# Patient Record
Sex: FEMALE | Race: BLACK OR AFRICAN AMERICAN | NOT HISPANIC OR LATINO | URBAN - METROPOLITAN AREA
[De-identification: names, ages, dates, MRNs, and addresses within clinical notes are randomized per-mention and may not be internally consistent; named-entity substitution may affect disease eponyms.]

---

## 2021-12-20 ENCOUNTER — ANESTHESIA EVENT (OUTPATIENT)
Dept: SURGERY | Facility: HOSPITAL | Age: 56
DRG: 907 | End: 2021-12-20
Payer: OTHER GOVERNMENT

## 2021-12-20 ENCOUNTER — HOSPITAL ENCOUNTER (INPATIENT)
Facility: HOSPITAL | Age: 56
LOS: 25 days | Discharge: NURSING, REHAB OR LONG-TERM CARE | DRG: 907 | End: 2022-01-14
Attending: SURGERY | Admitting: SURGERY
Payer: OTHER GOVERNMENT

## 2021-12-20 DIAGNOSIS — R58 HYPOTENSION DUE TO BLOOD LOSS: ICD-10-CM

## 2021-12-20 DIAGNOSIS — A41.9 SEPTIC SHOCK: ICD-10-CM

## 2021-12-20 DIAGNOSIS — D62 ACUTE BLOOD LOSS ANEMIA: ICD-10-CM

## 2021-12-20 DIAGNOSIS — R65.21 SEPTIC SHOCK: ICD-10-CM

## 2021-12-20 DIAGNOSIS — R00.0 INCREASED HEART RATE: ICD-10-CM

## 2021-12-20 DIAGNOSIS — R00.0 TACHYCARDIA: Primary | ICD-10-CM

## 2021-12-20 DIAGNOSIS — I95.89 HYPOTENSION DUE TO BLOOD LOSS: ICD-10-CM

## 2021-12-20 DIAGNOSIS — K76.89 SUBCAPSULAR HEMATOMA OF LIVER: ICD-10-CM

## 2021-12-20 DIAGNOSIS — Z99.11 ENCOUNTER FOR WEANING FROM VENTILATOR: ICD-10-CM

## 2021-12-20 DIAGNOSIS — N17.9 AKI (ACUTE KIDNEY INJURY): ICD-10-CM

## 2021-12-20 DIAGNOSIS — E87.5 HYPERKALEMIA: ICD-10-CM

## 2021-12-20 DIAGNOSIS — K63.1 BOWEL PERFORATION: ICD-10-CM

## 2021-12-20 LAB
ABO + RH BLD: NORMAL
ALBUMIN SERPL BCP-MCNC: 3.1 G/DL (ref 3.5–5.2)
ALBUMIN SERPL BCP-MCNC: 3.6 G/DL (ref 3.5–5.2)
ALBUMIN SERPL BCP-MCNC: 3.6 G/DL (ref 3.5–5.2)
ALLENS TEST: ABNORMAL
ALP SERPL-CCNC: 152 U/L (ref 55–135)
ALP SERPL-CCNC: 158 U/L (ref 55–135)
ALT SERPL W/O P-5'-P-CCNC: 321 U/L (ref 10–44)
ALT SERPL W/O P-5'-P-CCNC: 322 U/L (ref 10–44)
ANION GAP SERPL CALC-SCNC: 8 MMOL/L (ref 8–16)
ANION GAP SERPL CALC-SCNC: 9 MMOL/L (ref 8–16)
ANISOCYTOSIS BLD QL SMEAR: SLIGHT
ANISOCYTOSIS BLD QL SMEAR: SLIGHT
APTT BLDCRRT: 42.4 SEC (ref 21–32)
AST SERPL-CCNC: 846 U/L (ref 10–40)
AST SERPL-CCNC: 848 U/L (ref 10–40)
BASOPHILS # BLD AUTO: ABNORMAL K/UL (ref 0–0.2)
BASOPHILS # BLD AUTO: ABNORMAL K/UL (ref 0–0.2)
BASOPHILS NFR BLD: 0 % (ref 0–1.9)
BASOPHILS NFR BLD: 0 % (ref 0–1.9)
BILIRUB SERPL-MCNC: 4.8 MG/DL (ref 0.1–1)
BILIRUB SERPL-MCNC: 4.8 MG/DL (ref 0.1–1)
BLD GP AB SCN CELLS X3 SERPL QL: NORMAL
BLD PROD TYP BPU: NORMAL
BLD PROD TYP BPU: NORMAL
BLOOD UNIT EXPIRATION DATE: NORMAL
BLOOD UNIT EXPIRATION DATE: NORMAL
BLOOD UNIT TYPE CODE: 6200
BLOOD UNIT TYPE CODE: 6200
BLOOD UNIT TYPE: NORMAL
BLOOD UNIT TYPE: NORMAL
BUN SERPL-MCNC: 54 MG/DL (ref 6–20)
BUN SERPL-MCNC: 55 MG/DL (ref 6–20)
BUN SERPL-MCNC: 55 MG/DL (ref 6–20)
BUN SERPL-MCNC: 59 MG/DL (ref 6–20)
CA-I BLDV-SCNC: 1.05 MMOL/L (ref 1.06–1.42)
CALCIUM SERPL-MCNC: 7.9 MG/DL (ref 8.7–10.5)
CALCIUM SERPL-MCNC: 7.9 MG/DL (ref 8.7–10.5)
CALCIUM SERPL-MCNC: 8 MG/DL (ref 8.7–10.5)
CALCIUM SERPL-MCNC: 8.3 MG/DL (ref 8.7–10.5)
CHLORIDE SERPL-SCNC: 105 MMOL/L (ref 95–110)
CHLORIDE SERPL-SCNC: 106 MMOL/L (ref 95–110)
CO2 SERPL-SCNC: 20 MMOL/L (ref 23–29)
CO2 SERPL-SCNC: 22 MMOL/L (ref 23–29)
CO2 SERPL-SCNC: 23 MMOL/L (ref 23–29)
CO2 SERPL-SCNC: 23 MMOL/L (ref 23–29)
CODING SYSTEM: NORMAL
CODING SYSTEM: NORMAL
CREAT SERPL-MCNC: 2.9 MG/DL (ref 0.5–1.4)
CREAT SERPL-MCNC: 3.2 MG/DL (ref 0.5–1.4)
DELSYS: ABNORMAL
DIFFERENTIAL METHOD: ABNORMAL
DIFFERENTIAL METHOD: ABNORMAL
DISPENSE STATUS: NORMAL
DISPENSE STATUS: NORMAL
EOSINOPHIL # BLD AUTO: ABNORMAL K/UL (ref 0–0.5)
EOSINOPHIL # BLD AUTO: ABNORMAL K/UL (ref 0–0.5)
EOSINOPHIL NFR BLD: 1 % (ref 0–8)
EOSINOPHIL NFR BLD: 1 % (ref 0–8)
ERYTHROCYTE [DISTWIDTH] IN BLOOD BY AUTOMATED COUNT: 15.5 % (ref 11.5–14.5)
ERYTHROCYTE [DISTWIDTH] IN BLOOD BY AUTOMATED COUNT: 16.3 % (ref 11.5–14.5)
ERYTHROCYTE [SEDIMENTATION RATE] IN BLOOD BY WESTERGREN METHOD: 18 MM/H
EST. GFR  (AFRICAN AMERICAN): 18.1 ML/MIN/1.73 M^2
EST. GFR  (AFRICAN AMERICAN): 20.4 ML/MIN/1.73 M^2
EST. GFR  (NON AFRICAN AMERICAN): 15.7 ML/MIN/1.73 M^2
EST. GFR  (NON AFRICAN AMERICAN): 17.7 ML/MIN/1.73 M^2
GLUCOSE SERPL-MCNC: 114 MG/DL (ref 70–110)
GLUCOSE SERPL-MCNC: 118 MG/DL (ref 70–110)
GLUCOSE SERPL-MCNC: 118 MG/DL (ref 70–110)
GLUCOSE SERPL-MCNC: 90 MG/DL (ref 70–110)
HCO3 UR-SCNC: 23.1 MMOL/L (ref 24–28)
HCT VFR BLD AUTO: 20.9 % (ref 37–48.5)
HCT VFR BLD AUTO: 29.9 % (ref 37–48.5)
HGB BLD-MCNC: 10.5 G/DL (ref 12–16)
HGB BLD-MCNC: 7.3 G/DL (ref 12–16)
IMM GRANULOCYTES # BLD AUTO: ABNORMAL K/UL (ref 0–0.04)
IMM GRANULOCYTES # BLD AUTO: ABNORMAL K/UL (ref 0–0.04)
IMM GRANULOCYTES NFR BLD AUTO: ABNORMAL % (ref 0–0.5)
IMM GRANULOCYTES NFR BLD AUTO: ABNORMAL % (ref 0–0.5)
INR PPP: 1.3 (ref 0.8–1.2)
LACTATE SERPL-SCNC: 1.1 MMOL/L (ref 0.5–2.2)
LYMPHOCYTES # BLD AUTO: ABNORMAL K/UL (ref 1–4.8)
LYMPHOCYTES # BLD AUTO: ABNORMAL K/UL (ref 1–4.8)
LYMPHOCYTES NFR BLD: 14 % (ref 18–48)
LYMPHOCYTES NFR BLD: 4 % (ref 18–48)
MAGNESIUM SERPL-MCNC: 2.3 MG/DL (ref 1.6–2.6)
MAGNESIUM SERPL-MCNC: 2.4 MG/DL (ref 1.6–2.6)
MAGNESIUM SERPL-MCNC: 2.4 MG/DL (ref 1.6–2.6)
MCH RBC QN AUTO: 30.4 PG (ref 27–31)
MCH RBC QN AUTO: 30.8 PG (ref 27–31)
MCHC RBC AUTO-ENTMCNC: 34.9 G/DL (ref 32–36)
MCHC RBC AUTO-ENTMCNC: 35.1 G/DL (ref 32–36)
MCV RBC AUTO: 87 FL (ref 82–98)
MCV RBC AUTO: 88 FL (ref 82–98)
METAMYELOCYTES NFR BLD MANUAL: 2 %
METAMYELOCYTES NFR BLD MANUAL: 5 %
MODE: ABNORMAL
MONOCYTES # BLD AUTO: ABNORMAL K/UL (ref 0.3–1)
MONOCYTES # BLD AUTO: ABNORMAL K/UL (ref 0.3–1)
MONOCYTES NFR BLD: 3 % (ref 4–15)
MONOCYTES NFR BLD: 9 % (ref 4–15)
MYELOCYTES NFR BLD MANUAL: 2 %
MYELOCYTES NFR BLD MANUAL: 2 %
NEUTROPHILS NFR BLD: 64 % (ref 38–73)
NEUTROPHILS NFR BLD: 70 % (ref 38–73)
NEUTS BAND NFR BLD MANUAL: 11 %
NEUTS BAND NFR BLD MANUAL: 11 %
NRBC BLD-RTO: 2 /100 WBC
NRBC BLD-RTO: 2 /100 WBC
NUM UNITS TRANS PACKED RBC: NORMAL
NUM UNITS TRANS PACKED RBC: NORMAL
OVALOCYTES BLD QL SMEAR: ABNORMAL
PCO2 BLDA: 42.9 MMHG (ref 35–45)
PEEP: 5
PH SMN: 7.34 [PH] (ref 7.35–7.45)
PHOSPHATE SERPL-MCNC: 2.8 MG/DL (ref 2.7–4.5)
PLATELET # BLD AUTO: 101 K/UL (ref 150–450)
PLATELET # BLD AUTO: 109 K/UL (ref 150–450)
PLATELET BLD QL SMEAR: ABNORMAL
PLATELET BLD QL SMEAR: ABNORMAL
PMV BLD AUTO: 12.2 FL (ref 9.2–12.9)
PMV BLD AUTO: 12.2 FL (ref 9.2–12.9)
PO2 BLDA: 74 MMHG (ref 80–100)
POC BE: -3 MMOL/L
POC SATURATED O2: 94 % (ref 95–100)
POC TCO2: 24 MMOL/L (ref 23–27)
POCT GLUCOSE: 114 MG/DL (ref 70–110)
POCT GLUCOSE: 204 MG/DL (ref 70–110)
POCT GLUCOSE: 52 MG/DL (ref 70–110)
POCT GLUCOSE: 64 MG/DL (ref 70–110)
POCT GLUCOSE: 75 MG/DL (ref 70–110)
POCT GLUCOSE: 89 MG/DL (ref 70–110)
POCT GLUCOSE: 96 MG/DL (ref 70–110)
POIKILOCYTOSIS BLD QL SMEAR: SLIGHT
POLYCHROMASIA BLD QL SMEAR: ABNORMAL
POLYCHROMASIA BLD QL SMEAR: ABNORMAL
POTASSIUM SERPL-SCNC: 4.4 MMOL/L (ref 3.5–5.1)
POTASSIUM SERPL-SCNC: 4.5 MMOL/L (ref 3.5–5.1)
POTASSIUM SERPL-SCNC: 4.5 MMOL/L (ref 3.5–5.1)
POTASSIUM SERPL-SCNC: 4.7 MMOL/L (ref 3.5–5.1)
PROT SERPL-MCNC: 5.1 G/DL (ref 6–8.4)
PROT SERPL-MCNC: 5.4 G/DL (ref 6–8.4)
PROTHROMBIN TIME: 14.5 SEC (ref 9–12.5)
RBC # BLD AUTO: 2.4 M/UL (ref 4–5.4)
RBC # BLD AUTO: 3.41 M/UL (ref 4–5.4)
SAMPLE: ABNORMAL
SITE: ABNORMAL
SODIUM SERPL-SCNC: 135 MMOL/L (ref 136–145)
SODIUM SERPL-SCNC: 135 MMOL/L (ref 136–145)
SODIUM SERPL-SCNC: 136 MMOL/L (ref 136–145)
SODIUM SERPL-SCNC: 136 MMOL/L (ref 136–145)
TRIGL SERPL-MCNC: 53 MG/DL (ref 30–150)
VT: 395
WBC # BLD AUTO: 16.5 K/UL (ref 3.9–12.7)
WBC # BLD AUTO: 17.45 K/UL (ref 3.9–12.7)
WBC OTHER NFR BLD MANUAL: 1 %

## 2021-12-20 PROCEDURE — 99291 CRITICAL CARE FIRST HOUR: CPT | Mod: ,,, | Performed by: SURGERY

## 2021-12-20 PROCEDURE — 83735 ASSAY OF MAGNESIUM: CPT | Mod: 91 | Performed by: SURGERY

## 2021-12-20 PROCEDURE — P9016 RBC LEUKOCYTES REDUCED: HCPCS | Performed by: SURGERY

## 2021-12-20 PROCEDURE — 84100 ASSAY OF PHOSPHORUS: CPT | Mod: 91 | Performed by: SURGERY

## 2021-12-20 PROCEDURE — 86901 BLOOD TYPING SEROLOGIC RH(D): CPT | Performed by: SURGERY

## 2021-12-20 PROCEDURE — 85060 BLOOD SMEAR INTERPRETATION: CPT | Mod: ,,, | Performed by: PATHOLOGY

## 2021-12-20 PROCEDURE — 85007 BL SMEAR W/DIFF WBC COUNT: CPT | Mod: 91

## 2021-12-20 PROCEDURE — 80053 COMPREHEN METABOLIC PANEL: CPT

## 2021-12-20 PROCEDURE — 82330 ASSAY OF CALCIUM: CPT | Performed by: SURGERY

## 2021-12-20 PROCEDURE — 25000003 PHARM REV CODE 250: Performed by: INTERNAL MEDICINE

## 2021-12-20 PROCEDURE — P9016 RBC LEUKOCYTES REDUCED: HCPCS

## 2021-12-20 PROCEDURE — 63600175 PHARM REV CODE 636 W HCPCS

## 2021-12-20 PROCEDURE — 99291 PR CRITICAL CARE, E/M 30-74 MINUTES: ICD-10-PCS | Mod: ,,, | Performed by: SURGERY

## 2021-12-20 PROCEDURE — 84100 ASSAY OF PHOSPHORUS: CPT

## 2021-12-20 PROCEDURE — 86920 COMPATIBILITY TEST SPIN: CPT

## 2021-12-20 PROCEDURE — 27000221 HC OXYGEN, UP TO 24 HOURS

## 2021-12-20 PROCEDURE — 83735 ASSAY OF MAGNESIUM: CPT | Mod: 91 | Performed by: STUDENT IN AN ORGANIZED HEALTH CARE EDUCATION/TRAINING PROGRAM

## 2021-12-20 PROCEDURE — 36430 TRANSFUSION BLD/BLD COMPNT: CPT

## 2021-12-20 PROCEDURE — 86920 COMPATIBILITY TEST SPIN: CPT | Performed by: STUDENT IN AN ORGANIZED HEALTH CARE EDUCATION/TRAINING PROGRAM

## 2021-12-20 PROCEDURE — 25000003 PHARM REV CODE 250

## 2021-12-20 PROCEDURE — 80069 RENAL FUNCTION PANEL: CPT | Performed by: STUDENT IN AN ORGANIZED HEALTH CARE EDUCATION/TRAINING PROGRAM

## 2021-12-20 PROCEDURE — 63600175 PHARM REV CODE 636 W HCPCS: Mod: JG

## 2021-12-20 PROCEDURE — 20000000 HC ICU ROOM

## 2021-12-20 PROCEDURE — 94002 VENT MGMT INPAT INIT DAY: CPT

## 2021-12-20 PROCEDURE — 37799 UNLISTED PX VASCULAR SURGERY: CPT

## 2021-12-20 PROCEDURE — 85007 BL SMEAR W/DIFF WBC COUNT: CPT | Performed by: SURGERY

## 2021-12-20 PROCEDURE — 85610 PROTHROMBIN TIME: CPT | Performed by: SURGERY

## 2021-12-20 PROCEDURE — 85027 COMPLETE CBC AUTOMATED: CPT | Mod: 91

## 2021-12-20 PROCEDURE — 94761 N-INVAS EAR/PLS OXIMETRY MLT: CPT

## 2021-12-20 PROCEDURE — 25000003 PHARM REV CODE 250: Performed by: SURGERY

## 2021-12-20 PROCEDURE — 80053 COMPREHEN METABOLIC PANEL: CPT | Mod: 91 | Performed by: SURGERY

## 2021-12-20 PROCEDURE — 83605 ASSAY OF LACTIC ACID: CPT | Performed by: SURGERY

## 2021-12-20 PROCEDURE — 83735 ASSAY OF MAGNESIUM: CPT

## 2021-12-20 PROCEDURE — 85730 THROMBOPLASTIN TIME PARTIAL: CPT | Performed by: SURGERY

## 2021-12-20 PROCEDURE — 82803 BLOOD GASES ANY COMBINATION: CPT

## 2021-12-20 PROCEDURE — 86920 COMPATIBILITY TEST SPIN: CPT | Performed by: SURGERY

## 2021-12-20 PROCEDURE — 85027 COMPLETE CBC AUTOMATED: CPT | Performed by: SURGERY

## 2021-12-20 PROCEDURE — 84478 ASSAY OF TRIGLYCERIDES: CPT | Performed by: SURGERY

## 2021-12-20 PROCEDURE — 36415 COLL VENOUS BLD VENIPUNCTURE: CPT | Performed by: SURGERY

## 2021-12-20 PROCEDURE — 85060 PATHOLOGIST REVIEW: ICD-10-PCS | Mod: ,,, | Performed by: PATHOLOGY

## 2021-12-20 PROCEDURE — 99900035 HC TECH TIME PER 15 MIN (STAT)

## 2021-12-20 RX ORDER — MUPIROCIN 20 MG/G
OINTMENT TOPICAL 2 TIMES DAILY
Status: COMPLETED | OUTPATIENT
Start: 2021-12-20 | End: 2021-12-25

## 2021-12-20 RX ORDER — MAGNESIUM SULFATE HEPTAHYDRATE 40 MG/ML
4 INJECTION, SOLUTION INTRAVENOUS
Status: DISCONTINUED | OUTPATIENT
Start: 2021-12-20 | End: 2021-12-21

## 2021-12-20 RX ORDER — FENTANYL CITRATE 50 UG/ML
INJECTION, SOLUTION INTRAMUSCULAR; INTRAVENOUS
Status: COMPLETED
Start: 2021-12-20 | End: 2021-12-20

## 2021-12-20 RX ORDER — NOREPINEPHRINE BITARTRATE/D5W 4MG/250ML
0-3 PLASTIC BAG, INJECTION (ML) INTRAVENOUS CONTINUOUS
Status: DISCONTINUED | OUTPATIENT
Start: 2021-12-20 | End: 2021-12-26

## 2021-12-20 RX ORDER — POTASSIUM CHLORIDE 29.8 MG/ML
80 INJECTION INTRAVENOUS
Status: DISCONTINUED | OUTPATIENT
Start: 2021-12-20 | End: 2021-12-21

## 2021-12-20 RX ORDER — FENTANYL CITRATE-0.9 % NACL/PF 10 MCG/ML
0-250 PLASTIC BAG, INJECTION (ML) INTRAVENOUS CONTINUOUS
Status: DISCONTINUED | OUTPATIENT
Start: 2021-12-20 | End: 2021-12-22

## 2021-12-20 RX ORDER — MAGNESIUM SULFATE HEPTAHYDRATE 40 MG/ML
2 INJECTION, SOLUTION INTRAVENOUS
Status: ACTIVE | OUTPATIENT
Start: 2021-12-20 | End: 2021-12-21

## 2021-12-20 RX ORDER — HYDROCODONE BITARTRATE AND ACETAMINOPHEN 500; 5 MG/1; MG/1
TABLET ORAL CONTINUOUS
Status: DISCONTINUED | OUTPATIENT
Start: 2021-12-20 | End: 2022-01-07

## 2021-12-20 RX ORDER — FAMOTIDINE 10 MG/ML
20 INJECTION INTRAVENOUS EVERY 12 HOURS
Status: DISCONTINUED | OUTPATIENT
Start: 2021-12-20 | End: 2021-12-21

## 2021-12-20 RX ORDER — HYDROCODONE BITARTRATE AND ACETAMINOPHEN 500; 5 MG/1; MG/1
TABLET ORAL
Status: DISCONTINUED | OUTPATIENT
Start: 2021-12-20 | End: 2021-12-22

## 2021-12-20 RX ORDER — POTASSIUM CHLORIDE 14.9 MG/ML
60 INJECTION INTRAVENOUS
Status: DISCONTINUED | OUTPATIENT
Start: 2021-12-20 | End: 2021-12-21

## 2021-12-20 RX ORDER — SODIUM CHLORIDE 0.9 % (FLUSH) 0.9 %
10 SYRINGE (ML) INJECTION
Status: DISCONTINUED | OUTPATIENT
Start: 2021-12-20 | End: 2022-01-07

## 2021-12-20 RX ORDER — MAGNESIUM SULFATE HEPTAHYDRATE 40 MG/ML
2 INJECTION, SOLUTION INTRAVENOUS
Status: DISCONTINUED | OUTPATIENT
Start: 2021-12-20 | End: 2021-12-21

## 2021-12-20 RX ORDER — DEXMEDETOMIDINE HYDROCHLORIDE 4 UG/ML
0-1.4 INJECTION, SOLUTION INTRAVENOUS CONTINUOUS
Status: DISCONTINUED | OUTPATIENT
Start: 2021-12-20 | End: 2021-12-27

## 2021-12-20 RX ORDER — POTASSIUM CHLORIDE 29.8 MG/ML
40 INJECTION INTRAVENOUS
Status: DISCONTINUED | OUTPATIENT
Start: 2021-12-20 | End: 2021-12-21

## 2021-12-20 RX ORDER — SODIUM CHLORIDE, SODIUM LACTATE, POTASSIUM CHLORIDE, CALCIUM CHLORIDE 600; 310; 30; 20 MG/100ML; MG/100ML; MG/100ML; MG/100ML
INJECTION, SOLUTION INTRAVENOUS CONTINUOUS
Status: DISCONTINUED | OUTPATIENT
Start: 2021-12-20 | End: 2021-12-21

## 2021-12-20 RX ADMIN — MUPIROCIN: 20 OINTMENT TOPICAL at 08:12

## 2021-12-20 RX ADMIN — FAMOTIDINE 20 MG: 10 INJECTION INTRAVENOUS at 08:12

## 2021-12-20 RX ADMIN — ALTEPLASE 2 MG: 2.2 INJECTION, POWDER, LYOPHILIZED, FOR SOLUTION INTRAVENOUS at 09:12

## 2021-12-20 RX ADMIN — SODIUM CHLORIDE, SODIUM LACTATE, POTASSIUM CHLORIDE, AND CALCIUM CHLORIDE: .6; .31; .03; .02 INJECTION, SOLUTION INTRAVENOUS at 03:12

## 2021-12-20 RX ADMIN — ALTEPLASE 2 MG: 2.2 INJECTION, POWDER, LYOPHILIZED, FOR SOLUTION INTRAVENOUS at 11:12

## 2021-12-20 RX ADMIN — SODIUM CHLORIDE: 0.9 INJECTION, SOLUTION INTRAVENOUS at 08:12

## 2021-12-20 RX ADMIN — CALCIUM GLUCONATE 1 G: 98 INJECTION, SOLUTION INTRAVENOUS at 04:12

## 2021-12-20 RX ADMIN — NITROGLYCERIN 0.5 INCH: 20 OINTMENT TOPICAL at 09:12

## 2021-12-20 RX ADMIN — DEXTROSE MONOHYDRATE 25 G: 25 INJECTION, SOLUTION INTRAVENOUS at 03:12

## 2021-12-20 RX ADMIN — PIPERACILLIN SODIUM AND TAZOBACTAM SODIUM 4.5 G: 4; .5 INJECTION, POWDER, FOR SOLUTION INTRAVENOUS at 04:12

## 2021-12-20 RX ADMIN — NOREPINEPHRINE BITARTRATE 0.02 MCG/KG/MIN: 4 INJECTION, SOLUTION INTRAVENOUS at 08:12

## 2021-12-20 NOTE — H&P
[facility] - Surgical Intensive Care  Critical Care - Surgery  History & Physical    Patient Name: Chidi Castaneda  MRN: [id number]  Admission Date: 12/20/2021  Code Status: Full Code  Attending Physician: Kendall Gorman MD   Primary Care Provider: Primary Doctor No   Principal Problem: <principal problem not specified>    Subjective:     HPI:  Ms. Castaneda is a 57yo F with PMH of sleep apnea, GERD, DVT not on antiboagulation, diverticulitis, vaughn's esophagus, HFrEF (last EF 40%) and a PSH of elective laparoscopic hysterectomy on 12/8.    On 12/10 she was transferred from [facility] to [facility] for acute renal failure, hypotension and tachycardia. A CT scan was done at the OSH that showed 2 small bowel perforations. She went for an expoloratory laparotomy on 12/11 at which time 16 inches of small bowel were removed, abdomen left open with abthera vac in place. Started on SLED on 12/12 for her SUSAN. On 12/15 found to have a subcapsular liver hematoma. On 12/18 she was taken back tot he OR for Wound vac change and washout with removal of liver hematoma.  She continued to have sanguinous to serosanguinous output from her wound vac. She was given TXA on 12/19 without success. 12/20 underwent HD. She has receveived a total of 9 units pRBC, 4 units FFP, and 1 unit of platelets since her admission to the OSH.     She has been transferred from [facility] for embolization vs OR takeback of her suspected hepatic bleed and higher level of care in the intensive care unit.      Hospital/ICU Course:  No notes on file    Follow-up For: Procedure(s) (LRB):  LAPAROTOMY, EXPLORATORY (N/A)    Post-Operative Day:       No past medical history on file.    No past surgical history on file.    Review of patient's allergies indicates:   Allergen Reactions    Oxycodone     Tylox [oxycodone-acetaminophen]        Family History    None       Tobacco Use    Smoking status: Not on file    Smokeless tobacco: Not on file    Substance and Sexual Activity    Alcohol use: Not on file    Drug use: Not on file    Sexual activity: Not on file      Review of Systems Patient is intubated. Negative Except per HPI.  Objective:     Vital Signs (Most Recent):  Temp: 96.62 °F (35.9 °C) (12/20/21 1515)  Pulse: 98 (12/20/21 1515)  Resp: 20 (12/20/21 1515)  BP: (!) 109/59 (12/20/21 1509)  SpO2: 99 % (12/20/21 1515) Vital Signs (24h Range):  Temp:  [96.62 °F (35.9 °C)-98.8 °F (37.1 °C)] 96.62 °F (35.9 °C)  Pulse:  [] 98  Resp:  [18-22] 20  SpO2:  [98 %-100 %] 99 %  BP: (103-142)/(50-66) 109/59  Arterial Line BP: (133-160)/(59-65) 133/59     Weight: 97.5 kg (214 lb 15.2 oz)  There is no height or weight on file to calculate BMI.    No intake or output data in the 24 hours ending 12/20/21 1613    Physical Exam  Vitals and nursing note reviewed.   Constitutional:       Appearance: She is obese. She is ill-appearing.   HENT:      Head: Normocephalic and atraumatic.      Nose:      Comments: NG tube in place  Cardiovascular:      Rate and Rhythm: Normal rate and regular rhythm.      Pulses: Normal pulses.   Pulmonary:      Comments: Intubated  Vent Mode: A/C  Oxygen Concentration (%):  (40-50) 50  Resp Rate Total:  (18 br/min-20 br/min) 20 br/min  Vt Set:  (395 mL) 395 mL  PEEP/CPAP:  (5 cmH20) 5 cmH20  Mean Airway Pressure:  (12 cmH20) 12 cmH20    Abdominal:      Comments: Abdomen is open. There is an abdominal wound vac in place to continuous suction draining serosanguinous to sanguinous output.   Musculoskeletal:      Right lower leg: Edema present.      Left lower leg: Edema present.      Comments: L radial larry, L femoral trialysis 16fr, L subclavian triple lumen   Skin:     General: Skin is warm and dry.   Neurological:      Comments: Does not respond to commands         Vents:  Vent Mode: A/C (12/20/21 1509)  Ventilator Initiated: Yes (12/20/21 1448)  Set Rate: 18 BPM (12/20/21 1509)  Vt Set: 395 mL (12/20/21 1509)  PEEP/CPAP: 5 cmH20  (12/20/21 1509)  Oxygen Concentration (%): 50 (12/20/21 1515)  Peak Airway Pressure: 40 cmH2O (12/20/21 1509)  Plateau Pressure: 0 cmH20 (12/20/21 1509)  Total Ve: 7.22 mL (12/20/21 1509)  Negative Inspiratory Force (cm H2O): 0 (12/20/21 1509)  F/VT Ratio<105 (RSBI): (!) 44.67 (12/20/21 1509)    Lines/Drains/Airways     Central Venous Catheter Line            Percutaneous Central Line Insertion/Assessment - Triple Lumen  12/16/21 1531 left subclavian 4 days    Trialysis (Dialysis) Catheter 12/16/21 0000 left femoral 4 days          Drain                 NG/OG Tube 12/16/21 0000 Left nostril 4 days          Airway                 Airway - Non-Surgical 12/20/21 1451 Endotracheal Tube <1 day          Arterial Line            Arterial Line 12/18/21 0000 Right Radial 2 days                Significant Labs:    CBC/Anemia Profile:  Recent Labs   Lab 12/20/21  1454   WBC 17.45*   HGB 7.3*   HCT 20.9*   *   MCV 87   RDW 15.5*        Chemistries:  Recent Labs   Lab 12/20/21  1454   *   K 4.4      CO2 22*   BUN 54*   CREATININE 2.9*   CALCIUM 8.0*   ALBUMIN 3.6   PROT 5.4*   BILITOT 4.8*   ALKPHOS 152*   *   *   MG 2.4   PHOS 2.8       All pertinent labs within the past 24 hours have been reviewed.    Significant Imaging: I have reviewed all pertinent imaging results/findings within the past 24 hours.    Assessment/Plan:     Subcapsular hematoma of liver    Neuro/Psych:   -- Does not respond to commands, this is unchanged from OSH report   --Sedation/Pain: precedex, fentanyl, currently off sedation             Cards:   -- Pressor requirements on arrival 0.03 vaso, levophed 0.06, currently off pressors  -- Transfused 1u prior to arrival, Hgb at that point 6.9, repeat 7.3  -- LR @ 125cc/hr      Pulm:   -- Goal O2 sat > 90%  -- Intubated, currently on 50/6, satting well      Renal:  -- Keep billingsley for strict I/O  -- Currently has SUSAN - was received SLED at OSH, received HD today for  transfer  -- BUN/Cr elevated, trending  -- Nephrology consult today      FEN / GI:   -- Replace lytes as needed  -- Nutrition: strict NPO  -- LR @ 125cc/hr      ID:   -- Tm: afebrile; WBC 17.45   -- Abx on zosyn      Heme/Onc:   -- Since hosptial admission on 12/10 has received 9u pRBC, 4u FFP, 1u platelets  -- H/H 7.3 from 6.9, transfusing 2 units now  -- Q12 CBC      Endo:   -- Gluc goal 140-180  -- no history of diabetes      PPx:   Feeding: NPO  Analgesia/Sedation: Fentanyl/Versed  Thromboembolic prevention: Holding  HOB >30: yes  Stress Ulcer ppx: Famotidine  Glucose control: Critical care goal 140-180 g/dl, ISS    Lines/Drains/Airway: NG, Ng, L radial larry, L femoral trialysis 16fr, L subclavian triple lumen      Dispo/Code Status/Palliative:   -- SICU / Full Code       Critical secondary to Patient has a condition that poses threat to life and bodily function: Acute Renal Failure, Acute blood loss anemia     Critical care was time spent personally by me on the following activities: development of treatment plan with patient or surrogate and bedside caregivers, discussions with consultants, evaluation of patient's response to treatment, examination of patient, ordering and performing treatments and interventions, ordering and review of laboratory studies, ordering and review of radiographic studies, pulse oximetry, re-evaluation of patient's condition.  This critical care time did not overlap with that of any other provider or involve time for any procedures.     Jaylan Altman MD  Critical Care - Surgery  [facility] - Surgical Intensive Care

## 2021-12-20 NOTE — CARE UPDATE
FLIGHT CARE TRANSPORT NOTE     Date of Transport: 2021  : 1965  Age: 56 y.o.  Medication Dosing Weight: 88kg  Sex: female  Race: Unknown    MRN: [id number]  Time Of Patient Handoff: 1445    ASSESSMENT/INTERVENTIONS     This patient was transported by [facility] from the ICU of [facility] by [facility]. The patient's overall condition remained unchanged throughout transport, with all vital signs remaining stable per the patient's current baseline. All lines, tubes, and devices remained patent and intact.     LDAs:  7.5 ETT - 20cm @ the teeth,  NG tube - L. Nare,  Midline - L. Upper Arm,  Right Radial Arterial Line,  Left Subclavian TLC,  R. Groin HD TLC,  Indwelling Ng Catheter,  LIWS with 'open' abdomen - 300mL sanguinous output en route.     GTTS:  Vaso gtt @ 0.03units/min,  Fentanyl gtt @ 150mcg/hr,  Versed gtt @ 6mg/hr.    Levo gtt DC'd en route s/t MAP >65.    POCT CB.    The patient was transferred from the [facility] stretcher to SICU bed 13242 where care was transitioned to Bedside [facility] without incident. The patient was sent without personal belongings (sent with family) and OSH Chart and Diagnostic Disk(s) were left with receiving clinician.     Vital signs prior to  departure:  HR: 105 ST, no ectopy,  BP: 111/89 (96) - Automatic  BP: 143/53 (71) - Arterial  SpO2: 100%  EtCO2: 40  RR: 20  Temperature: 98.8 Axillary    Ventilator Settings:  AC  VT: 280mL (from receiving s/t 'lung protective measures')  PEEP: 5.0  FiO2: 40%  RR: 20    Personally contacted family:  Columba Castaneda (): [phone] - and updated on safe arrival. No questions noted.  Mahad Castaneda (Son): [phone].    FOLLOW-UP     Call [facility], Yarely Cao RN at [phone] (Adult Team) or [phone] (Pediatric Team) for additional questions or concerns.

## 2021-12-20 NOTE — SUBJECTIVE & OBJECTIVE
Follow-up For: Procedure(s) (LRB):  LAPAROTOMY, EXPLORATORY (N/A)    Post-Operative Day:       No past medical history on file.    No past surgical history on file.    Review of patient's allergies indicates:   Allergen Reactions    Oxycodone     Tylox [oxycodone-acetaminophen]        Family History    None       Tobacco Use    Smoking status: Not on file    Smokeless tobacco: Not on file   Substance and Sexual Activity    Alcohol use: Not on file    Drug use: Not on file    Sexual activity: Not on file      Review of Systems Patient is intubated. Negative Except per HPI.  Objective:     Vital Signs (Most Recent):  Temp: 96.62 °F (35.9 °C) (12/20/21 1515)  Pulse: 98 (12/20/21 1515)  Resp: 20 (12/20/21 1515)  BP: (!) 109/59 (12/20/21 1509)  SpO2: 99 % (12/20/21 1515) Vital Signs (24h Range):  Temp:  [96.62 °F (35.9 °C)-98.8 °F (37.1 °C)] 96.62 °F (35.9 °C)  Pulse:  [] 98  Resp:  [18-22] 20  SpO2:  [98 %-100 %] 99 %  BP: (103-142)/(50-66) 109/59  Arterial Line BP: (133-160)/(59-65) 133/59     Weight: 97.5 kg (214 lb 15.2 oz)  There is no height or weight on file to calculate BMI.    No intake or output data in the 24 hours ending 12/20/21 1613    Physical Exam  Vitals and nursing note reviewed.   Constitutional:       Appearance: She is obese. She is ill-appearing.   HENT:      Head: Normocephalic and atraumatic.      Nose:      Comments: NG tube in place  Cardiovascular:      Rate and Rhythm: Normal rate and regular rhythm.      Pulses: Normal pulses.   Pulmonary:      Comments: Intubated  Vent Mode: A/C  Oxygen Concentration (%):  (40-50) 50  Resp Rate Total:  (18 br/min-20 br/min) 20 br/min  Vt Set:  (395 mL) 395 mL  PEEP/CPAP:  (5 cmH20) 5 cmH20  Mean Airway Pressure:  (12 cmH20) 12 cmH20    Abdominal:      Comments: Abdomen is open. There is an abdominal wound vac in place to continuous suction draining serosanguinous to sanguinous output.   Musculoskeletal:      Right lower leg: Edema present.       Left lower leg: Edema present.      Comments: L radial larry, L femoral trialysis 16fr, L subclavian triple lumen   Skin:     General: Skin is warm and dry.   Neurological:      Comments: Does not respond to commands         Vents:  Vent Mode: A/C (12/20/21 1509)  Ventilator Initiated: Yes (12/20/21 1448)  Set Rate: 18 BPM (12/20/21 1509)  Vt Set: 395 mL (12/20/21 1509)  PEEP/CPAP: 5 cmH20 (12/20/21 1509)  Oxygen Concentration (%): 50 (12/20/21 1515)  Peak Airway Pressure: 40 cmH2O (12/20/21 1509)  Plateau Pressure: 0 cmH20 (12/20/21 1509)  Total Ve: 7.22 mL (12/20/21 1509)  Negative Inspiratory Force (cm H2O): 0 (12/20/21 1509)  F/VT Ratio<105 (RSBI): (!) 44.67 (12/20/21 1509)    Lines/Drains/Airways     Central Venous Catheter Line            Percutaneous Central Line Insertion/Assessment - Triple Lumen  12/16/21 1531 left subclavian 4 days    Trialysis (Dialysis) Catheter 12/16/21 0000 left femoral 4 days          Drain                 NG/OG Tube 12/16/21 0000 Left nostril 4 days          Airway                 Airway - Non-Surgical 12/20/21 1451 Endotracheal Tube <1 day          Arterial Line            Arterial Line 12/18/21 0000 Right Radial 2 days                Significant Labs:    CBC/Anemia Profile:  Recent Labs   Lab 12/20/21  1454   WBC 17.45*   HGB 7.3*   HCT 20.9*   *   MCV 87   RDW 15.5*        Chemistries:  Recent Labs   Lab 12/20/21  1454   *   K 4.4      CO2 22*   BUN 54*   CREATININE 2.9*   CALCIUM 8.0*   ALBUMIN 3.6   PROT 5.4*   BILITOT 4.8*   ALKPHOS 152*   *   *   MG 2.4   PHOS 2.8       All pertinent labs within the past 24 hours have been reviewed.    Significant Imaging: I have reviewed all pertinent imaging results/findings within the past 24 hours.

## 2021-12-20 NOTE — CARE UPDATE
Received patient from outside facility and placed on a  vent on documented settings.    ETT is placed at 20cm at the teeth.  Will continue to monitor

## 2021-12-20 NOTE — PROGRESS NOTES
Patient admitted to SICU [id number]. Report received from CHASIDY Santamaria RN at [facility], report received from flight nurses. Respiratory/charge/SICU team notified of arrival. Pt placed on ventilator by RT, continuous cardiac monitor. Fully assessed, see flowsheet. MD orders placed and carried out. Orders for 2 unit PRBC. WCTM.

## 2021-12-20 NOTE — ASSESSMENT & PLAN NOTE
  Neuro/Psych:   -- Does not respond to commands, this is unchanged from OSH report   --Sedation/Pain: precedex, fentanyl, currently off sedation             Cards:   -- Pressor requirements on arrival 0.03 vaso, levophed 0.06, currently off pressors  -- Transfused 1u prior to arrival, Hgb at that point 6.9, repeat 7.3  -- LR @ 125cc/hr      Pulm:   -- Goal O2 sat > 90%  -- Intubated, currently on 50/6, satting well      Renal:  -- Keep billingsley for strict I/O  -- Currently has SUSAN - was received SLED at OSH, received HD today for transfer  -- BUN/Cr elevated, trending  -- Nephrology consult today      FEN / GI:   -- Replace lytes as needed  -- Nutrition: strict NPO  -- LR @ 125cc/hr      ID:   -- Tm: afebrile; WBC 17.45   -- Abx on zosyn      Heme/Onc:   -- Since hosptial admission on 12/10 has received 9u pRBC, 4u FFP, 1u platelets  -- H/H 7.3 from 6.9, transfusing 2 units now  -- Q12 CBC      Endo:   -- Gluc goal 140-180  -- no history of diabetes      PPx:   Feeding: NPO  Analgesia/Sedation: Fentanyl/Versed  Thromboembolic prevention: Holding  HOB >30: yes  Stress Ulcer ppx: Famotidine  Glucose control: Critical care goal 140-180 g/dl, ISS    Lines/Drains/Airway: NG, Billingsley, L radial larry, L femoral trialysis 16fr, L subclavian triple lumen      Dispo/Code Status/Palliative:   -- SICU / Full Code

## 2021-12-20 NOTE — HPI
Ms. Castaneda is a 57yo F with PMH of sleep apnea, GERD, DVT not on antiboagulation, diverticulitis, vaughn's esophagus, HFrEF (last EF 40%) and a PSH of elective laparoscopic hysterectomy on 12/8.    On 12/10 she was transferred from [facility] to [facility] for acute renal failure, hypotension and tachycardia. A CT scan was done at the OSH that showed 2 small bowel perforations. She went for an expoloratory laparotomy on 12/11 at which time 16 inches of small bowel were removed, abdomen left open with abthera vac in place. Started on SLED on 12/12 for her SUSAN. On 12/15 found to have a subcapsular liver hematoma. On 12/18 she was taken back tot he OR for Wound vac change and washout with removal of liver hematoma.  She continued to have sanguinous to serosanguinous output from her wound vac. She was given TXA on 12/19 without success. 12/20 underwent HD. She has receveived a total of 9 units pRBC, 4 units FFP, and 1 unit of platelets since her admission to the OSH.     She has been transferred from [facility] for embolization vs OR takeback of her suspected hepatic bleed and higher level of care in the intensive care unit.

## 2021-12-21 ENCOUNTER — ANESTHESIA (OUTPATIENT)
Dept: SURGERY | Facility: HOSPITAL | Age: 56
DRG: 907 | End: 2021-12-21
Payer: OTHER GOVERNMENT

## 2021-12-21 PROBLEM — A41.9 SEPTIC SHOCK: Status: ACTIVE | Noted: 2021-12-21

## 2021-12-21 PROBLEM — E87.20 METABOLIC ACIDOSIS: Status: ACTIVE | Noted: 2021-12-21

## 2021-12-21 PROBLEM — N17.9 AKI (ACUTE KIDNEY INJURY): Status: ACTIVE | Noted: 2021-12-21

## 2021-12-21 PROBLEM — R65.21 SEPTIC SHOCK: Status: ACTIVE | Noted: 2021-12-21

## 2021-12-21 PROBLEM — K63.1 BOWEL PERFORATION: Status: ACTIVE | Noted: 2021-12-21

## 2021-12-21 PROBLEM — E87.70 VOLUME OVERLOAD: Status: ACTIVE | Noted: 2021-12-21

## 2021-12-21 LAB
ALBUMIN SERPL BCP-MCNC: 2 G/DL (ref 3.5–5.2)
ALBUMIN SERPL BCP-MCNC: 2.4 G/DL (ref 3.5–5.2)
ALBUMIN SERPL BCP-MCNC: 2.9 G/DL (ref 3.5–5.2)
ALBUMIN SERPL BCP-MCNC: 3 G/DL (ref 3.5–5.2)
ALLENS TEST: ABNORMAL
ALLENS TEST: NORMAL
ALLENS TEST: NORMAL
ALP SERPL-CCNC: 163 U/L (ref 55–135)
ALP SERPL-CCNC: 163 U/L (ref 55–135)
ALP SERPL-CCNC: 194 U/L (ref 55–135)
ALT SERPL W/O P-5'-P-CCNC: 262 U/L (ref 10–44)
ALT SERPL W/O P-5'-P-CCNC: 262 U/L (ref 10–44)
ALT SERPL W/O P-5'-P-CCNC: 287 U/L (ref 10–44)
ANION GAP SERPL CALC-SCNC: 5 MMOL/L (ref 8–16)
ANION GAP SERPL CALC-SCNC: 6 MMOL/L (ref 8–16)
ANION GAP SERPL CALC-SCNC: 8 MMOL/L (ref 8–16)
ANION GAP SERPL CALC-SCNC: 9 MMOL/L (ref 8–16)
ANISOCYTOSIS BLD QL SMEAR: SLIGHT
ASCENDING AORTA: 2.69 CM
AST SERPL-CCNC: 705 U/L (ref 10–40)
AV INDEX (PROSTH): 0.63
AV MEAN GRADIENT: 2 MMHG
AV PEAK GRADIENT: 4 MMHG
AV VALVE AREA: 1.82 CM2
AV VELOCITY RATIO: 0.81
BASOPHILS # BLD AUTO: ABNORMAL K/UL (ref 0–0.2)
BASOPHILS NFR BLD: 0 % (ref 0–1.9)
BASOPHILS NFR BLD: 1 % (ref 0–1.9)
BILIRUB SERPL-MCNC: 5.4 MG/DL (ref 0.1–1)
BILIRUB SERPL-MCNC: 5.4 MG/DL (ref 0.1–1)
BILIRUB SERPL-MCNC: 6.4 MG/DL (ref 0.1–1)
BSA FOR ECHO PROCEDURE: 2.1 M2
BUN SERPL-MCNC: 10 MG/DL (ref 6–20)
BUN SERPL-MCNC: 22 MG/DL (ref 6–20)
BUN SERPL-MCNC: 22 MG/DL (ref 6–20)
BUN SERPL-MCNC: 28 MG/DL (ref 6–20)
BUN SERPL-MCNC: 29 MG/DL (ref 6–20)
BUN SERPL-MCNC: 7 MG/DL (ref 6–20)
CALCIUM SERPL-MCNC: 7.3 MG/DL (ref 8.7–10.5)
CALCIUM SERPL-MCNC: 7.5 MG/DL (ref 8.7–10.5)
CALCIUM SERPL-MCNC: 7.5 MG/DL (ref 8.7–10.5)
CALCIUM SERPL-MCNC: 7.8 MG/DL (ref 8.7–10.5)
CALCIUM SERPL-MCNC: 8.1 MG/DL (ref 8.7–10.5)
CALCIUM SERPL-MCNC: 8.1 MG/DL (ref 8.7–10.5)
CHLORIDE SERPL-SCNC: 107 MMOL/L (ref 95–110)
CHLORIDE SERPL-SCNC: 107 MMOL/L (ref 95–110)
CHLORIDE SERPL-SCNC: 108 MMOL/L (ref 95–110)
CO2 SERPL-SCNC: 20 MMOL/L (ref 23–29)
CO2 SERPL-SCNC: 20 MMOL/L (ref 23–29)
CO2 SERPL-SCNC: 23 MMOL/L (ref 23–29)
CO2 SERPL-SCNC: 23 MMOL/L (ref 23–29)
CO2 SERPL-SCNC: 26 MMOL/L (ref 23–29)
CO2 SERPL-SCNC: 26 MMOL/L (ref 23–29)
CREAT SERPL-MCNC: 0.7 MG/DL (ref 0.5–1.4)
CREAT SERPL-MCNC: 0.8 MG/DL (ref 0.5–1.4)
CREAT SERPL-MCNC: 1.7 MG/DL (ref 0.5–1.4)
CREAT SERPL-MCNC: 1.7 MG/DL (ref 0.5–1.4)
CREAT SERPL-MCNC: 1.8 MG/DL (ref 0.5–1.4)
CREAT SERPL-MCNC: 1.8 MG/DL (ref 0.5–1.4)
CV ECHO LV RWT: 0.55 CM
DELSYS: ABNORMAL
DELSYS: NORMAL
DIFFERENTIAL METHOD: ABNORMAL
DOP CALC AO PEAK VEL: 0.97 M/S
DOP CALC AO VTI: 14.83 CM
DOP CALC LVOT AREA: 2.9 CM2
DOP CALC LVOT DIAMETER: 1.91 CM
DOP CALC LVOT PEAK VEL: 0.79 M/S
DOP CALC LVOT STROKE VOLUME: 26.95 CM3
DOP CALCLVOT PEAK VEL VTI: 9.41 CM
ECHO LV POSTERIOR WALL: 1.01 CM (ref 0.6–1.1)
EJECTION FRACTION: 55 %
EOSINOPHIL # BLD AUTO: ABNORMAL K/UL (ref 0–0.5)
EOSINOPHIL NFR BLD: 0 % (ref 0–8)
EOSINOPHIL NFR BLD: 0 % (ref 0–8)
EOSINOPHIL NFR BLD: 1 % (ref 0–8)
EOSINOPHIL NFR BLD: 1 % (ref 0–8)
EOSINOPHIL NFR BLD: 2 % (ref 0–8)
ERYTHROCYTE [DISTWIDTH] IN BLOOD BY AUTOMATED COUNT: 16 % (ref 11.5–14.5)
ERYTHROCYTE [DISTWIDTH] IN BLOOD BY AUTOMATED COUNT: 16.7 % (ref 11.5–14.5)
ERYTHROCYTE [DISTWIDTH] IN BLOOD BY AUTOMATED COUNT: 17 % (ref 11.5–14.5)
ERYTHROCYTE [DISTWIDTH] IN BLOOD BY AUTOMATED COUNT: 17 % (ref 11.5–14.5)
ERYTHROCYTE [DISTWIDTH] IN BLOOD BY AUTOMATED COUNT: 17.5 % (ref 11.5–14.5)
ERYTHROCYTE [SEDIMENTATION RATE] IN BLOOD BY WESTERGREN METHOD: 18 MM/H
ERYTHROCYTE [SEDIMENTATION RATE] IN BLOOD BY WESTERGREN METHOD: 18 MM/H
EST. GFR  (AFRICAN AMERICAN): 36.3 ML/MIN/1.73 M^2
EST. GFR  (AFRICAN AMERICAN): 36.3 ML/MIN/1.73 M^2
EST. GFR  (AFRICAN AMERICAN): 38.9 ML/MIN/1.73 M^2
EST. GFR  (AFRICAN AMERICAN): 38.9 ML/MIN/1.73 M^2
EST. GFR  (AFRICAN AMERICAN): >60 ML/MIN/1.73 M^2
EST. GFR  (AFRICAN AMERICAN): >60 ML/MIN/1.73 M^2
EST. GFR  (NON AFRICAN AMERICAN): 31.5 ML/MIN/1.73 M^2
EST. GFR  (NON AFRICAN AMERICAN): 31.5 ML/MIN/1.73 M^2
EST. GFR  (NON AFRICAN AMERICAN): 33.7 ML/MIN/1.73 M^2
EST. GFR  (NON AFRICAN AMERICAN): 33.7 ML/MIN/1.73 M^2
EST. GFR  (NON AFRICAN AMERICAN): >60 ML/MIN/1.73 M^2
EST. GFR  (NON AFRICAN AMERICAN): >60 ML/MIN/1.73 M^2
FIO2: 50
FIO2: 50
FRACTIONAL SHORTENING: 31 % (ref 28–44)
GLUCOSE SERPL-MCNC: 112 MG/DL (ref 70–110)
GLUCOSE SERPL-MCNC: 112 MG/DL (ref 70–110)
GLUCOSE SERPL-MCNC: 77 MG/DL (ref 70–110)
GLUCOSE SERPL-MCNC: 77 MG/DL (ref 70–110)
GLUCOSE SERPL-MCNC: 83 MG/DL (ref 70–110)
GLUCOSE SERPL-MCNC: 83 MG/DL (ref 70–110)
HCO3 UR-SCNC: 25.1 MMOL/L (ref 24–28)
HCO3 UR-SCNC: 25.3 MMOL/L (ref 24–28)
HCT VFR BLD AUTO: 24.7 % (ref 37–48.5)
HCT VFR BLD AUTO: 28.6 % (ref 37–48.5)
HCT VFR BLD AUTO: 28.6 % (ref 37–48.5)
HCT VFR BLD AUTO: 29 % (ref 37–48.5)
HCT VFR BLD AUTO: 29.4 % (ref 37–48.5)
HGB BLD-MCNC: 10.1 G/DL (ref 12–16)
HGB BLD-MCNC: 10.1 G/DL (ref 12–16)
HGB BLD-MCNC: 10.3 G/DL (ref 12–16)
HGB BLD-MCNC: 10.3 G/DL (ref 12–16)
HGB BLD-MCNC: 8.3 G/DL (ref 12–16)
HYPOCHROMIA BLD QL SMEAR: ABNORMAL
IMM GRANULOCYTES # BLD AUTO: ABNORMAL K/UL (ref 0–0.04)
IMM GRANULOCYTES NFR BLD AUTO: ABNORMAL % (ref 0–0.5)
INTERVENTRICULAR SEPTUM: 0.87 CM (ref 0.6–1.1)
LA MAJOR: 3.66 CM
LA MINOR: 2.79 CM
LA WIDTH: 2.44 CM
LDH SERPL L TO P-CCNC: 1.04 MMOL/L (ref 0.36–1.25)
LEFT ATRIUM SIZE: 2.47 CM
LEFT ATRIUM VOLUME INDEX: 8 ML/M2
LEFT ATRIUM VOLUME: 16.22 CM3
LEFT INTERNAL DIMENSION IN SYSTOLE: 2.56 CM (ref 2.1–4)
LEFT VENTRICLE DIASTOLIC VOLUME INDEX: 28.58 ML/M2
LEFT VENTRICLE DIASTOLIC VOLUME: 57.73 ML
LEFT VENTRICLE MASS INDEX: 51 G/M2
LEFT VENTRICLE SYSTOLIC VOLUME INDEX: 11.7 ML/M2
LEFT VENTRICLE SYSTOLIC VOLUME: 23.59 ML
LEFT VENTRICULAR INTERNAL DIMENSION IN DIASTOLE: 3.69 CM (ref 3.5–6)
LEFT VENTRICULAR MASS: 102.57 G
LYMPHOCYTES # BLD AUTO: ABNORMAL K/UL (ref 1–4.8)
LYMPHOCYTES NFR BLD: 4 % (ref 18–48)
LYMPHOCYTES NFR BLD: 7 % (ref 18–48)
LYMPHOCYTES NFR BLD: 7 % (ref 18–48)
LYMPHOCYTES NFR BLD: 8 % (ref 18–48)
LYMPHOCYTES NFR BLD: 9 % (ref 18–48)
MAGNESIUM SERPL-MCNC: 1.7 MG/DL (ref 1.6–2.6)
MAGNESIUM SERPL-MCNC: 1.8 MG/DL (ref 1.6–2.6)
MAGNESIUM SERPL-MCNC: 2 MG/DL (ref 1.6–2.6)
MCH RBC QN AUTO: 29.9 PG (ref 27–31)
MCH RBC QN AUTO: 30 PG (ref 27–31)
MCH RBC QN AUTO: 30.1 PG (ref 27–31)
MCH RBC QN AUTO: 30.5 PG (ref 27–31)
MCH RBC QN AUTO: 30.5 PG (ref 27–31)
MCHC RBC AUTO-ENTMCNC: 33.6 G/DL (ref 32–36)
MCHC RBC AUTO-ENTMCNC: 35 G/DL (ref 32–36)
MCHC RBC AUTO-ENTMCNC: 35.3 G/DL (ref 32–36)
MCHC RBC AUTO-ENTMCNC: 35.3 G/DL (ref 32–36)
MCHC RBC AUTO-ENTMCNC: 35.5 G/DL (ref 32–36)
MCV RBC AUTO: 84 FL (ref 82–98)
MCV RBC AUTO: 86 FL (ref 82–98)
MCV RBC AUTO: 89 FL (ref 82–98)
METAMYELOCYTES NFR BLD MANUAL: 1 %
METAMYELOCYTES NFR BLD MANUAL: 2 %
METAMYELOCYTES NFR BLD MANUAL: 4 %
MIN VOL: 8.4
MODE: ABNORMAL
MODE: NORMAL
MONOCYTES # BLD AUTO: ABNORMAL K/UL (ref 0.3–1)
MONOCYTES NFR BLD: 10 % (ref 4–15)
MONOCYTES NFR BLD: 3 % (ref 4–15)
MONOCYTES NFR BLD: 7 % (ref 4–15)
MYELOCYTES NFR BLD MANUAL: 2 %
MYELOCYTES NFR BLD MANUAL: 3 %
MYELOCYTES NFR BLD MANUAL: 3 %
NEUTROPHILS NFR BLD: 72 % (ref 38–73)
NEUTROPHILS NFR BLD: 76 % (ref 38–73)
NEUTROPHILS NFR BLD: 77 % (ref 38–73)
NEUTROPHILS NFR BLD: 77 % (ref 38–73)
NEUTROPHILS NFR BLD: 79 % (ref 38–73)
NEUTS BAND NFR BLD MANUAL: 4 %
NEUTS BAND NFR BLD MANUAL: 5 %
NEUTS BAND NFR BLD MANUAL: 5 %
NRBC BLD-RTO: 1 /100 WBC
NRBC BLD-RTO: 2 /100 WBC
NRBC BLD-RTO: 3 /100 WBC
OVALOCYTES BLD QL SMEAR: ABNORMAL
PATH REV BLD -IMP: NORMAL
PCO2 BLDA: 33.1 MMHG (ref 35–45)
PCO2 BLDA: 37.9 MMHG (ref 35–45)
PEEP: 6
PEEP: 6
PH SMN: 7.43 [PH] (ref 7.35–7.45)
PH SMN: 7.49 [PH] (ref 7.35–7.45)
PHOSPHATE SERPL-MCNC: 1.7 MG/DL (ref 2.7–4.5)
PHOSPHATE SERPL-MCNC: 2.5 MG/DL (ref 2.7–4.5)
PHOSPHATE SERPL-MCNC: 3 MG/DL (ref 2.7–4.5)
PIP: 32
PISA TR MAX VEL: 2.68 M/S
PLATELET # BLD AUTO: 107 K/UL (ref 150–450)
PLATELET # BLD AUTO: 108 K/UL (ref 150–450)
PLATELET # BLD AUTO: 109 K/UL (ref 150–450)
PLATELET # BLD AUTO: 114 K/UL (ref 150–450)
PLATELET # BLD AUTO: 114 K/UL (ref 150–450)
PLATELET BLD QL SMEAR: ABNORMAL
PMV BLD AUTO: 12 FL (ref 9.2–12.9)
PMV BLD AUTO: 12.1 FL (ref 9.2–12.9)
PMV BLD AUTO: 12.2 FL (ref 9.2–12.9)
PO2 BLDA: 107 MMHG (ref 80–100)
PO2 BLDA: 86 MMHG (ref 80–100)
POC BE: 1 MMOL/L
POC BE: 2 MMOL/L
POC SATURATED O2: 97 % (ref 95–100)
POC SATURATED O2: 99 % (ref 95–100)
POC TCO2: 26 MMOL/L (ref 23–27)
POC TCO2: 26 MMOL/L (ref 23–27)
POCT GLUCOSE: 110 MG/DL (ref 70–110)
POCT GLUCOSE: 112 MG/DL (ref 70–110)
POCT GLUCOSE: 117 MG/DL (ref 70–110)
POCT GLUCOSE: 160 MG/DL (ref 70–110)
POCT GLUCOSE: 76 MG/DL (ref 70–110)
POCT GLUCOSE: 84 MG/DL (ref 70–110)
POCT GLUCOSE: 89 MG/DL (ref 70–110)
POCT GLUCOSE: 96 MG/DL (ref 70–110)
POIKILOCYTOSIS BLD QL SMEAR: SLIGHT
POLYCHROMASIA BLD QL SMEAR: ABNORMAL
POTASSIUM SERPL-SCNC: 4.8 MMOL/L (ref 3.5–5.1)
POTASSIUM SERPL-SCNC: 4.9 MMOL/L (ref 3.5–5.1)
POTASSIUM SERPL-SCNC: 4.9 MMOL/L (ref 3.5–5.1)
POTASSIUM SERPL-SCNC: 5.1 MMOL/L (ref 3.5–5.1)
PROT SERPL-MCNC: 4 G/DL (ref 6–8.4)
PROT SERPL-MCNC: 4 G/DL (ref 6–8.4)
PROT SERPL-MCNC: 4.8 G/DL (ref 6–8.4)
RA MAJOR: 3.04 CM
RA WIDTH: 2.44 CM
RBC # BLD AUTO: 2.77 M/UL (ref 4–5.4)
RBC # BLD AUTO: 3.31 M/UL (ref 4–5.4)
RBC # BLD AUTO: 3.31 M/UL (ref 4–5.4)
RBC # BLD AUTO: 3.42 M/UL (ref 4–5.4)
RBC # BLD AUTO: 3.44 M/UL (ref 4–5.4)
RIGHT VENTRICULAR END-DIASTOLIC DIMENSION: 2.68 CM
SAMPLE: ABNORMAL
SAMPLE: NORMAL
SAMPLE: NORMAL
SINUS: 2.44 CM
SITE: ABNORMAL
SITE: NORMAL
SITE: NORMAL
SMUDGE CELLS BLD QL SMEAR: PRESENT
SMUDGE CELLS BLD QL SMEAR: PRESENT
SODIUM SERPL-SCNC: 136 MMOL/L (ref 136–145)
SODIUM SERPL-SCNC: 136 MMOL/L (ref 136–145)
SODIUM SERPL-SCNC: 138 MMOL/L (ref 136–145)
SODIUM SERPL-SCNC: 139 MMOL/L (ref 136–145)
SODIUM SERPL-SCNC: 139 MMOL/L (ref 136–145)
SODIUM SERPL-SCNC: 140 MMOL/L (ref 136–145)
SP02: 96
SP02: 96
STJ: 2.47 CM
TDI LATERAL: 0.07 M/S
TDI SEPTAL: 0.04 M/S
TDI: 0.06 M/S
TR MAX PG: 29 MMHG
TRICUSPID ANNULAR PLANE SYSTOLIC EXCURSION: 1.61 CM
VT: 420
VT: 420
WBC # BLD AUTO: 16.05 K/UL (ref 3.9–12.7)
WBC # BLD AUTO: 16.09 K/UL (ref 3.9–12.7)
WBC # BLD AUTO: 16.12 K/UL (ref 3.9–12.7)
WBC # BLD AUTO: 17.03 K/UL (ref 3.9–12.7)
WBC # BLD AUTO: 17.03 K/UL (ref 3.9–12.7)

## 2021-12-21 PROCEDURE — 37799 UNLISTED PX VASCULAR SURGERY: CPT

## 2021-12-21 PROCEDURE — 84295 ASSAY OF SERUM SODIUM: CPT

## 2021-12-21 PROCEDURE — 99223 1ST HOSP IP/OBS HIGH 75: CPT | Mod: ,,, | Performed by: INTERNAL MEDICINE

## 2021-12-21 PROCEDURE — 25000003 PHARM REV CODE 250

## 2021-12-21 PROCEDURE — 49002 REOPENING OF ABDOMEN: CPT | Mod: ,,, | Performed by: SURGERY

## 2021-12-21 PROCEDURE — 80053 COMPREHEN METABOLIC PANEL: CPT

## 2021-12-21 PROCEDURE — 63600175 PHARM REV CODE 636 W HCPCS: Performed by: STUDENT IN AN ORGANIZED HEALTH CARE EDUCATION/TRAINING PROGRAM

## 2021-12-21 PROCEDURE — 97605 NEG PRS WND THER DME<=50SQCM: CPT | Mod: ,,, | Performed by: SURGERY

## 2021-12-21 PROCEDURE — 85007 BL SMEAR W/DIFF WBC COUNT: CPT | Mod: 91

## 2021-12-21 PROCEDURE — 88300 SURGICAL PATH GROSS: CPT | Performed by: STUDENT IN AN ORGANIZED HEALTH CARE EDUCATION/TRAINING PROGRAM

## 2021-12-21 PROCEDURE — 90945 DIALYSIS ONE EVALUATION: CPT

## 2021-12-21 PROCEDURE — 20000000 HC ICU ROOM

## 2021-12-21 PROCEDURE — 36000707: Performed by: SURGERY

## 2021-12-21 PROCEDURE — D9220A PRA ANESTHESIA: Mod: ,,, | Performed by: STUDENT IN AN ORGANIZED HEALTH CARE EDUCATION/TRAINING PROGRAM

## 2021-12-21 PROCEDURE — 99900035 HC TECH TIME PER 15 MIN (STAT)

## 2021-12-21 PROCEDURE — 82803 BLOOD GASES ANY COMBINATION: CPT

## 2021-12-21 PROCEDURE — 88300 PR  SURG PATH,GROSS,LEVEL I: ICD-10-PCS | Mod: 26,,, | Performed by: STUDENT IN AN ORGANIZED HEALTH CARE EDUCATION/TRAINING PROGRAM

## 2021-12-21 PROCEDURE — 99900026 HC AIRWAY MAINTENANCE (STAT)

## 2021-12-21 PROCEDURE — 80069 RENAL FUNCTION PANEL: CPT | Mod: 91 | Performed by: STUDENT IN AN ORGANIZED HEALTH CARE EDUCATION/TRAINING PROGRAM

## 2021-12-21 PROCEDURE — 97605 PR NEG PRESS WOUND THERAPY (NPWT) W/NON-DISPOSABLE WOUND VAC DEVICE (DME), <=50 CM: ICD-10-PCS | Mod: ,,, | Performed by: SURGERY

## 2021-12-21 PROCEDURE — 93005 ELECTROCARDIOGRAM TRACING: CPT

## 2021-12-21 PROCEDURE — 37000008 HC ANESTHESIA 1ST 15 MINUTES: Performed by: SURGERY

## 2021-12-21 PROCEDURE — A4217 STERILE WATER/SALINE, 500 ML: HCPCS | Performed by: STUDENT IN AN ORGANIZED HEALTH CARE EDUCATION/TRAINING PROGRAM

## 2021-12-21 PROCEDURE — 99223 PR INITIAL HOSPITAL CARE,LEVL III: ICD-10-PCS | Mod: ,,, | Performed by: INTERNAL MEDICINE

## 2021-12-21 PROCEDURE — 88300 SURGICAL PATH GROSS: CPT | Mod: 26,,, | Performed by: STUDENT IN AN ORGANIZED HEALTH CARE EDUCATION/TRAINING PROGRAM

## 2021-12-21 PROCEDURE — 36000706: Performed by: SURGERY

## 2021-12-21 PROCEDURE — 85007 BL SMEAR W/DIFF WBC COUNT: CPT | Mod: 91 | Performed by: STUDENT IN AN ORGANIZED HEALTH CARE EDUCATION/TRAINING PROGRAM

## 2021-12-21 PROCEDURE — 84132 ASSAY OF SERUM POTASSIUM: CPT

## 2021-12-21 PROCEDURE — 25000003 PHARM REV CODE 250: Performed by: STUDENT IN AN ORGANIZED HEALTH CARE EDUCATION/TRAINING PROGRAM

## 2021-12-21 PROCEDURE — 83735 ASSAY OF MAGNESIUM: CPT | Mod: 91 | Performed by: STUDENT IN AN ORGANIZED HEALTH CARE EDUCATION/TRAINING PROGRAM

## 2021-12-21 PROCEDURE — 27201423 OPTIME MED/SURG SUP & DEVICES STERILE SUPPLY: Performed by: SURGERY

## 2021-12-21 PROCEDURE — 93010 EKG 12-LEAD: ICD-10-PCS | Mod: ,,, | Performed by: INTERNAL MEDICINE

## 2021-12-21 PROCEDURE — 85027 COMPLETE CBC AUTOMATED: CPT | Mod: 91

## 2021-12-21 PROCEDURE — 49002 PR REOPEN RECENT ABD EXPLORATORY: ICD-10-PCS | Mod: ,,, | Performed by: SURGERY

## 2021-12-21 PROCEDURE — 82330 ASSAY OF CALCIUM: CPT

## 2021-12-21 PROCEDURE — 85014 HEMATOCRIT: CPT

## 2021-12-21 PROCEDURE — D9220A PRA ANESTHESIA: ICD-10-PCS | Mod: ,,, | Performed by: STUDENT IN AN ORGANIZED HEALTH CARE EDUCATION/TRAINING PROGRAM

## 2021-12-21 PROCEDURE — 94761 N-INVAS EAR/PLS OXIMETRY MLT: CPT

## 2021-12-21 PROCEDURE — 85007 BL SMEAR W/DIFF WBC COUNT: CPT | Mod: 91 | Performed by: SURGERY

## 2021-12-21 PROCEDURE — 37000009 HC ANESTHESIA EA ADD 15 MINS: Performed by: SURGERY

## 2021-12-21 PROCEDURE — 94003 VENT MGMT INPAT SUBQ DAY: CPT

## 2021-12-21 PROCEDURE — 99291 CRITICAL CARE FIRST HOUR: CPT | Mod: ,,, | Performed by: SURGERY

## 2021-12-21 PROCEDURE — 80053 COMPREHEN METABOLIC PANEL: CPT | Mod: 91 | Performed by: SURGERY

## 2021-12-21 PROCEDURE — 27000221 HC OXYGEN, UP TO 24 HOURS

## 2021-12-21 PROCEDURE — 25000003 PHARM REV CODE 250: Performed by: INTERNAL MEDICINE

## 2021-12-21 PROCEDURE — 93010 ELECTROCARDIOGRAM REPORT: CPT | Mod: ,,, | Performed by: INTERNAL MEDICINE

## 2021-12-21 PROCEDURE — 63600175 PHARM REV CODE 636 W HCPCS

## 2021-12-21 PROCEDURE — B4185 PARENTERAL SOL 10 GM LIPIDS: HCPCS | Performed by: STUDENT IN AN ORGANIZED HEALTH CARE EDUCATION/TRAINING PROGRAM

## 2021-12-21 PROCEDURE — 99291 PR CRITICAL CARE, E/M 30-74 MINUTES: ICD-10-PCS | Mod: ,,, | Performed by: SURGERY

## 2021-12-21 PROCEDURE — 85002 BLEEDING TIME TEST: CPT

## 2021-12-21 PROCEDURE — 85027 COMPLETE CBC AUTOMATED: CPT | Mod: 91 | Performed by: SURGERY

## 2021-12-21 PROCEDURE — 83605 ASSAY OF LACTIC ACID: CPT

## 2021-12-21 PROCEDURE — 85027 COMPLETE CBC AUTOMATED: CPT | Mod: 91 | Performed by: STUDENT IN AN ORGANIZED HEALTH CARE EDUCATION/TRAINING PROGRAM

## 2021-12-21 RX ORDER — ROCURONIUM BROMIDE 10 MG/ML
INJECTION, SOLUTION INTRAVENOUS
Status: DISCONTINUED | OUTPATIENT
Start: 2021-12-21 | End: 2021-12-21

## 2021-12-21 RX ORDER — MAGNESIUM SULFATE HEPTAHYDRATE 40 MG/ML
2 INJECTION, SOLUTION INTRAVENOUS ONCE
Status: COMPLETED | OUTPATIENT
Start: 2021-12-21 | End: 2021-12-22

## 2021-12-21 RX ORDER — HEPARIN SODIUM 5000 [USP'U]/ML
5000 INJECTION, SOLUTION INTRAVENOUS; SUBCUTANEOUS EVERY 8 HOURS
Status: DISCONTINUED | OUTPATIENT
Start: 2021-12-21 | End: 2021-12-28

## 2021-12-21 RX ORDER — PHENYLEPHRINE HYDROCHLORIDE 10 MG/ML
INJECTION INTRAVENOUS
Status: DISCONTINUED | OUTPATIENT
Start: 2021-12-21 | End: 2021-12-21

## 2021-12-21 RX ORDER — MIDAZOLAM HYDROCHLORIDE 5 MG/ML
INJECTION INTRAMUSCULAR; INTRAVENOUS
Status: DISCONTINUED | OUTPATIENT
Start: 2021-12-21 | End: 2021-12-21

## 2021-12-21 RX ORDER — FAMOTIDINE 10 MG/ML
20 INJECTION INTRAVENOUS DAILY
Status: DISCONTINUED | OUTPATIENT
Start: 2021-12-21 | End: 2021-12-27

## 2021-12-21 RX ADMIN — MAGNESIUM SULFATE 2 G: 2 INJECTION INTRAVENOUS at 11:12

## 2021-12-21 RX ADMIN — CALCIUM GLUCONATE: 98 INJECTION, SOLUTION INTRAVENOUS at 09:12

## 2021-12-21 RX ADMIN — SODIUM CHLORIDE, SODIUM LACTATE, POTASSIUM CHLORIDE, AND CALCIUM CHLORIDE 1000 ML: .6; .31; .03; .02 INJECTION, SOLUTION INTRAVENOUS at 06:12

## 2021-12-21 RX ADMIN — ROCURONIUM BROMIDE 10 MG: 10 INJECTION, SOLUTION INTRAVENOUS at 09:12

## 2021-12-21 RX ADMIN — PHENYLEPHRINE HYDROCHLORIDE 100 MCG: 10 INJECTION INTRAVENOUS at 09:12

## 2021-12-21 RX ADMIN — PIPERACILLIN SODIUM AND TAZOBACTAM SODIUM 4.5 G: 4; .5 INJECTION, POWDER, FOR SOLUTION INTRAVENOUS at 12:12

## 2021-12-21 RX ADMIN — PIPERACILLIN SODIUM AND TAZOBACTAM SODIUM 4.5 G: 4; .5 INJECTION, POWDER, FOR SOLUTION INTRAVENOUS at 08:12

## 2021-12-21 RX ADMIN — NITROGLYCERIN 0.5 INCH: 20 OINTMENT TOPICAL at 06:12

## 2021-12-21 RX ADMIN — DEXMEDETOMIDINE HYDROCHLORIDE 0.2 MCG/KG/HR: 4 INJECTION INTRAVENOUS at 04:12

## 2021-12-21 RX ADMIN — NITROGLYCERIN 0.5 INCH: 20 OINTMENT TOPICAL at 12:12

## 2021-12-21 RX ADMIN — NOREPINEPHRINE BITARTRATE 0.06 MCG/KG/MIN: 4 INJECTION, SOLUTION INTRAVENOUS at 11:12

## 2021-12-21 RX ADMIN — SODIUM CHLORIDE 0.4 MCG/KG/MIN: 9 INJECTION, SOLUTION INTRAVENOUS at 09:12

## 2021-12-21 RX ADMIN — SODIUM CHLORIDE: 0.9 INJECTION, SOLUTION INTRAVENOUS at 06:12

## 2021-12-21 RX ADMIN — PIPERACILLIN SODIUM AND TAZOBACTAM SODIUM 4.5 G: 4; .5 INJECTION, POWDER, FOR SOLUTION INTRAVENOUS at 04:12

## 2021-12-21 RX ADMIN — FAMOTIDINE 20 MG: 10 INJECTION, SOLUTION INTRAVENOUS at 09:12

## 2021-12-21 RX ADMIN — MUPIROCIN: 20 OINTMENT TOPICAL at 11:12

## 2021-12-21 RX ADMIN — HEPARIN SODIUM 5000 UNITS: 5000 INJECTION INTRAVENOUS; SUBCUTANEOUS at 09:12

## 2021-12-21 RX ADMIN — SODIUM PHOSPHATE, MONOBASIC, MONOHYDRATE 15 MMOL: 276; 142 INJECTION, SOLUTION INTRAVENOUS at 11:12

## 2021-12-21 RX ADMIN — NITROGLYCERIN 0.5 INCH: 20 OINTMENT TOPICAL at 05:12

## 2021-12-21 RX ADMIN — SMOFLIPID 250 ML: 6; 6; 5; 3 INJECTION, EMULSION INTRAVENOUS at 09:12

## 2021-12-21 RX ADMIN — PHENYLEPHRINE HYDROCHLORIDE 100 MCG: 10 INJECTION INTRAVENOUS at 10:12

## 2021-12-21 RX ADMIN — MUPIROCIN: 20 OINTMENT TOPICAL at 08:12

## 2021-12-21 RX ADMIN — MIDAZOLAM 2 MG: 5 INJECTION INTRAMUSCULAR; INTRAVENOUS at 10:12

## 2021-12-21 RX ADMIN — ROCURONIUM BROMIDE 40 MG: 10 INJECTION, SOLUTION INTRAVENOUS at 09:12

## 2021-12-21 RX ADMIN — SODIUM CHLORIDE, SODIUM LACTATE, POTASSIUM CHLORIDE, AND CALCIUM CHLORIDE 1000 ML: .6; .31; .03; .02 INJECTION, SOLUTION INTRAVENOUS at 04:12

## 2021-12-21 RX ADMIN — NITROGLYCERIN 0.5 INCH: 20 OINTMENT TOPICAL at 11:12

## 2021-12-21 NOTE — PLAN OF CARE
[facility] - [facility] Intensive Care  Initial Discharge Assessment       Primary Care Provider: Primary Doctor No    Admission Diagnosis: Septic shock [A41.9, R65.21]    Admission Date: 12/20/2021  Expected Discharge Date:     Discharge Barriers Identified: None    Payor:  / Plan:  [employer] / Product Type: Government /     No emergency contact information on file.    Discharge Plan A: Other (Anticipate transfer back to [facility] after second surg on this Thursday.)  Discharge Plan B: Other (Anticipate transfer back to [facility] after second surg on this Thursday.)    No Pharmacies Listed    Initial Assessment (most recent)     Adult Discharge Assessment - 12/21/21 1140        Discharge Assessment    Assessment Type Discharge Planning Assessment     Confirmed/corrected address, phone number and insurance --   [address], [address]    Source of Information family   ()- Comaria luisa [facility] [phone] and (son) Mahad [facility] [phone]    If unable to respond/provide information was family/caregiver contacted? Yes   ()- Columba [facility] [phone] and (son) Mahad [facility] [phone]    Contact Name/Number ()- Columba [facility] [phone] and (son) Mahad [facility] [phone]     Communicated SHANA with patient/caregiver Date not available/Unable to determine     Reason For Admission No active principal  problem     Lives With spouse   ()- Columba [facility] [phone]    Facility Arrived From: [facility]     Do you expect to return to your current living situation? No   ()- Comaria luisa [facility] [phone]    Do you have help at home or someone to help you manage your care at home? Yes   ()- [facility] [phone]    Who are your caregiver(s) and their phone number(s)? ()- Josefmerissa [facility] [phone] and (son) Mahad [facility] [phone]     Prior to hospitilization cognitive status:  Coma/Sedated/Intubated     Current cognitive status: Coma/Sedated/Intubated     Walking or Climbing Stairs Difficulty none     Dressing/Bathing Difficulty none     Equipment Currently Used at Home none     Readmission within 30 days? No     Patient currently being followed by outpatient case management? No     Do you currently have service(s) that help you manage your care at home? No     Do you take prescription medications? Yes     Do you have prescription coverage? Yes     Coverage [facility]     Do you have any problems affording any of your prescribed medications? No     Is the patient taking medications as prescribed? yes     Who is going to help you get home at discharge? ()- Cotimbomerissa [facility] [phone] and (son) Mahad [facility] [phone]     How do you get to doctors appointments? car, drives self   ()- Comaria luisa [facility] [phone] and (son) Mahad [facility] [phone]    Are you on dialysis? No     Do you take coumadin? No     Discharge Plan A Other   Anticipate transfer back to [facility] after second surg on this Thursday.    Discharge Plan B Other   Anticipate transfer back to [facility] after second surg on this Thursday.    DME Needed Upon Discharge  other (see comments)   TBA    Discharge Plan discussed with: Spouse/sig other     Name(s) and Number(s) ()- Columba [facility] [phone]     Discharge Barriers Identified None        Relationship/Environment    Name(s) of Who Lives With Patient ()- [facility] [phone]                 LUISA spoke with patient's  via phone [facility] ([phone]  for Discharge Planning Assessment. Per ,  Amita lives  in a single family home on a [employer] [address] with  zero steps to porch and point of entry.  Patient was independent with ADLS and DID NOT use DME or in-home assistive equipment. Patient is not on dialysis or coumadin,  takes medications as prescribed / keeps  refilled / has resources for all daily and prescriptive needs. [facility]  -  Wants any necessary medication sent to preferred pharmacy.   Will have help from  ()- Columba Miller [phone] and other immediate family upon discharge - Anticipates facility transfer by ambulance back to [facility]note [facility] after second surgery on this Thursday.  All questions addressed. Unit and SW direct numbers provided. Will continue to follow for course of hospitalization.    12/20/2021 11:34 AM  Septic shock [A41.9, R65.21]    PCP: Primary Doctor No Dr. Lee OB- G[facility]    PHARMACY: No Pharmacies Listed [facility]    Payor:  / Plan: [facility] / Product Type: [place] /       Stephanie Cronin LMSW  PRN - [facility]  [id number]

## 2021-12-21 NOTE — SUBJECTIVE & OBJECTIVE
Interval History: went back to OR this morning and restarted on SLED for clearance and UF    Review of patient's allergies indicates:   Allergen Reactions    Oxycodone     Tylox [oxycodone-acetaminophen]      Current Facility-Administered Medications   Medication Frequency    0.9%  NaCl infusion (CRRT USE ONLY) Continuous    0.9%  NaCl infusion (for blood administration) Q24H PRN    dexmedetomidine (PRECEDEX) 400mcg/100mL 0.9% NaCL infusion Continuous    dextrose 50% injection 25 g PRN    famotidine (PF) injection 20 mg Daily    fentaNYL 2500 mcg in 0.9% sodium chloride 250 mL infusion premix (titrating) Continuous    heparin (porcine) injection 5,000 Units Q8H    lactated ringers bolus 1,000 mL Once    lipid (SMOFLIPID) (SMOFLIPID) 20 % infusion 250 mL Daily    magnesium sulfate 2g in water 50mL IVPB (premix) PRN    mupirocin 2 % ointment BID    nitroGLYCERIN 2% TD oint ointment 0.5 inch Q6H    NORepinephrine 4 mg in dextrose 5% 250 mL infusion (premix) (titrating) Continuous    piperacillin-tazobactam 4.5 g in sodium chloride 0.9% 100 mL IVPB (ready to mix system) Q8H    sodium chloride 0.9% flush 10 mL PRN    TPN ADULT CENTRAL LINE CUSTOM Continuous       Objective:     Vital Signs (Most Recent):  Temp: 99.32 °F (37.4 °C) (12/21/21 1600)  Pulse: 97 (12/21/21 1600)  Resp: 19 (12/21/21 1600)  BP: 93/74 (12/21/21 1600)  SpO2: 100 % (12/21/21 1600)  O2 Device (Oxygen Therapy): ventilator (12/21/21 1530) Vital Signs (24h Range):  Temp:  [96.26 °F (35.7 °C)-99.86 °F (37.7 °C)] 99.32 °F (37.4 °C)  Pulse:  [] 97  Resp:  [7-25] 19  SpO2:  [92 %-100 %] 100 %  BP: ()/(50-80) 93/74  Arterial Line BP: ()/(34-74) 91/53     Weight: 97.5 kg (215 lb) (12/21/21 1500)  Body mass index is 36.9 kg/m².  Body surface area is 2.1 meters squared.    I/O last 3 completed shifts:  In: 2624.6 [I.V.:1658.5; Blood:819; IV Piggyback:147.1]  Out: 2964 [Urine:50; Other:2914]    Physical Exam  Constitutional:        Appearance: She is obese. She is ill-appearing.   HENT:      Mouth/Throat:      Mouth: Mucous membranes are moist.   Eyes:      Pupils: Pupils are equal, round, and reactive to light.   Cardiovascular:      Rate and Rhythm: Normal rate and regular rhythm.   Pulmonary:      Comments: intubated  Abdominal:      Comments: open   Musculoskeletal:         General: No deformity.      Right lower leg: Edema present.      Left lower leg: Edema present.   Skin:     Coloration: Skin is not jaundiced.   Neurological:      General: No focal deficit present.         Significant Labs:  All labs within the past 24 hours have been reviewed.     Significant Imaging:  Labs: Reviewed

## 2021-12-21 NOTE — PROGRESS NOTES
[facility]  Critical Care - Surgery  Progress Note    Patient Name: Chidi Castaneda  MRN: [id number]  Admission Date: 12/20/2021  Hospital Length of Stay: 1 days  Code Status: Full Code  Attending Provider: Kendall Gorman MD  Primary Care Provider: Primary Doctor No   Principal Problem: <principal problem not specified>    Subjective:     Hospital/ICU Course:  No notes on file    Interval History/Significant Events:   Patient admitted yesterday as transfer from [facility].  Overnight, was called by nurse about trialysis line - unable to draw back from venous or arterial ports. Cathflow x2 was tried to no avail. Placed new right IJ trialysis line around 0000 this morning. SLED was started after that point. She tolerated this well and did not require pressors.   She continues to not follow commands and she is not on any sedation.    Follow-up For: Procedure(s) (LRB):  LAPAROTOMY, EXPLORATORY (N/A)    Post-Operative Day: Day of Surgery    Objective:     Vital Signs (Most Recent):  Temp: 99.32 °F (37.4 °C) (12/21/21 0545)  Pulse: 88 (12/21/21 0545)  Resp: 18 (12/21/21 0545)  BP: (!) 94/57 (12/21/21 0500)  SpO2: 97 % (12/21/21 0545) Vital Signs (24h Range):  Temp:  [96.26 °F (35.7 °C)-99.86 °F (37.7 °C)] 99.32 °F (37.4 °C)  Pulse:  [] 88  Resp:  [7-25] 18  SpO2:  [93 %-100 %] 97 %  BP: ()/(50-72) 94/57  Arterial Line BP: ()/(50-74) 109/53     Weight: 97.9 kg (215 lb 13.3 oz)  Body mass index is 37.3 kg/m².      Intake/Output Summary (Last 24 hours) at 12/21/2021 0558  Last data filed at 12/21/2021 0500  Gross per 24 hour   Intake 2397.22 ml   Output 1678 ml   Net 719.22 ml       Physical Exam  Vitals and nursing note reviewed.   Constitutional:       Appearance: She is obese. She is ill-appearing.   HENT:      Head: Normocephalic and atraumatic.      Nose:      Comments: NG tube in place  Neck:      Comments: Right IJ trialysis line  Cardiovascular:      Rate and  Rhythm: Normal rate and regular rhythm.      Pulses: Normal pulses.   Pulmonary:      Comments: Intubated  Vent Mode: A/C  Oxygen Concentration (%):  (40-50) 50  Resp Rate Total:  (18 br/min-25 br/min) 18 br/min  Vt Set:  (395 mL-420 mL) 420 mL  PEEP/CPAP:  (5 cmH20-6 cmH20) 6 cmH20  Mean Airway Pressure:  (12 lqO49-86 cmH20) 14 cmH20      Abdominal:      Comments: Abdomen is open. There is an abdominal wound vac in place to continuous suction draining serosanguinous to sanguinous output.   Musculoskeletal:      Right lower leg: Edema present.      Left lower leg: Edema present.      Comments: L radial larry, L subclavian triple lumen   Skin:     General: Skin is warm and dry.   Neurological:      Comments: Does not respond to commands  Will open eyes to stimulation  Withdraws from pain         Vents:  Vent Mode: A/C (12/21/21 0426)  Ventilator Initiated: Yes (12/20/21 1448)  Set Rate: 18 BPM (12/21/21 0426)  Vt Set: 420 mL (12/21/21 0426)  PEEP/CPAP: 6 cmH20 (12/21/21 0426)  Oxygen Concentration (%): 50 (12/21/21 0545)  Peak Airway Pressure: 32 cmH2O (12/21/21 0426)  Plateau Pressure: 0 cmH20 (12/21/21 0426)  Total Ve: 9.67 mL (12/21/21 0426)  Negative Inspiratory Force (cm H2O): 0 (12/21/21 0426)  F/VT Ratio<105 (RSBI): (!) 48.03 (12/21/21 0426)    Lines/Drains/Airways     Central Venous Catheter Line            Trialysis (Dialysis) Catheter 12/16/21 0000 left femoral 5 days    Percutaneous Central Line Insertion/Assessment - Triple Lumen  12/16/21 1531 left subclavian 4 days    Trialysis (Dialysis) Catheter 12/21/21 0030 right internal jugular <1 day          Drain                 NG/OG Tube 12/16/21 0000 Left nostril 5 days         Urethral Catheter 12/20/21 1743 <1 day          Airway                 Airway - Non-Surgical 12/20/21 1451 Endotracheal Tube <1 day          Arterial Line            Arterial Line 12/18/21 0000 Right Radial 3 days                Significant Labs:    CBC/Anemia Profile:  Recent Labs    Lab 12/20/21  1454 12/20/21 2022 12/21/21  0425   WBC 17.45* 16.50* 16.05*   HGB 7.3* 10.5* 10.3*   HCT 20.9* 29.9* 29.4*   * 101* 107*   MCV 87 88 86   RDW 15.5* 16.3* 16.0*        Chemistries:  Recent Labs   Lab 12/20/21  1454 12/20/21  1454 12/20/21 2216 12/21/21 0424 12/21/21  0425     136  135*   < > 135* 136 136   K 4.5  4.5  4.4   < > 4.7 4.9 4.9     105  105   < > 106 108 107   CO2 23  23  22*   < > 20* 20* 20*   BUN 55*  55*  54*   < > 59* 29* 28*   CREATININE 2.9*  2.9*  2.9*   < > 3.2* 1.8* 1.8*   CALCIUM 7.9*  7.9*  8.0*   < > 8.3* 8.1* 8.1*   ALBUMIN 3.6  3.6   < > 3.1* 2.9* 3.0*   PROT 5.1*  5.4*  --   --   --  4.8*   BILITOT 4.8*  4.8*  --   --   --  6.4*   ALKPHOS 158*  152*  --   --   --  194*   *  322*  --   --   --  287*   *  848*  --   --   --  705*   MG 2.4  2.4  --  2.3 2.0  --    PHOS 2.8  2.8  --  2.8 1.7*  --     < > = values in this interval not displayed.       All pertinent labs within the past 24 hours have been reviewed.    Significant Imaging:  I have reviewed all pertinent imaging results/findings within the past 24 hours.    Assessment/Plan:     Subcapsular hematoma of liver    Neuro/Psych:   -- Does not respond to commands but opens eyes to stimulation and withdraws from pain  --Sedation/Pain: precedex as needed, currently off             Cards:   -- HDS with MAP goal > 65   --Pressor requirements on arrival 0.03 vaso, levophed 0.06, currently off pressors  -- Transfused 1u prior to arrival, Hgb at that point 6.9, repeat 7.3      Pulm:   -- Goal O2 sat > 90%  -- Intubated, currently on 50/6, satting well      Renal:  -- Keep billingsley for strict I/O  -- Currently has SUSAN - was received SLED at OSH, received only 2 hours of HD yesterday prior to transfer  -- BUN/Cr 28/1.8  -- Nephrology consult - started on SLED overnight      FEN / GI:   -- Replace lytes as needed  -- Nutrition: strict NPO, reorder TPN tonight, will get G  tube today in OR  -- LR stopped for SLED      ID:   -- Tm: afebrile; WBC 16.05 from 17.45   -- Abx on zosyn      Heme/Onc:   -- Since hosptial admission on 12/10 has received 11u pRBC, 4u FFP, 1u platelets  -- H/H 10.3 from 7.3, s/p 2 units on arrival  -- Daily CBC      Endo:   -- Gluc goal 140-180  -- no history of diabetes  -- Accu checks as off TPN currently      PPx:   Feeding: NPO  Analgesia/Sedation: Precedex  Thromboembolic prevention: Holding  HOB >30: yes  Stress Ulcer ppx: Famotidine  Glucose control: Critical care goal 140-180 g/dl, ISS    Lines/Drains/Airway: NG, Ng, L radial larry, R IJ trialysis, L subclavian triple lumen      Dispo/Code Status/Palliative:   -- SICU / Full Code          Critical secondary to Patient has a condition that poses threat to life and bodily function: Acute Renal Failure and hemorrhagic vs septic shock     Critical care was time spent personally by me on the following activities: development of treatment plan with patient or surrogate and bedside caregivers, discussions with consultants, evaluation of patient's response to treatment, examination of patient, ordering and performing treatments and interventions, ordering and review of laboratory studies, ordering and review of radiographic studies, pulse oximetry, re-evaluation of patient's condition.  This critical care time did not overlap with that of any other provider or involve time for any procedures.     Jaylan Altman MD  Critical Care - Surgery  [facility]

## 2021-12-21 NOTE — PLAN OF CARE
SICU PLAN OF CARE NOTE      Shift Events: Admitted to SICU. Pressors weened to off. Nephrology consulted. Plans for OR with Dr. Gorman in morning.    Goals of Care: MAPs >65    Neuro: With sedation fully paused, does not follow commands. Withdraws from pain in all extremities. Pupils 2mm, brisk/equal/reactive bilaterally.    Vital Signs: /67   Pulse 83   Temp 96.44 °F (35.8 °C)   Resp 18   Wt 97.5 kg (214 lb 15.2 oz)   SpO2 95%     Respiratory: ACVC FiO2 50%, PEEP 6, peak pressures 38-42.     Diet: NPO    Gtts: Precedex    Urine Output: 50cc/shift    Drains: Open abd with surgical packing, transparent film, to continuous wall suction, 120cc sanguineous output since 1500. NG to low intermittent suction, no measurable output this shift.     Labs/Accuchecks: CBC/CMP/phos/mag, lactic sent. 2 units PRBC admin per MD order. Hypoglycemic, dextrose administered, BG rechecked, last .    Skin: Dusky nail beds. No new skin breakdown noted. Heel and sacral foams in place. Heels elevated. SCDs on. Bed plugged in and working properly.

## 2021-12-21 NOTE — OP NOTE
[facility] ([facility])  Surgery Department  Operative Note    SUMMARY     Date of Procedure: 12/21/2021     Procedure:    Re-exploration recent laparotomy  Evacuation of hematoma  Packing of liver  Application of negative pressure abdominal dressing (ABThera)    Surgeon(s) and Role:     * Kendall Gorman MD - Primary     * Víctor Valderrama MD - Resident - Assisting     * Angelica Ivy MD - Resident - Assisting    Pre-Operative Diagnosis:   1. Hepatic hematoma and torn capsule    Post-Operative Diagnosis:   1. Same    Anesthesia: General    Operative Findings:   >2L of old hematoma evacuated   7 retained lap pads removed from RUQ  Large capsular defect involving right hepatic lobe with diffuse oozing. Re-packed with Knunit and 7 lap pads and ABThera VAC replaced    Indications: Chidi Castaneda is a 56 y.o. female transferred to our facility with and open abdomen in hemorrhagic shock. She had recently undergone laparotomy and liver packing. We recommended return to the operating room for exploratory laparotomy and washout. The patient's family was in agreement with the plan and did sign informed consent.     Procedure:   The patient was identified in the preoperative area and informed consent verified prior to transport to the operating room. Anesthesia was induced without complication. The abdomen was then prepped and draped in the usual standard fashion. A timeout was performed verifying correct patient, procedure, and the administration of prophylactic antibiotics.  Temporary abdominal wound vac was removed and old hematoma was encountered. >2L of old blood was evacuated. A total of 7 retained lap pads were removed from the right upper quadrant. There was a large capsular defect involving most of the dome of the right hepatic lobe with ongoing venous oozing. The falciform ligament was taken down with cautery for better exposure. The RUQ was then temporarily packed and the rest of the peritoneal cavity examined.  There was hematoma in the pelvis. Vaginal cuff appeared intact. The small bowel was then examined from the ileocecal valve to the ligament of Tretiz. A patent small bowel to small bowel anastomosis was identified in the proximal ileum. The entire colon and rectum appeared healthy. Temporary lap pads were then removed from the RUQ and peritoneal cavity copiously irrigated. Given the continued venous oozing from the right liver, we elected to repack the patient. A large piece of Nu-Knit was applied to the raw liver surface and RUQ tightly packed with 7 lap pads. Hemostasis was satisfactory. An ABThera VAC was then replaced in the usual fashion.  All needle, instrument counts correct at the end of the case.  The sponge count remained off by 1, however, patient will return to OR 12/23 for re-exploration at which time laps will be recounted and XR obtained.    The patient was then transferred back to the SICU in critical but stable condition.     Dr. Gorman was scrubbed for the case.     Estimated Blood Loss (EBL): 200mL           Implants: * No implants in log *    Specimens:   Specimen (24h ago, onward)                None                    Condition: Critical    Disposition: ICU - intubated and critically ill.

## 2021-12-21 NOTE — PROCEDURES
Chidi Castaneda is a 56 y.o. female patient.    Temp: 99.32 °F (37.4 °C) (12/21/21 0000)  Pulse: 93 (12/21/21 0000)  Resp: (!) 22 (12/21/21 0000)  BP: 104/61 (12/21/21 0000)  SpO2: (!) 94 % (12/21/21 0000)  Weight: 97.5 kg (214 lb 15.2 oz) (12/20/21 1335)       Central Line    Date/Time: 12/21/2021 12:31 AM  Performed by: Jaylan Altman MD  Authorized by: Jaylan Altman MD     Supervisor Name:  Noel Sommer MD  Location procedure was performed:  [facility] CRITICAL CARE SURGERY  Time out complete?: Verified correct patient, procedure, equipment, staff, and site/side    Indications:  Hemodialysis, vascular access and hemodynamic monitoring  Anesthesia:  Local infiltration  Local anesthetic:  Lidocaine 1% without epinephrine  Anesthetic total (ml):  3  Preparation:  Skin prepped with ChloraPrep  Skin prep agent dried: Skin prep agent completely dried prior to procedure    Sterile barriers: All five maximal sterile barriers used - gloves, gown, cap, mask and large sterile sheet    Hand hygiene: Hand hygiene performed immediately prior to central venous catheter insertion    Location:  Right internal jugular  Catheter type:  Trialysis  Catheter size:  13 Fr  Inserted Catheter Length (cm):  15  Ultrasound guidance: Yes    Needle advanced into vessel with real time ultrasound guidance.    Guidewire confirmed in vessel.    Steril sheath on probe.    Sterile gel used.  Manometry: No    Number of attempts:  1  Securement:  Line sutured, chlorhexidine patch, blood return through all ports and sterile dressing applied  Complications: No    Estimated blood loss (mL):  5  Specimens: No    Implants: No    XRay:  Placement verified by x-ray and no pneumothorax on x-ray        12/21/2021

## 2021-12-21 NOTE — ASSESSMENT & PLAN NOTE
55yo female transfer from OSH after hysterectomy on 12/8 complicated by delayed recognition of small bowel injury requiring resection (in discontinuity) and at some point new bleed from the liver - transferred with open abdomen for higher level of care    - Plan for exploration in the operating room today, consent obtained yesterday from the family  - Additional recs pending operative findings  - NPO  - Resuscitate with fluids  - Transfuse as needed - would consider TEG if have concern for ongoing bleeding post-operatively  - Strict I/Os  - Appreciate nephrology assistance for CRRT  - Remainder of care per SICU

## 2021-12-21 NOTE — PLAN OF CARE
7 lap (from outside facility) removed and sent to Pathology for gross.      MD states  7 laps left in patient at packing laps.     Count is off by one lap.  Garbage and room searched.    ST believes that 8 laps were left in patient.     MD states we will be bringing patient back on Thursday and will be getting Xray at that time.

## 2021-12-21 NOTE — PROGRESS NOTES
Unable to aspirate from ports on L Fem Trialysis cath after 1h and 45 mins of Cathflo, discussed with Dr. Altman, will attempt a second dose of CathFlo at this time and reassess.

## 2021-12-21 NOTE — SUBJECTIVE & OBJECTIVE
Interval History: Minimal pressor requirements (off this AM) and no blood transfusions required overnight. Vent settings minimal. Wound vac with 400cc sanguinous output in last 24hrs. Minimal urine, started on CRRT.     Medications:  Continuous Infusions:   sodium chloride 0.9% 200 mL/hr at 12/21/21 0600    dexmedetomidine (PRECEDEX) infusion 0.2 mcg/kg/hr (12/21/21 0600)    fentanyl      lactated ringers Stopped (12/20/21 1914)    NORepinephrine bitartrate-D5W Stopped (12/21/21 0330)    vasopressin       Scheduled Meds:   famotidine (PF)  20 mg Intravenous Q12H    mupirocin   Nasal BID    nitroGLYCERIN 2% TD oint  0.5 inch Topical (Top) Q6H    piperacillin-tazobactam (ZOSYN) IVPB  4.5 g Intravenous Q12H     PRN Meds:sodium chloride, calcium gluconate IVPB, calcium gluconate IVPB, calcium gluconate IVPB, dextrose 50%, magnesium sulfate IVPB, magnesium sulfate IVPB, magnesium sulfate IVPB, potassium chloride in water **AND** potassium chloride in water **AND** potassium chloride in water, sodium chloride 0.9%     Review of patient's allergies indicates:   Allergen Reactions    Oxycodone     Tylox [oxycodone-acetaminophen]      Objective:     Vital Signs (Most Recent):  Temp: 99.14 °F (37.3 °C) (12/21/21 0630)  Pulse: 83 (12/21/21 0630)  Resp: 19 (12/21/21 0630)  BP: (!) 86/51 (12/21/21 0600)  SpO2: 98 % (12/21/21 0630) Vital Signs (24h Range):  Temp:  [96.26 °F (35.7 °C)-99.86 °F (37.7 °C)] 99.14 °F (37.3 °C)  Pulse:  [] 83  Resp:  [7-25] 19  SpO2:  [93 %-100 %] 98 %  BP: ()/(50-72) 86/51  Arterial Line BP: ()/(50-74) 140/64     Weight: 97.9 kg (215 lb 13.3 oz)  Body mass index is 37.3 kg/m².    Intake/Output - Last 3 Shifts       12/19 0700 12/20 0659 12/20 0700 12/21 0659 12/21 0700 12/22 0659    I.V. (mL/kg)  1658.5 (16.9)     Blood  819     IV Piggyback  147.1     Total Intake(mL/kg)  2624.6 (26.8)     Urine (mL/kg/hr)  50     Drains  0     Other  2459     Total Output  2509      Net  +115.6                  Physical Exam  Vitals and nursing note reviewed.   Constitutional:       Appearance: She is obese. She is ill-appearing.   HENT:      Head: Normocephalic and atraumatic.      Nose:      Comments: NG tube in place  Neck:      Comments: Right IJ trialysis line  Cardiovascular:      Rate and Rhythm: Normal rate and regular rhythm.      Pulses: Normal pulses.   Pulmonary:      Comments: Intubated  Vent Mode: A/C  Oxygen Concentration (%):  (40-50) 50  Resp Rate Total:  (18 br/min-25 br/min) 18 br/min  Vt Set:  (395 mL-420 mL) 420 mL  PEEP/CPAP:  (5 cmH20-6 cmH20) 6 cmH20  Mean Airway Pressure:  (12 wkW89-37 cmH20) 14 cmH20  Abdominal:      Comments: Abdomen is open. There is an abdominal wound vac in place to continuous suction draining serosanguinous to sanguinous output.   Musculoskeletal:      Right lower leg: Edema present.      Left lower leg: Edema present.      Comments: L radial larry, L subclavian triple lumen   Skin:     General: Skin is warm and dry.   Neurological:      Comments: Does not respond to commands  Will open eyes to stimulation  Withdraws from pain       Significant Labs:  I have reviewed all pertinent lab results within the past 24 hours.  CBC:   Recent Labs   Lab 12/21/21  0425   WBC 16.05*   RBC 3.42*   HGB 10.3*   HCT 29.4*   *   MCV 86   MCH 30.1   MCHC 35.0     CMP:   Recent Labs   Lab 12/21/21  0425   GLU 77   CALCIUM 8.1*   ALBUMIN 3.0*   PROT 4.8*      K 4.9   CO2 20*      BUN 28*   CREATININE 1.8*   ALKPHOS 194*   *   *   BILITOT 6.4*       Significant Diagnostics:  I have reviewed all pertinent imaging results/findings within the past 24 hours.

## 2021-12-21 NOTE — PROGRESS NOTES
[facility]  General Surgery  Progress Note    Subjective:     History of Present Illness:  No notes on file    Post-Op Info:  Procedure(s) (LRB):  LAPAROTOMY, EXPLORATORY (N/A)   Day of Surgery     Interval History: Minimal pressor requirements (off this AM) and no blood transfusions required overnight. Vent settings minimal. Wound vac with 400cc sanguinous output in last 24hrs. Minimal urine, started on CRRT.     Medications:  Continuous Infusions:   sodium chloride 0.9% 200 mL/hr at 12/21/21 0600    dexmedetomidine (PRECEDEX) infusion 0.2 mcg/kg/hr (12/21/21 0600)    fentanyl      lactated ringers Stopped (12/20/21 1914)    NORepinephrine bitartrate-D5W Stopped (12/21/21 0330)    vasopressin       Scheduled Meds:   famotidine (PF)  20 mg Intravenous Q12H    mupirocin   Nasal BID    nitroGLYCERIN 2% TD oint  0.5 inch Topical (Top) Q6H    piperacillin-tazobactam (ZOSYN) IVPB  4.5 g Intravenous Q12H     PRN Meds:sodium chloride, calcium gluconate IVPB, calcium gluconate IVPB, calcium gluconate IVPB, dextrose 50%, magnesium sulfate IVPB, magnesium sulfate IVPB, magnesium sulfate IVPB, potassium chloride in water **AND** potassium chloride in water **AND** potassium chloride in water, sodium chloride 0.9%     Review of patient's allergies indicates:   Allergen Reactions    Oxycodone     Tylox [oxycodone-acetaminophen]      Objective:     Vital Signs (Most Recent):  Temp: 99.14 °F (37.3 °C) (12/21/21 0630)  Pulse: 83 (12/21/21 0630)  Resp: 19 (12/21/21 0630)  BP: (!) 86/51 (12/21/21 0600)  SpO2: 98 % (12/21/21 0630) Vital Signs (24h Range):  Temp:  [96.26 °F (35.7 °C)-99.86 °F (37.7 °C)] 99.14 °F (37.3 °C)  Pulse:  [] 83  Resp:  [7-25] 19  SpO2:  [93 %-100 %] 98 %  BP: ()/(50-72) 86/51  Arterial Line BP: ()/(50-74) 140/64     Weight: 97.9 kg (215 lb 13.3 oz)  Body mass index is 37.3 kg/m².    Intake/Output - Last 3 Shifts       12/19 0700  12/20 0659 12/20  0700  12/21 0659 12/21 0700  12/22 0659    I.V. (mL/kg)  1658.5 (16.9)     Blood  819     IV Piggyback  147.1     Total Intake(mL/kg)  2624.6 (26.8)     Urine (mL/kg/hr)  50     Drains  0     Other  2459     Total Output  2509     Net  +115.6                  Physical Exam  Vitals and nursing note reviewed.   Constitutional:       Appearance: She is obese. She is ill-appearing.   HENT:      Head: Normocephalic and atraumatic.      Nose:      Comments: NG tube in place  Neck:      Comments: Right IJ trialysis line  Cardiovascular:      Rate and Rhythm: Normal rate and regular rhythm.      Pulses: Normal pulses.   Pulmonary:      Comments: Intubated  Vent Mode: A/C  Oxygen Concentration (%):  (40-50) 50  Resp Rate Total:  (18 br/min-25 br/min) 18 br/min  Vt Set:  (395 mL-420 mL) 420 mL  PEEP/CPAP:  (5 cmH20-6 cmH20) 6 cmH20  Mean Airway Pressure:  (12 hqC91-67 cmH20) 14 cmH20  Abdominal:      Comments: Abdomen is open. There is an abdominal wound vac in place to continuous suction draining serosanguinous to sanguinous output.   Musculoskeletal:      Right lower leg: Edema present.      Left lower leg: Edema present.      Comments: L radial larry, L subclavian triple lumen   Skin:     General: Skin is warm and dry.   Neurological:      Comments: Does not respond to commands  Will open eyes to stimulation  Withdraws from pain       Significant Labs:  I have reviewed all pertinent lab results within the past 24 hours.  CBC:   Recent Labs   Lab 12/21/21  0425   WBC 16.05*   RBC 3.42*   HGB 10.3*   HCT 29.4*   *   MCV 86   MCH 30.1   MCHC 35.0     CMP:   Recent Labs   Lab 12/21/21  0425   GLU 77   CALCIUM 8.1*   ALBUMIN 3.0*   PROT 4.8*      K 4.9   CO2 20*      BUN 28*   CREATININE 1.8*   ALKPHOS 194*   *   *   BILITOT 6.4*       Significant Diagnostics:  I have reviewed all pertinent imaging results/findings within the past 24 hours.    Assessment/Plan:     Subcapsular hematoma of  liver  57yo female transfer from OSH after hysterectomy on 12/8 complicated by delayed recognition of small bowel injury requiring resection (in discontinuity) and at some point new bleed from the liver - transferred with open abdomen for higher level of care    - Plan for exploration in the operating room today, consent obtained yesterday from the family  - Additional recs pending operative findings  - NPO  - Resuscitate with fluids  - Transfuse as needed - would consider TEG if have concern for ongoing bleeding post-operatively  - Strict I/Os  - Appreciate nephrology assistance for CRRT  - Remainder of care per SICU        Leanne Harding MD  General Surgery  [facility]

## 2021-12-21 NOTE — PROGRESS NOTES
Pt returned to [id number] per anesthesia. Connected to bedside tele monitor and ventilator. VSS. All new orders received and carried out. See flowsheet for full assessment.

## 2021-12-21 NOTE — HPI
"Chidi Castaneda is a 56 y.o. female w/ PMHx HTN, GERD s/p EGD 6/22/2021, migraines, ovarian cyst s/p cystectomy, and obesity who underwent an elective lap hysterectomy on 12/18/2021 at [facility] and was then transferred to [place], [place] for higher level of care when pt was found to have post-op somnolence, decrease PO intake, decrease urine output that progressed to anuria with a sharp rise in Cr from 0.9 to 2.8 (12/10). Pt was treated for sepsis with volume resuscitation and broad spectrum antibiotics, however pt continue to deteriorate and became tachycardic, hypotensive, and imaging showed an ileus. Pt was intubated since she was found to be acidotic with a ph of 7.28 despite a nonrebreather with 100%  FiO2  12/11 pt was taken back to the OR for washout and a bowel resection was done as she was found to have a small bowel perforation  12/15 pt was found to have a subscapular liver hematoma  12/18 pt was taken back to the OR for wound vacc exchange and removal of hematoma   12/19 general surgery team recommended no further surgical intervention since they did not find any active bleeding intraoperatively on 12/18 and felt that the ongoing bleeding was 2/2 consumptive coagulopathy and septic shock. They recommended to continue wound vacc and to consider higher level of care for pt  Pt was also being treated for a bacteremia as well as for intraabdominal infection, her antibiotics prior to transfer were: meropenem, flagyl, and vancomycin   Pt received tranexamic acid x1 and multiple units of blood products.    Pt was transferred to [facility] on 12/20/2021 for higher level of care: embolization for a subscapular hematoma       Nephrology was consulted for: "dialysis, SUSAN"    Pt does not have any h/o renal disease prior to hospitalization (per chart review and per pt's )   EDW: 88 kg   Post-op renal ultrasound was normal (results of ultrasound and other medical records from the outside " hospital are in the chart at the bedside, needs to be scanned into chart)   Pt was started on CRRT on 12/12 via a trialysis catheter,   On 12/20/21 morning nephrology at outside hospital had recommended CRRT-SLED however primary team requested iHD prior to transfer to [facility], it appears that pt was ordered for 4 hours however, pt was prepped for transfer and was unable to complete full session 2/2 transfer, per pt's , pt had 2L of fluid removed instead of the planned 4 liters. Of note, while pt was at [facility] pt's CRRT required the use of citrate to prevent clotting (briefly), however that apparently resolved and was able to get a session w/o citrate and obviously was able to tolerate iHD prior to transfer.   Upon arrival to [facility] pt was started on zosyn, she was started on NS 125ml/hr, was transfused 2 units of PRBCs, and remained off vasopressors.

## 2021-12-21 NOTE — SUBJECTIVE & OBJECTIVE
No past medical history on file.    No past surgical history on file.    Review of patient's allergies indicates:   Allergen Reactions    Oxycodone     Tylox [oxycodone-acetaminophen]      Current Facility-Administered Medications   Medication Frequency    0.9%  NaCl infusion (CRRT USE ONLY) Continuous    0.9%  NaCl infusion (for blood administration) Q24H PRN    alteplase injection 2 mg Once    alteplase injection 2 mg Once    calcium gluconate 1 g in dextrose 5 % 100 mL IVPB PRN    calcium gluconate 2 g in dextrose 5 % 100 mL IVPB PRN    calcium gluconate 3 g in dextrose 5 % 100 mL IVPB PRN    dexmedetomidine (PRECEDEX) 400mcg/100mL 0.9% NaCL infusion Continuous    dextrose 50% injection 25 g PRN    famotidine (PF) injection 20 mg Q12H    fentaNYL 2500 mcg in 0.9% sodium chloride 250 mL infusion premix (titrating) Continuous    lactated ringers infusion Continuous    magnesium sulfate 2g in water 50mL IVPB (premix) PRN    magnesium sulfate 2g in water 50mL IVPB (premix) PRN    magnesium sulfate 2g in water 50mL IVPB (premix) PRN    mupirocin 2 % ointment BID    nitroGLYCERIN 2% TD oint ointment 0.5 inch Q6H    NORepinephrine 4 mg in dextrose 5% 250 mL infusion (premix) (titrating) Continuous    piperacillin-tazobactam 4.5 g in sodium chloride 0.9% 100 mL IVPB (ready to mix system) Q12H    potassium chloride 40 mEq in 100 mL IVPB (FOR CENTRAL LINE ADMINISTRATION ONLY) PRN    And    potassium chloride 20 mEq in 100 mL IVPB (FOR CENTRAL LINE ADMINISTRATION ONLY) PRN    And    potassium chloride 40 mEq in 100 mL IVPB (FOR CENTRAL LINE ADMINISTRATION ONLY) PRN    sodium chloride 0.9% flush 10 mL PRN    vasopressin (PITRESSIN) 0.2 Units/mL in dextrose 5 % 100 mL infusion Continuous     Family History    None       Tobacco Use    Smoking status: Not on file    Smokeless tobacco: Not on file   Substance and Sexual Activity    Alcohol use: Not on file    Drug use: Not on file    Sexual  activity: Not on file     Review of Systems   Unable to perform ROS: Intubated     Objective:     Vital Signs (Most Recent):  Temp: 99.32 °F (37.4 °C) (12/21/21 0000)  Pulse: 93 (12/21/21 0000)  Resp: (!) 22 (12/21/21 0000)  BP: 104/61 (12/21/21 0000)  SpO2: (!) 94 % (12/21/21 0000)  O2 Device (Oxygen Therapy): ventilator (12/21/21 0000) Vital Signs (24h Range):  Temp:  [96.26 °F (35.7 °C)-99.32 °F (37.4 °C)] 99.32 °F (37.4 °C)  Pulse:  [] 93  Resp:  [18-23] 22  SpO2:  [93 %-100 %] 94 %  BP: ()/(50-72) 104/61  Arterial Line BP: ()/(50-74) 127/58     Weight: 97.5 kg (214 lb 15.2 oz) (12/20/21 1335)  There is no height or weight on file to calculate BMI.  There is no height or weight on file to calculate BSA.    I/O last 3 completed shifts:  In: 1265.3 [I.V.:397.9; Blood:819; IV Piggyback:48.4]  Out: 170 [Urine:50; Other:120]    Physical Exam  Vitals and nursing note reviewed.   Constitutional:       General: She is not in acute distress.     Appearance: She is ill-appearing and toxic-appearing. She is not diaphoretic.   HENT:      Nose:      Comments: NG TUBE in place with dark green/brown fluid draining into canister  Eyes:      General: No scleral icterus.  Cardiovascular:      Rate and Rhythm: Normal rate and regular rhythm.      Pulses: Normal pulses.      Heart sounds: No murmur heard.       Comments: L groin trialysis cath  Pulmonary:      Effort: Pulmonary effort is normal. No respiratory distress.      Breath sounds: No wheezing or rales.      Comments: Intubated   Abdominal:      General: Abdomen is flat. There is distension.      Palpations: Abdomen is soft.      Comments: See image  Severe abd wall edema  Open wound with wound vacc bloody drainage   JPs present with bloody drainage    Genitourinary:     Comments: Ng catheter, no urine in collection bag   Musculoskeletal:         General: Swelling present. No tenderness or deformity.      Right lower leg: Edema present.      Left lower  leg: Edema present.      Comments: 3+ to 4+ pitting edema from T10 down to to midfoot  Blistering noted      Skin:     General: Skin is warm.      Capillary Refill: Capillary refill takes 2 to 3 seconds.      Coloration: Skin is not jaundiced or pale.      Findings: Bruising present. No erythema, lesion or rash.      Comments: Dusky fingers R hand>L hand   Dusky toes R & L foot about equal   See image    Neurological:      Comments: Intubated and sedated    Psychiatric:      Comments: Intubated and sedated          Severe edema of thigh, R hand with dusky fingers    Severe abdominal wall edema and distention, some blisters noted around the hips     Dusky toes     Significant Labs:  ABGs:   Recent Labs   Lab 12/20/21  1509   PH 7.339*   PCO2 42.9   HCO3 23.1*   POCSATURATED 94*   BE -3     Cardiac Markers: No results for input(s): CKMB, TROPONINT, MYOGLOBIN in the last 168 hours.  CBC:   Recent Labs   Lab 12/20/21 2022   WBC 16.50*   RBC 3.41*   HGB 10.5*   HCT 29.9*   *   MCV 88   MCH 30.8   MCHC 35.1     CMP:   Recent Labs   Lab 12/20/21  1454 12/20/21  1454 12/20/21  2216   *  118*  114*   < > 90   CALCIUM 7.9*  7.9*  8.0*   < > 8.3*   ALBUMIN 3.6  3.6   < > 3.1*   PROT 5.1*  5.4*  --   --      136  135*   < > 135*   K 4.5  4.5  4.4   < > 4.7   CO2 23  23  22*   < > 20*     105  105   < > 106   BUN 55*  55*  54*   < > 59*   CREATININE 2.9*  2.9*  2.9*   < > 3.2*   ALKPHOS 158*  152*  --   --    *  322*  --   --    *  848*  --   --    BILITOT 4.8*  4.8*  --   --     < > = values in this interval not displayed.     Coagulation:   Recent Labs   Lab 12/20/21  1454   INR 1.3*   APTT 42.4*     LFTs:   Recent Labs   Lab 12/20/21  1454 12/20/21  1454 12/20/21  2216   *  322*  --   --    *  848*  --   --    ALKPHOS 158*  152*  --   --    BILITOT 4.8*  4.8*  --   --    PROT 5.1*  5.4*  --   --    ALBUMIN 3.6  3.6   < > 3.1*    < > = values in  this interval not displayed.     Microbiology Results (last 7 days)     ** No results found for the last 168 hours. **        Specimen (24h ago, onward)            None        PTH: No results for input(s): PTH in the last 168 hours.  TSH: No results for input(s): TSH in the last 168 hours.  All labs within the past 24 hours have been reviewed.    Significant Imaging:  Labs: Reviewed  X-Ray: Reviewed

## 2021-12-21 NOTE — PROGRESS NOTES
Per spouse, pt received Covid19 vaccine J&J in August. Tested negative on 12.20 prior to transfer.

## 2021-12-21 NOTE — PROGRESS NOTES
SKIN NOTE:    R heel DTI, mult skin tears to upper back, blisters to LLE and abd, DTI to nose noted upon arrival to ICU from OSH. Foams applied, heel boots in place, wound care consl placed. Pt turned q2h

## 2021-12-21 NOTE — BRIEF OP NOTE
[facility] - [facility] (2nd Fl)  Brief Operative Note    SUMMARY     Surgery Date: 12/21/2021     Surgeon(s) and Role:     * Kendall Gorman MD - Primary     * Víctor Valderrama MD - Resident - Assisting     * Angelica Ivy MD - Resident - Assisting    Pre-op Diagnosis:  Septic shock [A41.9, R65.21]    Post-op Diagnosis:  Post-Op Diagnosis Codes:     * Septic shock [A41.9, R65.21]    Procedure(s) (LRB):  1. Re-opening of recent laparotomy  2. Evacuation of hematoma  3. Abdominal washout   4. ABThera VAC replacement     Anesthesia: General    Operative Findings:   >2L of old hematoma evacuated   7 retained lap pads removed from RUQ  Large capsular defect involving right hepatic lobe with diffuse oozing. Re-packed with Knunit and 7 lap pads and ABThera VAC replaced    Estimated Blood Loss: 200mL    Estimated Blood Loss has been documented.         Specimens:   Specimen (24h ago, onward)            None          [id number]

## 2021-12-21 NOTE — ASSESSMENT & PLAN NOTE
-oliguric SUSAN, ischemic ATN with multifactorial etiology, however most likely d/t severe hypotension as a result of septic shock 2/2 small bowel perforation and bacteremia   -BL sCr 1  -KRT started at OSH 12/12/2021 for acidosis and volume overload  -still anuric on return from OR this morning on pressers  -restart KRT for clearance of acidosis and volume management  -SLED continuous goal UF 1-2L over next 12 hours

## 2021-12-21 NOTE — ASSESSMENT & PLAN NOTE
Oliguric SUSAN-Ischemic ATN with multifactorial etiology, however most likely d/t severe hypotension as a result of septic shock 2/2 small bowel perforation and bacteremia   Baseline: unk, however presented with sCr 0.9 at outside hospital on 12/10  Pt was started on RRT on 12/12/2021      Recommendations:   -anuric, start CRRT-SLED, -450ml/hr as BP allows, pt may need to be on vasopressors in order to improve volume removal. Consent obtained from  who was present in the hospital waiting area.  Pt is critically ill and mortality is high d/t her underlying septic shock, acute hypoxic respiratory failure, bacteremia, ongoing acute blood loss anemia, and severe ATN.   -Electrolytes: replace as necessary, nutrition consult recommended    Phos level 2.8, No phos binders needed yet   -Acid/base: AGMA, CRRT-SLED as per above   -Fluid balance: severely fluid overloaded, DC IVF (pt is anuric & CXR shows pulmonary findings consistent with pulm edema)    -Anemia: Hgb 7.3, s/p 3 units of PRBCs today, transfuse for Hgb <7.0  -Strict I/O's and daily weights   -Renal function panel Q8H   -Maintain MAP >65 for renal perfusion    There is no immediate indication for RRT at this time

## 2021-12-21 NOTE — PROGRESS NOTES
Attempted to connect pt to crrt. Arterial port of hd cath does not aspirate venous port not aspirating icu staff made aware

## 2021-12-21 NOTE — PROGRESS NOTES
[facility]  Nephrology  Progress Note    Patient Name: Chidi Castaneda  MRN: [id number]  Admission Date: 12/20/2021  Hospital Length of Stay: 1 days  Attending Provider: Kendall Gorman MD   Primary Care Physician: Primary Doctor No  Principal Problem:<principal problem not specified>    Subjective:     HPI: Chidi Castaneda is a 56 y.o. female w/ PMHx HTN, GERD s/p EGD 6/22/2021, migraines, ovarian cyst s/p cystectomy, and obesity who underwent an elective lap hysterectomy on 12/18/2021 at [facility] and was then transferred to [place], [place] for higher level of care when pt was found to have post-op somnolence, decrease PO intake, decrease urine output that progressed to anuria with a sharp rise in Cr from 0.9 to 2.8 (12/10). Pt was treated for sepsis with volume resuscitation and broad spectrum antibiotics, however pt continue to deteriorate and became tachycardic, hypotensive, and imaging showed an ileus. Pt was intubated since she was found to be acidotic with a ph of 7.28 despite a nonrebreather with 100%  FiO2  12/11 pt was taken back to the OR for washout and a bowel resection was done as she was found to have a small bowel perforation  12/15 pt was found to have a subscapular liver hematoma  12/18 pt was taken back to the OR for wound vacc exchange and removal of hematoma   12/19 general surgery team recommended no further surgical intervention since they did not find any active bleeding intraoperatively on 12/18 and felt that the ongoing bleeding was 2/2 consumptive coagulopathy and septic shock. They recommended to continue wound vacc and to consider higher level of care for pt  Pt was also being treated for a bacteremia as well as for intraabdominal infection, her antibiotics prior to transfer were: meropenem, flagyl, and vancomycin   Pt received tranexamic acid x1 and multiple units of blood products.    Pt was transferred to [facility] on 12/20/2021 for higher  "level of care: embolization for a subscapular hematoma       Nephrology was consulted for: "dialysis, SUSAN"    Pt does not have any h/o renal disease prior to hospitalization (per chart review and per pt's )   EDW: 88 kg   Post-op renal ultrasound was normal (results of ultrasound and other medical records from the outside hospital are in the chart at the bedside, needs to be scanned into chart)   Pt was started on CRRT on 12/12 via a trialysis catheter,   On 12/20/21 morning nephrology at outside hospital had recommended CRRT-SLED however primary team requested iHD prior to transfer to [facility], it appears that pt was ordered for 4 hours however, pt was prepped for transfer and was unable to complete full session 2/2 transfer, per pt's , pt had 2L of fluid removed instead of the planned 4 liters. Of note, while pt was at [facility] pt's CRRT required the use of citrate to prevent clotting (briefly), however that apparently resolved and was able to get a session w/o citrate and obviously was able to tolerate iHD prior to transfer.   Upon arrival to [facility] pt was started on zosyn, she was started on NS 125ml/hr, was transfused 2 units of PRBCs, and remained off vasopressors.       Interval History: went back to OR this morning and restarted on SLED for clearance and UF    Review of patient's allergies indicates:   Allergen Reactions    Oxycodone     Tylox [oxycodone-acetaminophen]      Current Facility-Administered Medications   Medication Frequency    0.9%  NaCl infusion (CRRT USE ONLY) Continuous    0.9%  NaCl infusion (for blood administration) Q24H PRN    dexmedetomidine (PRECEDEX) 400mcg/100mL 0.9% NaCL infusion Continuous    dextrose 50% injection 25 g PRN    famotidine (PF) injection 20 mg Daily    fentaNYL 2500 mcg in 0.9% sodium chloride 250 mL infusion premix (titrating) Continuous    heparin (porcine) injection 5,000 Units Q8H    lactated ringers bolus 1,000 mL Once    lipid " (SMOFLIPID) (SMOFLIPID) 20 % infusion 250 mL Daily    magnesium sulfate 2g in water 50mL IVPB (premix) PRN    mupirocin 2 % ointment BID    nitroGLYCERIN 2% TD oint ointment 0.5 inch Q6H    NORepinephrine 4 mg in dextrose 5% 250 mL infusion (premix) (titrating) Continuous    piperacillin-tazobactam 4.5 g in sodium chloride 0.9% 100 mL IVPB (ready to mix system) Q8H    sodium chloride 0.9% flush 10 mL PRN    TPN ADULT CENTRAL LINE CUSTOM Continuous       Objective:     Vital Signs (Most Recent):  Temp: 99.32 °F (37.4 °C) (12/21/21 1600)  Pulse: 97 (12/21/21 1600)  Resp: 19 (12/21/21 1600)  BP: 93/74 (12/21/21 1600)  SpO2: 100 % (12/21/21 1600)  O2 Device (Oxygen Therapy): ventilator (12/21/21 1530) Vital Signs (24h Range):  Temp:  [96.26 °F (35.7 °C)-99.86 °F (37.7 °C)] 99.32 °F (37.4 °C)  Pulse:  [] 97  Resp:  [7-25] 19  SpO2:  [92 %-100 %] 100 %  BP: ()/(50-80) 93/74  Arterial Line BP: ()/(34-74) 91/53     Weight: 97.5 kg (215 lb) (12/21/21 1500)  Body mass index is 36.9 kg/m².  Body surface area is 2.1 meters squared.    I/O last 3 completed shifts:  In: 2624.6 [I.V.:1658.5; Blood:819; IV Piggyback:147.1]  Out: 2964 [Urine:50; Other:2914]    Physical Exam  Constitutional:       Appearance: She is obese. She is ill-appearing.   HENT:      Mouth/Throat:      Mouth: Mucous membranes are moist.   Eyes:      Pupils: Pupils are equal, round, and reactive to light.   Cardiovascular:      Rate and Rhythm: Normal rate and regular rhythm.   Pulmonary:      Comments: intubated  Abdominal:      Comments: open   Musculoskeletal:         General: No deformity.      Right lower leg: Edema present.      Left lower leg: Edema present.   Skin:     Coloration: Skin is not jaundiced.   Neurological:      General: No focal deficit present.         Significant Labs:  All labs within the past 24 hours have been reviewed.     Significant Imaging:  Labs: Reviewed    Assessment/Plan:     Volume  overload  -correcting with UF    Metabolic acidosis  -controlled with KRT    Bowel perforation  -per primary    Septic shock  -secondary to bowel perforation  -per primary      SUSAN (acute kidney injury)  -oliguric SUSAN, ischemic ATN with multifactorial etiology, however most likely d/t severe hypotension as a result of septic shock 2/2 small bowel perforation and bacteremia   -BL sCr 1  -KRT started at OSH 12/12/2021 for acidosis and volume overload  -still anuric on return from OR this morning on pressers  -restart KRT for clearance of acidosis and volume management  -SLED continuous goal UF 1-2L over next 12 hours    Subcapsular hematoma of liver  -per primary          Anthony Steven MD  Nephrology  [facility]

## 2021-12-21 NOTE — ASSESSMENT & PLAN NOTE
  Neuro/Psych:   -- Does not respond to commands but opens eyes to stimulation and withdraws from pain  --Sedation/Pain: precedex as needed, currently off             Cards:   -- HDS with MAP goal > 65   --Pressor requirements on arrival 0.03 vaso, levophed 0.06, currently off pressors  -- Transfused 1u prior to arrival, Hgb at that point 6.9, repeat 7.3      Pulm:   -- Goal O2 sat > 90%  -- Intubated, currently on 50/6, satting well      Renal:  -- Keep billingsley for strict I/O  -- Currently has SUSAN - was received SLED at OSH, received only 2 hours of HD yesterday prior to transfer  -- BUN/Cr 28/1.8  -- Nephrology consult - started on SLED overnight      FEN / GI:   -- Replace lytes as needed  -- Nutrition: strict NPO, reorder TPN tonight, will get G tube today in OR  -- LR stopped for SLED      ID:   -- Tm: afebrile; WBC 16.05 from 17.45   -- Abx on zosyn      Heme/Onc:   -- Since hosptial admission on 12/10 has received 11u pRBC, 4u FFP, 1u platelets  -- H/H 10.3 from 7.3, s/p 2 units on arrival  -- Daily CBC      Endo:   -- Gluc goal 140-180  -- no history of diabetes  -- Accu checks as off TPN currently      PPx:   Feeding: NPO  Analgesia/Sedation: Precedex  Thromboembolic prevention: Holding  HOB >30: yes  Stress Ulcer ppx: Famotidine  Glucose control: Critical care goal 140-180 g/dl, ISS    Lines/Drains/Airway: NG, Billingsley, L radial larry, R IJ trialysis, L subclavian triple lumen      Dispo/Code Status/Palliative:   -- SICU / Full Code

## 2021-12-21 NOTE — CONSULTS
"[facility]  Nephrology  Consult Note    Patient Name: Chidi Castaneda  MRN: [id number]  Admission Date: 12/20/2021  Hospital Length of Stay: 1 days  Attending Provider: Kendall Gorman MD   Primary Care Physician: Primary Doctor No  Principal Problem:<principal problem not specified>    Inpatient consult to Nephrology  Consult performed by: Belinda Mckenna MD  Consult ordered by: Jaylan Altman MD  Reason for consult: "dialysis, SUSAN"  Assessment/Recommendations: Please see consult note & staff attestation below for full recommendations. Thank you!         Subjective:     HPI: Chidi Castaneda is a 56 y.o. female w/ PMHx HTN, GERD s/p EGD 6/22/2021, migraines, ovarian cyst s/p cystectomy, and obesity who underwent an elective lap hysterectomy on 12/18/2021 at [facility] and was then transferred to [place], [place] for higher level of care when pt was found to have post-op somnolence, decrease PO intake, decrease urine output that progressed to anuria with a sharp rise in Cr from 0.9 to 2.8 (12/10). Pt was treated for sepsis with volume resuscitation and broad spectrum antibiotics, however pt continue to deteriorate and became tachycardic, hypotensive, and imaging showed an ileus. Pt was intubated since she was found to be acidotic with a ph of 7.28 despite a nonrebreather with 100%  FiO2  12/11 pt was taken back to the OR for washout and a bowel resection was done as she was found to have a small bowel perforation  12/15 pt was found to have a subscapular liver hematoma  12/18 pt was taken back to the OR for wound vacc exchange and removal of hematoma   12/19 general surgery team recommended no further surgical intervention since they did not find any active bleeding intraoperatively on 12/18 and felt that the ongoing bleeding was 2/2 consumptive coagulopathy and septic shock. They recommended to continue wound vacc and to consider higher level of care for pt  Pt was also " "being treated for a bacteremia as well as for intraabdominal infection, her antibiotics prior to transfer were: meropenem, flagyl, and vancomycin   Pt received tranexamic acid x1 and multiple units of blood products.    Pt was transferred to [facility] on 12/20/2021 for higher level of care: embolization for a subscapular hematoma       Nephrology was consulted for: "dialysis, SUSAN"    Pt does not have any h/o renal disease prior to hospitalization (per chart review and per pt's )   EDW: 88 kg   Post-op renal ultrasound was normal (results of ultrasound and other medical records from the outside hospital are in the chart at the bedside, needs to be scanned into chart)   Pt was started on CRRT on 12/12 via a trialysis catheter,   On 12/20/21 morning nephrology at outside hospital had recommended CRRT-SLED however primary team requested iHD prior to transfer to [facility], it appears that pt was ordered for 4 hours however, pt was prepped for transfer and was unable to complete full session 2/2 transfer, per pt's , pt had 2L of fluid removed instead of the planned 4 liters. Of note, while pt was at [facility] pt's CRRT required the use of citrate to prevent clotting (briefly), however that apparently resolved and was able to get a session w/o citrate and obviously was able to tolerate iHD prior to transfer.   Upon arrival to [facility] pt was started on zosyn, she was started on NS 125ml/hr, was transfused 2 units of PRBCs, and remained off vasopressors.       No past medical history on file.    No past surgical history on file.    Review of patient's allergies indicates:   Allergen Reactions    Oxycodone     Tylox [oxycodone-acetaminophen]      Current Facility-Administered Medications   Medication Frequency    0.9%  NaCl infusion (CRRT USE ONLY) Continuous    0.9%  NaCl infusion (for blood administration) Q24H PRN    alteplase injection 2 mg Once    alteplase injection 2 mg Once    calcium gluconate 1 g in " dextrose 5 % 100 mL IVPB PRN    calcium gluconate 2 g in dextrose 5 % 100 mL IVPB PRN    calcium gluconate 3 g in dextrose 5 % 100 mL IVPB PRN    dexmedetomidine (PRECEDEX) 400mcg/100mL 0.9% NaCL infusion Continuous    dextrose 50% injection 25 g PRN    famotidine (PF) injection 20 mg Q12H    fentaNYL 2500 mcg in 0.9% sodium chloride 250 mL infusion premix (titrating) Continuous    lactated ringers infusion Continuous    magnesium sulfate 2g in water 50mL IVPB (premix) PRN    magnesium sulfate 2g in water 50mL IVPB (premix) PRN    magnesium sulfate 2g in water 50mL IVPB (premix) PRN    mupirocin 2 % ointment BID    nitroGLYCERIN 2% TD oint ointment 0.5 inch Q6H    NORepinephrine 4 mg in dextrose 5% 250 mL infusion (premix) (titrating) Continuous    piperacillin-tazobactam 4.5 g in sodium chloride 0.9% 100 mL IVPB (ready to mix system) Q12H    potassium chloride 40 mEq in 100 mL IVPB (FOR CENTRAL LINE ADMINISTRATION ONLY) PRN    And    potassium chloride 20 mEq in 100 mL IVPB (FOR CENTRAL LINE ADMINISTRATION ONLY) PRN    And    potassium chloride 40 mEq in 100 mL IVPB (FOR CENTRAL LINE ADMINISTRATION ONLY) PRN    sodium chloride 0.9% flush 10 mL PRN    vasopressin (PITRESSIN) 0.2 Units/mL in dextrose 5 % 100 mL infusion Continuous     Family History    None       Tobacco Use    Smoking status: Not on file    Smokeless tobacco: Not on file   Substance and Sexual Activity    Alcohol use: Not on file    Drug use: Not on file    Sexual activity: Not on file     Review of Systems   Unable to perform ROS: Intubated     Objective:     Vital Signs (Most Recent):  Temp: 99.32 °F (37.4 °C) (12/21/21 0000)  Pulse: 93 (12/21/21 0000)  Resp: (!) 22 (12/21/21 0000)  BP: 104/61 (12/21/21 0000)  SpO2: (!) 94 % (12/21/21 0000)  O2 Device (Oxygen Therapy): ventilator (12/21/21 0000) Vital Signs (24h Range):  Temp:  [96.26 °F (35.7 °C)-99.32 °F (37.4 °C)] 99.32 °F (37.4 °C)  Pulse:  [] 93  Resp:  [18-23]  22  SpO2:  [93 %-100 %] 94 %  BP: ()/(50-72) 104/61  Arterial Line BP: ()/(50-74) 127/58     Weight: 97.5 kg (214 lb 15.2 oz) (12/20/21 1335)  There is no height or weight on file to calculate BMI.  There is no height or weight on file to calculate BSA.    I/O last 3 completed shifts:  In: 1265.3 [I.V.:397.9; Blood:819; IV Piggyback:48.4]  Out: 170 [Urine:50; Other:120]    Physical Exam  Vitals and nursing note reviewed.   Constitutional:       General: She is not in acute distress.     Appearance: She is ill-appearing and toxic-appearing. She is not diaphoretic.   HENT:      Nose:      Comments: NG TUBE in place with dark green/brown fluid draining into canister  Eyes:      General: No scleral icterus.  Cardiovascular:      Rate and Rhythm: Normal rate and regular rhythm.      Pulses: Normal pulses.      Heart sounds: No murmur heard.       Comments: L groin trialysis cath  Pulmonary:      Effort: Pulmonary effort is normal. No respiratory distress.      Breath sounds: No wheezing or rales.      Comments: Intubated   Abdominal:      General: Abdomen is flat. There is distension.      Palpations: Abdomen is soft.      Comments: See image  Severe abd wall edema  Open wound with wound vacc bloody drainage   JPs present with bloody drainage    Genitourinary:     Comments: Ng catheter, no urine in collection bag   Musculoskeletal:         General: Swelling present. No tenderness or deformity.      Right lower leg: Edema present.      Left lower leg: Edema present.      Comments: 3+ to 4+ pitting edema from T10 down to to midfoot  Blistering noted      Skin:     General: Skin is warm.      Capillary Refill: Capillary refill takes 2 to 3 seconds.      Coloration: Skin is not jaundiced or pale.      Findings: Bruising present. No erythema, lesion or rash.      Comments: Dusky fingers R hand>L hand   Dusky toes R & L foot about equal   See image    Neurological:      Comments: Intubated and sedated     Psychiatric:      Comments: Intubated and sedated          Severe edema of thigh, R hand with dusky fingers    Severe abdominal wall edema and distention, some blisters noted around the hips     Dusky toes     Significant Labs:  ABGs:   Recent Labs   Lab 12/20/21  1509   PH 7.339*   PCO2 42.9   HCO3 23.1*   POCSATURATED 94*   BE -3     Cardiac Markers: No results for input(s): CKMB, TROPONINT, MYOGLOBIN in the last 168 hours.  CBC:   Recent Labs   Lab 12/20/21 2022   WBC 16.50*   RBC 3.41*   HGB 10.5*   HCT 29.9*   *   MCV 88   MCH 30.8   MCHC 35.1     CMP:   Recent Labs   Lab 12/20/21  1454 12/20/21  1454 12/20/21 2216   *  118*  114*   < > 90   CALCIUM 7.9*  7.9*  8.0*   < > 8.3*   ALBUMIN 3.6  3.6   < > 3.1*   PROT 5.1*  5.4*  --   --      136  135*   < > 135*   K 4.5  4.5  4.4   < > 4.7   CO2 23  23  22*   < > 20*     105  105   < > 106   BUN 55*  55*  54*   < > 59*   CREATININE 2.9*  2.9*  2.9*   < > 3.2*   ALKPHOS 158*  152*  --   --    *  322*  --   --    *  848*  --   --    BILITOT 4.8*  4.8*  --   --     < > = values in this interval not displayed.     Coagulation:   Recent Labs   Lab 12/20/21  1454   INR 1.3*   APTT 42.4*     LFTs:   Recent Labs   Lab 12/20/21  1454 12/20/21  1454 12/20/21  2216   *  322*  --   --    *  848*  --   --    ALKPHOS 158*  152*  --   --    BILITOT 4.8*  4.8*  --   --    PROT 5.1*  5.4*  --   --    ALBUMIN 3.6  3.6   < > 3.1*    < > = values in this interval not displayed.     Microbiology Results (last 7 days)     ** No results found for the last 168 hours. **        Specimen (24h ago, onward)            None        PTH: No results for input(s): PTH in the last 168 hours.  TSH: No results for input(s): TSH in the last 168 hours.  All labs within the past 24 hours have been reviewed.    Significant Imaging:  Labs: Reviewed  X-Ray: Reviewed    Assessment/Plan:     Septic shock  -Plan per primary  team       SUSAN (acute kidney injury)  Oliguric SUSAN-Ischemic ATN with multifactorial etiology, however most likely d/t severe hypotension as a result of septic shock 2/2 small bowel perforation and bacteremia   Baseline: unk, however presented with sCr 0.9 at outside hospital on 12/10  Pt was started on RRT on 12/12/2021      Recommendations:   -anuric, start CRRT-SLED, -450ml/hr as BP allows, pt may need to be on vasopressors in order to improve volume removal. Consent obtained from  who was present in the hospital waiting area.  Pt is critically ill and mortality is high d/t her underlying septic shock, acute hypoxic respiratory failure, bacteremia, ongoing acute blood loss anemia, and severe ATN.   -Electrolytes: replace as necessary, nutrition consult recommended    Phos level 2.8, No phos binders needed yet   -Acid/base: AGMA, CRRT-SLED as per above   -Fluid balance: severely fluid overloaded, DC IVF (pt is anuric & CXR shows pulmonary findings consistent with pulm edema)    -Anemia: Hgb 7.3, s/p 3 units of PRBCs today, transfuse for Hgb <7.0  -Strict I/O's and daily weights   -Renal function panel Q8H   -Maintain MAP >65 for renal perfusion              Subcapsular hematoma of liver  -Plan per primary team           Thank you for your consult. I will follow-up with patient. Please contact us if you have any additional questions.    Belinda Mckenna MD  Nephrology  [facility]

## 2021-12-21 NOTE — PROGRESS NOTES
POC reviewed with pt and spouse at bedside, no acute events overnight, VSS, on and off Norepinephrine gtt @ 0.01 mcg/kg/min to maintain MAP>65. L Fem trialysis exchanged for R IJ, no complications, CRRT started @ 0100,  @ goal, no UOP. Vent settings AC VC+ 50% and PEEP 6, stats 96%. AM lab results reviewed and addressed, all questions answered.

## 2021-12-21 NOTE — SUBJECTIVE & OBJECTIVE
Interval History/Significant Events:   Patient admitted yesterday as transfer from [facility].  Overnight, was called by nurse about trialysis line - unable to draw back from venous or arterial ports. Cathflow x2 was tried to no avail. Placed new right IJ trialysis line around 0000 this morning. SLED was started after that point. She tolerated this well and did not require pressors.   She continues to not follow commands and she is not on any sedation.    Follow-up For: Procedure(s) (LRB):  LAPAROTOMY, EXPLORATORY (N/A)    Post-Operative Day: Day of Surgery    Objective:     Vital Signs (Most Recent):  Temp: 99.32 °F (37.4 °C) (12/21/21 0545)  Pulse: 88 (12/21/21 0545)  Resp: 18 (12/21/21 0545)  BP: (!) 94/57 (12/21/21 0500)  SpO2: 97 % (12/21/21 0545) Vital Signs (24h Range):  Temp:  [96.26 °F (35.7 °C)-99.86 °F (37.7 °C)] 99.32 °F (37.4 °C)  Pulse:  [] 88  Resp:  [7-25] 18  SpO2:  [93 %-100 %] 97 %  BP: ()/(50-72) 94/57  Arterial Line BP: ()/(50-74) 109/53     Weight: 97.9 kg (215 lb 13.3 oz)  Body mass index is 37.3 kg/m².      Intake/Output Summary (Last 24 hours) at 12/21/2021 0558  Last data filed at 12/21/2021 0500  Gross per 24 hour   Intake 2397.22 ml   Output 1678 ml   Net 719.22 ml       Physical Exam  Vitals and nursing note reviewed.   Constitutional:       Appearance: She is obese. She is ill-appearing.   HENT:      Head: Normocephalic and atraumatic.      Nose:      Comments: NG tube in place  Neck:      Comments: Right IJ trialysis line  Cardiovascular:      Rate and Rhythm: Normal rate and regular rhythm.      Pulses: Normal pulses.   Pulmonary:      Comments: Intubated  Vent Mode: A/C  Oxygen Concentration (%):  (40-50) 50  Resp Rate Total:  (18 br/min-25 br/min) 18 br/min  Vt Set:  (395 mL-420 mL) 420 mL  PEEP/CPAP:  (5 cmH20-6 cmH20) 6 cmH20  Mean Airway Pressure:  (12 bwR67-50 cmH20) 14 cmH20      Abdominal:      Comments: Abdomen is open. There is an abdominal  wound vac in place to continuous suction draining serosanguinous to sanguinous output.   Musculoskeletal:      Right lower leg: Edema present.      Left lower leg: Edema present.      Comments: L radial larry, L subclavian triple lumen   Skin:     General: Skin is warm and dry.   Neurological:      Comments: Does not respond to commands  Will open eyes to stimulation  Withdraws from pain         Vents:  Vent Mode: A/C (12/21/21 0426)  Ventilator Initiated: Yes (12/20/21 1448)  Set Rate: 18 BPM (12/21/21 0426)  Vt Set: 420 mL (12/21/21 0426)  PEEP/CPAP: 6 cmH20 (12/21/21 0426)  Oxygen Concentration (%): 50 (12/21/21 0545)  Peak Airway Pressure: 32 cmH2O (12/21/21 0426)  Plateau Pressure: 0 cmH20 (12/21/21 0426)  Total Ve: 9.67 mL (12/21/21 0426)  Negative Inspiratory Force (cm H2O): 0 (12/21/21 0426)  F/VT Ratio<105 (RSBI): (!) 48.03 (12/21/21 0426)    Lines/Drains/Airways     Central Venous Catheter Line            Trialysis (Dialysis) Catheter 12/16/21 0000 left femoral 5 days    Percutaneous Central Line Insertion/Assessment - Triple Lumen  12/16/21 1531 left subclavian 4 days    Trialysis (Dialysis) Catheter 12/21/21 0030 right internal jugular <1 day          Drain                 NG/OG Tube 12/16/21 0000 Left nostril 5 days         Urethral Catheter 12/20/21 1743 <1 day          Airway                 Airway - Non-Surgical 12/20/21 1451 Endotracheal Tube <1 day          Arterial Line            Arterial Line 12/18/21 0000 Right Radial 3 days                Significant Labs:    CBC/Anemia Profile:  Recent Labs   Lab 12/20/21  1454 12/20/21 2022 12/21/21 0425   WBC 17.45* 16.50* 16.05*   HGB 7.3* 10.5* 10.3*   HCT 20.9* 29.9* 29.4*   * 101* 107*   MCV 87 88 86   RDW 15.5* 16.3* 16.0*        Chemistries:  Recent Labs   Lab 12/20/21  1454 12/20/21  1454 12/20/21  2216 12/21/21  0424 12/21/21  0425     136  135*   < > 135* 136 136   K 4.5  4.5  4.4   < > 4.7 4.9 4.9     105  105   < > 106  108 107   CO2 23  23  22*   < > 20* 20* 20*   BUN 55*  55*  54*   < > 59* 29* 28*   CREATININE 2.9*  2.9*  2.9*   < > 3.2* 1.8* 1.8*   CALCIUM 7.9*  7.9*  8.0*   < > 8.3* 8.1* 8.1*   ALBUMIN 3.6  3.6   < > 3.1* 2.9* 3.0*   PROT 5.1*  5.4*  --   --   --  4.8*   BILITOT 4.8*  4.8*  --   --   --  6.4*   ALKPHOS 158*  152*  --   --   --  194*   *  322*  --   --   --  287*   *  848*  --   --   --  705*   MG 2.4  2.4  --  2.3 2.0  --    PHOS 2.8  2.8  --  2.8 1.7*  --     < > = values in this interval not displayed.       All pertinent labs within the past 24 hours have been reviewed.    Significant Imaging:  I have reviewed all pertinent imaging results/findings within the past 24 hours.

## 2021-12-22 ENCOUNTER — ANESTHESIA EVENT (OUTPATIENT)
Dept: SURGERY | Facility: HOSPITAL | Age: 56
DRG: 907 | End: 2021-12-22
Payer: OTHER GOVERNMENT

## 2021-12-22 LAB
ALBUMIN SERPL BCP-MCNC: 1.6 G/DL (ref 3.5–5.2)
ALBUMIN SERPL BCP-MCNC: 1.6 G/DL (ref 3.5–5.2)
ALBUMIN SERPL BCP-MCNC: 1.7 G/DL (ref 3.5–5.2)
ALBUMIN SERPL BCP-MCNC: 1.8 G/DL (ref 3.5–5.2)
ALLENS TEST: ABNORMAL
ALP SERPL-CCNC: 118 U/L (ref 55–135)
ALT SERPL W/O P-5'-P-CCNC: 168 U/L (ref 10–44)
ANION GAP SERPL CALC-SCNC: 4 MMOL/L (ref 8–16)
ANION GAP SERPL CALC-SCNC: 4 MMOL/L (ref 8–16)
ANION GAP SERPL CALC-SCNC: 6 MMOL/L (ref 8–16)
ANION GAP SERPL CALC-SCNC: 7 MMOL/L (ref 8–16)
ANISOCYTOSIS BLD QL SMEAR: SLIGHT
AST SERPL-CCNC: 315 U/L (ref 10–40)
BASOPHILS # BLD AUTO: 0.05 K/UL (ref 0–0.2)
BASOPHILS NFR BLD: 0 % (ref 0–1.9)
BASOPHILS NFR BLD: 0.4 % (ref 0–1.9)
BASOPHILS NFR BLD: 1 % (ref 0–1.9)
BASOPHILS NFR BLD: 1 % (ref 0–1.9)
BILIRUB SERPL-MCNC: 4.2 MG/DL (ref 0.1–1)
BLD PROD TYP BPU: NORMAL
BLOOD UNIT EXPIRATION DATE: NORMAL
BLOOD UNIT TYPE CODE: 600
BLOOD UNIT TYPE CODE: 6200
BLOOD UNIT TYPE CODE: 6200
BLOOD UNIT TYPE: NORMAL
BUN SERPL-MCNC: 13 MG/DL (ref 6–20)
BUN SERPL-MCNC: 6 MG/DL (ref 6–20)
CALCIUM SERPL-MCNC: 6.9 MG/DL (ref 8.7–10.5)
CALCIUM SERPL-MCNC: 7.2 MG/DL (ref 8.7–10.5)
CHLORIDE SERPL-SCNC: 107 MMOL/L (ref 95–110)
CHLORIDE SERPL-SCNC: 107 MMOL/L (ref 95–110)
CHLORIDE SERPL-SCNC: 108 MMOL/L (ref 95–110)
CHLORIDE SERPL-SCNC: 108 MMOL/L (ref 95–110)
CO2 SERPL-SCNC: 25 MMOL/L (ref 23–29)
CO2 SERPL-SCNC: 26 MMOL/L (ref 23–29)
CO2 SERPL-SCNC: 26 MMOL/L (ref 23–29)
CO2 SERPL-SCNC: 28 MMOL/L (ref 23–29)
CODING SYSTEM: NORMAL
CREAT SERPL-MCNC: 0.5 MG/DL (ref 0.5–1.4)
CREAT SERPL-MCNC: 0.6 MG/DL (ref 0.5–1.4)
CREAT SERPL-MCNC: 0.6 MG/DL (ref 0.5–1.4)
CREAT SERPL-MCNC: 0.8 MG/DL (ref 0.5–1.4)
DELSYS: ABNORMAL
DIFFERENTIAL METHOD: ABNORMAL
DISPENSE STATUS: NORMAL
DOHLE BOD BLD QL SMEAR: PRESENT
EOSINOPHIL # BLD AUTO: 0.1 K/UL (ref 0–0.5)
EOSINOPHIL NFR BLD: 0 % (ref 0–8)
EOSINOPHIL NFR BLD: 0.5 % (ref 0–8)
ERYTHROCYTE [DISTWIDTH] IN BLOOD BY AUTOMATED COUNT: 16.5 % (ref 11.5–14.5)
ERYTHROCYTE [DISTWIDTH] IN BLOOD BY AUTOMATED COUNT: 17 % (ref 11.5–14.5)
ERYTHROCYTE [DISTWIDTH] IN BLOOD BY AUTOMATED COUNT: 17.6 % (ref 11.5–14.5)
ERYTHROCYTE [DISTWIDTH] IN BLOOD BY AUTOMATED COUNT: 18.2 % (ref 11.5–14.5)
ERYTHROCYTE [SEDIMENTATION RATE] IN BLOOD BY WESTERGREN METHOD: 18 MM/H
EST. GFR  (AFRICAN AMERICAN): >60 ML/MIN/1.73 M^2
EST. GFR  (NON AFRICAN AMERICAN): >60 ML/MIN/1.73 M^2
FIO2: 50
GLUCOSE SERPL-MCNC: 140 MG/DL (ref 70–110)
GLUCOSE SERPL-MCNC: 142 MG/DL (ref 70–110)
GLUCOSE SERPL-MCNC: 155 MG/DL (ref 70–110)
GLUCOSE SERPL-MCNC: 157 MG/DL (ref 70–110)
GLUCOSE SERPL-MCNC: 89 MG/DL (ref 70–110)
HCO3 UR-SCNC: 22.2 MMOL/L (ref 24–28)
HCO3 UR-SCNC: 29.6 MMOL/L (ref 24–28)
HCT VFR BLD AUTO: 19.1 % (ref 37–48.5)
HCT VFR BLD AUTO: 20.5 % (ref 37–48.5)
HCT VFR BLD AUTO: 21.9 % (ref 37–48.5)
HCT VFR BLD AUTO: 25.4 % (ref 37–48.5)
HCT VFR BLD CALC: 26 %PCV (ref 36–54)
HGB BLD-MCNC: 6.5 G/DL (ref 12–16)
HGB BLD-MCNC: 6.9 G/DL (ref 12–16)
HGB BLD-MCNC: 7.6 G/DL (ref 12–16)
HGB BLD-MCNC: 8.4 G/DL (ref 12–16)
HYPOCHROMIA BLD QL SMEAR: ABNORMAL
IMM GRANULOCYTES # BLD AUTO: 0.57 K/UL (ref 0–0.04)
IMM GRANULOCYTES # BLD AUTO: ABNORMAL K/UL (ref 0–0.04)
IMM GRANULOCYTES NFR BLD AUTO: 4.3 % (ref 0–0.5)
IMM GRANULOCYTES NFR BLD AUTO: ABNORMAL % (ref 0–0.5)
LYMPHOCYTES # BLD AUTO: 1.5 K/UL (ref 1–4.8)
LYMPHOCYTES NFR BLD: 11 % (ref 18–48)
LYMPHOCYTES NFR BLD: 2 % (ref 18–48)
LYMPHOCYTES NFR BLD: 4 % (ref 18–48)
LYMPHOCYTES NFR BLD: 8 % (ref 18–48)
MAGNESIUM SERPL-MCNC: 1.6 MG/DL (ref 1.6–2.6)
MAGNESIUM SERPL-MCNC: 1.9 MG/DL (ref 1.6–2.6)
MAGNESIUM SERPL-MCNC: 2.1 MG/DL (ref 1.6–2.6)
MCH RBC QN AUTO: 30.7 PG (ref 27–31)
MCH RBC QN AUTO: 30.7 PG (ref 27–31)
MCH RBC QN AUTO: 31 PG (ref 27–31)
MCH RBC QN AUTO: 31.7 PG (ref 27–31)
MCHC RBC AUTO-ENTMCNC: 33.1 G/DL (ref 32–36)
MCHC RBC AUTO-ENTMCNC: 33.7 G/DL (ref 32–36)
MCHC RBC AUTO-ENTMCNC: 34 G/DL (ref 32–36)
MCHC RBC AUTO-ENTMCNC: 34.7 G/DL (ref 32–36)
MCV RBC AUTO: 90 FL (ref 82–98)
MCV RBC AUTO: 91 FL (ref 82–98)
MCV RBC AUTO: 91 FL (ref 82–98)
MCV RBC AUTO: 94 FL (ref 82–98)
METAMYELOCYTES NFR BLD MANUAL: 1 %
METAMYELOCYTES NFR BLD MANUAL: 1 %
METAMYELOCYTES NFR BLD MANUAL: 2 %
MODE: ABNORMAL
MONOCYTES # BLD AUTO: 1.2 K/UL (ref 0.3–1)
MONOCYTES NFR BLD: 3 % (ref 4–15)
MONOCYTES NFR BLD: 3 % (ref 4–15)
MONOCYTES NFR BLD: 4 % (ref 4–15)
MONOCYTES NFR BLD: 8.7 % (ref 4–15)
MYELOCYTES NFR BLD MANUAL: 1 %
MYELOCYTES NFR BLD MANUAL: 1 %
MYELOCYTES NFR BLD MANUAL: 2 %
NEUTROPHILS # BLD AUTO: 10 K/UL (ref 1.8–7.7)
NEUTROPHILS NFR BLD: 75.1 % (ref 38–73)
NEUTROPHILS NFR BLD: 81 % (ref 38–73)
NEUTROPHILS NFR BLD: 85 % (ref 38–73)
NEUTROPHILS NFR BLD: 89 % (ref 38–73)
NEUTS BAND NFR BLD MANUAL: 2 %
NEUTS BAND NFR BLD MANUAL: 2 %
NEUTS BAND NFR BLD MANUAL: 6 %
NRBC BLD-RTO: 1 /100 WBC
NRBC BLD-RTO: 2 /100 WBC
OVALOCYTES BLD QL SMEAR: ABNORMAL
OVALOCYTES BLD QL SMEAR: ABNORMAL
PCO2 BLDA: 35.6 MMHG (ref 35–45)
PCO2 BLDA: 37 MMHG (ref 35–45)
PEEP: 6
PH SMN: 7.4 [PH] (ref 7.35–7.45)
PH SMN: 7.51 [PH] (ref 7.35–7.45)
PHOSPHATE SERPL-MCNC: 1.4 MG/DL (ref 2.7–4.5)
PHOSPHATE SERPL-MCNC: 1.8 MG/DL (ref 2.7–4.5)
PHOSPHATE SERPL-MCNC: 3.1 MG/DL (ref 2.7–4.5)
PLATELET # BLD AUTO: 110 K/UL (ref 150–450)
PLATELET # BLD AUTO: 114 K/UL (ref 150–450)
PLATELET # BLD AUTO: 115 K/UL (ref 150–450)
PLATELET # BLD AUTO: 99 K/UL (ref 150–450)
PLATELET BLD QL SMEAR: ABNORMAL
PLATELET BLD QL SMEAR: ABNORMAL
PMV BLD AUTO: 12.2 FL (ref 9.2–12.9)
PMV BLD AUTO: 12.6 FL (ref 9.2–12.9)
PMV BLD AUTO: 12.8 FL (ref 9.2–12.9)
PMV BLD AUTO: 13 FL (ref 9.2–12.9)
PO2 BLDA: 164 MMHG (ref 80–100)
PO2 BLDA: 78 MMHG (ref 80–100)
POC BE: -3 MMOL/L
POC BE: 7 MMOL/L
POC IONIZED CALCIUM: 1.09 MMOL/L (ref 1.06–1.42)
POC SATURATED O2: 100 % (ref 95–100)
POC SATURATED O2: 96 % (ref 95–100)
POC TCO2: 23 MMOL/L (ref 23–27)
POC TCO2: 31 MMOL/L (ref 23–27)
POCT GLUCOSE: 160 MG/DL (ref 70–110)
POCT GLUCOSE: 178 MG/DL (ref 70–110)
POCT GLUCOSE: 179 MG/DL (ref 70–110)
POIKILOCYTOSIS BLD QL SMEAR: SLIGHT
POLYCHROMASIA BLD QL SMEAR: ABNORMAL
POTASSIUM BLD-SCNC: 4.7 MMOL/L (ref 3.5–5.1)
POTASSIUM SERPL-SCNC: 4.2 MMOL/L (ref 3.5–5.1)
POTASSIUM SERPL-SCNC: 4.3 MMOL/L (ref 3.5–5.1)
POTASSIUM SERPL-SCNC: 4.8 MMOL/L (ref 3.5–5.1)
POTASSIUM SERPL-SCNC: 4.9 MMOL/L (ref 3.5–5.1)
PROMYELOCYTES NFR BLD MANUAL: 1 %
PROT SERPL-MCNC: 3.6 G/DL (ref 6–8.4)
RBC # BLD AUTO: 2.12 M/UL (ref 4–5.4)
RBC # BLD AUTO: 2.25 M/UL (ref 4–5.4)
RBC # BLD AUTO: 2.4 M/UL (ref 4–5.4)
RBC # BLD AUTO: 2.71 M/UL (ref 4–5.4)
SAMPLE: ABNORMAL
SAMPLE: ABNORMAL
SCHISTOCYTES BLD QL SMEAR: ABNORMAL
SCHISTOCYTES BLD QL SMEAR: PRESENT
SITE: ABNORMAL
SODIUM BLD-SCNC: 141 MMOL/L (ref 136–145)
SODIUM SERPL-SCNC: 137 MMOL/L (ref 136–145)
SODIUM SERPL-SCNC: 139 MMOL/L (ref 136–145)
SODIUM SERPL-SCNC: 140 MMOL/L (ref 136–145)
SODIUM SERPL-SCNC: 140 MMOL/L (ref 136–145)
TRANS ERYTHROCYTES VOL PATIENT: NORMAL ML
VT: 420
WBC # BLD AUTO: 13.32 K/UL (ref 3.9–12.7)
WBC # BLD AUTO: 15.14 K/UL (ref 3.9–12.7)
WBC # BLD AUTO: 17.45 K/UL (ref 3.9–12.7)
WBC # BLD AUTO: 19.08 K/UL (ref 3.9–12.7)

## 2021-12-22 PROCEDURE — 94003 VENT MGMT INPAT SUBQ DAY: CPT

## 2021-12-22 PROCEDURE — P9021 RED BLOOD CELLS UNIT: HCPCS | Performed by: STUDENT IN AN ORGANIZED HEALTH CARE EDUCATION/TRAINING PROGRAM

## 2021-12-22 PROCEDURE — 99900035 HC TECH TIME PER 15 MIN (STAT)

## 2021-12-22 PROCEDURE — 25000003 PHARM REV CODE 250

## 2021-12-22 PROCEDURE — 99233 PR SUBSEQUENT HOSPITAL CARE,LEVL III: ICD-10-PCS | Mod: 24,,, | Performed by: SURGERY

## 2021-12-22 PROCEDURE — 25000003 PHARM REV CODE 250: Performed by: STUDENT IN AN ORGANIZED HEALTH CARE EDUCATION/TRAINING PROGRAM

## 2021-12-22 PROCEDURE — 80069 RENAL FUNCTION PANEL: CPT | Mod: 91 | Performed by: STUDENT IN AN ORGANIZED HEALTH CARE EDUCATION/TRAINING PROGRAM

## 2021-12-22 PROCEDURE — 90945 DIALYSIS ONE EVALUATION: CPT | Mod: ,,, | Performed by: INTERNAL MEDICINE

## 2021-12-22 PROCEDURE — 63600175 PHARM REV CODE 636 W HCPCS: Performed by: STUDENT IN AN ORGANIZED HEALTH CARE EDUCATION/TRAINING PROGRAM

## 2021-12-22 PROCEDURE — 85025 COMPLETE CBC W/AUTO DIFF WBC: CPT | Performed by: STUDENT IN AN ORGANIZED HEALTH CARE EDUCATION/TRAINING PROGRAM

## 2021-12-22 PROCEDURE — 63600175 PHARM REV CODE 636 W HCPCS: Performed by: SURGERY

## 2021-12-22 PROCEDURE — 82803 BLOOD GASES ANY COMBINATION: CPT

## 2021-12-22 PROCEDURE — 37799 UNLISTED PX VASCULAR SURGERY: CPT

## 2021-12-22 PROCEDURE — 90945 PR DIALYSIS, NOT HEMO, 1 EVAL: ICD-10-PCS | Mod: ,,, | Performed by: INTERNAL MEDICINE

## 2021-12-22 PROCEDURE — 85027 COMPLETE CBC AUTOMATED: CPT | Mod: 91 | Performed by: STUDENT IN AN ORGANIZED HEALTH CARE EDUCATION/TRAINING PROGRAM

## 2021-12-22 PROCEDURE — 85007 BL SMEAR W/DIFF WBC COUNT: CPT | Performed by: SURGERY

## 2021-12-22 PROCEDURE — P9021 RED BLOOD CELLS UNIT: HCPCS

## 2021-12-22 PROCEDURE — 90945 DIALYSIS ONE EVALUATION: CPT

## 2021-12-22 PROCEDURE — 85027 COMPLETE CBC AUTOMATED: CPT | Performed by: SURGERY

## 2021-12-22 PROCEDURE — 63600175 PHARM REV CODE 636 W HCPCS

## 2021-12-22 PROCEDURE — A4217 STERILE WATER/SALINE, 500 ML: HCPCS | Performed by: STUDENT IN AN ORGANIZED HEALTH CARE EDUCATION/TRAINING PROGRAM

## 2021-12-22 PROCEDURE — 27000221 HC OXYGEN, UP TO 24 HOURS

## 2021-12-22 PROCEDURE — 85007 BL SMEAR W/DIFF WBC COUNT: CPT | Mod: 91 | Performed by: STUDENT IN AN ORGANIZED HEALTH CARE EDUCATION/TRAINING PROGRAM

## 2021-12-22 PROCEDURE — 80100008 HC CRRT DAILY MAINTENANCE

## 2021-12-22 PROCEDURE — 27200966 HC CLOSED SUCTION SYSTEM

## 2021-12-22 PROCEDURE — 83735 ASSAY OF MAGNESIUM: CPT | Mod: 91 | Performed by: STUDENT IN AN ORGANIZED HEALTH CARE EDUCATION/TRAINING PROGRAM

## 2021-12-22 PROCEDURE — 99233 SBSQ HOSP IP/OBS HIGH 50: CPT | Mod: 24,,, | Performed by: SURGERY

## 2021-12-22 PROCEDURE — 80053 COMPREHEN METABOLIC PANEL: CPT | Performed by: STUDENT IN AN ORGANIZED HEALTH CARE EDUCATION/TRAINING PROGRAM

## 2021-12-22 PROCEDURE — 99900026 HC AIRWAY MAINTENANCE (STAT)

## 2021-12-22 PROCEDURE — 20000000 HC ICU ROOM

## 2021-12-22 PROCEDURE — B4185 PARENTERAL SOL 10 GM LIPIDS: HCPCS | Performed by: STUDENT IN AN ORGANIZED HEALTH CARE EDUCATION/TRAINING PROGRAM

## 2021-12-22 PROCEDURE — 94761 N-INVAS EAR/PLS OXIMETRY MLT: CPT

## 2021-12-22 RX ORDER — POTASSIUM CHLORIDE 14.9 MG/ML
20 INJECTION INTRAVENOUS
Status: DISCONTINUED | OUTPATIENT
Start: 2021-12-22 | End: 2021-12-22

## 2021-12-22 RX ORDER — GLUCAGON 1 MG
1 KIT INJECTION
Status: DISCONTINUED | OUTPATIENT
Start: 2021-12-22 | End: 2021-12-22

## 2021-12-22 RX ORDER — MAGNESIUM SULFATE HEPTAHYDRATE 40 MG/ML
2 INJECTION, SOLUTION INTRAVENOUS ONCE
Status: COMPLETED | OUTPATIENT
Start: 2021-12-22 | End: 2021-12-22

## 2021-12-22 RX ORDER — INSULIN ASPART 100 [IU]/ML
0-5 INJECTION, SOLUTION INTRAVENOUS; SUBCUTANEOUS EVERY 6 HOURS PRN
Status: DISCONTINUED | OUTPATIENT
Start: 2021-12-22 | End: 2021-12-22

## 2021-12-22 RX ORDER — POTASSIUM CHLORIDE 29.8 MG/ML
40 INJECTION INTRAVENOUS
Status: DISCONTINUED | OUTPATIENT
Start: 2021-12-22 | End: 2021-12-22

## 2021-12-22 RX ORDER — HYDROCODONE BITARTRATE AND ACETAMINOPHEN 500; 5 MG/1; MG/1
TABLET ORAL
Status: DISCONTINUED | OUTPATIENT
Start: 2021-12-22 | End: 2021-12-27

## 2021-12-22 RX ORDER — BALSAM PERU/CASTOR OIL
OINTMENT (GRAM) TOPICAL 2 TIMES DAILY
Status: DISCONTINUED | OUTPATIENT
Start: 2021-12-22 | End: 2022-01-14 | Stop reason: HOSPADM

## 2021-12-22 RX ORDER — GLUCAGON 1 MG
1 KIT INJECTION
Status: DISCONTINUED | OUTPATIENT
Start: 2021-12-22 | End: 2022-01-03

## 2021-12-22 RX ORDER — MAGNESIUM SULFATE HEPTAHYDRATE 40 MG/ML
4 INJECTION, SOLUTION INTRAVENOUS
Status: DISCONTINUED | OUTPATIENT
Start: 2021-12-22 | End: 2021-12-22

## 2021-12-22 RX ORDER — SODIUM CHLORIDE 0.9 % (FLUSH) 0.9 %
10 SYRINGE (ML) INJECTION
Status: DISCONTINUED | OUTPATIENT
Start: 2021-12-22 | End: 2022-01-07

## 2021-12-22 RX ORDER — INSULIN ASPART 100 [IU]/ML
0-5 INJECTION, SOLUTION INTRAVENOUS; SUBCUTANEOUS EVERY 6 HOURS PRN
Status: DISCONTINUED | OUTPATIENT
Start: 2021-12-22 | End: 2022-01-03

## 2021-12-22 RX ORDER — FENTANYL CITRATE 50 UG/ML
25 INJECTION, SOLUTION INTRAMUSCULAR; INTRAVENOUS
Status: DISCONTINUED | OUTPATIENT
Start: 2021-12-22 | End: 2021-12-24

## 2021-12-22 RX ADMIN — MUPIROCIN: 20 OINTMENT TOPICAL at 09:12

## 2021-12-22 RX ADMIN — FAMOTIDINE 20 MG: 10 INJECTION, SOLUTION INTRAVENOUS at 08:12

## 2021-12-22 RX ADMIN — DEXMEDETOMIDINE HYDROCHLORIDE 0.3 MCG/KG/HR: 4 INJECTION INTRAVENOUS at 09:12

## 2021-12-22 RX ADMIN — CALCIUM GLUCONATE: 98 INJECTION, SOLUTION INTRAVENOUS at 10:12

## 2021-12-22 RX ADMIN — MAGNESIUM SULFATE 2 G: 2 INJECTION INTRAVENOUS at 03:12

## 2021-12-22 RX ADMIN — CALCIUM GLUCONATE 1 G: 98 INJECTION, SOLUTION INTRAVENOUS at 05:12

## 2021-12-22 RX ADMIN — Medication: at 01:12

## 2021-12-22 RX ADMIN — HEPARIN SODIUM 5000 UNITS: 5000 INJECTION INTRAVENOUS; SUBCUTANEOUS at 01:12

## 2021-12-22 RX ADMIN — Medication: at 08:12

## 2021-12-22 RX ADMIN — SODIUM CHLORIDE, SODIUM LACTATE, POTASSIUM CHLORIDE, AND CALCIUM CHLORIDE 1000 ML: .6; .31; .03; .02 INJECTION, SOLUTION INTRAVENOUS at 12:12

## 2021-12-22 RX ADMIN — HEPARIN SODIUM 5000 UNITS: 5000 INJECTION INTRAVENOUS; SUBCUTANEOUS at 05:12

## 2021-12-22 RX ADMIN — SMOFLIPID 250 ML: 6; 6; 5; 3 INJECTION, EMULSION INTRAVENOUS at 10:12

## 2021-12-22 RX ADMIN — NITROGLYCERIN 0.5 INCH: 20 OINTMENT TOPICAL at 06:12

## 2021-12-22 RX ADMIN — NITROGLYCERIN 0.5 INCH: 20 OINTMENT TOPICAL at 11:12

## 2021-12-22 RX ADMIN — PIPERACILLIN SODIUM AND TAZOBACTAM SODIUM 4.5 G: 4; .5 INJECTION, POWDER, FOR SOLUTION INTRAVENOUS at 12:12

## 2021-12-22 RX ADMIN — SODIUM PHOSPHATE, MONOBASIC, MONOHYDRATE 30 MMOL: 276; 142 INJECTION, SOLUTION INTRAVENOUS at 07:12

## 2021-12-22 RX ADMIN — SODIUM CHLORIDE: 0.9 INJECTION, SOLUTION INTRAVENOUS at 05:12

## 2021-12-22 RX ADMIN — FENTANYL CITRATE 25 MCG: 50 INJECTION INTRAMUSCULAR; INTRAVENOUS at 11:12

## 2021-12-22 RX ADMIN — HEPARIN SODIUM 5000 UNITS: 5000 INJECTION INTRAVENOUS; SUBCUTANEOUS at 09:12

## 2021-12-22 RX ADMIN — MUPIROCIN: 20 OINTMENT TOPICAL at 08:12

## 2021-12-22 RX ADMIN — NITROGLYCERIN 0.5 INCH: 20 OINTMENT TOPICAL at 12:12

## 2021-12-22 RX ADMIN — CALCIUM GLUCONATE 1 G: 98 INJECTION, SOLUTION INTRAVENOUS at 06:12

## 2021-12-22 RX ADMIN — NOREPINEPHRINE BITARTRATE 0.08 MCG/KG/MIN: 4 INJECTION, SOLUTION INTRAVENOUS at 09:12

## 2021-12-22 RX ADMIN — SODIUM PHOSPHATE, MONOBASIC, MONOHYDRATE 20.01 MMOL: 276; 142 INJECTION, SOLUTION INTRAVENOUS at 07:12

## 2021-12-22 RX ADMIN — PIPERACILLIN SODIUM AND TAZOBACTAM SODIUM 4.5 G: 4; .5 INJECTION, POWDER, FOR SOLUTION INTRAVENOUS at 04:12

## 2021-12-22 RX ADMIN — SODIUM CHLORIDE: 0.9 INJECTION, SOLUTION INTRAVENOUS at 12:12

## 2021-12-22 RX ADMIN — NITROGLYCERIN 0.5 INCH: 20 OINTMENT TOPICAL at 05:12

## 2021-12-22 RX ADMIN — NOREPINEPHRINE BITARTRATE 0.04 MCG/KG/MIN: 4 INJECTION, SOLUTION INTRAVENOUS at 10:12

## 2021-12-22 RX ADMIN — SODIUM CHLORIDE, SODIUM LACTATE, POTASSIUM CHLORIDE, AND CALCIUM CHLORIDE 1000 ML: .6; .31; .03; .02 INJECTION, SOLUTION INTRAVENOUS at 01:12

## 2021-12-22 NOTE — PROGRESS NOTES
[facility]  Adult Nutrition  Progress Note    SUMMARY       Recommendations    1. Rec Impact Peptide 1.5 advancing as tolerated until goal of 40 mL/hr providing pt with 1440 kcal, 90 g protein, and 739 mL free water    2. If not tolerating TFs, resume current TPN regimen 150g D, 100g AA + SMOF lipids - meeting needs    3. If able to extubate, ADAT to Cardiac with texture per SLP    Goals: Pt to tolerate >/= 75% EEN/EPN by f/u date  Nutrition Goal Status: new  Communication of RD Recs: reviewed with RN (POC)    Assessment and Plan    Nutrition Problem  Inadequate oral intake    Related to (etiology):   Inability to consume sufficient energy    Signs and Symptoms (as evidenced by):   NPO, intubated     Interventions (treatment strategy):  Collaboration with other providers  Parenteral/enteral nutrition     Nutrition Diagnosis Status:   New    Malnutrition Assessment                 Orbital Region (Subcutaneous Fat Loss): well nourished   Skipwith Region (Muscle Loss): well nourished  Clavicle Bone Region (Muscle Loss): well nourished                 Reason for Assessment    Reason For Assessment: new TPN,new tube feeding  Diagnosis:  (Subcapsular hematoma of liver)  Relevant Medical History: GERD, DVT not on antiboagulation, diverticulitis, vaughn's esophagus, HFrEF  Interdisciplinary Rounds: attended    General Information Comments: S/p ex lap/wound washout/wound vac replacement/ hemetoma evacuation POD1. Pt remains intubated, propofol off. TPN infusing, plans for trickle feeds today. CRRT/SLED daily. No wt hx. Partial NFPE completed 12/22, nourished w/ no physical indicators of malnutrition. Will monitor tolerance.    Nutrition Discharge Planning: Unable to determine    Nutrition Risk Screen    Nutrition Risk Screen: tube feeding or parenteral nutrition    Nutrition/Diet History    Spiritual, Cultural Beliefs, Moravian Practices, Values that Affect Care: no  Food Allergies: NKFA  Factors  "Affecting Nutritional Intake: NPO,on mechanical ventilation    Anthropometrics    Temp: 96.98 °F (36.1 °C)  Height Method:  (PER )  Height: 5' 4" (162.6 cm)  Height (inches): 64 in  Weight Method: Bed Scale  Weight: 97.5 kg (215 lb)  Weight (lb): 215 lb  Ideal Body Weight (IBW), Female: 120 lb  % Ideal Body Weight, Female (lb): 179.17 %  BMI (Calculated): 36.9  BMI Grade: 35 - 39.9 - obesity - grade II       Lab/Procedures/Meds    Pertinent Labs Reviewed: reviewed  Pertinent Labs Comments: Glu 155, Ca 6.9, Phos 1.8, Bili 4.2, ,   Pertinent Medications Reviewed: reviewed  Pertinent Medications Comments: famotidine, heparin, NaCl, precedex, TPN/lipids    Estimated/Assessed Needs    Weight Used For Calorie Calculations: 97.5 kg (214 lb 15.2 oz)  Energy Calorie Requirements (kcal): 8624-5629 kcal/day  Energy Need Method: Kcal/kg (11-14 kcal/kg)  Protein Requirements:  g/day (1.5-2.0 g/kg; intubated, dialysis, wound present)  Weight Used For Protein Calculations: 54.4 kg (120 lb) (IBW)  Fluid Requirements (mL): 1 mL/kcal or per MD  Estimated Fluid Requirement Method: RDA Method  RDA Method (mL): 1073         Nutrition Prescription Ordered    Current Diet Order: NPO  Nutrition Order Comments: TF not started; Custom TPN 150g D, 100g AA + SMOF lipids  Current Nutrition Support Formula Ordered: Novasource Renal    Evaluation of Received Nutrient/Fluid Intake    Parenteral Calories (kcal): 910  Parenteral Protein (gm): 100  Parenteral Fluid (mL): 960  Lipid Calories (kcals): 500 kcals  GIR (Glucose Infusion Rate) (mg/kg/min): 1.06 mg/kg/min  Total Calories (kcal): 1410  % Kcal Needs: 103  % Protein Needs: 100  I/O: +766.5 mL since admit  Energy Calories Required: meeting needs  Protein Required: meeting needs  Fluid Required: meeting needs  Comments: LBM: unknown  Tolerance:  (Will monitor)  % Intake of Estimated Energy Needs: 75 - 100 % via TPN/lipids  % Meal Intake: NPO    Nutrition Risk    Level " of Risk/Frequency of Follow-up: low - moderate     Monitor and Evaluation    Food and Nutrient Intake: energy intake  Food and Nutrient Adminstration: diet order,enteral and parenteral nutrition administration  Anthropometric Measurements: weight,weight change  Biochemical Data, Medical Tests and Procedures: electrolyte and renal panel,gastrointestinal profile,glucose/endocrine profile,inflammatory profile,lipid profile  Nutrition-Focused Physical Findings: overall appearance     Nutrition Follow-Up    RD Follow-up?: Yes

## 2021-12-22 NOTE — ASSESSMENT & PLAN NOTE
55yo female transfer from [facility] after hysterectomy on 12/8 complicated by delayed recognition of small bowel injury requiring resection (in discontinuity) and at some point new bleed from the liver - transferred with open abdomen for higher level of care.  S/p ex-lap, liver packing 12/21    - Plan for return to OR tomorrow but possibly today if continues to have bleeding requiring transfusion  - Transfuse. Correct coagulopathy  - Wean vasopressors  - Strict I/Os  - Appreciate nephrology assistance for CRRT  - Remainder of care per SICU

## 2021-12-22 NOTE — SUBJECTIVE & OBJECTIVE
Interval History: Bloody WV output with drop in HH and increased pressor requirement overnight  Anuric   Resp status stable    Medications:  Continuous Infusions:   sodium chloride 0.9% 200 mL/hr at 12/22/21 0600    dexmedetomidine (PRECEDEX) infusion 0.2 mcg/kg/hr (12/22/21 0600)    fentanyl      NORepinephrine bitartrate-D5W 0.07 mcg/kg/min (12/22/21 0600)    TPN ADULT CENTRAL LINE CUSTOM Stopped (12/21/21 2147)     Scheduled Meds:   calcium gluconate IVPB  1 g Intravenous Once    famotidine (PF)  20 mg Intravenous Daily    heparin (porcine)  5,000 Units Subcutaneous Q8H    lipid (SMOFLIPID)  250 mL Intravenous Daily    mupirocin   Nasal BID    nitroGLYCERIN 2% TD oint  0.5 inch Topical (Top) Q6H    piperacillin-tazobactam (ZOSYN) IVPB  4.5 g Intravenous Q8H     PRN Meds:sodium chloride, sodium chloride, sodium chloride, dextrose 50%, dextrose 50%, glucagon (human recombinant), insulin aspart U-100, sodium chloride 0.9%, sodium chloride 0.9%     Review of patient's allergies indicates:   Allergen Reactions    Oxycodone     Tylox [oxycodone-acetaminophen]      Objective:     Vital Signs (Most Recent):  Temp: 98.78 °F (37.1 °C) (12/22/21 0600)  Pulse: (!) 111 (12/22/21 0600)  Resp: 20 (12/22/21 0600)  BP: (!) 100/57 (12/22/21 0600)  SpO2: 98 % (12/22/21 0600) Vital Signs (24h Range):  Temp:  [97.52 °F (36.4 °C)-99.68 °F (37.6 °C)] 98.78 °F (37.1 °C)  Pulse:  [] 111  Resp:  [18-28] 20  SpO2:  [64 %-100 %] 98 %  BP: ()/(48-80) 100/57  Arterial Line BP: ()/(34-61) 111/53     Weight: 97.5 kg (215 lb)  Body mass index is 36.9 kg/m².    Intake/Output - Last 3 Shifts       12/20 0700 12/21 0659 12/21 0700 12/22 0659 12/22 0700 12/23 0659    I.V. (mL/kg) 1658.5 (16.9) 4367 (44.8)     Blood 819 227     IV Piggyback 147.1 3521.5     TPN  509.1     Total Intake(mL/kg) 2624.6 (26.8) 8624.7 (88.5)     Urine (mL/kg/hr) 50 25 (0)     Drains 0      Other 1826 4574     Total Output 0012 0164      Net +115.6 +623.7                  Physical Exam  Vitals and nursing note reviewed.   Constitutional:       Appearance: She is obese. She is ill-appearing.   HENT:      Head: Normocephalic and atraumatic.      Nose:      Comments: NG tube in place  Neck:      Comments: Right IJ trialysis line  Cardiovascular:      Rate and Rhythm: Normal rate and regular rhythm.      Pulses: Normal pulses.   Pulmonary:      Comments: Intubated  Vent Mode: A/C  Oxygen Concentration (%):  (40-50) 50  Resp Rate Total:  (18 br/min-25 br/min) 18 br/min  Vt Set:  (395 mL-420 mL) 420 mL  PEEP/CPAP:  (5 cmH20-6 cmH20) 6 cmH20  Mean Airway Pressure:  (12 uvV34-63 cmH20) 14 cmH20      Abdominal:      Comments: Abdomen is open. There is an abdominal wound vac in place to continuous suction draining serosanguinous to sanguinous output.   Musculoskeletal:      Right lower leg: Edema present.      Left lower leg: Edema present.      Comments: L radial larry, L subclavian triple lumen   Skin:     General: Skin is warm and dry.   Neurological:      Comments: Sedated         Significant Labs:  I have reviewed all pertinent lab results within the past 24 hours.    Significant Diagnostics:  I have reviewed all pertinent imaging results/findings within the past 24 hours.

## 2021-12-22 NOTE — PROGRESS NOTES
[facility]  Critical Care - Surgery  Progress Note    Patient Name: Chidi Castaneda  MRN: [id number]  Admission Date: 12/20/2021  Hospital Length of Stay: 2 days  Code Status: Full Code  Attending Provider: Kendall Gorman MD  Primary Care Provider: Primary Doctor No   Principal Problem: <principal problem not specified>    Subjective:       Interval History/Significant Events: NAEON.   Return to OR tomorrow for abd closure and g tube placement.   Bloody WV output, transfuse 2 units this AM  Minimal sedation; not following commands. Moves all 4 extremities.  Levo 0.07  AC 40/6, mildly alkalotic on morning gas, vent settings adjusted  TPN  Start TFs at trickle today, will d/c TPN tomorrow if tolerating  Continue Zosyn  Remains anuric  Heparin/pepcid    Follow-up For: Procedure(s) (LRB):  LAPAROTOMY, EXPLORATORY (N/A)  EVACUATION, HEMATOMA  WASHOUT  REPLACEMENT, WOUND VAC    Post-Operative Day: 1 Day Post-Op    Objective:     Vital Signs (Most Recent):  Temp: 98.78 °F (37.1 °C) (12/22/21 0600)  Pulse: (!) 111 (12/22/21 0600)  Resp: 20 (12/22/21 0600)  BP: (!) 100/57 (12/22/21 0600)  SpO2: 98 % (12/22/21 0600) Vital Signs (24h Range):  Temp:  [97.52 °F (36.4 °C)-99.68 °F (37.6 °C)] 98.78 °F (37.1 °C)  Pulse:  [] 111  Resp:  [18-28] 20  SpO2:  [64 %-100 %] 98 %  BP: ()/(48-80) 100/57  Arterial Line BP: ()/(34-64) 111/53     Weight: 97.5 kg (215 lb)  Body mass index is 36.9 kg/m².      Intake/Output Summary (Last 24 hours) at 12/22/2021 0626  Last data filed at 12/22/2021 0600  Gross per 24 hour   Intake 8624.65 ml   Output 8001 ml   Net 623.65 ml       Physical Exam  Vitals and nursing note reviewed.   Constitutional:       Appearance: She is obese. She is ill-appearing.   HENT:      Head: Normocephalic and atraumatic.      Nose:      Comments: NG tube in place  Neck:      Comments: Right IJ trialysis line  Cardiovascular:      Rate and Rhythm: Normal rate and regular rhythm.       Pulses: Normal pulses.   Pulmonary:      Comments: Intubated  Vent Mode: A/C  Oxygen Concentration (%):  (40-50) 50  Resp Rate Total:  (18 br/min-25 br/min) 18 br/min  Vt Set:  (395 mL-420 mL) 420 mL  PEEP/CPAP:  (5 cmH20-6 cmH20) 6 cmH20  Mean Airway Pressure:  (12 ajY63-71 cmH20) 14 cmH20      Abdominal:      Comments: Abdomen is open. There is an abdominal wound vac in place to continuous suction draining serosanguinous to sanguinous output.   Musculoskeletal:      Right lower leg: Edema present.      Left lower leg: Edema present.      Comments: L radial larry, L subclavian triple lumen   Skin:     General: Skin is warm and dry.   Neurological:      Comments: Does not respond to commands  Will open eyes to stimulation  Withdraws from pain         Vents:  Vent Mode: A/C (12/22/21 0355)  Ventilator Initiated: Yes (12/20/21 1448)  Set Rate: 18 BPM (12/22/21 0355)  Vt Set: 420 mL (12/22/21 0355)  PEEP/CPAP: 6 cmH20 (12/22/21 0355)  Oxygen Concentration (%): 40 (12/22/21 0600)  Peak Airway Pressure: 40 cmH2O (12/22/21 0355)  Plateau Pressure: 23 cmH20 (12/22/21 0355)  Total Ve: 11.7 mL (12/22/21 0355)  Negative Inspiratory Force (cm H2O): 0 (12/22/21 0355)  F/VT Ratio<105 (RSBI): (!) 61.32 (12/22/21 0355)    Lines/Drains/Airways     Central Venous Catheter Line            Percutaneous Central Line Insertion/Assessment - Triple Lumen  12/16/21 1531 left subclavian 5 days    Trialysis (Dialysis) Catheter 12/21/21 0030 right internal jugular 1 day          Drain                 NG/OG Tube 12/16/21 0000 Left nostril 6 days         Urethral Catheter 12/20/21 1743 1 day          Airway                 Airway - Non-Surgical 12/20/21 1451 Endotracheal Tube 1 day          Arterial Line            Arterial Line 12/18/21 0000 Right Radial 4 days                Significant Labs:    CBC/Anemia Profile:  Recent Labs   Lab 12/21/21  1045 12/21/21 2000 12/22/21  0400   WBC 17.03*  17.03* 16.09* 15.14*   HGB 10.1*  10.1* 8.3*  6.5*   HCT 28.6*  28.6* 24.7* 19.1*   *  114* 108* 115*   MCV 86  86 89 90   RDW 17.0*  17.0* 17.5* 17.6*        Chemistries:  Recent Labs   Lab 12/21/21  0425 12/21/21  0425 12/21/21  1045 12/21/21  1045 12/21/21  1456 12/21/21  2200 12/22/21  0400      < > 139  139   < > 138 140 137  140   K 4.9   < > 5.1  5.1   < > 4.8 5.1 4.8  4.9      < > 108  108   < > 107 108 107  108   CO2 20*   < > 23  23   < > 26 26 26  26   BUN 28*   < > 22*  22*   < > 10 7 6  6   CREATININE 1.8*   < > 1.7*  1.7*   < > 0.8 0.7 0.6  0.6   CALCIUM 8.1*   < > 7.5*  7.5*   < > 7.8* 7.3* 6.9*  6.9*   ALBUMIN 3.0*   < > 2.4*  2.4*   < > 2.4* 2.0* 1.7*  1.8*   PROT 4.8*  --  4.0*  4.0*  --   --   --  3.6*   BILITOT 6.4*  --  5.4*  5.4*  --   --   --  4.2*   ALKPHOS 194*  --  163*  163*  --   --   --  118   *  --  262*  262*  --   --   --  168*   *  --  705*  705*  --   --   --  315*   MG  --    < >  --   --  1.8 1.7 1.9   PHOS  --    < >  --   --  3.0 2.5* 1.8*    < > = values in this interval not displayed.       All pertinent labs within the past 24 hours have been reviewed.    Significant Imaging:  I have reviewed all pertinent imaging results/findings within the past 24 hours.    Assessment/Plan:     Subcapsular hematoma of liver    Neuro/Psych:   -- Does not respond to commands but opens eyes to stimulation and withdraws from pain  --Sedation/Pain: precedex PRN, remains on minimal dose 2/2 to vent agitation             Cards:   -- HDS with MAP goal > 65   --Pressor requirements on arrival 0.03 vaso, levophed 0.06  -- current: Levo 0.07  -- ECHO with EF 55%  -- transfuse PRN      Pulm:   -- Goal O2 sat > 90%  -- Intubated, currently on 40/6, satting well  -- SBT as indicated, after abd closure      Renal:  -- Keep billingsley for strict I/O  -- continue CRRT per nephrology  -- BUN/Cr 28/1.8  -- replete lytes PRN  -- keep euvolemic      FEN / GI:   -- Replace lytes as needed  --  Nutrition: strict NPO, TPN  -- G tube likely soon      ID:   -- Tm: afebrile; WBC 15   -- Abx - on zosyn      Heme/Onc:   -- Since hosptial admission on 12/10 has received 11u pRBC, 4u FFP, 1u platelets  -- H/H 6.5 this morning, transfuse  -- Daily CBC      Endo:   -- Gluc goal 140-180  -- no history of diabetes  -- SSI/accuchecks      PPx:   Feeding: NPO, TPN  Analgesia/Sedation: Precedex  Thromboembolic prevention: Heparin  HOB >30: yes  Stress Ulcer ppx: Famotidine  Glucose control: Critical care goal 140-180 g/dl, ISS    Lines/Drains/Airway: NG, Ng, L radial larry, R IJ trialysis, L subclavian triple lumen      Dispo/Code Status/Palliative:   -- SICU / Full Code  -- discussed with staff    Critical care was time spent personally by me on the following activities: development of treatment plan with patient or surrogate and bedside caregivers, discussions with consultants, evaluation of patient's response to treatment, examination of patient, ordering and performing treatments and interventions, ordering and review of laboratory studies, ordering and review of radiographic studies, pulse oximetry, re-evaluation of patient's condition.  This critical care time did not overlap with that of any other provider or involve time for any procedures.     Ricardo Zacarias MD  Critical Care - Surgery  [facility]

## 2021-12-22 NOTE — PLAN OF CARE
Recommendations    1. Rec Impact Peptide 1.5 advancing as tolerated until goal of 40 mL/hr providing pt with 1440 kcal, 90 g protein, and 739 mL free water    2. If not tolerating TFs, resume current TPN regimen 150g D, 100g AA + SMOF lipids - meeting needs    3. If able to extubate, ADAT to Cardiac with texture per SLP    Goals: Pt to tolerate >/= 75% EEN/EPN by f/u date  Nutrition Goal Status: new  Communication of RD Recs: reviewed with RN (POC)

## 2021-12-22 NOTE — ASSESSMENT & PLAN NOTE
  Neuro/Psych:   -- Does not respond to commands but opens eyes to stimulation and withdraws from pain  --Sedation/Pain: precedex PRN, remains on minimal dose 2/2 to vent agitation             Cards:   -- HDS with MAP goal > 65   --Pressor requirements on arrival 0.03 vaso, levophed 0.06  -- current: Levo 0.07  -- ECHO with EF 55%  -- transfuse PRN      Pulm:   -- Goal O2 sat > 90%  -- Intubated, currently on 40/6, satting well  -- SBT as indicated, after abd closure      Renal:  -- Keep billingsley for strict I/O  -- continue CRRT per nephrology  -- BUN/Cr 28/1.8  -- replete lytes PRN  -- keep euvolemic      FEN / GI:   -- Replace lytes as needed  -- Nutrition: strict NPO, TPN  -- G tube likely soon      ID:   -- Tm: afebrile; WBC 15   -- Abx - on zosyn      Heme/Onc:   -- Since hosptial admission on 12/10 has received 11u pRBC, 4u FFP, 1u platelets  -- H/H 6.5 this morning, transfuse  -- Daily CBC      Endo:   -- Gluc goal 140-180  -- no history of diabetes  -- SSI/accuchecks      PPx:   Feeding: NPO, TPN  Analgesia/Sedation: Precedex  Thromboembolic prevention: Heparin  HOB >30: yes  Stress Ulcer ppx: Famotidine  Glucose control: Critical care goal 140-180 g/dl, ISS    Lines/Drains/Airway: NG, Hernandez, L radial larry, R IJ trialysis, L subclavian triple lumen      Dispo/Code Status/Palliative:   -- SICU / Full Code  -- discussed with staff

## 2021-12-22 NOTE — PROGRESS NOTES
[facility]  Nephrology  Progress Note    Patient Name: Chidi Castaneda  MRN: [id number]  Admission Date: 12/20/2021  Hospital Length of Stay: 2 days  Attending Provider: Kendall Gorman MD   Primary Care Physician: Primary Doctor No  Principal Problem:<principal problem not specified>    Subjective:     HPI: Chidi Castaneda is a 56 y.o. female w/ PMHx HTN, GERD s/p EGD 6/22/2021, migraines, ovarian cyst s/p cystectomy, and obesity who underwent an elective lap hysterectomy on 12/18/2021 at [facility] and was then transferred to [place], [place] for higher level of care when pt was found to have post-op somnolence, decrease PO intake, decrease urine output that progressed to anuria with a sharp rise in Cr from 0.9 to 2.8 (12/10). Pt was treated for sepsis with volume resuscitation and broad spectrum antibiotics, however pt continue to deteriorate and became tachycardic, hypotensive, and imaging showed an ileus. Pt was intubated since she was found to be acidotic with a ph of 7.28 despite a nonrebreather with 100%  FiO2  12/11 pt was taken back to the OR for washout and a bowel resection was done as she was found to have a small bowel perforation  12/15 pt was found to have a subscapular liver hematoma  12/18 pt was taken back to the OR for wound vacc exchange and removal of hematoma   12/19 general surgery team recommended no further surgical intervention since they did not find any active bleeding intraoperatively on 12/18 and felt that the ongoing bleeding was 2/2 consumptive coagulopathy and septic shock. They recommended to continue wound vacc and to consider higher level of care for pt  Pt was also being treated for a bacteremia as well as for intraabdominal infection, her antibiotics prior to transfer were: meropenem, flagyl, and vancomycin   Pt received tranexamic acid x1 and multiple units of blood products.    Pt was transferred to [facility] on 12/20/2021 for higher  "level of care: embolization for a subscapular hematoma       Nephrology was consulted for: "dialysis, SUSAN"    Pt does not have any h/o renal disease prior to hospitalization (per chart review and per pt's )   EDW: 88 kg   Post-op renal ultrasound was normal (results of ultrasound and other medical records from the outside hospital are in the chart at the bedside, needs to be scanned into chart)   Pt was started on CRRT on 12/12 via a trialysis catheter,   On 12/20/21 morning nephrology at outside hospital had recommended CRRT-SLED however primary team requested iHD prior to transfer to [facility], it appears that pt was ordered for 4 hours however, pt was prepped for transfer and was unable to complete full session 2/2 transfer, per pt's , pt had 2L of fluid removed instead of the planned 4 liters. Of note, while pt was at [facility] pt's CRRT required the use of citrate to prevent clotting (briefly), however that apparently resolved and was able to get a session w/o citrate and obviously was able to tolerate iHD prior to transfer.   Upon arrival to [facility] pt was started on zosyn, she was started on NS 125ml/hr, was transfused 2 units of PRBCs, and remained off vasopressors.       Interval History: 500cc net positive fluid balance now with resp alkalosis    Review of patient's allergies indicates:   Allergen Reactions    Oxycodone     Tylox [oxycodone-acetaminophen]      Current Facility-Administered Medications   Medication Frequency    0.9%  NaCl infusion (CRRT USE ONLY) Continuous    0.9%  NaCl infusion (for blood administration) Q24H PRN    0.9%  NaCl infusion (for blood administration) Q24H PRN    0.9%  NaCl infusion (for blood administration) Q24H PRN    dexmedetomidine (PRECEDEX) 400mcg/100mL 0.9% NaCL infusion Continuous    dextrose 50% injection 12.5 g PRN    dextrose 50% injection 25 g PRN    famotidine (PF) injection 20 mg Daily    fentaNYL 50 mcg/mL injection 25 mcg Q1H PRN    " glucagon (human recombinant) injection 1 mg PRN    heparin (porcine) injection 5,000 Units Q8H    insulin aspart U-100 pen 0-5 Units Q6H PRN    mupirocin 2 % ointment BID    nitroGLYCERIN 2% TD oint ointment 0.5 inch Q6H    NORepinephrine 4 mg in dextrose 5% 250 mL infusion (premix) (titrating) Continuous    piperacillin-tazobactam 4.5 g in sodium chloride 0.9% 100 mL IVPB (ready to mix system) Q8H    sodium chloride 0.9% flush 10 mL PRN    sodium chloride 0.9% flush 10 mL PRN    sodium phosphate 20.01 mmol in dextrose 5 % 250 mL IVPB Once    TPN ADULT CENTRAL LINE CUSTOM Continuous       Objective:     Vital Signs (Most Recent):  Temp: 97.52 °F (36.4 °C) (12/22/21 0815)  Pulse: 85 (12/22/21 0815)  Resp: 18 (12/22/21 0815)  BP: (!) 88/53 (12/22/21 0800)  SpO2: 96 % (12/22/21 0815)  O2 Device (Oxygen Therapy): ventilator (12/22/21 0730) Vital Signs (24h Range):  Temp:  [97.52 °F (36.4 °C)-99.68 °F (37.6 °C)] 97.52 °F (36.4 °C)  Pulse:  [] 85  Resp:  [17-28] 18  SpO2:  [64 %-100 %] 96 %  BP: ()/(48-80) 88/53  Arterial Line BP: ()/(34-61) 121/61     Weight: 97.5 kg (215 lb) (12/21/21 1500)  Body mass index is 36.9 kg/m².  Body surface area is 2.1 meters squared.    I/O last 3 completed shifts:  In: 9984 [I.V.:5627.6; Blood:227; IV Piggyback:3620.2]  Out: 99258 [Urine:35; Other:42896]    Physical Exam  Constitutional:       Appearance: She is obese. She is ill-appearing.   HENT:      Mouth/Throat:      Mouth: Mucous membranes are moist.   Eyes:      Pupils: Pupils are equal, round, and reactive to light.   Cardiovascular:      Rate and Rhythm: Normal rate and regular rhythm.   Pulmonary:      Comments: intubated  Abdominal:      Comments: open   Musculoskeletal:         General: No deformity.      Right lower leg: Edema present.      Left lower leg: Edema present.   Skin:     Coloration: Skin is not jaundiced.   Neurological:      General: No focal deficit present.         Significant  Labs:  All labs within the past 24 hours have been reviewed.     Significant Imaging:  Labs: Reviewed    Assessment/Plan:     SUSAN (acute kidney injury)  -oliguric SUSAN, ischemic ATN with multifactorial etiology, however most likely d/t severe hypotension as a result of septic shock 2/2 small bowel perforation and bacteremia   -BL sCr 1  -KRT started at OSH 12/12/2021 for acidosis and volume overload  -still anuric on norepi and will continue KRT for volume optimization prior to surgery  -SLED today to stop at 6pm; goal euvolemia    Volume overload  -corrected  -CXR mild pulmonary edema    Metabolic acidosis  -controlled with KRT  -now with resp alkalosis    Bowel perforation  -per primary    Septic shock  -secondary to bowel perforation  -per primary      Subcapsular hematoma of liver  -per primary          Anthony Steven MD  Nephrology  [facility]

## 2021-12-22 NOTE — CONSULTS
[facility]  Wound Care    Patient Name:  Chidi Castaneda   MRN:  [id number]  Date: 12/22/2021  Diagnosis: <principal problem not specified>    History:     No past medical history on file.    Social History     Socioeconomic History    Marital status: Unknown       Precautions:     Allergies as of 12/20/2021 - Reviewed 12/20/2021   Allergen Reaction Noted    Oxycodone  12/20/2021    Tylox [oxycodone-acetaminophen]  12/20/2021       [facility] Assessment Details/Treatment     Wound care consult received from RN for assessment of nose, heels and upper back. Per chart review, patient transferred from outside hospital to [facility] for higher level of care. She is now s/p lap hysterectomy complicated by multiple bowel injuries. Has undergone ex-lap with small bowel resection and anastomosis and now with hemorrhage from liver. Patient arrived intubated, sedated, on multiple pressors with ongoing blood loss from abdominal vac dressing. Assessment and pictures listed below.     Upper back- nursing reports that patient has resolving skin tears to upper back. Nursing managing with silicone foam dressing.     Left heel- present on admit- intact skin with slow blanching redness      Left toes- present on admit- dusky discoloration likely from vasopressor induced ischemia       Right heel- present on admit-  1.5 x 1 cm- intact skin with purple discoloration- etiology: vasopressors vs pressure- will continue to monitor      Right toes-  present on admit- dusky discoloration likely from vasopressor induced ischemia      Left nare- present on admit- purple discoloration with partial thickness exposed at this time- small amount of serosanguinous drainage - nurse reports injury was found after NG tube removal from OSH          Recommendations made to primary team for   - Balsam peru castor oil ointment to bilateral heels to stimulate capillary blood flow to promote healing and prevent further skin breakdown  - Nursing to  continue to manage resolving skin tears to upper back with foam dressings   - Sween moisturizer to R nare BID.   - Orders in place currently for Nitropaste to right and left toes.    Nursing and MD team notified with recommendations. Orders placed. Nursing to maintain pressure injury prevention interventions to include a low air loss surface. Heel boots in use. Wound care to assist with pt prn.     12/22/2021

## 2021-12-22 NOTE — SUBJECTIVE & OBJECTIVE
Interval History/Significant Events: NAEON.   Return to OR tomorrow for abd closure and g tube placement.   Minimal sedation; not following commands. Moves all 4 extremities.  Levo 0.07  AC 40/6, mildly alkalotic on morning gas, vent settings adjusted  TPN  Continue Zosyn  1uPRBC infusing  Heparin/pepcid    Follow-up For: Procedure(s) (LRB):  LAPAROTOMY, EXPLORATORY (N/A)  EVACUATION, HEMATOMA  WASHOUT  REPLACEMENT, WOUND VAC    Post-Operative Day: 1 Day Post-Op    Objective:     Vital Signs (Most Recent):  Temp: 98.78 °F (37.1 °C) (12/22/21 0600)  Pulse: (!) 111 (12/22/21 0600)  Resp: 20 (12/22/21 0600)  BP: (!) 100/57 (12/22/21 0600)  SpO2: 98 % (12/22/21 0600) Vital Signs (24h Range):  Temp:  [97.52 °F (36.4 °C)-99.68 °F (37.6 °C)] 98.78 °F (37.1 °C)  Pulse:  [] 111  Resp:  [18-28] 20  SpO2:  [64 %-100 %] 98 %  BP: ()/(48-80) 100/57  Arterial Line BP: ()/(34-64) 111/53     Weight: 97.5 kg (215 lb)  Body mass index is 36.9 kg/m².      Intake/Output Summary (Last 24 hours) at 12/22/2021 0626  Last data filed at 12/22/2021 0600  Gross per 24 hour   Intake 8624.65 ml   Output 8001 ml   Net 623.65 ml       Physical Exam  Vitals and nursing note reviewed.   Constitutional:       Appearance: She is obese. She is ill-appearing.   HENT:      Head: Normocephalic and atraumatic.      Nose:      Comments: NG tube in place  Neck:      Comments: Right IJ trialysis line  Cardiovascular:      Rate and Rhythm: Normal rate and regular rhythm.      Pulses: Normal pulses.   Pulmonary:      Comments: Intubated  Vent Mode: A/C  Oxygen Concentration (%):  (40-50) 50  Resp Rate Total:  (18 br/min-25 br/min) 18 br/min  Vt Set:  (395 mL-420 mL) 420 mL  PEEP/CPAP:  (5 cmH20-6 cmH20) 6 cmH20  Mean Airway Pressure:  (12 foV09-28 cmH20) 14 cmH20      Abdominal:      Comments: Abdomen is open. There is an abdominal wound vac in place to continuous suction draining serosanguinous to sanguinous output.   Musculoskeletal:       Right lower leg: Edema present.      Left lower leg: Edema present.      Comments: L radial larry, L subclavian triple lumen   Skin:     General: Skin is warm and dry.   Neurological:      Comments: Does not respond to commands  Will open eyes to stimulation  Withdraws from pain         Vents:  Vent Mode: A/C (12/22/21 0355)  Ventilator Initiated: Yes (12/20/21 1448)  Set Rate: 18 BPM (12/22/21 0355)  Vt Set: 420 mL (12/22/21 0355)  PEEP/CPAP: 6 cmH20 (12/22/21 0355)  Oxygen Concentration (%): 40 (12/22/21 0600)  Peak Airway Pressure: 40 cmH2O (12/22/21 0355)  Plateau Pressure: 23 cmH20 (12/22/21 0355)  Total Ve: 11.7 mL (12/22/21 0355)  Negative Inspiratory Force (cm H2O): 0 (12/22/21 0355)  F/VT Ratio<105 (RSBI): (!) 61.32 (12/22/21 0355)    Lines/Drains/Airways     Central Venous Catheter Line            Percutaneous Central Line Insertion/Assessment - Triple Lumen  12/16/21 1531 left subclavian 5 days    Trialysis (Dialysis) Catheter 12/21/21 0030 right internal jugular 1 day          Drain                 NG/OG Tube 12/16/21 0000 Left nostril 6 days         Urethral Catheter 12/20/21 1743 1 day          Airway                 Airway - Non-Surgical 12/20/21 1451 Endotracheal Tube 1 day          Arterial Line            Arterial Line 12/18/21 0000 Right Radial 4 days                Significant Labs:    CBC/Anemia Profile:  Recent Labs   Lab 12/21/21  1045 12/21/21  2000 12/22/21  0400   WBC 17.03*  17.03* 16.09* 15.14*   HGB 10.1*  10.1* 8.3* 6.5*   HCT 28.6*  28.6* 24.7* 19.1*   *  114* 108* 115*   MCV 86  86 89 90   RDW 17.0*  17.0* 17.5* 17.6*        Chemistries:  Recent Labs   Lab 12/21/21  0425 12/21/21  0425 12/21/21  1045 12/21/21  1045 12/21/21  1456 12/21/21  2200 12/22/21  0400      < > 139  139   < > 138 140 137  140   K 4.9   < > 5.1  5.1   < > 4.8 5.1 4.8  4.9      < > 108  108   < > 107 108 107  108   CO2 20*   < > 23  23   < > 26 26 26  26   BUN 28*   < > 22*  22*    < > 10 7 6  6   CREATININE 1.8*   < > 1.7*  1.7*   < > 0.8 0.7 0.6  0.6   CALCIUM 8.1*   < > 7.5*  7.5*   < > 7.8* 7.3* 6.9*  6.9*   ALBUMIN 3.0*   < > 2.4*  2.4*   < > 2.4* 2.0* 1.7*  1.8*   PROT 4.8*  --  4.0*  4.0*  --   --   --  3.6*   BILITOT 6.4*  --  5.4*  5.4*  --   --   --  4.2*   ALKPHOS 194*  --  163*  163*  --   --   --  118   *  --  262*  262*  --   --   --  168*   *  --  705*  705*  --   --   --  315*   MG  --    < >  --   --  1.8 1.7 1.9   PHOS  --    < >  --   --  3.0 2.5* 1.8*    < > = values in this interval not displayed.       All pertinent labs within the past 24 hours have been reviewed.    Significant Imaging:  I have reviewed all pertinent imaging results/findings within the past 24 hours.

## 2021-12-22 NOTE — ASSESSMENT & PLAN NOTE
-oliguric SUSAN, ischemic ATN with multifactorial etiology, however most likely d/t severe hypotension as a result of septic shock 2/2 small bowel perforation and bacteremia   -BL sCr 1  -KRT started at OSH 12/12/2021 for acidosis and volume overload  -still anuric on norepi  -continue KRT for clearance of acidosis and volume management  -SLED continuous goal UF 1-2L over next 24 hours

## 2021-12-22 NOTE — PROGRESS NOTES
[facility]  General Surgery  Progress Note    Subjective:     History of Present Illness:  No notes on file    Post-Op Info:  Procedure(s) (LRB):  LAPAROTOMY, EXPLORATORY (N/A)  EVACUATION, HEMATOMA  WASHOUT  REPLACEMENT, WOUND VAC   1 Day Post-Op     Interval History: Bloody WV output with drop in HH and increased pressor requirement overnight  Anuric   Resp status stable    Medications:  Continuous Infusions:   sodium chloride 0.9% 200 mL/hr at 12/22/21 0600    dexmedetomidine (PRECEDEX) infusion 0.2 mcg/kg/hr (12/22/21 0600)    fentanyl      NORepinephrine bitartrate-D5W 0.07 mcg/kg/min (12/22/21 0600)    TPN ADULT CENTRAL LINE CUSTOM Stopped (12/21/21 2147)     Scheduled Meds:   calcium gluconate IVPB  1 g Intravenous Once    famotidine (PF)  20 mg Intravenous Daily    heparin (porcine)  5,000 Units Subcutaneous Q8H    lipid (SMOFLIPID)  250 mL Intravenous Daily    mupirocin   Nasal BID    nitroGLYCERIN 2% TD oint  0.5 inch Topical (Top) Q6H    piperacillin-tazobactam (ZOSYN) IVPB  4.5 g Intravenous Q8H     PRN Meds:sodium chloride, sodium chloride, sodium chloride, dextrose 50%, dextrose 50%, glucagon (human recombinant), insulin aspart U-100, sodium chloride 0.9%, sodium chloride 0.9%     Review of patient's allergies indicates:   Allergen Reactions    Oxycodone     Tylox [oxycodone-acetaminophen]      Objective:     Vital Signs (Most Recent):  Temp: 98.78 °F (37.1 °C) (12/22/21 0600)  Pulse: (!) 111 (12/22/21 0600)  Resp: 20 (12/22/21 0600)  BP: (!) 100/57 (12/22/21 0600)  SpO2: 98 % (12/22/21 0600) Vital Signs (24h Range):  Temp:  [97.52 °F (36.4 °C)-99.68 °F (37.6 °C)] 98.78 °F (37.1 °C)  Pulse:  [] 111  Resp:  [18-28] 20  SpO2:  [64 %-100 %] 98 %  BP: ()/(48-80) 100/57  Arterial Line BP: ()/(34-61) 111/53     Weight: 97.5 kg (215 lb)  Body mass index is 36.9 kg/m².    Intake/Output - Last 3 Shifts       12/20 0700  12/21 0659 12/21 0700 12/22 0659  12/22 0700  12/23 0659    I.V. (mL/kg) 1658.5 (16.9) 4367 (44.8)     Blood 819 227     IV Piggyback 147.1 3521.5     TPN  509.1     Total Intake(mL/kg) 2624.6 (26.8) 8624.7 (88.5)     Urine (mL/kg/hr) 50 25 (0)     Drains 0      Other 2459 7976     Total Output 2509 8001     Net +115.6 +623.7                  Physical Exam  Vitals and nursing note reviewed.   Constitutional:       Appearance: She is obese. She is ill-appearing.   HENT:      Head: Normocephalic and atraumatic.      Nose:      Comments: NG tube in place  Neck:      Comments: Right IJ trialysis line  Cardiovascular:      Rate and Rhythm: Normal rate and regular rhythm.      Pulses: Normal pulses.   Pulmonary:      Comments: Intubated  Vent Mode: A/C  Oxygen Concentration (%):  (40-50) 50  Resp Rate Total:  (18 br/min-25 br/min) 18 br/min  Vt Set:  (395 mL-420 mL) 420 mL  PEEP/CPAP:  (5 cmH20-6 cmH20) 6 cmH20  Mean Airway Pressure:  (12 xoQ98-70 cmH20) 14 cmH20      Abdominal:      Comments: Abdomen is open. There is an abdominal wound vac in place to continuous suction draining serosanguinous to sanguinous output.   Musculoskeletal:      Right lower leg: Edema present.      Left lower leg: Edema present.      Comments: L radial larry, L subclavian triple lumen   Skin:     General: Skin is warm and dry.   Neurological:      Comments: Sedated         Significant Labs:  I have reviewed all pertinent lab results within the past 24 hours.    Significant Diagnostics:  I have reviewed all pertinent imaging results/findings within the past 24 hours.    Assessment/Plan:     Subcapsular hematoma of liver  55yo female transfer from OSH after hysterectomy on 12/8 complicated by delayed recognition of small bowel injury requiring resection (in discontinuity) and at some point new bleed from the liver - transferred with open abdomen for higher level of care.  S/p ex-lap, liver packing 12/21    - Plan for return to OR tomorrow but possibly today if continues to have  bleeding requiring transfusion  - Transfuse. Correct coagulopathy  - Wean vasopressors  - Strict I/Os  - Appreciate nephrology assistance for CRRT  - Remainder of care per SICU        Víctor Valderrama MD  General Surgery  [facility]

## 2021-12-23 ENCOUNTER — ANESTHESIA (OUTPATIENT)
Dept: SURGERY | Facility: HOSPITAL | Age: 56
DRG: 907 | End: 2021-12-23
Payer: OTHER GOVERNMENT

## 2021-12-23 LAB
ABO + RH BLD: NORMAL
ALBUMIN SERPL BCP-MCNC: 1.7 G/DL (ref 3.5–5.2)
ALBUMIN SERPL BCP-MCNC: 1.8 G/DL (ref 3.5–5.2)
ALLENS TEST: ABNORMAL
ALP SERPL-CCNC: 55 U/L (ref 55–135)
ALP SERPL-CCNC: 91 U/L (ref 55–135)
ALT SERPL W/O P-5'-P-CCNC: 73 U/L (ref 10–44)
ALT SERPL W/O P-5'-P-CCNC: 96 U/L (ref 10–44)
ANION GAP SERPL CALC-SCNC: 10 MMOL/L (ref 8–16)
ANION GAP SERPL CALC-SCNC: 10 MMOL/L (ref 8–16)
ANISOCYTOSIS BLD QL SMEAR: SLIGHT
APTT BLDCRRT: 34 SEC (ref 21–32)
APTT BLDCRRT: 34.9 SEC (ref 21–32)
AST SERPL-CCNC: 110 U/L (ref 10–40)
AST SERPL-CCNC: 164 U/L (ref 10–40)
BASO STIPL BLD QL SMEAR: ABNORMAL
BASOPHILS # BLD AUTO: 0.05 K/UL (ref 0–0.2)
BASOPHILS # BLD AUTO: ABNORMAL K/UL (ref 0–0.2)
BASOPHILS NFR BLD: 0 % (ref 0–1.9)
BASOPHILS NFR BLD: 0.4 % (ref 0–1.9)
BASOPHILS NFR BLD: 1 % (ref 0–1.9)
BASOPHILS NFR BLD: 2.7 % (ref 0–1.9)
BILIRUB SERPL-MCNC: 5.3 MG/DL (ref 0.1–1)
BILIRUB SERPL-MCNC: 6.7 MG/DL (ref 0.1–1)
BLD GP AB SCN CELLS X3 SERPL QL: NORMAL
BLD PROD TYP BPU: NORMAL
BLOOD UNIT EXPIRATION DATE: NORMAL
BLOOD UNIT TYPE CODE: 5100
BLOOD UNIT TYPE CODE: 600
BLOOD UNIT TYPE CODE: 600
BLOOD UNIT TYPE CODE: 6200
BLOOD UNIT TYPE: NORMAL
BUN SERPL-MCNC: 21 MG/DL (ref 6–20)
BUN SERPL-MCNC: 31 MG/DL (ref 6–20)
CA-I BLDV-SCNC: 1.03 MMOL/L (ref 1.06–1.42)
CALCIUM SERPL-MCNC: 7 MG/DL (ref 8.7–10.5)
CALCIUM SERPL-MCNC: 8 MG/DL (ref 8.7–10.5)
CHLORIDE SERPL-SCNC: 105 MMOL/L (ref 95–110)
CHLORIDE SERPL-SCNC: 106 MMOL/L (ref 95–110)
CO2 SERPL-SCNC: 22 MMOL/L (ref 23–29)
CO2 SERPL-SCNC: 24 MMOL/L (ref 23–29)
CODING SYSTEM: NORMAL
CREAT SERPL-MCNC: 1.2 MG/DL (ref 0.5–1.4)
CREAT SERPL-MCNC: 1.6 MG/DL (ref 0.5–1.4)
DELSYS: ABNORMAL
DIFFERENTIAL METHOD: ABNORMAL
DISPENSE STATUS: NORMAL
EOSINOPHIL # BLD AUTO: 0.2 K/UL (ref 0–0.5)
EOSINOPHIL # BLD AUTO: ABNORMAL K/UL (ref 0–0.5)
EOSINOPHIL NFR BLD: 0 % (ref 0–8)
EOSINOPHIL NFR BLD: 0.7 % (ref 0–8)
EOSINOPHIL NFR BLD: 1 % (ref 0–8)
EOSINOPHIL NFR BLD: 1.5 % (ref 0–8)
ERYTHROCYTE [DISTWIDTH] IN BLOOD BY AUTOMATED COUNT: 14 % (ref 11.5–14.5)
ERYTHROCYTE [DISTWIDTH] IN BLOOD BY AUTOMATED COUNT: 14.2 % (ref 11.5–14.5)
ERYTHROCYTE [DISTWIDTH] IN BLOOD BY AUTOMATED COUNT: 15.9 % (ref 11.5–14.5)
ERYTHROCYTE [DISTWIDTH] IN BLOOD BY AUTOMATED COUNT: 15.9 % (ref 11.5–14.5)
ERYTHROCYTE [SEDIMENTATION RATE] IN BLOOD BY WESTERGREN METHOD: 16 MM/H
EST. GFR  (AFRICAN AMERICAN): 41.8 ML/MIN/1.73 M^2
EST. GFR  (AFRICAN AMERICAN): 59.2 ML/MIN/1.73 M^2
EST. GFR  (NON AFRICAN AMERICAN): 36.3 ML/MIN/1.73 M^2
EST. GFR  (NON AFRICAN AMERICAN): 51.4 ML/MIN/1.73 M^2
FIBRINOGEN PPP-MCNC: 261 MG/DL (ref 182–400)
FIO2: 35
FIO2: 35
FIO2: 40
GIANT PLATELETS BLD QL SMEAR: PRESENT
GLUCOSE SERPL-MCNC: 134 MG/DL (ref 70–110)
GLUCOSE SERPL-MCNC: 136 MG/DL (ref 70–110)
GLUCOSE SERPL-MCNC: 144 MG/DL (ref 70–110)
GLUCOSE SERPL-MCNC: 153 MG/DL (ref 70–110)
GLUCOSE SERPL-MCNC: 175 MG/DL (ref 70–110)
HCO3 UR-SCNC: 11.2 MMOL/L (ref 24–28)
HCO3 UR-SCNC: 23.1 MMOL/L (ref 24–28)
HCO3 UR-SCNC: 25.5 MMOL/L (ref 24–28)
HCO3 UR-SCNC: 25.6 MMOL/L (ref 24–28)
HCO3 UR-SCNC: 27.8 MMOL/L (ref 24–28)
HCO3 UR-SCNC: 28.3 MMOL/L (ref 24–28)
HCO3 UR-SCNC: 29.2 MMOL/L (ref 24–28)
HCT VFR BLD AUTO: 23.1 % (ref 37–48.5)
HCT VFR BLD AUTO: 25.1 % (ref 37–48.5)
HCT VFR BLD AUTO: 30.6 % (ref 37–48.5)
HCT VFR BLD AUTO: 33.5 % (ref 37–48.5)
HCT VFR BLD CALC: 16 %PCV (ref 36–54)
HCT VFR BLD CALC: 17 %PCV (ref 36–54)
HCT VFR BLD CALC: 20 %PCV (ref 36–54)
HCT VFR BLD CALC: 22 %PCV (ref 36–54)
HCT VFR BLD CALC: 30 %PCV (ref 36–54)
HGB BLD-MCNC: 10.3 G/DL (ref 12–16)
HGB BLD-MCNC: 10.9 G/DL (ref 12–16)
HGB BLD-MCNC: 8 G/DL (ref 12–16)
HGB BLD-MCNC: 8.5 G/DL (ref 12–16)
HYPOCHROMIA BLD QL SMEAR: ABNORMAL
IMM GRANULOCYTES # BLD AUTO: 0.85 K/UL (ref 0–0.04)
IMM GRANULOCYTES # BLD AUTO: ABNORMAL K/UL (ref 0–0.04)
IMM GRANULOCYTES NFR BLD AUTO: 6 % (ref 0–0.5)
IMM GRANULOCYTES NFR BLD AUTO: ABNORMAL % (ref 0–0.5)
INR PPP: 1.2 (ref 0.8–1.2)
INR PPP: 1.2 (ref 0.8–1.2)
LACTATE SERPL-SCNC: 2.6 MMOL/L (ref 0.5–2.2)
LDH SERPL L TO P-CCNC: 2.67 MMOL/L (ref 0.36–1.25)
LYMPHOCYTES # BLD AUTO: 1.2 K/UL (ref 1–4.8)
LYMPHOCYTES # BLD AUTO: ABNORMAL K/UL (ref 1–4.8)
LYMPHOCYTES NFR BLD: 10 % (ref 18–48)
LYMPHOCYTES NFR BLD: 11 % (ref 18–48)
LYMPHOCYTES NFR BLD: 17 % (ref 18–48)
LYMPHOCYTES NFR BLD: 8.6 % (ref 18–48)
MAGNESIUM SERPL-MCNC: 2.1 MG/DL (ref 1.6–2.6)
MAGNESIUM SERPL-MCNC: 2.1 MG/DL (ref 1.6–2.6)
MCH RBC QN AUTO: 29.7 PG (ref 27–31)
MCH RBC QN AUTO: 30 PG (ref 27–31)
MCH RBC QN AUTO: 31.1 PG (ref 27–31)
MCH RBC QN AUTO: 31.4 PG (ref 27–31)
MCHC RBC AUTO-ENTMCNC: 32.5 G/DL (ref 32–36)
MCHC RBC AUTO-ENTMCNC: 33.7 G/DL (ref 32–36)
MCHC RBC AUTO-ENTMCNC: 33.9 G/DL (ref 32–36)
MCHC RBC AUTO-ENTMCNC: 34.6 G/DL (ref 32–36)
MCV RBC AUTO: 88 FL (ref 82–98)
MCV RBC AUTO: 90 FL (ref 82–98)
MCV RBC AUTO: 92 FL (ref 82–98)
MCV RBC AUTO: 93 FL (ref 82–98)
METAMYELOCYTES NFR BLD MANUAL: 1 %
METAMYELOCYTES NFR BLD MANUAL: 1.3 %
MIN VOL: 7
MIN VOL: 7
MODE: ABNORMAL
MONOCYTES # BLD AUTO: 0.7 K/UL (ref 0.3–1)
MONOCYTES # BLD AUTO: ABNORMAL K/UL (ref 0.3–1)
MONOCYTES NFR BLD: 4.8 % (ref 4–15)
MONOCYTES NFR BLD: 6 % (ref 4–15)
MONOCYTES NFR BLD: 6.7 % (ref 4–15)
MONOCYTES NFR BLD: 8 % (ref 4–15)
MYELOCYTES NFR BLD MANUAL: 1 %
NEUTROPHILS # BLD AUTO: 11.2 K/UL (ref 1.8–7.7)
NEUTROPHILS NFR BLD: 70 % (ref 38–73)
NEUTROPHILS NFR BLD: 76.6 % (ref 38–73)
NEUTROPHILS NFR BLD: 77 % (ref 38–73)
NEUTROPHILS NFR BLD: 78.7 % (ref 38–73)
NEUTS BAND NFR BLD MANUAL: 2 %
NEUTS BAND NFR BLD MANUAL: 3 %
NEUTS BAND NFR BLD MANUAL: 4 %
NRBC BLD-RTO: 1 /100 WBC
NUM UNITS TRANS FFP: NORMAL
NUM UNITS TRANS FFP: NORMAL
NUM UNITS TRANS PACKED RBC: NORMAL
NUM UNITS TRANS PACKED RBC: NORMAL
OVALOCYTES BLD QL SMEAR: ABNORMAL
PCO2 BLDA: 17.3 MMHG (ref 35–45)
PCO2 BLDA: 30.1 MMHG (ref 35–45)
PCO2 BLDA: 36.6 MMHG (ref 35–45)
PCO2 BLDA: 36.8 MMHG (ref 35–45)
PCO2 BLDA: 40.3 MMHG (ref 35–45)
PCO2 BLDA: 43.4 MMHG (ref 35–45)
PCO2 BLDA: 44.2 MMHG (ref 35–45)
PEEP: 5
PEEP: 5
PEEP: 6
PH SMN: 7.37 [PH] (ref 7.35–7.45)
PH SMN: 7.41 [PH] (ref 7.35–7.45)
PH SMN: 7.42 [PH] (ref 7.35–7.45)
PH SMN: 7.42 [PH] (ref 7.35–7.45)
PH SMN: 7.45 [PH] (ref 7.35–7.45)
PH SMN: 7.51 [PH] (ref 7.35–7.45)
PH SMN: 7.54 [PH] (ref 7.35–7.45)
PHOSPHATE SERPL-MCNC: 2.5 MG/DL (ref 2.7–4.5)
PIP: 32
PIP: 32
PLATELET # BLD AUTO: 128 K/UL (ref 150–450)
PLATELET # BLD AUTO: 130 K/UL (ref 150–450)
PLATELET # BLD AUTO: 55 K/UL (ref 150–450)
PLATELET # BLD AUTO: 63 K/UL (ref 150–450)
PLATELET BLD QL SMEAR: ABNORMAL
PMV BLD AUTO: 12.2 FL (ref 9.2–12.9)
PMV BLD AUTO: 12.7 FL (ref 9.2–12.9)
PMV BLD AUTO: 13 FL (ref 9.2–12.9)
PMV BLD AUTO: 13.4 FL (ref 9.2–12.9)
PO2 BLDA: 138 MMHG (ref 80–100)
PO2 BLDA: 158 MMHG (ref 80–100)
PO2 BLDA: 190 MMHG (ref 80–100)
PO2 BLDA: 277 MMHG (ref 80–100)
PO2 BLDA: 66 MMHG (ref 80–100)
PO2 BLDA: 90 MMHG (ref 80–100)
PO2 BLDA: 96 MMHG (ref 80–100)
POC BE: -13 MMOL/L
POC BE: -2 MMOL/L
POC BE: 0 MMOL/L
POC BE: 3 MMOL/L
POC BE: 4 MMOL/L
POC BE: 4 MMOL/L
POC BE: 6 MMOL/L
POC IONIZED CALCIUM: 0.97 MMOL/L (ref 1.06–1.42)
POC IONIZED CALCIUM: 1.06 MMOL/L (ref 1.06–1.42)
POC IONIZED CALCIUM: 1.09 MMOL/L (ref 1.06–1.42)
POC IONIZED CALCIUM: 1.11 MMOL/L (ref 1.06–1.42)
POC IONIZED CALCIUM: 1.18 MMOL/L (ref 1.06–1.42)
POC SATURATED O2: 100 % (ref 95–100)
POC SATURATED O2: 92 % (ref 95–100)
POC SATURATED O2: 97 % (ref 95–100)
POC SATURATED O2: 98 % (ref 95–100)
POC SATURATED O2: 99 % (ref 95–100)
POC TCO2: 12 MMOL/L (ref 23–27)
POC TCO2: 24 MMOL/L (ref 23–27)
POC TCO2: 26 MMOL/L (ref 23–27)
POC TCO2: 27 MMOL/L (ref 23–27)
POC TCO2: 29 MMOL/L (ref 23–27)
POC TCO2: 30 MMOL/L (ref 23–27)
POC TCO2: 30 MMOL/L (ref 23–27)
POCT GLUCOSE: 132 MG/DL (ref 70–110)
POCT GLUCOSE: 148 MG/DL (ref 70–110)
POCT GLUCOSE: 166 MG/DL (ref 70–110)
POIKILOCYTOSIS BLD QL SMEAR: SLIGHT
POLYCHROMASIA BLD QL SMEAR: ABNORMAL
POTASSIUM BLD-SCNC: 3.4 MMOL/L (ref 3.5–5.1)
POTASSIUM BLD-SCNC: 3.7 MMOL/L (ref 3.5–5.1)
POTASSIUM BLD-SCNC: 3.7 MMOL/L (ref 3.5–5.1)
POTASSIUM BLD-SCNC: 3.8 MMOL/L (ref 3.5–5.1)
POTASSIUM BLD-SCNC: 4.7 MMOL/L (ref 3.5–5.1)
POTASSIUM SERPL-SCNC: 4.2 MMOL/L (ref 3.5–5.1)
POTASSIUM SERPL-SCNC: 4.9 MMOL/L (ref 3.5–5.1)
PROT SERPL-MCNC: 3.4 G/DL (ref 6–8.4)
PROT SERPL-MCNC: 4 G/DL (ref 6–8.4)
PROTHROMBIN TIME: 13 SEC (ref 9–12.5)
PROTHROMBIN TIME: 13.5 SEC (ref 9–12.5)
RBC # BLD AUTO: 2.57 M/UL (ref 4–5.4)
RBC # BLD AUTO: 2.71 M/UL (ref 4–5.4)
RBC # BLD AUTO: 3.47 M/UL (ref 4–5.4)
RBC # BLD AUTO: 3.63 M/UL (ref 4–5.4)
SAMPLE: ABNORMAL
SARS-COV-2 RDRP RESP QL NAA+PROBE: NEGATIVE
SITE: ABNORMAL
SMUDGE CELLS BLD QL SMEAR: PRESENT
SODIUM BLD-SCNC: 140 MMOL/L (ref 136–145)
SODIUM BLD-SCNC: 141 MMOL/L (ref 136–145)
SODIUM BLD-SCNC: 143 MMOL/L (ref 136–145)
SODIUM SERPL-SCNC: 138 MMOL/L (ref 136–145)
SODIUM SERPL-SCNC: 139 MMOL/L (ref 136–145)
SP02: 98
SP02: 98
TRANS ERYTHROCYTES VOL PATIENT: NORMAL ML
TRANS ERYTHROCYTES VOL PATIENT: NORMAL ML
UNIT NUMBER: NORMAL
VT: 420
WBC # BLD AUTO: 12.31 K/UL (ref 3.9–12.7)
WBC # BLD AUTO: 13.55 K/UL (ref 3.9–12.7)
WBC # BLD AUTO: 14.25 K/UL (ref 3.9–12.7)
WBC # BLD AUTO: 14.58 K/UL (ref 3.9–12.7)

## 2021-12-23 PROCEDURE — 80053 COMPREHEN METABOLIC PANEL: CPT | Mod: 91 | Performed by: STUDENT IN AN ORGANIZED HEALTH CARE EDUCATION/TRAINING PROGRAM

## 2021-12-23 PROCEDURE — 82803 BLOOD GASES ANY COMBINATION: CPT

## 2021-12-23 PROCEDURE — 85610 PROTHROMBIN TIME: CPT | Mod: 91 | Performed by: STUDENT IN AN ORGANIZED HEALTH CARE EDUCATION/TRAINING PROGRAM

## 2021-12-23 PROCEDURE — 99233 SBSQ HOSP IP/OBS HIGH 50: CPT | Mod: ,,, | Performed by: INTERNAL MEDICINE

## 2021-12-23 PROCEDURE — 63600175 PHARM REV CODE 636 W HCPCS: Mod: JG | Performed by: NURSE ANESTHETIST, CERTIFIED REGISTERED

## 2021-12-23 PROCEDURE — 25000003 PHARM REV CODE 250: Performed by: NURSE ANESTHETIST, CERTIFIED REGISTERED

## 2021-12-23 PROCEDURE — U0002 COVID-19 LAB TEST NON-CDC: HCPCS | Performed by: STUDENT IN AN ORGANIZED HEALTH CARE EDUCATION/TRAINING PROGRAM

## 2021-12-23 PROCEDURE — 99233 PR SUBSEQUENT HOSPITAL CARE,LEVL III: ICD-10-PCS | Mod: 24,,, | Performed by: SURGERY

## 2021-12-23 PROCEDURE — 85730 THROMBOPLASTIN TIME PARTIAL: CPT | Performed by: STUDENT IN AN ORGANIZED HEALTH CARE EDUCATION/TRAINING PROGRAM

## 2021-12-23 PROCEDURE — 85007 BL SMEAR W/DIFF WBC COUNT: CPT | Mod: 91 | Performed by: STUDENT IN AN ORGANIZED HEALTH CARE EDUCATION/TRAINING PROGRAM

## 2021-12-23 PROCEDURE — 47600 PR REMOVAL GALLBLADDER: ICD-10-PCS | Mod: 79,51,, | Performed by: SURGERY

## 2021-12-23 PROCEDURE — P9021 RED BLOOD CELLS UNIT: HCPCS | Performed by: SURGERY

## 2021-12-23 PROCEDURE — 27800903 OPTIME MED/SURG SUP & DEVICES OTHER IMPLANTS: Performed by: SURGERY

## 2021-12-23 PROCEDURE — 85384 FIBRINOGEN ACTIVITY: CPT | Performed by: STUDENT IN AN ORGANIZED HEALTH CARE EDUCATION/TRAINING PROGRAM

## 2021-12-23 PROCEDURE — 63600175 PHARM REV CODE 636 W HCPCS: Performed by: STUDENT IN AN ORGANIZED HEALTH CARE EDUCATION/TRAINING PROGRAM

## 2021-12-23 PROCEDURE — 20000000 HC ICU ROOM

## 2021-12-23 PROCEDURE — 99900035 HC TECH TIME PER 15 MIN (STAT)

## 2021-12-23 PROCEDURE — 86920 COMPATIBILITY TEST SPIN: CPT | Performed by: SURGERY

## 2021-12-23 PROCEDURE — 85002 BLEEDING TIME TEST: CPT

## 2021-12-23 PROCEDURE — 25000003 PHARM REV CODE 250: Performed by: STUDENT IN AN ORGANIZED HEALTH CARE EDUCATION/TRAINING PROGRAM

## 2021-12-23 PROCEDURE — 84100 ASSAY OF PHOSPHORUS: CPT | Performed by: STUDENT IN AN ORGANIZED HEALTH CARE EDUCATION/TRAINING PROGRAM

## 2021-12-23 PROCEDURE — 47010 PR DRAINAGE OF LIVER LESION, OPEN: ICD-10-PCS | Mod: 58,,, | Performed by: SURGERY

## 2021-12-23 PROCEDURE — 85027 COMPLETE CBC AUTOMATED: CPT | Mod: 91 | Performed by: STUDENT IN AN ORGANIZED HEALTH CARE EDUCATION/TRAINING PROGRAM

## 2021-12-23 PROCEDURE — 94003 VENT MGMT INPAT SUBQ DAY: CPT

## 2021-12-23 PROCEDURE — 36000707: Performed by: SURGERY

## 2021-12-23 PROCEDURE — P9012 CRYOPRECIPITATE EACH UNIT: HCPCS | Performed by: STUDENT IN AN ORGANIZED HEALTH CARE EDUCATION/TRAINING PROGRAM

## 2021-12-23 PROCEDURE — 36000706: Performed by: SURGERY

## 2021-12-23 PROCEDURE — 63600175 PHARM REV CODE 636 W HCPCS: Performed by: SURGERY

## 2021-12-23 PROCEDURE — 88300 SURGICAL PATH GROSS: CPT | Mod: 26,59,, | Performed by: PATHOLOGY

## 2021-12-23 PROCEDURE — 97607 PR NEG PRESS WOUND THERAPY (NPWT) W/DISPOSABLE WOUND VAC DEVICE, <=50 CM: ICD-10-PCS | Mod: ,,, | Performed by: SURGERY

## 2021-12-23 PROCEDURE — 63600175 PHARM REV CODE 636 W HCPCS: Performed by: NURSE ANESTHETIST, CERTIFIED REGISTERED

## 2021-12-23 PROCEDURE — 82330 ASSAY OF CALCIUM: CPT | Performed by: SURGERY

## 2021-12-23 PROCEDURE — 37000009 HC ANESTHESIA EA ADD 15 MINS: Performed by: SURGERY

## 2021-12-23 PROCEDURE — 86901 BLOOD TYPING SEROLOGIC RH(D): CPT | Performed by: SURGERY

## 2021-12-23 PROCEDURE — 83605 ASSAY OF LACTIC ACID: CPT | Performed by: STUDENT IN AN ORGANIZED HEALTH CARE EDUCATION/TRAINING PROGRAM

## 2021-12-23 PROCEDURE — 25000003 PHARM REV CODE 250: Performed by: SURGERY

## 2021-12-23 PROCEDURE — 83735 ASSAY OF MAGNESIUM: CPT | Performed by: STUDENT IN AN ORGANIZED HEALTH CARE EDUCATION/TRAINING PROGRAM

## 2021-12-23 PROCEDURE — 47010 HEPATOT OPN DRG ABSC/CST 1/2: CPT | Mod: 58,,, | Performed by: SURGERY

## 2021-12-23 PROCEDURE — P9016 RBC LEUKOCYTES REDUCED: HCPCS | Performed by: SURGERY

## 2021-12-23 PROCEDURE — D9220A PRA ANESTHESIA: Mod: ANES,,, | Performed by: ANESTHESIOLOGY

## 2021-12-23 PROCEDURE — 27200966 HC CLOSED SUCTION SYSTEM

## 2021-12-23 PROCEDURE — 94761 N-INVAS EAR/PLS OXIMETRY MLT: CPT

## 2021-12-23 PROCEDURE — 37799 UNLISTED PX VASCULAR SURGERY: CPT

## 2021-12-23 PROCEDURE — 85027 COMPLETE CBC AUTOMATED: CPT | Performed by: SURGERY

## 2021-12-23 PROCEDURE — B4185 PARENTERAL SOL 10 GM LIPIDS: HCPCS | Performed by: STUDENT IN AN ORGANIZED HEALTH CARE EDUCATION/TRAINING PROGRAM

## 2021-12-23 PROCEDURE — 27000221 HC OXYGEN, UP TO 24 HOURS

## 2021-12-23 PROCEDURE — D9220A PRA ANESTHESIA: ICD-10-PCS | Mod: CRNA,,, | Performed by: NURSE ANESTHETIST, CERTIFIED REGISTERED

## 2021-12-23 PROCEDURE — 86965 POOLING BLOOD PLATELETS: CPT | Performed by: STUDENT IN AN ORGANIZED HEALTH CARE EDUCATION/TRAINING PROGRAM

## 2021-12-23 PROCEDURE — P9045 ALBUMIN (HUMAN), 5%, 250 ML: HCPCS | Mod: JG | Performed by: NURSE ANESTHETIST, CERTIFIED REGISTERED

## 2021-12-23 PROCEDURE — 88304 TISSUE EXAM BY PATHOLOGIST: CPT | Mod: 26,,, | Performed by: PATHOLOGY

## 2021-12-23 PROCEDURE — 99233 SBSQ HOSP IP/OBS HIGH 50: CPT | Mod: 24,,, | Performed by: SURGERY

## 2021-12-23 PROCEDURE — 99233 PR SUBSEQUENT HOSPITAL CARE,LEVL III: ICD-10-PCS | Mod: ,,, | Performed by: INTERNAL MEDICINE

## 2021-12-23 PROCEDURE — 97607 NEG PRS WND THR NDME<=50SQCM: CPT | Mod: ,,, | Performed by: SURGERY

## 2021-12-23 PROCEDURE — 88300 SURGICAL PATH GROSS: CPT | Performed by: PATHOLOGY

## 2021-12-23 PROCEDURE — 88300 PR  SURG PATH,GROSS,LEVEL I: ICD-10-PCS | Mod: 26,59,, | Performed by: PATHOLOGY

## 2021-12-23 PROCEDURE — A4217 STERILE WATER/SALINE, 500 ML: HCPCS | Performed by: STUDENT IN AN ORGANIZED HEALTH CARE EDUCATION/TRAINING PROGRAM

## 2021-12-23 PROCEDURE — C1729 CATH, DRAINAGE: HCPCS | Performed by: SURGERY

## 2021-12-23 PROCEDURE — D9220A PRA ANESTHESIA: Mod: CRNA,,, | Performed by: NURSE ANESTHETIST, CERTIFIED REGISTERED

## 2021-12-23 PROCEDURE — 99900026 HC AIRWAY MAINTENANCE (STAT)

## 2021-12-23 PROCEDURE — 47600 CHOLECYSTECTOMY: CPT | Mod: 79,51,, | Performed by: SURGERY

## 2021-12-23 PROCEDURE — 83735 ASSAY OF MAGNESIUM: CPT | Mod: 91 | Performed by: STUDENT IN AN ORGANIZED HEALTH CARE EDUCATION/TRAINING PROGRAM

## 2021-12-23 PROCEDURE — D9220A PRA ANESTHESIA: ICD-10-PCS | Mod: ANES,,, | Performed by: ANESTHESIOLOGY

## 2021-12-23 PROCEDURE — 88304 PR  SURG PATH,LEVEL III: ICD-10-PCS | Mod: 26,,, | Performed by: PATHOLOGY

## 2021-12-23 PROCEDURE — 88304 TISSUE EXAM BY PATHOLOGIST: CPT | Performed by: PATHOLOGY

## 2021-12-23 PROCEDURE — 37000008 HC ANESTHESIA 1ST 15 MINUTES: Performed by: SURGERY

## 2021-12-23 PROCEDURE — 80053 COMPREHEN METABOLIC PANEL: CPT | Performed by: STUDENT IN AN ORGANIZED HEALTH CARE EDUCATION/TRAINING PROGRAM

## 2021-12-23 PROCEDURE — 27201423 OPTIME MED/SURG SUP & DEVICES STERILE SUPPLY: Performed by: SURGERY

## 2021-12-23 PROCEDURE — 85007 BL SMEAR W/DIFF WBC COUNT: CPT | Performed by: SURGERY

## 2021-12-23 PROCEDURE — 36430 TRANSFUSION BLD/BLD COMPNT: CPT

## 2021-12-23 PROCEDURE — P9017 PLASMA 1 DONOR FRZ W/IN 8 HR: HCPCS | Performed by: SURGERY

## 2021-12-23 RX ORDER — PHENYLEPHRINE HYDROCHLORIDE 10 MG/ML
INJECTION INTRAVENOUS
Status: DISCONTINUED | OUTPATIENT
Start: 2021-12-23 | End: 2021-12-23

## 2021-12-23 RX ORDER — ROCURONIUM BROMIDE 10 MG/ML
INJECTION, SOLUTION INTRAVENOUS
Status: DISCONTINUED | OUTPATIENT
Start: 2021-12-23 | End: 2021-12-23

## 2021-12-23 RX ORDER — SODIUM CHLORIDE 9 MG/ML
INJECTION, SOLUTION INTRAVENOUS CONTINUOUS
Status: DISCONTINUED | OUTPATIENT
Start: 2021-12-23 | End: 2021-12-27

## 2021-12-23 RX ORDER — HYDROCODONE BITARTRATE AND ACETAMINOPHEN 500; 5 MG/1; MG/1
TABLET ORAL
Status: DISCONTINUED | OUTPATIENT
Start: 2021-12-23 | End: 2021-12-29

## 2021-12-23 RX ORDER — ALBUMIN HUMAN 50 G/1000ML
SOLUTION INTRAVENOUS CONTINUOUS PRN
Status: DISCONTINUED | OUTPATIENT
Start: 2021-12-23 | End: 2021-12-23

## 2021-12-23 RX ORDER — MIDAZOLAM HYDROCHLORIDE 1 MG/ML
INJECTION, SOLUTION INTRAMUSCULAR; INTRAVENOUS
Status: DISCONTINUED | OUTPATIENT
Start: 2021-12-23 | End: 2021-12-23

## 2021-12-23 RX ORDER — ONDANSETRON 2 MG/ML
INJECTION INTRAMUSCULAR; INTRAVENOUS
Status: DISCONTINUED | OUTPATIENT
Start: 2021-12-23 | End: 2021-12-23

## 2021-12-23 RX ORDER — PROPOFOL 10 MG/ML
VIAL (ML) INTRAVENOUS
Status: DISCONTINUED | OUTPATIENT
Start: 2021-12-23 | End: 2021-12-23

## 2021-12-23 RX ORDER — CALCIUM CHLORIDE INJECTION 100 MG/ML
INJECTION, SOLUTION INTRAVENOUS
Status: DISCONTINUED | OUTPATIENT
Start: 2021-12-23 | End: 2021-12-23

## 2021-12-23 RX ORDER — MAGNESIUM SULFATE HEPTAHYDRATE 40 MG/ML
2 INJECTION, SOLUTION INTRAVENOUS
Status: CANCELLED | OUTPATIENT
Start: 2021-12-23 | End: 2021-12-24

## 2021-12-23 RX ADMIN — PHENYLEPHRINE HYDROCHLORIDE 200 MCG: 10 INJECTION INTRAVENOUS at 01:12

## 2021-12-23 RX ADMIN — PHENYLEPHRINE HYDROCHLORIDE 150 MCG: 10 INJECTION INTRAVENOUS at 11:12

## 2021-12-23 RX ADMIN — PHENYLEPHRINE HYDROCHLORIDE 100 MCG: 10 INJECTION INTRAVENOUS at 12:12

## 2021-12-23 RX ADMIN — PIPERACILLIN SODIUM AND TAZOBACTAM SODIUM 4.5 G: 4; .5 INJECTION, POWDER, FOR SOLUTION INTRAVENOUS at 08:12

## 2021-12-23 RX ADMIN — ROCURONIUM BROMIDE 50 MG: 10 INJECTION, SOLUTION INTRAVENOUS at 12:12

## 2021-12-23 RX ADMIN — DEXMEDETOMIDINE HYDROCHLORIDE 0.8 MCG/KG/HR: 4 INJECTION INTRAVENOUS at 08:12

## 2021-12-23 RX ADMIN — PHENYLEPHRINE HYDROCHLORIDE 100 MCG: 10 INJECTION INTRAVENOUS at 01:12

## 2021-12-23 RX ADMIN — HEPARIN SODIUM 5000 UNITS: 5000 INJECTION INTRAVENOUS; SUBCUTANEOUS at 05:12

## 2021-12-23 RX ADMIN — PHENYLEPHRINE HYDROCHLORIDE 200 MCG: 10 INJECTION INTRAVENOUS at 02:12

## 2021-12-23 RX ADMIN — NOREPINEPHRINE BITARTRATE 0.03 MCG/KG/MIN: 1 INJECTION, SOLUTION, CONCENTRATE INTRAVENOUS at 11:12

## 2021-12-23 RX ADMIN — FENTANYL CITRATE 25 MCG: 50 INJECTION INTRAMUSCULAR; INTRAVENOUS at 01:12

## 2021-12-23 RX ADMIN — ONDANSETRON 0.5 G: 2 INJECTION INTRAMUSCULAR; INTRAVENOUS at 12:12

## 2021-12-23 RX ADMIN — ONDANSETRON 0.5 G: 2 INJECTION INTRAMUSCULAR; INTRAVENOUS at 02:12

## 2021-12-23 RX ADMIN — SMOFLIPID 250 ML: 6; 6; 5; 3 INJECTION, EMULSION INTRAVENOUS at 09:12

## 2021-12-23 RX ADMIN — ALBUMIN (HUMAN): 12.5 SOLUTION INTRAVENOUS at 12:12

## 2021-12-23 RX ADMIN — NITROGLYCERIN 0.5 INCH: 20 OINTMENT TOPICAL at 05:12

## 2021-12-23 RX ADMIN — MUPIROCIN: 20 OINTMENT TOPICAL at 08:12

## 2021-12-23 RX ADMIN — NITROGLYCERIN 0.5 INCH: 20 OINTMENT TOPICAL at 11:12

## 2021-12-23 RX ADMIN — FENTANYL CITRATE 25 MCG: 50 INJECTION INTRAMUSCULAR; INTRAVENOUS at 10:12

## 2021-12-23 RX ADMIN — FENTANYL CITRATE 25 MCG: 50 INJECTION INTRAMUSCULAR; INTRAVENOUS at 11:12

## 2021-12-23 RX ADMIN — MUPIROCIN: 20 OINTMENT TOPICAL at 09:12

## 2021-12-23 RX ADMIN — ONDANSETRON 4 MG: 2 INJECTION INTRAMUSCULAR; INTRAVENOUS at 01:12

## 2021-12-23 RX ADMIN — FENTANYL CITRATE 25 MCG: 50 INJECTION INTRAMUSCULAR; INTRAVENOUS at 03:12

## 2021-12-23 RX ADMIN — ROCURONIUM BROMIDE 20 MG: 10 INJECTION, SOLUTION INTRAVENOUS at 11:12

## 2021-12-23 RX ADMIN — HEPARIN SODIUM 5000 UNITS: 5000 INJECTION INTRAVENOUS; SUBCUTANEOUS at 09:12

## 2021-12-23 RX ADMIN — CALCIUM GLUCONATE: 98 INJECTION, SOLUTION INTRAVENOUS at 09:12

## 2021-12-23 RX ADMIN — PHENYLEPHRINE HYDROCHLORIDE 150 MCG: 10 INJECTION INTRAVENOUS at 12:12

## 2021-12-23 RX ADMIN — MIDAZOLAM HYDROCHLORIDE 2 MG: 1 INJECTION, SOLUTION INTRAMUSCULAR; INTRAVENOUS at 11:12

## 2021-12-23 RX ADMIN — DEXMEDETOMIDINE HYDROCHLORIDE 0.8 MCG/KG/HR: 4 INJECTION INTRAVENOUS at 04:12

## 2021-12-23 RX ADMIN — ROCURONIUM BROMIDE 30 MG: 10 INJECTION, SOLUTION INTRAVENOUS at 11:12

## 2021-12-23 RX ADMIN — SODIUM CHLORIDE: 0.9 INJECTION, SOLUTION INTRAVENOUS at 04:12

## 2021-12-23 RX ADMIN — DEXMEDETOMIDINE HYDROCHLORIDE 1.4 MCG/KG/HR: 4 INJECTION INTRAVENOUS at 04:12

## 2021-12-23 RX ADMIN — VASOPRESSIN 0.04 UNITS/MIN: 20 INJECTION INTRAVENOUS at 08:12

## 2021-12-23 RX ADMIN — FAMOTIDINE 20 MG: 10 INJECTION, SOLUTION INTRAVENOUS at 09:12

## 2021-12-23 RX ADMIN — VASOPRESSIN 0.04 UNITS/MIN: 20 INJECTION INTRAVENOUS at 12:12

## 2021-12-23 RX ADMIN — PROPOFOL 50 MG: 10 INJECTION, EMULSION INTRAVENOUS at 11:12

## 2021-12-23 RX ADMIN — DEXMEDETOMIDINE HYDROCHLORIDE 1.2 MCG/KG/HR: 4 INJECTION INTRAVENOUS at 07:12

## 2021-12-23 RX ADMIN — Medication: at 08:12

## 2021-12-23 RX ADMIN — PHENYLEPHRINE HYDROCHLORIDE 200 MCG: 10 INJECTION INTRAVENOUS at 11:12

## 2021-12-23 RX ADMIN — SODIUM CHLORIDE, SODIUM LACTATE, POTASSIUM CHLORIDE, AND CALCIUM CHLORIDE 1000 ML: .6; .31; .03; .02 INJECTION, SOLUTION INTRAVENOUS at 05:12

## 2021-12-23 RX ADMIN — Medication: at 09:12

## 2021-12-23 RX ADMIN — SODIUM CHLORIDE, SODIUM LACTATE, POTASSIUM CHLORIDE, AND CALCIUM CHLORIDE 1000 ML: .6; .31; .03; .02 INJECTION, SOLUTION INTRAVENOUS at 08:12

## 2021-12-23 NOTE — PLAN OF CARE
[facility]  Discharge Reassessment    Primary Care Provider: Primary Doctor No    Expected Discharge Date: 12/27/2021    Reassessment (most recent)     Discharge Reassessment - 12/23/21 0903        Discharge Reassessment    Assessment Type Discharge Planning Reassessment     Communicated SHANA with patient/caregiver Date not available/Unable to determine     Discharge Plan A Home with family     Discharge Plan B Home Health     DME Needed Upon Discharge  other (see comments)   TBD    Discharge Barriers Identified None     Why the patient remains in the hospital Requires continued medical care        Post-Acute Status    Post-Acute Authorization Placement     Post-Acute Placement Status Pending medical clearance/testing               Per MD's note , NAEON. Levo 0.02 this morning. Temp to 100.4 overnight. Hgb to 6.9, transfused 1 unit  pRBC. To OR today        The patient is not medically stable to discharge and remains in SICU. The SW will continue to follow up to assist the patient with discharge needs.       Philip Borges LMSW  Case Management [facility]  Ext: [phone]

## 2021-12-23 NOTE — SUBJECTIVE & OBJECTIVE
Interval History: NAEON. Levo 0.02 this morning. Temp to 100.4 overnight. Hgb to 6.9, transfused 1 unit  pRBC. To OR today     Medications:  Continuous Infusions:   sodium chloride 0.9% Stopped (12/22/21 1745)    dexmedetomidine (PRECEDEX) infusion 1.2 mcg/kg/hr (12/23/21 0722)    NORepinephrine bitartrate-D5W 0.02 mcg/kg/min (12/23/21 0700)    TPN ADULT CENTRAL LINE CUSTOM 40 mL/hr at 12/23/21 0700     Scheduled Meds:   balsam peru-castor oiL   Topical (Top) BID    famotidine (PF)  20 mg Intravenous Daily    heparin (porcine)  5,000 Units Subcutaneous Q8H    lipid (SMOFLIPID)  250 mL Intravenous Daily    mupirocin   Nasal BID    nitroGLYCERIN 2% TD oint  0.5 inch Topical (Top) Q6H    piperacillin-tazobactam (ZOSYN) IVPB  4.5 g Intravenous Q12H     PRN Meds:sodium chloride, sodium chloride, sodium chloride, sodium chloride, dextrose 50%, dextrose 50%, fentaNYL, glucagon (human recombinant), insulin aspart U-100, sodium chloride 0.9%, sodium chloride 0.9%     Review of patient's allergies indicates:   Allergen Reactions    Oxycodone     Tylox [oxycodone-acetaminophen]      Objective:     Vital Signs (Most Recent):  Temp: 97.16 °F (36.2 °C) (12/23/21 0700)  Pulse: 69 (12/23/21 0700)  Resp: 16 (12/23/21 0700)  BP: 120/70 (12/23/21 0700)  SpO2: 98 % (12/23/21 0700) Vital Signs (24h Range):  Temp:  [96.26 °F (35.7 °C)-100.4 °F (38 °C)] 97.16 °F (36.2 °C)  Pulse:  [] 69  Resp:  [0-32] 16  SpO2:  [95 %-99 %] 98 %  BP: ()/(50-70) 120/70  Arterial Line BP: ()/(45-70) 132/70     Weight: 113 kg (249 lb 1.9 oz)  Body mass index is 42.76 kg/m².    Intake/Output - Last 3 Shifts       12/21 0700 12/22 0659 12/22 0700 12/23 0659 12/23 0700 12/24 0659    I.V. (mL/kg) 4367 (44.8) 2901.1 (25.7) 83 (0.7)    Blood 227 1307.3     IV Piggyback 3521.5 1857     .1 1101.8 121.8    Total Intake(mL/kg) 8624.7 (88.5) 7167.1 (63.4) 204.7 (1.8)    Urine (mL/kg/hr) 25 (0) 20 (0) 2 (0)    Drains  1300      Other 8236 4581 150    Stool  0     Total Output 8261 5901 152    Net +363.7 +1266.1 +52.7           Stool Occurrence  1 x           Vitals and nursing note reviewed.   Constitutional:       Appearance: She is obese. She is ill-appearing.   HENT:      Head: Normocephalic and atraumatic.      Nose:      Comments: NG tube in place  Neck:      Comments: Right IJ trialysis line  Cardiovascular:      Rate and Rhythm: Normal rate and regular rhythm.      Pulses: Normal pulses.   Pulmonary:      Comments: Intubated  Vent Mode: A/C  Oxygen Concentration (%):  [35-40] 35  Resp Rate Total:  [16 br/min-24 br/min] 16 br/min  Vt Set:  [420 mL] 420 mL  PEEP/CPAP:  [6 cmH20] 6 cmH20  Mean Airway Pressure:  [10 zrG75-68 cmH20] 10 cmH20  Abdominal:      Comments: Abdomen is open. There is an abdominal wound vac in place to continuous suction draining serosanguinous to sanguinous output.   Musculoskeletal:      Right lower leg: Edema present.      Left lower leg: Edema present.      Comments: L radial larry, L subclavian triple lumen. Palpaable pulses distally on BLE  Skin:     General: Skin is warm and dry.   Neurological:      Comments: Sedated     Significant Labs:  I have reviewed all pertinent lab results within the past 24 hours.  CBC:   Recent Labs   Lab 12/21/21  1017 12/23/21  0024 12/23/21  0512   WBC  --  14.58*  --    RBC  --  2.71*  --    HGB  --  8.5*  --    HCT   < > 25.1* 22*   PLT  --  128*  --    MCV  --  93  --    MCH  --  31.4*  --    MCHC  --  33.9  --     < > = values in this interval not displayed.     CMP:   Recent Labs   Lab 12/23/21  0358   *   CALCIUM 7.0*   ALBUMIN 1.7*   PROT 4.0*      K 4.2   CO2 24      BUN 21*   CREATININE 1.2   ALKPHOS 91   ALT 96*   *   BILITOT 6.7*       Significant Diagnostics:  I have reviewed all pertinent imaging results/findings within the past 24 hours.

## 2021-12-23 NOTE — PLAN OF CARE
Significant events: No significant events overnight.    Vitals:  Mechanically ventilated, AC VC +, FiO2 40%, 6-peep, rate 16.   O2: >97%.   HR: 70's-110's, sinus/sinus tachycardic.  MAP: >65.  Temperature max: 100.4 F.    Gtts:  Norepinephrine at 0.02 mcg/kg/min, Precedex, TPN, and Lipids.   UOP: 10 cc/shift.        Last Bowel Movement: 12/23/21.  Wound vac: 700 cc sanguineous output.   Neuro: Pupils equal and reactive. Withdraws to pain, does not follow commands.    Diet:  NPO.   Labs/Accuchecks:  CBC Q12 hrs. Daily labs.  Accuchecks Q6 hrs.       Plan:  Plan to go to OR today for washout and possible closure.     Skin:  Skin tear to upper back covered with foam. Wound vac intact to abdomen. Breakdown to left nare covered with sween cream. Waffle mattress, heel boots, and sacral dressing in use. Turned Q2 hrs. No new skin breakdown noted.

## 2021-12-23 NOTE — PROGRESS NOTES
[facility]  General Surgery  Progress Note    Subjective:     History of Present Illness:  No notes on file    Post-Op Info:  Procedure(s) (LRB):  LAPAROTOMY, EXPLORATORY (N/A)  EVACUATION, HEMATOMA  WASHOUT  REPLACEMENT, WOUND VAC   2 Days Post-Op     Interval History: NAEON. Levo 0.02 this morning. Temp to 100.4 overnight. Hgb to 6.9, transfused 1 unit  pRBC. To OR today     Medications:  Continuous Infusions:   sodium chloride 0.9% Stopped (12/22/21 1745)    dexmedetomidine (PRECEDEX) infusion 1.2 mcg/kg/hr (12/23/21 0722)    NORepinephrine bitartrate-D5W 0.02 mcg/kg/min (12/23/21 0700)    TPN ADULT CENTRAL LINE CUSTOM 40 mL/hr at 12/23/21 0700     Scheduled Meds:   balsam peru-castor oiL   Topical (Top) BID    famotidine (PF)  20 mg Intravenous Daily    heparin (porcine)  5,000 Units Subcutaneous Q8H    lipid (SMOFLIPID)  250 mL Intravenous Daily    mupirocin   Nasal BID    nitroGLYCERIN 2% TD oint  0.5 inch Topical (Top) Q6H    piperacillin-tazobactam (ZOSYN) IVPB  4.5 g Intravenous Q12H     PRN Meds:sodium chloride, sodium chloride, sodium chloride, sodium chloride, dextrose 50%, dextrose 50%, fentaNYL, glucagon (human recombinant), insulin aspart U-100, sodium chloride 0.9%, sodium chloride 0.9%     Review of patient's allergies indicates:   Allergen Reactions    Oxycodone     Tylox [oxycodone-acetaminophen]      Objective:     Vital Signs (Most Recent):  Temp: 97.16 °F (36.2 °C) (12/23/21 0700)  Pulse: 69 (12/23/21 0700)  Resp: 16 (12/23/21 0700)  BP: 120/70 (12/23/21 0700)  SpO2: 98 % (12/23/21 0700) Vital Signs (24h Range):  Temp:  [96.26 °F (35.7 °C)-100.4 °F (38 °C)] 97.16 °F (36.2 °C)  Pulse:  [] 69  Resp:  [0-32] 16  SpO2:  [95 %-99 %] 98 %  BP: ()/(50-70) 120/70  Arterial Line BP: ()/(45-70) 132/70     Weight: 113 kg (249 lb 1.9 oz)  Body mass index is 42.76 kg/m².    Intake/Output - Last 3 Shifts       12/21 0700  12/22 0659 12/22 0700 12/23  0659 12/23 0700  12/24 0659    I.V. (mL/kg) 4367 (44.8) 2901.1 (25.7) 83 (0.7)    Blood 227 1307.3     IV Piggyback 3521.5 1857     .1 1101.8 121.8    Total Intake(mL/kg) 8624.7 (88.5) 7167.1 (63.4) 204.7 (1.8)    Urine (mL/kg/hr) 25 (0) 20 (0) 2 (0)    Drains  1300     Other 8236 4581 150    Stool  0     Total Output 8261 5901 152    Net +363.7 +1266.1 +52.7           Stool Occurrence  1 x           Vitals and nursing note reviewed.   Constitutional:       Appearance: She is obese. She is ill-appearing.   HENT:      Head: Normocephalic and atraumatic.      Nose:      Comments: NG tube in place  Neck:      Comments: Right IJ trialysis line  Cardiovascular:      Rate and Rhythm: Normal rate and regular rhythm.      Pulses: Normal pulses.   Pulmonary:      Comments: Intubated  Vent Mode: A/C  Oxygen Concentration (%):  [35-40] 35  Resp Rate Total:  [16 br/min-24 br/min] 16 br/min  Vt Set:  [420 mL] 420 mL  PEEP/CPAP:  [6 cmH20] 6 cmH20  Mean Airway Pressure:  [10 szW17-98 cmH20] 10 cmH20  Abdominal:      Comments: Abdomen is open. There is an abdominal wound vac in place to continuous suction draining serosanguinous to sanguinous output.   Musculoskeletal:      Right lower leg: Edema present.      Left lower leg: Edema present.      Comments: L radial larry, L subclavian triple lumen. Palpaable pulses distally on BLE  Skin:     General: Skin is warm and dry.   Neurological:      Comments: Sedated     Significant Labs:  I have reviewed all pertinent lab results within the past 24 hours.  CBC:   Recent Labs   Lab 12/21/21  1017 12/23/21  0024 12/23/21  0512   WBC  --  14.58*  --    RBC  --  2.71*  --    HGB  --  8.5*  --    HCT   < > 25.1* 22*   PLT  --  128*  --    MCV  --  93  --    MCH  --  31.4*  --    MCHC  --  33.9  --     < > = values in this interval not displayed.     CMP:   Recent Labs   Lab 12/23/21  0358   *   CALCIUM 7.0*   ALBUMIN 1.7*   PROT 4.0*      K 4.2   CO2 24      BUN 21*    CREATININE 1.2   ALKPHOS 91   ALT 96*   *   BILITOT 6.7*       Significant Diagnostics:  I have reviewed all pertinent imaging results/findings within the past 24 hours.    Assessment/Plan:     Subcapsular hematoma of liver  55yo female transfer from OSH after hysterectomy on 12/8 complicated by delayed recognition of small bowel injury requiring resection (in discontinuity) and at some point new bleed from the liver - transferred with open abdomen for higher level of care.  S/p ex-lap, liver packing 12/21    - Plan for return to OR today  - Transfuse for Hgb <7. Correct coagulopathy  - Wean vasopressors  - Strict I/Os  - Appreciate nephrology assistance for CRRT  - Remainder of care per SICU    Case discussed with Dr. Gorman.      Renato Anderson PA-C  General Surgery  [facility]

## 2021-12-23 NOTE — SUBJECTIVE & OBJECTIVE
Interval History: net positive 1L fluid balance after SLED stopped last night around 6pm. fio2 stable and planned to return to OR though CXR with worse pulmonary edema, may need KRT on return from OR    Review of patient's allergies indicates:   Allergen Reactions    Oxycodone     Tylox [oxycodone-acetaminophen]      Current Facility-Administered Medications   Medication Frequency    0.9%  NaCl infusion (CRRT USE ONLY) Continuous    0.9%  NaCl infusion (for blood administration) Q24H PRN    0.9%  NaCl infusion (for blood administration) Q24H PRN    0.9%  NaCl infusion (for blood administration) Q24H PRN    0.9%  NaCl infusion (for blood administration) Q24H PRN    balsam peru-castor oiL Oint BID    dexmedetomidine (PRECEDEX) 400mcg/100mL 0.9% NaCL infusion Continuous    dextrose 50% injection 12.5 g PRN    dextrose 50% injection 25 g PRN    famotidine (PF) injection 20 mg Daily    fentaNYL 50 mcg/mL injection 25 mcg Q1H PRN    glucagon (human recombinant) injection 1 mg PRN    heparin (porcine) injection 5,000 Units Q8H    insulin aspart U-100 pen 0-5 Units Q6H PRN    lipid (SMOFLIPID) (SMOFLIPID) 20 % infusion 250 mL Daily    lipid (SMOFLIPID) (SMOFLIPID) 20 % infusion 250 mL Daily    mupirocin 2 % ointment BID    nitroGLYCERIN 2% TD oint ointment 0.5 inch Q6H    NORepinephrine 4 mg in dextrose 5% 250 mL infusion (premix) (titrating) Continuous    piperacillin-tazobactam 4.5 g in sodium chloride 0.9% 100 mL IVPB (ready to mix system) Q12H    sodium chloride 0.9% flush 10 mL PRN    sodium chloride 0.9% flush 10 mL PRN    TPN ADULT CENTRAL LINE CUSTOM Continuous    TPN ADULT CENTRAL LINE CUSTOM Continuous       Objective:     Vital Signs (Most Recent):  Temp: 96.62 °F (35.9 °C) (12/23/21 0800)  Pulse: 61 (12/23/21 0800)  Resp: 16 (12/23/21 0800)  BP: 129/70 (12/23/21 0800)  SpO2: 98 % (12/23/21 0800)  O2 Device (Oxygen Therapy): ventilator (12/23/21 6830) Vital Signs (24h Range):  Temp:   [96.26 °F (35.7 °C)-100.4 °F (38 °C)] 96.62 °F (35.9 °C)  Pulse:  [] 61  Resp:  [0-32] 16  SpO2:  [95 %-99 %] 98 %  BP: ()/(50-70) 129/70  Arterial Line BP: ()/(46-70) 138/68     Weight: 113 kg (249 lb 1.9 oz) (12/23/21 0400)  Body mass index is 42.76 kg/m².  Body surface area is 2.26 meters squared.    I/O last 3 completed shifts:  In: 74984.4 [I.V.:5323.2; Blood:1534.3; IV Piggyback:4094.3]  Out: 9823 [Urine:22; Drains:1300; Other:8501]    Physical Exam  Constitutional:       Appearance: She is obese. She is ill-appearing.   HENT:      Mouth/Throat:      Mouth: Mucous membranes are moist.   Eyes:      Pupils: Pupils are equal, round, and reactive to light.   Cardiovascular:      Rate and Rhythm: Normal rate and regular rhythm.   Pulmonary:      Comments: intubated  Abdominal:      Comments: open   Musculoskeletal:         General: No deformity.      Right lower leg: Edema present.      Left lower leg: Edema present.   Skin:     Coloration: Skin is not jaundiced.   Neurological:      General: No focal deficit present.         Significant Labs:  All labs within the past 24 hours have been reviewed.     Significant Imaging:  Labs: Reviewed  X-Ray: Reviewed

## 2021-12-23 NOTE — PROGRESS NOTES
Patient received from OR. Connected to bedside monitor. All vitals noted to be stable. MD and RT called to bedside. Patient placed on ventilator; current settings 35% 5 PEEP. Labs sent off. Plan of care reviewed with MD. Plan of care reviewed with family at bedside. All questions and concerns addressed and answered.

## 2021-12-23 NOTE — PROGRESS NOTES
[facility]  Nephrology  Progress Note    Patient Name: Chidi Castaneda  MRN: [id number]  Admission Date: 12/20/2021  Hospital Length of Stay: 3 days  Attending Provider: Kendall Gorman MD   Primary Care Physician: Primary Doctor No  Principal Problem:<principal problem not specified>    Subjective:     HPI: Chidi Castaneda is a 56 y.o. female w/ PMHx HTN, GERD s/p EGD 6/22/2021, migraines, ovarian cyst s/p cystectomy, and obesity who underwent an elective lap hysterectomy on 12/18/2021 at [facility] and was then transferred to [place], [place] for higher level of care when pt was found to have post-op somnolence, decrease PO intake, decrease urine output that progressed to anuria with a sharp rise in Cr from 0.9 to 2.8 (12/10). Pt was treated for sepsis with volume resuscitation and broad spectrum antibiotics, however pt continue to deteriorate and became tachycardic, hypotensive, and imaging showed an ileus. Pt was intubated since she was found to be acidotic with a ph of 7.28 despite a nonrebreather with 100%  FiO2  12/11 pt was taken back to the OR for washout and a bowel resection was done as she was found to have a small bowel perforation  12/15 pt was found to have a subscapular liver hematoma  12/18 pt was taken back to the OR for wound vacc exchange and removal of hematoma   12/19 general surgery team recommended no further surgical intervention since they did not find any active bleeding intraoperatively on 12/18 and felt that the ongoing bleeding was 2/2 consumptive coagulopathy and septic shock. They recommended to continue wound vacc and to consider higher level of care for pt  Pt was also being treated for a bacteremia as well as for intraabdominal infection, her antibiotics prior to transfer were: meropenem, flagyl, and vancomycin   Pt received tranexamic acid x1 and multiple units of blood products.    [place] [facility] [place] higher  "level of care: embolization for a subscapular hematoma       Nephrology was consulted for: "dialysis, SUSAN"    Pt does not have any h/o renal disease prior to hospitalization (per chart review and per pt's )   EDW: 88 kg   Post-op renal ultrasound was normal (results of ultrasound and other medical records from the outside hospital are in the chart at the bedside, needs to be scanned into chart)   Pt was started on CRRT on 12/12 via a trialysis catheter,   On 12/20/21 morning nephrology at outside hospital had recommended CRRT-SLED however primary team requested iHD prior to transfer to [facility], it appears that pt was ordered for 4 hours however, pt was prepped for transfer and was unable to complete full session 2/2 transfer, per pt's , pt had 2L of fluid removed instead of the planned 4 liters. Of note, while pt was at [facility] pt's CRRT required the use of citrate to prevent clotting (briefly), however that apparently resolved and was able to get a session w/o citrate and obviously was able to tolerate iHD prior to transfer.   Upon arrival to [facility] pt was started on zosyn, she was started on NS 125ml/hr, was transfused 2 units of PRBCs, and remained off vasopressors.       Interval History: net positive 1L fluid balance after SLED stopped last night around 6pm. fio2 stable and planned to return to OR though CXR with worse pulmonary edema, may need KRT on return from OR    Review of patient's allergies indicates:   Allergen Reactions    Oxycodone     Tylox [oxycodone-acetaminophen]      Current Facility-Administered Medications   Medication Frequency    0.9%  NaCl infusion (CRRT USE ONLY) Continuous    0.9%  NaCl infusion (for blood administration) Q24H PRN    0.9%  NaCl infusion (for blood administration) Q24H PRN    0.9%  NaCl infusion (for blood administration) Q24H PRN    0.9%  NaCl infusion (for blood administration) Q24H PRN    balsam peru-castor oiL Oint BID    dexmedetomidine " (PRECEDEX) 400mcg/100mL 0.9% NaCL infusion Continuous    dextrose 50% injection 12.5 g PRN    dextrose 50% injection 25 g PRN    famotidine (PF) injection 20 mg Daily    fentaNYL 50 mcg/mL injection 25 mcg Q1H PRN    glucagon (human recombinant) injection 1 mg PRN    heparin (porcine) injection 5,000 Units Q8H    insulin aspart U-100 pen 0-5 Units Q6H PRN    lipid (SMOFLIPID) (SMOFLIPID) 20 % infusion 250 mL Daily    lipid (SMOFLIPID) (SMOFLIPID) 20 % infusion 250 mL Daily    mupirocin 2 % ointment BID    nitroGLYCERIN 2% TD oint ointment 0.5 inch Q6H    NORepinephrine 4 mg in dextrose 5% 250 mL infusion (premix) (titrating) Continuous    piperacillin-tazobactam 4.5 g in sodium chloride 0.9% 100 mL IVPB (ready to mix system) Q12H    sodium chloride 0.9% flush 10 mL PRN    sodium chloride 0.9% flush 10 mL PRN    TPN ADULT CENTRAL LINE CUSTOM Continuous    TPN ADULT CENTRAL LINE CUSTOM Continuous       Objective:     Vital Signs (Most Recent):  Temp: 96.62 °F (35.9 °C) (12/23/21 0800)  Pulse: 61 (12/23/21 0800)  Resp: 16 (12/23/21 0800)  BP: 129/70 (12/23/21 0800)  SpO2: 98 % (12/23/21 0800)  O2 Device (Oxygen Therapy): ventilator (12/23/21 0811) Vital Signs (24h Range):  Temp:  [96.26 °F (35.7 °C)-100.4 °F (38 °C)] 96.62 °F (35.9 °C)  Pulse:  [] 61  Resp:  [0-32] 16  SpO2:  [95 %-99 %] 98 %  BP: ()/(50-70) 129/70  Arterial Line BP: ()/(46-70) 138/68     Weight: 113 kg (249 lb 1.9 oz) (12/23/21 0400)  Body mass index is 42.76 kg/m².  Body surface area is 2.26 meters squared.    I/O last 3 completed shifts:  In: 64217.4 [I.V.:5323.2; Blood:1534.3; IV Piggyback:4094.3]  Out: 9823 [Urine:22; Drains:1300; Other:8501]    Physical Exam  Constitutional:       Appearance: She is obese. She is ill-appearing.   HENT:      Mouth/Throat:      Mouth: Mucous membranes are moist.   Eyes:      Pupils: Pupils are equal, round, and reactive to light.   Cardiovascular:      Rate and Rhythm: Normal rate  and regular rhythm.   Pulmonary:      Comments: intubated  Abdominal:      Comments: open   Musculoskeletal:         General: No deformity.      Right lower leg: Edema present.      Left lower leg: Edema present.   Skin:     Coloration: Skin is not jaundiced.   Neurological:      General: No focal deficit present.         Significant Labs:  All labs within the past 24 hours have been reviewed.     Significant Imaging:  Labs: Reviewed  X-Ray: Reviewed    Assessment/Plan:     SUSAN (acute kidney injury)  -oliguric SUSAN, ischemic ATN with multifactorial etiology, however most likely d/t severe hypotension as a result of septic shock 2/2 small bowel perforation and bacteremia   -BL sCr 1  -KRT started at OSH 12/12/2021 for acidosis and volume overload  -still anuric on norepi  -net positive 1L last 24 hours with stable fio2 though worse CXR pulmonary edema  -may need resumption of KRT on return from OR    Metabolic acidosis  -controlled with KRT  -now with resp alkalosis    Bowel perforation  -per primary    Septic shock  -secondary to bowel perforation  -per primary      Subcapsular hematoma of liver  -per primary          Anthony Steven MD  Nephrology  [facility]

## 2021-12-23 NOTE — PROGRESS NOTES
Volume lost in OR. Ok to hold KRT this afternoon which was going to mainly be for UF. Discussed with Dr. Gorman surgical team. If thing change, please let us know.    Anthony Steven

## 2021-12-23 NOTE — ASSESSMENT & PLAN NOTE
55yo female transfer from OSH after hysterectomy on 12/8 complicated by delayed recognition of small bowel injury requiring resection (in discontinuity) and at some point new bleed from the liver - transferred with open abdomen for higher level of care.  S/p ex-lap, liver packing 12/21    - Plan for return to OR today  - Transfuse for Hgb <7. Correct coagulopathy  - Wean vasopressors  - Strict I/Os  - Appreciate nephrology assistance for CRRT  - Remainder of care per SICU

## 2021-12-23 NOTE — PROGRESS NOTES
[facility]  Critical Care - Surgery  Progress Note    Patient Name: Chidi Castaneda  MRN: [id number]  Admission Date: 12/20/2021  Hospital Length of Stay: 3 days  Code Status: Full Code  Attending Provider: Kendall Gorman MD  Primary Care Provider: Primary Doctor No   Principal Problem: <principal problem not specified>    Subjective:     Hospital/ICU Course:  No notes on file    Interval History/Significant Events: Hgb decreased to 6.9. Given 1U pRBC. Plan for return to OR today.    Follow-up For: Procedure(s) (LRB):  LAPAROTOMY, EXPLORATORY (N/A)  EVACUATION, HEMATOMA  WASHOUT  REPLACEMENT, WOUND VAC    Post-Operative Day: 2 Days Post-Op    Objective:     Vital Signs (Most Recent):  Temp: 98.06 °F (36.7 °C) (12/23/21 0545)  Pulse: 73 (12/23/21 0545)  Resp: 16 (12/23/21 0545)  BP: (!) 94/58 (12/23/21 0500)  SpO2: 98 % (12/23/21 0545) Vital Signs (24h Range):  Temp:  [96.26 °F (35.7 °C)-100.4 °F (38 °C)] 98.06 °F (36.7 °C)  Pulse:  [] 73  Resp:  [0-32] 16  SpO2:  [95 %-99 %] 98 %  BP: ()/(50-63) 94/58  Arterial Line BP: ()/(45-66) 112/65     Weight: 113 kg (249 lb 1.9 oz)  Body mass index is 42.76 kg/m².      Intake/Output Summary (Last 24 hours) at 12/23/2021 0701  Last data filed at 12/23/2021 0500  Gross per 24 hour   Intake 6465.1 ml   Output 5531 ml   Net 934.1 ml       Physical Exam  Vitals and nursing note reviewed.   Constitutional:       Appearance: She is obese. She is ill-appearing.   HENT:      Head: Normocephalic and atraumatic.      Nose: Nose normal.      Comments: NG tube in place  Eyes:      Extraocular Movements: Extraocular movements intact.   Neck:      Comments: Right IJ trialysis line  Cardiovascular:      Rate and Rhythm: Normal rate and regular rhythm.      Pulses: Normal pulses.   Pulmonary:      Comments: Intubated  Vent Mode: A/C  Oxygen Concentration (%):  (40-50) 50  Resp Rate Total:  (18 br/min-25 br/min) 18 br/min  Vt Set:  (395 mL-420 mL) 420  mL  PEEP/CPAP:  (5 cmH20-6 cmH20) 6 cmH20  Mean Airway Pressure:  (12 uzQ80-66 cmH20) 14 cmH20      Abdominal:      Comments: Abdomen is open. There is an abdominal wound vac in place to continuous suction draining serosanguinous to sanguinous output.   Musculoskeletal:      Right lower leg: Edema present.      Left lower leg: Edema present.      Comments: L radial larry, L subclavian triple lumen   Feet:      Comments: Dark skin discoloration in all toes. Likely from pressors. DP intact +2  Skin:     General: Skin is warm and dry.   Neurological:      Comments: Does not respond to commands  Will open eyes to stimulation  Withdraws from pain         Vents:  Vent Mode: A/C (12/23/21 0512)  Ventilator Initiated: Yes (12/20/21 1448)  Set Rate: 16 BPM (12/23/21 0512)  Vt Set: 420 mL (12/23/21 0512)  PEEP/CPAP: 6 cmH20 (12/23/21 0512)  Oxygen Concentration (%): 40 (12/23/21 0545)  Peak Airway Pressure: 25 cmH2O (12/23/21 0512)  Plateau Pressure: 23 cmH20 (12/23/21 0512)  Total Ve: 6.78 mL (12/23/21 0512)  Negative Inspiratory Force (cm H2O): 0 (12/23/21 0512)  F/VT Ratio<105 (RSBI): (!) 37.74 (12/23/21 0512)    Lines/Drains/Airways     Central Venous Catheter Line            Percutaneous Central Line Insertion/Assessment - Triple Lumen  12/16/21 1531 left subclavian 6 days    Trialysis (Dialysis) Catheter 12/21/21 0030 right internal jugular 2 days          Drain                 NG/OG Tube 12/16/21 0000 Left nostril 7 days         Urethral Catheter 12/20/21 1743 2 days          Airway                 Airway - Non-Surgical 12/20/21 1451 Endotracheal Tube 2 days          Arterial Line            Arterial Line 12/18/21 0000 Right Radial 5 days                Significant Labs:    CBC/Anemia Profile:  Recent Labs   Lab 12/22/21  1247 12/22/21  1247 12/22/21  2038 12/23/21  0024 12/23/21  0512   WBC 19.08*  --  13.32* 14.58*  --    HGB 8.4*  --  6.9* 8.5*  --    HCT 25.4*   < > 20.5* 25.1* 22*   *  --  114* 128*  --     MCV 94  --  91 93  --    RDW 17.0*  --  16.5* 15.9*  --     < > = values in this interval not displayed.        Chemistries:  Recent Labs   Lab 12/21/21  1045 12/21/21  1456 12/22/21  0400 12/22/21  0400 12/22/21  1400 12/22/21  2205 12/23/21  0358     139   < > 137  140   < > 139 140 139   K 5.1  5.1   < > 4.8  4.9   < > 4.3 4.2 4.2     108   < > 107  108   < > 107 108 105   CO2 23  23   < > 26  26   < > 28 25 24   BUN 22*  22*   < > 6  6   < > 6 13 21*   CREATININE 1.7*  1.7*   < > 0.6  0.6   < > 0.5 0.8 1.2   CALCIUM 7.5*  7.5*   < > 6.9*  6.9*   < > 6.9* 7.2* 7.0*   ALBUMIN 2.4*  2.4*   < > 1.7*  1.8*   < > 1.6* 1.6* 1.7*   PROT 4.0*  4.0*  --  3.6*  --   --   --  4.0*   BILITOT 5.4*  5.4*  --  4.2*  --   --   --  6.7*   ALKPHOS 163*  163*  --  118  --   --   --  91   *  262*  --  168*  --   --   --  96*   *  705*  --  315*  --   --   --  110*   MG  --    < > 1.9   < > 1.6 2.1 2.1   PHOS  --    < > 1.8*   < > 1.4* 3.1 2.5*    < > = values in this interval not displayed.       All pertinent labs within the past 24 hours have been reviewed.    Significant Imaging:  I have reviewed all pertinent imaging results/findings within the past 24 hours.    Assessment/Plan:     Subcapsular hematoma of liver    Neuro/Psych:   -- Does not respond to commands but opens eyes to stimulation and withdraws from pain  --Sedation/Pain: precedex PRN, remains on minimal dose 2/2 to vent agitation             Cards:   -- HDS with MAP goal > 65   -- Pressor requirements on arrival 0.03 vaso, levophed 0.06  -- current: Levo 0.02  -- ECHO with EF 55%  -- transfuse PRN      Pulm:   -- Goal O2 sat > 90%  -- Intubated, currently on 35/5, satting well.  -- SBT as indicated, after abd closure      Renal:  -- Keep billingsley for strict I/O  -- continue CRRT per nephrology  -- BUN/Cr 21/1.2  -- replete lytes PRN  -- keep euvolemic      FEN / GI:   -- Replace lytes as needed  -- Nutrition: strict  NPO, TPN  -- G tube likely soon  -- Elevated Tbili to 6.7  -- Plan to return to OR today.      ID:   -- Tm: afebrile; WBC 14.58  -- Abx - on zosyn      Heme/Onc:   -- Since hosptial admission on 12/10 has received 11u pRBC, 4u FFP, 1u platelets  -- H/H 6.9 this morning, transfuse  -- Daily CBC      Endo:   -- Gluc goal 140-180  -- no history of diabetes  -- SSI/accuchecks      PPx:   Feeding: NPO, TPN  Analgesia/Sedation: Precedex  Thromboembolic prevention: Heparin  HOB >30: yes  Stress Ulcer ppx: Famotidine  Glucose control: Critical care goal 140-180 g/dl, ISS    Lines/Drains/Airway: NG, Ng, L radial larry, R IJ trialysis, L subclavian triple lumen      Dispo/Code Status/Palliative:   -- SICU / Full Code          Critical secondary to Patient has a condition that poses threat to life and bodily function: Severe Respiratory Distress and Acute Renal Failure     Critical care was time spent personally by me on the following activities: development of treatment plan with patient or surrogate and bedside caregivers, discussions with consultants, evaluation of patient's response to treatment, examination of patient, ordering and performing treatments and interventions, ordering and review of laboratory studies, ordering and review of radiographic studies, pulse oximetry, re-evaluation of patient's condition.  This critical care time did not overlap with that of any other provider or involve time for any procedures.     Evan Cutler MD  Critical Care - Surgery  [facility]

## 2021-12-23 NOTE — SUBJECTIVE & OBJECTIVE
Interval History/Significant Events: Hgb decreased to 6.9. Given 1U pRBC. Plan for return to OR today.    Follow-up For: Procedure(s) (LRB):  LAPAROTOMY, EXPLORATORY (N/A)  EVACUATION, HEMATOMA  WASHOUT  REPLACEMENT, WOUND VAC    Post-Operative Day: 2 Days Post-Op    Objective:     Vital Signs (Most Recent):  Temp: 98.06 °F (36.7 °C) (12/23/21 0545)  Pulse: 73 (12/23/21 0545)  Resp: 16 (12/23/21 0545)  BP: (!) 94/58 (12/23/21 0500)  SpO2: 98 % (12/23/21 0545) Vital Signs (24h Range):  Temp:  [96.26 °F (35.7 °C)-100.4 °F (38 °C)] 98.06 °F (36.7 °C)  Pulse:  [] 73  Resp:  [0-32] 16  SpO2:  [95 %-99 %] 98 %  BP: ()/(50-63) 94/58  Arterial Line BP: ()/(45-66) 112/65     Weight: 113 kg (249 lb 1.9 oz)  Body mass index is 42.76 kg/m².      Intake/Output Summary (Last 24 hours) at 12/23/2021 0701  Last data filed at 12/23/2021 0500  Gross per 24 hour   Intake 6465.1 ml   Output 5531 ml   Net 934.1 ml       Physical Exam  Vitals and nursing note reviewed.   Constitutional:       Appearance: She is obese. She is ill-appearing.   HENT:      Head: Normocephalic and atraumatic.      Nose: Nose normal.      Comments: NG tube in place  Eyes:      Extraocular Movements: Extraocular movements intact.   Neck:      Comments: Right IJ trialysis line  Cardiovascular:      Rate and Rhythm: Normal rate and regular rhythm.      Pulses: Normal pulses.   Pulmonary:      Comments: Intubated  Vent Mode: A/C  Oxygen Concentration (%):  (40-50) 50  Resp Rate Total:  (18 br/min-25 br/min) 18 br/min  Vt Set:  (395 mL-420 mL) 420 mL  PEEP/CPAP:  (5 cmH20-6 cmH20) 6 cmH20  Mean Airway Pressure:  (12 spI45-19 cmH20) 14 cmH20      Abdominal:      Comments: Abdomen is open. There is an abdominal wound vac in place to continuous suction draining serosanguinous to sanguinous output.   Musculoskeletal:      Right lower leg: Edema present.      Left lower leg: Edema present.      Comments: L radial larry, L subclavian triple lumen    Feet:      Comments: Dark skin discoloration in all toes. Likely from pressors. DP intact +2  Skin:     General: Skin is warm and dry.   Neurological:      Comments: Does not respond to commands  Will open eyes to stimulation  Withdraws from pain         Vents:  Vent Mode: A/C (12/23/21 0512)  Ventilator Initiated: Yes (12/20/21 1448)  Set Rate: 16 BPM (12/23/21 0512)  Vt Set: 420 mL (12/23/21 0512)  PEEP/CPAP: 6 cmH20 (12/23/21 0512)  Oxygen Concentration (%): 40 (12/23/21 0545)  Peak Airway Pressure: 25 cmH2O (12/23/21 0512)  Plateau Pressure: 23 cmH20 (12/23/21 0512)  Total Ve: 6.78 mL (12/23/21 0512)  Negative Inspiratory Force (cm H2O): 0 (12/23/21 0512)  F/VT Ratio<105 (RSBI): (!) 37.74 (12/23/21 0512)    Lines/Drains/Airways     Central Venous Catheter Line            Percutaneous Central Line Insertion/Assessment - Triple Lumen  12/16/21 1531 left subclavian 6 days    Trialysis (Dialysis) Catheter 12/21/21 0030 right internal jugular 2 days          Drain                 NG/OG Tube 12/16/21 0000 Left nostril 7 days         Urethral Catheter 12/20/21 1743 2 days          Airway                 Airway - Non-Surgical 12/20/21 1451 Endotracheal Tube 2 days          Arterial Line            Arterial Line 12/18/21 0000 Right Radial 5 days                Significant Labs:    CBC/Anemia Profile:  Recent Labs   Lab 12/22/21  1247 12/22/21  1247 12/22/21  2038 12/23/21  0024 12/23/21  0512   WBC 19.08*  --  13.32* 14.58*  --    HGB 8.4*  --  6.9* 8.5*  --    HCT 25.4*   < > 20.5* 25.1* 22*   *  --  114* 128*  --    MCV 94  --  91 93  --    RDW 17.0*  --  16.5* 15.9*  --     < > = values in this interval not displayed.        Chemistries:  Recent Labs   Lab 12/21/21  1045 12/21/21  1456 12/22/21  0400 12/22/21  0400 12/22/21  1400 12/22/21  2205 12/23/21  0358     139   < > 137  140   < > 139 140 139   K 5.1  5.1   < > 4.8  4.9   < > 4.3 4.2 4.2     108   < > 107  108   < > 107 108 105   CO2  23  23   < > 26  26   < > 28 25 24   BUN 22*  22*   < > 6  6   < > 6 13 21*   CREATININE 1.7*  1.7*   < > 0.6  0.6   < > 0.5 0.8 1.2   CALCIUM 7.5*  7.5*   < > 6.9*  6.9*   < > 6.9* 7.2* 7.0*   ALBUMIN 2.4*  2.4*   < > 1.7*  1.8*   < > 1.6* 1.6* 1.7*   PROT 4.0*  4.0*  --  3.6*  --   --   --  4.0*   BILITOT 5.4*  5.4*  --  4.2*  --   --   --  6.7*   ALKPHOS 163*  163*  --  118  --   --   --  91   *  262*  --  168*  --   --   --  96*   *  705*  --  315*  --   --   --  110*   MG  --    < > 1.9   < > 1.6 2.1 2.1   PHOS  --    < > 1.8*   < > 1.4* 3.1 2.5*    < > = values in this interval not displayed.       All pertinent labs within the past 24 hours have been reviewed.    Significant Imaging:  I have reviewed all pertinent imaging results/findings within the past 24 hours.

## 2021-12-23 NOTE — BRIEF OP NOTE
[facility]  Brief Operative Note    SUMMARY     Surgery Date: 12/23/2021     Surgeon(s) and Role:     * Kendall Gorman MD - Primary     * Víctor Valderrama MD - Resident - Assisting    Pre-op Diagnosis:  Subcapsular hematoma of liver [K76.89]    Post-op Diagnosis:  Post-Op Diagnosis Codes:     * Subcapsular hematoma of liver [K76.89]    Procedure(s) (LRB):  1. Re-opening of recent laparotomy  2. Evacuation of hematoma  3. Open cholecystectomy  4. Gastrostomy tube insertion using 20Fr billingsley   5. Application wound vac    Anesthesia: General    Operative Findings: Moderate amount of hematoma around liver. Liver parenchyma congested and friable  Coagulopathic bleeding   2 ollie drains in RUQ, 1 in subcutaneous space  Abdominal fascia closed with internal retention sutures  Wound vac applied to skin    Estimated Blood Loss: 500 mL    Estimated Blood Loss has been documented.         Specimens:   Specimen (24h ago, onward)             Start     Ordered    12/23/21 1345  Specimen to Pathology, Surgery General Surgery  Once        Comments: Pre-op Diagnosis: Subcapsular hematoma of liver [K76.89]    Procedure(s):  LAPAROTOMY, EXPLORATORY  INSERTION, GASTROSTOMY TUBE, PERCUTANEOUS     Number of specimens: 2    Name of specimens:  1. Gallbladder (Perm)  2. Lap Sponge Packing (Gross) 7 total   References:    Click here for ordering Quick Tip   Question Answer Comment   Procedure Type: General Surgery    Specimen Class: Routine/Screening        12/23/21 1346                [id number]

## 2021-12-23 NOTE — ASSESSMENT & PLAN NOTE
  Neuro/Psych:   -- Does not respond to commands but opens eyes to stimulation and withdraws from pain  --Sedation/Pain: precedex PRN, remains on minimal dose 2/2 to vent agitation             Cards:   -- HDS with MAP goal > 65   -- Pressor requirements on arrival 0.03 vaso, levophed 0.06  -- current: Levo 0.02  -- ECHO with EF 55%  -- transfuse PRN      Pulm:   -- Goal O2 sat > 90%  -- Intubated, currently on 35/5, satting well.  -- SBT as indicated, after abd closure      Renal:  -- Keep billingsley for strict I/O  -- continue CRRT per nephrology  -- BUN/Cr 21/1.2  -- replete lytes PRN  -- keep euvolemic      FEN / GI:   -- Replace lytes as needed  -- Nutrition: strict NPO, TPN  -- G tube likely soon  -- Elevated Tbili to 6.7  -- Plan to return to OR today.      ID:   -- Tm: afebrile; WBC 14.58  -- Abx - on zosyn      Heme/Onc:   -- Since hosptial admission on 12/10 has received 11u pRBC, 4u FFP, 1u platelets  -- H/H 6.9 this morning, transfuse  -- Daily CBC      Endo:   -- Gluc goal 140-180  -- no history of diabetes  -- SSI/accuchecks      PPx:   Feeding: NPO, TPN  Analgesia/Sedation: Precedex  Thromboembolic prevention: Heparin  HOB >30: yes  Stress Ulcer ppx: Famotidine  Glucose control: Critical care goal 140-180 g/dl, ISS    Lines/Drains/Airway: NG, Hernandez, L radial larry, R IJ trialysis, L subclavian triple lumen      Dispo/Code Status/Palliative:   -- SICU / Full Code

## 2021-12-23 NOTE — ASSESSMENT & PLAN NOTE
-oliguric SUSAN, ischemic ATN with multifactorial etiology, however most likely d/t severe hypotension as a result of septic shock 2/2 small bowel perforation and bacteremia   -BL sCr 1  -KRT started at OSH 12/12/2021 for acidosis and volume overload  -still anuric on norepi  -net positive 1L last 24 hours with stable fio2 though worse CXR pulmonary edema  -may need resumption of KRT on return from OR

## 2021-12-24 LAB
ALBUMIN SERPL BCP-MCNC: 1.5 G/DL (ref 3.5–5.2)
ALBUMIN SERPL BCP-MCNC: 1.6 G/DL (ref 3.5–5.2)
ALBUMIN SERPL BCP-MCNC: 1.6 G/DL (ref 3.5–5.2)
ALLENS TEST: ABNORMAL
ALP SERPL-CCNC: 57 U/L (ref 55–135)
ALT SERPL W/O P-5'-P-CCNC: 73 U/L (ref 10–44)
ANION GAP SERPL CALC-SCNC: 6 MMOL/L (ref 8–16)
ANION GAP SERPL CALC-SCNC: 7 MMOL/L (ref 8–16)
ANION GAP SERPL CALC-SCNC: 8 MMOL/L (ref 8–16)
ANISOCYTOSIS BLD QL SMEAR: SLIGHT
AST SERPL-CCNC: 142 U/L (ref 10–40)
BASO STIPL BLD QL SMEAR: ABNORMAL
BASO STIPL BLD QL SMEAR: ABNORMAL
BASOPHILS # BLD AUTO: 0.06 K/UL (ref 0–0.2)
BASOPHILS # BLD AUTO: 0.06 K/UL (ref 0–0.2)
BASOPHILS # BLD AUTO: ABNORMAL K/UL (ref 0–0.2)
BASOPHILS NFR BLD: 0 % (ref 0–1.9)
BASOPHILS NFR BLD: 0.4 % (ref 0–1.9)
BASOPHILS NFR BLD: 0.5 % (ref 0–1.9)
BASOPHILS NFR BLD: 1 % (ref 0–1.9)
BILIRUB SERPL-MCNC: 4.8 MG/DL (ref 0.1–1)
BUN SERPL-MCNC: 13 MG/DL (ref 6–20)
BUN SERPL-MCNC: 42 MG/DL (ref 6–20)
BUN SERPL-MCNC: 42 MG/DL (ref 6–20)
CALCIUM SERPL-MCNC: 7.2 MG/DL (ref 8.7–10.5)
CALCIUM SERPL-MCNC: 7.3 MG/DL (ref 8.7–10.5)
CALCIUM SERPL-MCNC: 7.5 MG/DL (ref 8.7–10.5)
CHLORIDE SERPL-SCNC: 105 MMOL/L (ref 95–110)
CHLORIDE SERPL-SCNC: 106 MMOL/L (ref 95–110)
CHLORIDE SERPL-SCNC: 107 MMOL/L (ref 95–110)
CO2 SERPL-SCNC: 24 MMOL/L (ref 23–29)
CO2 SERPL-SCNC: 24 MMOL/L (ref 23–29)
CO2 SERPL-SCNC: 26 MMOL/L (ref 23–29)
CREAT SERPL-MCNC: 0.7 MG/DL (ref 0.5–1.4)
CREAT SERPL-MCNC: 2 MG/DL (ref 0.5–1.4)
CREAT SERPL-MCNC: 2.1 MG/DL (ref 0.5–1.4)
DACRYOCYTES BLD QL SMEAR: ABNORMAL
DELSYS: ABNORMAL
DIFFERENTIAL METHOD: ABNORMAL
EOSINOPHIL # BLD AUTO: 0.2 K/UL (ref 0–0.5)
EOSINOPHIL # BLD AUTO: 0.2 K/UL (ref 0–0.5)
EOSINOPHIL # BLD AUTO: ABNORMAL K/UL (ref 0–0.5)
EOSINOPHIL NFR BLD: 0 % (ref 0–8)
EOSINOPHIL NFR BLD: 0.5 % (ref 0–8)
EOSINOPHIL NFR BLD: 0.7 % (ref 0–8)
EOSINOPHIL NFR BLD: 1 % (ref 0–8)
EOSINOPHIL NFR BLD: 1.1 % (ref 0–8)
EOSINOPHIL NFR BLD: 1.5 % (ref 0–8)
ERYTHROCYTE [DISTWIDTH] IN BLOOD BY AUTOMATED COUNT: 14.6 % (ref 11.5–14.5)
ERYTHROCYTE [DISTWIDTH] IN BLOOD BY AUTOMATED COUNT: 14.6 % (ref 11.5–14.5)
ERYTHROCYTE [DISTWIDTH] IN BLOOD BY AUTOMATED COUNT: 15 % (ref 11.5–14.5)
ERYTHROCYTE [DISTWIDTH] IN BLOOD BY AUTOMATED COUNT: 15.3 % (ref 11.5–14.5)
ERYTHROCYTE [DISTWIDTH] IN BLOOD BY AUTOMATED COUNT: 15.4 % (ref 11.5–14.5)
ERYTHROCYTE [DISTWIDTH] IN BLOOD BY AUTOMATED COUNT: 15.4 % (ref 11.5–14.5)
ERYTHROCYTE [SEDIMENTATION RATE] IN BLOOD BY WESTERGREN METHOD: 16 MM/H
EST. GFR  (AFRICAN AMERICAN): 30.1 ML/MIN/1.73 M^2
EST. GFR  (AFRICAN AMERICAN): 31.9 ML/MIN/1.73 M^2
EST. GFR  (AFRICAN AMERICAN): >60 ML/MIN/1.73 M^2
EST. GFR  (NON AFRICAN AMERICAN): 26.1 ML/MIN/1.73 M^2
EST. GFR  (NON AFRICAN AMERICAN): 27.7 ML/MIN/1.73 M^2
EST. GFR  (NON AFRICAN AMERICAN): >60 ML/MIN/1.73 M^2
FIO2: 35
GIANT PLATELETS BLD QL SMEAR: PRESENT
GLUCOSE SERPL-MCNC: 132 MG/DL (ref 70–110)
GLUCOSE SERPL-MCNC: 133 MG/DL (ref 70–110)
GLUCOSE SERPL-MCNC: 135 MG/DL (ref 70–110)
HCO3 UR-SCNC: 23.9 MMOL/L (ref 24–28)
HCT VFR BLD AUTO: 21.9 % (ref 37–48.5)
HCT VFR BLD AUTO: 21.9 % (ref 37–48.5)
HCT VFR BLD AUTO: 22.2 % (ref 37–48.5)
HCT VFR BLD AUTO: 23.4 % (ref 37–48.5)
HCT VFR BLD AUTO: 25 % (ref 37–48.5)
HCT VFR BLD AUTO: 26.4 % (ref 37–48.5)
HGB BLD-MCNC: 7.5 G/DL (ref 12–16)
HGB BLD-MCNC: 7.6 G/DL (ref 12–16)
HGB BLD-MCNC: 7.7 G/DL (ref 12–16)
HGB BLD-MCNC: 8.2 G/DL (ref 12–16)
HGB BLD-MCNC: 8.7 G/DL (ref 12–16)
HGB BLD-MCNC: 9.3 G/DL (ref 12–16)
HYPOCHROMIA BLD QL SMEAR: ABNORMAL
IMM GRANULOCYTES # BLD AUTO: 0.54 K/UL (ref 0–0.04)
IMM GRANULOCYTES # BLD AUTO: 0.6 K/UL (ref 0–0.04)
IMM GRANULOCYTES # BLD AUTO: ABNORMAL K/UL (ref 0–0.04)
IMM GRANULOCYTES NFR BLD AUTO: 4.2 % (ref 0–0.5)
IMM GRANULOCYTES NFR BLD AUTO: 4.8 % (ref 0–0.5)
IMM GRANULOCYTES NFR BLD AUTO: ABNORMAL % (ref 0–0.5)
LACTATE SERPL-SCNC: 1.7 MMOL/L (ref 0.5–2.2)
LYMPHOCYTES # BLD AUTO: 1.1 K/UL (ref 1–4.8)
LYMPHOCYTES # BLD AUTO: 1.2 K/UL (ref 1–4.8)
LYMPHOCYTES # BLD AUTO: ABNORMAL K/UL (ref 1–4.8)
LYMPHOCYTES NFR BLD: 14.5 % (ref 18–48)
LYMPHOCYTES NFR BLD: 4.7 % (ref 18–48)
LYMPHOCYTES NFR BLD: 5 % (ref 18–48)
LYMPHOCYTES NFR BLD: 8 % (ref 18–48)
LYMPHOCYTES NFR BLD: 8.1 % (ref 18–48)
LYMPHOCYTES NFR BLD: 9.6 % (ref 18–48)
MAGNESIUM SERPL-MCNC: 1.7 MG/DL (ref 1.6–2.6)
MAGNESIUM SERPL-MCNC: 1.8 MG/DL (ref 1.6–2.6)
MAGNESIUM SERPL-MCNC: 1.9 MG/DL (ref 1.6–2.6)
MCH RBC QN AUTO: 30 PG (ref 27–31)
MCH RBC QN AUTO: 30.2 PG (ref 27–31)
MCH RBC QN AUTO: 30.4 PG (ref 27–31)
MCH RBC QN AUTO: 30.5 PG (ref 27–31)
MCH RBC QN AUTO: 30.7 PG (ref 27–31)
MCH RBC QN AUTO: 30.8 PG (ref 27–31)
MCHC RBC AUTO-ENTMCNC: 34.2 G/DL (ref 32–36)
MCHC RBC AUTO-ENTMCNC: 34.7 G/DL (ref 32–36)
MCHC RBC AUTO-ENTMCNC: 34.7 G/DL (ref 32–36)
MCHC RBC AUTO-ENTMCNC: 34.8 G/DL (ref 32–36)
MCHC RBC AUTO-ENTMCNC: 35 G/DL (ref 32–36)
MCHC RBC AUTO-ENTMCNC: 35.2 G/DL (ref 32–36)
MCV RBC AUTO: 87 FL (ref 82–98)
MCV RBC AUTO: 87 FL (ref 82–98)
MCV RBC AUTO: 88 FL (ref 82–98)
METAMYELOCYTES NFR BLD MANUAL: 0.7 %
METAMYELOCYTES NFR BLD MANUAL: 1 %
METAMYELOCYTES NFR BLD MANUAL: 3 %
MODE: ABNORMAL
MONOCYTES # BLD AUTO: 0.8 K/UL (ref 0.3–1)
MONOCYTES # BLD AUTO: 0.9 K/UL (ref 0.3–1)
MONOCYTES # BLD AUTO: ABNORMAL K/UL (ref 0.3–1)
MONOCYTES NFR BLD: 2 % (ref 4–15)
MONOCYTES NFR BLD: 4.6 % (ref 4–15)
MONOCYTES NFR BLD: 6.5 % (ref 4–15)
MONOCYTES NFR BLD: 7.3 % (ref 4–15)
MONOCYTES NFR BLD: 8 % (ref 4–15)
MONOCYTES NFR BLD: 8 % (ref 4–15)
MYELOCYTES NFR BLD MANUAL: 0.7 %
MYELOCYTES NFR BLD MANUAL: 1 %
MYELOCYTES NFR BLD MANUAL: 2 %
NEUTROPHILS # BLD AUTO: 11.4 K/UL (ref 1.8–7.7)
NEUTROPHILS # BLD AUTO: 8.6 K/UL (ref 1.8–7.7)
NEUTROPHILS # BLD AUTO: ABNORMAL K/UL (ref 1.8–7.7)
NEUTROPHILS NFR BLD: 73.5 % (ref 38–73)
NEUTROPHILS NFR BLD: 76.3 % (ref 38–73)
NEUTROPHILS NFR BLD: 78 % (ref 38–73)
NEUTROPHILS NFR BLD: 79.7 % (ref 38–73)
NEUTROPHILS NFR BLD: 82.6 % (ref 38–73)
NEUTROPHILS NFR BLD: 89 % (ref 38–73)
NEUTS BAND NFR BLD MANUAL: 1 %
NEUTS BAND NFR BLD MANUAL: 1 %
NEUTS BAND NFR BLD MANUAL: 2.5 %
NEUTS BAND NFR BLD MANUAL: 6 %
NRBC BLD-RTO: 0 /100 WBC
NRBC BLD-RTO: 1 /100 WBC
NRBC BLD-RTO: 1 /100 WBC
OVALOCYTES BLD QL SMEAR: ABNORMAL
PCO2 BLDA: 30.9 MMHG (ref 35–45)
PEEP: 5
PH SMN: 7.5 [PH] (ref 7.35–7.45)
PHOSPHATE SERPL-MCNC: 1.5 MG/DL (ref 2.7–4.5)
PHOSPHATE SERPL-MCNC: 2.3 MG/DL (ref 2.7–4.5)
PHOSPHATE SERPL-MCNC: 3.3 MG/DL (ref 2.7–4.5)
PLATELET # BLD AUTO: 106 K/UL (ref 150–450)
PLATELET # BLD AUTO: 63 K/UL (ref 150–450)
PLATELET # BLD AUTO: 76 K/UL (ref 150–450)
PLATELET # BLD AUTO: 82 K/UL (ref 150–450)
PLATELET # BLD AUTO: 86 K/UL (ref 150–450)
PLATELET # BLD AUTO: 93 K/UL (ref 150–450)
PLATELET BLD QL SMEAR: ABNORMAL
PMV BLD AUTO: 12.7 FL (ref 9.2–12.9)
PMV BLD AUTO: 13 FL (ref 9.2–12.9)
PMV BLD AUTO: 13.4 FL (ref 9.2–12.9)
PMV BLD AUTO: 13.5 FL (ref 9.2–12.9)
PMV BLD AUTO: 13.8 FL (ref 9.2–12.9)
PMV BLD AUTO: 13.9 FL (ref 9.2–12.9)
PO2 BLDA: 55 MMHG (ref 80–100)
POC BE: 1 MMOL/L
POC SATURATED O2: 91 % (ref 95–100)
POC TCO2: 25 MMOL/L (ref 23–27)
POCT GLUCOSE: 149 MG/DL (ref 70–110)
POCT GLUCOSE: 154 MG/DL (ref 70–110)
POCT GLUCOSE: 164 MG/DL (ref 70–110)
POIKILOCYTOSIS BLD QL SMEAR: SLIGHT
POLYCHROMASIA BLD QL SMEAR: ABNORMAL
POTASSIUM SERPL-SCNC: 4.2 MMOL/L (ref 3.5–5.1)
POTASSIUM SERPL-SCNC: 4.4 MMOL/L (ref 3.5–5.1)
POTASSIUM SERPL-SCNC: 4.8 MMOL/L (ref 3.5–5.1)
PROT SERPL-MCNC: 3.6 G/DL (ref 6–8.4)
RBC # BLD AUTO: 2.48 M/UL (ref 4–5.4)
RBC # BLD AUTO: 2.53 M/UL (ref 4–5.4)
RBC # BLD AUTO: 2.53 M/UL (ref 4–5.4)
RBC # BLD AUTO: 2.67 M/UL (ref 4–5.4)
RBC # BLD AUTO: 2.85 M/UL (ref 4–5.4)
RBC # BLD AUTO: 3.02 M/UL (ref 4–5.4)
SAMPLE: ABNORMAL
SITE: ABNORMAL
SODIUM SERPL-SCNC: 137 MMOL/L (ref 136–145)
SODIUM SERPL-SCNC: 138 MMOL/L (ref 136–145)
SODIUM SERPL-SCNC: 138 MMOL/L (ref 136–145)
VT: 420
WBC # BLD AUTO: 10.82 K/UL (ref 3.9–12.7)
WBC # BLD AUTO: 11.23 K/UL (ref 3.9–12.7)
WBC # BLD AUTO: 11.42 K/UL (ref 3.9–12.7)
WBC # BLD AUTO: 11.96 K/UL (ref 3.9–12.7)
WBC # BLD AUTO: 13.82 K/UL (ref 3.9–12.7)
WBC # BLD AUTO: 14.34 K/UL (ref 3.9–12.7)

## 2021-12-24 PROCEDURE — 25000003 PHARM REV CODE 250: Performed by: STUDENT IN AN ORGANIZED HEALTH CARE EDUCATION/TRAINING PROGRAM

## 2021-12-24 PROCEDURE — 63600175 PHARM REV CODE 636 W HCPCS: Performed by: STUDENT IN AN ORGANIZED HEALTH CARE EDUCATION/TRAINING PROGRAM

## 2021-12-24 PROCEDURE — 99233 PR SUBSEQUENT HOSPITAL CARE,LEVL III: ICD-10-PCS | Mod: 24,,, | Performed by: SURGERY

## 2021-12-24 PROCEDURE — 83605 ASSAY OF LACTIC ACID: CPT | Performed by: STUDENT IN AN ORGANIZED HEALTH CARE EDUCATION/TRAINING PROGRAM

## 2021-12-24 PROCEDURE — 85007 BL SMEAR W/DIFF WBC COUNT: CPT | Mod: 91 | Performed by: STUDENT IN AN ORGANIZED HEALTH CARE EDUCATION/TRAINING PROGRAM

## 2021-12-24 PROCEDURE — 25000003 PHARM REV CODE 250: Performed by: SURGERY

## 2021-12-24 PROCEDURE — 85027 COMPLETE CBC AUTOMATED: CPT | Performed by: SURGERY

## 2021-12-24 PROCEDURE — 90945 PR DIALYSIS, NOT HEMO, 1 EVAL: ICD-10-PCS | Mod: ,,, | Performed by: INTERNAL MEDICINE

## 2021-12-24 PROCEDURE — 63600175 PHARM REV CODE 636 W HCPCS: Performed by: INTERNAL MEDICINE

## 2021-12-24 PROCEDURE — 99900035 HC TECH TIME PER 15 MIN (STAT)

## 2021-12-24 PROCEDURE — 27000221 HC OXYGEN, UP TO 24 HOURS

## 2021-12-24 PROCEDURE — 27200966 HC CLOSED SUCTION SYSTEM

## 2021-12-24 PROCEDURE — 85025 COMPLETE CBC W/AUTO DIFF WBC: CPT | Performed by: SURGERY

## 2021-12-24 PROCEDURE — B4185 PARENTERAL SOL 10 GM LIPIDS: HCPCS | Performed by: SURGERY

## 2021-12-24 PROCEDURE — 83735 ASSAY OF MAGNESIUM: CPT | Performed by: STUDENT IN AN ORGANIZED HEALTH CARE EDUCATION/TRAINING PROGRAM

## 2021-12-24 PROCEDURE — 85027 COMPLETE CBC AUTOMATED: CPT | Mod: 91 | Performed by: STUDENT IN AN ORGANIZED HEALTH CARE EDUCATION/TRAINING PROGRAM

## 2021-12-24 PROCEDURE — 82803 BLOOD GASES ANY COMBINATION: CPT

## 2021-12-24 PROCEDURE — 99900026 HC AIRWAY MAINTENANCE (STAT)

## 2021-12-24 PROCEDURE — 20000000 HC ICU ROOM

## 2021-12-24 PROCEDURE — 94003 VENT MGMT INPAT SUBQ DAY: CPT

## 2021-12-24 PROCEDURE — 85007 BL SMEAR W/DIFF WBC COUNT: CPT | Mod: 91 | Performed by: SURGERY

## 2021-12-24 PROCEDURE — 80069 RENAL FUNCTION PANEL: CPT | Mod: 91 | Performed by: INTERNAL MEDICINE

## 2021-12-24 PROCEDURE — A4217 STERILE WATER/SALINE, 500 ML: HCPCS | Performed by: SURGERY

## 2021-12-24 PROCEDURE — 37799 UNLISTED PX VASCULAR SURGERY: CPT

## 2021-12-24 PROCEDURE — 83735 ASSAY OF MAGNESIUM: CPT | Mod: 91 | Performed by: INTERNAL MEDICINE

## 2021-12-24 PROCEDURE — 63600175 PHARM REV CODE 636 W HCPCS: Performed by: SURGERY

## 2021-12-24 PROCEDURE — 90945 DIALYSIS ONE EVALUATION: CPT | Mod: ,,, | Performed by: INTERNAL MEDICINE

## 2021-12-24 PROCEDURE — 90945 DIALYSIS ONE EVALUATION: CPT

## 2021-12-24 PROCEDURE — 80053 COMPREHEN METABOLIC PANEL: CPT | Performed by: STUDENT IN AN ORGANIZED HEALTH CARE EDUCATION/TRAINING PROGRAM

## 2021-12-24 PROCEDURE — 94761 N-INVAS EAR/PLS OXIMETRY MLT: CPT

## 2021-12-24 PROCEDURE — 84100 ASSAY OF PHOSPHORUS: CPT | Performed by: STUDENT IN AN ORGANIZED HEALTH CARE EDUCATION/TRAINING PROGRAM

## 2021-12-24 PROCEDURE — 99233 SBSQ HOSP IP/OBS HIGH 50: CPT | Mod: 24,,, | Performed by: SURGERY

## 2021-12-24 RX ORDER — FENTANYL CITRATE-0.9 % NACL/PF 10 MCG/ML
0-200 PLASTIC BAG, INJECTION (ML) INTRAVENOUS CONTINUOUS
Status: DISCONTINUED | OUTPATIENT
Start: 2021-12-24 | End: 2021-12-27

## 2021-12-24 RX ORDER — MAGNESIUM SULFATE HEPTAHYDRATE 40 MG/ML
2 INJECTION, SOLUTION INTRAVENOUS
Status: DISPENSED | OUTPATIENT
Start: 2021-12-24 | End: 2021-12-25

## 2021-12-24 RX ORDER — SODIUM,POTASSIUM PHOSPHATES 280-250MG
2 POWDER IN PACKET (EA) ORAL
Status: DISCONTINUED | OUTPATIENT
Start: 2021-12-25 | End: 2021-12-27

## 2021-12-24 RX ORDER — HYDROMORPHONE HYDROCHLORIDE 1 MG/ML
0.5 INJECTION, SOLUTION INTRAMUSCULAR; INTRAVENOUS; SUBCUTANEOUS ONCE
Status: COMPLETED | OUTPATIENT
Start: 2021-12-24 | End: 2021-12-24

## 2021-12-24 RX ORDER — FENTANYL CITRATE 50 UG/ML
50 INJECTION, SOLUTION INTRAMUSCULAR; INTRAVENOUS
Status: DISCONTINUED | OUTPATIENT
Start: 2021-12-24 | End: 2021-12-24

## 2021-12-24 RX ADMIN — FENTANYL CITRATE 50 MCG: 50 INJECTION INTRAMUSCULAR; INTRAVENOUS at 05:12

## 2021-12-24 RX ADMIN — VASOPRESSIN 0.04 UNITS/MIN: 20 INJECTION INTRAVENOUS at 02:12

## 2021-12-24 RX ADMIN — Medication: at 08:12

## 2021-12-24 RX ADMIN — SODIUM CHLORIDE: 0.9 INJECTION, SOLUTION INTRAVENOUS at 05:12

## 2021-12-24 RX ADMIN — SODIUM CHLORIDE: 0.9 INJECTION, SOLUTION INTRAVENOUS at 12:12

## 2021-12-24 RX ADMIN — NITROGLYCERIN 0.5 INCH: 20 OINTMENT TOPICAL at 11:12

## 2021-12-24 RX ADMIN — MAGNESIUM SULFATE 2 G: 2 INJECTION INTRAVENOUS at 11:12

## 2021-12-24 RX ADMIN — FENTANYL CITRATE 50 MCG: 50 INJECTION INTRAMUSCULAR; INTRAVENOUS at 08:12

## 2021-12-24 RX ADMIN — VASOPRESSIN 0.04 UNITS/MIN: 20 INJECTION INTRAVENOUS at 05:12

## 2021-12-24 RX ADMIN — Medication 25 MCG/HR: at 02:12

## 2021-12-24 RX ADMIN — HYDROMORPHONE HYDROCHLORIDE 0.5 MG: 1 INJECTION, SOLUTION INTRAMUSCULAR; INTRAVENOUS; SUBCUTANEOUS at 11:12

## 2021-12-24 RX ADMIN — FENTANYL CITRATE 50 MCG: 50 INJECTION INTRAMUSCULAR; INTRAVENOUS at 10:12

## 2021-12-24 RX ADMIN — HEPARIN SODIUM 5000 UNITS: 5000 INJECTION INTRAVENOUS; SUBCUTANEOUS at 05:12

## 2021-12-24 RX ADMIN — DEXMEDETOMIDINE HYDROCHLORIDE 1.4 MCG/KG/HR: 4 INJECTION INTRAVENOUS at 01:12

## 2021-12-24 RX ADMIN — SMOFLIPID 250 ML: 6; 6; 5; 3 INJECTION, EMULSION INTRAVENOUS at 09:12

## 2021-12-24 RX ADMIN — DEXMEDETOMIDINE HYDROCHLORIDE 1.4 MCG/KG/HR: 4 INJECTION INTRAVENOUS at 07:12

## 2021-12-24 RX ADMIN — FAMOTIDINE 20 MG: 10 INJECTION, SOLUTION INTRAVENOUS at 08:12

## 2021-12-24 RX ADMIN — PIPERACILLIN SODIUM AND TAZOBACTAM SODIUM 4.5 G: 4; .5 INJECTION, POWDER, FOR SOLUTION INTRAVENOUS at 07:12

## 2021-12-24 RX ADMIN — NITROGLYCERIN 0.5 INCH: 20 OINTMENT TOPICAL at 05:12

## 2021-12-24 RX ADMIN — NOREPINEPHRINE BITARTRATE 0.01 MCG/KG/MIN: 4 INJECTION, SOLUTION INTRAVENOUS at 02:12

## 2021-12-24 RX ADMIN — DEXMEDETOMIDINE HYDROCHLORIDE 1 MCG/KG/HR: 4 INJECTION INTRAVENOUS at 01:12

## 2021-12-24 RX ADMIN — PIPERACILLIN SODIUM AND TAZOBACTAM SODIUM 4.5 G: 4; .5 INJECTION, POWDER, FOR SOLUTION INTRAVENOUS at 08:12

## 2021-12-24 RX ADMIN — MUPIROCIN: 20 OINTMENT TOPICAL at 08:12

## 2021-12-24 RX ADMIN — FENTANYL CITRATE 25 MCG: 50 INJECTION INTRAMUSCULAR; INTRAVENOUS at 02:12

## 2021-12-24 RX ADMIN — HEPARIN SODIUM 5000 UNITS: 5000 INJECTION INTRAVENOUS; SUBCUTANEOUS at 01:12

## 2021-12-24 RX ADMIN — FENTANYL CITRATE 50 MCG: 50 INJECTION INTRAMUSCULAR; INTRAVENOUS at 04:12

## 2021-12-24 RX ADMIN — DEXMEDETOMIDINE HYDROCHLORIDE 1.4 MCG/KG/HR: 4 INJECTION INTRAVENOUS at 04:12

## 2021-12-24 RX ADMIN — HEPARIN SODIUM 5000 UNITS: 5000 INJECTION INTRAVENOUS; SUBCUTANEOUS at 09:12

## 2021-12-24 RX ADMIN — CALCIUM GLUCONATE: 98 INJECTION, SOLUTION INTRAVENOUS at 09:12

## 2021-12-24 RX ADMIN — FENTANYL CITRATE 50 MCG: 50 INJECTION INTRAMUSCULAR; INTRAVENOUS at 01:12

## 2021-12-24 RX ADMIN — DEXMEDETOMIDINE HYDROCHLORIDE 1.4 MCG/KG/HR: 4 INJECTION INTRAVENOUS at 10:12

## 2021-12-24 NOTE — PLAN OF CARE
Pt VSS w/ need of Vaso gtt. Sporadic SBTs completed- pt unable to maintain so stayed on ACVC throughout shift w/ FiO2 of 50% & 5P. CRRT (SLED) started ~ 1245 w/ 300 UF & tolerating. NSR. Afebrile. Vaso, Precedex, Fentanyl gtts. Sedated/intubated but able to follow commands. UOP 10cc/shift. REAGAN 1- 95, REAGAN 2- 170, REAGAN 3- 0, Wound Vac- 375, & Gtube- 5. Wound care as ordered. Accuchecks and CBCs q6. Plan to wean pressors and get anxiety/pain controlled in order to extubate. POC reviewed w/ pt and pts spouse/family members @ bedside. Questions encouraged and answered. Emotional support provided

## 2021-12-24 NOTE — PROGRESS NOTES
[facility]  General Surgery  Progress Note    Subjective:     History of Present Illness:  No notes on file    Post-Op Info:  Procedure(s) (LRB):  LAPAROTOMY, EXPLORATORY (N/A)  INSERTION, GASTROSTOMY TUBE, PERCUTANEOUS (N/A)  CHOLECYSTECTOMY  EGD (ESOPHAGOGASTRODUODENOSCOPY) (N/A)   1 Day Post-Op     Interval History: NAEON. Abdomen closed yesterday in OR.  On 0.04 vaso.  Low vent settings this morning.  Still making little to no urine.   H/H downt o 8/7/25 this morning.      Medications:  Continuous Infusions:   sodium chloride 0.9% Stopped (12/22/21 1745)    sodium chloride 0.9% Stopped (12/24/21 0447)    dexmedetomidine (PRECEDEX) infusion 1.4 mcg/kg/hr (12/24/21 0724)    NORepinephrine bitartrate-D5W Stopped (12/23/21 2226)    TPN ADULT CENTRAL LINE CUSTOM 40 mL/hr at 12/24/21 0700    vasopressin 0.04 Units/min (12/24/21 0700)     Scheduled Meds:   balsam peru-castor oiL   Topical (Top) BID    famotidine (PF)  20 mg Intravenous Daily    heparin (porcine)  5,000 Units Subcutaneous Q8H    lipid (SMOFLIPID)  250 mL Intravenous Daily    mupirocin   Nasal BID    nitroGLYCERIN 2% TD oint  0.5 inch Topical (Top) Q6H    piperacillin-tazobactam (ZOSYN) IVPB  4.5 g Intravenous Q12H     PRN Meds:sodium chloride, sodium chloride, sodium chloride, sodium chloride, sodium chloride, dextrose 50%, dextrose 50%, fentaNYL, glucagon (human recombinant), insulin aspart U-100, sodium chloride 0.9%, sodium chloride 0.9%     Review of patient's allergies indicates:   Allergen Reactions    Oxycodone     Tylox [oxycodone-acetaminophen]      Objective:     Vital Signs (Most Recent):  Temp: 99.32 °F (37.4 °C) (12/24/21 0732)  Pulse: 86 (12/24/21 0732)  Resp: (!) 39 (12/24/21 0732)  BP: (!) 89/53 (12/24/21 0600)  SpO2: (!) 94 % (12/24/21 0732) Vital Signs (24h Range):  Temp:  [93.92 °F (34.4 °C)-99.68 °F (37.6 °C)] 99.32 °F (37.4 °C)  Pulse:  [58-92] 86  Resp:  [16-39] 39  SpO2:  [90 %-100 %] 94  %  BP: ()/(46-79) 89/53  Arterial Line BP: ()/(51-84) 117/67     Weight: 120.1 kg (264 lb 12.4 oz)  Body mass index is 45.45 kg/m².    Intake/Output - Last 3 Shifts       12/22 0700  12/23 0659 12/23 0700 12/24 0659 12/24 0700 12/25 0659    I.V. (mL/kg) 2901.1 (25.7) 2113.8 (17.6) 46.1 (0.4)    Blood 1307.3 1710     NG/GT  25     IV Piggyback 1857 3501.3     TPN 1101.8 943.8 60.9    Total Intake(mL/kg) 7167.1 (63.4) 8293.9 (69.1) 107 (0.9)    Urine (mL/kg/hr) 20 (0) 12 (0) 0 (0)    Drains 1300 538 90    Other 4581 600 150    Stool 0 0     Blood  200     Total Output 5901 1350 240    Net +1266.1 +6943.9 -133           Stool Occurrence 1 x 1 x           Vitals and nursing note reviewed.   Constitutional:       Appearance: She is obese. She is ill-appearing.   HENT:      Head: Normocephalic and atraumatic.      Nose:      Comments: NG tube in place  Neck:      Comments: Right IJ trialysis line  Cardiovascular:      Rate and Rhythm: Normal rate and regular rhythm.      Pulses: Normal pulses.   Pulmonary:      Comments: Intubated  Vent Mode: A/C  Oxygen Concentration (%):  [35-50] 50  Resp Rate Total:  [16 br/min-25 br/min] 25 br/min  Vt Set:  [420 mL] 420 mL  PEEP/CPAP:  [5 cmH20] 5 cmH20  Mean Airway Pressure:  [11 lwN51-85 cmH20] 14 cmH20  Abdominal:      Comments: Abdomen is closed with skin open with wound vac in place. x2 ollie drains within abdomen around liver with serosanguinous output.  SubQ ollie drain in LLQ with serosanguinous output.   Musculoskeletal:      Right lower leg: Edema present.      Left lower leg: Edema present.      Comments: L radial larry, L subclavian triple lumen. Palpaable pulses distally on BLE  Skin:     General: Skin is warm and dry.   Neurological:      Comments: Sedated     Significant Labs:  I have reviewed all pertinent lab results within the past 24 hours.  CBC:   Recent Labs   Lab 12/24/21  0255   WBC 11.96   RBC 2.85*   HGB 8.7*   HCT 25.0*   PLT 76*   MCV 88   MCH  30.5   MCHC 34.8     CMP:   Recent Labs   Lab 12/24/21  0255   *   CALCIUM 7.5*   ALBUMIN 1.6*   PROT 3.6*      K 4.8   CO2 24      BUN 42*   CREATININE 2.1*   ALKPHOS 57   ALT 73*   *   BILITOT 4.8*       Significant Diagnostics:  I have reviewed all pertinent imaging results/findings within the past 24 hours.    Assessment/Plan:     Subcapsular hematoma of liver  55yo female transfer from OSH after hysterectomy on 12/8 complicated by delayed recognition of small bowel injury requiring resection (in discontinuity) and at some point new bleed from the liver - transferred with open abdomen for higher level of care.  S/p ex-lap, liver packing 12/21    - Sedation vacation and SBT, if meets parameters would plan to extubate  - Transfuse for Hgb <7. Correct coagulopathy  - Wean vasopressors as tolerated  - Strict I/Os  - Appreciate nephrology assistance for CRRT  - Remainder of care per SICU        Robb Ceron MD  General Surgery  [facility]

## 2021-12-24 NOTE — CARE UPDATE
Placed patient on spontaneous mode.  She became tachypnic with rates in the upper 30's low 40's.    Will try again today, will continue to monitor

## 2021-12-24 NOTE — PROGRESS NOTES
VSS throughout shift. Bear hugger applied to temp after returning from OR. Patient withdraws on all extremities with sedation paused. Patient on AC/VC 35% 5 PEEP; SATS >95%. NSR with HR 50s-80s. Patient with extremely labile blood pressure. MAPS >65. Patient currently on MIVF at 75ml/hr, levo at 0.01 mcg/kg/min, and vaso at 0.04 units/min. Precedex titrated for RASS and comfort. See flowsheet for UOP, REAGAN drain output, and wound vac output. Accuchecks q6 hours. Labs drawn and reviewed with MD. Plan of care reviewed with patient and family at bedside. All questions and concerns addressed and answered.     Skin: no additional breakdown noted throughout shift. Foams applied to pressure points. SCDs and heel boots in use. Patient turned as tolerated. Bed plugged in and waffle inflated.

## 2021-12-24 NOTE — ASSESSMENT & PLAN NOTE
55yo female transfer from OSH after hysterectomy on 12/8 complicated by delayed recognition of small bowel injury requiring resection (in discontinuity) and at some point new bleed from the liver - transferred with open abdomen for higher level of care.  S/p ex-lap, liver packing 12/21    - Sedation vacation and SBT, if meets parameters would plan to extubate  - Transfuse for Hgb <7. Correct coagulopathy  - Wean vasopressors as tolerated  - Strict I/Os  - Appreciate nephrology assistance for CRRT  - Remainder of care per SICU

## 2021-12-24 NOTE — OP NOTE
[facility]  Surgery Department  Operative Note    SUMMARY     Date of Procedure: 12/23/2021     Procedure:   1. Re-opening of recent laparotomy  2. Evacuation of hematoma  3. Open cholecystectomy  4. Gastrostomy tube insertion using 20Fr billingsley   5. Application wound vac  6. Esophagogastroduodenoscopy     Surgeon(s) and Role:     * Kendall Gorman MD - Primary     * Víctor Valderrama MD - Resident - Assisting     * Robb Ceron MD - Resident - Assisting    Pre-Operative Diagnosis: Subcapsular hematoma of liver [K76.89]    Post-Operative Diagnosis: Post-Op Diagnosis Codes:     * Subcapsular hematoma of liver [K76.89]    Anesthesia: General    Operative Findings: Moderate amount of hematoma around liver. Liver parenchyma congested and friable  Coagulopathic bleeding   2 ollie drains in RUQ, 1 in subcutaneous space  Abdominal fascia closed with internal retention sutures  Wound vac applied to skin  Stomach mucosa normal     Indications: Chidi Castaneda is a 56 y.o. female initially transferred to our facility with and open abdomen in hemorrhagic shock. She had recently undergone laparotomy and liver re-packing. We recommended return to the operating room for exploratory laparotomy and washout. The patient's family was in agreement with the plan and did sign informed consent.    Procedure:   The patient was identified in the preoperative area and informed consent verified prior to transport to the operating room. Anesthesia was induced without complication. The ABThera was removed and abdomen was then prepped and draped in the usual standard fashion. A timeout was performed verifying correct patient, procedure, and the administration of prophylactic antibiotics.  A total of 7 lap pads were removed from around the liver. There was a mild amount of old hematoma but no active bleeding. There was a moderate amount of necrotic liver parenchyma involving the right lobe at the area of previous capsular  injury. A small amount of old hematoma was evacuated from the pelvis. The butler retractor was deployed. We then turned attention to the gallbladder which appeared chronically inflamed, with areas of punctate ischemia. There was cholelithiasis and a thick inflammatory rind. We proceeded with cholecystectomy. The gallbladder was  from the liver bed using cautery starting at the dome of the gallbladder down towards the cystic triangle. The cystic artery was divided with cautery. The cystic duct was identified, clamped, and divided sharply. The cystic duct stump was secured using a 2-0 silk stick tie and then oversewn with silk. The liver was congested and extremely friable resulting in diffuse oozing from the gallbladder fossa. This was controlled with a combination of Nu-Knit, Argon coag, and packing. Surgiflo was also utilized with good effect. We then elected to place a gastrostomy tube for gastric decompression and long term enteral access. A double pursestring suture was placed on the anterior stomach wall using 3-0 Vicryl. A 20Fr billingsley was introduced through a small stab incision in the left upper quadrant. A gastrotomy was made at the center of the pure string sutures and billingsley catheter inserted. The billingsley balloon was inflated and pursestring sutures tied. Catheter flushed easily. The stomach was then stammed to the anterior abdominal wall using 2-0 Silk. Gastrostomy tube was secured at the skin. The abdomen was then copiously irrigated and thoroughly examined. Small bowel and colon appeared healthy. Previously done small anastomosis appeared patent and viable. Hemostasis was excellent in the gallbladder fossa. Two 19Fr ollie drains were introduced in the RUQ and positioned into the gallbladder fossa and over the dome of the liver. An x-ray was then obtained prior to abdominal closure which did not identify any retained foreign objects. The abdominal fascia was then closed using 2 #1 looped PDS  combined with #1 Vicryl internal retention sutures spaced several centimeters apart. Bilateral subcutaneous skin flaps were raised to allow primary closure of the fascia. A 19Fr ollie drain was introduced through a LLQ stab incision and placed above the fascia. The skin was left open and wound vac applied in standard fashion using black sponge. An EGD was performed as there was some resistance flushing the gastrostomy tube and minimal drainage from it. EGD revealed the gastrostomy tube to be in good position inside the stomach lumen. Gastric mucosa appeared normal.   The patient was kept intubated and transported back to the SICU in stable but critical condition.   Dr. Gorman was scrubbed for the case.     Estimated Blood Loss (EBL): 500 mL           Implants: * No implants in log *    Specimens:   Specimen (24h ago, onward)                 Start     Ordered    12/23/21 1345  Specimen to Pathology, Surgery General Surgery  Once        Comments: Pre-op Diagnosis: Subcapsular hematoma of liver [K76.89]    Procedure(s):  LAPAROTOMY, EXPLORATORY  INSERTION, GASTROSTOMY TUBE, PERCUTANEOUS     Number of specimens: 2    Name of specimens:  1. Gallbladder (Perm)  2. Lap Sponge Packing (Gross) 7 total   References:    Click here for ordering Quick Tip   Question Answer Comment   Procedure Type: General Surgery    Specimen Class: Routine/Screening        12/23/21 1344                            Condition: Critical    Disposition: ICU - intubated and critically ill.

## 2021-12-24 NOTE — PROGRESS NOTES
[facility]  Nephrology  Progress Note     Patient Name: Chidi Castaneda  MRN: [id number]  Admission Date: 12/20/2021  Hospital Length of Stay: 4 days  Attending Provider: Kendall Gorman MD   Primary Care Physician: Primary Doctor No  Principal Problem: <principal problem not specified>    Subjective:     HPI: Chidi Castaneda is a 56 y.o. female w/ PMHx HTN, GERD s/p EGD 6/22/2021, migraines, ovarian cyst s/p cystectomy, and obesity who underwent an elective lap hysterectomy on 12/18/2021 at [facility] and was then transferred to [place], [place] for higher level of care when pt was found to have post-op somnolence, decrease PO intake, decrease urine output that progressed to anuria with a sharp rise in Cr from 0.9 to 2.8 (12/10). Pt was treated for sepsis with volume resuscitation and broad spectrum antibiotics, however pt continue to deteriorate and became tachycardic, hypotensive, and imaging showed an ileus. Pt was intubated since she was found to be acidotic with a ph of 7.28 despite a nonrebreather with 100%  FiO2  12/11 pt was taken back to the OR for washout and a bowel resection was done as she was found to have a small bowel perforation  12/15 pt was found to have a subscapular liver hematoma  12/18 pt was taken back to the OR for wound vacc exchange and removal of hematoma   12/19 general surgery team recommended no further surgical intervention since they did not find any active bleeding intraoperatively on 12/18 and felt that the ongoing bleeding was 2/2 consumptive coagulopathy and septic shock. They recommended to continue wound vacc and to consider higher level of care for pt  Pt was also being treated for a bacteremia as well as for intraabdominal infection, her antibiotics prior to transfer were: meropenem, flagyl, and vancomycin   Pt received tranexamic acid x1 and multiple units of blood products.    Pt was transferred to [facility] on 12/20/2021 for higher  "level of care: embolization for a subscapular hematoma       Nephrology was consulted for: "dialysis, SUSAN"    Pt does not have any h/o renal disease prior to hospitalization (per chart review and per pt's )   EDW: 88 kg   Post-op renal ultrasound was normal (results of ultrasound and other medical records from the outside hospital are in the chart at the bedside, needs to be scanned into chart)   Pt was started on CRRT on 12/12 via a trialysis catheter,   On 12/20/21 morning nephrology at outside hospital had recommended CRRT-SLED however primary team requested iHD prior to transfer to [facility], it appears that pt was ordered for 4 hours however, pt was prepped for transfer and was unable to complete full session 2/2 transfer, per pt's , pt had 2L of fluid removed instead of the planned 4 liters. Of note, while pt was at [facility] pt's CRRT required the use of citrate to prevent clotting (briefly), however that apparently resolved and was able to get a session w/o citrate and obviously was able to tolerate iHD prior to transfer.   Upon arrival to [facility] pt was started on zosyn, she was started on NS 125ml/hr, was transfused 2 units of PRBCs, and remained off vasopressors.       Interval History: Seen at the bedside no event overnight       Review of patient's allergies indicates:   Allergen Reactions    Oxycodone     Tylox [oxycodone-acetaminophen]       Current Facility-Administered Medications   Medication Frequency    0.9%  NaCl infusion (CRRT USE ONLY) Continuous    0.9%  NaCl infusion (for blood administration) Q24H PRN    0.9%  NaCl infusion (for blood administration) Q24H PRN    0.9%  NaCl infusion (for blood administration) Q24H PRN    0.9%  NaCl infusion (for blood administration) Q24H PRN    0.9%  NaCl infusion (for blood administration) Q24H PRN    0.9%  NaCl infusion Continuous    balsam peru-castor oiL Oint BID    dexmedetomidine (PRECEDEX) 400mcg/100mL 0.9% NaCL infusion " Continuous    dextrose 50% injection 12.5 g PRN    dextrose 50% injection 25 g PRN    famotidine (PF) injection 20 mg Daily    fentaNYL 50 mcg/mL injection 50 mcg Q1H PRN    glucagon (human recombinant) injection 1 mg PRN    heparin (porcine) injection 5,000 Units Q8H    insulin aspart U-100 pen 0-5 Units Q6H PRN    lipid (SMOFLIPID) (SMOFLIPID) 20 % infusion 250 mL Daily    mupirocin 2 % ointment BID    nitroGLYCERIN 2% TD oint ointment 0.5 inch Q6H    NORepinephrine 4 mg in dextrose 5% 250 mL infusion (premix) (titrating) Continuous    piperacillin-tazobactam 4.5 g in sodium chloride 0.9% 100 mL IVPB (ready to mix system) Q12H    sodium chloride 0.9% flush 10 mL PRN    sodium chloride 0.9% flush 10 mL PRN    TPN ADULT CENTRAL LINE CUSTOM Continuous    TPN ADULT CENTRAL LINE CUSTOM Continuous    vasopressin (PITRESSIN) 0.2 Units/mL in dextrose 5 % 100 mL infusion Continuous       Objective:     Vital Signs (Most Recent):  Temp: 98.96 °F (37.2 °C) (12/24/21 1045)  Pulse: 84 (12/24/21 1045)  Resp: (!) 23 (12/24/21 1045)  BP: (!) 99/56 (12/24/21 1000)  SpO2: (!) 94 % (12/24/21 1045)  O2 Device (Oxygen Therapy): ventilator (12/24/21 0732) Vital Signs (24h Range):  Temp:  [93.92 °F (34.4 °C)-99.68 °F (37.6 °C)] 98.96 °F (37.2 °C)  Pulse:  [58-93] 84  Resp:  [16-40] 23  SpO2:  [90 %-100 %] 94 %  BP: ()/(46-79) 99/56  Arterial Line BP: ()/() 110/64   Weight: 120.1 kg (264 lb 12.4 oz) (12/24/21 0600)  Body mass index is 45.45 kg/m².  Body surface area is 2.33 meters squared.      Intake/Output Summary (Last 24 hours) at 12/24/2021 1052  Last data filed at 12/24/2021 1010  Gross per 24 hour   Intake 8307.34 ml   Output 1293 ml   Net 7014.34 ml     I/O last 3 completed shifts:  In: 9711.8 [I.V.:2325.4; Blood:1981; NG/GT:25; IV Piggyback:3801.4]  Out: 2600 [Urine:22; Drains:928; Other:1450; Blood:200]  Net IO Since Admission: 8,932.46 mL [12/24/21 1052]     Physical Exam     Constitutional:        Appearance: She is obese. She is ill-appearing.   HENT:      Mouth/Throat:      Mouth: Mucous membranes are moist.   Eyes:      Pupils: Pupils are equal, round, and reactive to light.   Cardiovascular:      Rate and Rhythm: Normal rate and regular rhythm.   Pulmonary:      Comments: intubated  Abdominal:      Comments: open   Musculoskeletal:         General: No deformity.      Right lower leg: Edema present.      Left lower leg: Edema present.   Skin:     Coloration: Skin is not jaundiced.   Neurological:      General: No focal deficit present.     Recent Labs   Lab 12/23/21  2345 12/23/21  2345 12/24/21  0255 12/24/21  0826   WBC 10.82  --  11.96 14.34*   HGB 9.3*  --  8.7* 8.2*   HCT 26.4*  --  25.0* 23.4*   PLT 63*  --  76* 93*   MONO 8.0  Test Not Performed   < > 4.6  CANCELED 6.5  0.9    < > = values in this interval not displayed.      Recent Labs   Lab 12/23/21  0358 12/23/21  1524 12/24/21  0255    138 138   K 4.2 4.9 4.8    106 107   CO2 24 22* 24   BUN 21* 31* 42*   CREATININE 1.2 1.6* 2.1*   CALCIUM 7.0* 8.0* 7.5*   PROT 4.0* 3.4* 3.6*   BILITOT 6.7* 5.3* 4.8*   ALKPHOS 91 55 57   ALT 96* 73* 73*   * 164* 142*      Recent Labs     12/23/21  1439 12/23/21  1531 12/24/21  0414   PH 7.405 7.371 7.496*   PCO2 36.8 44.2 30.9*   PO2 277* 66* 55*   HCO3 23.1* 25.6 23.9*   POCSATURATED 100 92* 91*   BE -2 0 1       Assessment/Plan:       Subcapsular hematoma of liver    SUSAN (acute kidney injury)    Septic shock    Bowel perforation    Metabolic acidosis    Volume overload       SUSAN (acute kidney injury)  -oliguric SUSAN, ischemic ATN with multifactorial etiology, however most likely d/t severe hypotension as a result of septic shock 2/2 small bowel perforation and bacteremia   -BL sCr 1  -KRT started at OSH 12/12/2021 for acidosis and volume overload  -still anuric on norepi  -net positive 6L last 24 hours with CXR pulmonary edema as read by me   - we will start SLED for volume and  metabolic clearance        Metabolic acidosis  -controlled with KRT  - stable      Bowel perforation  -per primary     Septic shock  -secondary to bowel perforation  -per primary        Subcapsular hematoma of liver  -per primary      Thank you for your consult. I will follow-up with patient. Please contact us if you have any additional questions.    Uma Ernandez MD  Nephrology  [facility]

## 2021-12-24 NOTE — PROGRESS NOTES
[facility]  Critical Care - Surgery  Progress Note    Patient Name: Chidi Castaneda  MRN: [id number]  Admission Date: 12/20/2021  Hospital Length of Stay: 4 days  Code Status: Full Code  Attending Provider: eKndall Gorman MD  Primary Care Provider: Primary Doctor No   Principal Problem: <principal problem not specified>    Subjective:     Hospital/ICU Course:  No notes on file    Interval History/Significant Events: TEG after transfusions wnl. AM Hgb 8.7. Given 2Ls LR overnight. Failed SBT this morning.           Follow-up For: Procedure(s) (LRB):  LAPAROTOMY, EXPLORATORY (N/A)  INSERTION, GASTROSTOMY TUBE, PERCUTANEOUS (N/A)  CHOLECYSTECTOMY  EGD (ESOPHAGOGASTRODUODENOSCOPY) (N/A)    Post-Operative Day: 1 Day Post-Op    Objective:     Vital Signs (Most Recent):  Temp: 99.14 °F (37.3 °C) (12/24/21 0830)  Pulse: 84 (12/24/21 0830)  Resp: 17 (12/24/21 0830)  BP: (!) 90/58 (12/24/21 0800)  SpO2: (!) 94 % (12/24/21 0830) Vital Signs (24h Range):  Temp:  [93.92 °F (34.4 °C)-99.68 °F (37.6 °C)] 99.14 °F (37.3 °C)  Pulse:  [58-93] 84  Resp:  [16-40] 17  SpO2:  [90 %-100 %] 94 %  BP: ()/(46-79) 90/58  Arterial Line BP: ()/() 108/62     Weight: 120.1 kg (264 lb 12.4 oz)  Body mass index is 45.45 kg/m².      Intake/Output Summary (Last 24 hours) at 12/24/2021 0848  Last data filed at 12/24/2021 0800  Gross per 24 hour   Intake 8203.66 ml   Output 1438 ml   Net 6765.66 ml       Physical Exam  Constitutional:       Comments: RAAS of 0   HENT:      Head: Normocephalic and atraumatic.      Nose: Nose normal.      Mouth/Throat:      Mouth: Mucous membranes are moist.      Pharynx: Oropharynx is clear.   Eyes:      Conjunctiva/sclera: Conjunctivae normal.      Pupils: Pupils are equal, round, and reactive to light.   Cardiovascular:      Rate and Rhythm: Normal rate and regular rhythm.      Pulses: Normal pulses.      Heart sounds: Normal heart sounds.   Pulmonary:      Effort: Pulmonary  effort is normal.      Breath sounds: Normal breath sounds.      Comments: Crackles best appreciated at the bases.   Abdominal:      General: Abdomen is flat.      Comments: REAGAN drains and abdominal wound vac in place   Musculoskeletal:         General: Normal range of motion.      Cervical back: Normal range of motion and neck supple.   Skin:     General: Skin is warm and dry.      Capillary Refill: Capillary refill takes less than 2 seconds.   Psychiatric:         Mood and Affect: Mood normal.         Behavior: Behavior normal.         Vents:  Vent Mode: A/C (12/24/21 0732)  Ventilator Initiated: Yes (12/20/21 1448)  Set Rate: 18 BPM (12/24/21 0732)  Vt Set: 420 mL (12/24/21 0732)  PEEP/CPAP: 5 cmH20 (12/24/21 0732)  Oxygen Concentration (%): 50 (12/24/21 0830)  Peak Airway Pressure: 33 cmH2O (12/24/21 0732)  Plateau Pressure: 38 cmH20 (12/24/21 0732)  Total Ve: 11.1 mL (12/24/21 0732)  Negative Inspiratory Force (cm H2O): 0 (12/24/21 0732)  F/VT Ratio<105 (RSBI): 108.33 (12/24/21 0732)    Lines/Drains/Airways     Central Venous Catheter Line            Percutaneous Central Line Insertion/Assessment - Triple Lumen  12/16/21 1531 left subclavian 7 days    Trialysis (Dialysis) Catheter 12/21/21 0030 right internal jugular 3 days          Drain                 Urethral Catheter 12/20/21 1743 3 days         Closed/Suction Drain 12/23/21 1428 Right;Inferior RUQ Bulb 19 Fr. <1 day         Closed/Suction Drain 12/23/21 1429 Right;Superior RUQ Bulb 19 Fr. <1 day         Closed/Suction Drain 12/23/21 1430 LLQ Bulb 19 Fr. <1 day         Gastrostomy/Enterostomy 12/23/21 1338 Gastrostomy tube w/ balloon LUQ feeding <1 day          Airway                 Airway - Non-Surgical 12/20/21 1451 Endotracheal Tube 3 days          Arterial Line            Arterial Line 12/18/21 0000 Right Radial 6 days                Significant Labs:    CBC/Anemia Profile:  Recent Labs   Lab 12/23/21  1910 12/23/21  2345 12/24/21  0255   WBC 13.55*  10.82 11.96   HGB 10.3* 9.3* 8.7*   HCT 30.6* 26.4* 25.0*   PLT 55* 63* 76*   MCV 88 87 88   RDW 14.0 14.6* 14.6*        Chemistries:  Recent Labs   Lab 12/22/21 2205 12/22/21 2205 12/23/21  0358 12/23/21  1524 12/24/21  0255      < > 139 138 138   K 4.2   < > 4.2 4.9 4.8      < > 105 106 107   CO2 25   < > 24 22* 24   BUN 13   < > 21* 31* 42*   CREATININE 0.8   < > 1.2 1.6* 2.1*   CALCIUM 7.2*   < > 7.0* 8.0* 7.5*   ALBUMIN 1.6*   < > 1.7* 1.8* 1.6*   PROT  --   --  4.0* 3.4* 3.6*   BILITOT  --   --  6.7* 5.3* 4.8*   ALKPHOS  --   --  91 55 57   ALT  --   --  96* 73* 73*   AST  --   --  110* 164* 142*   MG 2.1   < > 2.1 2.1 1.9   PHOS 3.1  --  2.5*  --  3.3    < > = values in this interval not displayed.           Assessment/Plan:     Subcapsular hematoma of liver    Neuro/Psy ch:   -- Does not respond to commands but opens eyes to stimulation and withdraws from pain  --Sedation/Pain: precedex PRN, remains on minimal dose 2/2 to vent agitation             Cards:   -- HDS with MAP goal > 65   -- current: vaso 0.04  -- ECHO with EF 55%  -- transfuse PRN      Pulm:   -- Goal O2 sat > 90%  -- Intubated, currently on 35/5, satting well.  -- SBT as indicated, after abd closure      Renal:  -- Keep billingsley for strict I/O  -- continue CRRT per nephrology  -- BUN/Cr 21/1.2  -- replete lytes PRN  -- keep euvolemic      FEN / GI:   -- Replace lytes as needed  -- Nutrition: TPN, start trickle feeds today through G-tube   -- G tube likely soon        ID:   -- Tm: afebrile; WBC 11.9  -- Abx - on zosyn      Heme/Onc:   -- Since hosptial admission on 12/10 has received 11u pRBC, 4u FFP, 1u platelets  -- H/H 6.9 this morning, transfuse  -- Daily CBC      Endo:   -- Gluc goal 140-180  -- no history of diabetes  -- SSI/accuchecks      PPx:   Feeding: NPO, TPN  Analgesia/Sedation: Precedex  Thromboembolic prevention: Heparin  HOB >30: yes  Stress Ulcer ppx: Famotidine  Glucose control: Critical care goal 140-180  g/dl, ISS    Lines/Drains/Airway: NG, Ng, L radial larry, R IJ trialysis, L subclavian triple lumen      Dispo/Code Status/Palliative:   -- SICU / Full Code        Critical care was time spent personally by me on the following activities: development of treatment plan with patient or surrogate and bedside caregivers, discussions with consultants, evaluation of patient's response to treatment, examination of patient, ordering and performing treatments and interventions, ordering and review of laboratory studies, ordering and review of radiographic studies, pulse oximetry, re-evaluation of patient's condition.  This critical care time did not overlap with that of any other provider or involve time for any procedures.     Joao Verdugo MD  Critical Care - Surgery  [facility]

## 2021-12-24 NOTE — NURSING
Dr. Gorman & team to bedside for rounds. Plan to start trickle feeds, RT periodically working pt on spont., and plan to d/c zosyn brady (12/25)

## 2021-12-24 NOTE — SUBJECTIVE & OBJECTIVE
Interval History/Significant Events: TEG after transfusions wnl. AM Hgb 8.7. Given 2Ls LR overnight. Failed SBT this morning.           Follow-up For: Procedure(s) (LRB):  LAPAROTOMY, EXPLORATORY (N/A)  INSERTION, GASTROSTOMY TUBE, PERCUTANEOUS (N/A)  CHOLECYSTECTOMY  EGD (ESOPHAGOGASTRODUODENOSCOPY) (N/A)    Post-Operative Day: 1 Day Post-Op    Objective:     Vital Signs (Most Recent):  Temp: 99.14 °F (37.3 °C) (12/24/21 0830)  Pulse: 84 (12/24/21 0830)  Resp: 17 (12/24/21 0830)  BP: (!) 90/58 (12/24/21 0800)  SpO2: (!) 94 % (12/24/21 0830) Vital Signs (24h Range):  Temp:  [93.92 °F (34.4 °C)-99.68 °F (37.6 °C)] 99.14 °F (37.3 °C)  Pulse:  [58-93] 84  Resp:  [16-40] 17  SpO2:  [90 %-100 %] 94 %  BP: ()/(46-79) 90/58  Arterial Line BP: ()/() 108/62     Weight: 120.1 kg (264 lb 12.4 oz)  Body mass index is 45.45 kg/m².      Intake/Output Summary (Last 24 hours) at 12/24/2021 0848  Last data filed at 12/24/2021 0800  Gross per 24 hour   Intake 8203.66 ml   Output 1438 ml   Net 6765.66 ml       Physical Exam  Constitutional:       Comments: RAAS of 0   HENT:      Head: Normocephalic and atraumatic.      Nose: Nose normal.      Mouth/Throat:      Mouth: Mucous membranes are moist.      Pharynx: Oropharynx is clear.   Eyes:      Conjunctiva/sclera: Conjunctivae normal.      Pupils: Pupils are equal, round, and reactive to light.   Cardiovascular:      Rate and Rhythm: Normal rate and regular rhythm.      Pulses: Normal pulses.      Heart sounds: Normal heart sounds.   Pulmonary:      Effort: Pulmonary effort is normal.      Breath sounds: Normal breath sounds.      Comments: Crackles best appreciated at the bases.   Abdominal:      General: Abdomen is flat.      Comments: REAGAN drains and abdominal wound vac in place   Musculoskeletal:         General: Normal range of motion.      Cervical back: Normal range of motion and neck supple.   Skin:     General: Skin is warm and dry.      Capillary Refill:  Capillary refill takes less than 2 seconds.   Psychiatric:         Mood and Affect: Mood normal.         Behavior: Behavior normal.         Vents:  Vent Mode: A/C (12/24/21 0732)  Ventilator Initiated: Yes (12/20/21 1448)  Set Rate: 18 BPM (12/24/21 0732)  Vt Set: 420 mL (12/24/21 0732)  PEEP/CPAP: 5 cmH20 (12/24/21 0732)  Oxygen Concentration (%): 50 (12/24/21 0830)  Peak Airway Pressure: 33 cmH2O (12/24/21 0732)  Plateau Pressure: 38 cmH20 (12/24/21 0732)  Total Ve: 11.1 mL (12/24/21 0732)  Negative Inspiratory Force (cm H2O): 0 (12/24/21 0732)  F/VT Ratio<105 (RSBI): 108.33 (12/24/21 0732)    Lines/Drains/Airways     Central Venous Catheter Line            Percutaneous Central Line Insertion/Assessment - Triple Lumen  12/16/21 1531 left subclavian 7 days    Trialysis (Dialysis) Catheter 12/21/21 0030 right internal jugular 3 days          Drain                 Urethral Catheter 12/20/21 1743 3 days         Closed/Suction Drain 12/23/21 1428 Right;Inferior RUQ Bulb 19 Fr. <1 day         Closed/Suction Drain 12/23/21 1429 Right;Superior RUQ Bulb 19 Fr. <1 day         Closed/Suction Drain 12/23/21 1430 LLQ Bulb 19 Fr. <1 day         Gastrostomy/Enterostomy 12/23/21 1338 Gastrostomy tube w/ balloon LUQ feeding <1 day          Airway                 Airway - Non-Surgical 12/20/21 1451 Endotracheal Tube 3 days          Arterial Line            Arterial Line 12/18/21 0000 Right Radial 6 days                Significant Labs:    CBC/Anemia Profile:  Recent Labs   Lab 12/23/21  1910 12/23/21  2345 12/24/21  0255   WBC 13.55* 10.82 11.96   HGB 10.3* 9.3* 8.7*   HCT 30.6* 26.4* 25.0*   PLT 55* 63* 76*   MCV 88 87 88   RDW 14.0 14.6* 14.6*        Chemistries:  Recent Labs   Lab 12/22/21  2205 12/22/21  2205 12/23/21  0358 12/23/21  1524 12/24/21  0255      < > 139 138 138   K 4.2   < > 4.2 4.9 4.8      < > 105 106 107   CO2 25   < > 24 22* 24   BUN 13   < > 21* 31* 42*   CREATININE 0.8   < > 1.2 1.6* 2.1*    CALCIUM 7.2*   < > 7.0* 8.0* 7.5*   ALBUMIN 1.6*   < > 1.7* 1.8* 1.6*   PROT  --   --  4.0* 3.4* 3.6*   BILITOT  --   --  6.7* 5.3* 4.8*   ALKPHOS  --   --  91 55 57   ALT  --   --  96* 73* 73*   AST  --   --  110* 164* 142*   MG 2.1   < > 2.1 2.1 1.9   PHOS 3.1  --  2.5*  --  3.3    < > = values in this interval not displayed.

## 2021-12-24 NOTE — ASSESSMENT & PLAN NOTE
  Neuro/Psy ch:   -- Does not respond to commands but opens eyes to stimulation and withdraws from pain  --Sedation/Pain: precedex PRN, remains on minimal dose 2/2 to vent agitation             Cards:   -- HDS with MAP goal > 65   -- current: vaso 0.04  -- ECHO with EF 55%  -- transfuse PRN      Pulm:   -- Goal O2 sat > 90%  -- Intubated, currently on 35/5, satting well.  -- SBT as indicated, after abd closure      Renal:  -- Keep billingsley for strict I/O  -- continue CRRT per nephrology  -- BUN/Cr 21/1.2  -- replete lytes PRN  -- keep euvolemic      FEN / GI:   -- Replace lytes as needed  -- Nutrition: TPN, start trickle feeds today through G-tube   -- G tube likely soon        ID:   -- Tm: afebrile; WBC 11.9  -- Abx - on zosyn      Heme/Onc:   -- Since hosptial admission on 12/10 has received 11u pRBC, 4u FFP, 1u platelets  -- H/H 6.9 this morning, transfuse  -- Daily CBC      Endo:   -- Gluc goal 140-180  -- no history of diabetes  -- SSI/accuchecks      PPx:   Feeding: NPO, TPN  Analgesia/Sedation: Precedex  Thromboembolic prevention: Heparin  HOB >30: yes  Stress Ulcer ppx: Famotidine  Glucose control: Critical care goal 140-180 g/dl, ISS    Lines/Drains/Airway: NG, Billingsley, L radial larry, R IJ trialysis, L subclavian triple lumen      Dispo/Code Status/Palliative:   -- SICU / Full Code

## 2021-12-24 NOTE — SUBJECTIVE & OBJECTIVE
Interval History: NAEON. Abdomen closed yesterday in OR.  On 0.04 vaso.  Low vent settings this morning.  Still making little to no urine.   H/H downt o 8/7/25 this morning.      Medications:  Continuous Infusions:   sodium chloride 0.9% Stopped (12/22/21 1745)    sodium chloride 0.9% Stopped (12/24/21 0447)    dexmedetomidine (PRECEDEX) infusion 1.4 mcg/kg/hr (12/24/21 0724)    NORepinephrine bitartrate-D5W Stopped (12/23/21 2226)    TPN ADULT CENTRAL LINE CUSTOM 40 mL/hr at 12/24/21 0700    vasopressin 0.04 Units/min (12/24/21 0700)     Scheduled Meds:   balsam peru-castor oiL   Topical (Top) BID    famotidine (PF)  20 mg Intravenous Daily    heparin (porcine)  5,000 Units Subcutaneous Q8H    lipid (SMOFLIPID)  250 mL Intravenous Daily    mupirocin   Nasal BID    nitroGLYCERIN 2% TD oint  0.5 inch Topical (Top) Q6H    piperacillin-tazobactam (ZOSYN) IVPB  4.5 g Intravenous Q12H     PRN Meds:sodium chloride, sodium chloride, sodium chloride, sodium chloride, sodium chloride, dextrose 50%, dextrose 50%, fentaNYL, glucagon (human recombinant), insulin aspart U-100, sodium chloride 0.9%, sodium chloride 0.9%     Review of patient's allergies indicates:   Allergen Reactions    Oxycodone     Tylox [oxycodone-acetaminophen]      Objective:     Vital Signs (Most Recent):  Temp: 99.32 °F (37.4 °C) (12/24/21 0732)  Pulse: 86 (12/24/21 0732)  Resp: (!) 39 (12/24/21 0732)  BP: (!) 89/53 (12/24/21 0600)  SpO2: (!) 94 % (12/24/21 0732) Vital Signs (24h Range):  Temp:  [93.92 °F (34.4 °C)-99.68 °F (37.6 °C)] 99.32 °F (37.4 °C)  Pulse:  [58-92] 86  Resp:  [16-39] 39  SpO2:  [90 %-100 %] 94 %  BP: ()/(46-79) 89/53  Arterial Line BP: ()/(51-84) 117/67     Weight: 120.1 kg (264 lb 12.4 oz)  Body mass index is 45.45 kg/m².    Intake/Output - Last 3 Shifts       12/22 0700 12/23 0659 12/23 0700 12/24 0659 12/24 0700 12/25 0659    I.V. (mL/kg) 2901.1 (25.7) 2113.8 (17.6) 46.1 (0.4)    Blood 1307.3 1710      NG/GT  25     IV Piggyback 1857 3501.3     TPN 1101.8 943.8 60.9    Total Intake(mL/kg) 7167.1 (63.4) 8293.9 (69.1) 107 (0.9)    Urine (mL/kg/hr) 20 (0) 12 (0) 0 (0)    Drains 1300 538 90    Other 4581 600 150    Stool 0 0     Blood  200     Total Output 5901 1350 240    Net +1266.1 +6943.9 -133           Stool Occurrence 1 x 1 x           Vitals and nursing note reviewed.   Constitutional:       Appearance: She is obese. She is ill-appearing.   HENT:      Head: Normocephalic and atraumatic.      Nose:      Comments: NG tube in place  Neck:      Comments: Right IJ trialysis line  Cardiovascular:      Rate and Rhythm: Normal rate and regular rhythm.      Pulses: Normal pulses.   Pulmonary:      Comments: Intubated  Vent Mode: A/C  Oxygen Concentration (%):  [35-50] 50  Resp Rate Total:  [16 br/min-25 br/min] 25 br/min  Vt Set:  [420 mL] 420 mL  PEEP/CPAP:  [5 cmH20] 5 cmH20  Mean Airway Pressure:  [11 jrU82-18 cmH20] 14 cmH20  Abdominal:      Comments: Abdomen is closed with skin open with wound vac in place. x2 ollie drains within abdomen around liver with serosanguinous output.  SubQ ollie drain in LLQ with serosanguinous output.   Musculoskeletal:      Right lower leg: Edema present.      Left lower leg: Edema present.      Comments: L radial larry, L subclavian triple lumen. Palpaable pulses distally on BLE  Skin:     General: Skin is warm and dry.   Neurological:      Comments: Sedated     Significant Labs:  I have reviewed all pertinent lab results within the past 24 hours.  CBC:   Recent Labs   Lab 12/24/21  0255   WBC 11.96   RBC 2.85*   HGB 8.7*   HCT 25.0*   PLT 76*   MCV 88   MCH 30.5   MCHC 34.8     CMP:   Recent Labs   Lab 12/24/21  0255   *   CALCIUM 7.5*   ALBUMIN 1.6*   PROT 3.6*      K 4.8   CO2 24      BUN 42*   CREATININE 2.1*   ALKPHOS 57   ALT 73*   *   BILITOT 4.8*       Significant Diagnostics:  I have reviewed all pertinent imaging results/findings within the past 24  hours.

## 2021-12-24 NOTE — PLAN OF CARE
"      SICU PLAN OF CARE NOTE    Dx: Septic shock, Bowel perforation    Shift Events: Pt's V/S at this time. Pt received an additional 1L bolus of LR and 1 of Cryo over night. MD notified of pt's decreased PAO2 and O2Sat, FiO2 increased to 50% @ 0430, MIVF stopped per MD, pt responding well to increase in O2. Levophed gtt off since 2230, pressor requirements stable over shift. MD aware of pt's ability to follow commands multiple times throughout shift, sedation increased as needed, MD aware of pt being awake despite max sedation / pain control measures, MD approved of increased Fentanyl IVP dose at this time. Plan to re-evaluate fluid volume status / need for CRRT, weaned O2 / pressors as tolerated, monitor neuro status closely, maintain pt comfort. POC reviewed with pt and pt's spouse, all questions answered and encouraged. Will continue to monitor.    Goals of Care: MAP >65    Neuro: Sedated, Arouses to Voice, Follows Commands and Moves All Extremities     Vital Signs: BP (!) 93/52 (BP Location: Right arm, Patient Position: Lying)   Pulse 78   Temp 99.5 °F (37.5 °C) (Core Esophageal)   Resp (!) 22   Ht 5' 4" (1.626 m)   Wt 113 kg (249 lb 1.9 oz)   SpO2 (!) 94%   BMI 42.76 kg/m²     Cardiac: NSR, HR 60-80s, CVP 12/15/15    Respiratory: Ventilator, AC/VC+, FiO2 50%, PEEP 5, RR 16    Diet: NPO and TPN @ 40 ml/hr    Gtts: Precedex, Norepinephrine, Vasopressin and MIVF    Urine Output: Urinary Catheter 10 cc/shift    Drains:  REAGAN #1, total output of 85 ml/shift  REAGAN #2, total output of 225 ml/shift  REAGAN #3, total output of 0 ml/shift  LUQ G-tube, total output of 35 ml/shift     Restriants: maintained while pt intubated/sedated, assessed Q2H for injury, no injury noted     Labs: daily, Q6H CBC / Accuchecks: Q6H    Skin: No new skin breakdown noted. Pt's midabdominal incision maintained with Wound Vac over night CDI, 125 mmHg suction. Skin tears redressed with skin foams, CDI at this time. Venelex applied to bilateral " heels.

## 2021-12-25 LAB
ALBUMIN SERPL BCP-MCNC: 1.5 G/DL (ref 3.5–5.2)
ALBUMIN SERPL BCP-MCNC: 1.6 G/DL (ref 3.5–5.2)
ALLENS TEST: ABNORMAL
ALP SERPL-CCNC: 87 U/L (ref 55–135)
ALT SERPL W/O P-5'-P-CCNC: 69 U/L (ref 10–44)
ANION GAP SERPL CALC-SCNC: 5 MMOL/L (ref 8–16)
ANION GAP SERPL CALC-SCNC: 7 MMOL/L (ref 8–16)
ANION GAP SERPL CALC-SCNC: 7 MMOL/L (ref 8–16)
ANION GAP SERPL CALC-SCNC: 9 MMOL/L (ref 8–16)
ANISOCYTOSIS BLD QL SMEAR: SLIGHT
ANISOCYTOSIS BLD QL SMEAR: SLIGHT
AST SERPL-CCNC: 103 U/L (ref 10–40)
BASO STIPL BLD QL SMEAR: ABNORMAL
BASOPHILS # BLD AUTO: 0.04 K/UL (ref 0–0.2)
BASOPHILS # BLD AUTO: 0.05 K/UL (ref 0–0.2)
BASOPHILS # BLD AUTO: ABNORMAL K/UL (ref 0–0.2)
BASOPHILS NFR BLD: 0 % (ref 0–1.9)
BASOPHILS NFR BLD: 0.3 % (ref 0–1.9)
BASOPHILS NFR BLD: 0.4 % (ref 0–1.9)
BASOPHILS NFR BLD: 0.7 % (ref 0–1.9)
BILIRUB SERPL-MCNC: 3.5 MG/DL (ref 0.1–1)
BUN SERPL-MCNC: 10 MG/DL (ref 6–20)
BUN SERPL-MCNC: 20 MG/DL (ref 6–20)
BUN SERPL-MCNC: 9 MG/DL (ref 6–20)
BUN SERPL-MCNC: 9 MG/DL (ref 6–20)
BURR CELLS BLD QL SMEAR: ABNORMAL
CALCIUM SERPL-MCNC: 7.1 MG/DL (ref 8.7–10.5)
CALCIUM SERPL-MCNC: 7.2 MG/DL (ref 8.7–10.5)
CALCIUM SERPL-MCNC: 7.3 MG/DL (ref 8.7–10.5)
CALCIUM SERPL-MCNC: 7.3 MG/DL (ref 8.7–10.5)
CHLORIDE SERPL-SCNC: 104 MMOL/L (ref 95–110)
CHLORIDE SERPL-SCNC: 104 MMOL/L (ref 95–110)
CHLORIDE SERPL-SCNC: 107 MMOL/L (ref 95–110)
CHLORIDE SERPL-SCNC: 108 MMOL/L (ref 95–110)
CO2 SERPL-SCNC: 24 MMOL/L (ref 23–29)
CO2 SERPL-SCNC: 24 MMOL/L (ref 23–29)
CO2 SERPL-SCNC: 26 MMOL/L (ref 23–29)
CO2 SERPL-SCNC: 27 MMOL/L (ref 23–29)
CREAT SERPL-MCNC: 0.6 MG/DL (ref 0.5–1.4)
CREAT SERPL-MCNC: 0.6 MG/DL (ref 0.5–1.4)
CREAT SERPL-MCNC: 0.7 MG/DL (ref 0.5–1.4)
CREAT SERPL-MCNC: 1.2 MG/DL (ref 0.5–1.4)
DACRYOCYTES BLD QL SMEAR: ABNORMAL
DIFFERENTIAL METHOD: ABNORMAL
DOHLE BOD BLD QL SMEAR: PRESENT
EOSINOPHIL # BLD AUTO: 0.2 K/UL (ref 0–0.5)
EOSINOPHIL # BLD AUTO: 0.2 K/UL (ref 0–0.5)
EOSINOPHIL # BLD AUTO: ABNORMAL K/UL (ref 0–0.5)
EOSINOPHIL NFR BLD: 1.7 % (ref 0–8)
EOSINOPHIL NFR BLD: 1.9 % (ref 0–8)
EOSINOPHIL NFR BLD: 2 % (ref 0–8)
EOSINOPHIL NFR BLD: 3.3 % (ref 0–8)
ERYTHROCYTE [DISTWIDTH] IN BLOOD BY AUTOMATED COUNT: 15.5 % (ref 11.5–14.5)
ERYTHROCYTE [DISTWIDTH] IN BLOOD BY AUTOMATED COUNT: 15.8 % (ref 11.5–14.5)
ERYTHROCYTE [DISTWIDTH] IN BLOOD BY AUTOMATED COUNT: 15.8 % (ref 11.5–14.5)
ERYTHROCYTE [DISTWIDTH] IN BLOOD BY AUTOMATED COUNT: 15.9 % (ref 11.5–14.5)
EST. GFR  (AFRICAN AMERICAN): 59.2 ML/MIN/1.73 M^2
EST. GFR  (AFRICAN AMERICAN): >60 ML/MIN/1.73 M^2
EST. GFR  (NON AFRICAN AMERICAN): 51.4 ML/MIN/1.73 M^2
EST. GFR  (NON AFRICAN AMERICAN): >60 ML/MIN/1.73 M^2
GLUCOSE SERPL-MCNC: 123 MG/DL (ref 70–110)
GLUCOSE SERPL-MCNC: 124 MG/DL (ref 70–110)
GLUCOSE SERPL-MCNC: 129 MG/DL (ref 70–110)
GLUCOSE SERPL-MCNC: 129 MG/DL (ref 70–110)
HCO3 UR-SCNC: 26 MMOL/L (ref 24–28)
HCT VFR BLD AUTO: 22 % (ref 37–48.5)
HCT VFR BLD AUTO: 22.2 % (ref 37–48.5)
HCT VFR BLD AUTO: 22.3 % (ref 37–48.5)
HCT VFR BLD AUTO: 22.8 % (ref 37–48.5)
HGB BLD-MCNC: 7.1 G/DL (ref 12–16)
HGB BLD-MCNC: 7.3 G/DL (ref 12–16)
HGB BLD-MCNC: 7.4 G/DL (ref 12–16)
HGB BLD-MCNC: 7.5 G/DL (ref 12–16)
HYPOCHROMIA BLD QL SMEAR: ABNORMAL
IMM GRANULOCYTES # BLD AUTO: 0.52 K/UL (ref 0–0.04)
IMM GRANULOCYTES # BLD AUTO: 0.63 K/UL (ref 0–0.04)
IMM GRANULOCYTES # BLD AUTO: ABNORMAL K/UL (ref 0–0.04)
IMM GRANULOCYTES # BLD AUTO: ABNORMAL K/UL (ref 0–0.04)
IMM GRANULOCYTES NFR BLD AUTO: 4.2 % (ref 0–0.5)
IMM GRANULOCYTES NFR BLD AUTO: 4.7 % (ref 0–0.5)
IMM GRANULOCYTES NFR BLD AUTO: ABNORMAL % (ref 0–0.5)
IMM GRANULOCYTES NFR BLD AUTO: ABNORMAL % (ref 0–0.5)
LYMPHOCYTES # BLD AUTO: 1 K/UL (ref 1–4.8)
LYMPHOCYTES # BLD AUTO: 1.1 K/UL (ref 1–4.8)
LYMPHOCYTES # BLD AUTO: ABNORMAL K/UL (ref 1–4.8)
LYMPHOCYTES NFR BLD: 3 % (ref 18–48)
LYMPHOCYTES NFR BLD: 8.2 % (ref 18–48)
LYMPHOCYTES NFR BLD: 8.4 % (ref 18–48)
LYMPHOCYTES NFR BLD: 8.6 % (ref 18–48)
MAGNESIUM SERPL-MCNC: 1.8 MG/DL (ref 1.6–2.6)
MCH RBC QN AUTO: 29.9 PG (ref 27–31)
MCH RBC QN AUTO: 30.1 PG (ref 27–31)
MCH RBC QN AUTO: 30.1 PG (ref 27–31)
MCH RBC QN AUTO: 30.2 PG (ref 27–31)
MCHC RBC AUTO-ENTMCNC: 32 G/DL (ref 32–36)
MCHC RBC AUTO-ENTMCNC: 32 G/DL (ref 32–36)
MCHC RBC AUTO-ENTMCNC: 33.6 G/DL (ref 32–36)
MCHC RBC AUTO-ENTMCNC: 33.6 G/DL (ref 32–36)
MCV RBC AUTO: 90 FL (ref 82–98)
MCV RBC AUTO: 90 FL (ref 82–98)
MCV RBC AUTO: 93 FL (ref 82–98)
MCV RBC AUTO: 94 FL (ref 82–98)
METAMYELOCYTES NFR BLD MANUAL: 0.7 %
MONOCYTES # BLD AUTO: 1 K/UL (ref 0.3–1)
MONOCYTES # BLD AUTO: 1.1 K/UL (ref 0.3–1)
MONOCYTES # BLD AUTO: ABNORMAL K/UL (ref 0.3–1)
MONOCYTES NFR BLD: 2 % (ref 4–15)
MONOCYTES NFR BLD: 6 % (ref 4–15)
MONOCYTES NFR BLD: 8.2 % (ref 4–15)
MONOCYTES NFR BLD: 8.4 % (ref 4–15)
MYELOCYTES NFR BLD MANUAL: 0.7 %
NEUTROPHILS # BLD AUTO: 10.2 K/UL (ref 1.8–7.7)
NEUTROPHILS # BLD AUTO: 9.4 K/UL (ref 1.8–7.7)
NEUTROPHILS NFR BLD: 76.7 % (ref 38–73)
NEUTROPHILS NFR BLD: 76.9 % (ref 38–73)
NEUTROPHILS NFR BLD: 80.6 % (ref 38–73)
NEUTROPHILS NFR BLD: 85 % (ref 38–73)
NEUTS BAND NFR BLD MANUAL: 2.7 %
NEUTS BAND NFR BLD MANUAL: 4 %
NRBC BLD-RTO: 0 /100 WBC
OVALOCYTES BLD QL SMEAR: ABNORMAL
PCO2 BLDA: 38.7 MMHG (ref 35–45)
PH SMN: 7.43 [PH] (ref 7.35–7.45)
PHOSPHATE SERPL-MCNC: 1.6 MG/DL (ref 2.7–4.5)
PHOSPHATE SERPL-MCNC: 1.7 MG/DL (ref 2.7–4.5)
PHOSPHATE SERPL-MCNC: 2.4 MG/DL (ref 2.7–4.5)
PHOSPHATE SERPL-MCNC: 3.6 MG/DL (ref 2.7–4.5)
PLATELET # BLD AUTO: 126 K/UL (ref 150–450)
PLATELET # BLD AUTO: 139 K/UL (ref 150–450)
PLATELET # BLD AUTO: 155 K/UL (ref 150–450)
PLATELET # BLD AUTO: 165 K/UL (ref 150–450)
PLATELET BLD QL SMEAR: ABNORMAL
PLATELET BLD QL SMEAR: ABNORMAL
PMV BLD AUTO: 13.1 FL (ref 9.2–12.9)
PMV BLD AUTO: 13.4 FL (ref 9.2–12.9)
PMV BLD AUTO: 13.5 FL (ref 9.2–12.9)
PMV BLD AUTO: 13.8 FL (ref 9.2–12.9)
PO2 BLDA: 118 MMHG (ref 80–100)
POC BE: 2 MMOL/L
POC SATURATED O2: 99 % (ref 95–100)
POC TCO2: 27 MMOL/L (ref 23–27)
POCT GLUCOSE: 129 MG/DL (ref 70–110)
POCT GLUCOSE: 131 MG/DL (ref 70–110)
POCT GLUCOSE: 136 MG/DL (ref 70–110)
POCT GLUCOSE: 140 MG/DL (ref 70–110)
POIKILOCYTOSIS BLD QL SMEAR: SLIGHT
POLYCHROMASIA BLD QL SMEAR: ABNORMAL
POLYCHROMASIA BLD QL SMEAR: ABNORMAL
POTASSIUM SERPL-SCNC: 4.5 MMOL/L (ref 3.5–5.1)
POTASSIUM SERPL-SCNC: 4.6 MMOL/L (ref 3.5–5.1)
PROMYELOCYTES NFR BLD MANUAL: 0.7 %
PROT SERPL-MCNC: 4.2 G/DL (ref 6–8.4)
RBC # BLD AUTO: 2.36 M/UL (ref 4–5.4)
RBC # BLD AUTO: 2.44 M/UL (ref 4–5.4)
RBC # BLD AUTO: 2.45 M/UL (ref 4–5.4)
RBC # BLD AUTO: 2.49 M/UL (ref 4–5.4)
SAMPLE: ABNORMAL
SCHISTOCYTES BLD QL SMEAR: ABNORMAL
SCHISTOCYTES BLD QL SMEAR: PRESENT
SITE: ABNORMAL
SODIUM SERPL-SCNC: 136 MMOL/L (ref 136–145)
SODIUM SERPL-SCNC: 137 MMOL/L (ref 136–145)
SODIUM SERPL-SCNC: 139 MMOL/L (ref 136–145)
SODIUM SERPL-SCNC: 140 MMOL/L (ref 136–145)
WBC # BLD AUTO: 12.3 K/UL (ref 3.9–12.7)
WBC # BLD AUTO: 13.22 K/UL (ref 3.9–12.7)
WBC # BLD AUTO: 13.34 K/UL (ref 3.9–12.7)
WBC # BLD AUTO: 15.04 K/UL (ref 3.9–12.7)
WBC TOXIC VACUOLES BLD QL SMEAR: PRESENT

## 2021-12-25 PROCEDURE — 94150 VITAL CAPACITY TEST: CPT

## 2021-12-25 PROCEDURE — 99233 PR SUBSEQUENT HOSPITAL CARE,LEVL III: ICD-10-PCS | Mod: 24,,, | Performed by: SURGERY

## 2021-12-25 PROCEDURE — 20000000 HC ICU ROOM

## 2021-12-25 PROCEDURE — 99233 SBSQ HOSP IP/OBS HIGH 50: CPT | Mod: 24,,, | Performed by: SURGERY

## 2021-12-25 PROCEDURE — 84100 ASSAY OF PHOSPHORUS: CPT | Performed by: STUDENT IN AN ORGANIZED HEALTH CARE EDUCATION/TRAINING PROGRAM

## 2021-12-25 PROCEDURE — 90945 DIALYSIS ONE EVALUATION: CPT

## 2021-12-25 PROCEDURE — 82803 BLOOD GASES ANY COMBINATION: CPT

## 2021-12-25 PROCEDURE — 99900026 HC AIRWAY MAINTENANCE (STAT)

## 2021-12-25 PROCEDURE — B4185 PARENTERAL SOL 10 GM LIPIDS: HCPCS | Performed by: SURGERY

## 2021-12-25 PROCEDURE — 99900035 HC TECH TIME PER 15 MIN (STAT)

## 2021-12-25 PROCEDURE — 63600175 PHARM REV CODE 636 W HCPCS

## 2021-12-25 PROCEDURE — A4217 STERILE WATER/SALINE, 500 ML: HCPCS | Performed by: SURGERY

## 2021-12-25 PROCEDURE — 90945 PR DIALYSIS, NOT HEMO, 1 EVAL: ICD-10-PCS | Mod: ,,, | Performed by: INTERNAL MEDICINE

## 2021-12-25 PROCEDURE — 85025 COMPLETE CBC W/AUTO DIFF WBC: CPT | Mod: 91 | Performed by: SURGERY

## 2021-12-25 PROCEDURE — 63600175 PHARM REV CODE 636 W HCPCS: Performed by: SURGERY

## 2021-12-25 PROCEDURE — 80069 RENAL FUNCTION PANEL: CPT | Performed by: INTERNAL MEDICINE

## 2021-12-25 PROCEDURE — 25000003 PHARM REV CODE 250: Performed by: SURGERY

## 2021-12-25 PROCEDURE — 27000221 HC OXYGEN, UP TO 24 HOURS

## 2021-12-25 PROCEDURE — 63600175 PHARM REV CODE 636 W HCPCS: Performed by: STUDENT IN AN ORGANIZED HEALTH CARE EDUCATION/TRAINING PROGRAM

## 2021-12-25 PROCEDURE — 94761 N-INVAS EAR/PLS OXIMETRY MLT: CPT

## 2021-12-25 PROCEDURE — 85027 COMPLETE CBC AUTOMATED: CPT | Performed by: SURGERY

## 2021-12-25 PROCEDURE — 25000003 PHARM REV CODE 250: Performed by: STUDENT IN AN ORGANIZED HEALTH CARE EDUCATION/TRAINING PROGRAM

## 2021-12-25 PROCEDURE — 85007 BL SMEAR W/DIFF WBC COUNT: CPT | Mod: 91 | Performed by: SURGERY

## 2021-12-25 PROCEDURE — 94010 BREATHING CAPACITY TEST: CPT

## 2021-12-25 PROCEDURE — 90945 DIALYSIS ONE EVALUATION: CPT | Mod: ,,, | Performed by: INTERNAL MEDICINE

## 2021-12-25 PROCEDURE — 83735 ASSAY OF MAGNESIUM: CPT | Mod: 91 | Performed by: INTERNAL MEDICINE

## 2021-12-25 PROCEDURE — 83735 ASSAY OF MAGNESIUM: CPT | Performed by: STUDENT IN AN ORGANIZED HEALTH CARE EDUCATION/TRAINING PROGRAM

## 2021-12-25 PROCEDURE — 80100008 HC CRRT DAILY MAINTENANCE

## 2021-12-25 PROCEDURE — 63600175 PHARM REV CODE 636 W HCPCS: Performed by: INTERNAL MEDICINE

## 2021-12-25 PROCEDURE — 94003 VENT MGMT INPAT SUBQ DAY: CPT

## 2021-12-25 PROCEDURE — 80053 COMPREHEN METABOLIC PANEL: CPT | Performed by: STUDENT IN AN ORGANIZED HEALTH CARE EDUCATION/TRAINING PROGRAM

## 2021-12-25 PROCEDURE — 37799 UNLISTED PX VASCULAR SURGERY: CPT

## 2021-12-25 RX ORDER — FENTANYL CITRATE 50 UG/ML
100 INJECTION, SOLUTION INTRAMUSCULAR; INTRAVENOUS
Status: DISCONTINUED | OUTPATIENT
Start: 2021-12-25 | End: 2021-12-25

## 2021-12-25 RX ORDER — HYDROMORPHONE HYDROCHLORIDE 1 MG/ML
0.5 INJECTION, SOLUTION INTRAMUSCULAR; INTRAVENOUS; SUBCUTANEOUS ONCE
Status: COMPLETED | OUTPATIENT
Start: 2021-12-25 | End: 2021-12-25

## 2021-12-25 RX ORDER — HYDROMORPHONE HYDROCHLORIDE 1 MG/ML
0.5 INJECTION, SOLUTION INTRAMUSCULAR; INTRAVENOUS; SUBCUTANEOUS
Status: DISCONTINUED | OUTPATIENT
Start: 2021-12-25 | End: 2021-12-26

## 2021-12-25 RX ORDER — FENTANYL CITRATE 50 UG/ML
50 INJECTION, SOLUTION INTRAMUSCULAR; INTRAVENOUS
Status: DISCONTINUED | OUTPATIENT
Start: 2021-12-25 | End: 2021-12-27

## 2021-12-25 RX ADMIN — Medication: at 08:12

## 2021-12-25 RX ADMIN — NITROGLYCERIN 0.5 INCH: 20 OINTMENT TOPICAL at 11:12

## 2021-12-25 RX ADMIN — MUPIROCIN: 20 OINTMENT TOPICAL at 08:12

## 2021-12-25 RX ADMIN — FAMOTIDINE 20 MG: 10 INJECTION, SOLUTION INTRAVENOUS at 08:12

## 2021-12-25 RX ADMIN — DEXMEDETOMIDINE HYDROCHLORIDE 1.4 MCG/KG/HR: 4 INJECTION INTRAVENOUS at 06:12

## 2021-12-25 RX ADMIN — NITROGLYCERIN 0.5 INCH: 20 OINTMENT TOPICAL at 06:12

## 2021-12-25 RX ADMIN — POTASSIUM & SODIUM PHOSPHATES POWDER PACK 280-160-250 MG 2 PACKET: 280-160-250 PACK at 12:12

## 2021-12-25 RX ADMIN — VASOPRESSIN 0.02 UNITS/MIN: 20 INJECTION INTRAVENOUS at 02:12

## 2021-12-25 RX ADMIN — CALCIUM GLUCONATE: 98 INJECTION, SOLUTION INTRAVENOUS at 09:12

## 2021-12-25 RX ADMIN — HYDROMORPHONE HYDROCHLORIDE 0.5 MG: 1 INJECTION, SOLUTION INTRAMUSCULAR; INTRAVENOUS; SUBCUTANEOUS at 06:12

## 2021-12-25 RX ADMIN — HEPARIN SODIUM 5000 UNITS: 5000 INJECTION INTRAVENOUS; SUBCUTANEOUS at 06:12

## 2021-12-25 RX ADMIN — DEXMEDETOMIDINE HYDROCHLORIDE 1.4 MCG/KG/HR: 4 INJECTION INTRAVENOUS at 01:12

## 2021-12-25 RX ADMIN — NITROGLYCERIN 0.5 INCH: 20 OINTMENT TOPICAL at 12:12

## 2021-12-25 RX ADMIN — VASOPRESSIN 0.04 UNITS/MIN: 20 INJECTION INTRAVENOUS at 01:12

## 2021-12-25 RX ADMIN — HEPARIN SODIUM 5000 UNITS: 5000 INJECTION INTRAVENOUS; SUBCUTANEOUS at 09:12

## 2021-12-25 RX ADMIN — SMOFLIPID 250 ML: 6; 6; 5; 3 INJECTION, EMULSION INTRAVENOUS at 09:12

## 2021-12-25 RX ADMIN — DEXMEDETOMIDINE HYDROCHLORIDE 1.4 MCG/KG/HR: 4 INJECTION INTRAVENOUS at 03:12

## 2021-12-25 RX ADMIN — MAGNESIUM SULFATE 2 G: 2 INJECTION INTRAVENOUS at 04:12

## 2021-12-25 RX ADMIN — HYDROMORPHONE HYDROCHLORIDE 0.5 MG: 1 INJECTION, SOLUTION INTRAMUSCULAR; INTRAVENOUS; SUBCUTANEOUS at 09:12

## 2021-12-25 RX ADMIN — HYDROMORPHONE HYDROCHLORIDE 0.5 MG: 1 INJECTION, SOLUTION INTRAMUSCULAR; INTRAVENOUS; SUBCUTANEOUS at 02:12

## 2021-12-25 RX ADMIN — HYDROMORPHONE HYDROCHLORIDE 0.5 MG: 1 INJECTION, SOLUTION INTRAMUSCULAR; INTRAVENOUS; SUBCUTANEOUS at 10:12

## 2021-12-25 RX ADMIN — POTASSIUM & SODIUM PHOSPHATES POWDER PACK 280-160-250 MG 4 PACKET: 280-160-250 PACK at 04:12

## 2021-12-25 RX ADMIN — DEXMEDETOMIDINE HYDROCHLORIDE 1.4 MCG/KG/HR: 4 INJECTION INTRAVENOUS at 02:12

## 2021-12-25 RX ADMIN — HYDROMORPHONE HYDROCHLORIDE 0.5 MG: 1 INJECTION, SOLUTION INTRAMUSCULAR; INTRAVENOUS; SUBCUTANEOUS at 07:12

## 2021-12-25 RX ADMIN — DEXMEDETOMIDINE HYDROCHLORIDE 1.4 MCG/KG/HR: 4 INJECTION INTRAVENOUS at 09:12

## 2021-12-25 RX ADMIN — Medication: at 09:12

## 2021-12-25 RX ADMIN — HEPARIN SODIUM 5000 UNITS: 5000 INJECTION INTRAVENOUS; SUBCUTANEOUS at 01:12

## 2021-12-25 NOTE — PLAN OF CARE
"                                                                        SICU PLAN OF CARE NOTE     Dx: Septic shock, Bowel perforation     Shift Events: Pt's V/S at this time. Pt intermittently on Levophed gtt over night, minimal pressor requirements while awake. Neuro status remains unchanged over shift. MD notified of pt's increasing discomfort and anxiety despite max sedation and pain control measures, one time dose of dilaudid given and PRN fentanyl bolus ordered and given, pt responding well temporarily but no sustain comfort obtained. Pt's had trial on CPAP this AM, low tidal volumes obtained, planning to wean sedation further and try later this AM. Plan to continue CRRT and maintain adequate fluid volume status, wean O2 and pressors as tolerated, possibly extubation, maintain pt comfort. POC reviewed with pt and pt's spouse all questions answered and encouraged. Will continue to monitor.      Goals of Care: MAP >65     Neuro: Sedated, Arouses to Voice, Follows Commands and Moves All Extremities     Vital Signs: BP (!) 74/44 (BP Location: Right arm, Patient Position: Lying)   Pulse 96   Temp 98.6 °F (37 °C) (Core Esophageal)   Resp 18   Ht 5' 4" (1.626 m)   Wt 120.1 kg (264 lb 12.4 oz)   SpO2 (!) 92%   BMI 45.45 kg/m²     Cardiac: NSR, HR 60-80s, CVP 12/15/15     Respiratory: Ventilator, AC/VC+, FiO2 50%, PEEP 5, RR 12     Diet: NPO and TPN @ 40 ml/hr     Gtts: Precedex, Vasopressin and MIVF - intermittent Levophed, Fentanyl off this AM per MD for SBT     Urine Output: Urinary Catheter 0 cc/shift, Anuric while on CRRT     Drains:  REAGAN #1, total output of 60 ml/shift  REAGAN #2, total output of 165 ml/shift  REAGAN #3, total output of 0 ml/shift  LUQ G-tube, minimal residuals      Restriants: maintained while pt intubated/sedated, assessed Q2H for injury, no injury noted    CRRT: SLED continuous, UF goal 200-400 (@ 350 currently), tolerating well at this time, UF titrated per pt's MAPS     Labs: daily, Q6H CBC / " Accuchecks: Q6H     Skin: No new skin breakdown noted. Pt's midabdominal incision maintained with Wound Vac over night CDI, 125 mmHg suction. Skin tears redressed with skin foams, CDI at this time. Venelex applied to bilateral heels. [place] bed plugged in and working properly, waffle mattress inflated.

## 2021-12-25 NOTE — SUBJECTIVE & OBJECTIVE
Interval History/Significant Events: NAEON. Pt failed SBT yesterday, will try again this AM. Pt was agitated and overbreathing overnight and responded to prn dose of dilaudid and fentanyl gtt. Will CTM in SICU.     Follow-up For: Procedure(s) (LRB):  LAPAROTOMY, EXPLORATORY (N/A)  INSERTION, GASTROSTOMY TUBE, PERCUTANEOUS (N/A)  CHOLECYSTECTOMY  EGD (ESOPHAGOGASTRODUODENOSCOPY) (N/A)    Post-Operative Day: 2 Days Post-Op    Objective:     Vital Signs (Most Recent):  Temp: 98.6 °F (37 °C) (12/25/21 0500)  Pulse: 96 (12/25/21 0530)  Resp: 18 (12/25/21 0530)  BP: (!) 74/44 (12/25/21 0300)  SpO2: (!) 92 % (12/25/21 0530) Vital Signs (24h Range):  Temp:  [97.88 °F (36.6 °C)-99.32 °F (37.4 °C)] 98.6 °F (37 °C)  Pulse:  [] 96  Resp:  [14-40] 18  SpO2:  [88 %-100 %] 92 %  BP: ()/(44-80) 74/44  Arterial Line BP: ()/() 131/62     Weight: 120.1 kg (264 lb 12.4 oz)  Body mass index is 45.45 kg/m².      Intake/Output Summary (Last 24 hours) at 12/25/2021 0551  Last data filed at 12/25/2021 0500  Gross per 24 hour   Intake 6089.49 ml   Output 6045 ml   Net 44.49 ml       Physical Exam  Constitutional:       Comments: RAAS of 0   HENT:      Head: Normocephalic and atraumatic.      Nose: Nose normal.      Mouth/Throat:      Mouth: Mucous membranes are moist.      Pharynx: Oropharynx is clear.   Eyes:      Conjunctiva/sclera: Conjunctivae normal.      Pupils: Pupils are equal, round, and reactive to light.   Cardiovascular:      Rate and Rhythm: Normal rate and regular rhythm.      Pulses: Normal pulses.      Heart sounds: Normal heart sounds.   Pulmonary:      Effort: Pulmonary effort is normal.      Breath sounds: Normal breath sounds.      Comments: Crackles best appreciated at the bases.   Abdominal:      General: Abdomen is flat.      Comments: REAGAN drains and abdominal wound vac in place   Musculoskeletal:         General: Normal range of motion.      Cervical back: Normal range of motion and neck supple.    Skin:     General: Skin is warm and dry.      Capillary Refill: Capillary refill takes less than 2 seconds.   Psychiatric:         Mood and Affect: Mood normal.         Behavior: Behavior normal.         Vents:  Vent Mode: A/C (12/25/21 0515)  Ventilator Initiated: Yes (12/20/21 1448)  Set Rate: 12 BPM (12/25/21 0515)  Vt Set: 420 mL (12/25/21 0515)  Pressure Support: 20 cmH20 (12/25/21 0100)  PEEP/CPAP: 5 cmH20 (12/25/21 0515)  Oxygen Concentration (%): 40 (12/25/21 0530)  Peak Airway Pressure: 32 cmH2O (12/25/21 0515)  Plateau Pressure: 36 cmH20 (12/25/21 0515)  Total Ve: 6.98 mL (12/25/21 0515)  Negative Inspiratory Force (cm H2O): 0 (12/25/21 0100)  F/VT Ratio<105 (RSBI): (!) 42.86 (12/25/21 0515)    Lines/Drains/Airways     Central Venous Catheter Line            Percutaneous Central Line Insertion/Assessment - Triple Lumen  12/16/21 1531 left subclavian 8 days    Trialysis (Dialysis) Catheter 12/21/21 0030 right internal jugular 4 days          Drain                 Urethral Catheter 12/20/21 1743 4 days         Closed/Suction Drain 12/23/21 1428 Right;Inferior RUQ Bulb 19 Fr. 1 day         Closed/Suction Drain 12/23/21 1429 Right;Superior RUQ Bulb 19 Fr. 1 day         Closed/Suction Drain 12/23/21 1430 LLQ Bulb 19 Fr. 1 day         Gastrostomy/Enterostomy 12/23/21 1338 Gastrostomy tube w/ balloon LUQ feeding 1 day          Airway                 Airway - Non-Surgical 12/20/21 1451 Endotracheal Tube 4 days          Arterial Line            Arterial Line 12/18/21 0000 Right Radial 7 days                Significant Labs:    CBC/Anemia Profile:  Recent Labs   Lab 12/24/21  1308 12/24/21  2051 12/25/21  0300   WBC 11.42  11.23 13.82* 13.22*   HGB 7.7*  7.6* 7.5* 7.4*   HCT 22.2*  21.9* 21.9* 22.0*   PLT 86*  82* 106* 126*   MCV 88  87 88 90   RDW 15.3*  15.0* 15.4* 15.5*        Chemistries:  Recent Labs   Lab 12/23/21  1524 12/23/21  1524 12/24/21  0255 12/24/21  0255 12/24/21  1308 12/24/21  2217  12/25/21  0300      < > 138   < > 138 137 136  137   K 4.9   < > 4.8   < > 4.2 4.4 4.5  4.5      < > 107   < > 106 105 104  104   CO2 22*   < > 24   < > 24 26 27  26   BUN 31*   < > 42*   < > 42* 13 9  9   CREATININE 1.6*   < > 2.1*   < > 2.0* 0.7 0.6  0.6   CALCIUM 8.0*   < > 7.5*   < > 7.3* 7.2* 7.1*  7.2*   ALBUMIN 1.8*   < > 1.6*   < > 1.5* 1.6* 1.6*  1.6*   PROT 3.4*  --  3.6*  --   --   --  4.2*   BILITOT 5.3*  --  4.8*  --   --   --  3.5*   ALKPHOS 55  --  57  --   --   --  87   ALT 73*  --  73*  --   --   --  69*   *  --  142*  --   --   --  103*   MG 2.1   < > 1.9   < > 1.8 1.7 1.8  1.8   PHOS  --   --  3.3   < > 2.3* 1.5* 1.7*  1.6*    < > = values in this interval not displayed.       All pertinent labs within the past 24 hours have been reviewed.    Significant Imaging:  I have reviewed all pertinent imaging results/findings within the past 24 hours.  I have reviewed and interpreted all pertinent imaging results/findings within the past 24 hours.

## 2021-12-25 NOTE — PROGRESS NOTES
[facility]  Nephrology  Progress Note     Patient Name: Chidi Castaneda  MRN: [id number]  Admission Date: 12/20/2021  Hospital Length of Stay: 5 days  Attending Provider: Kendall Gorman MD   Primary Care Physician: Primary Doctor No  Principal Problem: <principal problem not specified>    Subjective:     HPI: Chidi Castaneda is a 56 y.o. female w/ PMHx HTN, GERD s/p EGD 6/22/2021, migraines, ovarian cyst s/p cystectomy, and obesity who underwent an elective lap hysterectomy on 12/18/2021 at [facility] and was then transferred to [place], [place] for higher level of care when pt was found to have post-op somnolence, decrease PO intake, decrease urine output that progressed to anuria with a sharp rise in Cr from 0.9 to 2.8 (12/10). Pt was treated for sepsis with volume resuscitation and broad spectrum antibiotics, however pt continue to deteriorate and became tachycardic, hypotensive, and imaging showed an ileus. Pt was intubated since she was found to be acidotic with a ph of 7.28 despite a nonrebreather with 100%  FiO2  12/11 pt was taken back to the OR for washout and a bowel resection was done as she was found to have a small bowel perforation  12/15 pt was found to have a subscapular liver hematoma  12/18 pt was taken back to the OR for wound vacc exchange and removal of hematoma   12/19 general surgery team recommended no further surgical intervention since they did not find any active bleeding intraoperatively on 12/18 and felt that the ongoing bleeding was 2/2 consumptive coagulopathy and septic shock. They recommended to continue wound vacc and to consider higher level of care for pt  Pt was also being treated for a bacteremia as well as for intraabdominal infection, her antibiotics prior to transfer were: meropenem, flagyl, and vancomycin   Pt received tranexamic acid x1 and multiple units of blood products.    Pt was transferred to [facility] [place] 12/20/[place]21 for higher  "level of care: embolization for a subscapular hematoma       Nephrology was consulted for: "dialysis, SUSAN"    Pt does not have any h/o renal disease prior to hospitalization (per chart review and per pt's )   EDW: 88 kg   Post-op renal ultrasound was normal (results of ultrasound and other medical records from the outside hospital are in the chart at the bedside, needs to be scanned into chart)   Pt was started on CRRT on 12/12 via a trialysis catheter,   On 12/20/21 morning nephrology at outside hospital had recommended CRRT-SLED however primary team requested iHD prior to [place] [facility], it appears that pt was ordered for 4 hours however, pt was prepped for transfer and was unable to complete full session 2/2 transfer, per pt's , pt had 2L of fluid removed instead of the planned 4 liters. Of note, while pt was at [facility] pt's CRRT required the use of citrate to prevent clotting (briefly), however that apparently resolved and was able to get a session w/o citrate and obviously was able to tolerate iHD prior to transfer.   Upon arrival to [facility] [place] was started on zosyn, she was started on NS 125ml/hr, was transfused 2 units of PRBCs, and remained off vasopressors.       Interval History: Seen at the bedside no event overnight       Review of patient's allergies indicates:   Allergen Reactions    Oxycodone     Tylox [oxycodone-acetaminophen]       Current Facility-Administered Medications   Medication Frequency    0.9%  NaCl infusion (CRRT USE ONLY) Continuous    0.9%  NaCl infusion (for blood administration) Q24H PRN    0.9%  NaCl infusion (for blood administration) Q24H PRN    0.9%  NaCl infusion (for blood administration) Q24H PRN    0.9%  NaCl infusion (for blood administration) Q24H PRN    0.9%  NaCl infusion (for blood administration) Q24H PRN    0.9%  NaCl infusion Continuous    balsam peru-castor oiL Oint BID    dexmedetomidine (PRECEDEX) 400mcg/100mL 0.9% NaCL infusion " Continuous    dextrose 50% injection 12.5 g PRN    dextrose 50% injection 25 g PRN    famotidine (PF) injection 20 mg Daily    fentaNYL 2500 mcg in 0.9% sodium chloride 250 mL infusion premix (titrating) Continuous    fentanyl IV bolus from bag/infusion 50 mcg Q1H PRN    glucagon (human recombinant) injection 1 mg PRN    heparin (porcine) injection 5,000 Units Q8H    HYDROmorphone injection 0.5 mg Q2H PRN    insulin aspart U-100 pen 0-5 Units Q6H PRN    lipid (SMOFLIPID) (SMOFLIPID) 20 % infusion 250 mL Daily    magnesium sulfate 2g in water 50mL IVPB (premix) PRN    nitroGLYCERIN 2% TD oint ointment 0.5 inch Q6H    NORepinephrine 4 mg in dextrose 5% 250 mL infusion (premix) (titrating) Continuous    potassium, sodium phosphates 280-160-250 mg packet 2 packet PRN    potassium, sodium phosphates 280-160-250 mg packet 2 packet PRN    potassium, sodium phosphates 280-160-250 mg packet 2 packet PRN    sodium chloride 0.9% flush 10 mL PRN    sodium chloride 0.9% flush 10 mL PRN    TPN ADULT CENTRAL LINE CUSTOM Continuous    vasopressin (PITRESSIN) 0.2 Units/mL in dextrose 5 % 100 mL infusion Continuous       Objective:     Vital Signs (Most Recent):  Temp: 98.6 °F (37 °C) (12/25/21 0821)  Pulse: 92 (12/25/21 0821)  Resp: (!) 40 (12/25/21 0821)  BP: (!) 74/44 (12/25/21 0300)  SpO2: 95 % (12/25/21 0821)  O2 Device (Oxygen Therapy): ventilator (12/25/21 0821) Vital Signs (24h Range):  Temp:  [97.88 °F (36.6 °C)-99.14 °F (37.3 °C)] 98.6 °F (37 °C)  Pulse:  [] 92  Resp:  [14-40] 40  SpO2:  [88 %-100 %] 95 %  BP: ()/(44-80) 74/44  Arterial Line BP: ()/(47-75) 148/66   Weight: 120.1 kg (264 lb 12.4 oz) (12/24/21 0600)  Body mass index is 45.45 kg/m².  Body surface area is 2.33 meters squared.      Intake/Output Summary (Last 24 hours) at 12/25/2021 0910  Last data filed at 12/25/2021 0800  Gross per 24 hour   Intake 6281.74 ml   Output 7084 ml   Net -802.26 ml     I/O last 3 completed  shifts:  In: 9904.2 [I.V.:5949.4; Blood:79; NG/GT:240; IV Piggyback:1873.5]  Out: 6876 [Urine:20; Drains:840; Other:6016]  Net IO Since Admission: 8,002.54 mL [12/25/21 0910]     Physical Exam     Constitutional:       Appearance: She is obese. She is ill-appearing.   HENT:      Mouth/Throat:      Mouth: Mucous membranes are moist.   Eyes:      Pupils: Pupils are equal, round, and reactive to light.   Cardiovascular:      Rate and Rhythm: Normal rate and regular rhythm.   Pulmonary:      Comments: intubated  Abdominal:      Comments: open   Musculoskeletal:         General: No deformity.      Right lower leg: Edema present.      Left lower leg: Edema present.   Skin:     Coloration: Skin is not jaundiced.   Neurological:      General: No focal deficit present.     Recent Labs   Lab 12/24/21  1308 12/24/21  1308 12/24/21  2051 12/25/21  0300   WBC 11.42  11.23  --  13.82* 13.22*   HGB 7.7*  7.6*  --  7.5* 7.4*   HCT 22.2*  21.9*  --  21.9* 22.0*   PLT 86*  82*  --  106* 126*   MONO 2.0*  7.3  CANCELED  0.8   < > 8.0  CANCELED 2.0*  CANCELED    < > = values in this interval not displayed.      Recent Labs   Lab 12/23/21  1524 12/23/21  1524 12/24/21  0255 12/24/21  0255 12/24/21  1308 12/24/21  2215 12/25/21  0300      < > 138   < > 138 137 136  137   K 4.9   < > 4.8   < > 4.2 4.4 4.5  4.5      < > 107   < > 106 105 104  104   CO2 22*   < > 24   < > 24 26 27  26   BUN 31*   < > 42*   < > 42* 13 9  9   CREATININE 1.6*   < > 2.1*   < > 2.0* 0.7 0.6  0.6   CALCIUM 8.0*   < > 7.5*   < > 7.3* 7.2* 7.1*  7.2*   PROT 3.4*  --  3.6*  --   --   --  4.2*   BILITOT 5.3*  --  4.8*  --   --   --  3.5*   ALKPHOS 55  --  57  --   --   --  87   ALT 73*  --  73*  --   --   --  69*   *  --  142*  --   --   --  103*    < > = values in this interval not displayed.      Recent Labs     12/23/21  1531 12/24/21  0414 12/25/21  0443   PH 7.371 7.496* 7.435   PCO2 44.2 30.9* 38.7   PO2 66* 55* 118*   HCO3  25.6 23.9* 26.0   POCSATURATED 92* 91* 99   BE 0 1 2       Assessment/Plan:       Subcapsular hematoma of liver    SUSAN (acute kidney injury)    Septic shock    Bowel perforation    Metabolic acidosis    Volume overload       SUSAN (acute kidney injury)  -oliguric SUSAN, ischemic ATN with multifactorial etiology, however most likely d/t severe hypotension as a result of septic shock 2/2 small bowel perforation and bacteremia   -BL sCr 1  -KRT started at OSH 12/12/2021 for acidosis and volume overload  -still anuric on norepi  -net positive 6L last 24 hours with CXR pulmonary edema as read by me   - started SLED 12/24/21 we will continue till 8pm today and will hold and will reassess in am        Metabolic acidosis  -controlled with KRT  - stable      Bowel perforation  -per primary     Septic shock  -secondary to bowel perforation  -per primary        Subcapsular hematoma of liver  -per primary      Thank you for your consult. I will follow-up with patient. Please contact us if you have any additional questions.    Uma Ernandez MD  Nephrology  [facility]

## 2021-12-25 NOTE — ASSESSMENT & PLAN NOTE
55yo female transfer from OSH after hysterectomy on 12/8 complicated by delayed recognition of small bowel injury requiring resection (in discontinuity) and at some point new bleed from the liver - transferred with open abdomen for higher level of care.  S/p ex-lap, liver packing 12/21    - Sedation vacation and SBT, if meets parameters would plan to extubate  - Transfuse for Hgb <7. Correct coagulopathy  - Wean vasopressors as tolerated to off  - Would start Tube feeds after extubation or if not extubated would begin tube feeds  - Strict I/Os  - Appreciate nephrology assistance for CRRT  - Remainder of care per SICU

## 2021-12-25 NOTE — PROGRESS NOTES
[facility]  General Surgery  Progress Note    Subjective:     History of Present Illness:  No notes on file    Post-Op Info:  Procedure(s) (LRB):  LAPAROTOMY, EXPLORATORY (N/A)  INSERTION, GASTROSTOMY TUBE, PERCUTANEOUS (N/A)  CHOLECYSTECTOMY  EGD (ESOPHAGOGASTRODUODENOSCOPY) (N/A)   2 Days Post-Op     Interval History: NAEON.  H/H stable overnight.  On 0.02 of vaso.  Vent settings minimal.  Failed SBT yesterday secondary to agitation.  Tolerating CRRT.    Medications:  Continuous Infusions:   sodium chloride 0.9% 200 mL/hr at 12/25/21 0600    sodium chloride 0.9% Stopped (12/24/21 0447)    dexmedetomidine (PRECEDEX) infusion 1.4 mcg/kg/hr (12/25/21 0600)    fentanyl 100 mcg/hr (12/25/21 0600)    NORepinephrine bitartrate-D5W Stopped (12/25/21 0221)    TPN ADULT CENTRAL LINE CUSTOM 40 mL/hr at 12/25/21 0600    vasopressin 0.02 Units/min (12/25/21 0600)     Scheduled Meds:   balsam peru-castor oiL   Topical (Top) BID    famotidine (PF)  20 mg Intravenous Daily    heparin (porcine)  5,000 Units Subcutaneous Q8H    lipid (SMOFLIPID)  250 mL Intravenous Daily    mupirocin   Nasal BID    nitroGLYCERIN 2% TD oint  0.5 inch Topical (Top) Q6H     PRN Meds:sodium chloride, sodium chloride, sodium chloride, sodium chloride, sodium chloride, dextrose 50%, dextrose 50%, fentanyl, glucagon (human recombinant), insulin aspart U-100, magnesium sulfate IVPB, potassium, sodium phosphates, potassium, sodium phosphates, potassium, sodium phosphates, sodium chloride 0.9%, sodium chloride 0.9%     Review of patient's allergies indicates:   Allergen Reactions    Oxycodone     Tylox [oxycodone-acetaminophen]      Objective:     Vital Signs (Most Recent):  Temp: 98.6 °F (37 °C) (12/25/21 0500)  Pulse: 87 (12/25/21 0645)  Resp: (!) 23 (12/25/21 0645)  BP: (!) 74/44 (12/25/21 0300)  SpO2: 98 % (12/25/21 0645) Vital Signs (24h Range):  Temp:  [97.88 °F (36.6 °C)-99.32 °F (37.4 °C)] 98.6 °F (37  °C)  Pulse:  [] 87  Resp:  [14-40] 23  SpO2:  [88 %-100 %] 98 %  BP: ()/(44-80) 74/44  Arterial Line BP: ()/() 120/57     Weight: 120.1 kg (264 lb 12.4 oz)  Body mass index is 45.45 kg/m².    Intake/Output - Last 3 Shifts       12/23 0700 12/24 0659 12/24 0700 12/25 0659 12/25 0700 12/26 0659    I.V. (mL/kg) 2113.8 (17.6) 4699.9 (39.1)     Blood 1710      NG/GT 25 215     IV Piggyback 3501.3 199.6     .8 1209.6     Total Intake(mL/kg) 8293.9 (69.1) 6324.1 (52.7)     Urine (mL/kg/hr) 12 (0) 10 (0)     Drains 538 495     Other 600 5916     Stool 0 0     Blood 200      Total Output 1350 6421     Net +6943.9 -96.9            Stool Occurrence 1 x 2 x           Vitals and nursing note reviewed.   Constitutional:       Appearance: She is obese. She is ill-appearing.   HENT:      Head: Normocephalic and atraumatic.      Nose:      Comments: NG tube in place  Neck:      Comments: Right IJ trialysis line  Cardiovascular:      Rate and Rhythm: Normal rate and regular rhythm.      Pulses: Normal pulses.   Pulmonary:      Comments: Intubated  Vent Mode: A/C  Oxygen Concentration (%):  [40-50] 40  Resp Rate Total:  [14 br/min-41 br/min] 23 br/min  Vt Set:  [420 mL] 420 mL  PEEP/CPAP:  [5 cmH20] 5 cmH20  Pressure Support:  [20 cmH20] 20 cmH20  Mean Airway Pressure:  [10 pgG42-65 cmH20] 10 cmH20  Abdominal:      Comments: Abdomen is closed with skin open with wound vac in place. x2 ollie drains within abdomen around liver with serosanguinous output.  SubQ ollie drain in LLQ with serosanguinous output.   Musculoskeletal:      Right lower leg: Edema present.      Left lower leg: Edema present.      Comments: L radial larry, L subclavian triple lumen. Palpaable pulses distally on BLE  Skin:     General: Skin is warm and dry.   Neurological:      Comments: Arrousable following commands.     Significant Labs:  I have reviewed all pertinent lab results within the past 24 hours.  CBC:   Recent Labs   Lab  12/25/21  0300   WBC 13.22*   RBC 2.45*   HGB 7.4*   HCT 22.0*   *   MCV 90   MCH 30.2   MCHC 33.6     CMP:   Recent Labs   Lab 12/25/21  0300   *  129*   CALCIUM 7.1*  7.2*   ALBUMIN 1.6*  1.6*   PROT 4.2*     137   K 4.5  4.5   CO2 27  26     104   BUN 9  9   CREATININE 0.6  0.6   ALKPHOS 87   ALT 69*   *   BILITOT 3.5*       Significant Diagnostics:  I have reviewed all pertinent imaging results/findings within the past 24 hours.    Assessment/Plan:     Subcapsular hematoma of liver  55yo female transfer from OSH after hysterectomy on 12/8 complicated by delayed recognition of small bowel injury requiring resection (in discontinuity) and at some point new bleed from the liver - transferred with open abdomen for higher level of care.  S/p ex-lap, liver packing 12/21    - Sedation vacation and SBT, if meets parameters would plan to extubate  - Transfuse for Hgb <7. Correct coagulopathy  - Wean vasopressors as tolerated to off  - Would start Tube feeds after extubation or if not extubated would begin tube feeds  - Strict I/Os  - Appreciate nephrology assistance for CRRT  - Remainder of care per SICU        Robb Ceron MD  General Surgery  [facility]

## 2021-12-25 NOTE — ASSESSMENT & PLAN NOTE
  Neuro/Psy ch:   -- Does not respond to commands but opens eyes to stimulation and withdraws from pain  --Sedation/Pain: fentanyl/precedex gtt, remains on minimal dose 2/2 to vent agitation  --Fentanyl pushes PRN             Cards:   -- HDS with MAP goal > 65   -- Levo/Vaso  -- ECHO with EF 55%  -- transfuse PRN      Pulm:   -- Goal O2 sat > 90%  -- Intubated, wean as tolerated  -- SBT as indicated      Renal:  -- Keep billingsley for strict I/O  -- continue CRRT per nephrology  -- BUN/Cr stable  -- replete lytes PRN  -- keep euvolemic      FEN / GI:   -- Replace lytes as needed  -- Nutrition: TPN, trickle feeds today through G-tube after possible extubation  -- G tube        ID:   -- Tm: afebrile; WBC stable  -- Abx none      Heme/Onc:   -- Since hosptial admission on 12/10 has received 11u pRBC, 4u FFP, 1u platelets  -- H/H stable  -- Daily CBC      Endo:   -- Gluc goal 140-180  -- no history of diabetes  -- SSI/accuchecks      PPx:   Feeding: NPO, TPN  Analgesia/Sedation: Precedex, Fentanyl  Thromboembolic prevention: Heparin  HOB >30: yes  Stress Ulcer ppx: Famotidine  Glucose control: Critical care goal 140-180 g/dl, ISS    Lines/Drains/Airway: PEG, Billingsley, L radial larry, R IJ trialysis, L subclavian triple lumen, ETT      Dispo/Code Status/Palliative:   -- SICU / Full Code

## 2021-12-25 NOTE — PROGRESS NOTES
12/25/21 0351   Treatment   Treatment Type SLED   Treatment Status Daily equipment check   Dialysis Machine Number [id number]   Dialyzer Time (hours) 15.15   BVP (Liters) 178.5 L   Solutions Labeled and Current  Yes   Access Right;IJ;Temporary Cath   Catheter Dressing Intact  Yes   Alarms Engaged Yes   CRRT Comments SLED daily check   Prescription   Time (Hours) Continuous   Dialysate K + (mEq/L) 4   Dialysate CA + (mEq/L) 2.25   Dialysate HCO3 - (Bicarb) (mEq/L) 30   Dialysate Na + (mEq/L) 138   Cartridge Type Other  (f16)   Dialysate Flow Rate (mL/min) 200   UF Goal Rate 400 mL/hr   CRRT Hourly Documentation   Blood Flow (mL/min) 200   UF Rate 300 cc/hr   Arterial Pressure (mmHg) -40 mmHg   Venous Pressure (mmHg) 60 mmHg   Effluent Pressure (EP) (mmHg) 50 mmHg     SLED daily check performed.

## 2021-12-25 NOTE — SUBJECTIVE & OBJECTIVE
Interval History: NAEON.  H/H stable overnight.  On 0.02 of vaso.  Vent settings minimal.  Failed SBT yesterday secondary to agitation.  Tolerating CRRT.    Medications:  Continuous Infusions:   sodium chloride 0.9% 200 mL/hr at 12/25/21 0600    sodium chloride 0.9% Stopped (12/24/21 0447)    dexmedetomidine (PRECEDEX) infusion 1.4 mcg/kg/hr (12/25/21 0600)    fentanyl 100 mcg/hr (12/25/21 0600)    NORepinephrine bitartrate-D5W Stopped (12/25/21 0221)    TPN ADULT CENTRAL LINE CUSTOM 40 mL/hr at 12/25/21 0600    vasopressin 0.02 Units/min (12/25/21 0600)     Scheduled Meds:   balsam peru-castor oiL   Topical (Top) BID    famotidine (PF)  20 mg Intravenous Daily    heparin (porcine)  5,000 Units Subcutaneous Q8H    lipid (SMOFLIPID)  250 mL Intravenous Daily    mupirocin   Nasal BID    nitroGLYCERIN 2% TD oint  0.5 inch Topical (Top) Q6H     PRN Meds:sodium chloride, sodium chloride, sodium chloride, sodium chloride, sodium chloride, dextrose 50%, dextrose 50%, fentanyl, glucagon (human recombinant), insulin aspart U-100, magnesium sulfate IVPB, potassium, sodium phosphates, potassium, sodium phosphates, potassium, sodium phosphates, sodium chloride 0.9%, sodium chloride 0.9%     Review of patient's allergies indicates:   Allergen Reactions    Oxycodone     Tylox [oxycodone-acetaminophen]      Objective:     Vital Signs (Most Recent):  Temp: 98.6 °F (37 °C) (12/25/21 0500)  Pulse: 87 (12/25/21 0645)  Resp: (!) 23 (12/25/21 0645)  BP: (!) 74/44 (12/25/21 0300)  SpO2: 98 % (12/25/21 0645) Vital Signs (24h Range):  Temp:  [97.88 °F (36.6 °C)-99.32 °F (37.4 °C)] 98.6 °F (37 °C)  Pulse:  [] 87  Resp:  [14-40] 23  SpO2:  [88 %-100 %] 98 %  BP: ()/(44-80) 74/44  Arterial Line BP: ()/() 120/57     Weight: 120.1 kg (264 lb 12.4 oz)  Body mass index is 45.45 kg/m².    Intake/Output - Last 3 Shifts       12/23 0700 12/24 0659 12/24 0700 12/25 0659 12/25 0700 12/26 0659    I.V. (mL/kg)  2113.8 (17.6) 4699.9 (39.1)     Blood 1710      NG/GT 25 215     IV Piggyback 3501.3 199.6     .8 1209.6     Total Intake(mL/kg) 8293.9 (69.1) 6324.1 (52.7)     Urine (mL/kg/hr) 12 (0) 10 (0)     Drains 538 495     Other 600 5916     Stool 0 0     Blood 200      Total Output 1350 6421     Net +6943.9 -96.9            Stool Occurrence 1 x 2 x           Vitals and nursing note reviewed.   Constitutional:       Appearance: She is obese. She is ill-appearing.   HENT:      Head: Normocephalic and atraumatic.      Nose:      Comments: NG tube in place  Neck:      Comments: Right IJ trialysis line  Cardiovascular:      Rate and Rhythm: Normal rate and regular rhythm.      Pulses: Normal pulses.   Pulmonary:      Comments: Intubated  Vent Mode: A/C  Oxygen Concentration (%):  [40-50] 40  Resp Rate Total:  [14 br/min-41 br/min] 23 br/min  Vt Set:  [420 mL] 420 mL  PEEP/CPAP:  [5 cmH20] 5 cmH20  Pressure Support:  [20 cmH20] 20 cmH20  Mean Airway Pressure:  [10 bpG63-64 cmH20] 10 cmH20  Abdominal:      Comments: Abdomen is closed with skin open with wound vac in place. x2 ollie drains within abdomen around liver with serosanguinous output.  SubQ ollie drain in LLQ with serosanguinous output.   Musculoskeletal:      Right lower leg: Edema present.      Left lower leg: Edema present.      Comments: L radial larry, L subclavian triple lumen. Palpaable pulses distally on BLE  Skin:     General: Skin is warm and dry.   Neurological:      Comments: Arrousable following commands.     Significant Labs:  I have reviewed all pertinent lab results within the past 24 hours.  CBC:   Recent Labs   Lab 12/25/21  0300   WBC 13.22*   RBC 2.45*   HGB 7.4*   HCT 22.0*   *   MCV 90   MCH 30.2   MCHC 33.6     CMP:   Recent Labs   Lab 12/25/21  0300   *  129*   CALCIUM 7.1*  7.2*   ALBUMIN 1.6*  1.6*   PROT 4.2*     137   K 4.5  4.5   CO2 27  26     104   BUN 9  9   CREATININE 0.6  0.6   ALKPHOS 87   ALT  69*   *   BILITOT 3.5*       Significant Diagnostics:  I have reviewed all pertinent imaging results/findings within the past 24 hours.

## 2021-12-25 NOTE — PROGRESS NOTES
[facility]  Critical Care - Surgery  Progress Note    Patient Name: Chidi Castaneda  MRN: [id number]  Admission Date: 12/20/2021  Hospital Length of Stay: 5 days  Code Status: Full Code  Attending Provider: Kendall Gorman MD  Primary Care Provider: Primary Doctor No   Principal Problem: <principal problem not specified>    Subjective:     Hospital/ICU Course:  No notes on file    Interval History/Significant Events: NAEON. Pt failed SBT yesterday, will try again this AM. Pt was agitated and overbreathing overnight and responded to prn dose of dilaudid and fentanyl gtt. Will CTM in SICU.     Follow-up For: Procedure(s) (LRB):  LAPAROTOMY, EXPLORATORY (N/A)  INSERTION, GASTROSTOMY TUBE, PERCUTANEOUS (N/A)  CHOLECYSTECTOMY  EGD (ESOPHAGOGASTRODUODENOSCOPY) (N/A)    Post-Operative Day: 2 Days Post-Op    Objective:     Vital Signs (Most Recent):  Temp: 98.6 °F (37 °C) (12/25/21 0500)  Pulse: 96 (12/25/21 0530)  Resp: 18 (12/25/21 0530)  BP: (!) 74/44 (12/25/21 0300)  SpO2: (!) 92 % (12/25/21 0530) Vital Signs (24h Range):  Temp:  [97.88 °F (36.6 °C)-99.32 °F (37.4 °C)] 98.6 °F (37 °C)  Pulse:  [] 96  Resp:  [14-40] 18  SpO2:  [88 %-100 %] 92 %  BP: ()/(44-80) 74/44  Arterial Line BP: ()/() 131/62     Weight: 120.1 kg (264 lb 12.4 oz)  Body mass index is 45.45 kg/m².      Intake/Output Summary (Last 24 hours) at 12/25/2021 0551  Last data filed at 12/25/2021 0500  Gross per 24 hour   Intake 6089.49 ml   Output 6045 ml   Net 44.49 ml       Physical Exam  Constitutional:       Comments: RAAS of 0   HENT:      Head: Normocephalic and atraumatic.      Nose: Nose normal.      Mouth/Throat:      Mouth: Mucous membranes are moist.      Pharynx: Oropharynx is clear.   Eyes:      Conjunctiva/sclera: Conjunctivae normal.      Pupils: Pupils are equal, round, and reactive to light.   Cardiovascular:      Rate and Rhythm: Normal rate and regular rhythm.      Pulses: Normal pulses.       Heart sounds: Normal heart sounds.   Pulmonary:      Effort: Pulmonary effort is normal.      Breath sounds: Normal breath sounds.      Comments: Crackles best appreciated at the bases.   Abdominal:      General: Abdomen is flat.      Comments: REAGAN drains and abdominal wound vac in place   Musculoskeletal:         General: Normal range of motion.      Cervical back: Normal range of motion and neck supple.   Skin:     General: Skin is warm and dry.      Capillary Refill: Capillary refill takes less than 2 seconds.   Psychiatric:         Mood and Affect: Mood normal.         Behavior: Behavior normal.         Vents:  Vent Mode: A/C (12/25/21 0515)  Ventilator Initiated: Yes (12/20/21 1448)  Set Rate: 12 BPM (12/25/21 0515)  Vt Set: 420 mL (12/25/21 0515)  Pressure Support: 20 cmH20 (12/25/21 0100)  PEEP/CPAP: 5 cmH20 (12/25/21 0515)  Oxygen Concentration (%): 40 (12/25/21 0530)  Peak Airway Pressure: 32 cmH2O (12/25/21 0515)  Plateau Pressure: 36 cmH20 (12/25/21 0515)  Total Ve: 6.98 mL (12/25/21 0515)  Negative Inspiratory Force (cm H2O): 0 (12/25/21 0100)  F/VT Ratio<105 (RSBI): (!) 42.86 (12/25/21 0515)    Lines/Drains/Airways     Central Venous Catheter Line            Percutaneous Central Line Insertion/Assessment - Triple Lumen  12/16/21 1531 left subclavian 8 days    Trialysis (Dialysis) Catheter 12/21/21 0030 right internal jugular 4 days          Drain                 Urethral Catheter 12/20/21 1743 4 days         Closed/Suction Drain 12/23/21 1428 Right;Inferior RUQ Bulb 19 Fr. 1 day         Closed/Suction Drain 12/23/21 1429 Right;Superior RUQ Bulb 19 Fr. 1 day         Closed/Suction Drain 12/23/21 1430 LLQ Bulb 19 Fr. 1 day         Gastrostomy/Enterostomy 12/23/21 1338 Gastrostomy tube w/ balloon LUQ feeding 1 day          Airway                 Airway - Non-Surgical 12/20/21 1451 Endotracheal Tube 4 days          Arterial Line            Arterial Line 12/18/21 0000 Right Radial 7 days                 Significant Labs:    CBC/Anemia Profile:  Recent Labs   Lab 12/24/21  1308 12/24/21  2051 12/25/21  0300   WBC 11.42  11.23 13.82* 13.22*   HGB 7.7*  7.6* 7.5* 7.4*   HCT 22.2*  21.9* 21.9* 22.0*   PLT 86*  82* 106* 126*   MCV 88  87 88 90   RDW 15.3*  15.0* 15.4* 15.5*        Chemistries:  Recent Labs   Lab 12/23/21  1524 12/23/21  1524 12/24/21  0255 12/24/21  0255 12/24/21  1308 12/24/21 2215 12/25/21  0300      < > 138   < > 138 137 136  137   K 4.9   < > 4.8   < > 4.2 4.4 4.5  4.5      < > 107   < > 106 105 104  104   CO2 22*   < > 24   < > 24 26 27  26   BUN 31*   < > 42*   < > 42* 13 9  9   CREATININE 1.6*   < > 2.1*   < > 2.0* 0.7 0.6  0.6   CALCIUM 8.0*   < > 7.5*   < > 7.3* 7.2* 7.1*  7.2*   ALBUMIN 1.8*   < > 1.6*   < > 1.5* 1.6* 1.6*  1.6*   PROT 3.4*  --  3.6*  --   --   --  4.2*   BILITOT 5.3*  --  4.8*  --   --   --  3.5*   ALKPHOS 55  --  57  --   --   --  87   ALT 73*  --  73*  --   --   --  69*   *  --  142*  --   --   --  103*   MG 2.1   < > 1.9   < > 1.8 1.7 1.8  1.8   PHOS  --   --  3.3   < > 2.3* 1.5* 1.7*  1.6*    < > = values in this interval not displayed.       All pertinent labs within the past 24 hours have been reviewed.    Significant Imaging:  I have reviewed all pertinent imaging results/findings within the past 24 hours.  I have reviewed and interpreted all pertinent imaging results/findings within the past 24 hours.    Assessment/Plan:     Subcapsular hematoma of liver    Neuro/Psy ch:   -- Does not respond to commands but opens eyes to stimulation and withdraws from pain  --Sedation/Pain: fentanyl/precedex gtt, remains on minimal dose 2/2 to vent agitation  --Fentanyl pushes PRN             Cards:   -- HDS with MAP goal > 65   -- Levo/Vaso  -- ECHO with EF 55%  -- transfuse PRN      Pulm:   -- Goal O2 sat > 90%  -- Intubated, wean as tolerated  -- SBT as indicated      Renal:  -- Keep billingsley for strict I/O  -- continue CRRT per  nephrology  -- BUN/Cr stable  -- replete lytes PRN  -- keep euvolemic      FEN / GI:   -- Replace lytes as needed  -- Nutrition: TPN, trickle feeds today through G-tube after possible extubation  -- G tube        ID:   -- Tm: afebrile; WBC stable  -- Abx none      Heme/Onc:   -- Since hosptial admission on 12/10 has received 11u pRBC, 4u FFP, 1u platelets  -- H/H stable  -- Daily CBC      Endo:   -- Gluc goal 140-180  -- no history of diabetes  -- SSI/accuchecks      PPx:   Feeding: NPO, TPN  Analgesia/Sedation: Precedex, Fentanyl  Thromboembolic prevention: Heparin  HOB >30: yes  Stress Ulcer ppx: Famotidine  Glucose control: Critical care goal 140-180 g/dl, ISS    Lines/Drains/Airway: PEG, Ng, L radial larry, R IJ trialysis, L subclavian triple lumen, ETT      Dispo/Code Status/Palliative:   -- SICU / Full Code         Critical care was time spent personally by me on the following activities: development of treatment plan with patient or surrogate and bedside caregivers, discussions with consultants, evaluation of patient's response to treatment, examination of patient, ordering and performing treatments and interventions, ordering and review of laboratory studies, ordering and review of radiographic studies, pulse oximetry, re-evaluation of patient's condition.  This critical care time did not overlap with that of any other provider or involve time for any procedures.     Peter Durham MD  Critical Care - Surgery  [facility] - Surgical Intensive Care

## 2021-12-26 LAB
ALBUMIN SERPL BCP-MCNC: 1.6 G/DL (ref 3.5–5.2)
ALBUMIN SERPL BCP-MCNC: 1.7 G/DL (ref 3.5–5.2)
ALLENS TEST: ABNORMAL
ALP SERPL-CCNC: 119 U/L (ref 55–135)
ALT SERPL W/O P-5'-P-CCNC: 67 U/L (ref 10–44)
ANION GAP SERPL CALC-SCNC: 10 MMOL/L (ref 8–16)
ANION GAP SERPL CALC-SCNC: 10 MMOL/L (ref 8–16)
ANISOCYTOSIS BLD QL SMEAR: SLIGHT
AST SERPL-CCNC: 74 U/L (ref 10–40)
BASO STIPL BLD QL SMEAR: ABNORMAL
BASOPHILS # BLD AUTO: 0.03 K/UL (ref 0–0.2)
BASOPHILS # BLD AUTO: ABNORMAL K/UL (ref 0–0.2)
BASOPHILS # BLD AUTO: ABNORMAL K/UL (ref 0–0.2)
BASOPHILS NFR BLD: 0 % (ref 0–1.9)
BASOPHILS NFR BLD: 0.2 % (ref 0–1.9)
BASOPHILS NFR BLD: 1 % (ref 0–1.9)
BILIRUB SERPL-MCNC: 3.3 MG/DL (ref 0.1–1)
BUN SERPL-MCNC: 24 MG/DL (ref 6–20)
BUN SERPL-MCNC: 34 MG/DL (ref 6–20)
BURR CELLS BLD QL SMEAR: ABNORMAL
CALCIUM SERPL-MCNC: 7.6 MG/DL (ref 8.7–10.5)
CALCIUM SERPL-MCNC: 7.7 MG/DL (ref 8.7–10.5)
CHLORIDE SERPL-SCNC: 107 MMOL/L (ref 95–110)
CHLORIDE SERPL-SCNC: 108 MMOL/L (ref 95–110)
CO2 SERPL-SCNC: 21 MMOL/L (ref 23–29)
CO2 SERPL-SCNC: 22 MMOL/L (ref 23–29)
CREAT SERPL-MCNC: 1.3 MG/DL (ref 0.5–1.4)
CREAT SERPL-MCNC: 1.9 MG/DL (ref 0.5–1.4)
DELSYS: ABNORMAL
DIFFERENTIAL METHOD: ABNORMAL
EOSINOPHIL # BLD AUTO: 0.3 K/UL (ref 0–0.5)
EOSINOPHIL # BLD AUTO: ABNORMAL K/UL (ref 0–0.5)
EOSINOPHIL # BLD AUTO: ABNORMAL K/UL (ref 0–0.5)
EOSINOPHIL NFR BLD: 1 % (ref 0–8)
EOSINOPHIL NFR BLD: 2 % (ref 0–8)
EOSINOPHIL NFR BLD: 2.7 % (ref 0–8)
ERYTHROCYTE [DISTWIDTH] IN BLOOD BY AUTOMATED COUNT: 15.7 % (ref 11.5–14.5)
ERYTHROCYTE [DISTWIDTH] IN BLOOD BY AUTOMATED COUNT: 15.9 % (ref 11.5–14.5)
ERYTHROCYTE [DISTWIDTH] IN BLOOD BY AUTOMATED COUNT: 16.1 % (ref 11.5–14.5)
EST. GFR  (AFRICAN AMERICAN): 34 ML/MIN/1.73 M^2
EST. GFR  (AFRICAN AMERICAN): 53.7 ML/MIN/1.73 M^2
EST. GFR  (NON AFRICAN AMERICAN): 29.5 ML/MIN/1.73 M^2
EST. GFR  (NON AFRICAN AMERICAN): 46.6 ML/MIN/1.73 M^2
FIO2: 40
GLUCOSE SERPL-MCNC: 121 MG/DL (ref 70–110)
GLUCOSE SERPL-MCNC: 130 MG/DL (ref 70–110)
HCO3 UR-SCNC: 22.8 MMOL/L (ref 24–28)
HCT VFR BLD AUTO: 21.3 % (ref 37–48.5)
HCT VFR BLD AUTO: 21.7 % (ref 37–48.5)
HCT VFR BLD AUTO: 22.3 % (ref 37–48.5)
HGB BLD-MCNC: 7 G/DL (ref 12–16)
HGB BLD-MCNC: 7 G/DL (ref 12–16)
HGB BLD-MCNC: 7.2 G/DL (ref 12–16)
IMM GRANULOCYTES # BLD AUTO: 0.56 K/UL (ref 0–0.04)
IMM GRANULOCYTES # BLD AUTO: ABNORMAL K/UL (ref 0–0.04)
IMM GRANULOCYTES # BLD AUTO: ABNORMAL K/UL (ref 0–0.04)
IMM GRANULOCYTES NFR BLD AUTO: 4.3 % (ref 0–0.5)
IMM GRANULOCYTES NFR BLD AUTO: ABNORMAL % (ref 0–0.5)
IMM GRANULOCYTES NFR BLD AUTO: ABNORMAL % (ref 0–0.5)
LYMPHOCYTES # BLD AUTO: 1.2 K/UL (ref 1–4.8)
LYMPHOCYTES # BLD AUTO: ABNORMAL K/UL (ref 1–4.8)
LYMPHOCYTES # BLD AUTO: ABNORMAL K/UL (ref 1–4.8)
LYMPHOCYTES NFR BLD: 4.6 % (ref 18–48)
LYMPHOCYTES NFR BLD: 6 % (ref 18–48)
LYMPHOCYTES NFR BLD: 9.3 % (ref 18–48)
MAGNESIUM SERPL-MCNC: 1.7 MG/DL (ref 1.6–2.6)
MAGNESIUM SERPL-MCNC: 1.9 MG/DL (ref 1.6–2.6)
MCH RBC QN AUTO: 29.5 PG (ref 27–31)
MCH RBC QN AUTO: 29.9 PG (ref 27–31)
MCH RBC QN AUTO: 30 PG (ref 27–31)
MCHC RBC AUTO-ENTMCNC: 31.4 G/DL (ref 32–36)
MCHC RBC AUTO-ENTMCNC: 32.9 G/DL (ref 32–36)
MCHC RBC AUTO-ENTMCNC: 33.2 G/DL (ref 32–36)
MCV RBC AUTO: 90 FL (ref 82–98)
MCV RBC AUTO: 91 FL (ref 82–98)
MCV RBC AUTO: 94 FL (ref 82–98)
METAMYELOCYTES NFR BLD MANUAL: 0.7 %
MODE: ABNORMAL
MONOCYTES # BLD AUTO: 1.2 K/UL (ref 0.3–1)
MONOCYTES # BLD AUTO: ABNORMAL K/UL (ref 0.3–1)
MONOCYTES # BLD AUTO: ABNORMAL K/UL (ref 0.3–1)
MONOCYTES NFR BLD: 2 % (ref 4–15)
MONOCYTES NFR BLD: 3.3 % (ref 4–15)
MONOCYTES NFR BLD: 9 % (ref 4–15)
MYELOCYTES NFR BLD MANUAL: 0.7 %
MYELOCYTES NFR BLD MANUAL: 1 %
NEUTROPHILS # BLD AUTO: 9.7 K/UL (ref 1.8–7.7)
NEUTROPHILS NFR BLD: 75.2 % (ref 38–73)
NEUTROPHILS NFR BLD: 81 % (ref 38–73)
NEUTROPHILS NFR BLD: 84 % (ref 38–73)
NEUTS BAND NFR BLD MANUAL: 4 %
NEUTS BAND NFR BLD MANUAL: 8 %
NRBC BLD-RTO: 0 /100 WBC
PCO2 BLDA: 32.2 MMHG (ref 35–45)
PEEP: 5
PH SMN: 7.46 [PH] (ref 7.35–7.45)
PHOSPHATE SERPL-MCNC: 3.5 MG/DL (ref 2.7–4.5)
PHOSPHATE SERPL-MCNC: 3.6 MG/DL (ref 2.7–4.5)
PLATELET # BLD AUTO: 187 K/UL (ref 150–450)
PLATELET # BLD AUTO: 194 K/UL (ref 150–450)
PLATELET # BLD AUTO: 198 K/UL (ref 150–450)
PLATELET BLD QL SMEAR: ABNORMAL
PMV BLD AUTO: 12.8 FL (ref 9.2–12.9)
PMV BLD AUTO: 13 FL (ref 9.2–12.9)
PMV BLD AUTO: 13 FL (ref 9.2–12.9)
PO2 BLDA: 75 MMHG (ref 80–100)
POC BE: -1 MMOL/L
POC SATURATED O2: 96 % (ref 95–100)
POC TCO2: 24 MMOL/L (ref 23–27)
POCT GLUCOSE: 138 MG/DL (ref 70–110)
POCT GLUCOSE: 149 MG/DL (ref 70–110)
POCT GLUCOSE: 151 MG/DL (ref 70–110)
POLYCHROMASIA BLD QL SMEAR: ABNORMAL
POLYCHROMASIA BLD QL SMEAR: ABNORMAL
POTASSIUM SERPL-SCNC: 4.5 MMOL/L (ref 3.5–5.1)
POTASSIUM SERPL-SCNC: 4.5 MMOL/L (ref 3.5–5.1)
PROT SERPL-MCNC: 4.7 G/DL (ref 6–8.4)
PS: 5
RBC # BLD AUTO: 2.33 M/UL (ref 4–5.4)
RBC # BLD AUTO: 2.37 M/UL (ref 4–5.4)
RBC # BLD AUTO: 2.41 M/UL (ref 4–5.4)
SAMPLE: ABNORMAL
SITE: ABNORMAL
SMUDGE CELLS BLD QL SMEAR: PRESENT
SODIUM SERPL-SCNC: 139 MMOL/L (ref 136–145)
SODIUM SERPL-SCNC: 139 MMOL/L (ref 136–145)
SP02: 100
SPONT RATE: 35
WBC # BLD AUTO: 12.97 K/UL (ref 3.9–12.7)
WBC # BLD AUTO: 13.85 K/UL (ref 3.9–12.7)
WBC # BLD AUTO: 14.26 K/UL (ref 3.9–12.7)
WBC TOXIC VACUOLES BLD QL SMEAR: PRESENT

## 2021-12-26 PROCEDURE — 63600175 PHARM REV CODE 636 W HCPCS: Performed by: STUDENT IN AN ORGANIZED HEALTH CARE EDUCATION/TRAINING PROGRAM

## 2021-12-26 PROCEDURE — 84100 ASSAY OF PHOSPHORUS: CPT | Performed by: STUDENT IN AN ORGANIZED HEALTH CARE EDUCATION/TRAINING PROGRAM

## 2021-12-26 PROCEDURE — 99233 SBSQ HOSP IP/OBS HIGH 50: CPT | Mod: 24,,, | Performed by: SURGERY

## 2021-12-26 PROCEDURE — 25000003 PHARM REV CODE 250: Performed by: STUDENT IN AN ORGANIZED HEALTH CARE EDUCATION/TRAINING PROGRAM

## 2021-12-26 PROCEDURE — 27000221 HC OXYGEN, UP TO 24 HOURS

## 2021-12-26 PROCEDURE — 99233 PR SUBSEQUENT HOSPITAL CARE,LEVL III: ICD-10-PCS | Mod: 24,,, | Performed by: SURGERY

## 2021-12-26 PROCEDURE — 90945 DIALYSIS ONE EVALUATION: CPT

## 2021-12-26 PROCEDURE — 85027 COMPLETE CBC AUTOMATED: CPT | Mod: 91

## 2021-12-26 PROCEDURE — 99900026 HC AIRWAY MAINTENANCE (STAT)

## 2021-12-26 PROCEDURE — 80053 COMPREHEN METABOLIC PANEL: CPT | Performed by: STUDENT IN AN ORGANIZED HEALTH CARE EDUCATION/TRAINING PROGRAM

## 2021-12-26 PROCEDURE — 85027 COMPLETE CBC AUTOMATED: CPT | Performed by: SURGERY

## 2021-12-26 PROCEDURE — 99900035 HC TECH TIME PER 15 MIN (STAT)

## 2021-12-26 PROCEDURE — 90945 DIALYSIS ONE EVALUATION: CPT | Mod: ,,, | Performed by: INTERNAL MEDICINE

## 2021-12-26 PROCEDURE — 94761 N-INVAS EAR/PLS OXIMETRY MLT: CPT

## 2021-12-26 PROCEDURE — 94003 VENT MGMT INPAT SUBQ DAY: CPT

## 2021-12-26 PROCEDURE — 20000000 HC ICU ROOM

## 2021-12-26 PROCEDURE — 83735 ASSAY OF MAGNESIUM: CPT | Mod: 91 | Performed by: INTERNAL MEDICINE

## 2021-12-26 PROCEDURE — 85025 COMPLETE CBC W/AUTO DIFF WBC: CPT

## 2021-12-26 PROCEDURE — 83735 ASSAY OF MAGNESIUM: CPT | Performed by: STUDENT IN AN ORGANIZED HEALTH CARE EDUCATION/TRAINING PROGRAM

## 2021-12-26 PROCEDURE — 80100008 HC CRRT DAILY MAINTENANCE

## 2021-12-26 PROCEDURE — 90945 PR DIALYSIS, NOT HEMO, 1 EVAL: ICD-10-PCS | Mod: ,,, | Performed by: INTERNAL MEDICINE

## 2021-12-26 PROCEDURE — 85007 BL SMEAR W/DIFF WBC COUNT: CPT | Performed by: SURGERY

## 2021-12-26 PROCEDURE — 82803 BLOOD GASES ANY COMBINATION: CPT

## 2021-12-26 PROCEDURE — 94150 VITAL CAPACITY TEST: CPT

## 2021-12-26 PROCEDURE — 80069 RENAL FUNCTION PANEL: CPT | Mod: 91 | Performed by: INTERNAL MEDICINE

## 2021-12-26 PROCEDURE — 94010 BREATHING CAPACITY TEST: CPT

## 2021-12-26 PROCEDURE — 37799 UNLISTED PX VASCULAR SURGERY: CPT

## 2021-12-26 PROCEDURE — 85007 BL SMEAR W/DIFF WBC COUNT: CPT | Mod: 91

## 2021-12-26 RX ORDER — LORAZEPAM 2 MG/ML
1 INJECTION INTRAMUSCULAR ONCE
Status: COMPLETED | OUTPATIENT
Start: 2021-12-26 | End: 2021-12-26

## 2021-12-26 RX ORDER — MAGNESIUM SULFATE 1 G/100ML
1 INJECTION INTRAVENOUS ONCE
Status: COMPLETED | OUTPATIENT
Start: 2021-12-26 | End: 2021-12-26

## 2021-12-26 RX ORDER — LORAZEPAM 0.5 MG/1
0.5 TABLET ORAL ONCE
Status: COMPLETED | OUTPATIENT
Start: 2021-12-26 | End: 2021-12-26

## 2021-12-26 RX ADMIN — DEXMEDETOMIDINE HYDROCHLORIDE 1.4 MCG/KG/HR: 4 INJECTION INTRAVENOUS at 11:12

## 2021-12-26 RX ADMIN — NITROGLYCERIN 0.5 INCH: 20 OINTMENT TOPICAL at 11:12

## 2021-12-26 RX ADMIN — LORAZEPAM 0.5 MG: 0.5 TABLET ORAL at 08:12

## 2021-12-26 RX ADMIN — Medication: at 08:12

## 2021-12-26 RX ADMIN — LORAZEPAM 1 MG: 2 INJECTION INTRAMUSCULAR; INTRAVENOUS at 09:12

## 2021-12-26 RX ADMIN — DEXMEDETOMIDINE HYDROCHLORIDE 1.4 MCG/KG/HR: 4 INJECTION INTRAVENOUS at 08:12

## 2021-12-26 RX ADMIN — HYDROMORPHONE HYDROCHLORIDE 0.5 MG: 1 INJECTION, SOLUTION INTRAMUSCULAR; INTRAVENOUS; SUBCUTANEOUS at 03:12

## 2021-12-26 RX ADMIN — NITROGLYCERIN 0.5 INCH: 20 OINTMENT TOPICAL at 05:12

## 2021-12-26 RX ADMIN — HEPARIN SODIUM 5000 UNITS: 5000 INJECTION INTRAVENOUS; SUBCUTANEOUS at 05:12

## 2021-12-26 RX ADMIN — HYDROMORPHONE HYDROCHLORIDE 0.5 MG: 1 INJECTION, SOLUTION INTRAMUSCULAR; INTRAVENOUS; SUBCUTANEOUS at 01:12

## 2021-12-26 RX ADMIN — HEPARIN SODIUM 5000 UNITS: 5000 INJECTION INTRAVENOUS; SUBCUTANEOUS at 09:12

## 2021-12-26 RX ADMIN — DEXMEDETOMIDINE HYDROCHLORIDE 1.4 MCG/KG/HR: 4 INJECTION INTRAVENOUS at 05:12

## 2021-12-26 RX ADMIN — Medication: at 09:12

## 2021-12-26 RX ADMIN — DEXMEDETOMIDINE HYDROCHLORIDE 1.4 MCG/KG/HR: 4 INJECTION INTRAVENOUS at 12:12

## 2021-12-26 RX ADMIN — DEXMEDETOMIDINE HYDROCHLORIDE 1.4 MCG/KG/HR: 4 INJECTION INTRAVENOUS at 02:12

## 2021-12-26 RX ADMIN — VASOPRESSIN 0.04 UNITS/MIN: 20 INJECTION INTRAVENOUS at 09:12

## 2021-12-26 RX ADMIN — MAGNESIUM SULFATE HEPTAHYDRATE 1 G: 500 INJECTION, SOLUTION INTRAMUSCULAR; INTRAVENOUS at 05:12

## 2021-12-26 RX ADMIN — SODIUM CHLORIDE: 0.9 INJECTION, SOLUTION INTRAVENOUS at 02:12

## 2021-12-26 RX ADMIN — FENTANYL CITRATE 50 MCG: 50 INJECTION INTRAMUSCULAR; INTRAVENOUS at 06:12

## 2021-12-26 RX ADMIN — Medication 50 MCG/HR: at 11:12

## 2021-12-26 RX ADMIN — FAMOTIDINE 20 MG: 10 INJECTION, SOLUTION INTRAVENOUS at 08:12

## 2021-12-26 RX ADMIN — HEPARIN SODIUM 5000 UNITS: 5000 INJECTION INTRAVENOUS; SUBCUTANEOUS at 01:12

## 2021-12-26 RX ADMIN — VASOPRESSIN 0.04 UNITS/MIN: 20 INJECTION INTRAVENOUS at 10:12

## 2021-12-26 RX ADMIN — FENTANYL CITRATE 50 MCG: 50 INJECTION INTRAMUSCULAR; INTRAVENOUS at 04:12

## 2021-12-26 NOTE — ASSESSMENT & PLAN NOTE
  Neuro/Psy ch:   -- Follows commands  -- Sedation/Pain: maxed on precedex gtt for vent agitation  -- Fentanyl pushes PRN  -- trail benzo today, to eliminate anxiety as barrier to discharge             Cards:   -- HDS with MAP goal > 65   -- minimal vaso  -- ECHO with EF 55%  -- transfuse PRN      Pulm:   -- Goal O2 sat > 90%  -- Intubated, wean as tolerated  -- SBT as indicated      Renal:  -- Keep billingsley for strict I/O  -- continue CRRT per nephrology  -- BUN/Cr stable  -- replete lytes PRN  -- keep euvolemic      FEN / GI:   -- Replace lytes as needed  -- Nutrition: TPN, TFs when tolerating  -- s/p G tube      ID:   -- Tm: afebrile; WBC stable  -- Abx none      Heme/Onc:   -- Since hosptial admission on 12/10 has received 11u pRBC, 4u FFP, 1u platelets  -- H/H stable  -- CBC q12      Endo:   -- Gluc goal 140-180  -- no history of diabetes  -- SSI/accuchecks      PPx:   Feeding: NPO, TPN/TFs trickle  Analgesia/Sedation: Precedex, Fentanyl, Ativan  Thromboembolic prevention: Heparin  HOB >30: yes  Stress Ulcer ppx: Famotidine  Glucose control: Critical care goal 140-180 g/dl, ISS    Lines/Drains/Airway: PEG, Billingsley, L radial larry, R IJ trialysis, L subclavian triple lumen, ETT      Dispo/Code Status/Palliative:   -- SICU / Full Code  -- discussed with SICU staff

## 2021-12-26 NOTE — PLAN OF CARE
"      SICU PLAN OF CARE NOTE    Dx: Septic shock, Bowel perforation     Shift Events: Pt's V/S at this time, no acute events over night. Pt has minimal pressor requirements over shift. Neuro status remains unchanged over shift. PRN dilaudid / fentanyl IVP utilized for pt comfort over night, pt tolerating interventions well with increased comfort intermittently. Plan to continue CRRT, wean O2 and pressors as tolerated, possibly extubation, maintain pt comfort. POC reviewed with pt and pt's spouse all questions answered and encouraged. Will continue to monitor.      Goals of Care: MAP >65     Neuro: Sedated, Arouses to Voice, Follows Commands and Moves All Extremities     Vital Signs: BP (!) 74/44 (BP Location: Right arm, Patient Position: Lying)   Pulse 77   Temp 97.7 °F (36.5 °C) (Core Esophageal)   Resp (!) 23   Ht 5' 4" (1.626 m)   Wt 121.2 kg (267 lb 3.2 oz)   SpO2 99%   BMI 45.86 kg/m²     Cardiac: NSR, HR 60-100s,     Respiratory: Ventilator, Spontaneous PAV+, FiO2 40%, PEEP 5, tolerating well over night     Diet: NPO and TPN/Lipids @ 40 ml/hr     Gtts: Precedex, Vasopressin     Urine Output: Urinary Catheter 5 cc/shift, Oliguric while on CRRT     Drains:  REAGAN #1, total output of 50 ml/shift  REAGAN #2, total output of 60 ml/shift  REAGAN #3, total output of 0 ml/shift  LUQ G-tube, no residuals      Restriants: maintained while pt intubated/sedated, assessed Q2H for injury, no injury noted     CRRT: SCUF continuous, UF goal 200-400 (@ 400 ml/hr currently), tolerating well at this time     Labs: daily, Q6H CBC, 1g of Mg given IVPB / Accuchecks: Q6H     Skin: No new skin breakdown noted. Pt's midabdominal incision maintained with Wound Vac over night CDI, 125 mmHg suction. Skin tears dressed with skin foams, CDI at this time. Venelex applied to bilateral heels. Billings bed plugged in and working properly, waffle mattress inflated.       "

## 2021-12-26 NOTE — PROGRESS NOTES
12/26/21 0300   Treatment   Treatment Type SCUF   Treatment Status Daily equipment check   Dialysis Machine Number [id number]   Dialyzer Time (hours) 38.13   BVP (Liters) 273.8 L   Solutions Labeled and Current  Yes   Access Right;IJ;Temporary Cath   Catheter Dressing Intact  Yes   Alarms Engaged Yes   CRRT Comments SLED daily check   Prescription   Time (Hours) Continuous   Dialysate K + (mEq/L) Other (Comment)   Dialysate CA + (mEq/L) Other (Comment)   Dialysate HCO3 - (Bicarb) (mEq/L) Other (Comment)   Dialysate Na + (mEq/L) 150   Cartridge Type Other  (f16)   Dialysate Flow Rate (mL/min) 0(seq)   UF Goal Rate 400 mL/hr   CRRT Hourly Documentation   Blood Flow (mL/min) 200   UF Rate 400 cc/hr   Arterial Pressure (mmHg) -40 mmHg   Venous Pressure (mmHg) 60 mmHg   Effluent Pressure (EP) (mmHg) 70 mmHg     SCUF daily check performed.

## 2021-12-26 NOTE — ASSESSMENT & PLAN NOTE
55yo female transfer from OSH after hysterectomy on 12/8 complicated by delayed recognition of small bowel injury requiring resection (in discontinuity) and at some point new bleed from the liver - transferred with open abdomen for higher level of care.  S/p ex-lap, liver packing 12/21    - Sedation vacation and SBT, if meets parameters would plan to extubate  - Transfuse for Hgb <7  - Wean vasopressors as tolerated to off  - Would start Tube feeds after extubation or if not extubated would continue  - Strict I/Os  - Appreciate nephrology assistance for CRRT  - Wound vac change 12/27  - Remainder of care per SICU

## 2021-12-26 NOTE — PROGRESS NOTES
[facility]  Critical Care - Surgery  Progress Note    Patient Name: Chidi Castaneda  MRN: [id number]  Admission Date: 12/20/2021  Hospital Length of Stay: 6 days  Code Status: Full Code  Attending Provider: Kendall Gorman MD  Primary Care Provider: Primary Doctor No   Principal Problem: <principal problem not specified>    Subjective:     Hospital/ICU Course:  No notes on file    Interval History/Significant Events:   NAEON.   PAV overnight  SBT again today.   Precedex for agitation, remains anxious/tachpynea maxed out.  Small benzo trial today to eliminate anxiety as barrier to extubation.  Remains on minimal vaso for SCUF. Remains anuric.   TFs at goal.  Hgb stable, spaced out to q12      Follow-up For: Procedure(s) (LRB):  LAPAROTOMY, EXPLORATORY (N/A)  INSERTION, GASTROSTOMY TUBE, PERCUTANEOUS (N/A)  CHOLECYSTECTOMY  EGD (ESOPHAGOGASTRODUODENOSCOPY) (N/A)    Post-Operative Day: 3 Days Post-Op    Objective:     Vital Signs (Most Recent):  Temp: 97.7 °F (36.5 °C) (12/26/21 0715)  Pulse: 79 (12/26/21 0715)  Resp: (!) 25 (12/26/21 0715)  BP: (!) 74/44 (12/25/21 0300)  SpO2: 99 % (12/26/21 0715) Vital Signs (24h Range):  Temp:  [94.82 °F (34.9 °C)-98.6 °F (37 °C)] 97.7 °F (36.5 °C)  Pulse:  [] 79  Resp:  [19-43] 25  SpO2:  [84 %-100 %] 99 %  Arterial Line BP: ()/(47-84) 110/57     Weight: 121.2 kg (267 lb 3.2 oz)  Body mass index is 45.86 kg/m².      Intake/Output Summary (Last 24 hours) at 12/26/2021 0722  Last data filed at 12/26/2021 0700  Gross per 24 hour   Intake 7039.21 ml   Output 8855 ml   Net -1815.79 ml       Physical Exam  Constitutional:       Comments: RAAS of 1   HENT:      Head: Normocephalic and atraumatic.      Nose: Nose normal.      Mouth/Throat:      Mouth: Mucous membranes are moist.      Pharynx: Oropharynx is clear.   Eyes:      Conjunctiva/sclera: Conjunctivae normal.      Pupils: Pupils are equal, round, and reactive to light.   Cardiovascular:      Rate  and Rhythm: Normal rate and regular rhythm.      Pulses: Normal pulses.      Heart sounds: Normal heart sounds.   Pulmonary:      Effort: Pulmonary effort is normal. No respiratory distress.      Breath sounds: Normal breath sounds.      Comments: Crackles best appreciated at the bases.   Mechanical breath sounds  Abdominal:      General: Abdomen is flat.      Comments: REAGAN drains and abdominal wound vac in place   Musculoskeletal:         General: Normal range of motion.      Cervical back: Normal range of motion and neck supple.      Right lower leg: Edema present.      Left lower leg: Edema present.   Skin:     General: Skin is warm and dry.      Capillary Refill: Capillary refill takes less than 2 seconds.   Psychiatric:         Mood and Affect: Mood normal.         Behavior: Behavior normal.         Vents:  Vent Mode: PAV+ (12/26/21 0322)  Ventilator Initiated: Yes (12/20/21 1448)  Set Rate: 18 BPM (12/25/21 1552)  Vt Set: 330 mL (12/25/21 1552)  Pressure Support: 15 cmH20 (12/25/21 2000)  PEEP/CPAP: 5 cmH20 (12/26/21 0322)  Oxygen Concentration (%): 40 (12/26/21 0715)  Peak Airway Pressure: 20 cmH2O (12/26/21 0322)  Plateau Pressure: 25 cmH20 (12/26/21 0322)  Total Ve: 9.13 mL (12/26/21 0322)  Negative Inspiratory Force (cm H2O): -13 (12/26/21 0322)  F/VT Ratio<105 (RSBI): 359.55 (12/26/21 0322)    Lines/Drains/Airways     Central Venous Catheter Line            Percutaneous Central Line Insertion/Assessment - Triple Lumen  12/16/21 1531 left subclavian 9 days    Trialysis (Dialysis) Catheter 12/21/21 0030 right internal jugular 5 days          Drain                 Urethral Catheter 12/20/21 1743 5 days         Closed/Suction Drain 12/23/21 1428 Right;Inferior RUQ Bulb 19 Fr. 2 days         Closed/Suction Drain 12/23/21 1429 Right;Superior RUQ Bulb 19 Fr. 2 days         Closed/Suction Drain 12/23/21 1430 LLQ Bulb 19 Fr. 2 days         Gastrostomy/Enterostomy 12/23/21 1338 Gastrostomy tube w/ balloon LUQ  feeding 2 days          Airway                 Airway - Non-Surgical 12/20/21 1451 Endotracheal Tube 5 days          Arterial Line            Arterial Line 12/18/21 0000 Right Radial 8 days                Significant Labs:    CBC/Anemia Profile:  Recent Labs   Lab 12/25/21  1531 12/25/21 2059 12/26/21  0318   WBC 13.34* 12.30 14.26*   HGB 7.3* 7.1* 7.2*   HCT 22.8* 22.2* 21.7*    155 187   MCV 93 94 90   RDW 15.9* 15.8* 15.7*        Chemistries:  Recent Labs   Lab 12/25/21  0300 12/25/21  0300 12/25/21  1357 12/25/21 2059 12/26/21 0318     137   < > 140 139 139   K 4.5  4.5   < > 4.6 4.5 4.5     104   < > 107 108 107   CO2 27  26   < > 24 24 22*   BUN 9  9   < > 10 20 24*   CREATININE 0.6  0.6   < > 0.7 1.2 1.3   CALCIUM 7.1*  7.2*   < > 7.3* 7.3* 7.6*   ALBUMIN 1.6*  1.6*   < > 1.6* 1.5* 1.7*   PROT 4.2*  --   --   --  4.7*   BILITOT 3.5*  --   --   --  3.3*   ALKPHOS 87  --   --   --  119   ALT 69*  --   --   --  67*   *  --   --   --  74*   MG 1.8  1.8   < > 1.8 1.8 1.7   PHOS 1.7*  1.6*   < > 2.4* 3.6 3.5    < > = values in this interval not displayed.       All pertinent labs within the past 24 hours have been reviewed.    Significant Imaging:  I have reviewed all pertinent imaging results/findings within the past 24 hours.    Assessment/Plan:     Subcapsular hematoma of liver    Neuro/Psy ch:   -- Follows commands  -- Sedation/Pain: maxed on precedex gtt for vent agitation  -- Fentanyl pushes PRN  -- trail benzo today, to eliminate anxiety as barrier to discharge             Cards:   -- HDS with MAP goal > 65   -- minimal vaso  -- ECHO with EF 55%  -- transfuse PRN      Pulm:   -- Goal O2 sat > 90%  -- Intubated, wean as tolerated  -- SBT as indicated      Renal:  -- Keep billingsley for strict I/O  -- continue CRRT per nephrology  -- BUN/Cr stable  -- replete lytes PRN  -- keep euvolemic      FEN / GI:   -- Replace lytes as needed  -- Nutrition: TPN, TFs when  tolerating  -- s/p G tube      ID:   -- Tm: afebrile; WBC stable  -- Abx none      Heme/Onc:   -- Since hosptial admission on 12/10 has received 11u pRBC, 4u FFP, 1u platelets  -- H/H stable  -- CBC q12      Endo:   -- Gluc goal 140-180  -- no history of diabetes  -- SSI/accuchecks      PPx:   Feeding: NPO, TPN/TFs trickle  Analgesia/Sedation: Precedex, Fentanyl, Ativan  Thromboembolic prevention: Heparin  HOB >30: yes  Stress Ulcer ppx: Famotidine  Glucose control: Critical care goal 140-180 g/dl, ISS    Lines/Drains/Airway: PEG, Ng, L radial larry, R IJ trialysis, L subclavian triple lumen, ETT      Dispo/Code Status/Palliative:   -- SICU / Full Code  -- discussed with SICU staff      Critical care was time spent personally by me on the following activities: development of treatment plan with patient or surrogate and bedside caregivers, discussions with consultants, evaluation of patient's response to treatment, examination of patient, ordering and performing treatments and interventions, ordering and review of laboratory studies, ordering and review of radiographic studies, pulse oximetry, re-evaluation of patient's condition.  This critical care time did not overlap with that of any other provider or involve time for any procedures.     Ricardo Zacarias MD  Critical Care - Surgery  [facility]

## 2021-12-26 NOTE — PROGRESS NOTES
[facility]  General Surgery  Progress Note    Subjective:     History of Present Illness:  No notes on file    Post-Op Info:  Procedure(s) (LRB):  LAPAROTOMY, EXPLORATORY (N/A)  INSERTION, GASTROSTOMY TUBE, PERCUTANEOUS (N/A)  CHOLECYSTECTOMY  EGD (ESOPHAGOGASTRODUODENOSCOPY) (N/A)   3 Days Post-Op     Interval History: NAEON.  H/H stable.  Remains on 0.02 vaso.  On PAV vent settings tolerating well. Failed SBT x 2 yesterday.    Medications:  Continuous Infusions:   sodium chloride 0.9% 200 mL/hr at 12/26/21 0700    sodium chloride 0.9% Stopped (12/24/21 0447)    dexmedetomidine (PRECEDEX) infusion 1.4 mcg/kg/hr (12/26/21 0700)    fentanyl 100 mcg/hr (12/25/21 0600)    TPN ADULT CENTRAL LINE CUSTOM 40 mL/hr at 12/26/21 0700    vasopressin 0.02 Units/min (12/26/21 0700)     Scheduled Meds:   balsam peru-castor oiL   Topical (Top) BID    famotidine (PF)  20 mg Intravenous Daily    heparin (porcine)  5,000 Units Subcutaneous Q8H    lipid (SMOFLIPID)  250 mL Intravenous Daily    nitroGLYCERIN 2% TD oint  0.5 inch Topical (Top) Q6H     PRN Meds:sodium chloride, sodium chloride, sodium chloride, sodium chloride, sodium chloride, dextrose 50%, dextrose 50%, fentaNYL, fentanyl, glucagon (human recombinant), insulin aspart U-100, potassium, sodium phosphates, potassium, sodium phosphates, potassium, sodium phosphates, sodium chloride 0.9%, sodium chloride 0.9%     Review of patient's allergies indicates:   Allergen Reactions    Oxycodone     Tylox [oxycodone-acetaminophen]      Objective:     Vital Signs (Most Recent):  Temp: 97.7 °F (36.5 °C) (12/26/21 0730)  Pulse: 88 (12/26/21 0730)  Resp: (!) 31 (12/26/21 0730)  BP: 120/74 (12/26/21 0730)  SpO2: 100 % (12/26/21 0730) Vital Signs (24h Range):  Temp:  [94.82 °F (34.9 °C)-98.6 °F (37 °C)] 97.7 °F (36.5 °C)  Pulse:  [] 88  Resp:  [19-43] 31  SpO2:  [84 %-100 %] 100 %  BP: (120)/(74) 120/74  Arterial Line BP: ()/(47-84) 136/65      Weight: 121.2 kg (267 lb 3.2 oz)  Body mass index is 45.86 kg/m².    Intake/Output - Last 3 Shifts       12/24 0700 12/25 0659 12/25 0700 12/26 0659 12/26 0700 12/27 0659    I.V. (mL/kg) 4699.9 (39.1) 5742.9 (47.4) 311.2 (2.6)    Blood       NG/ 40     IV Piggyback 199.6      TPN 1209.6 1197.5 60.9    Total Intake(mL/kg) 6324.1 (52.7) 6980.4 (57.6) 372.1 (3.1)    Urine (mL/kg/hr) 10 (0) 18 (0)     Drains 495 265     Other 5916 9380 637    Stool 0 0     Blood       Total Output 6421 9663 637    Net -96.9 -2682.6 -264.9           Stool Occurrence 3 x 0 x           Vitals and nursing note reviewed.   Constitutional:       Appearance: She is obese. She is ill-appearing.   HENT:      Head: Normocephalic and atraumatic.      Nose:      Comments: NG tube in place  Neck:      Comments: Right IJ trialysis line  Cardiovascular:      Rate and Rhythm: Normal rate and regular rhythm.      Pulses: Normal pulses.   Pulmonary:      Comments: Intubated  Vent Mode: PAV+  Oxygen Concentration (%):  [40] 40  Resp Rate Total:  [18 br/min-40 br/min] 29 br/min  Vt Set:  [330 mL] 330 mL  PEEP/CPAP:  [5 cmH20] 5 cmH20  Pressure Support:  [5 cmH20-15 cmH20] 15 cmH20  Mean Airway Pressure:  [7.5 ldB86-14 cmH20] 8.8 cmH20  Abdominal:      Comments: Abdomen is closed with skin open with wound vac in place. x2 ollie drains within abdomen around liver with serosanguinous output.  SubQ ollie drain in LLQ with serosanguinous output.   Musculoskeletal:      Right lower leg: Edema present.      Left lower leg: Edema present.      Comments: L radial larry, L subclavian triple lumen. Palpaable pulses distally on BLE  Skin:     General: Skin is warm and dry.   Neurological:      Comments: Arrousable following commands.     Significant Labs:  I have reviewed all pertinent lab results within the past 24 hours.  CBC:   Recent Labs   Lab 12/26/21  0318   WBC 14.26*   RBC 2.41*   HGB 7.2*   HCT 21.7*      MCV 90   MCH 29.9   MCHC 33.2      CMP:   Recent Labs   Lab 12/26/21  0318   *   CALCIUM 7.6*   ALBUMIN 1.7*   PROT 4.7*      K 4.5   CO2 22*      BUN 24*   CREATININE 1.3   ALKPHOS 119   ALT 67*   AST 74*   BILITOT 3.3*       Significant Diagnostics:  I have reviewed all pertinent imaging results/findings within the past 24 hours.    Assessment/Plan:     Subcapsular hematoma of liver  55yo female transfer from OSH after hysterectomy on 12/8 complicated by delayed recognition of small bowel injury requiring resection (in discontinuity) and at some point new bleed from the liver - transferred with open abdomen for higher level of care.  S/p ex-lap, liver packing 12/21    - Sedation vacation and SBT, if meets parameters would plan to extubate  - Transfuse for Hgb <7  - Wean vasopressors as tolerated to off  - Would start Tube feeds after extubation or if not extubated would continue  - Strict I/Os  - Appreciate nephrology assistance for CRRT  - Wound vac change 12/27  - Remainder of care per SICU      Robb Ceron MD  General Surgery  [facility]

## 2021-12-26 NOTE — PROGRESS NOTES
I personally saw and examined the patient on SLED which he is currently on for the removal of uremic toxins and volume control.  The patient is tolerating the treatment, see SLED flow sheet for vitals and assessemts. I also reviewed the chart, flow sheets and current medication.   The UFR, BFR, DFR and dialysis bath was adjusted accordingly.    Patient is doing well, on minimal vent settings (spontaneous), still wtih tachypnea and pulmonary markings on CXR. Respiratory alkalosis.    -2.8 liter yesterday, currently on SCUF.     - will continue SCUF for volume removal with current settings

## 2021-12-26 NOTE — NURSING
SICU PLAN OF CARE NOTE    Dx: <principal problem not specified>    Shift Events: VSS, pt failed SBT x 2, UF rate increased to 400. Plan is for possible extubation in the AM.    Goals of Care: MAP >65    Neuro: Arouses to Voice, Follows Commands and Moves All Extremities    Cardiac: NSR 70s-80s    Respiratory: ventilator, spontaneous 40%, 5 PEEP    Diet: NPO    Gtts: Precedex and Vasopressin    Urine Output: Anuric 13 cc/shift    Drains: REAGAN Drain, total output 155 cc / shift    CRRT     Restraints R &L wrist      Labs/Accuchecks: accuchecks q6, CBC q6.    Skin: no change in pressure injuries, new dressing and ointments applied. Pt turned throughout shift, SCDs on, foams in place, green booties on.  at bedside.

## 2021-12-26 NOTE — SUBJECTIVE & OBJECTIVE
Interval History/Significant Events:   NAEON.   PAV overnight  SBT again today.   Precedex for agitation, remains anxious/tachpynea maxed out.  Small benzo trial today to eliminate anxiety as barrier to extubation.  Remains on minimal vaso for SCUF. Remains anuric.   TFs at goal.  Hgb stable, spaced out to q12      Follow-up For: Procedure(s) (LRB):  LAPAROTOMY, EXPLORATORY (N/A)  INSERTION, GASTROSTOMY TUBE, PERCUTANEOUS (N/A)  CHOLECYSTECTOMY  EGD (ESOPHAGOGASTRODUODENOSCOPY) (N/A)    Post-Operative Day: 3 Days Post-Op    Objective:     Vital Signs (Most Recent):  Temp: 97.7 °F (36.5 °C) (12/26/21 0715)  Pulse: 79 (12/26/21 0715)  Resp: (!) 25 (12/26/21 0715)  BP: (!) 74/44 (12/25/21 0300)  SpO2: 99 % (12/26/21 0715) Vital Signs (24h Range):  Temp:  [94.82 °F (34.9 °C)-98.6 °F (37 °C)] 97.7 °F (36.5 °C)  Pulse:  [] 79  Resp:  [19-43] 25  SpO2:  [84 %-100 %] 99 %  Arterial Line BP: ()/(47-84) 110/57     Weight: 121.2 kg (267 lb 3.2 oz)  Body mass index is 45.86 kg/m².      Intake/Output Summary (Last 24 hours) at 12/26/2021 0722  Last data filed at 12/26/2021 0700  Gross per 24 hour   Intake 7039.21 ml   Output 8855 ml   Net -1815.79 ml       Physical Exam  Constitutional:       Comments: RAAS of 0   HENT:      Head: Normocephalic and atraumatic.      Nose: Nose normal.      Mouth/Throat:      Mouth: Mucous membranes are moist.      Pharynx: Oropharynx is clear.   Eyes:      Conjunctiva/sclera: Conjunctivae normal.      Pupils: Pupils are equal, round, and reactive to light.   Cardiovascular:      Rate and Rhythm: Normal rate and regular rhythm.      Pulses: Normal pulses.      Heart sounds: Normal heart sounds.   Pulmonary:      Effort: Pulmonary effort is normal. No respiratory distress.      Breath sounds: Normal breath sounds.      Comments: Crackles best appreciated at the bases.   Mechanical breath sounds  Abdominal:      General: Abdomen is flat.      Comments: REAGAN drains and abdominal wound vac  in place   Musculoskeletal:         General: Normal range of motion.      Cervical back: Normal range of motion and neck supple.      Right lower leg: Edema present.      Left lower leg: Edema present.   Skin:     General: Skin is warm and dry.      Capillary Refill: Capillary refill takes less than 2 seconds.   Psychiatric:         Mood and Affect: Mood normal.         Behavior: Behavior normal.         Vents:  Vent Mode: PAV+ (12/26/21 0322)  Ventilator Initiated: Yes (12/20/21 1448)  Set Rate: 18 BPM (12/25/21 1552)  Vt Set: 330 mL (12/25/21 1552)  Pressure Support: 15 cmH20 (12/25/21 2000)  PEEP/CPAP: 5 cmH20 (12/26/21 0322)  Oxygen Concentration (%): 40 (12/26/21 0715)  Peak Airway Pressure: 20 cmH2O (12/26/21 0322)  Plateau Pressure: 25 cmH20 (12/26/21 0322)  Total Ve: 9.13 mL (12/26/21 0322)  Negative Inspiratory Force (cm H2O): -13 (12/26/21 0322)  F/VT Ratio<105 (RSBI): 359.55 (12/26/21 0322)    Lines/Drains/Airways     Central Venous Catheter Line            Percutaneous Central Line Insertion/Assessment - Triple Lumen  12/16/21 1531 left subclavian 9 days    Trialysis (Dialysis) Catheter 12/21/21 0030 right internal jugular 5 days          Drain                 Urethral Catheter 12/20/21 1743 5 days         Closed/Suction Drain 12/23/21 1428 Right;Inferior RUQ Bulb 19 Fr. 2 days         Closed/Suction Drain 12/23/21 1429 Right;Superior RUQ Bulb 19 Fr. 2 days         Closed/Suction Drain 12/23/21 1430 LLQ Bulb 19 Fr. 2 days         Gastrostomy/Enterostomy 12/23/21 1338 Gastrostomy tube w/ balloon LUQ feeding 2 days          Airway                 Airway - Non-Surgical 12/20/21 1451 Endotracheal Tube 5 days          Arterial Line            Arterial Line 12/18/21 0000 Right Radial 8 days                Significant Labs:    CBC/Anemia Profile:  Recent Labs   Lab 12/25/21  1531 12/25/21  2059 12/26/21  0318   WBC 13.34* 12.30 14.26*   HGB 7.3* 7.1* 7.2*   HCT 22.8* 22.2* 21.7*    155 187   MCV 93 94 90    RDW 15.9* 15.8* 15.7*        Chemistries:  Recent Labs   Lab 12/25/21  0300 12/25/21  0300 12/25/21  1357 12/25/21 2059 12/26/21  0318     137   < > 140 139 139   K 4.5  4.5   < > 4.6 4.5 4.5     104   < > 107 108 107   CO2 27  26   < > 24 24 22*   BUN 9  9   < > 10 20 24*   CREATININE 0.6  0.6   < > 0.7 1.2 1.3   CALCIUM 7.1*  7.2*   < > 7.3* 7.3* 7.6*   ALBUMIN 1.6*  1.6*   < > 1.6* 1.5* 1.7*   PROT 4.2*  --   --   --  4.7*   BILITOT 3.5*  --   --   --  3.3*   ALKPHOS 87  --   --   --  119   ALT 69*  --   --   --  67*   *  --   --   --  74*   MG 1.8  1.8   < > 1.8 1.8 1.7   PHOS 1.7*  1.6*   < > 2.4* 3.6 3.5    < > = values in this interval not displayed.       All pertinent labs within the past 24 hours have been reviewed.    Significant Imaging:  I have reviewed all pertinent imaging results/findings within the past 24 hours.

## 2021-12-27 LAB
ABO + RH BLD: NORMAL
ALBUMIN SERPL BCP-MCNC: 1.6 G/DL (ref 3.5–5.2)
ALBUMIN SERPL BCP-MCNC: 1.7 G/DL (ref 3.5–5.2)
ALBUMIN SERPL BCP-MCNC: 1.7 G/DL (ref 3.5–5.2)
ALBUMIN SERPL BCP-MCNC: 1.8 G/DL (ref 3.5–5.2)
ALBUMIN SERPL BCP-MCNC: 2.3 G/DL (ref 3.5–5.2)
ALLENS TEST: ABNORMAL
ALLENS TEST: ABNORMAL
ALP SERPL-CCNC: 158 U/L (ref 55–135)
ALT SERPL W/O P-5'-P-CCNC: 65 U/L (ref 10–44)
ANION GAP SERPL CALC-SCNC: 10 MMOL/L (ref 8–16)
ANION GAP SERPL CALC-SCNC: 10 MMOL/L (ref 8–16)
ANION GAP SERPL CALC-SCNC: 12 MMOL/L (ref 8–16)
ANION GAP SERPL CALC-SCNC: 12 MMOL/L (ref 8–16)
ANION GAP SERPL CALC-SCNC: 8 MMOL/L (ref 8–16)
ANION GAP SERPL CALC-SCNC: 9 MMOL/L (ref 8–16)
ANISOCYTOSIS BLD QL SMEAR: SLIGHT
AST SERPL-CCNC: 62 U/L (ref 10–40)
BASOPHILS # BLD AUTO: 0.03 K/UL (ref 0–0.2)
BASOPHILS # BLD AUTO: 0.04 K/UL (ref 0–0.2)
BASOPHILS # BLD AUTO: 0.05 K/UL (ref 0–0.2)
BASOPHILS # BLD AUTO: 0.05 K/UL (ref 0–0.2)
BASOPHILS NFR BLD: 0.2 % (ref 0–1.9)
BASOPHILS NFR BLD: 0.2 % (ref 0–1.9)
BASOPHILS NFR BLD: 0.3 % (ref 0–1.9)
BASOPHILS NFR BLD: 0.3 % (ref 0–1.9)
BILIRUB SERPL-MCNC: 3.1 MG/DL (ref 0.1–1)
BLD GP AB SCN CELLS X3 SERPL QL: NORMAL
BUN SERPL-MCNC: 10 MG/DL (ref 6–20)
BUN SERPL-MCNC: 21 MG/DL (ref 6–20)
BUN SERPL-MCNC: 29 MG/DL (ref 6–20)
BUN SERPL-MCNC: 47 MG/DL (ref 6–20)
BUN SERPL-MCNC: 52 MG/DL (ref 6–20)
BUN SERPL-MCNC: 52 MG/DL (ref 6–20)
BURR CELLS BLD QL SMEAR: ABNORMAL
BURR CELLS BLD QL SMEAR: ABNORMAL
CALCIUM SERPL-MCNC: 7.8 MG/DL (ref 8.7–10.5)
CALCIUM SERPL-MCNC: 7.8 MG/DL (ref 8.7–10.5)
CALCIUM SERPL-MCNC: 7.9 MG/DL (ref 8.7–10.5)
CALCIUM SERPL-MCNC: 8 MG/DL (ref 8.7–10.5)
CALCIUM SERPL-MCNC: 8 MG/DL (ref 8.7–10.5)
CALCIUM SERPL-MCNC: 8.2 MG/DL (ref 8.7–10.5)
CHLORIDE SERPL-SCNC: 104 MMOL/L (ref 95–110)
CHLORIDE SERPL-SCNC: 105 MMOL/L (ref 95–110)
CHLORIDE SERPL-SCNC: 106 MMOL/L (ref 95–110)
CHLORIDE SERPL-SCNC: 107 MMOL/L (ref 95–110)
CHLORIDE SERPL-SCNC: 108 MMOL/L (ref 95–110)
CHLORIDE SERPL-SCNC: 110 MMOL/L (ref 95–110)
CO2 SERPL-SCNC: 17 MMOL/L (ref 23–29)
CO2 SERPL-SCNC: 19 MMOL/L (ref 23–29)
CO2 SERPL-SCNC: 19 MMOL/L (ref 23–29)
CO2 SERPL-SCNC: 23 MMOL/L (ref 23–29)
CO2 SERPL-SCNC: 24 MMOL/L (ref 23–29)
CO2 SERPL-SCNC: 24 MMOL/L (ref 23–29)
CREAT SERPL-MCNC: 0.8 MG/DL (ref 0.5–1.4)
CREAT SERPL-MCNC: 1.3 MG/DL (ref 0.5–1.4)
CREAT SERPL-MCNC: 1.6 MG/DL (ref 0.5–1.4)
CREAT SERPL-MCNC: 2.3 MG/DL (ref 0.5–1.4)
CREAT SERPL-MCNC: 2.6 MG/DL (ref 0.5–1.4)
CREAT SERPL-MCNC: 2.7 MG/DL (ref 0.5–1.4)
DELSYS: ABNORMAL
DELSYS: ABNORMAL
DIFFERENTIAL METHOD: ABNORMAL
EOSINOPHIL # BLD AUTO: 0 K/UL (ref 0–0.5)
EOSINOPHIL # BLD AUTO: 0 K/UL (ref 0–0.5)
EOSINOPHIL # BLD AUTO: 0.1 K/UL (ref 0–0.5)
EOSINOPHIL # BLD AUTO: 0.3 K/UL (ref 0–0.5)
EOSINOPHIL NFR BLD: 0.2 % (ref 0–8)
EOSINOPHIL NFR BLD: 0.2 % (ref 0–8)
EOSINOPHIL NFR BLD: 0.4 % (ref 0–8)
EOSINOPHIL NFR BLD: 1.9 % (ref 0–8)
ERYTHROCYTE [DISTWIDTH] IN BLOOD BY AUTOMATED COUNT: 15.6 % (ref 11.5–14.5)
ERYTHROCYTE [DISTWIDTH] IN BLOOD BY AUTOMATED COUNT: 15.8 % (ref 11.5–14.5)
ERYTHROCYTE [DISTWIDTH] IN BLOOD BY AUTOMATED COUNT: 15.8 % (ref 11.5–14.5)
ERYTHROCYTE [DISTWIDTH] IN BLOOD BY AUTOMATED COUNT: 15.9 % (ref 11.5–14.5)
ERYTHROCYTE [SEDIMENTATION RATE] IN BLOOD BY WESTERGREN METHOD: 23 MM/H
EST. GFR  (AFRICAN AMERICAN): 22.2 ML/MIN/1.73 M^2
EST. GFR  (AFRICAN AMERICAN): 23.2 ML/MIN/1.73 M^2
EST. GFR  (AFRICAN AMERICAN): 27 ML/MIN/1.73 M^2
EST. GFR  (AFRICAN AMERICAN): 41.8 ML/MIN/1.73 M^2
EST. GFR  (AFRICAN AMERICAN): 53.7 ML/MIN/1.73 M^2
EST. GFR  (AFRICAN AMERICAN): >60 ML/MIN/1.73 M^2
EST. GFR  (NON AFRICAN AMERICAN): 19.3 ML/MIN/1.73 M^2
EST. GFR  (NON AFRICAN AMERICAN): 20.2 ML/MIN/1.73 M^2
EST. GFR  (NON AFRICAN AMERICAN): 23.4 ML/MIN/1.73 M^2
EST. GFR  (NON AFRICAN AMERICAN): 36.3 ML/MIN/1.73 M^2
EST. GFR  (NON AFRICAN AMERICAN): 46.6 ML/MIN/1.73 M^2
EST. GFR  (NON AFRICAN AMERICAN): >60 ML/MIN/1.73 M^2
FIO2: 40
FIO2: 40
FLOW: 50
GLUCOSE SERPL-MCNC: 110 MG/DL (ref 70–110)
GLUCOSE SERPL-MCNC: 131 MG/DL (ref 70–110)
GLUCOSE SERPL-MCNC: 77 MG/DL (ref 70–110)
GLUCOSE SERPL-MCNC: 85 MG/DL (ref 70–110)
GLUCOSE SERPL-MCNC: 97 MG/DL (ref 70–110)
GLUCOSE SERPL-MCNC: 98 MG/DL (ref 70–110)
HCO3 UR-SCNC: 19.3 MMOL/L (ref 24–28)
HCO3 UR-SCNC: 25 MMOL/L (ref 24–28)
HCT VFR BLD AUTO: 22.2 % (ref 37–48.5)
HCT VFR BLD AUTO: 23.2 % (ref 37–48.5)
HCT VFR BLD AUTO: 23.8 % (ref 37–48.5)
HCT VFR BLD AUTO: 23.8 % (ref 37–48.5)
HGB BLD-MCNC: 7.1 G/DL (ref 12–16)
HGB BLD-MCNC: 7.5 G/DL (ref 12–16)
HGB BLD-MCNC: 7.7 G/DL (ref 12–16)
HGB BLD-MCNC: 7.8 G/DL (ref 12–16)
HYPOCHROMIA BLD QL SMEAR: ABNORMAL
HYPOCHROMIA BLD QL SMEAR: ABNORMAL
IMM GRANULOCYTES # BLD AUTO: 0.53 K/UL (ref 0–0.04)
IMM GRANULOCYTES # BLD AUTO: 0.54 K/UL (ref 0–0.04)
IMM GRANULOCYTES # BLD AUTO: 0.62 K/UL (ref 0–0.04)
IMM GRANULOCYTES # BLD AUTO: 0.62 K/UL (ref 0–0.04)
IMM GRANULOCYTES NFR BLD AUTO: 3.2 % (ref 0–0.5)
IMM GRANULOCYTES NFR BLD AUTO: 3.3 % (ref 0–0.5)
IMM GRANULOCYTES NFR BLD AUTO: 4.1 % (ref 0–0.5)
IMM GRANULOCYTES NFR BLD AUTO: 4.6 % (ref 0–0.5)
LYMPHOCYTES # BLD AUTO: 1 K/UL (ref 1–4.8)
LYMPHOCYTES # BLD AUTO: 1.2 K/UL (ref 1–4.8)
LYMPHOCYTES # BLD AUTO: 1.2 K/UL (ref 1–4.8)
LYMPHOCYTES # BLD AUTO: 1.3 K/UL (ref 1–4.8)
LYMPHOCYTES NFR BLD: 6.9 % (ref 18–48)
LYMPHOCYTES NFR BLD: 7.4 % (ref 18–48)
LYMPHOCYTES NFR BLD: 7.6 % (ref 18–48)
LYMPHOCYTES NFR BLD: 7.9 % (ref 18–48)
MAGNESIUM SERPL-MCNC: 1.8 MG/DL (ref 1.6–2.6)
MAGNESIUM SERPL-MCNC: 1.9 MG/DL (ref 1.6–2.6)
MAGNESIUM SERPL-MCNC: 2.2 MG/DL (ref 1.6–2.6)
MCH RBC QN AUTO: 29.3 PG (ref 27–31)
MCH RBC QN AUTO: 29.5 PG (ref 27–31)
MCH RBC QN AUTO: 29.6 PG (ref 27–31)
MCH RBC QN AUTO: 29.7 PG (ref 27–31)
MCHC RBC AUTO-ENTMCNC: 32 G/DL (ref 32–36)
MCHC RBC AUTO-ENTMCNC: 32.3 G/DL (ref 32–36)
MCHC RBC AUTO-ENTMCNC: 32.4 G/DL (ref 32–36)
MCHC RBC AUTO-ENTMCNC: 32.8 G/DL (ref 32–36)
MCV RBC AUTO: 90 FL (ref 82–98)
MCV RBC AUTO: 91 FL (ref 82–98)
MCV RBC AUTO: 92 FL (ref 82–98)
MCV RBC AUTO: 93 FL (ref 82–98)
MODE: ABNORMAL
MODE: ABNORMAL
MONOCYTES # BLD AUTO: 1.2 K/UL (ref 0.3–1)
MONOCYTES # BLD AUTO: 1.4 K/UL (ref 0.3–1)
MONOCYTES NFR BLD: 10.1 % (ref 4–15)
MONOCYTES NFR BLD: 7.6 % (ref 4–15)
MONOCYTES NFR BLD: 8.7 % (ref 4–15)
MONOCYTES NFR BLD: 8.7 % (ref 4–15)
NEUTROPHILS # BLD AUTO: 10.3 K/UL (ref 1.8–7.7)
NEUTROPHILS # BLD AUTO: 12.2 K/UL (ref 1.8–7.7)
NEUTROPHILS # BLD AUTO: 13.2 K/UL (ref 1.8–7.7)
NEUTROPHILS # BLD AUTO: 13.4 K/UL (ref 1.8–7.7)
NEUTROPHILS NFR BLD: 75.8 % (ref 38–73)
NEUTROPHILS NFR BLD: 79.8 % (ref 38–73)
NEUTROPHILS NFR BLD: 79.9 % (ref 38–73)
NEUTROPHILS NFR BLD: 80.7 % (ref 38–73)
NRBC BLD-RTO: 0 /100 WBC
OVALOCYTES BLD QL SMEAR: ABNORMAL
OVALOCYTES BLD QL SMEAR: ABNORMAL
PCO2 BLDA: 35.3 MMHG (ref 35–45)
PCO2 BLDA: 35.4 MMHG (ref 35–45)
PH SMN: 7.35 [PH] (ref 7.35–7.45)
PH SMN: 7.46 [PH] (ref 7.35–7.45)
PHOSPHATE SERPL-MCNC: 1.9 MG/DL (ref 2.7–4.5)
PHOSPHATE SERPL-MCNC: 2.8 MG/DL (ref 2.7–4.5)
PHOSPHATE SERPL-MCNC: 3.7 MG/DL (ref 2.7–4.5)
PHOSPHATE SERPL-MCNC: 4.3 MG/DL (ref 2.7–4.5)
PHOSPHATE SERPL-MCNC: 4.4 MG/DL (ref 2.7–4.5)
PLATELET # BLD AUTO: 238 K/UL (ref 150–450)
PLATELET # BLD AUTO: 313 K/UL (ref 150–450)
PLATELET # BLD AUTO: 314 K/UL (ref 150–450)
PLATELET # BLD AUTO: 332 K/UL (ref 150–450)
PLATELET BLD QL SMEAR: ABNORMAL
PMV BLD AUTO: 12.1 FL (ref 9.2–12.9)
PMV BLD AUTO: 12.2 FL (ref 9.2–12.9)
PMV BLD AUTO: 12.4 FL (ref 9.2–12.9)
PMV BLD AUTO: 12.6 FL (ref 9.2–12.9)
PO2 BLDA: 125 MMHG (ref 80–100)
PO2 BLDA: 78 MMHG (ref 80–100)
POC BE: -6 MMOL/L
POC BE: 1 MMOL/L
POC SATURATED O2: 96 % (ref 95–100)
POC SATURATED O2: 99 % (ref 95–100)
POC TCO2: 20 MMOL/L (ref 23–27)
POC TCO2: 26 MMOL/L (ref 23–27)
POCT GLUCOSE: 101 MG/DL (ref 70–110)
POCT GLUCOSE: 110 MG/DL (ref 70–110)
POCT GLUCOSE: 118 MG/DL (ref 70–110)
POCT GLUCOSE: 83 MG/DL (ref 70–110)
POIKILOCYTOSIS BLD QL SMEAR: SLIGHT
POTASSIUM SERPL-SCNC: 4.6 MMOL/L (ref 3.5–5.1)
POTASSIUM SERPL-SCNC: 4.7 MMOL/L (ref 3.5–5.1)
POTASSIUM SERPL-SCNC: 4.8 MMOL/L (ref 3.5–5.1)
POTASSIUM SERPL-SCNC: 4.8 MMOL/L (ref 3.5–5.1)
POTASSIUM SERPL-SCNC: 4.9 MMOL/L (ref 3.5–5.1)
POTASSIUM SERPL-SCNC: 5 MMOL/L (ref 3.5–5.1)
PROT SERPL-MCNC: 5.1 G/DL (ref 6–8.4)
RBC # BLD AUTO: 2.39 M/UL (ref 4–5.4)
RBC # BLD AUTO: 2.54 M/UL (ref 4–5.4)
RBC # BLD AUTO: 2.6 M/UL (ref 4–5.4)
RBC # BLD AUTO: 2.66 M/UL (ref 4–5.4)
SAMPLE: ABNORMAL
SAMPLE: ABNORMAL
SITE: ABNORMAL
SITE: ABNORMAL
SMUDGE CELLS BLD QL SMEAR: PRESENT
SODIUM SERPL-SCNC: 136 MMOL/L (ref 136–145)
SODIUM SERPL-SCNC: 136 MMOL/L (ref 136–145)
SODIUM SERPL-SCNC: 137 MMOL/L (ref 136–145)
SODIUM SERPL-SCNC: 138 MMOL/L (ref 136–145)
SODIUM SERPL-SCNC: 139 MMOL/L (ref 136–145)
SODIUM SERPL-SCNC: 141 MMOL/L (ref 136–145)
SP02: 100
WBC # BLD AUTO: 13.61 K/UL (ref 3.9–12.7)
WBC # BLD AUTO: 15.21 K/UL (ref 3.9–12.7)
WBC # BLD AUTO: 16.47 K/UL (ref 3.9–12.7)
WBC # BLD AUTO: 16.56 K/UL (ref 3.9–12.7)

## 2021-12-27 PROCEDURE — 83735 ASSAY OF MAGNESIUM: CPT | Mod: 91

## 2021-12-27 PROCEDURE — 94660 CPAP INITIATION&MGMT: CPT

## 2021-12-27 PROCEDURE — 63600175 PHARM REV CODE 636 W HCPCS: Mod: JG

## 2021-12-27 PROCEDURE — 27000190 HC CPAP FULL FACE MASK W/VALVE

## 2021-12-27 PROCEDURE — P9045 ALBUMIN (HUMAN), 5%, 250 ML: HCPCS | Mod: JG

## 2021-12-27 PROCEDURE — 94799 UNLISTED PULMONARY SVC/PX: CPT

## 2021-12-27 PROCEDURE — 63600175 PHARM REV CODE 636 W HCPCS: Performed by: STUDENT IN AN ORGANIZED HEALTH CARE EDUCATION/TRAINING PROGRAM

## 2021-12-27 PROCEDURE — 27200966 HC CLOSED SUCTION SYSTEM

## 2021-12-27 PROCEDURE — 93010 ELECTROCARDIOGRAM REPORT: CPT | Mod: ,,, | Performed by: INTERNAL MEDICINE

## 2021-12-27 PROCEDURE — 25000003 PHARM REV CODE 250: Performed by: STUDENT IN AN ORGANIZED HEALTH CARE EDUCATION/TRAINING PROGRAM

## 2021-12-27 PROCEDURE — 80048 BASIC METABOLIC PNL TOTAL CA: CPT

## 2021-12-27 PROCEDURE — 99900017 HC EXTUBATION W/PARAMETERS (STAT)

## 2021-12-27 PROCEDURE — 86920 COMPATIBILITY TEST SPIN: CPT

## 2021-12-27 PROCEDURE — 25000003 PHARM REV CODE 250

## 2021-12-27 PROCEDURE — 99291 PR CRITICAL CARE, E/M 30-74 MINUTES: ICD-10-PCS | Mod: 25,24,, | Performed by: SURGERY

## 2021-12-27 PROCEDURE — 94761 N-INVAS EAR/PLS OXIMETRY MLT: CPT

## 2021-12-27 PROCEDURE — 99291 CRITICAL CARE FIRST HOUR: CPT | Mod: 25,24,, | Performed by: SURGERY

## 2021-12-27 PROCEDURE — 37799 UNLISTED PX VASCULAR SURGERY: CPT

## 2021-12-27 PROCEDURE — 85025 COMPLETE CBC W/AUTO DIFF WBC: CPT | Mod: 91 | Performed by: SURGERY

## 2021-12-27 PROCEDURE — 20000000 HC ICU ROOM

## 2021-12-27 PROCEDURE — 82803 BLOOD GASES ANY COMBINATION: CPT

## 2021-12-27 PROCEDURE — 84100 ASSAY OF PHOSPHORUS: CPT | Performed by: STUDENT IN AN ORGANIZED HEALTH CARE EDUCATION/TRAINING PROGRAM

## 2021-12-27 PROCEDURE — 80069 RENAL FUNCTION PANEL: CPT | Mod: 91 | Performed by: STUDENT IN AN ORGANIZED HEALTH CARE EDUCATION/TRAINING PROGRAM

## 2021-12-27 PROCEDURE — 85025 COMPLETE CBC W/AUTO DIFF WBC: CPT | Mod: 91

## 2021-12-27 PROCEDURE — P9047 ALBUMIN (HUMAN), 25%, 50ML: HCPCS | Mod: JG

## 2021-12-27 PROCEDURE — 86901 BLOOD TYPING SEROLOGIC RH(D): CPT

## 2021-12-27 PROCEDURE — 94667 MNPJ CHEST WALL 1ST: CPT

## 2021-12-27 PROCEDURE — 27100171 HC OXYGEN HIGH FLOW UP TO 24 HOURS

## 2021-12-27 PROCEDURE — 83735 ASSAY OF MAGNESIUM: CPT | Mod: 91 | Performed by: STUDENT IN AN ORGANIZED HEALTH CARE EDUCATION/TRAINING PROGRAM

## 2021-12-27 PROCEDURE — 90945 DIALYSIS ONE EVALUATION: CPT | Mod: ,,, | Performed by: INTERNAL MEDICINE

## 2021-12-27 PROCEDURE — 93005 ELECTROCARDIOGRAM TRACING: CPT

## 2021-12-27 PROCEDURE — 80053 COMPREHEN METABOLIC PANEL: CPT | Performed by: STUDENT IN AN ORGANIZED HEALTH CARE EDUCATION/TRAINING PROGRAM

## 2021-12-27 PROCEDURE — 94640 AIRWAY INHALATION TREATMENT: CPT

## 2021-12-27 PROCEDURE — 80069 RENAL FUNCTION PANEL: CPT | Performed by: INTERNAL MEDICINE

## 2021-12-27 PROCEDURE — 93010 EKG 12-LEAD: ICD-10-PCS | Mod: ,,, | Performed by: INTERNAL MEDICINE

## 2021-12-27 PROCEDURE — 27000221 HC OXYGEN, UP TO 24 HOURS

## 2021-12-27 PROCEDURE — 90945 PR DIALYSIS, NOT HEMO, 1 EVAL: ICD-10-PCS | Mod: ,,, | Performed by: INTERNAL MEDICINE

## 2021-12-27 PROCEDURE — P9021 RED BLOOD CELLS UNIT: HCPCS

## 2021-12-27 PROCEDURE — 99900026 HC AIRWAY MAINTENANCE (STAT)

## 2021-12-27 PROCEDURE — 83735 ASSAY OF MAGNESIUM: CPT | Performed by: INTERNAL MEDICINE

## 2021-12-27 PROCEDURE — 25000242 PHARM REV CODE 250 ALT 637 W/ HCPCS

## 2021-12-27 PROCEDURE — 94010 BREATHING CAPACITY TEST: CPT

## 2021-12-27 PROCEDURE — 25000242 PHARM REV CODE 250 ALT 637 W/ HCPCS: Performed by: STUDENT IN AN ORGANIZED HEALTH CARE EDUCATION/TRAINING PROGRAM

## 2021-12-27 PROCEDURE — 99900035 HC TECH TIME PER 15 MIN (STAT)

## 2021-12-27 PROCEDURE — 94644 CONT INHLJ TX 1ST HOUR: CPT

## 2021-12-27 PROCEDURE — 94150 VITAL CAPACITY TEST: CPT

## 2021-12-27 RX ORDER — ALBUMIN HUMAN 250 G/1000ML
12.5 SOLUTION INTRAVENOUS ONCE
Status: COMPLETED | OUTPATIENT
Start: 2021-12-27 | End: 2021-12-27

## 2021-12-27 RX ORDER — OLANZAPINE 5 MG/1
10 TABLET, ORALLY DISINTEGRATING ORAL NIGHTLY PRN
Status: DISCONTINUED | OUTPATIENT
Start: 2021-12-27 | End: 2022-01-04

## 2021-12-27 RX ORDER — MAGNESIUM SULFATE HEPTAHYDRATE 40 MG/ML
2 INJECTION, SOLUTION INTRAVENOUS
Status: DISPENSED | OUTPATIENT
Start: 2021-12-27 | End: 2021-12-28

## 2021-12-27 RX ORDER — METHOCARBAMOL 500 MG/1
500 TABLET, FILM COATED ORAL 4 TIMES DAILY
Status: DISCONTINUED | OUTPATIENT
Start: 2021-12-27 | End: 2022-01-14 | Stop reason: HOSPADM

## 2021-12-27 RX ORDER — OXYCODONE HCL 5 MG/5 ML
5 SOLUTION, ORAL ORAL EVERY 4 HOURS PRN
Status: DISCONTINUED | OUTPATIENT
Start: 2021-12-27 | End: 2022-01-14 | Stop reason: HOSPADM

## 2021-12-27 RX ORDER — HYDROCODONE BITARTRATE AND ACETAMINOPHEN 500; 5 MG/1; MG/1
TABLET ORAL
Status: DISCONTINUED | OUTPATIENT
Start: 2021-12-27 | End: 2021-12-29

## 2021-12-27 RX ORDER — IPRATROPIUM BROMIDE AND ALBUTEROL SULFATE 2.5; .5 MG/3ML; MG/3ML
3 SOLUTION RESPIRATORY (INHALATION)
Status: DISCONTINUED | OUTPATIENT
Start: 2021-12-27 | End: 2021-12-28

## 2021-12-27 RX ORDER — ALBUMIN HUMAN 50 G/1000ML
25 SOLUTION INTRAVENOUS ONCE
Status: COMPLETED | OUTPATIENT
Start: 2021-12-27 | End: 2021-12-28

## 2021-12-27 RX ORDER — HYDROMORPHONE HYDROCHLORIDE 1 MG/ML
0.5 INJECTION, SOLUTION INTRAMUSCULAR; INTRAVENOUS; SUBCUTANEOUS ONCE
Status: COMPLETED | OUTPATIENT
Start: 2021-12-27 | End: 2021-12-27

## 2021-12-27 RX ORDER — GABAPENTIN 250 MG/5ML
125 SOLUTION ORAL EVERY 8 HOURS
Status: DISCONTINUED | OUTPATIENT
Start: 2021-12-27 | End: 2022-01-14 | Stop reason: HOSPADM

## 2021-12-27 RX ORDER — MAGNESIUM SULFATE HEPTAHYDRATE 40 MG/ML
2 INJECTION, SOLUTION INTRAVENOUS
Status: DISCONTINUED | OUTPATIENT
Start: 2021-12-27 | End: 2021-12-27

## 2021-12-27 RX ORDER — METOPROLOL TARTRATE 1 MG/ML
INJECTION, SOLUTION INTRAVENOUS
Status: DISPENSED
Start: 2021-12-27 | End: 2021-12-28

## 2021-12-27 RX ORDER — OXYCODONE HCL 5 MG/5 ML
10 SOLUTION, ORAL ORAL EVERY 4 HOURS PRN
Status: DISCONTINUED | OUTPATIENT
Start: 2021-12-27 | End: 2022-01-14 | Stop reason: HOSPADM

## 2021-12-27 RX ORDER — HYDROMORPHONE HYDROCHLORIDE 1 MG/ML
0.5 INJECTION, SOLUTION INTRAMUSCULAR; INTRAVENOUS; SUBCUTANEOUS EVERY 6 HOURS PRN
Status: DISCONTINUED | OUTPATIENT
Start: 2021-12-27 | End: 2022-01-05

## 2021-12-27 RX ADMIN — MAGNESIUM SULFATE IN WATER 2 G: 40 INJECTION, SOLUTION INTRAVENOUS at 05:12

## 2021-12-27 RX ADMIN — IPRATROPIUM BROMIDE AND ALBUTEROL SULFATE 3 ML: .5; 2.5 SOLUTION RESPIRATORY (INHALATION) at 10:12

## 2021-12-27 RX ADMIN — METHOCARBAMOL 500 MG: 500 TABLET ORAL at 09:12

## 2021-12-27 RX ADMIN — NITROGLYCERIN 0.5 INCH: 20 OINTMENT TOPICAL at 05:12

## 2021-12-27 RX ADMIN — GABAPENTIN 125 MG: 250 SOLUTION ORAL at 01:12

## 2021-12-27 RX ADMIN — Medication 150 MCG/HR: at 07:12

## 2021-12-27 RX ADMIN — DEXMEDETOMIDINE HYDROCHLORIDE 1.4 MCG/KG/HR: 4 INJECTION INTRAVENOUS at 02:12

## 2021-12-27 RX ADMIN — DEXMEDETOMIDINE HYDROCHLORIDE 1.4 MCG/KG/HR: 4 INJECTION INTRAVENOUS at 05:12

## 2021-12-27 RX ADMIN — ALBUMIN (HUMAN) 12.5 G: 0.25 INJECTION, SOLUTION INTRAVENOUS at 04:12

## 2021-12-27 RX ADMIN — VASOPRESSIN 0.04 UNITS/MIN: 20 INJECTION INTRAVENOUS at 07:12

## 2021-12-27 RX ADMIN — NITROGLYCERIN 0.5 INCH: 20 OINTMENT TOPICAL at 11:12

## 2021-12-27 RX ADMIN — IPRATROPIUM BROMIDE AND ALBUTEROL SULFATE 3 ML: .5; 2.5 SOLUTION RESPIRATORY (INHALATION) at 08:12

## 2021-12-27 RX ADMIN — HEPARIN SODIUM 5000 UNITS: 5000 INJECTION INTRAVENOUS; SUBCUTANEOUS at 05:12

## 2021-12-27 RX ADMIN — IPRATROPIUM BROMIDE AND ALBUTEROL SULFATE 3 ML: .5; 2.5 SOLUTION RESPIRATORY (INHALATION) at 03:12

## 2021-12-27 RX ADMIN — ALBUMIN (HUMAN) 25 G: 12.5 SOLUTION INTRAVENOUS at 11:12

## 2021-12-27 RX ADMIN — Medication: at 08:12

## 2021-12-27 RX ADMIN — DEXMEDETOMIDINE HYDROCHLORIDE 1.4 MCG/KG/HR: 4 INJECTION INTRAVENOUS at 07:12

## 2021-12-27 RX ADMIN — SODIUM CHLORIDE: 0.9 INJECTION, SOLUTION INTRAVENOUS at 08:12

## 2021-12-27 RX ADMIN — OXYCODONE HYDROCHLORIDE 10 MG: 5 SOLUTION ORAL at 04:12

## 2021-12-27 RX ADMIN — IPRATROPIUM BROMIDE AND ALBUTEROL SULFATE 3 ML: .5; 2.5 SOLUTION RESPIRATORY (INHALATION) at 11:12

## 2021-12-27 RX ADMIN — Medication: at 09:12

## 2021-12-27 RX ADMIN — SODIUM CHLORIDE: 0.9 INJECTION, SOLUTION INTRAVENOUS at 06:12

## 2021-12-27 RX ADMIN — HEPARIN SODIUM 5000 UNITS: 5000 INJECTION INTRAVENOUS; SUBCUTANEOUS at 09:12

## 2021-12-27 RX ADMIN — NITROGLYCERIN 0.5 INCH: 20 OINTMENT TOPICAL at 01:12

## 2021-12-27 RX ADMIN — METHOCARBAMOL 500 MG: 500 TABLET ORAL at 01:12

## 2021-12-27 RX ADMIN — GABAPENTIN 125 MG: 250 SOLUTION ORAL at 09:12

## 2021-12-27 RX ADMIN — HEPARIN SODIUM 5000 UNITS: 5000 INJECTION INTRAVENOUS; SUBCUTANEOUS at 01:12

## 2021-12-27 RX ADMIN — HYDROMORPHONE HYDROCHLORIDE 0.5 MG: 1 INJECTION, SOLUTION INTRAMUSCULAR; INTRAVENOUS; SUBCUTANEOUS at 10:12

## 2021-12-27 RX ADMIN — METHOCARBAMOL 500 MG: 500 TABLET ORAL at 04:12

## 2021-12-27 RX ADMIN — OLANZAPINE 10 MG: 5 TABLET, ORALLY DISINTEGRATING ORAL at 09:12

## 2021-12-27 RX ADMIN — FAMOTIDINE 20 MG: 10 INJECTION, SOLUTION INTRAVENOUS at 08:12

## 2021-12-27 NOTE — NURSING
Extubated to 2L per NC with brett >98%. IS and neb treatment equipment at bedside. Resp. Tx to give scheduled neb and CPT as ordered. NAD noted with VSS. Precedex and Fentanyl gtts d/c'd. Will wean vaso to off as tolerated. WCTM

## 2021-12-27 NOTE — PLAN OF CARE
[facility]  Discharge Reassessment    Primary Care Provider: Primary Doctor No    Expected Discharge Date: 12/30/2021    Reassessment (most recent)     Discharge Reassessment - 12/27/21 1153        Discharge Reassessment    Assessment Type Discharge Planning Assessment     Communicated SHANA with patient/caregiver Date not available/Unable to determine     Discharge Plan A Home     Discharge Plan B Home with family     Discharge Barriers Identified None     Why the patient remains in the hospital Requires continued medical care        Post-Acute Status    Post-Acute Authorization Placement     Post-Acute Placement Status Pending medical clearance/testing               Per MD's Note, No acute events overnight, afebrile, vitals stable. Did not pass SBT yesterday but very alert and responsive this morning. Remains on 0.02 vaso, on CRRT. Net negative 2.5L in last 24hrs. Tolerated TFs yesterday without issue but held at midnight given hopes for extubation this morning.     The patient is not medically stable to discharge and remains in SICU. The SW will continue to follow up with the patient to assist with discharge needs.       Philip Borges LMSW  Case Management [facility]  Ext: [phone]

## 2021-12-27 NOTE — PROGRESS NOTES
[facility]  General Surgery  Progress Note    Subjective:     History of Present Illness:  No notes on file    Post-Op Info:  Procedure(s) (LRB):  LAPAROTOMY, EXPLORATORY (N/A)  INSERTION, GASTROSTOMY TUBE, PERCUTANEOUS (N/A)  CHOLECYSTECTOMY  EGD (ESOPHAGOGASTRODUODENOSCOPY) (N/A)   4 Days Post-Op     Interval History: No acute events overnight, afebrile, vitals stable. Did not pass SBT yesterday but very alert and responsive this morning. Remains on 0.02 vaso, on CRRT. Net negative 2.5L in last 24hrs. Tolerated TFs yesterday without issue but held at midnight given hopes for extubation this morning.     Medications:  Continuous Infusions:   sodium chloride 0.9% 200 mL/hr at 12/27/21 0652    sodium chloride 0.9% Stopped (12/24/21 0447)    dexmedetomidine (PRECEDEX) infusion 1.4 mcg/kg/hr (12/27/21 0652)    fentanyl 150 mcg/hr (12/27/21 0652)    vasopressin 0.02 Units/min (12/27/21 0652)     Scheduled Meds:   balsam peru-castor oiL   Topical (Top) BID    famotidine (PF)  20 mg Intravenous Daily    heparin (porcine)  5,000 Units Subcutaneous Q8H    nitroGLYCERIN 2% TD oint  0.5 inch Topical (Top) Q6H     PRN Meds:sodium chloride, sodium chloride, sodium chloride, sodium chloride, sodium chloride, dextrose 50%, dextrose 50%, fentaNYL, fentanyl, glucagon (human recombinant), insulin aspart U-100, potassium, sodium phosphates, potassium, sodium phosphates, potassium, sodium phosphates, sodium chloride 0.9%, sodium chloride 0.9%     Review of patient's allergies indicates:   Allergen Reactions    Oxycodone     Tylox [oxycodone-acetaminophen]      Objective:     Vital Signs (Most Recent):  Temp: 97.7 °F (36.5 °C) (12/27/21 0700)  Pulse: 82 (12/27/21 0700)  Resp: 18 (12/27/21 0700)  BP: (!) 86/51 (12/27/21 0700)  SpO2: 100 % (12/27/21 0700) Vital Signs (24h Range):  Temp:  [97.7 °F (36.5 °C)-99.14 °F (37.3 °C)] 97.7 °F (36.5 °C)  Pulse:  [] 82  Resp:  [18-39] 18  SpO2:  [97 %-100 %]  100 %  BP: ()/(51-74) 86/51  Arterial Line BP: ()/() 108/53     Weight: 121.2 kg (267 lb 3.2 oz)  Body mass index is 45.86 kg/m².    Intake/Output - Last 3 Shifts       12/25 0700  12/26 0659 12/26 0700 12/27 0659 12/27 0700 12/28 0659    I.V. (mL/kg) 5742.9 (47.4) 6261.5 (51.7)     NG/GT 40 347     IV Piggyback       TPN 1197.5 745.3     Total Intake(mL/kg) 6980.4 (57.6) 7353.8 (60.7)     Urine (mL/kg/hr) 18 (0) 15 (0)     Drains 265 170     Other 9380 9415 411    Stool 0      Total Output 9663 9600 411    Net -2682.6 -2246.2 -411           Stool Occurrence 0 x            Physical Exam  Vitals and nursing note reviewed.   Constitutional:       General: She is awake.      Appearance: Normal appearance.      Interventions: She is intubated.      Comments: RAAS of 0   HENT:      Head: Normocephalic and atraumatic.      Nose: Nose normal.      Mouth/Throat:      Mouth: Mucous membranes are moist.      Pharynx: Oropharynx is clear.   Eyes:      Conjunctiva/sclera: Conjunctivae normal.      Pupils: Pupils are equal, round, and reactive to light.   Cardiovascular:      Rate and Rhythm: Normal rate and regular rhythm.      Pulses: Normal pulses.      Heart sounds: Normal heart sounds.   Pulmonary:      Effort: Tachypnea present. No respiratory distress. She is intubated.      Comments: Crackles best appreciated at the bases.   Mechanical breath sounds  Vent Mode: Spont  Oxygen Concentration (%):  (40) 40  Resp Rate Total:  (18 br/min-38 br/min) 18 br/min  PEEP/CPAP:  (5 cmH20) 5 cmH20  Pressure Support:  (5 cmH20) 5 cmH20  Mean Airway Pressure:  (7.7 cmH20-10 cmH20) 9.4 cmH20  Abdominal:      General: Abdomen is flat.      Comments: REAGAN drains and abdominal wound vac in place   Musculoskeletal:         General: Normal range of motion.      Cervical back: Normal range of motion and neck supple.      Right lower leg: Edema present.      Left lower leg: Edema present.   Skin:     General: Skin is warm and  dry.      Capillary Refill: Capillary refill takes less than 2 seconds.   Psychiatric:         Mood and Affect: Mood normal.         Behavior: Behavior normal. Behavior is cooperative.       Significant Labs:  I have reviewed all pertinent lab results within the past 24 hours.  CBC:   Recent Labs   Lab 12/27/21  0309   WBC 13.61*   RBC 2.39*   HGB 7.1*   HCT 22.2*      MCV 93   MCH 29.7   MCHC 32.0     CMP:   Recent Labs   Lab 12/27/21  0309   GLU 97  98   CALCIUM 8.0*  7.9*   ALBUMIN 1.7*  1.7*   PROT 5.1*     136   K 4.9  5.0   CO2 17*  19*     107   BUN 52*  52*   CREATININE 2.7*  2.6*   ALKPHOS 158*   ALT 65*   AST 62*   BILITOT 3.1*       Significant Diagnostics:  I have reviewed all pertinent imaging results/findings within the past 24 hours.    Assessment/Plan:     Subcapsular hematoma of liver  57yo female transfer from OSH after hysterectomy on 12/8 complicated by delayed recognition of small bowel injury requiring resection (in discontinuity) and at some point new bleed from the liver - transferred with open abdomen for higher level of care.  S/p ex-lap, liver packing 12/21    - Sedation vacation and SBT, if meets parameters would plan to extubate  - Transfuse for Hgb <7  - Wean vasopressors as tolerated to off  - Would start Tube feeds after extubation or if not extubated would continue  - Strict I/Os  - Appreciate nephrology assistance for CRRT  - Wound vac change today 12/27  - Remainder of care per SICU        Leanne Harding MD  General Surgery  [facility]

## 2021-12-27 NOTE — CARE UPDATE
Patient extubated to 3 lpm nasal cannula.Following extubation parameters reported to MD:  NIF:-30,VC: 400 ml, RSBI:60, positive leak test. Patient is awake, alert and following commands, I will continue to monitor patient.

## 2021-12-27 NOTE — PROGRESS NOTES
I personally saw and examined the patient on SLED which he is currently on for the removal of uremic toxins and volume control.  The patient is tolerating the treatment, see SLED flow sheet for vitals and assessemts. I also reviewed the chart, flow sheets and current medication.   The UFR, BFR, DFR and dialysis bath was adjusted accordingly.    Patient extubated, doing well. 100% on NC, off pressors now.  Net negative 2.5 liter last 24 hrs. Remains volume overloaded but improved.    Will change SCUF to SLED for 8 hrs for clearance and continue volume removal.

## 2021-12-27 NOTE — SUBJECTIVE & OBJECTIVE
Interval History/Significant Events: Failed SBT again yesterday. All parameters except RSBI were acceptable. Remained on PAV all night. On vaso gtt for BP.     Follow-up For: Procedure(s) (LRB):  LAPAROTOMY, EXPLORATORY (N/A)  INSERTION, GASTROSTOMY TUBE, PERCUTANEOUS (N/A)  CHOLECYSTECTOMY  EGD (ESOPHAGOGASTRODUODENOSCOPY) (N/A)    Post-Operative Day: 4 Days Post-Op    Objective:     Vital Signs (Most Recent):  Temp: 98.42 °F (36.9 °C) (12/27/21 0500)  Pulse: 82 (12/27/21 0515)  Resp: 20 (12/27/21 0515)  BP: 117/74 (12/27/21 0500)  SpO2: 100 % (12/27/21 0515) Vital Signs (24h Range):  Temp:  [97.7 °F (36.5 °C)-99.14 °F (37.3 °C)] 98.42 °F (36.9 °C)  Pulse:  [] 82  Resp:  [18-39] 20  SpO2:  [97 %-100 %] 100 %  BP: ()/(51-74) 117/74  Arterial Line BP: ()/() 133/65     Weight: 121.2 kg (267 lb 3.2 oz)  Body mass index is 45.86 kg/m².      Intake/Output Summary (Last 24 hours) at 12/27/2021 0602  Last data filed at 12/27/2021 0500  Gross per 24 hour   Intake 6874.68 ml   Output 9204 ml   Net -2329.32 ml       Physical Exam  Vitals and nursing note reviewed.   Constitutional:       General: She is awake.      Appearance: Normal appearance.      Interventions: She is intubated.      Comments: RAAS of 0   HENT:      Head: Normocephalic and atraumatic.      Nose: Nose normal.      Mouth/Throat:      Mouth: Mucous membranes are moist.      Pharynx: Oropharynx is clear.   Eyes:      Conjunctiva/sclera: Conjunctivae normal.      Pupils: Pupils are equal, round, and reactive to light.   Cardiovascular:      Rate and Rhythm: Normal rate and regular rhythm.      Pulses: Normal pulses.      Heart sounds: Normal heart sounds.   Pulmonary:      Effort: Tachypnea present. No respiratory distress. She is intubated.      Comments: Crackles best appreciated at the bases.   Mechanical breath sounds  Abdominal:      General: Abdomen is flat.      Comments: REAGAN drains and abdominal wound vac in place    Musculoskeletal:         General: Normal range of motion.      Cervical back: Normal range of motion and neck supple.      Right lower leg: Edema present.      Left lower leg: Edema present.   Skin:     General: Skin is warm and dry.      Capillary Refill: Capillary refill takes less than 2 seconds.   Psychiatric:         Mood and Affect: Mood normal.         Behavior: Behavior normal. Behavior is cooperative.         Vents:  Vent Mode: Spont (12/27/21 0344)  Ventilator Initiated: Yes (12/20/21 1448)  Set Rate: 18 BPM (12/25/21 1552)  Vt Set: 330 mL (12/25/21 1552)  Pressure Support: 5 cmH20 (12/26/21 0759)  PEEP/CPAP: 5 cmH20 (12/27/21 0344)  Oxygen Concentration (%): 40 (12/27/21 0515)  Peak Airway Pressure: 19 cmH2O (12/27/21 0344)  Plateau Pressure: 25 cmH20 (12/27/21 0344)  Total Ve: 7.14 mL (12/27/21 0344)  Negative Inspiratory Force (cm H2O): -35 (12/27/21 0344)  F/VT Ratio<105 (RSBI): (!) 58.46 (12/27/21 0344)    Lines/Drains/Airways     Central Venous Catheter Line            Percutaneous Central Line Insertion/Assessment - Triple Lumen  12/16/21 1531 left subclavian 10 days    Trialysis (Dialysis) Catheter 12/21/21 0030 right internal jugular 6 days          Drain                 Urethral Catheter 12/20/21 1743 6 days         Closed/Suction Drain 12/23/21 1428 Right;Inferior RUQ Bulb 19 Fr. 3 days         Closed/Suction Drain 12/23/21 1429 Right;Superior RUQ Bulb 19 Fr. 3 days         Closed/Suction Drain 12/23/21 1430 LLQ Bulb 19 Fr. 3 days         Gastrostomy/Enterostomy 12/23/21 1338 Gastrostomy tube w/ balloon LUQ feeding 3 days          Airway                 Airway - Non-Surgical 12/20/21 1451 Endotracheal Tube 6 days          Arterial Line            Arterial Line 12/18/21 0000 Right Radial 9 days                Significant Labs:    CBC/Anemia Profile:  Recent Labs   Lab 12/26/21  0927 12/26/21 2001 12/27/21  0309   WBC 13.85* 12.97* 13.61*   HGB 7.0* 7.0* 7.1*   HCT 21.3* 22.3* 22.2*     198 238   MCV 91 94 93   RDW 16.1* 15.9* 15.8*        Chemistries:  Recent Labs   Lab 12/26/21  0318 12/26/21  0318 12/26/21  1309 12/26/21  2222 12/27/21  0309      < > 139 139 137  136   K 4.5   < > 4.5 4.8 4.9  5.0      < > 108 110 108  107   CO2 22*   < > 21* 19* 17*  19*   BUN 24*   < > 34* 47* 52*  52*   CREATININE 1.3   < > 1.9* 2.3* 2.7*  2.6*   CALCIUM 7.6*   < > 7.7* 7.8* 8.0*  7.9*   ALBUMIN 1.7*   < > 1.6* 1.6* 1.7*  1.7*   PROT 4.7*  --   --   --  5.1*   BILITOT 3.3*  --   --   --  3.1*   ALKPHOS 119  --   --   --  158*   ALT 67*  --   --   --  65*   AST 74*  --   --   --  62*   MG 1.7   < > 1.9 1.8 1.8  1.9   PHOS 3.5   < > 3.6 3.7 4.3  4.4    < > = values in this interval not displayed.       All pertinent labs within the past 24 hours have been reviewed.    Significant Imaging:  I have reviewed all pertinent imaging results/findings within the past 24 hours.

## 2021-12-27 NOTE — PROGRESS NOTES
[facility]  Critical Care - Surgery  Progress Note    Patient Name: Chidi Castaneda  MRN: [id number]  Admission Date: 12/20/2021  Hospital Length of Stay: 7 days  Code Status: Full Code  Attending Provider: Kendall Gorman MD  Primary Care Provider: Primary Doctor No   Principal Problem: <principal problem not specified>    Subjective:     Hospital/ICU Course:  No notes on file    Interval History/Significant Events: Failed SBT again yesterday. All parameters except RSBI were acceptable. Remained on PAV all night. On vaso gtt for BP.     Follow-up For: Procedure(s) (LRB):  LAPAROTOMY, EXPLORATORY (N/A)  INSERTION, GASTROSTOMY TUBE, PERCUTANEOUS (N/A)  CHOLECYSTECTOMY  EGD (ESOPHAGOGASTRODUODENOSCOPY) (N/A)    Post-Operative Day: 4 Days Post-Op    Objective:     Vital Signs (Most Recent):  Temp: 98.42 °F (36.9 °C) (12/27/21 0500)  Pulse: 82 (12/27/21 0515)  Resp: 20 (12/27/21 0515)  BP: 117/74 (12/27/21 0500)  SpO2: 100 % (12/27/21 0515) Vital Signs (24h Range):  Temp:  [97.7 °F (36.5 °C)-99.14 °F (37.3 °C)] 98.42 °F (36.9 °C)  Pulse:  [] 82  Resp:  [18-39] 20  SpO2:  [97 %-100 %] 100 %  BP: ()/(51-74) 117/74  Arterial Line BP: ()/() 133/65     Weight: 121.2 kg (267 lb 3.2 oz)  Body mass index is 45.86 kg/m².      Intake/Output Summary (Last 24 hours) at 12/27/2021 0602  Last data filed at 12/27/2021 0500  Gross per 24 hour   Intake 6874.68 ml   Output 9204 ml   Net -2329.32 ml       Physical Exam  Vitals and nursing note reviewed.   Constitutional:       General: She is awake.      Appearance: Normal appearance.      Interventions: She is intubated.      Comments: RAAS of 0   HENT:      Head: Normocephalic and atraumatic.      Nose: Nose normal.      Mouth/Throat:      Mouth: Mucous membranes are moist.      Pharynx: Oropharynx is clear.   Eyes:      Conjunctiva/sclera: Conjunctivae normal.      Pupils: Pupils are equal, round, and reactive to light.   Cardiovascular:       Rate and Rhythm: Normal rate and regular rhythm.      Pulses: Normal pulses.      Heart sounds: Normal heart sounds.   Pulmonary:      Effort: Tachypnea present. No respiratory distress. She is intubated.      Comments: Crackles best appreciated at the bases.   Mechanical breath sounds  Abdominal:      General: Abdomen is flat.      Comments: REAGAN drains and abdominal wound vac in place   Musculoskeletal:         General: Normal range of motion.      Cervical back: Normal range of motion and neck supple.      Right lower leg: Edema present.      Left lower leg: Edema present.   Skin:     General: Skin is warm and dry.      Capillary Refill: Capillary refill takes less than 2 seconds.   Psychiatric:         Mood and Affect: Mood normal.         Behavior: Behavior normal. Behavior is cooperative.         Vents:  Vent Mode: Spont (12/27/21 0344)  Ventilator Initiated: Yes (12/20/21 1448)  Set Rate: 18 BPM (12/25/21 1552)  Vt Set: 330 mL (12/25/21 1552)  Pressure Support: 5 cmH20 (12/26/21 0759)  PEEP/CPAP: 5 cmH20 (12/27/21 0344)  Oxygen Concentration (%): 40 (12/27/21 0515)  Peak Airway Pressure: 19 cmH2O (12/27/21 0344)  Plateau Pressure: 25 cmH20 (12/27/21 0344)  Total Ve: 7.14 mL (12/27/21 0344)  Negative Inspiratory Force (cm H2O): -35 (12/27/21 0344)  F/VT Ratio<105 (RSBI): (!) 58.46 (12/27/21 0344)    Lines/Drains/Airways     Central Venous Catheter Line            Percutaneous Central Line Insertion/Assessment - Triple Lumen  12/16/21 1531 left subclavian 10 days    Trialysis (Dialysis) Catheter 12/21/21 0030 right internal jugular 6 days          Drain                 Urethral Catheter 12/20/21 1743 6 days         Closed/Suction Drain 12/23/21 1428 Right;Inferior RUQ Bulb 19 Fr. 3 days         Closed/Suction Drain 12/23/21 1429 Right;Superior RUQ Bulb 19 Fr. 3 days         Closed/Suction Drain 12/23/21 1430 LLQ Bulb 19 Fr. 3 days         Gastrostomy/Enterostomy 12/23/21 1338 Gastrostomy tube w/ balloon LUQ  feeding 3 days          Airway                 Airway - Non-Surgical 12/20/21 1451 Endotracheal Tube 6 days          Arterial Line            Arterial Line 12/18/21 0000 Right Radial 9 days                Significant Labs:    CBC/Anemia Profile:  Recent Labs   Lab 12/26/21  0927 12/26/21 2001 12/27/21  0309   WBC 13.85* 12.97* 13.61*   HGB 7.0* 7.0* 7.1*   HCT 21.3* 22.3* 22.2*    198 238   MCV 91 94 93   RDW 16.1* 15.9* 15.8*        Chemistries:  Recent Labs   Lab 12/26/21  0318 12/26/21 0318 12/26/21  1309 12/26/21  2222 12/27/21  0309      < > 139 139 137  136   K 4.5   < > 4.5 4.8 4.9  5.0      < > 108 110 108  107   CO2 22*   < > 21* 19* 17*  19*   BUN 24*   < > 34* 47* 52*  52*   CREATININE 1.3   < > 1.9* 2.3* 2.7*  2.6*   CALCIUM 7.6*   < > 7.7* 7.8* 8.0*  7.9*   ALBUMIN 1.7*   < > 1.6* 1.6* 1.7*  1.7*   PROT 4.7*  --   --   --  5.1*   BILITOT 3.3*  --   --   --  3.1*   ALKPHOS 119  --   --   --  158*   ALT 67*  --   --   --  65*   AST 74*  --   --   --  62*   MG 1.7   < > 1.9 1.8 1.8  1.9   PHOS 3.5   < > 3.6 3.7 4.3  4.4    < > = values in this interval not displayed.       All pertinent labs within the past 24 hours have been reviewed.    Significant Imaging:  I have reviewed all pertinent imaging results/findings within the past 24 hours.    Assessment/Plan:     Subcapsular hematoma of liver    Neuro/Psy ch:   -- Follows commands  -- Sedation/Pain: Stop precedex and fentanyl gtt. Can start oxy PRN if needing assistance w pain control             Cards:   -- HDS with MAP goal > 65   -- minimal vaso. Wean off if able  -- ECHO with EF 55%  -- transfuse PRN      Pulm:   -- Goal O2 sat > 90%  -- Passed SBT today. Extubated to nasal cannula.  -- Chest PT, IS, inhalational treatment, duo nebs      Renal:  -- Keep billingsley for strict I/O  -- continue CRRT per nephrology  -- BUN/Cr stable  -- replete lytes PRN  -- Try to approach Net 0 for hospitalization      FEN / GI:   --  Replace lytes as needed  -- Nutrition: Restart TF  -- s/p G tube  -- Consult SLP      ID:   -- Tm: afebrile; WBC stable  -- Abx none      Heme/Onc:   -- Since hosptial admission on 12/10 has received 11u pRBC, 4u FFP, 1u platelets  -- H/H stable  -- CBC q12      Endo:   -- Gluc goal 140-180  -- no history of diabetes  -- SSI/accuchecks      PPx:   Feeding: NPO, TF  Analgesia/Sedation: None. Can start oxy PRN if need pain control  Thromboembolic prevention: Heparin  HOB >30: yes  Stress Ulcer ppx: none  Glucose control: Critical care goal 140-180 g/dl, ISS    Lines/Drains/Airway: PEG, Ng, L radial larry, R IJ trialysis, L subclavian triple lumen      Dispo/Code Status/Palliative:   -- SICU / Full Code  -- discussed with SICU staff      Critical secondary to Patient has a condition that poses threat to life and bodily function: Severe Respiratory Distress and Acute Renal Failure     Critical care was time spent personally by me on the following activities: development of treatment plan with patient or surrogate and bedside caregivers, discussions with consultants, evaluation of patient's response to treatment, examination of patient, ordering and performing treatments and interventions, ordering and review of laboratory studies, ordering and review of radiographic studies, pulse oximetry, re-evaluation of patient's condition.  This critical care time did not overlap with that of any other provider or involve time for any procedures.     Evan Cutler MD  Critical Care - Surgery  [facility]

## 2021-12-27 NOTE — PLAN OF CARE
"      SICU PLAN OF CARE NOTE    Dx: Septic shock, Bowel perforation     Shift Events: Pt's V/S at this time, no acute events over night. Pt's pressor requirements stable over shift. Neuro status remains unchanged over shift. Pt able to rest in between care over night, fentanyl gtt / bolus utilized to maintain pt comfort, pt tolerating interventions well with increased comfort majority of night. Plan to continue CRRT SCUF, wound vac exchange at bedside this AM, wean O2 and pressors as tolerated, possible extubation, maintain pt comfort. POC reviewed with pt and pt's spouse all questions answered and encouraged. Will continue to monitor.      Goals of Care: MAP >65     Neuro: Sedated, Arouses to Voice, Follows Commands and Moves All Extremities     Vital Signs: /74 (BP Location: Left arm, Patient Position: Lying)   Pulse 91   Temp 98.24 °F (36.8 °C) (Core Esophageal)   Resp 20   Ht 5' 4" (1.626 m)   Wt 121.2 kg (267 lb 3.2 oz)   SpO2 100%   BMI 45.86 kg/m²     Cardiac: NSR, HR 60-100s,     Respiratory: Ventilator, Spontaneous PAV+, FiO2 40%, PEEP 5, tolerating well over night     Diet: TF @ 35 ml/hr, NPO @ 0000     Gtts: Precedex, Vasopressin, Fentanyl      Urine Output: Urinary Catheter 5 cc/shift, Oliguric while on CRRT     Drains:  REAGAN #1, total output of 25 ml/shift  REAGAN #2, total output of 30 ml/shift  REAGAN #3, total output of 0 ml/shift  LUQ G-tube, no residuals      Restriants: maintained while pt intubated/sedated, assessed Q2H for injury, no injury noted     CRRT: SCUF continuous, UF goal 200-400 (@ 400 ml/hr currently), tolerating well at this time     Labs: daily, Q6H CBC / Accuchecks: Q6H     Skin: No new skin breakdown noted. Pt's midabdominal incision maintained with Wound Vac over night CDI, 125 mmHg suction. Skin tears dressed with skin foams, CDI at this time. Venelex applied to bilateral heels. Aurora bed plugged in and working properly, waffle mattress inflated.    "

## 2021-12-27 NOTE — ASSESSMENT & PLAN NOTE
  Neuro/Psy ch:   -- Follows commands  -- Sedation/Pain: Stop precedex and fentanyl gtt. Can start oxy PRN if needing assistance w pain control             Cards:   -- HDS with MAP goal > 65   -- minimal vaso. Wean off if able  -- ECHO with EF 55%  -- transfuse PRN      Pulm:   -- Goal O2 sat > 90%  -- Passed SBT today. Extubated to nasal cannula.  -- Chest PT, IS, inhalational treatment, duo nebs      Renal:  -- Keep billingsley for strict I/O  -- continue CRRT per nephrology  -- BUN/Cr stable  -- replete lytes PRN  -- Try to approach Net 0 for hospitalization      FEN / GI:   -- Replace lytes as needed  -- Nutrition: Restart TF  -- s/p G tube  -- Consult SLP      ID:   -- Tm: afebrile; WBC stable  -- Abx none      Heme/Onc:   -- Since hosptial admission on 12/10 has received 11u pRBC, 4u FFP, 1u platelets  -- H/H stable  -- CBC q12      Endo:   -- Gluc goal 140-180  -- no history of diabetes  -- SSI/accuchecks      PPx:   Feeding: NPO, TF  Analgesia/Sedation: None. Can start oxy PRN if need pain control  Thromboembolic prevention: Heparin  HOB >30: yes  Stress Ulcer ppx: none  Glucose control: Critical care goal 140-180 g/dl, ISS    Lines/Drains/Airway: PEG, Billingsley, L radial larry, R IJ trialysis, L subclavian triple lumen      Dispo/Code Status/Palliative:   -- SICU / Full Code  -- discussed with SICU staff

## 2021-12-27 NOTE — PHYSICIAN QUERY
PT Name: Chidi Castaneda  MR #: [id number]     DOCUMENTATION CLARIFICATION     CDS: Brandy Capley, RN  Email: [email or web address]    This form is a permanent document in the medical record.     Query Date: December 27, 2021    By submitting this query, we are merely seeking further clarification of documentation.  Please utilize your independent clinical judgment when addressing the question(s) below.    The Medical Record contains the following   Indicators Supporting Clinical Findings Location in Medical Record   X Heart Failure documented   PMH   HFrEF (last EF 40%)     H&P 12/20, filed 12/21    BNP     X EF/Echo Technically challenging study.  The left ventricle is normal in size with concentric remodeling and normal systolic function. The estimated ejection fraction is 55%.  The right ventricle is not well visualized.  Indeterminate left ventricular diastolic function.  Mechanically ventilated; cannot use inferior caval vein diameter to estimate central venous pressure.     Echo 12/21   X Radiology findings severely fluid overloaded, DC IVF (pt is anuric & CXR shows pulmonary findings consistent with pulm edema)     net positive 1L last 24 hours with stable fio2 though worse CXR pulmonary edema    Bibasilar pleural effusions.  Bilateral patchy alveolar infiltrates.  Possible mild right basilar consolidation.  Possible pulmonary edema.  Cardiac margins are obscured.    Chest one view: Tubes and lines a good position.  There is borderline cardiomegaly.  There is moderate-severe edema pleural fluid and no change.  Impression:  Pulmonary edema pneumonia aspiration or sepsis.  Multiple lap sponges on the right.    There is cardiomegaly, moderate-severe edema, pleural fluid, and no change.  There are right upper quadrant and right lateral lap sponges.  There is postoperative change.  Impression:  Pulmonary edema pneumonia aspiration or sepsis.   Nephrology consult 12/20, filed 12/24      Nephrology progress notes  12/23    CXR 12/20        CXR 12/22            CXR 12/23   X Subjective/Objective Respiratory Conditions Pulmonary:      Effort: Pulmonary effort is normal. No respiratory distress.      Breath sounds: No wheezing or rales.      Comments: Intubated    Nephrology consult 12/20, filed 12/24    Recent/Current MI      Heart Transplant, LVAD     X Edema, JVD Musculoskeletal:         General: Swelling present. No tenderness or deformity.      Right lower leg: Edema present.      Left lower leg: Edema present.      Comments: 3+ to 4+ pitting edema from T10 down to to midfoot  Blistering noted    Nephrology consult 12/20, filed 12/24    Ascites      Diuretics/Meds     X Other Treatment Recommendations:   -anuric, start CRRT-SLED, -450ml/hr as BP allows, pt may need to be on vasopressors in order to improve volume removal.    Nephrology consult 12/20, filed 12/24   X Other Patient transferred from outside facility s/p lap hysterectomy complicated by multiple bowel injuries.  Has undergone ex-lap with SBR and anastomosis, now with hemorrhage from liver.  Packed and left open[place] [facility] [place]  Patient arrived intubated, sedated, on multiple pressors, likely secondary to hypovolemic shock.  Ongoing blood loss from abdominal vac dressing.  Will keep her intubated, sedated.  Wean pressors as tolerated with fluid resuscitation.  Transfuse 2U  PRBC for acute blood loss anemia.  RRT per nephrology for SUSAN.  Continue broad spectrum ABX.     SUSAN (acute kidney injury)  Oliguric SUSAN-Ischemic ATN with multifactorial etiology, however most likely d/t severe hypotension as a result of septic shock 2/2 small bowel perforation and bacteremia   Baseline: unk, however presented with sCr 0.9 at outside hospital on 12/10  Pt was started on RRT on 12/12/2021    H&P 12/20, filed 12/21              Nephrology consult 12/20, filed 12/24     Heart failure is a clinical diagnosis which includes symptomatic fluid  retention, elevated intracardiac pressures, and/or the inability of the heart to deliver adequate blood flow.    Heart Failure with reduced Ejection Fraction (HFrEF) or Systolic Heart Failure (loses ability to contract normally, EF is <40%)    Heart Failure with preserved Ejection Fraction (HFpEF) or Diastolic Heart Failure (stiff ventricles, does not relax properly, EF is >50%)     Heart Failure with Combined Systolic and Diastolic Failure (stiff ventricles, does not relax properly and EF is <50%)    Mid-range or mildly reduced ejection fraction (HFmrEF) is classified as systolic heart failure.   Common clues to acute exacerbation:  Rapidly progressive symptoms (w/in 2 weeks of presentation), using IV diuretics, using supplemental O2, pulmonary edema on Xray, new or worsening pleural effusion, +JVD or other signs of volume overload, MI w/in 4 weeks, and/or BNP >500  The clinical guidelines noted are only system guidelines, and do not replace the providers clinical judgment.    Provider, please clarify HFrEF:     [ x  ]  Acute on Chronic Systolic Heart Failure (HFrEF or HFmrEF) - worsening of CHF signs/symptoms in preexisting CHF     [   ]  Chronic Systolic Heart Failure (HFrEF or HFmrEF) - preexisting and stable     [   ]  Acute on Chronic Diastolic Heart Failure (HFpEF) - worsening of CHF signs/symptoms in preexisting CHF     [   ]  Chronic Diastolic Heart Failure (HFpEF) - preexisting and stable     [   ]  Acute on Chronic Combined Systolic and Diastolic Heart Failure - worsening of CHF signs/symptoms in preexisting CHF     [   ]  Chronic Combined Systolic and Diastolic Heart Failure - pre-existing and stable     [   ]  Other (please specify): ___________________________________     [  ]  Clinically Undetermined       Please document in your progress notes daily for the duration of treatment until resolved and include in your discharge summary.    References:  American Heart Association editorial staff. (2017,  May). Ejection Fraction Heart Failure Measurement. American Heart Association. https://www.heart.org/en/health-topics/heart-failure/diagnosing-heart-failure/ejection-fraction-heart-failure-measurement#:~:text=Ejection%20fraction%20(EF)%20is%20a,pushed%20out%20with%20each%20heartbeat  CHASIDY Foster (2020, December 15). Heart failure with preserved ejection fraction: Clinical manifestations and diagnosis. [employer]. https://www.[facility].[employer]/contents/heart-failure-with-preserved-ejection-fraction-clinical-manifestations-and-diagnosis.  ICD-10-CM/PCS Coding Clinic Third Quarter ICD-10, Effective with discharges: September 8, 2020 [facility] § Heart failure with mid-range or mildly reduced ejection fraction (2020).  Form No. 43280

## 2021-12-27 NOTE — PHYSICIAN QUERY
PT Name: Chidi Castaneda  MR #: [id number]  DOCUMENTATION CLARIFICATION      CDS: Brandy Capley, RN  Email: [email or web address]    This form is a permanent document in the medical record.      Query Date: December 27, 2021    By submitting this query, we are merely seeking further clarification of documentation. Please utilize your independent clinical judgment when addressing the question(s) below.    The Medical Record contains the following:   Indicators  Supporting Clinical Findings Location in Medical Record   X PT        INR        PTT    12/20/2021 14:54   Protime 14.5 (H)   INR 1.3 (H)   aPTT 42.4 (H)        Labs 12/20   X Platelets  12/20/2021 14:54 12/21/2021 04:25 12/22/2021 04:00 12/23/2021 00:24 12/24/2021 02:55 12/25/2021 03:00 12/26/2021 03:18 12/27/2021 03:09   Platelets 109 (L) 107 (L) 115 (L) 128 (L) 76 (L) 126 (L) 187 238      Labs 12/20-12/27   X Coagulopathy or Coagulation Defect documented 12/19 general surgery team recommended no further surgical intervention since they did not find any active bleeding intraoperatively on 12/18 and felt that the ongoing bleeding was 2/2 consumptive coagulopathy and septic shock.     Transfuse. Correct coagulopathy    Coagulopathic bleeding    Nephrology consult 12/20, filed 12/24        General surgery progress notes filed 12/22    Op note 12/24   X Acute/Chronic Illness Patient transferred from outside facility s/p lap hysterectomy complicated by multiple bowel injuries.  Has undergone ex-lap with SBR and anastomosis, now with hemorrhage from liver.  Packed and left open, transferred to [facility] [place] higher level of care.  Patient arrived intubated, sedated, on multiple pressors, likely secondary to hypovolemic shock.  Ongoing blood loss from abdominal vac dressing.  Will keep her intubated, sedated.  Wean pressors as tolerated with fluid resuscitation.  Transfuse 2U  PRBC for acute blood loss anemia.     She has receveived a total of 9 units pRBC, 4 units FFP, and 1  "unit of platelets since her admission to the OSH.    H&P 12/20, filed 12/21   X Treatment Transfuse platelets 1 dose     Transfuse FFP     Transfuse Cryoprecipitate 1 dose   Transfusion Indication: Other (Specify)  Specify "Other" Indication: suspected coagulopathy       Orders 12/22    Orders 12/23    orders 12/23   X Other    12/23/2021 15:24   Fibrinogen 261      Labs 12/23     Provider, please clarify coagulopathy:    [  x ] Abnormal INR/Coagulation Profile     [   ] Coagulation Defect due to (please specify):_________     [   ] DIC (Disseminated Intravascular Coagulation)     [   ] Other hematological diagnosis (please specify):_______     [  ] Clinically Undetermined         Please document in your progress notes daily for the duration of treatment until resolved, and include in your discharge summary.  "

## 2021-12-27 NOTE — PLAN OF CARE
"      SICU PLAN OF CARE NOTE    Dx: <principal problem not specified>    Shift Events: SBT attempted but noted tachypnea and labored respirations. Returned to PAV+ and started on Fentanyl gtt for pain and comfort. Will reattempt SBT and extubate if appropriate tomorrow. Remains on Vaso to maintain MAP >65. TPN to runoff tonight. TF restarted and increased to 35 ml/hr to PEG. Hold TF tonight at midnight for planned extubation in am. WV in midline abd intact with no leaks or issues, 200 ml serous drainage all shift. REAGAN x3 with minimal output, see I&Os.    Goals of Care: MAP >65    Neuro: Sedated, Follows Commands, and Moves All Extremities    Vital Signs: BP (!) 94/53 (BP Location: Left arm, Patient Position: Lying)   Pulse 77   Temp 98.24 °F (36.8 °C)   Resp (!) 23   Ht 5' 4" (1.626 m)   Wt 121.2 kg (267 lb 3.2 oz)   SpO2 100%   BMI 45.86 kg/m²     Respiratory: Ventilator PAV+ 40% 5 PEEP    Diet: Tube Feeds and TPN    Gtts: Fentanyl, Precedex, and Vasopressin    Urine Output: Urinary Catheter 10 cc/shift    Drains: REAGAN Drain, total output 115 cc / shift    CRRT continuous SLED UF @ 400 ml/hr    Wound Vac Midline abd: 200ml/hr    Restraints bilateral soft wrist- pulling at lines     Labs/Accuchecks: Q8hr Renal/Mg. Accuchecks Q6h    Skin: No new skin breakdown noted. See assessment for details and wound care orders. Bilateral heel and sacral foams in place for prevention. Bilateral heel boots in use. TQ2 with x 2 assist required. Exudry applied to abdominal folds to prevent moisture. Full bath and linen change complete.       "

## 2021-12-27 NOTE — PROGRESS NOTES
12/26/21 2208   Treatment   Treatment Type SCUF   Treatment Status Restart   Dialysis Machine Number [id number]   Dialyzer Time (hours) 0   BVP (Liters) 0 L   Solutions Labeled and Current  Yes   Access Right;IJ;Temporary Cath   Catheter Dressing Intact  Yes   Alarms Engaged Yes   CRRT Comments SLED restart   Prescription   Time (Hours) Continuous   Dialysate K + (mEq/L) Other (Comment)   Dialysate CA + (mEq/L) 3   Dialysate HCO3 - (Bicarb) (mEq/L) 35   Dialysate Na + (mEq/L) Other (comment)   Cartridge Type Other  (f16)   Dialysate Flow Rate (mL/min) 0(seq)   UF Goal Rate 400 mL/hr   CRRT Hourly Documentation   Blood Flow (mL/min) 200   UF Rate 400 cc/hr   Arterial Pressure (mmHg) -40 mmHg   Venous Pressure (mmHg) 60 mmHg   Effluent Pressure (EP) (mmHg) 50 mmHg     SCUF restarted. UF rate set at 400. Report given to primary nurse.

## 2021-12-27 NOTE — SUBJECTIVE & OBJECTIVE
Interval History: No acute events overnight, afebrile, vitals stable. Did not pass SBT yesterday but very alert and responsive this morning. Remains on 0.02 vaso, on CRRT. Net negative 2.5L in last 24hrs. Tolerated TFs yesterday without issue but held at midnight given hopes for extubation this morning.     Medications:  Continuous Infusions:   sodium chloride 0.9% 200 mL/hr at 12/27/21 0652    sodium chloride 0.9% Stopped (12/24/21 0447)    dexmedetomidine (PRECEDEX) infusion 1.4 mcg/kg/hr (12/27/21 0652)    fentanyl 150 mcg/hr (12/27/21 0652)    vasopressin 0.02 Units/min (12/27/21 0652)     Scheduled Meds:   balsam peru-castor oiL   Topical (Top) BID    famotidine (PF)  20 mg Intravenous Daily    heparin (porcine)  5,000 Units Subcutaneous Q8H    nitroGLYCERIN 2% TD oint  0.5 inch Topical (Top) Q6H     PRN Meds:sodium chloride, sodium chloride, sodium chloride, sodium chloride, sodium chloride, dextrose 50%, dextrose 50%, fentaNYL, fentanyl, glucagon (human recombinant), insulin aspart U-100, potassium, sodium phosphates, potassium, sodium phosphates, potassium, sodium phosphates, sodium chloride 0.9%, sodium chloride 0.9%     Review of patient's allergies indicates:   Allergen Reactions    Oxycodone     Tylox [oxycodone-acetaminophen]      Objective:     Vital Signs (Most Recent):  Temp: 97.7 °F (36.5 °C) (12/27/21 0700)  Pulse: 82 (12/27/21 0700)  Resp: 18 (12/27/21 0700)  BP: (!) 86/51 (12/27/21 0700)  SpO2: 100 % (12/27/21 0700) Vital Signs (24h Range):  Temp:  [97.7 °F (36.5 °C)-99.14 °F (37.3 °C)] 97.7 °F (36.5 °C)  Pulse:  [] 82  Resp:  [18-39] 18  SpO2:  [97 %-100 %] 100 %  BP: ()/(51-74) 86/51  Arterial Line BP: ()/() 108/53     Weight: 121.2 kg (267 lb 3.2 oz)  Body mass index is 45.86 kg/m².    Intake/Output - Last 3 Shifts       12/25 0700 12/26 0659 12/26 0700 12/27 0659 12/27 0700 12/28 0659    I.V. (mL/kg) 5742.9 (47.4) 6261.5 (51.7)     NG/GT 40 347     IV  Piggyback       TPN 1197.5 745.3     Total Intake(mL/kg) 6980.4 (57.6) 7353.8 (60.7)     Urine (mL/kg/hr) 18 (0) 15 (0)     Drains 265 170     Other 9380 9415 411    Stool 0      Total Output 9663 9600 411    Net -2682.6 -2246.2 -411           Stool Occurrence 0 x            Physical Exam  Vitals and nursing note reviewed.   Constitutional:       General: She is awake.      Appearance: Normal appearance.      Interventions: She is intubated.      Comments: RAAS of 0   HENT:      Head: Normocephalic and atraumatic.      Nose: Nose normal.      Mouth/Throat:      Mouth: Mucous membranes are moist.      Pharynx: Oropharynx is clear.   Eyes:      Conjunctiva/sclera: Conjunctivae normal.      Pupils: Pupils are equal, round, and reactive to light.   Cardiovascular:      Rate and Rhythm: Normal rate and regular rhythm.      Pulses: Normal pulses.      Heart sounds: Normal heart sounds.   Pulmonary:      Effort: Tachypnea present. No respiratory distress. She is intubated.      Comments: Crackles best appreciated at the bases.   Mechanical breath sounds  Vent Mode: Spont  Oxygen Concentration (%):  (40) 40  Resp Rate Total:  (18 br/min-38 br/min) 18 br/min  PEEP/CPAP:  (5 cmH20) 5 cmH20  Pressure Support:  (5 cmH20) 5 cmH20  Mean Airway Pressure:  (7.7 cmH20-10 cmH20) 9.4 cmH20  Abdominal:      General: Abdomen is flat.      Comments: REAGAN drains and abdominal wound vac in place   Musculoskeletal:         General: Normal range of motion.      Cervical back: Normal range of motion and neck supple.      Right lower leg: Edema present.      Left lower leg: Edema present.   Skin:     General: Skin is warm and dry.      Capillary Refill: Capillary refill takes less than 2 seconds.   Psychiatric:         Mood and Affect: Mood normal.         Behavior: Behavior normal. Behavior is cooperative.       Significant Labs:  I have reviewed all pertinent lab results within the past 24 hours.  CBC:   Recent Labs   Lab 12/27/21  0309   WBC  13.61*   RBC 2.39*   HGB 7.1*   HCT 22.2*      MCV 93   MCH 29.7   MCHC 32.0     CMP:   Recent Labs   Lab 12/27/21  0309   GLU 97  98   CALCIUM 8.0*  7.9*   ALBUMIN 1.7*  1.7*   PROT 5.1*     136   K 4.9  5.0   CO2 17*  19*     107   BUN 52*  52*   CREATININE 2.7*  2.6*   ALKPHOS 158*   ALT 65*   AST 62*   BILITOT 3.1*       Significant Diagnostics:  I have reviewed all pertinent imaging results/findings within the past 24 hours.

## 2021-12-27 NOTE — ASSESSMENT & PLAN NOTE
55yo female transfer from OSH after hysterectomy on 12/8 complicated by delayed recognition of small bowel injury requiring resection (in discontinuity) and at some point new bleed from the liver - transferred with open abdomen for higher level of care.  S/p ex-lap, liver packing 12/21    - Sedation vacation and SBT, if meets parameters would plan to extubate  - Transfuse for Hgb <7  - Wean vasopressors as tolerated to off  - Would start Tube feeds after extubation or if not extubated would continue  - Strict I/Os  - Appreciate nephrology assistance for CRRT  - Wound vac change today 12/27  - Remainder of care per SICU

## 2021-12-28 LAB
ALBUMIN SERPL BCP-MCNC: 2.2 G/DL (ref 3.5–5.2)
ALBUMIN SERPL BCP-MCNC: 2.3 G/DL (ref 3.5–5.2)
ALBUMIN SERPL BCP-MCNC: 2.7 G/DL (ref 3.5–5.2)
ALLENS TEST: ABNORMAL
ALP SERPL-CCNC: 177 U/L (ref 55–135)
ALT SERPL W/O P-5'-P-CCNC: 67 U/L (ref 10–44)
ANION GAP SERPL CALC-SCNC: 11 MMOL/L (ref 8–16)
ANION GAP SERPL CALC-SCNC: 12 MMOL/L (ref 8–16)
ANION GAP SERPL CALC-SCNC: 12 MMOL/L (ref 8–16)
ANION GAP SERPL CALC-SCNC: 9 MMOL/L (ref 8–16)
ANION GAP SERPL CALC-SCNC: 9 MMOL/L (ref 8–16)
ASCENDING AORTA: 2.66 CM
AST SERPL-CCNC: 73 U/L (ref 10–40)
AV INDEX (PROSTH): 0.74
AV MEAN GRADIENT: 5 MMHG
AV PEAK GRADIENT: 10 MMHG
AV VALVE AREA: 2.23 CM2
AV VELOCITY RATIO: 0.75
BASOPHILS # BLD AUTO: 0.04 K/UL (ref 0–0.2)
BASOPHILS # BLD AUTO: 0.07 K/UL (ref 0–0.2)
BASOPHILS NFR BLD: 0.3 % (ref 0–1.9)
BASOPHILS NFR BLD: 0.4 % (ref 0–1.9)
BILIRUB SERPL-MCNC: 4.8 MG/DL (ref 0.1–1)
BSA FOR ECHO PROCEDURE: 2.34 M2
BUN SERPL-MCNC: 18 MG/DL (ref 6–20)
BUN SERPL-MCNC: 30 MG/DL (ref 6–20)
BUN SERPL-MCNC: 33 MG/DL (ref 6–20)
BUN SERPL-MCNC: 34 MG/DL (ref 6–20)
BUN SERPL-MCNC: 35 MG/DL (ref 6–20)
CALCIUM SERPL-MCNC: 8 MG/DL (ref 8.7–10.5)
CALCIUM SERPL-MCNC: 8.2 MG/DL (ref 8.7–10.5)
CALCIUM SERPL-MCNC: 8.3 MG/DL (ref 8.7–10.5)
CHLORIDE SERPL-SCNC: 103 MMOL/L (ref 95–110)
CHLORIDE SERPL-SCNC: 104 MMOL/L (ref 95–110)
CHLORIDE SERPL-SCNC: 105 MMOL/L (ref 95–110)
CHLORIDE SERPL-SCNC: 105 MMOL/L (ref 95–110)
CHLORIDE SERPL-SCNC: 106 MMOL/L (ref 95–110)
CO2 SERPL-SCNC: 20 MMOL/L (ref 23–29)
CO2 SERPL-SCNC: 20 MMOL/L (ref 23–29)
CO2 SERPL-SCNC: 22 MMOL/L (ref 23–29)
CO2 SERPL-SCNC: 22 MMOL/L (ref 23–29)
CO2 SERPL-SCNC: 23 MMOL/L (ref 23–29)
CREAT SERPL-MCNC: 1.7 MG/DL (ref 0.5–1.4)
CREAT SERPL-MCNC: 2.4 MG/DL (ref 0.5–1.4)
CREAT SERPL-MCNC: 2.9 MG/DL (ref 0.5–1.4)
CREAT SERPL-MCNC: 2.9 MG/DL (ref 0.5–1.4)
CREAT SERPL-MCNC: 3 MG/DL (ref 0.5–1.4)
CV ECHO LV RWT: 0.35 CM
DELSYS: ABNORMAL
DIFFERENTIAL METHOD: ABNORMAL
DIFFERENTIAL METHOD: ABNORMAL
DOP CALC AO PEAK VEL: 1.6 M/S
DOP CALC AO VTI: 24.46 CM
DOP CALC LVOT AREA: 3 CM2
DOP CALC LVOT DIAMETER: 1.96 CM
DOP CALC LVOT PEAK VEL: 1.2 M/S
DOP CALC LVOT STROKE VOLUME: 54.52 CM3
DOP CALCLVOT PEAK VEL VTI: 18.08 CM
E WAVE DECELERATION TIME: 100.58 MSEC
E/A RATIO: 0.76
E/E' RATIO: 5.57 M/S
ECHO LV POSTERIOR WALL: 0.7 CM (ref 0.6–1.1)
EJECTION FRACTION: 65 %
EOSINOPHIL # BLD AUTO: 0 K/UL (ref 0–0.5)
EOSINOPHIL # BLD AUTO: 0.1 K/UL (ref 0–0.5)
EOSINOPHIL NFR BLD: 0.1 % (ref 0–8)
EOSINOPHIL NFR BLD: 0.8 % (ref 0–8)
EP: 7
ERYTHROCYTE [DISTWIDTH] IN BLOOD BY AUTOMATED COUNT: 16.3 % (ref 11.5–14.5)
ERYTHROCYTE [DISTWIDTH] IN BLOOD BY AUTOMATED COUNT: 16.5 % (ref 11.5–14.5)
ERYTHROCYTE [SEDIMENTATION RATE] IN BLOOD BY WESTERGREN METHOD: 18 MM/H
ERYTHROCYTE [SEDIMENTATION RATE] IN BLOOD BY WESTERGREN METHOD: 65 MM/H
EST. GFR  (AFRICAN AMERICAN): 19.6 ML/MIN/1.73 M^2
EST. GFR  (AFRICAN AMERICAN): 20.4 ML/MIN/1.73 M^2
EST. GFR  (AFRICAN AMERICAN): 20.4 ML/MIN/1.73 M^2
EST. GFR  (AFRICAN AMERICAN): 25.6 ML/MIN/1.73 M^2
EST. GFR  (AFRICAN AMERICAN): 38.9 ML/MIN/1.73 M^2
EST. GFR  (NON AFRICAN AMERICAN): 17 ML/MIN/1.73 M^2
EST. GFR  (NON AFRICAN AMERICAN): 17.7 ML/MIN/1.73 M^2
EST. GFR  (NON AFRICAN AMERICAN): 17.7 ML/MIN/1.73 M^2
EST. GFR  (NON AFRICAN AMERICAN): 22.2 ML/MIN/1.73 M^2
EST. GFR  (NON AFRICAN AMERICAN): 33.7 ML/MIN/1.73 M^2
FIO2: 36
FIO2: 90
FLOW: 4
FRACTIONAL SHORTENING: 35 % (ref 28–44)
GLUCOSE SERPL-MCNC: 109 MG/DL (ref 70–110)
GLUCOSE SERPL-MCNC: 115 MG/DL (ref 70–110)
GLUCOSE SERPL-MCNC: 116 MG/DL (ref 70–110)
GLUCOSE SERPL-MCNC: 127 MG/DL (ref 70–110)
GLUCOSE SERPL-MCNC: 128 MG/DL (ref 70–110)
HCO3 UR-SCNC: 24.9 MMOL/L (ref 24–28)
HCO3 UR-SCNC: 26 MMOL/L (ref 24–28)
HCO3 UR-SCNC: 26.1 MMOL/L (ref 24–28)
HCT VFR BLD AUTO: 23.7 % (ref 37–48.5)
HCT VFR BLD AUTO: 25.3 % (ref 37–48.5)
HCT VFR BLD CALC: 25 %PCV (ref 36–54)
HGB BLD-MCNC: 7.6 G/DL (ref 12–16)
HGB BLD-MCNC: 7.9 G/DL (ref 12–16)
IMM GRANULOCYTES # BLD AUTO: 0.5 K/UL (ref 0–0.04)
IMM GRANULOCYTES # BLD AUTO: 0.8 K/UL (ref 0–0.04)
IMM GRANULOCYTES NFR BLD AUTO: 3.5 % (ref 0–0.5)
IMM GRANULOCYTES NFR BLD AUTO: 4.6 % (ref 0–0.5)
INTERVENTRICULAR SEPTUM: 0.74 CM (ref 0.6–1.1)
IP: 14
IVRT: 64.7 MSEC
LA MAJOR: 5.49 CM
LA MINOR: 5.26 CM
LA WIDTH: 4.02 CM
LACTATE SERPL-SCNC: 0.6 MMOL/L (ref 0.5–2.2)
LEFT ATRIUM SIZE: 3.44 CM
LEFT ATRIUM VOLUME INDEX MOD: 21.6 ML/M2
LEFT ATRIUM VOLUME INDEX: 28.6 ML/M2
LEFT ATRIUM VOLUME MOD: 47.75 CM3
LEFT ATRIUM VOLUME: 63.15 CM3
LEFT INTERNAL DIMENSION IN SYSTOLE: 2.65 CM (ref 2.1–4)
LEFT VENTRICLE DIASTOLIC VOLUME INDEX: 32.63 ML/M2
LEFT VENTRICLE DIASTOLIC VOLUME: 72.12 ML
LEFT VENTRICLE MASS INDEX: 38 G/M2
LEFT VENTRICLE SYSTOLIC VOLUME INDEX: 11.7 ML/M2
LEFT VENTRICLE SYSTOLIC VOLUME: 25.83 ML
LEFT VENTRICULAR INTERNAL DIMENSION IN DIASTOLE: 4.05 CM (ref 3.5–6)
LEFT VENTRICULAR MASS: 83 G
LV LATERAL E/E' RATIO: 4.59 M/S
LV SEPTAL E/E' RATIO: 7.09 M/S
LYMPHOCYTES # BLD AUTO: 1.3 K/UL (ref 1–4.8)
LYMPHOCYTES # BLD AUTO: 1.3 K/UL (ref 1–4.8)
LYMPHOCYTES NFR BLD: 7.6 % (ref 18–48)
LYMPHOCYTES NFR BLD: 8.7 % (ref 18–48)
MAGNESIUM SERPL-MCNC: 2.2 MG/DL (ref 1.6–2.6)
MAGNESIUM SERPL-MCNC: 2.3 MG/DL (ref 1.6–2.6)
MCH RBC QN AUTO: 28.8 PG (ref 27–31)
MCH RBC QN AUTO: 28.9 PG (ref 27–31)
MCHC RBC AUTO-ENTMCNC: 31.2 G/DL (ref 32–36)
MCHC RBC AUTO-ENTMCNC: 32.1 G/DL (ref 32–36)
MCV RBC AUTO: 90 FL (ref 82–98)
MCV RBC AUTO: 93 FL (ref 82–98)
MODE: ABNORMAL
MONOCYTES # BLD AUTO: 1.4 K/UL (ref 0.3–1)
MONOCYTES # BLD AUTO: 1.6 K/UL (ref 0.3–1)
MONOCYTES NFR BLD: 9.3 % (ref 4–15)
MONOCYTES NFR BLD: 9.5 % (ref 4–15)
MV A" WAVE DURATION": 10.56 MSEC
MV PEAK A VEL: 1.02 M/S
MV PEAK E VEL: 0.78 M/S
MV STENOSIS PRESSURE HALF TIME: 29.17 MS
MV VALVE AREA P 1/2 METHOD: 7.54 CM2
NEUTROPHILS # BLD AUTO: 11.2 K/UL (ref 1.8–7.7)
NEUTROPHILS # BLD AUTO: 13.5 K/UL (ref 1.8–7.7)
NEUTROPHILS NFR BLD: 77.2 % (ref 38–73)
NEUTROPHILS NFR BLD: 78 % (ref 38–73)
NRBC BLD-RTO: 0 /100 WBC
NRBC BLD-RTO: 0 /100 WBC
PCO2 BLDA: 41.3 MMHG (ref 35–45)
PCO2 BLDA: 42.7 MMHG (ref 35–45)
PCO2 BLDA: 51.1 MMHG (ref 35–45)
PEEP: 8
PH SMN: 7.32 [PH] (ref 7.35–7.45)
PH SMN: 7.37 [PH] (ref 7.35–7.45)
PH SMN: 7.41 [PH] (ref 7.35–7.45)
PHOSPHATE SERPL-MCNC: 6 MG/DL (ref 2.7–4.5)
PHOSPHATE SERPL-MCNC: 7 MG/DL (ref 2.7–4.5)
PHOSPHATE SERPL-MCNC: 7.4 MG/DL (ref 2.7–4.5)
PISA TR MAX VEL: 3.47 M/S
PLATELET # BLD AUTO: 325 K/UL (ref 150–450)
PLATELET # BLD AUTO: 331 K/UL (ref 150–450)
PMV BLD AUTO: 11.3 FL (ref 9.2–12.9)
PMV BLD AUTO: 12 FL (ref 9.2–12.9)
PO2 BLDA: 101 MMHG (ref 80–100)
PO2 BLDA: 214 MMHG (ref 80–100)
PO2 BLDA: 65 MMHG (ref 80–100)
POC BE: 0 MMOL/L
POC BE: 0 MMOL/L
POC BE: 1 MMOL/L
POC SATURATED O2: 100 % (ref 95–100)
POC SATURATED O2: 90 % (ref 95–100)
POC SATURATED O2: 98 % (ref 95–100)
POC TCO2: 26 MMOL/L (ref 23–27)
POC TCO2: 27 MMOL/L (ref 23–27)
POC TCO2: 28 MMOL/L (ref 23–27)
POCT GLUCOSE: 113 MG/DL (ref 70–110)
POCT GLUCOSE: 124 MG/DL (ref 70–110)
POCT GLUCOSE: 130 MG/DL (ref 70–110)
POCT GLUCOSE: 131 MG/DL (ref 70–110)
POCT GLUCOSE: 145 MG/DL (ref 70–110)
POCT GLUCOSE: 248 MG/DL (ref 70–110)
POCT GLUCOSE: 278 MG/DL (ref 70–110)
POTASSIUM SERPL-SCNC: 4.9 MMOL/L (ref 3.5–5.1)
POTASSIUM SERPL-SCNC: 5.2 MMOL/L (ref 3.5–5.1)
POTASSIUM SERPL-SCNC: 5.8 MMOL/L (ref 3.5–5.1)
POTASSIUM SERPL-SCNC: 5.9 MMOL/L (ref 3.5–5.1)
POTASSIUM SERPL-SCNC: 6.1 MMOL/L (ref 3.5–5.1)
PROT SERPL-MCNC: 5.8 G/DL (ref 6–8.4)
PULM VEIN S/D RATIO: 1.23
PV PEAK D VEL: 0.56 M/S
PV PEAK S VEL: 0.69 M/S
RA MAJOR: 4.73 CM
RA PRESSURE: 8 MMHG
RA WIDTH: 3.44 CM
RBC # BLD AUTO: 2.64 M/UL (ref 4–5.4)
RBC # BLD AUTO: 2.73 M/UL (ref 4–5.4)
RIGHT VENTRICULAR END-DIASTOLIC DIMENSION: 3.71 CM
RV TISSUE DOPPLER FREE WALL SYSTOLIC VELOCITY 1 (APICAL 4 CHAMBER VIEW): 15.24 CM/S
SAMPLE: ABNORMAL
SINUS: 2.97 CM
SITE: ABNORMAL
SODIUM SERPL-SCNC: 136 MMOL/L (ref 136–145)
SODIUM SERPL-SCNC: 136 MMOL/L (ref 136–145)
SODIUM SERPL-SCNC: 137 MMOL/L (ref 136–145)
SP02: 90
STJ: 2.78 CM
TDI LATERAL: 0.17 M/S
TDI SEPTAL: 0.11 M/S
TDI: 0.14 M/S
TR MAX PG: 48 MMHG
TRICUSPID ANNULAR PLANE SYSTOLIC EXCURSION: 2.2 CM
TV REST PULMONARY ARTERY PRESSURE: 56 MMHG
VT: 320
WBC # BLD AUTO: 14.46 K/UL (ref 3.9–12.7)
WBC # BLD AUTO: 17.32 K/UL (ref 3.9–12.7)

## 2021-12-28 PROCEDURE — 25000003 PHARM REV CODE 250: Performed by: STUDENT IN AN ORGANIZED HEALTH CARE EDUCATION/TRAINING PROGRAM

## 2021-12-28 PROCEDURE — 99900025 HC BRONCHOSCOPY-ASST (STAT)

## 2021-12-28 PROCEDURE — 25000003 PHARM REV CODE 250

## 2021-12-28 PROCEDURE — 63600175 PHARM REV CODE 636 W HCPCS

## 2021-12-28 PROCEDURE — 31622 DX BRONCHOSCOPE/WASH: CPT

## 2021-12-28 PROCEDURE — 87070 CULTURE OTHR SPECIMN AEROBIC: CPT | Performed by: SURGERY

## 2021-12-28 PROCEDURE — 62270 INTUBATION: ICD-10-PCS | Mod: GC,,, | Performed by: ANESTHESIOLOGY

## 2021-12-28 PROCEDURE — 99291 CRITICAL CARE FIRST HOUR: CPT | Mod: 24,25,, | Performed by: SURGERY

## 2021-12-28 PROCEDURE — 87205 SMEAR GRAM STAIN: CPT | Performed by: SURGERY

## 2021-12-28 PROCEDURE — 84100 ASSAY OF PHOSPHORUS: CPT | Performed by: STUDENT IN AN ORGANIZED HEALTH CARE EDUCATION/TRAINING PROGRAM

## 2021-12-28 PROCEDURE — 20000000 HC ICU ROOM

## 2021-12-28 PROCEDURE — 83605 ASSAY OF LACTIC ACID: CPT | Performed by: STUDENT IN AN ORGANIZED HEALTH CARE EDUCATION/TRAINING PROGRAM

## 2021-12-28 PROCEDURE — 80069 RENAL FUNCTION PANEL: CPT | Performed by: STUDENT IN AN ORGANIZED HEALTH CARE EDUCATION/TRAINING PROGRAM

## 2021-12-28 PROCEDURE — 85025 COMPLETE CBC W/AUTO DIFF WBC: CPT | Mod: 91 | Performed by: SURGERY

## 2021-12-28 PROCEDURE — 80069 RENAL FUNCTION PANEL: CPT | Mod: 91 | Performed by: STUDENT IN AN ORGANIZED HEALTH CARE EDUCATION/TRAINING PROGRAM

## 2021-12-28 PROCEDURE — 80053 COMPREHEN METABOLIC PANEL: CPT | Performed by: STUDENT IN AN ORGANIZED HEALTH CARE EDUCATION/TRAINING PROGRAM

## 2021-12-28 PROCEDURE — 99291 PR CRITICAL CARE, E/M 30-74 MINUTES: ICD-10-PCS | Mod: 24,25,, | Performed by: SURGERY

## 2021-12-28 PROCEDURE — 87077 CULTURE AEROBIC IDENTIFY: CPT | Performed by: SURGERY

## 2021-12-28 PROCEDURE — 99900035 HC TECH TIME PER 15 MIN (STAT)

## 2021-12-28 PROCEDURE — 31624 DX BRONCHOSCOPE/LAVAGE: CPT | Mod: 58,,, | Performed by: SURGERY

## 2021-12-28 PROCEDURE — 90945 PR DIALYSIS, NOT HEMO, 1 EVAL: ICD-10-PCS | Mod: ,,, | Performed by: INTERNAL MEDICINE

## 2021-12-28 PROCEDURE — 25000242 PHARM REV CODE 250 ALT 637 W/ HCPCS: Performed by: STUDENT IN AN ORGANIZED HEALTH CARE EDUCATION/TRAINING PROGRAM

## 2021-12-28 PROCEDURE — 90945 DIALYSIS ONE EVALUATION: CPT | Mod: ,,, | Performed by: INTERNAL MEDICINE

## 2021-12-28 PROCEDURE — 63600175 PHARM REV CODE 636 W HCPCS: Performed by: STUDENT IN AN ORGANIZED HEALTH CARE EDUCATION/TRAINING PROGRAM

## 2021-12-28 PROCEDURE — 31624 PR BRONCHOSCOPY,DIAG2STIC W LAVAGE: ICD-10-PCS | Mod: 58,,, | Performed by: SURGERY

## 2021-12-28 PROCEDURE — 25500020 PHARM REV CODE 255: Performed by: SURGERY

## 2021-12-28 PROCEDURE — 94761 N-INVAS EAR/PLS OXIMETRY MLT: CPT

## 2021-12-28 PROCEDURE — 94640 AIRWAY INHALATION TREATMENT: CPT

## 2021-12-28 PROCEDURE — 27000221 HC OXYGEN, UP TO 24 HOURS

## 2021-12-28 PROCEDURE — 83735 ASSAY OF MAGNESIUM: CPT | Performed by: STUDENT IN AN ORGANIZED HEALTH CARE EDUCATION/TRAINING PROGRAM

## 2021-12-28 PROCEDURE — 80048 BASIC METABOLIC PNL TOTAL CA: CPT | Performed by: SURGERY

## 2021-12-28 PROCEDURE — 94645 CONT INHLJ TX EACH ADDL HOUR: CPT

## 2021-12-28 PROCEDURE — 99900026 HC AIRWAY MAINTENANCE (STAT)

## 2021-12-28 PROCEDURE — 94668 MNPJ CHEST WALL SBSQ: CPT

## 2021-12-28 PROCEDURE — 37799 UNLISTED PX VASCULAR SURGERY: CPT

## 2021-12-28 PROCEDURE — 82800 BLOOD PH: CPT

## 2021-12-28 PROCEDURE — 85025 COMPLETE CBC W/AUTO DIFF WBC: CPT

## 2021-12-28 PROCEDURE — 87186 SC STD MICRODIL/AGAR DIL: CPT | Performed by: SURGERY

## 2021-12-28 PROCEDURE — 94003 VENT MGMT INPAT SUBQ DAY: CPT

## 2021-12-28 PROCEDURE — 82803 BLOOD GASES ANY COMBINATION: CPT

## 2021-12-28 PROCEDURE — 83735 ASSAY OF MAGNESIUM: CPT | Mod: 91 | Performed by: STUDENT IN AN ORGANIZED HEALTH CARE EDUCATION/TRAINING PROGRAM

## 2021-12-28 PROCEDURE — 62270 DX LMBR SPI PNXR: CPT | Mod: GC,,, | Performed by: ANESTHESIOLOGY

## 2021-12-28 RX ORDER — ROCURONIUM BROMIDE 10 MG/ML
100 INJECTION, SOLUTION INTRAVENOUS ONCE
Status: COMPLETED | OUTPATIENT
Start: 2021-12-28 | End: 2021-12-28

## 2021-12-28 RX ORDER — SODIUM CHLORIDE, SODIUM LACTATE, POTASSIUM CHLORIDE, CALCIUM CHLORIDE 600; 310; 30; 20 MG/100ML; MG/100ML; MG/100ML; MG/100ML
INJECTION, SOLUTION INTRAVENOUS CONTINUOUS
Status: DISCONTINUED | OUTPATIENT
Start: 2021-12-28 | End: 2021-12-29

## 2021-12-28 RX ORDER — ETOMIDATE 2 MG/ML
INJECTION INTRAVENOUS
Status: DISPENSED
Start: 2021-12-28 | End: 2021-12-28

## 2021-12-28 RX ORDER — PHENYLEPHRINE HCL IN 0.9% NACL 1 MG/10 ML
SYRINGE (ML) INTRAVENOUS
Status: COMPLETED
Start: 2021-12-28 | End: 2021-12-28

## 2021-12-28 RX ORDER — DEXMEDETOMIDINE HYDROCHLORIDE 4 UG/ML
INJECTION, SOLUTION INTRAVENOUS
Status: DISPENSED
Start: 2021-12-28 | End: 2021-12-28

## 2021-12-28 RX ORDER — MIDAZOLAM HYDROCHLORIDE 1 MG/ML
INJECTION INTRAMUSCULAR; INTRAVENOUS
Status: COMPLETED
Start: 2021-12-28 | End: 2021-12-28

## 2021-12-28 RX ORDER — ROCURONIUM BROMIDE 10 MG/ML
INJECTION, SOLUTION INTRAVENOUS
Status: COMPLETED
Start: 2021-12-28 | End: 2021-12-28

## 2021-12-28 RX ORDER — MIDAZOLAM HYDROCHLORIDE 1 MG/ML
2 INJECTION INTRAMUSCULAR; INTRAVENOUS ONCE
Status: COMPLETED | OUTPATIENT
Start: 2021-12-28 | End: 2021-12-28

## 2021-12-28 RX ORDER — ALBUTEROL SULFATE 2.5 MG/.5ML
10 SOLUTION RESPIRATORY (INHALATION) ONCE
Status: COMPLETED | OUTPATIENT
Start: 2021-12-29 | End: 2021-12-28

## 2021-12-28 RX ORDER — MAGNESIUM SULFATE HEPTAHYDRATE 40 MG/ML
2 INJECTION, SOLUTION INTRAVENOUS
Status: ACTIVE | OUTPATIENT
Start: 2021-12-28 | End: 2021-12-29

## 2021-12-28 RX ORDER — HEPARIN SODIUM 5000 [USP'U]/ML
7500 INJECTION, SOLUTION INTRAVENOUS; SUBCUTANEOUS EVERY 8 HOURS
Status: DISCONTINUED | OUTPATIENT
Start: 2021-12-28 | End: 2022-01-14 | Stop reason: HOSPADM

## 2021-12-28 RX ORDER — DIPHENHYDRAMINE HYDROCHLORIDE 50 MG/ML
12.5 INJECTION INTRAMUSCULAR; INTRAVENOUS ONCE
Status: COMPLETED | OUTPATIENT
Start: 2021-12-28 | End: 2021-12-28

## 2021-12-28 RX ORDER — ALBUTEROL SULFATE 2.5 MG/.5ML
10 SOLUTION RESPIRATORY (INHALATION) ONCE
Status: COMPLETED | OUTPATIENT
Start: 2021-12-28 | End: 2021-12-28

## 2021-12-28 RX ORDER — ALBUTEROL SULFATE 2.5 MG/.5ML
SOLUTION RESPIRATORY (INHALATION)
Status: DISPENSED
Start: 2021-12-28 | End: 2021-12-29

## 2021-12-28 RX ORDER — KETAMINE HCL IN 0.9 % NACL 50 MG/5 ML
50 SYRINGE (ML) INTRAVENOUS ONCE
Status: COMPLETED | OUTPATIENT
Start: 2021-12-28 | End: 2021-12-28

## 2021-12-28 RX ORDER — SODIUM CHLORIDE 0.9 % (FLUSH) 0.9 %
10 SYRINGE (ML) INJECTION
Status: CANCELLED | OUTPATIENT
Start: 2021-12-28

## 2021-12-28 RX ORDER — DEXMEDETOMIDINE HYDROCHLORIDE 4 UG/ML
0-1.4 INJECTION, SOLUTION INTRAVENOUS CONTINUOUS
Status: DISCONTINUED | OUTPATIENT
Start: 2021-12-28 | End: 2022-01-01

## 2021-12-28 RX ORDER — FENTANYL CITRATE-0.9 % NACL/PF 10 MCG/ML
0-200 PLASTIC BAG, INJECTION (ML) INTRAVENOUS CONTINUOUS
Status: DISCONTINUED | OUTPATIENT
Start: 2021-12-28 | End: 2022-01-01

## 2021-12-28 RX ADMIN — INSULIN HUMAN 10 UNITS: 100 INJECTION, SOLUTION PARENTERAL at 05:12

## 2021-12-28 RX ADMIN — HEPARIN SODIUM 7500 UNITS: 5000 INJECTION INTRAVENOUS; SUBCUTANEOUS at 09:12

## 2021-12-28 RX ADMIN — VASOPRESSIN 0.04 UNITS/MIN: 20 INJECTION INTRAVENOUS at 08:12

## 2021-12-28 RX ADMIN — ROCURONIUM BROMIDE 100 MG: 10 INJECTION, SOLUTION INTRAVENOUS at 10:12

## 2021-12-28 RX ADMIN — METHOCARBAMOL 500 MG: 500 TABLET ORAL at 09:12

## 2021-12-28 RX ADMIN — SODIUM PHOSPHATE, MONOBASIC, MONOHYDRATE 39.99 MMOL: 276; 142 INJECTION, SOLUTION INTRAVENOUS at 12:12

## 2021-12-28 RX ADMIN — HYDROMORPHONE HYDROCHLORIDE 0.5 MG: 1 INJECTION, SOLUTION INTRAMUSCULAR; INTRAVENOUS; SUBCUTANEOUS at 09:12

## 2021-12-28 RX ADMIN — METHOCARBAMOL 500 MG: 500 TABLET ORAL at 05:12

## 2021-12-28 RX ADMIN — METHOCARBAMOL 500 MG: 500 TABLET ORAL at 08:12

## 2021-12-28 RX ADMIN — GABAPENTIN 125 MG: 250 SOLUTION ORAL at 05:12

## 2021-12-28 RX ADMIN — DEXMEDETOMIDINE HYDROCHLORIDE 0.5 MCG/KG/HR: 4 INJECTION INTRAVENOUS at 03:12

## 2021-12-28 RX ADMIN — Medication 50 MG: at 10:12

## 2021-12-28 RX ADMIN — DEXTROSE MONOHYDRATE 25 G: 25 INJECTION, SOLUTION INTRAVENOUS at 11:12

## 2021-12-28 RX ADMIN — DEXMEDETOMIDINE HYDROCHLORIDE 0.4 MCG/KG/HR: 4 INJECTION INTRAVENOUS at 07:12

## 2021-12-28 RX ADMIN — GABAPENTIN 125 MG: 250 SOLUTION ORAL at 01:12

## 2021-12-28 RX ADMIN — IPRATROPIUM BROMIDE AND ALBUTEROL SULFATE 3 ML: .5; 2.5 SOLUTION RESPIRATORY (INHALATION) at 11:12

## 2021-12-28 RX ADMIN — METHOCARBAMOL 500 MG: 500 TABLET ORAL at 01:12

## 2021-12-28 RX ADMIN — CALCIUM GLUCONATE 1 G: 98 INJECTION, SOLUTION INTRAVENOUS at 05:12

## 2021-12-28 RX ADMIN — HEPARIN SODIUM 7500 UNITS: 5000 INJECTION INTRAVENOUS; SUBCUTANEOUS at 01:12

## 2021-12-28 RX ADMIN — Medication: at 08:12

## 2021-12-28 RX ADMIN — GABAPENTIN 125 MG: 250 SOLUTION ORAL at 09:12

## 2021-12-28 RX ADMIN — Medication 100 MCG/HR: at 10:12

## 2021-12-28 RX ADMIN — NITROGLYCERIN 0.5 INCH: 20 OINTMENT TOPICAL at 05:12

## 2021-12-28 RX ADMIN — DEXTROSE MONOHYDRATE 25 G: 25 INJECTION, SOLUTION INTRAVENOUS at 05:12

## 2021-12-28 RX ADMIN — HEPARIN SODIUM 5000 UNITS: 5000 INJECTION INTRAVENOUS; SUBCUTANEOUS at 05:12

## 2021-12-28 RX ADMIN — SODIUM CHLORIDE, SODIUM LACTATE, POTASSIUM CHLORIDE, AND CALCIUM CHLORIDE: .6; .31; .03; .02 INJECTION, SOLUTION INTRAVENOUS at 08:12

## 2021-12-28 RX ADMIN — ALBUTEROL SULFATE 10 MG: 2.5 SOLUTION RESPIRATORY (INHALATION) at 11:12

## 2021-12-28 RX ADMIN — IPRATROPIUM BROMIDE AND ALBUTEROL SULFATE 3 ML: .5; 2.5 SOLUTION RESPIRATORY (INHALATION) at 04:12

## 2021-12-28 RX ADMIN — IPRATROPIUM BROMIDE AND ALBUTEROL SULFATE 3 ML: .5; 2.5 SOLUTION RESPIRATORY (INHALATION) at 07:12

## 2021-12-28 RX ADMIN — NITROGLYCERIN 0.5 INCH: 20 OINTMENT TOPICAL at 12:12

## 2021-12-28 RX ADMIN — DEXMEDETOMIDINE HYDROCHLORIDE 0.1 MCG/KG/HR: 4 INJECTION INTRAVENOUS at 12:12

## 2021-12-28 RX ADMIN — DIPHENHYDRAMINE HYDROCHLORIDE 12.5 MG: 50 INJECTION INTRAMUSCULAR; INTRAVENOUS at 12:12

## 2021-12-28 RX ADMIN — INSULIN HUMAN 10 UNITS: 100 INJECTION, SOLUTION PARENTERAL at 11:12

## 2021-12-28 RX ADMIN — MIDAZOLAM HYDROCHLORIDE 2 MG: 1 INJECTION INTRAMUSCULAR; INTRAVENOUS at 10:12

## 2021-12-28 RX ADMIN — VASOPRESSIN 0.04 UNITS/MIN: 20 INJECTION INTRAVENOUS at 01:12

## 2021-12-28 RX ADMIN — IOHEXOL 100 ML: 350 INJECTION, SOLUTION INTRAVENOUS at 06:12

## 2021-12-28 RX ADMIN — DEXMEDETOMIDINE HYDROCHLORIDE 0.8 MCG/KG/HR: 4 INJECTION INTRAVENOUS at 09:12

## 2021-12-28 RX ADMIN — MIDAZOLAM 2 MG: 1 INJECTION INTRAMUSCULAR; INTRAVENOUS at 10:12

## 2021-12-28 RX ADMIN — ALBUTEROL SULFATE 7.5 MG: 2.5 SOLUTION RESPIRATORY (INHALATION) at 04:12

## 2021-12-28 NOTE — SUBJECTIVE & OBJECTIVE
"Interval History: from nasal cannula to bipap and CTA suggestive of pulmonary edema with CVP 10 despite almost 2L net negative fluid balance last 24 hours    Review of patient's allergies indicates:   Allergen Reactions    Tylox [oxycodone-acetaminophen]      "Jittery"     Current Facility-Administered Medications   Medication Frequency    0.9%  NaCl infusion (CRRT USE ONLY) Continuous    0.9%  NaCl infusion (for blood administration) Q24H PRN    0.9%  NaCl infusion (for blood administration) Q24H PRN    albuterol-ipratropium 2.5 mg-0.5 mg/3 mL nebulizer solution 3 mL Q4H WAKE    balsam peru-castor oiL Oint BID    dexmedetomidine (PRECEDEX) 400mcg/100mL 0.9% NaCL infusion Continuous    dextrose 50% injection 12.5 g PRN    dextrose 50% injection 25 g PRN    gabapentin 250 mg/5 mL (5 mL) solution 125 mg Q8H    glucagon (human recombinant) injection 1 mg PRN    heparin (porcine) injection 7,500 Units Q8H    HYDROmorphone injection 0.5 mg Q6H PRN    insulin aspart U-100 pen 0-5 Units Q6H PRN    lactated ringers infusion Continuous    magnesium sulfate 2g in water 50mL IVPB (premix) PRN    methocarbamoL tablet 500 mg QID    nitroGLYCERIN 2% TD oint ointment 0.5 inch Q6H    olanzapine zydis disintegrating tablet 10 mg Nightly PRN    oxyCODONE 5 mg/5 mL solution 10 mg Q4H PRN    oxyCODONE 5 mg/5 mL solution 5 mg Q4H PRN    sodium chloride 0.9% flush 10 mL PRN    sodium chloride 0.9% flush 10 mL PRN       Objective:     Vital Signs (Most Recent):  Temp: 98.1 °F (36.7 °C) (12/28/21 0715)  Pulse: (!) 131 (12/28/21 0845)  Resp: (!) 41 (12/28/21 0907)  BP: 128/74 (12/28/21 0800)  SpO2: 96 % (12/28/21 0845)  O2 Device (Oxygen Therapy): BiPAP (12/28/21 0730) Vital Signs (24h Range):  Temp:  [98.1 °F (36.7 °C)-100.6 °F (38.1 °C)] 98.1 °F (36.7 °C)  Pulse:  [107-162] 131  Resp:  [18-45] 41  SpO2:  [71 %-100 %] 96 %  BP: ()/(49-75) 128/74  Arterial Line BP: ()/(40-71) 135/71     Weight: 121.2 kg " (267 lb 3.2 oz) (12/26/21 0611)  Body mass index is 45.86 kg/m².  Body surface area is 2.34 meters squared.    I/O last 3 completed shifts:  In: 8298.1 [I.V.:6929; Blood:258; NG/GT:670; IV Piggyback:248.9]  Out: 04808 [Urine:33; Drains:132; Other:65350]    Physical Exam  Constitutional:       Appearance: She is obese. She is ill-appearing.   HENT:      Mouth/Throat:      Mouth: Mucous membranes are moist.   Eyes:      Pupils: Pupils are equal, round, and reactive to light.   Cardiovascular:      Rate and Rhythm: Normal rate and regular rhythm.   Pulmonary:      Effort: Pulmonary effort is normal.   Abdominal:      General: There is no distension.      Comments: closed   Musculoskeletal:         General: No deformity.      Right lower leg: Edema present.      Left lower leg: Edema present.   Skin:     Coloration: Skin is not jaundiced.   Neurological:      General: No focal deficit present.         Significant Labs:  All labs within the past 24 hours have been reviewed.     Significant Imaging:  Labs: Reviewed  X-Ray: Reviewed  CT: Reviewed

## 2021-12-28 NOTE — PLAN OF CARE
"      SICU PLAN OF CARE NOTE    Dx: <principal problem not specified>    Shift Events: Extubated to 2L NC. Vaso on and off to maintain MAP >65. CRRT to SLED for 8 hrs with . Tachycardia 130-140s, EKG = Sinus tachy. 25 gm Albumin (100ml) and 1 u PRBC transfused with small improvement. Pt anxiety noted with MD aware.    Goals of Care: MAP >65, O2sats >90%    Neuro: AAO x4, Follows Commands, and Moves All Extremities    Vital Signs: BP (!) 104/57 (BP Location: Left arm, Patient Position: Lying)   Pulse (!) 148   Temp (!) 100.4 °F (38 °C) (Oral)   Resp (!) 42   Ht 5' 4" (1.626 m)   Wt 121.2 kg (267 lb 3.2 oz)   SpO2 95%   BMI 45.86 kg/m²     Respiratory: Nasal Cannula 1L per NC    Diet: NPO and Tube Feeds Impact peptide @ 35 ml/hr    Gtts: Vasopressin    Urine Output: Urinary Catheter 28 cc/shift    Drains: REAGAN Drain, total output 27 cc / shift    Wound Vac ML abdomen: 50 ml/shift serous    CRRT SLED for 8hr,  ml/hr (goal 500)    Cardiac:Sinus Tachycardia 135 bpm     Labs/Accuchecks: Q8h Renal/Mg. Accuchecks Q6    Skin: No new skin breakdown noted. See assessment for details and wound care orders. Bilateral heel and sacral foams in place for prevention. Bilateral heel boots in use. TQ2 with x 2 assist required. Exudry applied to abdominal folds to prevent moisture. Full bath and linen change complete.       "

## 2021-12-28 NOTE — PT/OT/SLP PROGRESS
Occupational Therapy      Patient Name:  Chidi Castaneda   MRN:  [id number]    Patient not seen today secondary to on hold in am 2* tenuous respiratory status. In PM, pt intubated  . Will follow-up 12/30/21.    12/28/2021

## 2021-12-28 NOTE — EICU
Rounding (Video Assessment):  Yes    Intervention Initiated From:  Bedside    Deven Communicated with Bedside Nurse regarding:  Time-Out    eLerted for Intubation and bronchoscopy. RSI medications ordered by bedside team. 7.0ETT inserted by Micah Mcnair assisting at bedside. Pt tolerated procedures without incident. CXR ordered. Time-out/LDA entered.

## 2021-12-28 NOTE — ASSESSMENT & PLAN NOTE
-oliguric SUSAN, ischemic ATN with multifactorial etiology, however most likely d/t severe hypotension as a result of septic shock 2/2 small bowel perforation and bacteremia   -BL sCr 1  -KRT started at OSH 12/12/2021 for acidosis and volume overload  -still anuric  -despite almost 2L net negative fluid balance she was reintubated and a CTA performed to rule out PE which showed some pulmonary edema  -abg and electrolytes benign, but may need some fluid removal later today  -likely will restart KRT this afternoon

## 2021-12-28 NOTE — PT/OT/SLP PROGRESS
Physical Therapy      Patient Name:  Chidi Castaneda   MRN:  [id number]    Patient not seen today secondary to  (AM -pt on hold due to tentative respiratory status. PM -pt on hold due to being intubated and sedated.). Will follow-up at a later date.    12/28/2021  .

## 2021-12-28 NOTE — PROGRESS NOTES
"[facility]  General Surgery  Progress Note    Subjective:     History of Present Illness:  No notes on file    Post-Op Info:  Procedure(s) (LRB):  LAPAROTOMY, EXPLORATORY (N/A)  INSERTION, GASTROSTOMY TUBE, PERCUTANEOUS (N/A)  CHOLECYSTECTOMY  EGD (ESOPHAGOGASTRODUODENOSCOPY) (N/A)   5 Days Post-Op     Interval History: Extubated yesterday to NC, CPAP overnight. Tachycardic to 160s overnight with temp to 100.4.  Transfused 1 unit pRBC. Tolerating TF @ 35cc/hr.    Medications:  Continuous Infusions:   sodium chloride 0.9% Stopped (12/27/21 2151)    dexmedetomidine (PRECEDEX) infusion 0.4 mcg/kg/hr (12/28/21 0500)    vasopressin Stopped (12/27/21 1803)     Scheduled Meds:   albuterol-ipratropium  3 mL Nebulization Q4H WAKE    balsam peru-castor oiL   Topical (Top) BID    gabapentin  125 mg Per G Tube Q8H    heparin (porcine)  7,500 Units Subcutaneous Q8H    methocarbamoL  500 mg Per G Tube QID    nitroGLYCERIN 2% TD oint  0.5 inch Topical (Top) Q6H     PRN Meds:sodium chloride, sodium chloride, dextrose 50%, dextrose 50%, glucagon (human recombinant), HYDROmorphone, insulin aspart U-100, magnesium sulfate IVPB, olanzapine zydis, oxyCODONE, oxyCODONE, sodium chloride 0.9%, sodium chloride 0.9%     Review of patient's allergies indicates:   Allergen Reactions    Tylox [oxycodone-acetaminophen]      "Jittery"     Objective:     Vital Signs (Most Recent):  Temp: (!) 100.4 °F (38 °C) (12/28/21 0300)  Pulse: (!) 135 (12/28/21 0715)  Resp: (!) 39 (12/28/21 0715)  BP: 127/70 (12/28/21 0715)  SpO2: (!) 87 % (12/28/21 0715) Vital Signs (24h Range):  Temp:  [97.16 °F (36.2 °C)-100.6 °F (38.1 °C)] 100.4 °F (38 °C)  Pulse:  [] 135  Resp:  [16-45] 39  SpO2:  [71 %-100 %] 87 %  BP: ()/(49-75) 127/70  Arterial Line BP: ()/(40-69) 118/59     Weight: 121.2 kg (267 lb 3.2 oz)  Body mass index is 45.86 kg/m².    Intake/Output - Last 3 Shifts       12/26 0700  12/27 0659 12/27 " 0700  12/28 0659 12/28 0700  12/29 0659    I.V. (mL/kg) 6261.5 (51.7) 3695.5 (30.5)     Blood  258     NG/ 420     IV Piggyback  248.9     .3      Total Intake(mL/kg) 7353.8 (60.7) 4622.4 (38.1)     Urine (mL/kg/hr) 15 (0) 28 (0)     Drains 170 77     Other 9415 6518     Stool  0     Total Output 9600 6623     Net -2246.2 -2000.6            Stool Occurrence  2 x           Physical Exam  Vitals and nursing note reviewed.   Constitutional:       General: She is not in acute distress.     Appearance: She is obese. She is not diaphoretic.      Comments: Nasal cannula   HENT:      Head: Normocephalic and atraumatic.      Mouth/Throat:      Mouth: Mucous membranes are moist.      Pharynx: Oropharynx is clear.   Eyes:      Extraocular Movements: Extraocular movements intact.      Conjunctiva/sclera: Conjunctivae normal.   Cardiovascular:      Rate and Rhythm: Regular rhythm. Tachycardia present.   Pulmonary:      Effort: No respiratory distress.      Comments: Increased WOB  Abdominal:      General: There is no distension.      Palpations: Abdomen is soft.      Tenderness: There is no guarding or rebound.      Comments: Wound vac in place with good seal, no leak noted.   REAGAN drains with benign fluid, not bloody or bilious   Musculoskeletal:         General: No deformity.   Skin:     General: Skin is warm and dry.   Neurological:      Mental Status: She is alert.         Significant Labs:  I have reviewed all pertinent lab results within the past 24 hours.  CBC:   Recent Labs   Lab 12/28/21  0303   WBC 17.32*   RBC 2.73*   HGB 7.9*   HCT 25.3*      MCV 93   MCH 28.9   MCHC 31.2*     CMP:   Recent Labs   Lab 12/28/21  0303   *   CALCIUM 8.0*   ALBUMIN 2.7*   PROT 5.8*      K 4.9   CO2 22*      BUN 18   CREATININE 1.7*   ALKPHOS 177*   ALT 67*   AST 73*   BILITOT 4.8*       Significant Diagnostics:  I have reviewed all pertinent imaging results/findings within the past 24  hours.    Assessment/Plan:     Subcapsular hematoma of liver  55yo female transfer from OSH after hysterectomy on 12/8 complicated by delayed recognition of small bowel injury requiring resection (in discontinuity) and at some point new bleed from the liver - transferred with open abdomen for higher level of care.  S/p ex-lap, liver packing 12/21    - CTA c/a/p with PE protocol today  - Transfuse for Hgb <7  - Continue TF as tolerated for now   - Strict I/Os  - Appreciate nephrology assistance for CRRT  - Wound vac last changed 12/27, next change 12/30  - Remainder of care per SICU, appreciate assistance     Case discussed with general surgery staff    Renato Anderson PAJaidaC  General Surgery  [facility]

## 2021-12-28 NOTE — PT/OT/SLP PROGRESS
Speech Language Pathology      Chidi Castaneda  MRN: [id number]    8:10AM  SLP to bedside this AM in attempts to initiate clinical swallow assessment. Pt currently on bipap and not medically appropriate to wean for any PO trials at this time. Per further discussion with RN, SLP will re-attempt this PM as schedule allows and pt medically ready. Otherwise, SLP will re-attempt assessment 12/29/21 as pt medically appropriate. RN demonstrated understanding and agreement with plan.     11:17AM   Pt currently intubated and not appropriate for skilled speech services at this time. Current orders will be discontinued. Team to re-consult upon extubation once medically appropriate.         Mayelin Santillan MS, CCC-SLP  Speech Language Pathologist  Pager: [phone]  Date 12/28/2021

## 2021-12-28 NOTE — PROGRESS NOTES
[facility]  Critical Care - Surgery  Progress Note    Patient Name: Chidi Castaneda  MRN: [id number]  Admission Date: 12/20/2021  Hospital Length of Stay: 8 days  Code Status: Full Code  Attending Provider: Kendall Gorman MD  Primary Care Provider: Primary Doctor No   Principal Problem: <principal problem not specified>    Subjective:     Hospital/ICU Course:  No notes on file    Interval History/Significant Events: Extubated yesterday. Was doing well except for sinus tachycardia in the 140s. EKG showed sinus tach, bedside echo wo R heart strain, CXR w edema, and labs unchanged from prior. Received 1U pRBC, 100cc 25% albumin, and 500cc 5% albumin o/n d/t small CVP on echo. Started on CPAP o/n, but back on 4L nc after awake. Restarted on low-dose precedex infusion for possible precedex discontinuation syndrome as pt endorsed anxiousness. Also given benadryl. This AM, however, was still tachycardic but now tachypneic. ABG w resp acidosis. Concern for PE, so taken for CTA Chest/abd/pel w PE protocol. No PE noted on CTA. Placed on BiPAP this AM.    Follow-up For: Procedure(s) (LRB):  LAPAROTOMY, EXPLORATORY (N/A)  INSERTION, GASTROSTOMY TUBE, PERCUTANEOUS (N/A)  CHOLECYSTECTOMY  EGD (ESOPHAGOGASTRODUODENOSCOPY) (N/A)    Post-Operative Day: 5 Days Post-Op    Objective:     Vital Signs (Most Recent):  Temp: (!) 100.4 °F (38 °C) (12/28/21 0300)  Pulse: (!) 125 (12/28/21 0730)  Resp: (!) 39 (12/28/21 0730)  BP: 127/70 (12/28/21 0715)  SpO2: (!) 93 % (12/28/21 0730) Vital Signs (24h Range):  Temp:  [97.16 °F (36.2 °C)-100.6 °F (38.1 °C)] 100.4 °F (38 °C)  Pulse:  [] 125  Resp:  [17-45] 39  SpO2:  [71 %-100 %] 93 %  BP: ()/(49-75) 127/70  Arterial Line BP: ()/(40-69) 111/55     Weight: 121.2 kg (267 lb 3.2 oz)  Body mass index is 45.86 kg/m².      Intake/Output Summary (Last 24 hours) at 12/28/2021 0743  Last data filed at 12/28/2021 0500  Gross per 24 hour   Intake 4622.36 ml    Output 6202 ml   Net -1579.64 ml       Physical Exam  Vitals and nursing note reviewed.   Constitutional:       General: She is in acute distress.      Appearance: She is obese. She is not diaphoretic.      Comments: Nasal cannula   HENT:      Head: Normocephalic and atraumatic.      Mouth/Throat:      Mouth: Mucous membranes are moist.      Pharynx: Oropharynx is clear.   Eyes:      Extraocular Movements: Extraocular movements intact.      Conjunctiva/sclera: Conjunctivae normal.   Cardiovascular:      Rate and Rhythm: Regular rhythm. Tachycardia present.      Pulses:           Radial pulses are 2+ on the right side and 2+ on the left side.      Heart sounds: Normal heart sounds.   Pulmonary:      Effort: Tachypnea, accessory muscle usage and respiratory distress present.      Breath sounds: Examination of the right-lower field reveals decreased breath sounds. Examination of the left-lower field reveals decreased breath sounds. Decreased breath sounds present.   Abdominal:      General: There is no distension.      Palpations: Abdomen is soft.      Tenderness: There is no abdominal tenderness. There is no guarding or rebound.      Comments: Wound vac in place with good seal, no leak noted.   REAGAN drains with benign fluid, not bloody or bilious   Musculoskeletal:         General: No deformity.   Skin:     General: Skin is warm and dry.   Neurological:      Mental Status: She is alert.   Psychiatric:         Behavior: Behavior is cooperative.         Vents:  Vent Mode: Spont (12/27/21 0820)  Ventilator Initiated: Yes (12/20/21 1448)  Set Rate: 18 BPM (12/25/21 1552)  Vt Set: 330 mL (12/25/21 1552)  Pressure Support: 5 cmH20 (12/26/21 0759)  PEEP/CPAP: 5 cmH20 (12/27/21 0820)  Oxygen Concentration (%): 65 (12/28/21 0730)  Peak Airway Pressure: 28 cmH2O (12/27/21 0820)  Plateau Pressure: 25 cmH20 (12/27/21 0820)  Total Ve: 0 mL (12/27/21 0820)  Negative Inspiratory Force (cm H2O): -30 (12/27/21 0820)  F/VT Ratio<105  (RSBI): (!) 58.46 (12/27/21 0344)    Lines/Drains/Airways     Central Venous Catheter Line            Percutaneous Central Line Insertion/Assessment - Triple Lumen  12/16/21 1531 left subclavian 11 days    Trialysis (Dialysis) Catheter 12/21/21 0030 right internal jugular 7 days          Drain                 Urethral Catheter 12/20/21 1743 7 days         Closed/Suction Drain 12/23/21 1428 Right;Inferior RUQ Bulb 19 Fr. 4 days         Closed/Suction Drain 12/23/21 1429 Right;Superior RUQ Bulb 19 Fr. 4 days         Gastrostomy/Enterostomy 12/23/21 1338 Gastrostomy tube w/ balloon LUQ feeding 4 days          Arterial Line            Arterial Line 12/18/21 0000 Right Radial 10 days                Significant Labs:    CBC/Anemia Profile:  Recent Labs   Lab 12/27/21  1508 12/27/21  1600 12/28/21  0303   WBC 16.56* 15.21* 17.32*   HGB 7.7* 7.5* 7.9*   HCT 23.8* 23.2* 25.3*    313 325   MCV 92 91 93   RDW 15.9* 15.8* 16.5*        Chemistries:  Recent Labs   Lab 12/27/21  0309 12/27/21  0309 12/27/21  1332 12/27/21  1332 12/27/21  1508 12/27/21  2143 12/28/21  0303     136   < > 141   < > 138 136 136   K 4.9  5.0   < > 4.8   < > 4.6 4.7 4.9     107   < > 106   < > 105 104 103   CO2 17*  19*   < > 23   < > 24 24 22*   BUN 52*  52*   < > 29*   < > 21* 10 18   CREATININE 2.7*  2.6*   < > 1.6*   < > 1.3 0.8 1.7*   CALCIUM 8.0*  7.9*   < > 8.2*   < > 7.8* 8.0* 8.0*   ALBUMIN 1.7*  1.7*   < > 1.8*  --   --  2.3* 2.7*   PROT 5.1*  --   --   --   --   --  5.8*   BILITOT 3.1*  --   --   --   --   --  4.8*   ALKPHOS 158*  --   --   --   --   --  177*   ALT 65*  --   --   --   --   --  67*   AST 62*  --   --   --   --   --  73*   MG 1.8  1.9   < >  --   --  1.8 2.2 2.2   PHOS 4.3  4.4   < > 2.8  --   --  1.9* 6.0*    < > = values in this interval not displayed.       All pertinent labs within the past 24 hours have been reviewed.    Significant Imaging:  I have reviewed all pertinent imaging  results/findings within the past 24 hours.    Assessment/Plan:     Subcapsular hematoma of liver    Neuro/Psych:   -- Follows commands  -- Sedation/Pain: Restarted precedex and given benadryl o/n for anxiety. Oxy/dilaudid PRN. Once intubated, will go back to precedex and fentanyl gtts             Cards:   -- HDS with MAP goal > 65   -- Off pressors. Restart vaso if hypotensive  -- ECHO with EF 55%. No R heart strain on bedside echo  -- Cards consulted for echo. Appreciate their assistance      Pulm:   -- Goal O2 sat > 90%  -- Extubated yesterday. Was on nc and CPAP. Placed on BiPAP this AM for tachypnea, increased WOB and O2 at 90%.  -- Will re-intubate today and bronch. F/u BAL  -- Chest PT, IS, inhalational treatment, duo nebs - restart once extubated again  -- CTA Chest w PE protocol: No evidence of PE. Nonspecific bilateral airspace consolidation which could reflect pneumonia, aspiration, and/or other inflammatory process      Renal:  -- Keep billingsley for strict I/O  -- continue CRRT per nephrology  -- BUN/Cr increased  -- replete lytes PRN  -- Try to approach Net 0 for hospitalization      FEN / GI:   -- Replace lytes as needed  -- Nutrition: Hold TF until intubated, then restart  -- Will start mIVF while TF off  -- s/p G tube  -- SLP following  -- CTA Abd/pel: Enchancing lesion in peripheral right hepatic lobe (1.4cm). Moderate volume free fluid in the abdomen or pelvis, possibility of peritonitis or developing abscess. Dependently within the collection there are lobular areas of hyperattenuation. Subcapsular fluid collection about the liver, with drain in-situ.  Small undrained subcapsular collection involving the posterior aspect of the spleen.      ID:   -- Tm: Tmax 100.6. WBC increased to 17.32  -- Abx none for now  -- Lactic acid 0.6      Heme/Onc:   -- Since hosptial admission on 12/10 has received 12u pRBC, 4u FFP, 1u platelets  -- H/H stable  -- CBC q12      Endo:   -- Gluc goal 140-180  -- no  history of diabetes  -- SSI/accuchecks      PPx:   Feeding: NPO, TF  Analgesia/Sedation: oxy and dilaudid/Precedex and fentanyl gtt once intubated  Thromboembolic prevention: SQH  HOB >30: yes  Stress Ulcer ppx: none  Glucose control: Critical care goal 140-180 g/dl, ISS    Lines/Drains/Airway: PEG, Ng, L radial larry, R IJ trialysis, L subclavian triple lumen      Dispo/Code Status/Palliative:   -- SICU / Full Code  -- discussed with SICU staff      Critical secondary to Patient has a condition that poses threat to life and bodily function: Severe Respiratory Distress and Acute Renal Failure     Critical care was time spent personally by me on the following activities: development of treatment plan with patient or surrogate and bedside caregivers, discussions with consultants, evaluation of patient's response to treatment, examination of patient, ordering and performing treatments and interventions, ordering and review of laboratory studies, ordering and review of radiographic studies, pulse oximetry, re-evaluation of patient's condition.  This critical care time did not overlap with that of any other provider or involve time for any procedures.     Evan Cutler MD  Critical Care - Surgery  [facility]

## 2021-12-28 NOTE — NURSING
Patient continues with increased work of breathing with BIPAP at 65% in place. Complain of generalized discomfort. MD to intubate patient as well as bronch. Patient intubated with BIPAP at 100% preoxygenation. Pt. Tolerated procedure with no immediate complications noted. Safety and comfort maintained. Patient  at bedside. Updated by MD       0231 Notified SICU team of increase in pt. Creatine and potassium noting previous plan to hold dialysis this shift per nephro. New orders received.

## 2021-12-28 NOTE — SUBJECTIVE & OBJECTIVE
"Interval History: Extubated yesterday to NC, CPAP overnight. Tachycardic to 160s overnight with temp to 100.4.  Transfused 1 unit pRBC. Tolerating TF @ 35cc/hr.    Medications:  Continuous Infusions:   sodium chloride 0.9% Stopped (12/27/21 2151)    dexmedetomidine (PRECEDEX) infusion 0.4 mcg/kg/hr (12/28/21 0500)    vasopressin Stopped (12/27/21 1803)     Scheduled Meds:   albuterol-ipratropium  3 mL Nebulization Q4H WAKE    balsam peru-castor oiL   Topical (Top) BID    gabapentin  125 mg Per G Tube Q8H    heparin (porcine)  7,500 Units Subcutaneous Q8H    methocarbamoL  500 mg Per G Tube QID    nitroGLYCERIN 2% TD oint  0.5 inch Topical (Top) Q6H     PRN Meds:sodium chloride, sodium chloride, dextrose 50%, dextrose 50%, glucagon (human recombinant), HYDROmorphone, insulin aspart U-100, magnesium sulfate IVPB, olanzapine zydis, oxyCODONE, oxyCODONE, sodium chloride 0.9%, sodium chloride 0.9%     Review of patient's allergies indicates:   Allergen Reactions    Tylox [oxycodone-acetaminophen]      "Jittery"     Objective:     Vital Signs (Most Recent):  Temp: (!) 100.4 °F (38 °C) (12/28/21 0300)  Pulse: (!) 135 (12/28/21 0715)  Resp: (!) 39 (12/28/21 0715)  BP: 127/70 (12/28/21 0715)  SpO2: (!) 87 % (12/28/21 0715) Vital Signs (24h Range):  Temp:  [97.16 °F (36.2 °C)-100.6 °F (38.1 °C)] 100.4 °F (38 °C)  Pulse:  [] 135  Resp:  [16-45] 39  SpO2:  [71 %-100 %] 87 %  BP: ()/(49-75) 127/70  Arterial Line BP: ()/(40-69) 118/59     Weight: 121.2 kg (267 lb 3.2 oz)  Body mass index is 45.86 kg/m².    Intake/Output - Last 3 Shifts       12/26 0700 12/27 0659 12/27 0700 12/28 0659 12/28 0700 12/29 0659    I.V. (mL/kg) 6261.5 (51.7) 3695.5 (30.5)     Blood  258     NG/ 420     IV Piggyback  248.9     .3      Total Intake(mL/kg) 7353.8 (60.7) 4622.4 (38.1)     Urine (mL/kg/hr) 15 (0) 28 (0)     Drains 170 77     Other 5115 6561     Stool  0     Total Output 9600 6623     Net -2246.2 " -2000.6            Stool Occurrence  2 x           Physical Exam  Vitals and nursing note reviewed.   Constitutional:       General: She is not in acute distress.     Appearance: She is obese. She is not diaphoretic.      Comments: Nasal cannula   HENT:      Head: Normocephalic and atraumatic.      Mouth/Throat:      Mouth: Mucous membranes are moist.      Pharynx: Oropharynx is clear.   Eyes:      Extraocular Movements: Extraocular movements intact.      Conjunctiva/sclera: Conjunctivae normal.   Cardiovascular:      Rate and Rhythm: Regular rhythm. Tachycardia present.   Pulmonary:      Effort: No respiratory distress.      Comments: Increased WOB  Abdominal:      General: There is no distension.      Palpations: Abdomen is soft.      Tenderness: There is no guarding or rebound.      Comments: Wound vac in place with good seal, no leak noted.   REAGAN drains with benign fluid, not bloody or bilious   Musculoskeletal:         General: No deformity.   Skin:     General: Skin is warm and dry.   Neurological:      Mental Status: She is alert.         Significant Labs:  I have reviewed all pertinent lab results within the past 24 hours.  CBC:   Recent Labs   Lab 12/28/21  0303   WBC 17.32*   RBC 2.73*   HGB 7.9*   HCT 25.3*      MCV 93   MCH 28.9   MCHC 31.2*     CMP:   Recent Labs   Lab 12/28/21  0303   *   CALCIUM 8.0*   ALBUMIN 2.7*   PROT 5.8*      K 4.9   CO2 22*      BUN 18   CREATININE 1.7*   ALKPHOS 177*   ALT 67*   AST 73*   BILITOT 4.8*       Significant Diagnostics:  I have reviewed all pertinent imaging results/findings within the past 24 hours.

## 2021-12-28 NOTE — ASSESSMENT & PLAN NOTE
  Neuro/Psych:   -- Follows commands  -- Sedation/Pain: Restarted precedex and given benadryl o/n for anxiety. Oxy/dilaudid PRN. Once intubated, will go back to precedex and fentanyl gtts             Cards:   -- HDS with MAP goal > 65   -- Off pressors. Restart vaso if hypotensive  -- ECHO with EF 55%. No R heart strain on bedside echo  -- Cards consulted for echo. Appreciate their assistance      Pulm:   -- Goal O2 sat > 90%  -- Extubated yesterday. Was on nc and CPAP. Placed on BiPAP this AM for tachypnea, increased WOB and O2 at 90%.  -- Will re-intubate today and bronch. F/u BAL  -- Chest PT, IS, inhalational treatment, duo nebs - restart once extubated again  -- CTA Chest w PE protocol: No evidence of PE. Nonspecific bilateral airspace consolidation which could reflect pneumonia, aspiration, and/or other inflammatory process      Renal:  -- Keep billingsley for strict I/O  -- continue CRRT per nephrology  -- BUN/Cr increased  -- replete lytes PRN  -- Try to approach Net 0 for hospitalization      FEN / GI:   -- Replace lytes as needed  -- Nutrition: Hold TF until intubated, then restart  -- Will start mIVF while TF off  -- s/p G tube  -- SLP following  -- CTA Abd/pel: Enchancing lesion in peripheral right hepatic lobe (1.4cm). Moderate volume free fluid in the abdomen or pelvis, possibility of peritonitis or developing abscess. Dependently within the collection there are lobular areas of hyperattenuation. Subcapsular fluid collection about the liver, with drain in-situ.  Small undrained subcapsular collection involving the posterior aspect of the spleen.      ID:   -- Tm: Tmax 100.6. WBC increased to 17.32  -- Abx none for now  -- Lactic acid 0.6      Heme/Onc:   -- Since hosptial admission on 12/10 has received 12u pRBC, 4u FFP, 1u platelets  -- H/H stable  -- CBC q12      Endo:   -- Gluc goal 140-180  -- no history of diabetes  -- SSI/accuchecks      PPx:   Feeding: NPO, TF  Analgesia/Sedation: oxy and  dilaudid/Precedex and fentanyl gtt once intubated  Thromboembolic prevention: SQH  HOB >30: yes  Stress Ulcer ppx: none  Glucose control: Critical care goal 140-180 g/dl, ISS    Lines/Drains/Airway: PEG, Ng, L radial larry, R IJ trialysis, L subclavian triple lumen      Dispo/Code Status/Palliative:   -- SICU / Full Code  -- discussed with SICU staff

## 2021-12-28 NOTE — ASSESSMENT & PLAN NOTE
55yo female transfer from OSH after hysterectomy on 12/8 complicated by delayed recognition of small bowel injury requiring resection (in discontinuity) and at some point new bleed from the liver - transferred with open abdomen for higher level of care.  S/p ex-lap, liver packing 12/21    - CTA c/a/p with PE protocol today  - Transfuse for Hgb <7  - Continue TF as tolerated for now   - Strict I/Os  - Appreciate nephrology assistance for CRRT  - Wound vac last changed 12/27, next change 12/30  - Remainder of care per SICU, appreciate assistance

## 2021-12-28 NOTE — PLAN OF CARE
500cc Albumin given overnight. Precedex gtt started for increasing anxiety. Completed SLED for 8hrs.     Tmax 100.4. Kathleen hugger removed. -160s. EKG done showing Sinus Tachycardia. MAP> 65. SpO2 >90% on 4L NC. CPAP overnight. Pt follows commands and moves all extremities.     Gtts:   Precedex 0.4 mcg/kg/hr     REAGAN drains in place with serosanguineous drainage.   Wound Vac with SS output. 50cc/shift  Ng in place with minimal output  PEG w/ tube feeds at goal of 35cc/hr.     POC reviewed with patient and spouse. All questions/concerns addressed.     Skin: Heel boots and SCDs in place. Nitro paste applied to BL toes. Venelex applied to heels.

## 2021-12-28 NOTE — PROCEDURES
[facility]  Bronchoscopy   Procedure Note    SUMMARY     Date of Procedure: 12/28/2021    Procedure: Bronchoalveolar lavage, BAL    Provider: Ricardo Zacarias MD    Assisting Provider: Evan Cutler MD    Pre-Procedure Diagnosis:  Respiratory Failure    Post-Procedure Diagnosis: Respiratory failure    Indication: Atelectasis/consolidation on CXR/CT    Anesthesia: Moderate Sedation with fentanyl/precedex gtt    Technical Procedures Used: Bronchoscopy    Description of the Findings of the Procedure:     Left mainstem and its subsegmental bronchi patent and without focal findings.    R mainstem patent, middle and lower segmental bronchi with serosanguinous/purulent collection obtained via bronchoalveolar lavage.     Patient's  was consented for the procedure with all risk and benefit of the procedure explained in detail.  Patient's  was given the opportunity to ask questions and all concerns were answered.  The bronchocope was inserted into the main airway via the endotracheal tube. An anatomical survey was done of the main airways and the subsegmental bronchus.  The findings are reported above.  A bronchialalveolar lavage was performed using aliquots of normal saline instilled into the airways then aspirated back.    Significant Surgical Tasks Conducted by the Assistant(s), if Applicable: RLL bronchioloalveolar lavage    Complications: None; patient tolerated the procedure well.    Estimated Blood Loss (EBL): none           Implants: none    Specimens: Sent purulent fluid       Condition: Stable        Disposition: ICU - intubated and hemodynamically stable.    Ricardo Zacarias MD  Department of Anesthesiology  [facility]  12/28/2021 11:05 AM

## 2021-12-28 NOTE — SUBJECTIVE & OBJECTIVE
Interval History/Significant Events: Extubated yesterday. Was doing well except for sinus tachycardia in the 140s. EKG showed sinus tach, bedside echo wo R heart strain, CXR w edema, and labs unchanged from prior. Received 1U pRBC, 100cc 25% albumin, and 500cc 5% albumin o/n d/t small CVP on echo. Started on CPAP o/n, but back on 4L nc after awake. Restarted on low-dose precedex infusion for possible precedex discontinuation syndrome as pt endorsed anxiousness. Also given benadryl. This AM, however, was still tachycardic but now tachypneic. ABG w resp acidosis. Concern for PE, so taken for CTA Chest/abd/pel w PE protocol. No PE noted on CTA. Placed on BiPAP this AM.    Follow-up For: Procedure(s) (LRB):  LAPAROTOMY, EXPLORATORY (N/A)  INSERTION, GASTROSTOMY TUBE, PERCUTANEOUS (N/A)  CHOLECYSTECTOMY  EGD (ESOPHAGOGASTRODUODENOSCOPY) (N/A)    Post-Operative Day: 5 Days Post-Op    Objective:     Vital Signs (Most Recent):  Temp: (!) 100.4 °F (38 °C) (12/28/21 0300)  Pulse: (!) 125 (12/28/21 0730)  Resp: (!) 39 (12/28/21 0730)  BP: 127/70 (12/28/21 0715)  SpO2: (!) 93 % (12/28/21 0730) Vital Signs (24h Range):  Temp:  [97.16 °F (36.2 °C)-100.6 °F (38.1 °C)] 100.4 °F (38 °C)  Pulse:  [] 125  Resp:  [17-45] 39  SpO2:  [71 %-100 %] 93 %  BP: ()/(49-75) 127/70  Arterial Line BP: ()/(40-69) 111/55     Weight: 121.2 kg (267 lb 3.2 oz)  Body mass index is 45.86 kg/m².      Intake/Output Summary (Last 24 hours) at 12/28/2021 0743  Last data filed at 12/28/2021 0500  Gross per 24 hour   Intake 4622.36 ml   Output 6202 ml   Net -1579.64 ml       Physical Exam  Vitals and nursing note reviewed.   Constitutional:       General: She is in acute distress.      Appearance: She is obese. She is not diaphoretic.      Comments: Nasal cannula   HENT:      Head: Normocephalic and atraumatic.      Mouth/Throat:      Mouth: Mucous membranes are moist.      Pharynx: Oropharynx is clear.   Eyes:      Extraocular Movements:  Extraocular movements intact.      Conjunctiva/sclera: Conjunctivae normal.   Cardiovascular:      Rate and Rhythm: Regular rhythm. Tachycardia present.      Pulses:           Radial pulses are 2+ on the right side and 2+ on the left side.      Heart sounds: Normal heart sounds.   Pulmonary:      Effort: Tachypnea, accessory muscle usage and respiratory distress present.      Breath sounds: Examination of the right-lower field reveals decreased breath sounds. Examination of the left-lower field reveals decreased breath sounds. Decreased breath sounds present.   Abdominal:      General: There is no distension.      Palpations: Abdomen is soft.      Tenderness: There is no abdominal tenderness. There is no guarding or rebound.      Comments: Wound vac in place with good seal, no leak noted.   REAGAN drains with benign fluid, not bloody or bilious   Musculoskeletal:         General: No deformity.   Skin:     General: Skin is warm and dry.   Neurological:      Mental Status: She is alert.   Psychiatric:         Behavior: Behavior is cooperative.         Vents:  Vent Mode: Spont (12/27/21 0820)  Ventilator Initiated: Yes (12/20/21 1448)  Set Rate: 18 BPM (12/25/21 1552)  Vt Set: 330 mL (12/25/21 1552)  Pressure Support: 5 cmH20 (12/26/21 0759)  PEEP/CPAP: 5 cmH20 (12/27/21 0820)  Oxygen Concentration (%): 65 (12/28/21 0730)  Peak Airway Pressure: 28 cmH2O (12/27/21 0820)  Plateau Pressure: 25 cmH20 (12/27/21 0820)  Total Ve: 0 mL (12/27/21 0820)  Negative Inspiratory Force (cm H2O): -30 (12/27/21 0820)  F/VT Ratio<105 (RSBI): (!) 58.46 (12/27/21 0344)    Lines/Drains/Airways     Central Venous Catheter Line            Percutaneous Central Line Insertion/Assessment - Triple Lumen  12/16/21 1531 left subclavian 11 days    Trialysis (Dialysis) Catheter 12/21/21 0030 right internal jugular 7 days          Drain                 Urethral Catheter 12/20/21 1743 7 days         Closed/Suction Drain 12/23/21 1428 Right;Inferior RUQ  Bulb 19 Fr. 4 days         Closed/Suction Drain 12/23/21 1429 Right;Superior RUQ Bulb 19 Fr. 4 days         Gastrostomy/Enterostomy 12/23/21 1338 Gastrostomy tube w/ balloon LUQ feeding 4 days          Arterial Line            Arterial Line 12/18/21 0000 Right Radial 10 days                Significant Labs:    CBC/Anemia Profile:  Recent Labs   Lab 12/27/21  1508 12/27/21  1600 12/28/21  0303   WBC 16.56* 15.21* 17.32*   HGB 7.7* 7.5* 7.9*   HCT 23.8* 23.2* 25.3*    313 325   MCV 92 91 93   RDW 15.9* 15.8* 16.5*        Chemistries:  Recent Labs   Lab 12/27/21  0309 12/27/21  0309 12/27/21  1332 12/27/21  1332 12/27/21  1508 12/27/21  2143 12/28/21  0303     136   < > 141   < > 138 136 136   K 4.9  5.0   < > 4.8   < > 4.6 4.7 4.9     107   < > 106   < > 105 104 103   CO2 17*  19*   < > 23   < > 24 24 22*   BUN 52*  52*   < > 29*   < > 21* 10 18   CREATININE 2.7*  2.6*   < > 1.6*   < > 1.3 0.8 1.7*   CALCIUM 8.0*  7.9*   < > 8.2*   < > 7.8* 8.0* 8.0*   ALBUMIN 1.7*  1.7*   < > 1.8*  --   --  2.3* 2.7*   PROT 5.1*  --   --   --   --   --  5.8*   BILITOT 3.1*  --   --   --   --   --  4.8*   ALKPHOS 158*  --   --   --   --   --  177*   ALT 65*  --   --   --   --   --  67*   AST 62*  --   --   --   --   --  73*   MG 1.8  1.9   < >  --   --  1.8 2.2 2.2   PHOS 4.3  4.4   < > 2.8  --   --  1.9* 6.0*    < > = values in this interval not displayed.       All pertinent labs within the past 24 hours have been reviewed.    Significant Imaging:  I have reviewed all pertinent imaging results/findings within the past 24 hours.

## 2021-12-28 NOTE — PROGRESS NOTES
[facility]  Nephrology  Progress Note    Patient Name: Chidi Castaneda  MRN: [id number]  Admission Date: 12/20/2021  Hospital Length of Stay: 8 days  Attending Provider: Kendall Gorman MD   Primary Care Physician: Primary Doctor No  Principal Problem:<principal problem not specified>    Subjective:     HPI: Chidi Castaneda is a 56 y.o. female w/ PMHx HTN, GERD s/p EGD 6/22/2021, migraines, ovarian cyst s/p cystectomy, and obesity who underwent an elective lap hysterectomy on 12/18/2021 at [facility] and was then transferred to [place], [place] for higher level of care when pt was found to have post-op somnolence, decrease PO intake, decrease urine output that progressed to anuria with a sharp rise in Cr from 0.9 to 2.8 (12/10). Pt was treated for sepsis with volume resuscitation and broad spectrum antibiotics, however pt continue to deteriorate and became tachycardic, hypotensive, and imaging showed an ileus. Pt was intubated since she was found to be acidotic with a ph of 7.28 despite a nonrebreather with 100%  FiO2  12/11 pt was taken back to the OR for washout and a bowel resection was done as she was found to have a small bowel perforation  12/15 pt was found to have a subscapular liver hematoma  12/18 pt was taken back to the OR for wound vacc exchange and removal of hematoma   12/19 general surgery team recommended no further surgical intervention since they did not find any active bleeding intraoperatively on 12/18 and felt that the ongoing bleeding was 2/2 consumptive coagulopathy and septic shock. They recommended to continue wound vacc and to consider higher level of care for pt  Pt was also being treated for a bacteremia as well as for intraabdominal infection, her antibiotics prior to transfer were: meropenem, flagyl, and vancomycin   Pt received tranexamic acid x1 and multiple units of blood products.    [place] [facility] [place] for higher  "level of care: embolization for a subscapular hematoma       Nephrology was consulted for: "dialysis, SUSAN"    Pt does not have any h/o renal disease prior to hospitalization (per chart review and per pt's )   EDW: 88 kg   Post-op renal ultrasound was normal (results of ultrasound and other medical records from the outside hospital are in the chart at the bedside, needs to be scanned into chart)   Pt was started on CRRT on 12/12 via a trialysis catheter,   On 12/20/21 morning nephrology at outside hospital had recommended CRRT-SLED however primary team requested iHD prior to transfer to [facility], it appears that pt was ordered for 4 hours however, pt was prepped for transfer and was unable to complete full session 2/2 transfer, per pt's , pt had 2L of fluid removed instead of the planned 4 liters. Of note, while pt was at [facility] pt's CRRT required the use of citrate to prevent clotting (briefly), however that apparently resolved and was able to get a session w/o citrate and obviously was able to tolerate iHD prior to transfer.   Upon arrival to [facility] pt was started on zosyn, she was started on NS 125ml/hr, was transfused 2 units of PRBCs, and remained off vasopressors.       Interval History: from nasal cannula to bipap and CTA suggestive of pulmonary edema with CVP 10 despite almost 2L net negative fluid balance last 24 hours    Review of patient's allergies indicates:   Allergen Reactions    Tylox [oxycodone-acetaminophen]      "Jittery"     Current Facility-Administered Medications   Medication Frequency    0.9%  NaCl infusion (CRRT USE ONLY) Continuous    0.9%  NaCl infusion (for blood administration) Q24H PRN    0.9%  NaCl infusion (for blood administration) Q24H PRN    albuterol-ipratropium 2.5 mg-0.5 mg/3 mL nebulizer solution 3 mL Q4H WAKE    balsam peru-castor oiL Oint BID    dexmedetomidine (PRECEDEX) 400mcg/100mL 0.9% NaCL infusion Continuous    dextrose 50% injection 12.5 g PRN "    dextrose 50% injection 25 g PRN    gabapentin 250 mg/5 mL (5 mL) solution 125 mg Q8H    glucagon (human recombinant) injection 1 mg PRN    heparin (porcine) injection 7,500 Units Q8H    HYDROmorphone injection 0.5 mg Q6H PRN    insulin aspart U-100 pen 0-5 Units Q6H PRN    lactated ringers infusion Continuous    magnesium sulfate 2g in water 50mL IVPB (premix) PRN    methocarbamoL tablet 500 mg QID    nitroGLYCERIN 2% TD oint ointment 0.5 inch Q6H    olanzapine zydis disintegrating tablet 10 mg Nightly PRN    oxyCODONE 5 mg/5 mL solution 10 mg Q4H PRN    oxyCODONE 5 mg/5 mL solution 5 mg Q4H PRN    sodium chloride 0.9% flush 10 mL PRN    sodium chloride 0.9% flush 10 mL PRN       Objective:     Vital Signs (Most Recent):  Temp: 98.1 °F (36.7 °C) (12/28/21 0715)  Pulse: (!) 131 (12/28/21 0845)  Resp: (!) 41 (12/28/21 0907)  BP: 128/74 (12/28/21 0800)  SpO2: 96 % (12/28/21 0845)  O2 Device (Oxygen Therapy): BiPAP (12/28/21 0730) Vital Signs (24h Range):  Temp:  [98.1 °F (36.7 °C)-100.6 °F (38.1 °C)] 98.1 °F (36.7 °C)  Pulse:  [107-162] 131  Resp:  [18-45] 41  SpO2:  [71 %-100 %] 96 %  BP: ()/(49-75) 128/74  Arterial Line BP: ()/(40-71) 135/71     Weight: 121.2 kg (267 lb 3.2 oz) (12/26/21 0611)  Body mass index is 45.86 kg/m².  Body surface area is 2.34 meters squared.    I/O last 3 completed shifts:  In: 8298.1 [I.V.:6929; Blood:258; NG/GT:670; IV Piggyback:248.9]  Out: 25907 [Urine:33; Drains:132; Other:92605]    Physical Exam  Constitutional:       Appearance: She is obese. She is ill-appearing.   HENT:      Mouth/Throat:      Mouth: Mucous membranes are moist.   Eyes:      Pupils: Pupils are equal, round, and reactive to light.   Cardiovascular:      Rate and Rhythm: Normal rate and regular rhythm.   Pulmonary:      Effort: Pulmonary effort is normal.   Abdominal:      General: There is no distension.      Comments: closed   Musculoskeletal:         General: No deformity.      Right  lower leg: Edema present.      Left lower leg: Edema present.   Skin:     Coloration: Skin is not jaundiced.   Neurological:      General: No focal deficit present.         Significant Labs:  All labs within the past 24 hours have been reviewed.     Significant Imaging:  Labs: Reviewed  X-Ray: Reviewed  CT: Reviewed    Assessment/Plan:     SUSAN (acute kidney injury)  -oliguric SUSAN, ischemic ATN with multifactorial etiology, however most likely d/t severe hypotension as a result of septic shock 2/2 small bowel perforation and bacteremia   -BL sCr 1  -KRT started at OSH 12/12/2021 for acidosis and volume overload  -still anuric  -despite almost 2L net negative fluid balance she was reintubated and a CTA performed to rule out PE which showed some pulmonary edema  -abg and electrolytes benign, but may need some fluid removal later today  -likely will restart KRT this afternoon    Volume overload  -CTA and CXR mild pulmonary edema    Metabolic acidosis  -controlled with KRT  -now with resp alkalosis    Bowel perforation  -per primary    Septic shock  -secondary to bowel perforation  -per primary      Subcapsular hematoma of liver  -per primary        Anthony Steven MD  Nephrology  [facility]

## 2021-12-28 NOTE — PROCEDURES
"Chidi Castaneda is a 56 y.o. female patient.    Temp: 98.1 °F (36.7 °C) (12/28/21 0715)  Pulse: (!) 123 (12/28/21 1003)  Resp: (!) 38 (12/28/21 0930)  BP: 127/70 (12/28/21 1003)  SpO2: 98 % (12/28/21 0930)  Weight: 121.1 kg (267 lb) (12/28/21 1003)  Height: 5' 4" (162.6 cm) (12/28/21 1003)       Intubation    Date/Time: 12/28/2021 10:48 AM  Location procedure was performed: [facility] CRITICAL CARE SURGERY  Performed by: Ricardo Zacarias MD  Authorized by: Max Coffman MD   Pre-operative diagnosis: respiratory failure  Post-operative diagnosis: respiratory failure  Consent Done: Emergent Situation  Indications: respiratory distress,  respiratory failure and  hypercapnia  Intubation method: video-assisted  Patient status: paralyzed (RSI)  Preoxygenation: BiPAP 100%  Pretreatment medications: midazolam  Sedatives: ketmine  Paralytic: rocuronium  Laryngoscope size: Glide 3  Tube size: 7.0 mm  Tube type: cuffed  Number of attempts: 2  Ventilation between attempts: BVM  Cricoid pressure: yes  Cords visualized: yes  Post-procedure assessment: CO2 detector and chest rise  Breath sounds: clear but diminished bilaterally.  Cuff inflated: yes  ETT to lip: 23 cm  ETT to teeth: 22 cm  Tube secured with: ETT talbert  Chest x-ray findings: endotracheal tube in appropriate position  Patient tolerance: Patient tolerated the procedure well with no immediate complications  Complications: No  Estimated blood loss (mL): 0          12/28/2021  "

## 2021-12-29 LAB
ALBUMIN SERPL BCP-MCNC: 2.1 G/DL (ref 3.5–5.2)
ALBUMIN SERPL BCP-MCNC: 2.2 G/DL (ref 3.5–5.2)
ALLENS TEST: ABNORMAL
ALLENS TEST: NORMAL
ALP SERPL-CCNC: 140 U/L (ref 55–135)
ALT SERPL W/O P-5'-P-CCNC: 59 U/L (ref 10–44)
ANION GAP SERPL CALC-SCNC: 10 MMOL/L (ref 8–16)
ANION GAP SERPL CALC-SCNC: 7 MMOL/L (ref 8–16)
ANION GAP SERPL CALC-SCNC: 8 MMOL/L (ref 8–16)
ANISOCYTOSIS BLD QL SMEAR: SLIGHT
AST SERPL-CCNC: 51 U/L (ref 10–40)
BASOPHILS # BLD AUTO: 0.04 K/UL (ref 0–0.2)
BASOPHILS # BLD AUTO: 0.04 K/UL (ref 0–0.2)
BASOPHILS # BLD AUTO: 0.05 K/UL (ref 0–0.2)
BASOPHILS NFR BLD: 0.2 % (ref 0–1.9)
BASOPHILS NFR BLD: 0.2 % (ref 0–1.9)
BASOPHILS NFR BLD: 0.4 % (ref 0–1.9)
BILIRUB SERPL-MCNC: 2.5 MG/DL (ref 0.1–1)
BUN SERPL-MCNC: 10 MG/DL (ref 6–20)
BUN SERPL-MCNC: 12 MG/DL (ref 6–20)
BUN SERPL-MCNC: 26 MG/DL (ref 6–20)
BUN SERPL-MCNC: 6 MG/DL (ref 6–20)
BUN SERPL-MCNC: 7 MG/DL (ref 6–20)
CALCIUM SERPL-MCNC: 7.6 MG/DL (ref 8.7–10.5)
CALCIUM SERPL-MCNC: 7.7 MG/DL (ref 8.7–10.5)
CALCIUM SERPL-MCNC: 7.9 MG/DL (ref 8.7–10.5)
CALCIUM SERPL-MCNC: 8 MG/DL (ref 8.7–10.5)
CALCIUM SERPL-MCNC: 8.1 MG/DL (ref 8.7–10.5)
CHLORIDE SERPL-SCNC: 104 MMOL/L (ref 95–110)
CHLORIDE SERPL-SCNC: 104 MMOL/L (ref 95–110)
CHLORIDE SERPL-SCNC: 105 MMOL/L (ref 95–110)
CO2 SERPL-SCNC: 23 MMOL/L (ref 23–29)
CO2 SERPL-SCNC: 24 MMOL/L (ref 23–29)
CO2 SERPL-SCNC: 25 MMOL/L (ref 23–29)
CREAT SERPL-MCNC: 0.7 MG/DL (ref 0.5–1.4)
CREAT SERPL-MCNC: 0.7 MG/DL (ref 0.5–1.4)
CREAT SERPL-MCNC: 0.9 MG/DL (ref 0.5–1.4)
CREAT SERPL-MCNC: 1.1 MG/DL (ref 0.5–1.4)
CREAT SERPL-MCNC: 2.1 MG/DL (ref 0.5–1.4)
DELSYS: ABNORMAL
DELSYS: NORMAL
DIFFERENTIAL METHOD: ABNORMAL
EOSINOPHIL # BLD AUTO: 0.3 K/UL (ref 0–0.5)
EOSINOPHIL # BLD AUTO: 0.4 K/UL (ref 0–0.5)
EOSINOPHIL # BLD AUTO: 0.4 K/UL (ref 0–0.5)
EOSINOPHIL NFR BLD: 1.5 % (ref 0–8)
EOSINOPHIL NFR BLD: 2.4 % (ref 0–8)
EOSINOPHIL NFR BLD: 2.5 % (ref 0–8)
ERYTHROCYTE [DISTWIDTH] IN BLOOD BY AUTOMATED COUNT: 15.7 % (ref 11.5–14.5)
ERYTHROCYTE [DISTWIDTH] IN BLOOD BY AUTOMATED COUNT: 15.9 % (ref 11.5–14.5)
ERYTHROCYTE [DISTWIDTH] IN BLOOD BY AUTOMATED COUNT: 15.9 % (ref 11.5–14.5)
ERYTHROCYTE [SEDIMENTATION RATE] IN BLOOD BY WESTERGREN METHOD: 18 MM/H
ERYTHROCYTE [SEDIMENTATION RATE] IN BLOOD BY WESTERGREN METHOD: 18 MM/H
EST. GFR  (AFRICAN AMERICAN): 30.1 ML/MIN/1.73 M^2
EST. GFR  (AFRICAN AMERICAN): >60 ML/MIN/1.73 M^2
EST. GFR  (NON AFRICAN AMERICAN): 26.1 ML/MIN/1.73 M^2
EST. GFR  (NON AFRICAN AMERICAN): 57.1 ML/MIN/1.73 M^2
EST. GFR  (NON AFRICAN AMERICAN): >60 ML/MIN/1.73 M^2
FIO2: 40
FIO2: 50
GLUCOSE SERPL-MCNC: 104 MG/DL (ref 70–110)
GLUCOSE SERPL-MCNC: 109 MG/DL (ref 70–110)
GLUCOSE SERPL-MCNC: 82 MG/DL (ref 70–110)
GLUCOSE SERPL-MCNC: 85 MG/DL (ref 70–110)
GLUCOSE SERPL-MCNC: 90 MG/DL (ref 70–110)
HCO3 UR-SCNC: 24.6 MMOL/L (ref 24–28)
HCO3 UR-SCNC: 25.3 MMOL/L (ref 24–28)
HCO3 UR-SCNC: 27.4 MMOL/L (ref 24–28)
HCT VFR BLD AUTO: 22.9 % (ref 37–48.5)
HCT VFR BLD AUTO: 23.7 % (ref 37–48.5)
HCT VFR BLD AUTO: 24.1 % (ref 37–48.5)
HCT VFR BLD CALC: 30 %PCV (ref 36–54)
HGB BLD-MCNC: 7.4 G/DL (ref 12–16)
HGB BLD-MCNC: 7.6 G/DL (ref 12–16)
HGB BLD-MCNC: 7.7 G/DL (ref 12–16)
HYPOCHROMIA BLD QL SMEAR: ABNORMAL
IMM GRANULOCYTES # BLD AUTO: 0.32 K/UL (ref 0–0.04)
IMM GRANULOCYTES # BLD AUTO: 0.41 K/UL (ref 0–0.04)
IMM GRANULOCYTES # BLD AUTO: 0.51 K/UL (ref 0–0.04)
IMM GRANULOCYTES NFR BLD AUTO: 1.9 % (ref 0–0.5)
IMM GRANULOCYTES NFR BLD AUTO: 2.5 % (ref 0–0.5)
IMM GRANULOCYTES NFR BLD AUTO: 3.7 % (ref 0–0.5)
LDH SERPL L TO P-CCNC: 0.62 MMOL/L (ref 0.36–1.25)
LYMPHOCYTES # BLD AUTO: 1.1 K/UL (ref 1–4.8)
LYMPHOCYTES # BLD AUTO: 1.1 K/UL (ref 1–4.8)
LYMPHOCYTES # BLD AUTO: 1.2 K/UL (ref 1–4.8)
LYMPHOCYTES NFR BLD: 6.3 % (ref 18–48)
LYMPHOCYTES NFR BLD: 6.4 % (ref 18–48)
LYMPHOCYTES NFR BLD: 8.5 % (ref 18–48)
MAGNESIUM SERPL-MCNC: 1.8 MG/DL (ref 1.6–2.6)
MAGNESIUM SERPL-MCNC: 2.1 MG/DL (ref 1.6–2.6)
MCH RBC QN AUTO: 28.9 PG (ref 27–31)
MCH RBC QN AUTO: 29.1 PG (ref 27–31)
MCH RBC QN AUTO: 29.5 PG (ref 27–31)
MCHC RBC AUTO-ENTMCNC: 31.5 G/DL (ref 32–36)
MCHC RBC AUTO-ENTMCNC: 32.3 G/DL (ref 32–36)
MCHC RBC AUTO-ENTMCNC: 32.5 G/DL (ref 32–36)
MCV RBC AUTO: 90 FL (ref 82–98)
MCV RBC AUTO: 91 FL (ref 82–98)
MCV RBC AUTO: 92 FL (ref 82–98)
MODE: ABNORMAL
MODE: NORMAL
MONOCYTES # BLD AUTO: 1.3 K/UL (ref 0.3–1)
MONOCYTES # BLD AUTO: 1.4 K/UL (ref 0.3–1)
MONOCYTES # BLD AUTO: 1.6 K/UL (ref 0.3–1)
MONOCYTES NFR BLD: 8.4 % (ref 4–15)
MONOCYTES NFR BLD: 9.4 % (ref 4–15)
MONOCYTES NFR BLD: 9.5 % (ref 4–15)
NEUTROPHILS # BLD AUTO: 10.4 K/UL (ref 1.8–7.7)
NEUTROPHILS # BLD AUTO: 13.2 K/UL (ref 1.8–7.7)
NEUTROPHILS # BLD AUTO: 13.5 K/UL (ref 1.8–7.7)
NEUTROPHILS NFR BLD: 75.4 % (ref 38–73)
NEUTROPHILS NFR BLD: 79.2 % (ref 38–73)
NEUTROPHILS NFR BLD: 81.6 % (ref 38–73)
NRBC BLD-RTO: 0 /100 WBC
OVALOCYTES BLD QL SMEAR: ABNORMAL
PCO2 BLDA: 33.7 MMHG (ref 35–45)
PCO2 BLDA: 34.5 MMHG (ref 35–45)
PCO2 BLDA: 34.5 MMHG (ref 35–45)
PEEP: 8
PEEP: 8
PH SMN: 7.47 [PH] (ref 7.35–7.45)
PH SMN: 7.47 [PH] (ref 7.35–7.45)
PH SMN: 7.51 [PH] (ref 7.35–7.45)
PHOSPHATE SERPL-MCNC: 2.5 MG/DL (ref 2.7–4.5)
PHOSPHATE SERPL-MCNC: 5.3 MG/DL (ref 2.7–4.5)
PLATELET # BLD AUTO: 327 K/UL (ref 150–450)
PLATELET # BLD AUTO: 349 K/UL (ref 150–450)
PLATELET # BLD AUTO: 407 K/UL (ref 150–450)
PLATELET BLD QL SMEAR: ABNORMAL
PLATELET BLD QL SMEAR: ABNORMAL
PMV BLD AUTO: 11.1 FL (ref 9.2–12.9)
PMV BLD AUTO: 11.4 FL (ref 9.2–12.9)
PMV BLD AUTO: 11.5 FL (ref 9.2–12.9)
PO2 BLDA: 86 MMHG (ref 80–100)
PO2 BLDA: 97 MMHG (ref 80–100)
PO2 BLDA: 98 MMHG (ref 80–100)
POC BE: 1 MMOL/L
POC BE: 2 MMOL/L
POC BE: 4 MMOL/L
POC SATURATED O2: 97 % (ref 95–100)
POC SATURATED O2: 98 % (ref 95–100)
POC SATURATED O2: 98 % (ref 95–100)
POC TCO2: 26 MMOL/L (ref 23–27)
POC TCO2: 26 MMOL/L (ref 23–27)
POC TCO2: 28 MMOL/L (ref 23–27)
POCT GLUCOSE: 101 MG/DL (ref 70–110)
POCT GLUCOSE: 118 MG/DL (ref 70–110)
POCT GLUCOSE: 119 MG/DL (ref 70–110)
POCT GLUCOSE: 126 MG/DL (ref 70–110)
POCT GLUCOSE: 130 MG/DL (ref 70–110)
POCT GLUCOSE: 95 MG/DL (ref 70–110)
POIKILOCYTOSIS BLD QL SMEAR: SLIGHT
POLYCHROMASIA BLD QL SMEAR: ABNORMAL
POTASSIUM SERPL-SCNC: 4.3 MMOL/L (ref 3.5–5.1)
POTASSIUM SERPL-SCNC: 4.3 MMOL/L (ref 3.5–5.1)
POTASSIUM SERPL-SCNC: 4.5 MMOL/L (ref 3.5–5.1)
POTASSIUM SERPL-SCNC: 4.5 MMOL/L (ref 3.5–5.1)
POTASSIUM SERPL-SCNC: 4.6 MMOL/L (ref 3.5–5.1)
POTASSIUM SERPL-SCNC: 5 MMOL/L (ref 3.5–5.1)
POTASSIUM SERPL-SCNC: 5 MMOL/L (ref 3.5–5.1)
POTASSIUM SERPL-SCNC: 5.1 MMOL/L (ref 3.5–5.1)
POTASSIUM SERPL-SCNC: 5.4 MMOL/L (ref 3.5–5.1)
PROT SERPL-MCNC: 5.3 G/DL (ref 6–8.4)
RBC # BLD AUTO: 2.54 M/UL (ref 4–5.4)
RBC # BLD AUTO: 2.61 M/UL (ref 4–5.4)
RBC # BLD AUTO: 2.63 M/UL (ref 4–5.4)
SAMPLE: ABNORMAL
SAMPLE: NORMAL
SITE: ABNORMAL
SITE: NORMAL
SODIUM SERPL-SCNC: 136 MMOL/L (ref 136–145)
SODIUM SERPL-SCNC: 138 MMOL/L (ref 136–145)
SODIUM SERPL-SCNC: 138 MMOL/L (ref 136–145)
VT: 320
VT: 320
WBC # BLD AUTO: 13.77 K/UL (ref 3.9–12.7)
WBC # BLD AUTO: 16.52 K/UL (ref 3.9–12.7)
WBC # BLD AUTO: 16.68 K/UL (ref 3.9–12.7)

## 2021-12-29 PROCEDURE — 25000003 PHARM REV CODE 250: Performed by: STUDENT IN AN ORGANIZED HEALTH CARE EDUCATION/TRAINING PROGRAM

## 2021-12-29 PROCEDURE — 31622 DX BRONCHOSCOPE/WASH: CPT

## 2021-12-29 PROCEDURE — 27000221 HC OXYGEN, UP TO 24 HOURS

## 2021-12-29 PROCEDURE — 31624 DX BRONCHOSCOPE/LAVAGE: CPT | Mod: 79,,, | Performed by: SURGERY

## 2021-12-29 PROCEDURE — 84132 ASSAY OF SERUM POTASSIUM: CPT | Performed by: STUDENT IN AN ORGANIZED HEALTH CARE EDUCATION/TRAINING PROGRAM

## 2021-12-29 PROCEDURE — 25000003 PHARM REV CODE 250

## 2021-12-29 PROCEDURE — 83735 ASSAY OF MAGNESIUM: CPT | Performed by: STUDENT IN AN ORGANIZED HEALTH CARE EDUCATION/TRAINING PROGRAM

## 2021-12-29 PROCEDURE — 85025 COMPLETE CBC W/AUTO DIFF WBC: CPT | Performed by: SURGERY

## 2021-12-29 PROCEDURE — 63600175 PHARM REV CODE 636 W HCPCS: Performed by: STUDENT IN AN ORGANIZED HEALTH CARE EDUCATION/TRAINING PROGRAM

## 2021-12-29 PROCEDURE — 94640 AIRWAY INHALATION TREATMENT: CPT

## 2021-12-29 PROCEDURE — 25000242 PHARM REV CODE 250 ALT 637 W/ HCPCS: Performed by: STUDENT IN AN ORGANIZED HEALTH CARE EDUCATION/TRAINING PROGRAM

## 2021-12-29 PROCEDURE — 90945 PR DIALYSIS, NOT HEMO, 1 EVAL: ICD-10-PCS | Mod: ,,, | Performed by: INTERNAL MEDICINE

## 2021-12-29 PROCEDURE — 83605 ASSAY OF LACTIC ACID: CPT

## 2021-12-29 PROCEDURE — 83735 ASSAY OF MAGNESIUM: CPT | Mod: 91 | Performed by: STUDENT IN AN ORGANIZED HEALTH CARE EDUCATION/TRAINING PROGRAM

## 2021-12-29 PROCEDURE — 99291 CRITICAL CARE FIRST HOUR: CPT | Mod: 24,25,, | Performed by: SURGERY

## 2021-12-29 PROCEDURE — 94667 MNPJ CHEST WALL 1ST: CPT

## 2021-12-29 PROCEDURE — 80053 COMPREHEN METABOLIC PANEL: CPT | Performed by: STUDENT IN AN ORGANIZED HEALTH CARE EDUCATION/TRAINING PROGRAM

## 2021-12-29 PROCEDURE — 99900026 HC AIRWAY MAINTENANCE (STAT)

## 2021-12-29 PROCEDURE — 99900025 HC BRONCHOSCOPY-ASST (STAT)

## 2021-12-29 PROCEDURE — 80069 RENAL FUNCTION PANEL: CPT | Performed by: STUDENT IN AN ORGANIZED HEALTH CARE EDUCATION/TRAINING PROGRAM

## 2021-12-29 PROCEDURE — 27000635 HC IPV DISPOSABLE BREATHING CIRCUIT

## 2021-12-29 PROCEDURE — 31624 PR BRONCHOSCOPY,DIAG2STIC W LAVAGE: ICD-10-PCS | Mod: 79,,, | Performed by: SURGERY

## 2021-12-29 PROCEDURE — 90945 DIALYSIS ONE EVALUATION: CPT | Mod: ,,, | Performed by: INTERNAL MEDICINE

## 2021-12-29 PROCEDURE — 82803 BLOOD GASES ANY COMBINATION: CPT

## 2021-12-29 PROCEDURE — 20000000 HC ICU ROOM

## 2021-12-29 PROCEDURE — 37799 UNLISTED PX VASCULAR SURGERY: CPT

## 2021-12-29 PROCEDURE — 84100 ASSAY OF PHOSPHORUS: CPT | Performed by: STUDENT IN AN ORGANIZED HEALTH CARE EDUCATION/TRAINING PROGRAM

## 2021-12-29 PROCEDURE — 27200966 HC CLOSED SUCTION SYSTEM

## 2021-12-29 PROCEDURE — 27000644 HC VENT IN-LINE ADAPTER

## 2021-12-29 PROCEDURE — 94003 VENT MGMT INPAT SUBQ DAY: CPT

## 2021-12-29 PROCEDURE — 94761 N-INVAS EAR/PLS OXIMETRY MLT: CPT

## 2021-12-29 PROCEDURE — 99291 PR CRITICAL CARE, E/M 30-74 MINUTES: ICD-10-PCS | Mod: 24,25,, | Performed by: SURGERY

## 2021-12-29 PROCEDURE — 90945 DIALYSIS ONE EVALUATION: CPT

## 2021-12-29 PROCEDURE — 80048 BASIC METABOLIC PNL TOTAL CA: CPT | Mod: 91 | Performed by: SURGERY

## 2021-12-29 PROCEDURE — 85025 COMPLETE CBC W/AUTO DIFF WBC: CPT | Mod: 91 | Performed by: STUDENT IN AN ORGANIZED HEALTH CARE EDUCATION/TRAINING PROGRAM

## 2021-12-29 PROCEDURE — 99900035 HC TECH TIME PER 15 MIN (STAT)

## 2021-12-29 RX ORDER — ACETAMINOPHEN 325 MG/1
650 TABLET ORAL ONCE
Status: COMPLETED | OUTPATIENT
Start: 2021-12-29 | End: 2021-12-29

## 2021-12-29 RX ORDER — MIDAZOLAM HYDROCHLORIDE 1 MG/ML
2 INJECTION INTRAMUSCULAR; INTRAVENOUS EVERY 5 MIN PRN
Status: COMPLETED | OUTPATIENT
Start: 2021-12-29 | End: 2021-12-29

## 2021-12-29 RX ORDER — FENTANYL CITRATE 50 UG/ML
50 INJECTION, SOLUTION INTRAMUSCULAR; INTRAVENOUS ONCE
Status: COMPLETED | OUTPATIENT
Start: 2021-12-29 | End: 2021-12-29

## 2021-12-29 RX ORDER — MIDAZOLAM HYDROCHLORIDE 1 MG/ML
INJECTION INTRAMUSCULAR; INTRAVENOUS
Status: DISPENSED
Start: 2021-12-29 | End: 2021-12-29

## 2021-12-29 RX ADMIN — HEPARIN SODIUM 7500 UNITS: 5000 INJECTION INTRAVENOUS; SUBCUTANEOUS at 05:12

## 2021-12-29 RX ADMIN — METHOCARBAMOL 500 MG: 500 TABLET ORAL at 09:12

## 2021-12-29 RX ADMIN — METHOCARBAMOL 500 MG: 500 TABLET ORAL at 06:12

## 2021-12-29 RX ADMIN — HEPARIN SODIUM 7500 UNITS: 5000 INJECTION INTRAVENOUS; SUBCUTANEOUS at 09:12

## 2021-12-29 RX ADMIN — NITROGLYCERIN 0.5 INCH: 20 OINTMENT TOPICAL at 06:12

## 2021-12-29 RX ADMIN — DEXMEDETOMIDINE HYDROCHLORIDE 1.2 MCG/KG/HR: 4 INJECTION INTRAVENOUS at 02:12

## 2021-12-29 RX ADMIN — DEXMEDETOMIDINE HYDROCHLORIDE 1 MCG/KG/HR: 4 INJECTION INTRAVENOUS at 12:12

## 2021-12-29 RX ADMIN — SODIUM CHLORIDE 200 ML/HR: 0.9 INJECTION, SOLUTION INTRAVENOUS at 12:12

## 2021-12-29 RX ADMIN — HEPARIN SODIUM 7500 UNITS: 5000 INJECTION INTRAVENOUS; SUBCUTANEOUS at 02:12

## 2021-12-29 RX ADMIN — GABAPENTIN 125 MG: 250 SOLUTION ORAL at 09:12

## 2021-12-29 RX ADMIN — NITROGLYCERIN 0.5 INCH: 20 OINTMENT TOPICAL at 12:12

## 2021-12-29 RX ADMIN — DEXMEDETOMIDINE HYDROCHLORIDE 1.4 MCG/KG/HR: 4 INJECTION INTRAVENOUS at 08:12

## 2021-12-29 RX ADMIN — ACETAMINOPHEN 650 MG: 325 TABLET ORAL at 09:12

## 2021-12-29 RX ADMIN — GABAPENTIN 125 MG: 250 SOLUTION ORAL at 05:12

## 2021-12-29 RX ADMIN — Medication: at 08:12

## 2021-12-29 RX ADMIN — DEXMEDETOMIDINE HYDROCHLORIDE 1 MCG/KG/HR: 4 INJECTION INTRAVENOUS at 08:12

## 2021-12-29 RX ADMIN — DEXMEDETOMIDINE HYDROCHLORIDE 1.4 MCG/KG/HR: 4 INJECTION INTRAVENOUS at 05:12

## 2021-12-29 RX ADMIN — MIDAZOLAM 2 MG: 1 INJECTION INTRAMUSCULAR; INTRAVENOUS at 10:12

## 2021-12-29 RX ADMIN — METHOCARBAMOL 500 MG: 500 TABLET ORAL at 08:12

## 2021-12-29 RX ADMIN — GABAPENTIN 125 MG: 250 SOLUTION ORAL at 02:12

## 2021-12-29 RX ADMIN — METHOCARBAMOL 500 MG: 500 TABLET ORAL at 12:12

## 2021-12-29 RX ADMIN — FENTANYL CITRATE 50 MCG: 50 INJECTION, SOLUTION INTRAMUSCULAR; INTRAVENOUS at 10:12

## 2021-12-29 RX ADMIN — SODIUM CHLORIDE, SODIUM LACTATE, POTASSIUM CHLORIDE, AND CALCIUM CHLORIDE 1000 ML: .6; .31; .03; .02 INJECTION, SOLUTION INTRAVENOUS at 09:12

## 2021-12-29 NOTE — SUBJECTIVE & OBJECTIVE
"Interval History: KRT restarted last night due to new hyperkalemia problem, net positive 800cc fluid balance with no sign of kidney recovery    Review of patient's allergies indicates:   Allergen Reactions    Tylox [oxycodone-acetaminophen]      "Jittery"     Current Facility-Administered Medications   Medication Frequency    0.9%  NaCl infusion (CRRT USE ONLY) Continuous    0.9%  NaCl infusion (for blood administration) Q24H PRN    0.9%  NaCl infusion (for blood administration) Q24H PRN    balsam peru-castor oiL Oint BID    calcium gluconate 1 g in dextrose 5 % 100 mL IVPB Q10 Min PRN    dexmedetomidine (PRECEDEX) 400mcg/100mL 0.9% NaCL infusion Continuous    dextrose 50% injection 12.5 g PRN    dextrose 50% injection 25 g PRN    dextrose 50% injection 25 g PRN    dextrose 50% injection 25 g PRN    fentaNYL 2500 mcg in 0.9% sodium chloride 250 mL infusion premix (titrating) Continuous    gabapentin 250 mg/5 mL (5 mL) solution 125 mg Q8H    glucagon (human recombinant) injection 1 mg PRN    heparin (porcine) injection 7,500 Units Q8H    HYDROmorphone injection 0.5 mg Q6H PRN    insulin aspart U-100 pen 0-5 Units Q6H PRN    lactated ringers infusion Continuous    magnesium sulfate 2g in water 50mL IVPB (premix) PRN    methocarbamoL tablet 500 mg QID    nitroGLYCERIN 2% TD oint ointment 0.5 inch Q6H    olanzapine zydis disintegrating tablet 10 mg Nightly PRN    oxyCODONE 5 mg/5 mL solution 10 mg Q4H PRN    oxyCODONE 5 mg/5 mL solution 5 mg Q4H PRN    sodium chloride 0.9% flush 10 mL PRN    sodium chloride 0.9% flush 10 mL PRN    vasopressin (PITRESSIN) 0.2 Units/mL in dextrose 5 % 100 mL infusion Continuous       Objective:     Vital Signs (Most Recent):  Temp: 98.5 °F (36.9 °C) (12/29/21 0701)  Pulse: 106 (12/29/21 0804)  Resp: (!) 28 (12/29/21 0804)  BP: 118/69 (12/29/21 0804)  SpO2: 100 % (12/29/21 0804)  O2 Device (Oxygen Therapy): ventilator (12/29/21 0804) Vital Signs (24h " Range):  Temp:  [97 °F (36.1 °C)-98.9 °F (37.2 °C)] 98.5 °F (36.9 °C)  Pulse:  [] 106  Resp:  [18-41] 28  SpO2:  [91 %-100 %] 100 %  BP: ()/(50-80) 118/69  Arterial Line BP: ()/(48-90) 131/58     Weight: 121.1 kg (267 lb) (12/28/21 1003)  Body mass index is 45.83 kg/m².  Body surface area is 2.34 meters squared.    I/O last 3 completed shifts:  In: 4539.3 [I.V.:3462.8; NG/GT:750; IV Piggyback:326.4]  Out: 2962 [Urine:31; Drains:268; Other:2663]    Physical Exam  Constitutional:       Appearance: She is obese. She is ill-appearing.   HENT:      Mouth/Throat:      Mouth: Mucous membranes are moist.   Eyes:      Pupils: Pupils are equal, round, and reactive to light.   Cardiovascular:      Rate and Rhythm: Normal rate and regular rhythm.   Pulmonary:      Effort: Pulmonary effort is normal.   Abdominal:      General: There is no distension.      Comments: closed   Musculoskeletal:         General: No deformity.      Right lower leg: Edema present.      Left lower leg: Edema present.   Skin:     Coloration: Skin is not jaundiced.   Neurological:      General: No focal deficit present.         Significant Labs:  All labs within the past 24 hours have been reviewed.     Significant Imaging:  Labs: Reviewed

## 2021-12-29 NOTE — NURSING
MD Zacarias notified of increase in Potassium of 5.8. Orders received to shift K. Awaiting Dialysis to start SLED x12hrs.

## 2021-12-29 NOTE — SUBJECTIVE & OBJECTIVE
Interval History/Significant Events: Hyperkalemic to 5.8 overnight. Shifted with insulin and D50 x1 and albuterol x1. Nephrology team called to initiate HD early. HDS, no arrhythmias appreciated on monitor.     Follow-up For: Procedure(s) (LRB):  LAPAROTOMY, EXPLORATORY (N/A)  INSERTION, GASTROSTOMY TUBE, PERCUTANEOUS (N/A)  CHOLECYSTECTOMY  EGD (ESOPHAGOGASTRODUODENOSCOPY) (N/A)    Post-Operative Day: 6 Days Post-Op    Objective:     Vital Signs (Most Recent):  Temp: 98.5 °F (36.9 °C) (12/29/21 0701)  Pulse: 101 (12/29/21 0913)  Resp: (!) 25 (12/29/21 0913)  BP: 106/67 (12/29/21 0900)  SpO2: 100 % (12/29/21 0913) Vital Signs (24h Range):  Temp:  [97 °F (36.1 °C)-98.9 °F (37.2 °C)] 98.5 °F (36.9 °C)  Pulse:  [] 101  Resp:  [18-32] 25  SpO2:  [91 %-100 %] 100 %  BP: ()/(50-80) 106/67  Arterial Line BP: ()/(48-90) 141/62     Weight: 121.1 kg (267 lb)  Body mass index is 45.83 kg/m².      Intake/Output Summary (Last 24 hours) at 12/29/2021 1028  Last data filed at 12/29/2021 0900  Gross per 24 hour   Intake 3300.04 ml   Output 2992 ml   Net 308.04 ml       Physical Exam    Vents:  Vent Mode: Spont (12/29/21 0913)  Ventilator Initiated: Yes (12/28/21 1104)  Set Rate: 18 BPM (12/29/21 0636)  Vt Set: 320 mL (12/29/21 0636)  Pressure Support: 60 cmH20 (12/29/21 0804)  PEEP/CPAP: 5 cmH20 (12/29/21 0913)  Oxygen Concentration (%): 40 (12/29/21 0913)  Peak Airway Pressure: 27 cmH2O (12/29/21 0913)  Plateau Pressure: 31 cmH20 (12/29/21 0913)  Total Ve: 9.18 mL (12/29/21 0913)  Negative Inspiratory Force (cm H2O): 0 (12/29/21 0913)  F/VT Ratio<105 (RSBI): (!) 70.22 (12/29/21 0913)    Lines/Drains/Airways     Central Venous Catheter Line            Percutaneous Central Line Insertion/Assessment - Triple Lumen  12/16/21 1531 left subclavian 12 days    Trialysis (Dialysis) Catheter 12/21/21 0030 right internal jugular 8 days          Drain                 Urethral Catheter 12/20/21 1743 8 days          Closed/Suction Drain 12/23/21 1428 Right;Inferior RUQ Bulb 19 Fr. 5 days         Closed/Suction Drain 12/23/21 1429 Right;Superior RUQ Bulb 19 Fr. 5 days         Gastrostomy/Enterostomy 12/23/21 1338 Gastrostomy tube w/ balloon LUQ feeding 5 days          Airway                 Airway - Non-Surgical 12/28/21 1005 Endotracheal Tube 1 day         Airway - Non-Surgical 12/28/21 1045 Endotracheal Tube <1 day          Arterial Line            Arterial Line 12/18/21 0000 Right Radial 11 days                Significant Labs:    CBC/Anemia Profile:  Recent Labs   Lab 12/28/21  0303 12/28/21  0901 12/28/21  1424 12/29/21  0236 12/29/21  0636   WBC 17.32*  --  14.46* 13.77*  --    HGB 7.9*  --  7.6* 7.4*  --    HCT 25.3*   < > 23.7* 22.9* 30*     --  331 327  --    MCV 93  --  90 90  --    RDW 16.5*  --  16.3* 15.9*  --     < > = values in this interval not displayed.        Chemistries:  Recent Labs   Lab 12/28/21  0303 12/28/21  0303 12/28/21  1424 12/28/21  1817 12/28/21  2158 12/28/21  2158 12/29/21  0236 12/29/21  0651 12/29/21  0810      < > 137   < > 137  137  --  138  --  138   K 4.9   < > 6.1*   < > 5.8*  5.9*   < > 5.4* 5.1 5.0  5.0      < > 105   < > 106  105  --  105  --  105   CO2 22*   < > 23   < > 22*  20*  --  23  --  25   BUN 18   < > 30*   < > 35*  33*  --  26*  --  12   CREATININE 1.7*   < > 2.4*   < > 2.9*  3.0*  --  2.1*  --  1.1   CALCIUM 8.0*   < > 8.2*   < > 8.3*  8.2*  --  8.0*  --  7.7*   ALBUMIN 2.7*   < > 2.3*  --  2.2*  --  2.2*  --   --    PROT 5.8*  --   --   --   --   --  5.3*  --   --    BILITOT 4.8*  --   --   --   --   --  2.5*  --   --    ALKPHOS 177*  --   --   --   --   --  140*  --   --    ALT 67*  --   --   --   --   --  59*  --   --    AST 73*  --   --   --   --   --  51*  --   --    MG 2.2  --   --   --  2.3  --  2.1  --   --    PHOS 6.0*   < > 7.4*  --  7.0*  --  5.3*  --   --     < > = values in this interval not displayed.

## 2021-12-29 NOTE — PROCEDURES
"Chidi Castaneda is a 56 y.o. female patient.    Temp: 98.5 °F (36.9 °C) (12/29/21 0701)  Pulse: 101 (12/29/21 0913)  Resp: (!) 25 (12/29/21 0913)  BP: 106/67 (12/29/21 0900)  SpO2: 100 % (12/29/21 0913)  Weight: 121.1 kg (267 lb) (12/28/21 1003)  Height: 5' 4" (162.6 cm) (12/29/21 0804)       Procedures    12/29/2021     Date of Procedure: 12/29/2021     Procedure: Bronchoalveolar lavage, BAL     Provider: Joao Verdugo MD     Assisting Provider: Evan Cutler MD     Pre-Procedure Diagnosis:  Respiratory Failure     Post-Procedure Diagnosis: Respiratory failure     Indication: Atelectasis/consolidation on CXR/CT     Anesthesia: Moderate Sedation with fentanyl/precedex gtt     Technical Procedures Used: Bronchoscopy     Description of the Findings of the Procedure:      Whit/yellow mucous appreciated and suctioned in distal segment of ETT.      R mainstem patent, middle and lower segmental bronchi without focal findings.      Left mainstem and its subsegmental bronchi patent and without focal findings.       Patient's  was consented for the procedure with all risk and benefit of the procedure explained in detail.  Patient's  was given the opportunity to ask questions and all concerns were answered.  The bronchocope was inserted into the main airway via the endotracheal tube. An anatomical survey was done of the main airways and the subsegmental bronchus.  The findings are reported above.  A bronchialalveolar lavage was performed using aliquots of normal saline instilled into the airways then aspirated back.       Significant Surgical Tasks Conducted by the Assistant(s), if Applicable: RLL bronchioloalveolar lavage     Complications: Mildly hypotensive with MAPs in the high 50s after procedure; likely 2/2 from sedative bolus. MAPs re-established to goal with vasopressin infusion.     Estimated Blood Loss (EBL): none           Implants: none     Specimens: none       Condition: Stable      "   Disposition: ICU - intubated and hemodynamically stable.

## 2021-12-29 NOTE — PROGRESS NOTES
[facility]  Adult Nutrition  Progress Note    SUMMARY       Recommendations    1. Continue Impact Peptide 1.5 @ goal 40 mL/hr providing pt with 1440 kcal, 90 g protein, and 739 mL free water   - Meeting needs   - If Renal status worsening, rec Novasource Renal advancing as tolerated until goal of 30 mL/hr providing pt with 1440 kcal, 65 g protein, and 516 mL free water    2. Monitor and follow-up    Goals: Pt to tolerate >/= 75% EEN/EPN by f/u date  Nutrition Goal Status: goal met  Communication of RD Recs: reviewed with RN (POC)    Assessment and Plan      Nutrition Problem  Inadequate oral intake     Related to (etiology):   Inability to consume sufficient energy     Signs and Symptoms (as evidenced by):   NPO, intubated      Interventions (treatment strategy):  Collaboration with other providers  Enteral nutrition      Nutrition Diagnosis Status:   Continues    Malnutrition Assessment                 Orbital Region (Subcutaneous Fat Loss): well nourished   [place] Region (Muscle Loss): well nourished  Clavicle Bone Region (Muscle Loss): well nourished                 Reason for Assessment    Reason For Assessment: RD follow-up  Diagnosis:  (Subcapsular hematoma of liver)  Relevant Medical History: GERD, DVT not on antiboagulation, diverticulitis, vaughn's esophagus, HFrEF  Interdisciplinary Rounds: attended    General Information Comments: S/p ex lap/wound washout/wound vac replacement/ hemetoma evacuation. Now s/p G -tube placement. Pt remains intubated. No family at bedside. SLED/CRRT tx daily. No wt hx. TF paused, but will restart today. Per RN, tolerating TF previously with minimal residuals. Partial NFPE completed 12/22, nourished w/ no physical indicators of malnutrition.    Nutrition Discharge Planning: Unable to determine    Nutrition Risk Screen    Nutrition Risk Screen: tube feeding or parenteral nutrition    Nutrition/Diet History    Spiritual, Cultural Beliefs, Spiritism  "Practices, Values that Affect Care: no  Food Allergies: NKFA  Factors Affecting Nutritional Intake: NPO,on mechanical ventilation    Anthropometrics    Temp: 98.5 °F (36.9 °C)  Height Method:  (PER )  Height: 5' 4" (162.6 cm)  Height (inches): 64 in  Weight Method: Bed Scale  Weight: 121.1 kg (267 lb)  Weight (lb): 267 lb  Ideal Body Weight (IBW), Female: 120 lb  % Ideal Body Weight, Female (lb): 222.5 %  BMI (Calculated): 45.8  BMI Grade: greater than 40 - morbid obesity       Lab/Procedures/Meds    Pertinent Labs Reviewed: reviewed  Pertinent Labs Comments: Phos 5.3, Bili 2.5, AST 51, ALT 59  Pertinent Medications Reviewed: reviewed  Pertinent Medications Comments: D50, insulin, fentanyl, gabapentin, heparin, NaCl, precedex, vasopressin    Estimated/Assessed Needs    Weight Used For Calorie Calculations: 97.5 kg (214 lb 15.2 oz)  Energy Calorie Requirements (kcal): 2777-9341 kcal/day  Energy Need Method: Kcal/kg (11-14 kcal/kg)  Protein Requirements:  g/day (1.5-2.0 g/kg; intubated, dialysis, wound present)  Weight Used For Protein Calculations: 54.4 kg (120 lb) (IBW)  Fluid Requirements (mL): 1 mL/kcal or per MD  Estimated Fluid Requirement Method: RDA Method  RDA Method (mL): 1073       Nutrition Prescription Ordered    Current Diet Order: NPO  Nutrition Order Comments: TF held, will restart today  Current Nutrition Support Formula Ordered: Impact Peptide 1.5  Current Nutrition Support Rate Ordered: 40 (ml)  Current Nutrition Support Frequency Ordered: mL/hr x 24 hrs    Evaluation of Received Nutrient/Fluid Intake    Enteral Calories (kcal): 1440  Enteral Protein (gm): 90  Enteral (Free Water) Fluid (mL): 739  % Kcal Needs: 105  % Protein Needs: 100  I/O: +2.5L since admit  Energy Calories Required: meeting needs  Protein Required: meeting needs  Fluid Required: meeting needs  Comments: LBM: 12/27  Tolerance: tolerating  % Intake of Estimated Energy Needs: 75 - 100 %  % Meal Intake: NPO    Nutrition " Risk    Level of Risk/Frequency of Follow-up: low     Monitor and Evaluation    Food and Nutrient Intake: energy intake,enteral nutrition intake  Food and Nutrient Adminstration: diet order,enteral and parenteral nutrition administration  Anthropometric Measurements: weight,weight change  Biochemical Data, Medical Tests and Procedures: electrolyte and renal panel,gastrointestinal profile,glucose/endocrine profile,inflammatory profile,lipid profile  Nutrition-Focused Physical Findings: overall appearance     Nutrition Follow-Up    RD Follow-up?: Yes

## 2021-12-29 NOTE — SUBJECTIVE & OBJECTIVE
"Interval History: Re-intubated yesterday morning given increase work of breathing, tachypnea and tachycardia. CTA of chest without evidence of PE and CT abdomen without abdominal process to explain tachycardia. HR improved following intubation. Initially required vaso but now off. Bronch yesterday reportedly without significant findings. Appears comfortable and is appropriately responsive on vent this morning.     Medications:  Continuous Infusions:   sodium chloride 0.9% 200 mL/hr at 12/29/21 0701    dexmedetomidine (PRECEDEX) infusion 1 mcg/kg/hr (12/29/21 0701)    fentanyl 50 mcg/hr (12/29/21 0701)    lactated ringers Stopped (12/28/21 1415)    vasopressin Stopped (12/29/21 0100)     Scheduled Meds:   balsam peru-castor oiL   Topical (Top) BID    gabapentin  125 mg Per G Tube Q8H    heparin (porcine)  7,500 Units Subcutaneous Q8H    methocarbamoL  500 mg Per G Tube QID    nitroGLYCERIN 2% TD oint  0.5 inch Topical (Top) Q6H     PRN Meds:sodium chloride, sodium chloride, [COMPLETED] calcium gluconate IVPB **AND** calcium gluconate IVPB, dextrose 50%, dextrose 50%, [COMPLETED] dextrose 50% **AND** dextrose 50% **AND** [COMPLETED] insulin regular, [COMPLETED] dextrose 50% **AND** dextrose 50% **AND** [COMPLETED] insulin regular, glucagon (human recombinant), HYDROmorphone, insulin aspart U-100, magnesium sulfate IVPB, olanzapine zydis, oxyCODONE, oxyCODONE, sodium chloride 0.9%, sodium chloride 0.9%     Review of patient's allergies indicates:   Allergen Reactions    Tylox [oxycodone-acetaminophen]      "Jittery"     Objective:     Vital Signs (Most Recent):  Temp: 98.5 °F (36.9 °C) (12/29/21 0701)  Pulse: 109 (12/29/21 0701)  Resp: (!) 28 (12/29/21 0701)  BP: 97/65 (12/29/21 0700)  SpO2: 100 % (12/29/21 0701) Vital Signs (24h Range):  Temp:  [97 °F (36.1 °C)-98.9 °F (37.2 °C)] 98.5 °F (36.9 °C)  Pulse:  [] 109  Resp:  [18-41] 28  SpO2:  [87 %-100 %] 100 %  BP: ()/(50-80) 97/65  Arterial Line " BP: ()/(48-90) 132/58     Weight: 121.1 kg (267 lb)  Body mass index is 45.83 kg/m².    Intake/Output - Last 3 Shifts       12/27 0700 12/28 0659 12/28 0700 12/29 0659 12/29 0700 12/30 0659    I.V. (mL/kg) 3695.5 (30.5) 2346.5 (19.4) 239.3 (2)    Blood 258      NG/ 470 35    IV Piggyback 248.9 77.5     TPN       Total Intake(mL/kg) 4622.4 (38.1) 2894 (23.9) 274.3 (2.3)    Urine (mL/kg/hr) 28 (0) 31 (0) 0 (0)    Drains 77 268 24    Other 6518 1701 363    Stool 0 0     Total Output 6623 2000 387    Net -2000.6 +894 -112.7           Stool Occurrence 2 x 1 x           Physical Exam  Vitals and nursing note reviewed.   Constitutional:       General: She is not in acute distress.     Appearance: She is obese. She is not diaphoretic.   HENT:      Head: Normocephalic and atraumatic.      Mouth/Throat:      Mouth: Mucous membranes are moist.      Pharynx: Oropharynx is clear.   Eyes:      Extraocular Movements: Extraocular movements intact.      Conjunctiva/sclera: Conjunctivae normal.   Cardiovascular:      Rate and Rhythm: Regular rhythm. Tachycardia present.   Pulmonary:      Effort: No respiratory distress.      Comments: Intubated  Vent Mode: A/C  Oxygen Concentration (%):  (50-90) 50  Resp Rate Total:  (18 br/min-37 br/min) 26 br/min  Vt Set:  (320 mL) 320 mL  PEEP/CPAP:  (8 cmH20) 8 cmH20  Mean Airway Pressure:  (9.4 ttG62-24 cmH20) 13 cmH20  Abdominal:      General: There is no distension.      Palpations: Abdomen is soft.      Tenderness: There is no guarding or rebound.      Comments: Wound vac in place with good seal, no leak noted.   REAGAN drains with benign fluid, not bloody or bilious   Musculoskeletal:         General: No deformity.   Skin:     General: Skin is warm and dry.   Neurological:      Mental Status: She is alert.       Significant Labs:  I have reviewed all pertinent lab results within the past 24 hours.  CBC:   Recent Labs   Lab 12/28/21  0901 12/29/21  0236 12/29/21  0636   WBC  --   13.77*  --    RBC  --  2.54*  --    HGB  --  7.4*  --    HCT   < > 22.9* 30*   PLT  --  327  --    MCV  --  90  --    MCH  --  29.1  --    MCHC  --  32.3  --     < > = values in this interval not displayed.     CMP:   Recent Labs   Lab 12/29/21  0236   GLU 90   CALCIUM 8.0*   ALBUMIN 2.2*   PROT 5.3*      K 5.4*   CO2 23      BUN 26*   CREATININE 2.1*   ALKPHOS 140*   ALT 59*   AST 51*   BILITOT 2.5*       Significant Diagnostics:  I have reviewed all pertinent imaging results/findings within the past 24 hours.

## 2021-12-29 NOTE — EICU
Rounding (Video Assessment):  Yes    Intervention Initiated From:  Bedside    Deven Communicated with Bedside Nurse regarding:  Time-Out    eLerted for bronchoscopy by Dr Verdugo, supervised by Dr Cutler. Versed and Fentanyl given IVP for procedure (see eMAR). Pt tolerated well. Time-out documented.

## 2021-12-29 NOTE — PROGRESS NOTES
[facility]  Nephrology  Progress Note    Patient Name: Chidi Castaneda  MRN: [id number]  Admission Date: 12/20/2021  Hospital Length of Stay: 9 days  Attending Provider: Kendall Gorman MD   Primary Care Physician: Primary Doctor No  Principal Problem:<principal problem not specified>    Subjective:     HPI: Chidi Castaneda is a 56 y.o. female w/ PMHx HTN, GERD s/p EGD 6/22/2021, migraines, ovarian cyst s/p cystectomy, and obesity who underwent an elective lap hysterectomy on 12/18/2021 at [facility] and was then transferred to [place], [place] for higher level of care when pt was found to have post-op somnolence, decrease PO intake, decrease urine output that progressed to anuria with a sharp rise in Cr from 0.9 to 2.8 (12/10). Pt was treated for sepsis with volume resuscitation and broad spectrum antibiotics, however pt continue to deteriorate and became tachycardic, hypotensive, and imaging showed an ileus. Pt was intubated since she was found to be acidotic with a ph of 7.28 despite a nonrebreather with 100%  FiO2  12/11 pt was taken back to the OR for washout and a bowel resection was done as she was found to have a small bowel perforation  12/15 pt was found to have a subscapular liver hematoma  12/18 pt was taken back to the OR for wound vacc exchange and removal of hematoma   12/19 general surgery team recommended no further surgical intervention since they did not find any active bleeding intraoperatively on 12/18 and felt that the ongoing bleeding was 2/2 consumptive coagulopathy and septic shock. They recommended to continue wound vacc and to consider higher level of care for pt  Pt was also being treated for a bacteremia as well as for intraabdominal infection, her antibiotics prior to transfer were: meropenem, flagyl, and vancomycin   Pt received tranexamic acid x1 and multiple units of blood products.    Pt was transferred to [facility] on 12/20/2021 for higher  "level of care: embolization for a subscapular hematoma       Nephrology was consulted for: "dialysis, SUSAN"    Pt does not have any h/o renal disease prior to hospitalization (per chart review and per pt's )   EDW: 88 kg   Post-op renal ultrasound was normal (results of ultrasound and other medical records from the outside hospital are in the chart at the bedside, needs to be scanned into chart)   Pt was started on CRRT on 12/12 via a trialysis catheter,   On 12/20/21 morning nephrology at outside hospital had recommended CRRT-SLED however primary team requested iHD prior to transfer to [facility], it appears that pt was ordered for 4 hours however, pt was prepped for transfer and was unable to complete full session 2/2 transfer, per pt's , pt had 2L of fluid removed instead of the planned 4 liters. Of note, while pt was at [facility] pt's CRRT required the use of citrate to prevent clotting (briefly), however that apparently resolved and was able to get a session w/o citrate and obviously was able to tolerate iHD prior to transfer.   Upon arrival to [facility] pt was started on zosyn, she was started on NS 125ml/hr, was transfused 2 units of PRBCs, and remained off vasopressors.       Interval History: KRT restarted last night due to new hyperkalemia problem, net positive 800cc fluid balance with no sign of kidney recovery    Review of patient's allergies indicates:   Allergen Reactions    Tylox [oxycodone-acetaminophen]      "Jittery"     Current Facility-Administered Medications   Medication Frequency    0.9%  NaCl infusion (CRRT USE ONLY) Continuous    0.9%  NaCl infusion (for blood administration) Q24H PRN    0.9%  NaCl infusion (for blood administration) Q24H PRN    balsam peru-castor oiL Oint BID    calcium gluconate 1 g in dextrose 5 % 100 mL IVPB Q10 Min PRN    dexmedetomidine (PRECEDEX) 400mcg/100mL 0.9% NaCL infusion Continuous    dextrose 50% injection 12.5 g PRN    dextrose 50% injection " 25 g PRN    dextrose 50% injection 25 g PRN    dextrose 50% injection 25 g PRN    fentaNYL 2500 mcg in 0.9% sodium chloride 250 mL infusion premix (titrating) Continuous    gabapentin 250 mg/5 mL (5 mL) solution 125 mg Q8H    glucagon (human recombinant) injection 1 mg PRN    heparin (porcine) injection 7,500 Units Q8H    HYDROmorphone injection 0.5 mg Q6H PRN    insulin aspart U-100 pen 0-5 Units Q6H PRN    lactated ringers infusion Continuous    magnesium sulfate 2g in water 50mL IVPB (premix) PRN    methocarbamoL tablet 500 mg QID    nitroGLYCERIN 2% TD oint ointment 0.5 inch Q6H    olanzapine zydis disintegrating tablet 10 mg Nightly PRN    oxyCODONE 5 mg/5 mL solution 10 mg Q4H PRN    oxyCODONE 5 mg/5 mL solution 5 mg Q4H PRN    sodium chloride 0.9% flush 10 mL PRN    sodium chloride 0.9% flush 10 mL PRN    vasopressin (PITRESSIN) 0.2 Units/mL in dextrose 5 % 100 mL infusion Continuous       Objective:     Vital Signs (Most Recent):  Temp: 98.5 °F (36.9 °C) (12/29/21 0701)  Pulse: 106 (12/29/21 0804)  Resp: (!) 28 (12/29/21 0804)  BP: 118/69 (12/29/21 0804)  SpO2: 100 % (12/29/21 0804)  O2 Device (Oxygen Therapy): ventilator (12/29/21 0804) Vital Signs (24h Range):  Temp:  [97 °F (36.1 °C)-98.9 °F (37.2 °C)] 98.5 °F (36.9 °C)  Pulse:  [] 106  Resp:  [18-41] 28  SpO2:  [91 %-100 %] 100 %  BP: ()/(50-80) 118/69  Arterial Line BP: ()/(48-90) 131/58     Weight: 121.1 kg (267 lb) (12/28/21 1003)  Body mass index is 45.83 kg/m².  Body surface area is 2.34 meters squared.    I/O last 3 completed shifts:  In: 4539.3 [I.V.:3462.8; NG/GT:750; IV Piggyback:326.4]  Out: 2962 [Urine:31; Drains:268; Other:2663]    Physical Exam  Constitutional:       Appearance: She is obese. She is ill-appearing.   HENT:      Mouth/Throat:      Mouth: Mucous membranes are moist.   Eyes:      Pupils: Pupils are equal, round, and reactive to light.   Cardiovascular:      Rate and Rhythm: Normal rate and  regular rhythm.   Pulmonary:      Effort: Pulmonary effort is normal.   Abdominal:      General: There is no distension.      Comments: closed   Musculoskeletal:         General: No deformity.      Right lower leg: Edema present.      Left lower leg: Edema present.   Skin:     Coloration: Skin is not jaundiced.   Neurological:      General: No focal deficit present.         Significant Labs:  All labs within the past 24 hours have been reviewed.     Significant Imaging:  Labs: Reviewed      Assessment/Plan:     SUSAN (acute kidney injury)  -oliguric SUSAN, ischemic ATN with multifactorial etiology, however most likely d/t severe hypotension as a result of septic shock 2/2 small bowel perforation and bacteremia   -BL sCr 1  -KRT started at OSH 12/12/2021 for acidosis and volume overload  -still anuric  -KRT restarted last night for hyperkalemia  -resultant net 800cc positive fluid balance, cvp 12, though improvement in fio2  -plan to continue KRT today given persistent elevated potassium  -stop SLED this evening 6pm after 3K bath for today    Metabolic acidosis  -controlled with KRT    Bowel perforation  -per primary    Septic shock  -secondary to bowel perforation  -per primary      Subcapsular hematoma of liver  -per primary          Anthony Steven MD  Nephrology  [facility]

## 2021-12-29 NOTE — ASSESSMENT & PLAN NOTE
  Neuro/Psych:   -- Follows commands  -- Sedation/Pain: Restarted precedex     Cards:   -- HDS with MAP goal > 65   -- On and off pressors  -- ECHO with EF 55%      Pulm:   -- Goal O2 sat > 90%  -- Bronch w/ BAL yesterday, results pending. Bronch'd today  -- Chest PT, IS, inhalational treatment, duo nebs - restart once extubated again  -- CTA Chest w PE protocol: No evidence of PE. Nonspecific bilateral airspace consolidation which could reflect pneumonia, aspiration, and/or other inflammatory process      Renal:  -- Keep billingsley for strict I/O  -- continue CRRT per nephrology  -- BUN/Cr increased  -- replete lytes PRN  -- Try to approach Net 0 for hospitalization      FEN / GI:   -- Replace lytes as needed  -- Nutrition: Hold TF until intubated, then restart  -- Will start mIVF while TF off  -- s/p G tube  -- SLP following  -- CTA Abd/pel: Enchancing lesion in peripheral right hepatic lobe (1.4cm). Moderate volume free fluid in the abdomen or pelvis, possibility of peritonitis or developing abscess. Dependently within the collection there are lobular areas of hyperattenuation. Subcapsular fluid collection about the liver, with drain in-situ.  Small undrained subcapsular collection involving the posterior aspect of the spleen.      ID:   -- Tm: Afebrile. WBC decreased to 13.7  -- Abx none for now        Heme/Onc:   -- H/H stable  -- CBC q12      Endo:   -- Gluc goal 140-180  -- no history of diabetes  -- SSI/accuchecks      PPx:   Feeding: NPO, TF  Analgesia/Sedation: oxy and dilaudid/Precedex and fentanyl gtt once intubated  Thromboembolic prevention: SQH  HOB >30: yes  Stress Ulcer ppx: none  Glucose control: Critical care goal 140-180 g/dl, ISS    Lines/Drains/Airway: PEG, Billingsley, L radial larry, R IJ trialysis, L subclavian triple lumen      Dispo/Code Status/Palliative:   -- SICU / Full Code  -- discussed with SICU staff

## 2021-12-29 NOTE — ASSESSMENT & PLAN NOTE
-oliguric SUSAN, ischemic ATN with multifactorial etiology, however most likely d/t severe hypotension as a result of septic shock 2/2 small bowel perforation and bacteremia   -BL sCr 1  -KRT started at OSH 12/12/2021 for acidosis and volume overload  -still anuric  -KRT restarted last night for hyperkalemia  -resultant net 800cc positive fluid balance, cvp 12, though improvement in fio2  -plan to continue KRT today given persistent elevated potassium  -stop SLED this evening 6pm after 3K bath for today

## 2021-12-29 NOTE — PLAN OF CARE
"SICU PLAN OF CARE NOTE    Dx: <principal problem not specified>    Goals of Care:  MAP >65; Manage ventilator; PT/OT; Titrate feedings to goal. Dialysis tonight X12 hours. Manage VS    Vital Signs:  BP (!) 94/59 (BP Location: Left arm, Patient Position: Lying)   Pulse 86   Temp 97 °F (36.1 °C) (Oral)   Resp (!) 22   Ht 5' 4" (1.626 m)   Wt 121.1 kg (267 lb)   SpO2 99%   BMI 45.83 kg/m²     Cardiac:  ST initially. NSR following extubation    Resp:  SpO2 100% on Ventilator at 65%     Neuro:  Follows Commands and Moves All Extremities    Gtts:  Fentanyl, Precedex, and Vasopressin    Urine Output:  Urinary Catheter 11 cc/shift    Drains:  REAGAN Drain, total output 254 cc / shift    Wound Vac 100 cc output    Diet:  NPO and Tube Feeds     Labs/Accuchecks:  Q6, labs reviewed     Skin:  All skin remains free from new injury.  Patient turned Q2, waffle mattress inflated, ICU bed working correctly.    Shift Events:  Patient placed on BIPAP then intubated. Broch performed following intubation and sputum samples sent. Labs reviewed to receive Dialysis tonight. Patient potassium shifted. Remains NPO. Tube feedings restarted at a minimal rate. Ng with minimal UO.  See flowsheet for further assessment/details.  Family (patient spouse) updated on current condition/plan of care, questions answered, and emotional support provided.   MD updated on current condition, vitals, labs, and gtts.  No new orders received, will continue to monitor.    "

## 2021-12-29 NOTE — PROGRESS NOTES
12/29/21 0830   Treatment   Treatment Type SLED   Treatment Status Order change   CRRT Comments SLED bath changed   Prescription   Dialysate K + (mEq/L) 3

## 2021-12-29 NOTE — PLAN OF CARE
CRRT started overnight. Pt remains intubated and sedated. Trending Potassium due to hyperkalemia.      Afebrile. HR 90-110s. NSR MAP> 65. SpO2 >90% on Ventilator. Vent settings: AC/VC 60% 8 PEEP. Rate 18. Tv 320. Pt follows commands and moves all extremities.      Gtts:   Precedex 1 mcg/kg/hr   Fentanyl 50 mcg/hr     REAGAN drains in place with serosanguineous drainage.   Wound Vac with SS output. 50cc/shift  Ng in place with minimal output  PEG w/ tube feeds at goal of 35cc/hr.      POC reviewed with patient and spouse. All questions/concerns addressed.      Skin: Heel boots and SCDs in place. Nitro paste applied to BL toes. Venelex applied to heels. Turn q2h.

## 2021-12-29 NOTE — PROGRESS NOTES
[facility]  Critical Care - Surgery  Progress Note    Patient Name: Chidi Castaneda  MRN: [id number]  Admission Date: 12/20/2021  Hospital Length of Stay: 9 days  Code Status: Full Code  Attending Provider: Kendall Gorman MD  Primary Care Provider: Primary Doctor No   Principal Problem: <principal problem not specified>    Subjective:     Hospital/ICU Course:  No notes on file    Interval History/Significant Events: Hyperkalemic to 5.8 overnight. Shifted with insulin and D50 x1 and albuterol x1. Nephrology team called to initiate HD early. HDS, no arrhythmias appreciated on monitor.     Follow-up For: Procedure(s) (LRB):  LAPAROTOMY, EXPLORATORY (N/A)  INSERTION, GASTROSTOMY TUBE, PERCUTANEOUS (N/A)  CHOLECYSTECTOMY  EGD (ESOPHAGOGASTRODUODENOSCOPY) (N/A)    Post-Operative Day: 6 Days Post-Op    Objective:     Vital Signs (Most Recent):  Temp: 98.5 °F (36.9 °C) (12/29/21 0701)  Pulse: 101 (12/29/21 0913)  Resp: (!) 25 (12/29/21 0913)  BP: 106/67 (12/29/21 0900)  SpO2: 100 % (12/29/21 0913) Vital Signs (24h Range):  Temp:  [97 °F (36.1 °C)-98.9 °F (37.2 °C)] 98.5 °F (36.9 °C)  Pulse:  [] 101  Resp:  [18-32] 25  SpO2:  [91 %-100 %] 100 %  BP: ()/(50-80) 106/67  Arterial Line BP: ()/(48-90) 141/62     Weight: 121.1 kg (267 lb)  Body mass index is 45.83 kg/m².      Intake/Output Summary (Last 24 hours) at 12/29/2021 1028  Last data filed at 12/29/2021 0900  Gross per 24 hour   Intake 3300.04 ml   Output 2992 ml   Net 308.04 ml       Physical Exam    Vents:  Vent Mode: Spont (12/29/21 0913)  Ventilator Initiated: Yes (12/28/21 1104)  Set Rate: 18 BPM (12/29/21 0636)  Vt Set: 320 mL (12/29/21 0636)  Pressure Support: 60 cmH20 (12/29/21 0804)  PEEP/CPAP: 5 cmH20 (12/29/21 0913)  Oxygen Concentration (%): 40 (12/29/21 0913)  Peak Airway Pressure: 27 cmH2O (12/29/21 0913)  Plateau Pressure: 31 cmH20 (12/29/21 0913)  Total Ve: 9.18 mL (12/29/21 0913)  Negative Inspiratory Force (cm  H2O): 0 (12/29/21 0913)  F/VT Ratio<105 (RSBI): (!) 70.22 (12/29/21 0913)    Lines/Drains/Airways     Central Venous Catheter Line            Percutaneous Central Line Insertion/Assessment - Triple Lumen  12/16/21 1531 left subclavian 12 days    Trialysis (Dialysis) Catheter 12/21/21 0030 right internal jugular 8 days          Drain                 Urethral Catheter 12/20/21 1743 8 days         Closed/Suction Drain 12/23/21 1428 Right;Inferior RUQ Bulb 19 Fr. 5 days         Closed/Suction Drain 12/23/21 1429 Right;Superior RUQ Bulb 19 Fr. 5 days         Gastrostomy/Enterostomy 12/23/21 1338 Gastrostomy tube w/ balloon LUQ feeding 5 days          Airway                 Airway - Non-Surgical 12/28/21 1005 Endotracheal Tube 1 day         Airway - Non-Surgical 12/28/21 1045 Endotracheal Tube <1 day          Arterial Line            Arterial Line 12/18/21 0000 Right Radial 11 days                Significant Labs:    CBC/Anemia Profile:  Recent Labs   Lab 12/28/21  0303 12/28/21  0901 12/28/21  1424 12/29/21  0236 12/29/21  0636   WBC 17.32*  --  14.46* 13.77*  --    HGB 7.9*  --  7.6* 7.4*  --    HCT 25.3*   < > 23.7* 22.9* 30*     --  331 327  --    MCV 93  --  90 90  --    RDW 16.5*  --  16.3* 15.9*  --     < > = values in this interval not displayed.        Chemistries:  Recent Labs   Lab 12/28/21  0303 12/28/21  0303 12/28/21  1424 12/28/21  1817 12/28/21  2158 12/28/21  2158 12/29/21  0236 12/29/21  0651 12/29/21  0810      < > 137   < > 137  137  --  138  --  138   K 4.9   < > 6.1*   < > 5.8*  5.9*   < > 5.4* 5.1 5.0  5.0      < > 105   < > 106  105  --  105  --  105   CO2 22*   < > 23   < > 22*  20*  --  23  --  25   BUN 18   < > 30*   < > 35*  33*  --  26*  --  12   CREATININE 1.7*   < > 2.4*   < > 2.9*  3.0*  --  2.1*  --  1.1   CALCIUM 8.0*   < > 8.2*   < > 8.3*  8.2*  --  8.0*  --  7.7*   ALBUMIN 2.7*   < > 2.3*  --  2.2*  --  2.2*  --   --    PROT 5.8*  --   --   --   --   --  5.3*   --   --    BILITOT 4.8*  --   --   --   --   --  2.5*  --   --    ALKPHOS 177*  --   --   --   --   --  140*  --   --    ALT 67*  --   --   --   --   --  59*  --   --    AST 73*  --   --   --   --   --  51*  --   --    MG 2.2  --   --   --  2.3  --  2.1  --   --    PHOS 6.0*   < > 7.4*  --  7.0*  --  5.3*  --   --     < > = values in this interval not displayed.         Assessment/Plan:     Subcapsular hematoma of liver    Neuro/Psych:   -- Follows commands  -- Sedation/Pain: Restarted precedex     Cards:   -- HDS with MAP goal > 65   -- On and off pressors  -- ECHO with EF 55%      Pulm:   -- Goal O2 sat > 90%  -- Bronch w/ BAL yesterday, results pending. Bronch'd today  -- Chest PT, IS, inhalational treatment, duo nebs - restart once extubated again  -- CTA Chest w PE protocol: No evidence of PE. Nonspecific bilateral airspace consolidation which could reflect pneumonia, aspiration, and/or other inflammatory process      Renal:  -- Keep billingsley for strict I/O  -- continue CRRT per nephrology  -- BUN/Cr increased  -- replete lytes PRN  -- Try to approach Net 0 for hospitalization      FEN / GI:   -- Replace lytes as needed  -- Nutrition: Hold TF until intubated, then restart  -- Will start mIVF while TF off  -- s/p G tube  -- SLP following  -- CTA Abd/pel: Enchancing lesion in peripheral right hepatic lobe (1.4cm). Moderate volume free fluid in the abdomen or pelvis, possibility of peritonitis or developing abscess. Dependently within the collection there are lobular areas of hyperattenuation. Subcapsular fluid collection about the liver, with drain in-situ.  Small undrained subcapsular collection involving the posterior aspect of the spleen.      ID:   -- Tm: Afebrile. WBC decreased to 13.7  -- Abx none for now        Heme/Onc:   -- H/H stable  -- CBC q12      Endo:   -- Gluc goal 140-180  -- no history of diabetes  -- SSI/accuchecks      PPx:   Feeding: NPO, TF  Analgesia/Sedation: oxy and dilaudid/Precedex  and fentanyl gtt once intubated  Thromboembolic prevention: SQH  HOB >30: yes  Stress Ulcer ppx: none  Glucose control: Critical care goal 140-180 g/dl, ISS    Lines/Drains/Airway: PEG, Ng, L radial larry, R IJ trialysis, L subclavian triple lumen      Dispo/Code Status/Palliative:   -- SICU / Full Code  -- discussed with SICU staff           Critical care was time spent personally by me on the following activities: development of treatment plan with patient or surrogate and bedside caregivers, discussions with consultants, evaluation of patient's response to treatment, examination of patient, ordering and performing treatments and interventions, ordering and review of laboratory studies, ordering and review of radiographic studies, pulse oximetry, re-evaluation of patient's condition.  This critical care time did not overlap with that of any other provider or involve time for any procedures.     Joao Verdugo MD  Critical Care - Surgery  [facility]

## 2021-12-29 NOTE — ASSESSMENT & PLAN NOTE
55yo female transfer from OSH after hysterectomy on 12/8 complicated by delayed recognition of small bowel injury requiring resection (in discontinuity) and at some point new bleed from the liver - transferred with open abdomen for higher level of care.  S/p ex-lap, liver packing 12/21    - Wean vent settings as tolerated today with PAV trials if tolerates  - Transfuse for Hgb <7  - Advance TFs as tolerated to goal   - Strict I/Os  - Appreciate nephrology assistance for CRRT  - Wound vac last changed 12/27, next change 12/30  - Remainder of care per SICU, appreciate assistance

## 2021-12-29 NOTE — PROGRESS NOTES
"[facility]  General Surgery  Progress Note    Subjective:     History of Present Illness:  No notes on file    Post-Op Info:  Procedure(s) (LRB):  LAPAROTOMY, EXPLORATORY (N/A)  INSERTION, GASTROSTOMY TUBE, PERCUTANEOUS (N/A)  CHOLECYSTECTOMY  EGD (ESOPHAGOGASTRODUODENOSCOPY) (N/A)   6 Days Post-Op     Interval History: Re-intubated yesterday morning given increase work of breathing, tachypnea and tachycardia. CTA of chest without evidence of PE and CT abdomen without abdominal process to explain tachycardia. HR improved following intubation. Initially required vaso but now off. Bronch yesterday reportedly without significant findings. Appears comfortable and is appropriately responsive on vent this morning.     Medications:  Continuous Infusions:   sodium chloride 0.9% 200 mL/hr at 12/29/21 0701    dexmedetomidine (PRECEDEX) infusion 1 mcg/kg/hr (12/29/21 0701)    fentanyl 50 mcg/hr (12/29/21 0701)    lactated ringers Stopped (12/28/21 1415)    vasopressin Stopped (12/29/21 0100)     Scheduled Meds:   balsam peru-castor oiL   Topical (Top) BID    gabapentin  125 mg Per G Tube Q8H    heparin (porcine)  7,500 Units Subcutaneous Q8H    methocarbamoL  500 mg Per G Tube QID    nitroGLYCERIN 2% TD oint  0.5 inch Topical (Top) Q6H     PRN Meds:sodium chloride, sodium chloride, [COMPLETED] calcium gluconate IVPB **AND** calcium gluconate IVPB, dextrose 50%, dextrose 50%, [COMPLETED] dextrose 50% **AND** dextrose 50% **AND** [COMPLETED] insulin regular, [COMPLETED] dextrose 50% **AND** dextrose 50% **AND** [COMPLETED] insulin regular, glucagon (human recombinant), HYDROmorphone, insulin aspart U-100, magnesium sulfate IVPB, olanzapine zydis, oxyCODONE, oxyCODONE, sodium chloride 0.9%, sodium chloride 0.9%     Review of patient's allergies indicates:   Allergen Reactions    Tylox [oxycodone-acetaminophen]      "Jittery"     Objective:     Vital Signs (Most Recent):  Temp: 98.5 °F (36.9 °C) " (12/29/21 0701)  Pulse: 109 (12/29/21 0701)  Resp: (!) 28 (12/29/21 0701)  BP: 97/65 (12/29/21 0700)  SpO2: 100 % (12/29/21 0701) Vital Signs (24h Range):  Temp:  [97 °F (36.1 °C)-98.9 °F (37.2 °C)] 98.5 °F (36.9 °C)  Pulse:  [] 109  Resp:  [18-41] 28  SpO2:  [87 %-100 %] 100 %  BP: ()/(50-80) 97/65  Arterial Line BP: ()/(48-90) 132/58     Weight: 121.1 kg (267 lb)  Body mass index is 45.83 kg/m².    Intake/Output - Last 3 Shifts       12/27 0700 12/28 0659 12/28 0700 12/29 0659 12/29 0700 12/30 0659    I.V. (mL/kg) 3695.5 (30.5) 2346.5 (19.4) 239.3 (2)    Blood 258      NG/ 470 35    IV Piggyback 248.9 77.5     TPN       Total Intake(mL/kg) 4622.4 (38.1) 2894 (23.9) 274.3 (2.3)    Urine (mL/kg/hr) 28 (0) 31 (0) 0 (0)    Drains 77 268 24    Other 6518 1701 363    Stool 0 0     Total Output 6623 2000 387    Net -2000.6 +894 -112.7           Stool Occurrence 2 x 1 x           Physical Exam  Vitals and nursing note reviewed.   Constitutional:       General: She is not in acute distress.     Appearance: She is obese. She is not diaphoretic.   HENT:      Head: Normocephalic and atraumatic.      Mouth/Throat:      Mouth: Mucous membranes are moist.      Pharynx: Oropharynx is clear.   Eyes:      Extraocular Movements: Extraocular movements intact.      Conjunctiva/sclera: Conjunctivae normal.   Cardiovascular:      Rate and Rhythm: Regular rhythm. Tachycardia present.   Pulmonary:      Effort: No respiratory distress.      Comments: Intubated  Vent Mode: A/C  Oxygen Concentration (%):  (50-90) 50  Resp Rate Total:  (18 br/min-37 br/min) 26 br/min  Vt Set:  (320 mL) 320 mL  PEEP/CPAP:  (8 cmH20) 8 cmH20  Mean Airway Pressure:  (9.4 kuP82-21 cmH20) 13 cmH20  Abdominal:      General: There is no distension.      Palpations: Abdomen is soft.      Tenderness: There is no guarding or rebound.      Comments: Wound vac in place with good seal, no leak noted.   REAGAN drains with benign fluid, not bloody or  bilious   Musculoskeletal:         General: No deformity.   Skin:     General: Skin is warm and dry.   Neurological:      Mental Status: She is alert.       Significant Labs:  I have reviewed all pertinent lab results within the past 24 hours.  CBC:   Recent Labs   Lab 12/28/21  0901 12/29/21  0236 12/29/21  0636   WBC  --  13.77*  --    RBC  --  2.54*  --    HGB  --  7.4*  --    HCT   < > 22.9* 30*   PLT  --  327  --    MCV  --  90  --    MCH  --  29.1  --    MCHC  --  32.3  --     < > = values in this interval not displayed.     CMP:   Recent Labs   Lab 12/29/21  0236   GLU 90   CALCIUM 8.0*   ALBUMIN 2.2*   PROT 5.3*      K 5.4*   CO2 23      BUN 26*   CREATININE 2.1*   ALKPHOS 140*   ALT 59*   AST 51*   BILITOT 2.5*       Significant Diagnostics:  I have reviewed all pertinent imaging results/findings within the past 24 hours.    Assessment/Plan:     Subcapsular hematoma of liver  55yo female transfer from OSH after hysterectomy on 12/8 complicated by delayed recognition of small bowel injury requiring resection (in discontinuity) and at some point new bleed from the liver - transferred with open abdomen for higher level of care.  S/p ex-lap, liver packing 12/21    - Wean vent settings as tolerated today with PAV trials if tolerates  - Transfuse for Hgb <7  - Advance TFs as tolerated to goal   - Strict I/Os  - Appreciate nephrology assistance for CRRT  - Wound vac last changed 12/27, next change 12/30  - Remainder of care per SICU, appreciate assistance         Leanne Harding MD  General Surgery  [facility]

## 2021-12-30 LAB
ALBUMIN SERPL BCP-MCNC: 1.8 G/DL (ref 3.5–5.2)
ALBUMIN SERPL BCP-MCNC: 1.9 G/DL (ref 3.5–5.2)
ALBUMIN SERPL BCP-MCNC: 1.9 G/DL (ref 3.5–5.2)
ALLENS TEST: ABNORMAL
ALP SERPL-CCNC: 186 U/L (ref 55–135)
ALT SERPL W/O P-5'-P-CCNC: 55 U/L (ref 10–44)
ANION GAP SERPL CALC-SCNC: 5 MMOL/L (ref 8–16)
ANION GAP SERPL CALC-SCNC: 6 MMOL/L (ref 8–16)
ANION GAP SERPL CALC-SCNC: 6 MMOL/L (ref 8–16)
ANION GAP SERPL CALC-SCNC: 8 MMOL/L (ref 8–16)
ANION GAP SERPL CALC-SCNC: 8 MMOL/L (ref 8–16)
AST SERPL-CCNC: 54 U/L (ref 10–40)
BACTERIA SPEC AEROBE CULT: ABNORMAL
BACTERIA SPEC AEROBE CULT: ABNORMAL
BASOPHILS # BLD AUTO: 0.05 K/UL (ref 0–0.2)
BASOPHILS # BLD AUTO: 0.05 K/UL (ref 0–0.2)
BASOPHILS NFR BLD: 0.2 % (ref 0–1.9)
BASOPHILS NFR BLD: 0.3 % (ref 0–1.9)
BILIRUB SERPL-MCNC: 2 MG/DL (ref 0.1–1)
BUN SERPL-MCNC: 19 MG/DL (ref 6–20)
BUN SERPL-MCNC: 26 MG/DL (ref 6–20)
BUN SERPL-MCNC: 30 MG/DL (ref 6–20)
BUN SERPL-MCNC: 30 MG/DL (ref 6–20)
BUN SERPL-MCNC: 37 MG/DL (ref 6–20)
CALCIUM SERPL-MCNC: 7.4 MG/DL (ref 8.7–10.5)
CALCIUM SERPL-MCNC: 7.5 MG/DL (ref 8.7–10.5)
CALCIUM SERPL-MCNC: 7.8 MG/DL (ref 8.7–10.5)
CHLORIDE SERPL-SCNC: 104 MMOL/L (ref 95–110)
CHLORIDE SERPL-SCNC: 105 MMOL/L (ref 95–110)
CHLORIDE SERPL-SCNC: 106 MMOL/L (ref 95–110)
CO2 SERPL-SCNC: 23 MMOL/L (ref 23–29)
CO2 SERPL-SCNC: 23 MMOL/L (ref 23–29)
CO2 SERPL-SCNC: 24 MMOL/L (ref 23–29)
CO2 SERPL-SCNC: 25 MMOL/L (ref 23–29)
CO2 SERPL-SCNC: 25 MMOL/L (ref 23–29)
CREAT SERPL-MCNC: 1.5 MG/DL (ref 0.5–1.4)
CREAT SERPL-MCNC: 1.9 MG/DL (ref 0.5–1.4)
CREAT SERPL-MCNC: 2.1 MG/DL (ref 0.5–1.4)
CREAT SERPL-MCNC: 2.1 MG/DL (ref 0.5–1.4)
CREAT SERPL-MCNC: 2.5 MG/DL (ref 0.5–1.4)
DELSYS: ABNORMAL
DIFFERENTIAL METHOD: ABNORMAL
DIFFERENTIAL METHOD: ABNORMAL
EOSINOPHIL # BLD AUTO: 0.2 K/UL (ref 0–0.5)
EOSINOPHIL # BLD AUTO: 0.2 K/UL (ref 0–0.5)
EOSINOPHIL NFR BLD: 0.7 % (ref 0–8)
EOSINOPHIL NFR BLD: 1.2 % (ref 0–8)
ERYTHROCYTE [DISTWIDTH] IN BLOOD BY AUTOMATED COUNT: 15.7 % (ref 11.5–14.5)
ERYTHROCYTE [DISTWIDTH] IN BLOOD BY AUTOMATED COUNT: 15.8 % (ref 11.5–14.5)
ERYTHROCYTE [SEDIMENTATION RATE] IN BLOOD BY WESTERGREN METHOD: 18 MM/H
EST. GFR  (AFRICAN AMERICAN): 24.4 ML/MIN/1.73 M^2
EST. GFR  (AFRICAN AMERICAN): 30.1 ML/MIN/1.73 M^2
EST. GFR  (AFRICAN AMERICAN): 30.1 ML/MIN/1.73 M^2
EST. GFR  (AFRICAN AMERICAN): 34 ML/MIN/1.73 M^2
EST. GFR  (AFRICAN AMERICAN): 45.2 ML/MIN/1.73 M^2
EST. GFR  (NON AFRICAN AMERICAN): 21.1 ML/MIN/1.73 M^2
EST. GFR  (NON AFRICAN AMERICAN): 26.1 ML/MIN/1.73 M^2
EST. GFR  (NON AFRICAN AMERICAN): 26.1 ML/MIN/1.73 M^2
EST. GFR  (NON AFRICAN AMERICAN): 29.5 ML/MIN/1.73 M^2
EST. GFR  (NON AFRICAN AMERICAN): 39.2 ML/MIN/1.73 M^2
FIO2: 40
GLUCOSE SERPL-MCNC: 114 MG/DL (ref 70–110)
GLUCOSE SERPL-MCNC: 115 MG/DL (ref 70–110)
GLUCOSE SERPL-MCNC: 116 MG/DL (ref 70–110)
GLUCOSE SERPL-MCNC: 116 MG/DL (ref 70–110)
GLUCOSE SERPL-MCNC: 117 MG/DL (ref 70–110)
GRAM STN SPEC: ABNORMAL
HCO3 UR-SCNC: 24.5 MMOL/L (ref 24–28)
HCT VFR BLD AUTO: 23.7 % (ref 37–48.5)
HCT VFR BLD AUTO: 24.3 % (ref 37–48.5)
HGB BLD-MCNC: 7.4 G/DL (ref 12–16)
HGB BLD-MCNC: 7.6 G/DL (ref 12–16)
IMM GRANULOCYTES # BLD AUTO: 0.36 K/UL (ref 0–0.04)
IMM GRANULOCYTES # BLD AUTO: 0.43 K/UL (ref 0–0.04)
IMM GRANULOCYTES NFR BLD AUTO: 1.9 % (ref 0–0.5)
IMM GRANULOCYTES NFR BLD AUTO: 1.9 % (ref 0–0.5)
LYMPHOCYTES # BLD AUTO: 1.1 K/UL (ref 1–4.8)
LYMPHOCYTES # BLD AUTO: 1.3 K/UL (ref 1–4.8)
LYMPHOCYTES NFR BLD: 4.8 % (ref 18–48)
LYMPHOCYTES NFR BLD: 6.6 % (ref 18–48)
MAGNESIUM SERPL-MCNC: 1.8 MG/DL (ref 1.6–2.6)
MAGNESIUM SERPL-MCNC: 1.8 MG/DL (ref 1.6–2.6)
MAGNESIUM SERPL-MCNC: 2.4 MG/DL (ref 1.6–2.6)
MCH RBC QN AUTO: 28.8 PG (ref 27–31)
MCH RBC QN AUTO: 29.2 PG (ref 27–31)
MCHC RBC AUTO-ENTMCNC: 30.5 G/DL (ref 32–36)
MCHC RBC AUTO-ENTMCNC: 32.1 G/DL (ref 32–36)
MCV RBC AUTO: 91 FL (ref 82–98)
MCV RBC AUTO: 95 FL (ref 82–98)
MODE: ABNORMAL
MONOCYTES # BLD AUTO: 1.6 K/UL (ref 0.3–1)
MONOCYTES # BLD AUTO: 1.6 K/UL (ref 0.3–1)
MONOCYTES NFR BLD: 7 % (ref 4–15)
MONOCYTES NFR BLD: 8.3 % (ref 4–15)
NEUTROPHILS # BLD AUTO: 15.5 K/UL (ref 1.8–7.7)
NEUTROPHILS # BLD AUTO: 19.6 K/UL (ref 1.8–7.7)
NEUTROPHILS NFR BLD: 81.7 % (ref 38–73)
NEUTROPHILS NFR BLD: 85.4 % (ref 38–73)
NRBC BLD-RTO: 0 /100 WBC
NRBC BLD-RTO: 0 /100 WBC
PCO2 BLDA: 32.4 MMHG (ref 35–45)
PEEP: 8
PH SMN: 7.49 [PH] (ref 7.35–7.45)
PHOSPHATE SERPL-MCNC: 2.3 MG/DL (ref 2.7–4.5)
PHOSPHATE SERPL-MCNC: 2.4 MG/DL (ref 2.7–4.5)
PHOSPHATE SERPL-MCNC: 2.6 MG/DL (ref 2.7–4.5)
PLATELET # BLD AUTO: 389 K/UL (ref 150–450)
PLATELET # BLD AUTO: 407 K/UL (ref 150–450)
PMV BLD AUTO: 11.3 FL (ref 9.2–12.9)
PMV BLD AUTO: 11.5 FL (ref 9.2–12.9)
PO2 BLDA: 92 MMHG (ref 80–100)
POC BE: 1 MMOL/L
POC SATURATED O2: 98 % (ref 95–100)
POC TCO2: 25 MMOL/L (ref 23–27)
POCT GLUCOSE: 122 MG/DL (ref 70–110)
POCT GLUCOSE: 128 MG/DL (ref 70–110)
POCT GLUCOSE: 132 MG/DL (ref 70–110)
POCT GLUCOSE: 138 MG/DL (ref 70–110)
POTASSIUM SERPL-SCNC: 4.5 MMOL/L (ref 3.5–5.1)
POTASSIUM SERPL-SCNC: 4.6 MMOL/L (ref 3.5–5.1)
POTASSIUM SERPL-SCNC: 4.7 MMOL/L (ref 3.5–5.1)
POTASSIUM SERPL-SCNC: 4.8 MMOL/L (ref 3.5–5.1)
POTASSIUM SERPL-SCNC: 4.8 MMOL/L (ref 3.5–5.1)
PROT SERPL-MCNC: 5.1 G/DL (ref 6–8.4)
RBC # BLD AUTO: 2.57 M/UL (ref 4–5.4)
RBC # BLD AUTO: 2.6 M/UL (ref 4–5.4)
SAMPLE: ABNORMAL
SITE: ABNORMAL
SODIUM SERPL-SCNC: 134 MMOL/L (ref 136–145)
SODIUM SERPL-SCNC: 135 MMOL/L (ref 136–145)
SODIUM SERPL-SCNC: 137 MMOL/L (ref 136–145)
VT: 320
WBC # BLD AUTO: 18.9 K/UL (ref 3.9–12.7)
WBC # BLD AUTO: 22.94 K/UL (ref 3.9–12.7)

## 2021-12-30 PROCEDURE — 83735 ASSAY OF MAGNESIUM: CPT | Mod: 91 | Performed by: STUDENT IN AN ORGANIZED HEALTH CARE EDUCATION/TRAINING PROGRAM

## 2021-12-30 PROCEDURE — 84100 ASSAY OF PHOSPHORUS: CPT | Performed by: STUDENT IN AN ORGANIZED HEALTH CARE EDUCATION/TRAINING PROGRAM

## 2021-12-30 PROCEDURE — 37799 UNLISTED PX VASCULAR SURGERY: CPT

## 2021-12-30 PROCEDURE — 25000003 PHARM REV CODE 250: Performed by: STUDENT IN AN ORGANIZED HEALTH CARE EDUCATION/TRAINING PROGRAM

## 2021-12-30 PROCEDURE — 36556 INSERT NON-TUNNEL CV CATH: CPT | Mod: 79,,, | Performed by: SURGERY

## 2021-12-30 PROCEDURE — 27100171 HC OXYGEN HIGH FLOW UP TO 24 HOURS

## 2021-12-30 PROCEDURE — 99291 CRITICAL CARE FIRST HOUR: CPT | Mod: 24,25,, | Performed by: SURGERY

## 2021-12-30 PROCEDURE — 63600175 PHARM REV CODE 636 W HCPCS: Performed by: STUDENT IN AN ORGANIZED HEALTH CARE EDUCATION/TRAINING PROGRAM

## 2021-12-30 PROCEDURE — 94668 MNPJ CHEST WALL SBSQ: CPT

## 2021-12-30 PROCEDURE — 20000000 HC ICU ROOM

## 2021-12-30 PROCEDURE — 99900035 HC TECH TIME PER 15 MIN (STAT)

## 2021-12-30 PROCEDURE — 94761 N-INVAS EAR/PLS OXIMETRY MLT: CPT

## 2021-12-30 PROCEDURE — 85025 COMPLETE CBC W/AUTO DIFF WBC: CPT | Mod: 91 | Performed by: SURGERY

## 2021-12-30 PROCEDURE — 80048 BASIC METABOLIC PNL TOTAL CA: CPT | Performed by: SURGERY

## 2021-12-30 PROCEDURE — 90945 DIALYSIS ONE EVALUATION: CPT

## 2021-12-30 PROCEDURE — 99291 PR CRITICAL CARE, E/M 30-74 MINUTES: ICD-10-PCS | Mod: 24,25,, | Performed by: SURGERY

## 2021-12-30 PROCEDURE — 27000221 HC OXYGEN, UP TO 24 HOURS

## 2021-12-30 PROCEDURE — 99233 SBSQ HOSP IP/OBS HIGH 50: CPT | Mod: ,,, | Performed by: INTERNAL MEDICINE

## 2021-12-30 PROCEDURE — 27000644 HC VENT IN-LINE ADAPTER

## 2021-12-30 PROCEDURE — 80053 COMPREHEN METABOLIC PANEL: CPT | Performed by: STUDENT IN AN ORGANIZED HEALTH CARE EDUCATION/TRAINING PROGRAM

## 2021-12-30 PROCEDURE — 25000003 PHARM REV CODE 250

## 2021-12-30 PROCEDURE — 80069 RENAL FUNCTION PANEL: CPT | Mod: 91 | Performed by: STUDENT IN AN ORGANIZED HEALTH CARE EDUCATION/TRAINING PROGRAM

## 2021-12-30 PROCEDURE — 99900026 HC AIRWAY MAINTENANCE (STAT)

## 2021-12-30 PROCEDURE — 36556 PR INSERT NON-TUNNEL CV CATH 5+ YRS OLD: ICD-10-PCS | Mod: 79,,, | Performed by: SURGERY

## 2021-12-30 PROCEDURE — 83735 ASSAY OF MAGNESIUM: CPT | Performed by: STUDENT IN AN ORGANIZED HEALTH CARE EDUCATION/TRAINING PROGRAM

## 2021-12-30 PROCEDURE — 80100008 HC CRRT DAILY MAINTENANCE

## 2021-12-30 PROCEDURE — 94640 AIRWAY INHALATION TREATMENT: CPT

## 2021-12-30 PROCEDURE — 82803 BLOOD GASES ANY COMBINATION: CPT

## 2021-12-30 PROCEDURE — 94003 VENT MGMT INPAT SUBQ DAY: CPT

## 2021-12-30 PROCEDURE — 99233 PR SUBSEQUENT HOSPITAL CARE,LEVL III: ICD-10-PCS | Mod: ,,, | Performed by: INTERNAL MEDICINE

## 2021-12-30 RX ORDER — MAGNESIUM SULFATE HEPTAHYDRATE 40 MG/ML
2 INJECTION, SOLUTION INTRAVENOUS
Status: DISPENSED | OUTPATIENT
Start: 2021-12-30 | End: 2021-12-31

## 2021-12-30 RX ORDER — SYRING-NEEDL,DISP,INSUL,0.3 ML 29 G X1/2"
296 SYRINGE, EMPTY DISPOSABLE MISCELLANEOUS ONCE
Status: DISCONTINUED | OUTPATIENT
Start: 2021-12-30 | End: 2022-01-03

## 2021-12-30 RX ORDER — CEFEPIME HYDROCHLORIDE 1 G/50ML
2 INJECTION, SOLUTION INTRAVENOUS
Status: DISCONTINUED | OUTPATIENT
Start: 2021-12-30 | End: 2021-12-30

## 2021-12-30 RX ADMIN — NITROGLYCERIN 0.5 INCH: 20 OINTMENT TOPICAL at 05:12

## 2021-12-30 RX ADMIN — MAGNESIUM SULFATE 2 G: 2 INJECTION INTRAVENOUS at 02:12

## 2021-12-30 RX ADMIN — NITROGLYCERIN 0.5 INCH: 20 OINTMENT TOPICAL at 06:12

## 2021-12-30 RX ADMIN — PIPERACILLIN SODIUM AND TAZOBACTAM SODIUM 4.5 G: 4; .5 INJECTION, POWDER, FOR SOLUTION INTRAVENOUS at 08:12

## 2021-12-30 RX ADMIN — HEPARIN SODIUM 7500 UNITS: 5000 INJECTION INTRAVENOUS; SUBCUTANEOUS at 01:12

## 2021-12-30 RX ADMIN — NITROGLYCERIN 0.5 INCH: 20 OINTMENT TOPICAL at 01:12

## 2021-12-30 RX ADMIN — METHOCARBAMOL 500 MG: 500 TABLET ORAL at 08:12

## 2021-12-30 RX ADMIN — METHOCARBAMOL 500 MG: 500 TABLET ORAL at 01:12

## 2021-12-30 RX ADMIN — NITROGLYCERIN 0.5 INCH: 20 OINTMENT TOPICAL at 12:12

## 2021-12-30 RX ADMIN — Medication: at 08:12

## 2021-12-30 RX ADMIN — GABAPENTIN 125 MG: 250 SOLUTION ORAL at 06:12

## 2021-12-30 RX ADMIN — HEPARIN SODIUM 7500 UNITS: 5000 INJECTION INTRAVENOUS; SUBCUTANEOUS at 06:12

## 2021-12-30 RX ADMIN — Medication 100 MCG/HR: at 04:12

## 2021-12-30 RX ADMIN — HEPARIN SODIUM 7500 UNITS: 5000 INJECTION INTRAVENOUS; SUBCUTANEOUS at 09:12

## 2021-12-30 RX ADMIN — NITROGLYCERIN 0.5 INCH: 20 OINTMENT TOPICAL at 11:12

## 2021-12-30 RX ADMIN — METHOCARBAMOL 500 MG: 500 TABLET ORAL at 05:12

## 2021-12-30 RX ADMIN — SODIUM CHLORIDE: 0.9 INJECTION, SOLUTION INTRAVENOUS at 12:12

## 2021-12-30 RX ADMIN — DEXMEDETOMIDINE HYDROCHLORIDE 0.8 MCG/KG/HR: 4 INJECTION INTRAVENOUS at 08:12

## 2021-12-30 RX ADMIN — GABAPENTIN 125 MG: 250 SOLUTION ORAL at 09:12

## 2021-12-30 RX ADMIN — GABAPENTIN 125 MG: 250 SOLUTION ORAL at 01:12

## 2021-12-30 RX ADMIN — DEXMEDETOMIDINE HYDROCHLORIDE 1.2 MCG/KG/HR: 4 INJECTION INTRAVENOUS at 03:12

## 2021-12-30 RX ADMIN — DEXMEDETOMIDINE HYDROCHLORIDE 1.4 MCG/KG/HR: 4 INJECTION INTRAVENOUS at 05:12

## 2021-12-30 RX ADMIN — Medication 125 MCG/HR: at 01:12

## 2021-12-30 NOTE — PROGRESS NOTES
[facility]  Critical Care - Surgery  Progress Note    Patient Name: Chidi Castaneda  MRN: [id number]  Admission Date: 12/20/2021  Hospital Length of Stay: 10 days  Code Status: Full Code  Attending Provider: Kendall Gorman MD  Primary Care Provider: Primary Doctor No   Principal Problem: <principal problem not specified>    Subjective:     Hospital/ICU Course:  No notes on file    Interval History/Significant Events:   T spiked to 102.1 - CBC, lactic collected. Given 1L LR. Tylenol 650mg.  Grew GNR on BAL    Follow-up For: Procedure(s) (LRB):  LAPAROTOMY, EXPLORATORY (N/A)  INSERTION, GASTROSTOMY TUBE, PERCUTANEOUS (N/A)  CHOLECYSTECTOMY  EGD (ESOPHAGOGASTRODUODENOSCOPY) (N/A)    Post-Operative Day: 7 Days Post-Op    Objective:     Vital Signs (Most Recent):  Temp: (!) 100.4 °F (38 °C) (12/30/21 0315)  Pulse: (!) 116 (12/30/21 0600)  Resp: (!) 24 (12/30/21 0600)  BP: 115/69 (12/30/21 0430)  SpO2: 97 % (12/30/21 0600) Vital Signs (24h Range):  Temp:  [97.9 °F (36.6 °C)-102.1 °F (38.9 °C)] 100.4 °F (38 °C)  Pulse:  [] 116  Resp:  [] 24  SpO2:  [91 %-100 %] 97 %  BP: ()/(49-83) 115/69  Arterial Line BP: ()/(46-70) 155/59     Weight: 121.1 kg (267 lb)  Body mass index is 45.83 kg/m².      Intake/Output Summary (Last 24 hours) at 12/30/2021 0607  Last data filed at 12/30/2021 0600  Gross per 24 hour   Intake 4839.95 ml   Output 3477 ml   Net 1362.95 ml       Physical Exam  Vitals and nursing note reviewed.   Constitutional:       General: She is awake.      Appearance: Normal appearance.      Interventions: She is intubated.      Comments: RAAS of 0   HENT:      Head: Normocephalic and atraumatic.      Nose: Nose normal.      Mouth/Throat:      Mouth: Mucous membranes are moist.      Pharynx: Oropharynx is clear.   Eyes:      Conjunctiva/sclera: Conjunctivae normal.      Pupils: Pupils are equal, round, and reactive to light.   Cardiovascular:      Rate and Rhythm: Normal  rate and regular rhythm.      Pulses: Normal pulses.      Heart sounds: Normal heart sounds.   Pulmonary:      Effort: Tachypnea present. No respiratory distress. She is intubated.      Comments: Crackles best appreciated at the bases.   Mechanical breath sounds  Abdominal:      General: Abdomen is flat.      Comments: REAGAN drains and abdominal wound vac in place   Musculoskeletal:         General: Normal range of motion.      Cervical back: Normal range of motion and neck supple.      Right lower leg: Edema present.      Left lower leg: Edema present.   Skin:     General: Skin is warm and dry.      Capillary Refill: Capillary refill takes less than 2 seconds.   Psychiatric:         Mood and Affect: Mood normal.         Behavior: Behavior normal. Behavior is cooperative.         Vents:  Vent Mode: A/C (12/30/21 0455)  Ventilator Initiated: Yes (12/28/21 1104)  Set Rate: 18 BPM (12/30/21 0455)  Vt Set: 320 mL (12/30/21 0455)  Pressure Support: 60 cmH20 (12/29/21 1306)  PEEP/CPAP: 8 cmH20 (12/30/21 0455)  Oxygen Concentration (%): 40 (12/30/21 0600)  Peak Airway Pressure: 27 cmH2O (12/30/21 0455)  Plateau Pressure: 31 cmH20 (12/30/21 0455)  Total Ve: 11.1 mL (12/30/21 0455)  Negative Inspiratory Force (cm H2O): -28 (12/30/21 0455)  F/VT Ratio<105 (RSBI): (!) 77.75 (12/30/21 0455)    Lines/Drains/Airways     Central Venous Catheter Line            Percutaneous Central Line Insertion/Assessment - Triple Lumen  12/16/21 1531 left subclavian 13 days    Trialysis (Dialysis) Catheter 12/21/21 0030 right internal jugular 9 days          Drain                 Urethral Catheter 12/20/21 1743 9 days         Closed/Suction Drain 12/23/21 1428 Right;Inferior RUQ Bulb 19 Fr. 6 days         Closed/Suction Drain 12/23/21 1429 Right;Superior RUQ Bulb 19 Fr. 6 days         Gastrostomy/Enterostomy 12/23/21 1338 Gastrostomy tube w/ balloon LUQ feeding 6 days          Airway                 Airway - Non-Surgical 12/28/21 1005 Endotracheal  Tube 1 day         Airway - Non-Surgical 12/28/21 1045 Endotracheal Tube 1 day          Arterial Line            Arterial Line 12/18/21 0000 Right Radial 12 days                Significant Labs:    CBC/Anemia Profile:  Recent Labs   Lab 12/29/21  1423 12/29/21 2135 12/30/21 0314   WBC 16.68* 16.52* 18.90*   HGB 7.6* 7.7* 7.6*   HCT 24.1* 23.7* 23.7*    407 407   MCV 92 91 91   RDW 15.9* 15.7* 15.8*        Chemistries:  Recent Labs   Lab 12/29/21  0236 12/29/21  0651 12/29/21  1423 12/29/21 2003 12/30/21 0314      < > 136 136 135*   K 5.4*   < > 4.6 4.5  4.5 4.7      < > 104 105 104   CO2 23   < > 25 24 23   BUN 26*   < > 6 10 19   CREATININE 2.1*   < > 0.7 0.9 1.5*   CALCIUM 8.0*   < > 8.1* 7.6* 7.5*   ALBUMIN 2.2*  --  2.1*  --  1.9*   PROT 5.3*  --   --   --  5.1*   BILITOT 2.5*  --   --   --  2.0*   ALKPHOS 140*  --   --   --  186*   ALT 59*  --   --   --  55*   AST 51*  --   --   --  54*   MG 2.1  --  1.8  --  1.8   PHOS 5.3*  --  2.5*  --  2.3*    < > = values in this interval not displayed.       Significant Imaging:  I have reviewed all pertinent imaging results/findings within the past 24 hours.    Assessment/Plan:     Subcapsular hematoma of liver    Neuro/Psych:   -- Follows commands  -- Sedation/Pain: Precedex, Fentanyl     Cards:   -- HDS with MAP goal > 65   -- On and off pressors  -- ECHO with EF 55%      Pulm:   -- Goal O2 sat > 90%  -- Bronch w/ BAL yesterday, results GNRs  -- Chest PT, IS, inhalational treatment, duo nebs - restart once extubated again  -- CTA Chest w PE protocol: No evidence of PE. Nonspecific bilateral airspace consolidation which could reflect pneumonia, aspiration, and/or other inflammatory process      Renal:  -- Keep billingsley for strict I/O  -- continue CRRT per nephrology  -- BUN/Cr increased  -- replete lytes PRN  -- Try to approach Net 0 for hospitalization      FEN / GI:   -- Replace lytes as needed  -- Nutrition: TFs @40  -- s/p G tube  -- SLP  following  -- CTA Abd/pel: Enchancing lesion in peripheral right hepatic lobe (1.4cm). Moderate volume free fluid in the abdomen or pelvis, possibility of peritonitis or developing abscess. Dependently within the collection there are lobular areas of hyperattenuation. Subcapsular fluid collection about the liver, with drain in-situ.  Small undrained subcapsular collection involving the posterior aspect of the spleen.      ID:   -- Tm: Febrile to Tmax of 102.1. WBC increased 18.9 from 16.5  -- Starting Zosyn today        Heme/Onc:   -- H/H stable  -- CBC q12      Endo:   -- Gluc goal 140-180  -- no history of diabetes  -- SSI/accuchecks      PPx:   Feeding: NPO, TF  Analgesia/Sedation: oxy and dilaudid/Precedex and fentanyl  Thromboembolic prevention: SQH  HOB >30: yes  Stress Ulcer ppx: none  Glucose control: Critical care goal 140-180 g/dl, ISS    Lines/Drains/Airway: PEG, Ng, L radial larry, R IJ trialysis, L subclavian triple lumen  -Removing L subclavian today, replaced RIJ trialysis today      Dispo/Code Status/Palliative:   -- SICU / Full Code  -- discussed with SICU staff        Critical secondary to Patient has a condition that poses threat to life and bodily function: sepsis     Critical care was time spent personally by me on the following activities: development of treatment plan with patient or surrogate and bedside caregivers, discussions with consultants, evaluation of patient's response to treatment, examination of patient, ordering and performing treatments and interventions, ordering and review of laboratory studies, ordering and review of radiographic studies, pulse oximetry, re-evaluation of patient's condition.  This critical care time did not overlap with that of any other provider or involve time for any procedures.     Jaylan Altman MD  Critical Care - Surgery  [facility]

## 2021-12-30 NOTE — SUBJECTIVE & OBJECTIVE
Interval History/Significant Events:   T spiked to 102.1 - CBC, lactic collected. Given 1L LR. Tylenol 650mg.  Grew GNR on BAL    Follow-up For: Procedure(s) (LRB):  LAPAROTOMY, EXPLORATORY (N/A)  INSERTION, GASTROSTOMY TUBE, PERCUTANEOUS (N/A)  CHOLECYSTECTOMY  EGD (ESOPHAGOGASTRODUODENOSCOPY) (N/A)    Post-Operative Day: 7 Days Post-Op    Objective:     Vital Signs (Most Recent):  Temp: (!) 100.4 °F (38 °C) (12/30/21 0315)  Pulse: (!) 116 (12/30/21 0600)  Resp: (!) 24 (12/30/21 0600)  BP: 115/69 (12/30/21 0430)  SpO2: 97 % (12/30/21 0600) Vital Signs (24h Range):  Temp:  [97.9 °F (36.6 °C)-102.1 °F (38.9 °C)] 100.4 °F (38 °C)  Pulse:  [] 116  Resp:  [] 24  SpO2:  [91 %-100 %] 97 %  BP: ()/(49-83) 115/69  Arterial Line BP: ()/(46-70) 155/59     Weight: 121.1 kg (267 lb)  Body mass index is 45.83 kg/m².      Intake/Output Summary (Last 24 hours) at 12/30/2021 0607  Last data filed at 12/30/2021 0600  Gross per 24 hour   Intake 4839.95 ml   Output 3477 ml   Net 1362.95 ml       Physical Exam  Vitals and nursing note reviewed.   Constitutional:       General: She is awake.      Appearance: Normal appearance.      Interventions: She is intubated.      Comments: RAAS of 0   HENT:      Head: Normocephalic and atraumatic.      Nose: Nose normal.      Mouth/Throat:      Mouth: Mucous membranes are moist.      Pharynx: Oropharynx is clear.   Eyes:      Conjunctiva/sclera: Conjunctivae normal.      Pupils: Pupils are equal, round, and reactive to light.   Cardiovascular:      Rate and Rhythm: Normal rate and regular rhythm.      Pulses: Normal pulses.      Heart sounds: Normal heart sounds.   Pulmonary:      Effort: Tachypnea present. No respiratory distress. She is intubated.      Comments: Crackles best appreciated at the bases.   Mechanical breath sounds  Abdominal:      General: Abdomen is flat.      Comments: REAGAN drains and abdominal wound vac in place   Musculoskeletal:         General: Normal  range of motion.      Cervical back: Normal range of motion and neck supple.      Right lower leg: Edema present.      Left lower leg: Edema present.   Skin:     General: Skin is warm and dry.      Capillary Refill: Capillary refill takes less than 2 seconds.   Psychiatric:         Mood and Affect: Mood normal.         Behavior: Behavior normal. Behavior is cooperative.         Vents:  Vent Mode: A/C (12/30/21 0455)  Ventilator Initiated: Yes (12/28/21 1104)  Set Rate: 18 BPM (12/30/21 0455)  Vt Set: 320 mL (12/30/21 0455)  Pressure Support: 60 cmH20 (12/29/21 1306)  PEEP/CPAP: 8 cmH20 (12/30/21 0455)  Oxygen Concentration (%): 40 (12/30/21 0600)  Peak Airway Pressure: 27 cmH2O (12/30/21 0455)  Plateau Pressure: 31 cmH20 (12/30/21 0455)  Total Ve: 11.1 mL (12/30/21 0455)  Negative Inspiratory Force (cm H2O): -28 (12/30/21 0455)  F/VT Ratio<105 (RSBI): (!) 77.75 (12/30/21 0455)    Lines/Drains/Airways     Central Venous Catheter Line            Percutaneous Central Line Insertion/Assessment - Triple Lumen  12/16/21 1531 left subclavian 13 days    Trialysis (Dialysis) Catheter 12/21/21 0030 right internal jugular 9 days          Drain                 Urethral Catheter 12/20/21 1743 9 days         Closed/Suction Drain 12/23/21 1428 Right;Inferior RUQ Bulb 19 Fr. 6 days         Closed/Suction Drain 12/23/21 1429 Right;Superior RUQ Bulb 19 Fr. 6 days         Gastrostomy/Enterostomy 12/23/21 1338 Gastrostomy tube w/ balloon LUQ feeding 6 days          Airway                 Airway - Non-Surgical 12/28/21 1005 Endotracheal Tube 1 day         Airway - Non-Surgical 12/28/21 1045 Endotracheal Tube 1 day          Arterial Line            Arterial Line 12/18/21 0000 Right Radial 12 days                Significant Labs:    CBC/Anemia Profile:  Recent Labs   Lab 12/29/21  1423 12/29/21  2135 12/30/21  0314   WBC 16.68* 16.52* 18.90*   HGB 7.6* 7.7* 7.6*   HCT 24.1* 23.7* 23.7*    407 407   MCV 92 91 91   RDW 15.9* 15.7*  15.8*        Chemistries:  Recent Labs   Lab 12/29/21  0236 12/29/21  0651 12/29/21  1423 12/29/21 2003 12/30/21  0314      < > 136 136 135*   K 5.4*   < > 4.6 4.5  4.5 4.7      < > 104 105 104   CO2 23   < > 25 24 23   BUN 26*   < > 6 10 19   CREATININE 2.1*   < > 0.7 0.9 1.5*   CALCIUM 8.0*   < > 8.1* 7.6* 7.5*   ALBUMIN 2.2*  --  2.1*  --  1.9*   PROT 5.3*  --   --   --  5.1*   BILITOT 2.5*  --   --   --  2.0*   ALKPHOS 140*  --   --   --  186*   ALT 59*  --   --   --  55*   AST 51*  --   --   --  54*   MG 2.1  --  1.8  --  1.8   PHOS 5.3*  --  2.5*  --  2.3*    < > = values in this interval not displayed.       Significant Imaging:  I have reviewed all pertinent imaging results/findings within the past 24 hours.

## 2021-12-30 NOTE — SUBJECTIVE & OBJECTIVE
"Interval History: Febrile and more tachycardic overnight  Vent stable      Medications:  Continuous Infusions:   sodium chloride 0.9% Stopped (12/29/21 1756)    dexmedetomidine (PRECEDEX) infusion 0.8 mcg/kg/hr (12/30/21 0715)    fentanyl 100 mcg/hr (12/30/21 0715)    vasopressin Stopped (12/29/21 0100)     Scheduled Meds:   balsam peru-castor oiL   Topical (Top) BID    gabapentin  125 mg Per G Tube Q8H    heparin (porcine)  7,500 Units Subcutaneous Q8H    methocarbamoL  500 mg Per G Tube QID    nitroGLYCERIN 2% TD oint  0.5 inch Topical (Top) Q6H     PRN Meds:[COMPLETED] calcium gluconate IVPB **AND** calcium gluconate IVPB, dextrose 50%, dextrose 50%, glucagon (human recombinant), HYDROmorphone, insulin aspart U-100, olanzapine zydis, oxyCODONE, oxyCODONE, sodium chloride 0.9%, sodium chloride 0.9%     Review of patient's allergies indicates:   Allergen Reactions    Tylox [oxycodone-acetaminophen]      "Jittery"     Objective:     Vital Signs (Most Recent):  Temp: (!) 101.2 °F (38.4 °C) (12/30/21 0715)  Pulse: (!) 113 (12/30/21 0715)  Resp: (!) 23 (12/30/21 0715)  BP: 115/69 (12/30/21 0430)  SpO2: 97 % (12/30/21 0715) Vital Signs (24h Range):  Temp:  [97.9 °F (36.6 °C)-102.1 °F (38.9 °C)] 101.2 °F (38.4 °C)  Pulse:  [] 113  Resp:  [] 23  SpO2:  [91 %-100 %] 97 %  BP: ()/(49-83) 115/69  Arterial Line BP: ()/(46-70) 129/56     Weight: 121.1 kg (267 lb)  Body mass index is 45.83 kg/m².    Intake/Output - Last 3 Shifts       12/28 0700  12/29 0659 12/29 0700  12/30 0659 12/30 0700  12/31 0659    I.V. (mL/kg) 2346.5 (19.4) 3324.6 (27.5) 52.2 (0.4)    Blood  0     NG/ 515 40    IV Piggyback 77.5 1000.3     Total Intake(mL/kg) 2894 (23.9) 4840 (40) 92.2 (0.8)    Urine (mL/kg/hr) 31 (0) 25 (0) 0 (0)    Drains 268 78 7    Other 1701 3384 0    Stool 0      Total Output 2000 3487 7    Net +894 +1353 +85.2           Stool Occurrence 1 x            Physical Exam  Vitals and nursing note " reviewed.   Constitutional:       General: She is not in acute distress.     Appearance: She is obese. She is not diaphoretic.   HENT:      Head: Normocephalic and atraumatic.      Mouth/Throat:      Mouth: Mucous membranes are moist.      Pharynx: Oropharynx is clear.   Eyes:      Extraocular Movements: Extraocular movements intact.      Conjunctiva/sclera: Conjunctivae normal.   Cardiovascular:      Rate and Rhythm: Regular rhythm. Tachycardia present.   Pulmonary:      Effort: No respiratory distress.      Comments: Intubated  Vent Mode: A/C  Oxygen Concentration (%):  (50-90) 50  Resp Rate Total:  (18 br/min-37 br/min) 26 br/min  Vt Set:  (320 mL) 320 mL  PEEP/CPAP:  (8 cmH20) 8 cmH20  Mean Airway Pressure:  (9.4 ooP11-32 cmH20) 13 cmH20  Abdominal:      General: There is no distension.      Palpations: Abdomen is soft.      Tenderness: There is no guarding or rebound.      Comments: Wound vac in place with good seal, no leak noted.   REAGAN drains with benign fluid, not bloody or bilious   Musculoskeletal:         General: No deformity.   Skin:     General: Skin is warm and dry.   Neurological:      Mental Status: She is alert.         Significant Labs:  I have reviewed all pertinent lab results within the past 24 hours.    Significant Diagnostics:  I have reviewed all pertinent imaging results/findings within the past 24 hours.

## 2021-12-30 NOTE — ASSESSMENT & PLAN NOTE
57yo female transfer from OSH after hysterectomy on 12/8 complicated by delayed recognition of small bowel injury requiring resection (in discontinuity) and at some point new bleed from the liver - transferred with open abdomen for higher level of care.  S/p ex-lap, liver packing 12/21  Open cholecystectomy, abdominal closure 12/23    - Recommend starting broad spec abx for GNR VAP  - Continue to wean vent  - Wound vac change today  - Transfuse for Hgb <7  - Advance TFs as tolerated to goal   - Strict I/Os  - Appreciate nephrology assistance for CRRT  - Remainder of care per SICU, appreciate assistance

## 2021-12-30 NOTE — PLAN OF CARE
[facility] Daily Progress Note    Date: 1/22/2019    Patient was seen and examined, awake, ready for DC  in the room    Review of Systems:    Last Stool Occurrence: 0 (01/20/19 1900)    All other systems reviewed and negative    Medications/Infusions:    Continue same meds  Physical Exam:     Vitals:   Temp,  98.7 °F (37.1 °C) (01/22/19 0802). Pulse 60 (01/22/19 0802). Blood Pressure 167/63 (01/22/19 0802). Respiration 20 (01/22/19 0802)      Head: Normocephalic, without obvious abnormality, atraumatic  Eyes: PERRL, conjunctiva/corneas clear, EOM's intact, fundi benign, both eyes  Ears: Normal TM's and external ear canals, both ears  Nose: Nares normal, septum midline, mucosa normal, no drainage or sinus tenderness  Throat:Lips, mucosa, and tongue normal; teeth and gums normal  Neck:Supple, symmetrical, trachea midline, no adenopathy; thyroid: No enlargement/tenderness/nodules; no carotid bruit or JVD  Back:Symmetric, no curvature, ROM normal, no CVA tenderness  Lungs:Clear to auscultation bilaterally, respirations unlabored  Chest wall:No tenderness or deformity  Heart:Regular rate and rhythm, S1 and S2 normal, no murmur, rub   or gallop  Abdomen:Soft, non-tender, bowel sounds active all four quadrants, no masses, no organomegaly  Extremities:Extremities normal, atraumatic, no cyanosis or edema  Pulses:2+ and symmetric all extremities  Laboratory Results:  Lab Results   Component Value Date    SODIUM 141 01/19/2019    POTASSIUM 3.6 01/19/2019    CHLORIDE 108 (H) 01/19/2019    CO2 25 01/19/2019    ANIONGAP 12 01/19/2019    BUN 15 01/19/2019    CREATININE 0.76 01/19/2019    WBC 7.3 01/19/2019    RBC 3.22 (L) 01/19/2019    HCT 27.6 (L) 01/19/2019    HGB 7.8 (L) 01/19/2019     01/19/2019    GLUCOSE 142 (H) 01/19/2019    TSH 0.984 01/15/2019    VB12 454 01/15/2019       ASSESSMENT AND PLAN:  1.  Muscle weakness.  The patient will benefit from physical therapy and occupational therapy.  2.  [facility]  Discharge Reassessment    Primary Care Provider: Primary Doctor No    Expected Discharge Date: 1/4/2022    According the MD's Note, Febrile and more tachycardic overnight  Vent stable     Reassessment (most recent)     Discharge Reassessment - 12/30/21 0953        Discharge Reassessment    Assessment Type Discharge Planning Assessment     Communicated SHANA with patient/caregiver Date not available/Unable to determine     Discharge Plan A Rehab     Discharge Plan B Home Health     DME Needed Upon Discharge  other (see comments)   TBD    Discharge Barriers Identified None     Why the patient remains in the hospital Requires continued medical care        Post-Acute Status    Post-Acute Authorization Placement     Post-Acute Placement Status Pending medical clearance/testing               The patient is not medically stable to discharge and remains in SICU. The SW will follow-up with the patient as needed to assist with discharge planning.         Philip Borges LMSW  Case Management [facility]  Ext: [phone]      Baseline cognitive impairment.  Because of her advanced age, it is very common that a patient at her age either has mild cognitive impairment or early onset Alzheimer's type dementia.  At this point, I recommended nonpharmacological interventions only like frequent orientation, early ambulation, out of bed, up to chair. Patient is able to make her own dicisons.     3.  Regarding goals of care, the patient does have a medical power of .  Code status is do not resuscitate.  At this point, the recommendation would be for subacute rehab.  Accepted at [facility], [facility] patient and  leaving today  Farida Iqbal MD

## 2021-12-30 NOTE — SUBJECTIVE & OBJECTIVE
"Interval History: SLED continued until yesterday afternoon to reduce K, now has respiratory alkalosis    Review of patient's allergies indicates:   Allergen Reactions    Tylox [oxycodone-acetaminophen]      "Jittery"     Current Facility-Administered Medications   Medication Frequency    0.9%  NaCl infusion (CRRT USE ONLY) Continuous    balsam peru-castor oiL Oint BID    calcium gluconate 1 g in dextrose 5 % 100 mL IVPB Q10 Min PRN    cefepime in dextrose 5 % IVPB 2 g Q24H    dexmedetomidine (PRECEDEX) 400mcg/100mL 0.9% NaCL infusion Continuous    dextrose 50% injection 12.5 g PRN    dextrose 50% injection 25 g PRN    fentaNYL 2500 mcg in 0.9% sodium chloride 250 mL infusion premix (titrating) Continuous    gabapentin 250 mg/5 mL (5 mL) solution 125 mg Q8H    glucagon (human recombinant) injection 1 mg PRN    heparin (porcine) injection 7,500 Units Q8H    HYDROmorphone injection 0.5 mg Q6H PRN    insulin aspart U-100 pen 0-5 Units Q6H PRN    methocarbamoL tablet 500 mg QID    nitroGLYCERIN 2% TD oint ointment 0.5 inch Q6H    olanzapine zydis disintegrating tablet 10 mg Nightly PRN    oxyCODONE 5 mg/5 mL solution 10 mg Q4H PRN    oxyCODONE 5 mg/5 mL solution 5 mg Q4H PRN    sodium chloride 0.9% flush 10 mL PRN    sodium chloride 0.9% flush 10 mL PRN    vasopressin (PITRESSIN) 0.2 Units/mL in dextrose 5 % 100 mL infusion Continuous       Objective:     Vital Signs (Most Recent):  Temp: (!) 101.2 °F (38.4 °C) (12/30/21 0715)  Pulse: (!) 115 (12/30/21 0755)  Resp: (!) 23 (12/30/21 0755)  BP: 115/69 (12/30/21 0430)  SpO2: 97 % (12/30/21 0755)  O2 Device (Oxygen Therapy): ventilator (12/30/21 0739) Vital Signs (24h Range):  Temp:  [97.9 °F (36.6 °C)-102.1 °F (38.9 °C)] 101.2 °F (38.4 °C)  Pulse:  [] 115  Resp:  [] 23  SpO2:  [91 %-100 %] 97 %  BP: ()/(49-83) 115/69  Arterial Line BP: ()/(46-70) 129/56     Weight: 121.1 kg (267 lb) (12/28/21 1003)  Body mass index is 45.83 " kg/m².  Body surface area is 2.34 meters squared.    I/O last 3 completed shifts:  In: 6504.8 [I.V.:4699.4; NG/GT:805; IV Piggyback:1000.3]  Out: 5122 [Urine:45; Drains:92; Other:4985]    Physical Exam  Constitutional:       Appearance: She is obese. She is ill-appearing.   HENT:      Mouth/Throat:      Mouth: Mucous membranes are moist.   Eyes:      Pupils: Pupils are equal, round, and reactive to light.   Cardiovascular:      Rate and Rhythm: Normal rate and regular rhythm.   Pulmonary:      Comments: intubated  Abdominal:      General: There is no distension.      Comments: closed   Musculoskeletal:         General: No deformity.      Right lower leg: Edema present.      Left lower leg: Edema present.   Skin:     Coloration: Skin is not jaundiced.   Neurological:      General: No focal deficit present.         Significant Labs:  All labs within the past 24 hours have been reviewed.     Significant Imaging:  Labs: Reviewed  X-Ray: Reviewed

## 2021-12-30 NOTE — PHYSICIAN QUERY
PT Name: Chidi Castaneda  MR #: [id number]     DOCUMENTATION CLARIFICATION     CDS: Brandy Capley, RN  Email: [email or web address]    This form is a permanent document in the medical record.     Query Date: December 30, 2021    By submitting this query, we are merely seeking further clarification of documentation.  Please utilize your independent clinical judgment when addressing the question(s) below.      The Medical Record contains the following:    Clinical Information Location in Medical Records     Recommend starting broad spec abx for GNR VAP   General surgery progress notes 12/30   Patient transferred from outside facility s/p lap hysterectomy complicated by multiple bowel injuries.  Has undergone ex-lap with SBR and anastomosis, now with hemorrhage from liver.  Packed and left open, transferred to [facility] for higher level of care.  Patient arrived intubated, sedated, on multiple pressors, likely secondary to hypovolemic shock.       Vital Signs (24h Range):  Temp:  [96.62 °F (35.9 °C)-98.8 °F (37.1 °C)] 96.62 °F (35.9 °C)  Pulse:  [] 98  Resp:  [18-22] 20  SpO2:  [98 %-100 %] 99 %  BP: (103-142)/(50-66) 109/59  Arterial Line BP: (133-160)/(59-65) 133/59     ID:   -- Tm: afebrile; WBC 17.45   -- Abx on zosyn    Septic shock  -Plan per primary team     Patient extubated to 3 lpm nasal cannula.    Acute respiratory failure.  Was successfully extubated yesterday and looked good from most of the day, developed increased work of breathing this morning requiring BiPAP support with a high respiratory rate.  Plan for re-intubation and bronchoscopy today.     Bronchoscopy  R mainstem patent, middle and lower segmental bronchi with serosanguinous/purulent collection obtained via bronchoalveolar lavage.    H&P 12/20, filed 12/21                                  Nephrology consult 12/20, filed 12/24      RT care update 12/27    Critical care surgery progress notes 12/28          Critical care surgery procedures 12/28,  filed 12/29   FINDINGS:  Bibasilar pleural effusions.  Bilateral patchy alveolar infiltrates.    Possible mild right basilar consolidation.     Possible pulmonary edema.     Cardiac margins are obscured.     No evidence of pneumothorax.  No acute osseous abnormality.   CXR 12/20   WBC: 17.45    Klebsiella Aerogenes- few Labs 12/20    Respiratory culture 12/28       According to coding guidelines, Present on Admission is defined as present at the time the order for inpatient admission occurs. Conditions that develop during an outpatient encounter, including emergency department, observation, or outpatient surgery, are considered as present on admission.       Please clarify the Present on Admission (POA) status of the diagnosis: __VAP_______    [  ] Yes (Y)   [ x ] No (N)   [  ] Documentation insufficient to determine if condition is POA (U)   [  ] Clinically Undetermined (W)     Reference:  ICD-10-CM Official Guidelines for Coding and Reporting FY 2021. (2020). Retrieved October 21, 2020, from https://www.cdc.gov/nchs/data/icd/10cmguidelines-FY2021.pdf?[email or web address]    Form No. [id number]

## 2021-12-30 NOTE — PROCEDURES
"Chidi Castaneda is a 56 y.o. female patient.    Temp: 99.9 °F (37.7 °C) (12/30/21 0900)  Pulse: 109 (12/30/21 1000)  Resp: 18 (12/30/21 1000)  BP: 98/63 (12/30/21 1000)  SpO2: 98 % (12/30/21 1000)  Weight: 121.1 kg (267 lb) (12/28/21 1003)  Height: 5' 4" (162.6 cm) (12/29/21 0804)       Central Line    Date/Time: 12/30/2021 10:20 AM  Performed by: Jaylan Altman MD  Authorized by: Jaylan Altman MD     Location procedure was performed:  [facility] CRITICAL CARE SURGERY  Assisting Provider:  Joao Verdugo MD  Time out complete?: Verified correct patient, procedure, equipment, staff, and site/side    Indications:  Hemodialysis, vascular access and med administration  Preparation:  Skin prepped with betadine  Location:  Right internal jugular  Catheter size:  13 Fr  Inserted Catheter Length (cm):  16  Ultrasound guidance: Yes    Guidewire confirmed in vessel.    Steril sheath on probe.    Sterile gel used.  Manometry: No    Number of attempts:  1  Securement:  Line sutured, chlorhexidine patch and sterile dressing applied  Complications: No    Estimated blood loss (mL):  1  Specimens: No    Implants: No          12/30/2021    "

## 2021-12-30 NOTE — PLAN OF CARE
SICU PLAN OF CARE NOTE    Dx: Ex-lap    Shift Events: No acute events throughout shift. On SPONT for entirety of shift, tolerating well. Trialysis line exchanged, L subclavian discontinued. Wound vac exchanged at bedside. SCUF x 10h.     Goals of Care: MAP > 65    Neuro: Follows Commands and Moves All Extremities    Cardiac: NSR to ST, HR 90s-110s    Respiratory: Ventilator, SPONT, FiO2 40%, PEEP 8    Diet: Tube Feeds    Gtts: Fentanyl and Precedex    Urine Output: Urinary Catheter 15 cc/shift    Drains:     R REAGAN x 2: 31 mL/shift  Wound vac: 0 mL/shift     Labs/Accuchecks: Q8h renal, mg. Accuchecks Q6h.

## 2021-12-30 NOTE — PT/OT/SLP PROGRESS
Physical Therapy      Patient Name:  Chidi Castaneda   MRN:  [id number]    Patient not seen today secondary to  (pt not seen due to being intubated and sedated.). Will follow-up at a later date.    12/30/2021  .

## 2021-12-30 NOTE — PLAN OF CARE
SICU PLAN OF CARE NOTE    Dx: <principal problem not specified>    Shift Events: Patient HR on the monitor 90s-100s. Remains on the ventilator, was on spontaneous at start of shift, switched to rate for overnight. No issues,  Tolerating well. Alert and comfortable on sedation. Spiked fever overnight, MD aware, tylenol given, ice placed on patient, 1L LR given.  Fever down to 99. BP remains above goal without need for pressor support. POC discussed with patient and  throughout shift.       Goals of Care: MAP>65    Neuro: AAO x4, Follows Commands, and Moves All Extremities      Respiratory: Ventilator 40% 8 PEEP    Diet: Tube Feeds @40cc/hr    Gtts: Fentanyl and Precedex    Urine Output: Urinary Catheter 25 cc/shift    Drains: REAGAN Drain, total output 54 cc / shift    Wound Vac to abdomen, no complications overnight    Restraints remain in place, patient educated on rational.       Labs/Accuchecks: daily labs, q6 hour accucheck.    Skin: Remains without new breakdown. Foam padding in use. Heel boots in use. Turned [id number] and prn comfort. CHG bath given.

## 2021-12-30 NOTE — PLAN OF CARE
"SICU PLAN OF CARE NOTE    Dx: <principal problem not specified>    Goals of Care:  MAP >65; Tye vent., Turn Q2. Keep calm.    Vital Signs:  BP (!) 82/49 (BP Location: Left arm, Patient Position: Lying)   Pulse 98   Temp 97.9 °F (36.6 °C) (Oral)   Resp (!) 25   Ht 5' 4" (1.626 m)   Wt 121.1 kg (267 lb)   SpO2 99%   BMI 45.83 kg/m²     Cardiac:  NSR/ST    Resp:  SpO2 % on Ventilator Spont 40%/8    Neuro:  Follows Commands and Moves All Extremities, patient alert. Able to write needs     Gtts:  Fentanyl @ 75 mcg/hr and Precedex 1 mcg/kg/hr    Urine Output:  Urinary Catheter 0 cc/shift    Drains:  REAGAN Drain, total output 24 cc / shift    Pt. On CRRT 1AM to 6PM. Patient clotted at 6PM. Venous blood returned. Tolerated dialysis well      Diet:  NPO and Tube Feeds (Impact Peptide 1.5 @ goal 40 ml/hr and tolerating     Labs/Accuchecks:  Q6 Blood glucose monitoring. Labs reviewed     Skin:  Patient toes with black markings on tip of toes to bilateral feet. ABD wound vac with seal intact. REAGAN drain to right side ABD. Mepalex to patient left side back.  Patient turned Q2, waffle mattress inflated, ICU bed working correctly.    Shift Events:  Patient on CRRT unitl 6 PM. Remains intubated at this time. Tube feedings at goal. Bronch performed with AM with family consent. Pain managed.  See flowsheet for further assessment/details.  Family (spouse) updated on current condition/plan of care, questions answered, and emotional support provided.   MD updated on current condition, vitals, labs, and gtts.  No new orders received, will continue to monitor.     "

## 2021-12-30 NOTE — PROGRESS NOTES
"[facility]  General Surgery  Progress Note    Subjective:     History of Present Illness:  No notes on file    Post-Op Info:  Procedure(s) (LRB):  LAPAROTOMY, EXPLORATORY (N/A)  INSERTION, GASTROSTOMY TUBE, PERCUTANEOUS (N/A)  CHOLECYSTECTOMY  EGD (ESOPHAGOGASTRODUODENOSCOPY) (N/A)   7 Days Post-Op     Interval History: Febrile and more tachycardic overnight  Vent stable      Medications:  Continuous Infusions:   sodium chloride 0.9% Stopped (12/29/21 1756)    dexmedetomidine (PRECEDEX) infusion 0.8 mcg/kg/hr (12/30/21 0715)    fentanyl 100 mcg/hr (12/30/21 0715)    vasopressin Stopped (12/29/21 0100)     Scheduled Meds:   balsam peru-castor oiL   Topical (Top) BID    gabapentin  125 mg Per G Tube Q8H    heparin (porcine)  7,500 Units Subcutaneous Q8H    methocarbamoL  500 mg Per G Tube QID    nitroGLYCERIN 2% TD oint  0.5 inch Topical (Top) Q6H     PRN Meds:[COMPLETED] calcium gluconate IVPB **AND** calcium gluconate IVPB, dextrose 50%, dextrose 50%, glucagon (human recombinant), HYDROmorphone, insulin aspart U-100, olanzapine zydis, oxyCODONE, oxyCODONE, sodium chloride 0.9%, sodium chloride 0.9%     Review of patient's allergies indicates:   Allergen Reactions    Tylox [oxycodone-acetaminophen]      "Jittery"     Objective:     Vital Signs (Most Recent):  Temp: (!) 101.2 °F (38.4 °C) (12/30/21 0715)  Pulse: (!) 113 (12/30/21 0715)  Resp: (!) 23 (12/30/21 0715)  BP: 115/69 (12/30/21 0430)  SpO2: 97 % (12/30/21 0715) Vital Signs (24h Range):  Temp:  [97.9 °F (36.6 °C)-102.1 °F (38.9 °C)] 101.2 °F (38.4 °C)  Pulse:  [] 113  Resp:  [] 23  SpO2:  [91 %-100 %] 97 %  BP: ()/(49-83) 115/69  Arterial Line BP: ()/(46-70) 129/56     Weight: 121.1 kg (267 lb)  Body mass index is 45.83 kg/m².    Intake/Output - Last 3 Shifts       12/28 0700 12/29 0659 12/29 0700 12/30 0659 12/30 0700 12/31 0659    I.V. (mL/kg) 2346.5 (19.4) 3324.6 (27.5) 52.2 (0.4)    Blood  0     " NG/ 515 40    IV Piggyback 77.5 1000.3     Total Intake(mL/kg) 2894 (23.9) 4840 (40) 92.2 (0.8)    Urine (mL/kg/hr) 31 (0) 25 (0) 0 (0)    Drains 268 78 7    Other 1701 3384 0    Stool 0      Total Output 2000 3487 7    Net +894 +1353 +85.2           Stool Occurrence 1 x            Physical Exam  Vitals and nursing note reviewed.   Constitutional:       General: She is not in acute distress.     Appearance: She is obese. She is not diaphoretic.   HENT:      Head: Normocephalic and atraumatic.      Mouth/Throat:      Mouth: Mucous membranes are moist.      Pharynx: Oropharynx is clear.   Eyes:      Extraocular Movements: Extraocular movements intact.      Conjunctiva/sclera: Conjunctivae normal.   Cardiovascular:      Rate and Rhythm: Regular rhythm. Tachycardia present.   Pulmonary:      Effort: No respiratory distress.      Comments: Intubated  Vent Mode: A/C  Oxygen Concentration (%):  (50-90) 50  Resp Rate Total:  (18 br/min-37 br/min) 26 br/min  Vt Set:  (320 mL) 320 mL  PEEP/CPAP:  (8 cmH20) 8 cmH20  Mean Airway Pressure:  (9.4 lfG14-74 cmH20) 13 cmH20  Abdominal:      General: There is no distension.      Palpations: Abdomen is soft.      Tenderness: There is no guarding or rebound.      Comments: Wound vac in place with good seal, no leak noted.   REAGAN drains with benign fluid, not bloody or bilious   Musculoskeletal:         General: No deformity.   Skin:     General: Skin is warm and dry.   Neurological:      Mental Status: She is alert.         Significant Labs:  I have reviewed all pertinent lab results within the past 24 hours.    Significant Diagnostics:  I have reviewed all pertinent imaging results/findings within the past 24 hours.    Assessment/Plan:     Subcapsular hematoma of liver  57yo female transfer from [facility]SH after hysterectomy on 12/8 complicated by delayed recognition of small bowel injury requiring resection (in discontinuity) and at some point new bleed from the liver - transferred with  open abdomen for higher level of care.  S/p ex-lap, liver packing 12/21  Open cholecystectomy, abdominal closure 12/23    - Recommend starting broad spec abx for GNR VAP  - Continue to wean vent  - Wound vac change today  - Transfuse for Hgb <7  - Advance TFs as tolerated to goal   - Strict I/Os  - Appreciate nephrology assistance for CRRT  - Remainder of care per SICU, appreciate assistance         Víctor Valderrama MD  General Surgery  [facility]

## 2021-12-30 NOTE — ASSESSMENT & PLAN NOTE
  Neuro/Psych:   -- Follows commands  -- Sedation/Pain: Precedex, Fentanyl     Cards:   -- HDS with MAP goal > 65   -- On and off pressors  -- ECHO with EF 55%      Pulm:   -- Goal O2 sat > 90%  -- Bronch w/ BAL yesterday, results GNRs  -- Chest PT, IS, inhalational treatment, duo nebs - restart once extubated again  -- CTA Chest w PE protocol: No evidence of PE. Nonspecific bilateral airspace consolidation which could reflect pneumonia, aspiration, and/or other inflammatory process      Renal:  -- Keep ibllingsley for strict I/O  -- continue CRRT per nephrology  -- BUN/Cr increased  -- replete lytes PRN  -- Try to approach Net 0 for hospitalization      FEN / GI:   -- Replace lytes as needed  -- Nutrition: TFs @40  -- s/p G tube  -- SLP following  -- CTA Abd/pel: Enchancing lesion in peripheral right hepatic lobe (1.4cm). Moderate volume free fluid in the abdomen or pelvis, possibility of peritonitis or developing abscess. Dependently within the collection there are lobular areas of hyperattenuation. Subcapsular fluid collection about the liver, with drain in-situ.  Small undrained subcapsular collection involving the posterior aspect of the spleen.      ID:   -- Tm: Febrile to Tmax of 102.1. WBC increased 18.9 from 16.5  -- Starting Zosyn today        Heme/Onc:   -- H/H stable  -- CBC q12      Endo:   -- Gluc goal 140-180  -- no history of diabetes  -- SSI/accuchecks      PPx:   Feeding: NPO, TF  Analgesia/Sedation: oxy and dilaudid/Precedex and fentanyl  Thromboembolic prevention: SQH  HOB >30: yes  Stress Ulcer ppx: none  Glucose control: Critical care goal 140-180 g/dl, ISS    Lines/Drains/Airway: PEG, Billingsley, L radial larry, R IJ trialysis, L subclavian triple lumen  -Removing L subclavian today, replaced RIJ trialysis today      Dispo/Code Status/Palliative:   -- SICU / Full Code  -- discussed with SICU staff

## 2021-12-30 NOTE — PT/OT/SLP PROGRESS
Occupational Therapy      Patient Name:  Chidi Castaneda   MRN:  [id number]    Patient not seen today secondary to remains intubated  . Will follow-up 1/2/22.    12/30/2021

## 2021-12-30 NOTE — ASSESSMENT & PLAN NOTE
-oliguric SUSAN, ischemic ATN with multifactorial etiology, however most likely d/t severe hypotension as a result of septic shock 2/2 small bowel perforation and bacteremia   -BL sCr 1  -KRT started at OSH 12/12/2021 for acidosis and volume overload  -still anuric  -stable fio2 of 40 though positive 1L fluid balance and pulmonary edema on CXR  -respiratory alkalosis present  -with no severe acidosis, no severe electrolyte abnormalities will perform KRT for UF  -SCUF 10h goal 2-3L UF  -if electrolytes change, can switch to SLED

## 2021-12-30 NOTE — PROGRESS NOTES
[facility]  Nephrology  Progress Note    Patient Name: Chidi Castaneda  MRN: [id number]  Admission Date: 12/20/2021  Hospital Length of Stay: 10 days  Attending Provider: Kendall Gorman MD   Primary Care Physician: Primary Doctor No  Principal Problem:<principal problem not specified>    Subjective:     HPI: Chidi Castaneda is a 56 y.o. female w/ PMHx HTN, GERD s/p EGD 6/22/2021, migraines, ovarian cyst s/p cystectomy, and obesity who underwent an elective lap hysterectomy on 12/18/2021 at [facility] and was then transferred to [place], [place] for higher level of care when pt was found to have post-op somnolence, decrease PO intake, decrease urine output that progressed to anuria with a sharp rise in Cr from 0.9 to 2.8 (12/10). Pt was treated for sepsis with volume resuscitation and broad spectrum antibiotics, however pt continue to deteriorate and became tachycardic, hypotensive, and imaging showed an ileus. Pt was intubated since she was found to be acidotic with a ph of 7.28 despite a nonrebreather with 100%  FiO2  12/11 pt was taken back to the OR for washout and a bowel resection was done as she was found to have a small bowel perforation  12/15 pt was found to have a subscapular liver hematoma  12/18 pt was taken back to the OR for wound vacc exchange and removal of hematoma   12/19 general surgery team recommended no further surgical intervention since they did not find any active bleeding intraoperatively on 12/18 and felt that the ongoing bleeding was 2/2 consumptive coagulopathy and septic shock. They recommended to continue wound vacc and to consider higher level of care for pt  Pt was also being treated for a bacteremia as well as for intraabdominal infection, her antibiotics prior to transfer were: meropenem, flagyl, and vancomycin   Pt received tranexamic acid x1 and multiple units of blood products.    Pt was transferred to [facility] on 12/20/2021 for higher  "level of care: embolization for a subscapular hematoma       Nephrology was consulted for: "dialysis, SUSAN"    Pt does not have any h/o renal disease prior to hospitalization (per chart review and per pt's )   EDW: 88 kg   Post-op renal ultrasound was normal (results of ultrasound and other medical records from the outside hospital are in the chart at the bedside, needs to be scanned into chart)   Pt was started on CRRT on 12/12 via a trialysis catheter,   On 12/20/21 morning nephrology at outside hospital had recommended CRRT-SLED however primary team requested iHD prior to transfer to [facility], it appears that pt was ordered for 4 hours however, pt was prepped for transfer and was unable to complete full session 2/2 transfer, per pt's , pt had 2L of fluid removed instead of the planned 4 liters. Of note, while pt was at [facility] pt's CRRT required the use of citrate to prevent clotting (briefly), however that apparently resolved and was able to get a session w/o citrate and obviously was able to tolerate iHD prior to transfer.   Upon arrival to [facility] pt was started on zosyn, she was started on NS 125ml/hr, was transfused 2 units of PRBCs, and remained off vasopressors.       Interval History: SLED continued until yesterday afternoon to reduce K, now has respiratory alkalosis    Review of patient's allergies indicates:   Allergen Reactions    Tylox [oxycodone-acetaminophen]      "Jittery"     Current Facility-Administered Medications   Medication Frequency    0.9%  NaCl infusion (CRRT USE ONLY) Continuous    balsam peru-castor oiL Oint BID    calcium gluconate 1 g in dextrose 5 % 100 mL IVPB Q10 Min PRN    cefepime in dextrose 5 % IVPB 2 g Q24H    dexmedetomidine (PRECEDEX) 400mcg/100mL 0.9% NaCL infusion Continuous    dextrose 50% injection 12.5 g PRN    dextrose 50% injection 25 g PRN    fentaNYL 2500 mcg in 0.9% sodium chloride 250 mL infusion premix (titrating) Continuous    gabapentin " 250 mg/5 mL (5 mL) solution 125 mg Q8H    glucagon (human recombinant) injection 1 mg PRN    heparin (porcine) injection 7,500 Units Q8H    HYDROmorphone injection 0.5 mg Q6H PRN    insulin aspart U-100 pen 0-5 Units Q6H PRN    methocarbamoL tablet 500 mg QID    nitroGLYCERIN 2% TD oint ointment 0.5 inch Q6H    olanzapine zydis disintegrating tablet 10 mg Nightly PRN    oxyCODONE 5 mg/5 mL solution 10 mg Q4H PRN    oxyCODONE 5 mg/5 mL solution 5 mg Q4H PRN    sodium chloride 0.9% flush 10 mL PRN    sodium chloride 0.9% flush 10 mL PRN    vasopressin (PITRESSIN) 0.2 Units/mL in dextrose 5 % 100 mL infusion Continuous       Objective:     Vital Signs (Most Recent):  Temp: (!) 101.2 °F (38.4 °C) (12/30/21 0715)  Pulse: (!) 115 (12/30/21 0755)  Resp: (!) 23 (12/30/21 0755)  BP: 115/69 (12/30/21 0430)  SpO2: 97 % (12/30/21 0755)  O2 Device (Oxygen Therapy): ventilator (12/30/21 0739) Vital Signs (24h Range):  Temp:  [97.9 °F (36.6 °C)-102.1 °F (38.9 °C)] 101.2 °F (38.4 °C)  Pulse:  [] 115  Resp:  [] 23  SpO2:  [91 %-100 %] 97 %  BP: ()/(49-83) 115/69  Arterial Line BP: ()/(46-70) 129/56     Weight: 121.1 kg (267 lb) (12/28/21 1003)  Body mass index is 45.83 kg/m².  Body surface area is 2.34 meters squared.    I/O last 3 completed shifts:  In: 6504.8 [I.V.:4699.4; NG/GT:805; IV Piggyback:1000.3]  Out: 5122 [Urine:45; Drains:92; Other:4985]    Physical Exam  Constitutional:       Appearance: She is obese. She is ill-appearing.   HENT:      Mouth/Throat:      Mouth: Mucous membranes are moist.   Eyes:      Pupils: Pupils are equal, round, and reactive to light.   Cardiovascular:      Rate and Rhythm: Normal rate and regular rhythm.   Pulmonary:      Comments: intubated  Abdominal:      General: There is no distension.      Comments: closed   Musculoskeletal:         General: No deformity.      Right lower leg: Edema present.      Left lower leg: Edema present.   Skin:     Coloration: Skin  is not jaundiced.   Neurological:      General: No focal deficit present.         Significant Labs:  All labs within the past 24 hours have been reviewed.     Significant Imaging:  Labs: Reviewed  X-Ray: Reviewed    Assessment/Plan:     SUSAN (acute kidney injury)  -oliguric SUSAN, ischemic ATN with multifactorial etiology, however most likely d/t severe hypotension as a result of septic shock 2/2 small bowel perforation and bacteremia   -BL sCr 1  -KRT started at OSH 12/12/2021 for acidosis and volume overload  -still anuric  -stable fio2 of 40 though positive 1L fluid balance and pulmonary edema on CXR  -respiratory alkalosis present  -with no severe acidosis, no severe electrolyte abnormalities will perform KRT for UF  -SCUF 8h goal 1-2L UF  -if electrolytes change, can switch to SLED      Volume overload  -CTA and CXR pulmonary edema    Metabolic acidosis  -controlled with KRT    Bowel perforation  -per primary    Septic shock  -secondary to bowel perforation  -per primary      Subcapsular hematoma of liver  -per primary          Anthony Steven MD  Nephrology  [facility]

## 2021-12-30 NOTE — PROGRESS NOTES
12/30/21 1250   Treatment   Treatment Type SCUF   Treatment Status Restart   Dialysis Machine Number [id number]   Solutions Labeled and Current  Yes   Access Temporary Cath;Right;IJ   Catheter Dressing Intact  Yes   Alarms Engaged Yes     CRRT restarted without issue, pt left in NAD/VSS. Report given to primary RN, will increase to goal UF as tolerated.

## 2021-12-31 LAB
ABO + RH BLD: NORMAL
ALBUMIN SERPL BCP-MCNC: 1.7 G/DL (ref 3.5–5.2)
ALBUMIN SERPL BCP-MCNC: 1.8 G/DL (ref 3.5–5.2)
ALBUMIN SERPL BCP-MCNC: 1.9 G/DL (ref 3.5–5.2)
ALLENS TEST: ABNORMAL
ALLENS TEST: ABNORMAL
ALP SERPL-CCNC: 170 U/L (ref 55–135)
ALT SERPL W/O P-5'-P-CCNC: 51 U/L (ref 10–44)
ANION GAP SERPL CALC-SCNC: 10 MMOL/L (ref 8–16)
ANION GAP SERPL CALC-SCNC: 11 MMOL/L (ref 8–16)
ANION GAP SERPL CALC-SCNC: 8 MMOL/L (ref 8–16)
ANISOCYTOSIS BLD QL SMEAR: SLIGHT
AST SERPL-CCNC: 40 U/L (ref 10–40)
BASOPHILS # BLD AUTO: 0.07 K/UL (ref 0–0.2)
BASOPHILS # BLD AUTO: 0.08 K/UL (ref 0–0.2)
BASOPHILS NFR BLD: 0.3 % (ref 0–1.9)
BASOPHILS NFR BLD: 0.3 % (ref 0–1.9)
BILIRUB SERPL-MCNC: 1.7 MG/DL (ref 0.1–1)
BLD GP AB SCN CELLS X3 SERPL QL: NORMAL
BLD PROD TYP BPU: NORMAL
BLD PROD TYP BPU: NORMAL
BLOOD UNIT EXPIRATION DATE: NORMAL
BLOOD UNIT EXPIRATION DATE: NORMAL
BLOOD UNIT TYPE CODE: 6200
BLOOD UNIT TYPE CODE: 6200
BLOOD UNIT TYPE: NORMAL
BLOOD UNIT TYPE: NORMAL
BUN SERPL-MCNC: 23 MG/DL (ref 6–20)
BUN SERPL-MCNC: 45 MG/DL (ref 6–20)
BUN SERPL-MCNC: 59 MG/DL (ref 6–20)
CALCIUM SERPL-MCNC: 7.5 MG/DL (ref 8.7–10.5)
CALCIUM SERPL-MCNC: 7.9 MG/DL (ref 8.7–10.5)
CALCIUM SERPL-MCNC: 7.9 MG/DL (ref 8.7–10.5)
CHLORIDE SERPL-SCNC: 106 MMOL/L (ref 95–110)
CHLORIDE SERPL-SCNC: 106 MMOL/L (ref 95–110)
CHLORIDE SERPL-SCNC: 107 MMOL/L (ref 95–110)
CO2 SERPL-SCNC: 19 MMOL/L (ref 23–29)
CO2 SERPL-SCNC: 21 MMOL/L (ref 23–29)
CO2 SERPL-SCNC: 22 MMOL/L (ref 23–29)
CODING SYSTEM: NORMAL
CODING SYSTEM: NORMAL
CREAT SERPL-MCNC: 1.5 MG/DL (ref 0.5–1.4)
CREAT SERPL-MCNC: 2.8 MG/DL (ref 0.5–1.4)
CREAT SERPL-MCNC: 3.2 MG/DL (ref 0.5–1.4)
DIFFERENTIAL METHOD: ABNORMAL
DIFFERENTIAL METHOD: ABNORMAL
DISPENSE STATUS: NORMAL
DISPENSE STATUS: NORMAL
DOHLE BOD BLD QL SMEAR: PRESENT
EOSINOPHIL # BLD AUTO: 0.3 K/UL (ref 0–0.5)
EOSINOPHIL # BLD AUTO: 0.3 K/UL (ref 0–0.5)
EOSINOPHIL NFR BLD: 1.2 % (ref 0–8)
EOSINOPHIL NFR BLD: 1.3 % (ref 0–8)
ERYTHROCYTE [DISTWIDTH] IN BLOOD BY AUTOMATED COUNT: 15.7 % (ref 11.5–14.5)
ERYTHROCYTE [DISTWIDTH] IN BLOOD BY AUTOMATED COUNT: 15.9 % (ref 11.5–14.5)
EST. GFR  (AFRICAN AMERICAN): 18.1 ML/MIN/1.73 M^2
EST. GFR  (AFRICAN AMERICAN): 21.3 ML/MIN/1.73 M^2
EST. GFR  (AFRICAN AMERICAN): 45.2 ML/MIN/1.73 M^2
EST. GFR  (NON AFRICAN AMERICAN): 15.7 ML/MIN/1.73 M^2
EST. GFR  (NON AFRICAN AMERICAN): 18.4 ML/MIN/1.73 M^2
EST. GFR  (NON AFRICAN AMERICAN): 39.2 ML/MIN/1.73 M^2
GIANT PLATELETS BLD QL SMEAR: PRESENT
GLUCOSE SERPL-MCNC: 104 MG/DL (ref 70–110)
GLUCOSE SERPL-MCNC: 112 MG/DL (ref 70–110)
GLUCOSE SERPL-MCNC: 115 MG/DL (ref 70–110)
GRAM STN SPEC: NORMAL
HCO3 UR-SCNC: 22.4 MMOL/L (ref 24–28)
HCO3 UR-SCNC: 24.2 MMOL/L (ref 24–28)
HCT VFR BLD AUTO: 23 % (ref 37–48.5)
HCT VFR BLD AUTO: 24.7 % (ref 37–48.5)
HCT VFR BLD CALC: 24 %PCV (ref 36–54)
HGB BLD-MCNC: 7.2 G/DL (ref 12–16)
HGB BLD-MCNC: 7.7 G/DL (ref 12–16)
HYPOCHROMIA BLD QL SMEAR: ABNORMAL
IMM GRANULOCYTES # BLD AUTO: 0.37 K/UL (ref 0–0.04)
IMM GRANULOCYTES # BLD AUTO: 0.45 K/UL (ref 0–0.04)
IMM GRANULOCYTES NFR BLD AUTO: 1.8 % (ref 0–0.5)
IMM GRANULOCYTES NFR BLD AUTO: 1.8 % (ref 0–0.5)
LYMPHOCYTES # BLD AUTO: 1.3 K/UL (ref 1–4.8)
LYMPHOCYTES # BLD AUTO: 1.3 K/UL (ref 1–4.8)
LYMPHOCYTES NFR BLD: 5.4 % (ref 18–48)
LYMPHOCYTES NFR BLD: 6.2 % (ref 18–48)
MAGNESIUM SERPL-MCNC: 1.9 MG/DL (ref 1.6–2.6)
MAGNESIUM SERPL-MCNC: 2.3 MG/DL (ref 1.6–2.6)
MAGNESIUM SERPL-MCNC: 2.4 MG/DL (ref 1.6–2.6)
MCH RBC QN AUTO: 28.8 PG (ref 27–31)
MCH RBC QN AUTO: 29.3 PG (ref 27–31)
MCHC RBC AUTO-ENTMCNC: 31.2 G/DL (ref 32–36)
MCHC RBC AUTO-ENTMCNC: 31.3 G/DL (ref 32–36)
MCV RBC AUTO: 92 FL (ref 82–98)
MCV RBC AUTO: 94 FL (ref 82–98)
MONOCYTES # BLD AUTO: 1.6 K/UL (ref 0.3–1)
MONOCYTES # BLD AUTO: 1.7 K/UL (ref 0.3–1)
MONOCYTES NFR BLD: 6.8 % (ref 4–15)
MONOCYTES NFR BLD: 7.8 % (ref 4–15)
NEUTROPHILS # BLD AUTO: 16.7 K/UL (ref 1.8–7.7)
NEUTROPHILS # BLD AUTO: 20.6 K/UL (ref 1.8–7.7)
NEUTROPHILS NFR BLD: 82.6 % (ref 38–73)
NEUTROPHILS NFR BLD: 84.5 % (ref 38–73)
NRBC BLD-RTO: 0 /100 WBC
NRBC BLD-RTO: 0 /100 WBC
OVALOCYTES BLD QL SMEAR: ABNORMAL
PCO2 BLDA: 37 MMHG (ref 35–45)
PCO2 BLDA: 37 MMHG (ref 35–45)
PH SMN: 7.39 [PH] (ref 7.35–7.45)
PH SMN: 7.42 [PH] (ref 7.35–7.45)
PHOSPHATE SERPL-MCNC: 1.8 MG/DL (ref 2.7–4.5)
PHOSPHATE SERPL-MCNC: 3 MG/DL (ref 2.7–4.5)
PHOSPHATE SERPL-MCNC: 3 MG/DL (ref 2.7–4.5)
PLATELET # BLD AUTO: 422 K/UL (ref 150–450)
PLATELET # BLD AUTO: 431 K/UL (ref 150–450)
PLATELET BLD QL SMEAR: ABNORMAL
PMV BLD AUTO: 11.3 FL (ref 9.2–12.9)
PMV BLD AUTO: 11.4 FL (ref 9.2–12.9)
PO2 BLDA: 114 MMHG (ref 80–100)
PO2 BLDA: 94 MMHG (ref 80–100)
POC BE: -3 MMOL/L
POC BE: 0 MMOL/L
POC IONIZED CALCIUM: 1.15 MMOL/L (ref 1.06–1.42)
POC SATURATED O2: 97 % (ref 95–100)
POC SATURATED O2: 99 % (ref 95–100)
POC TCO2: 23 MMOL/L (ref 23–27)
POC TCO2: 25 MMOL/L (ref 23–27)
POCT GLUCOSE: 115 MG/DL (ref 70–110)
POCT GLUCOSE: 122 MG/DL (ref 70–110)
POCT GLUCOSE: 128 MG/DL (ref 70–110)
POIKILOCYTOSIS BLD QL SMEAR: SLIGHT
POTASSIUM BLD-SCNC: 4.7 MMOL/L (ref 3.5–5.1)
POTASSIUM SERPL-SCNC: 4.9 MMOL/L (ref 3.5–5.1)
POTASSIUM SERPL-SCNC: 5.2 MMOL/L (ref 3.5–5.1)
POTASSIUM SERPL-SCNC: 5.2 MMOL/L (ref 3.5–5.1)
PROT SERPL-MCNC: 5.6 G/DL (ref 6–8.4)
RBC # BLD AUTO: 2.5 M/UL (ref 4–5.4)
RBC # BLD AUTO: 2.63 M/UL (ref 4–5.4)
SAMPLE: ABNORMAL
SAMPLE: ABNORMAL
SITE: ABNORMAL
SITE: ABNORMAL
SODIUM BLD-SCNC: 139 MMOL/L (ref 136–145)
SODIUM SERPL-SCNC: 135 MMOL/L (ref 136–145)
SODIUM SERPL-SCNC: 136 MMOL/L (ref 136–145)
SODIUM SERPL-SCNC: 139 MMOL/L (ref 136–145)
TRANS ERYTHROCYTES VOL PATIENT: NORMAL ML
TRANS ERYTHROCYTES VOL PATIENT: NORMAL ML
WBC # BLD AUTO: 20.27 K/UL (ref 3.9–12.7)
WBC # BLD AUTO: 24.38 K/UL (ref 3.9–12.7)
WBC TOXIC VACUOLES BLD QL SMEAR: PRESENT

## 2021-12-31 PROCEDURE — 25000003 PHARM REV CODE 250: Performed by: STUDENT IN AN ORGANIZED HEALTH CARE EDUCATION/TRAINING PROGRAM

## 2021-12-31 PROCEDURE — 94150 VITAL CAPACITY TEST: CPT

## 2021-12-31 PROCEDURE — 87070 CULTURE OTHR SPECIMN AEROBIC: CPT

## 2021-12-31 PROCEDURE — 85025 COMPLETE CBC W/AUTO DIFF WBC: CPT | Performed by: SURGERY

## 2021-12-31 PROCEDURE — 87077 CULTURE AEROBIC IDENTIFY: CPT

## 2021-12-31 PROCEDURE — 87186 SC STD MICRODIL/AGAR DIL: CPT

## 2021-12-31 PROCEDURE — 27000644 HC VENT IN-LINE ADAPTER

## 2021-12-31 PROCEDURE — 99291 PR CRITICAL CARE, E/M 30-74 MINUTES: ICD-10-PCS | Mod: 24,25,, | Performed by: SURGERY

## 2021-12-31 PROCEDURE — 37799 UNLISTED PX VASCULAR SURGERY: CPT

## 2021-12-31 PROCEDURE — 27200966 HC CLOSED SUCTION SYSTEM

## 2021-12-31 PROCEDURE — 84295 ASSAY OF SERUM SODIUM: CPT

## 2021-12-31 PROCEDURE — 80053 COMPREHEN METABOLIC PANEL: CPT | Performed by: STUDENT IN AN ORGANIZED HEALTH CARE EDUCATION/TRAINING PROGRAM

## 2021-12-31 PROCEDURE — 63600175 PHARM REV CODE 636 W HCPCS: Performed by: RADIOLOGY

## 2021-12-31 PROCEDURE — 27100171 HC OXYGEN HIGH FLOW UP TO 24 HOURS

## 2021-12-31 PROCEDURE — 63600175 PHARM REV CODE 636 W HCPCS

## 2021-12-31 PROCEDURE — 82565 ASSAY OF CREATININE: CPT

## 2021-12-31 PROCEDURE — 99900035 HC TECH TIME PER 15 MIN (STAT)

## 2021-12-31 PROCEDURE — 94003 VENT MGMT INPAT SUBQ DAY: CPT

## 2021-12-31 PROCEDURE — 80100008 HC CRRT DAILY MAINTENANCE

## 2021-12-31 PROCEDURE — 83735 ASSAY OF MAGNESIUM: CPT | Mod: 91 | Performed by: STUDENT IN AN ORGANIZED HEALTH CARE EDUCATION/TRAINING PROGRAM

## 2021-12-31 PROCEDURE — 63600175 PHARM REV CODE 636 W HCPCS: Performed by: STUDENT IN AN ORGANIZED HEALTH CARE EDUCATION/TRAINING PROGRAM

## 2021-12-31 PROCEDURE — 80069 RENAL FUNCTION PANEL: CPT | Performed by: STUDENT IN AN ORGANIZED HEALTH CARE EDUCATION/TRAINING PROGRAM

## 2021-12-31 PROCEDURE — 87076 CULTURE ANAEROBE IDENT EACH: CPT

## 2021-12-31 PROCEDURE — 87102 FUNGUS ISOLATION CULTURE: CPT

## 2021-12-31 PROCEDURE — 25500020 PHARM REV CODE 255: Performed by: SURGERY

## 2021-12-31 PROCEDURE — 82800 BLOOD PH: CPT

## 2021-12-31 PROCEDURE — 87205 SMEAR GRAM STAIN: CPT

## 2021-12-31 PROCEDURE — 85014 HEMATOCRIT: CPT

## 2021-12-31 PROCEDURE — 86920 COMPATIBILITY TEST SPIN: CPT | Performed by: SURGERY

## 2021-12-31 PROCEDURE — 25000003 PHARM REV CODE 250

## 2021-12-31 PROCEDURE — 99232 SBSQ HOSP IP/OBS MODERATE 35: CPT | Mod: ,,, | Performed by: INTERNAL MEDICINE

## 2021-12-31 PROCEDURE — 84100 ASSAY OF PHOSPHORUS: CPT | Performed by: STUDENT IN AN ORGANIZED HEALTH CARE EDUCATION/TRAINING PROGRAM

## 2021-12-31 PROCEDURE — 94668 MNPJ CHEST WALL SBSQ: CPT

## 2021-12-31 PROCEDURE — 94761 N-INVAS EAR/PLS OXIMETRY MLT: CPT

## 2021-12-31 PROCEDURE — 90945 DIALYSIS ONE EVALUATION: CPT

## 2021-12-31 PROCEDURE — 99232 PR SUBSEQUENT HOSPITAL CARE,LEVL II: ICD-10-PCS | Mod: ,,, | Performed by: INTERNAL MEDICINE

## 2021-12-31 PROCEDURE — 86920 COMPATIBILITY TEST SPIN: CPT | Performed by: STUDENT IN AN ORGANIZED HEALTH CARE EDUCATION/TRAINING PROGRAM

## 2021-12-31 PROCEDURE — 87075 CULTR BACTERIA EXCEPT BLOOD: CPT

## 2021-12-31 PROCEDURE — 20000000 HC ICU ROOM

## 2021-12-31 PROCEDURE — 99291 CRITICAL CARE FIRST HOUR: CPT | Mod: 24,25,, | Performed by: SURGERY

## 2021-12-31 PROCEDURE — 86901 BLOOD TYPING SEROLOGIC RH(D): CPT | Performed by: STUDENT IN AN ORGANIZED HEALTH CARE EDUCATION/TRAINING PROGRAM

## 2021-12-31 PROCEDURE — 94760 N-INVAS EAR/PLS OXIMETRY 1: CPT

## 2021-12-31 PROCEDURE — 82803 BLOOD GASES ANY COMBINATION: CPT

## 2021-12-31 PROCEDURE — 99900017 HC EXTUBATION W/PARAMETERS (STAT)

## 2021-12-31 PROCEDURE — 27000221 HC OXYGEN, UP TO 24 HOURS

## 2021-12-31 PROCEDURE — 82330 ASSAY OF CALCIUM: CPT

## 2021-12-31 PROCEDURE — 99900026 HC AIRWAY MAINTENANCE (STAT)

## 2021-12-31 PROCEDURE — 25000242 PHARM REV CODE 250 ALT 637 W/ HCPCS

## 2021-12-31 PROCEDURE — 83735 ASSAY OF MAGNESIUM: CPT | Performed by: STUDENT IN AN ORGANIZED HEALTH CARE EDUCATION/TRAINING PROGRAM

## 2021-12-31 PROCEDURE — 94010 BREATHING CAPACITY TEST: CPT

## 2021-12-31 PROCEDURE — 84132 ASSAY OF SERUM POTASSIUM: CPT

## 2021-12-31 RX ORDER — LIDOCAINE HYDROCHLORIDE 10 MG/ML
INJECTION INFILTRATION; PERINEURAL
Status: DISPENSED
Start: 2021-12-31 | End: 2022-01-01

## 2021-12-31 RX ORDER — MAGNESIUM SULFATE HEPTAHYDRATE 40 MG/ML
2 INJECTION, SOLUTION INTRAVENOUS
Status: DISCONTINUED | OUTPATIENT
Start: 2021-12-31 | End: 2022-01-01

## 2021-12-31 RX ORDER — FAMOTIDINE 10 MG/ML
20 INJECTION INTRAVENOUS DAILY
Status: DISCONTINUED | OUTPATIENT
Start: 2021-12-31 | End: 2021-12-31

## 2021-12-31 RX ORDER — FENTANYL CITRATE 50 UG/ML
INJECTION, SOLUTION INTRAMUSCULAR; INTRAVENOUS CODE/TRAUMA/SEDATION MEDICATION
Status: COMPLETED | OUTPATIENT
Start: 2021-12-31 | End: 2021-12-31

## 2021-12-31 RX ORDER — MELATONIN 1 MG/ML
5 LIQUID (ML) ORAL NIGHTLY PRN
Status: DISCONTINUED | OUTPATIENT
Start: 2021-12-31 | End: 2022-01-14 | Stop reason: HOSPADM

## 2021-12-31 RX ADMIN — METHOCARBAMOL 500 MG: 500 TABLET ORAL at 02:12

## 2021-12-31 RX ADMIN — HEPARIN SODIUM 7500 UNITS: 5000 INJECTION INTRAVENOUS; SUBCUTANEOUS at 05:12

## 2021-12-31 RX ADMIN — METHOCARBAMOL 500 MG: 500 TABLET ORAL at 09:12

## 2021-12-31 RX ADMIN — HEPARIN SODIUM 7500 UNITS: 5000 INJECTION INTRAVENOUS; SUBCUTANEOUS at 09:12

## 2021-12-31 RX ADMIN — HEPARIN SODIUM 7500 UNITS: 5000 INJECTION INTRAVENOUS; SUBCUTANEOUS at 02:12

## 2021-12-31 RX ADMIN — GABAPENTIN 125 MG: 250 SOLUTION ORAL at 05:12

## 2021-12-31 RX ADMIN — PIPERACILLIN SODIUM AND TAZOBACTAM SODIUM 4.5 G: 4; .5 INJECTION, POWDER, FOR SOLUTION INTRAVENOUS at 10:12

## 2021-12-31 RX ADMIN — HYDROMORPHONE HYDROCHLORIDE 0.5 MG: 1 INJECTION, SOLUTION INTRAMUSCULAR; INTRAVENOUS; SUBCUTANEOUS at 11:12

## 2021-12-31 RX ADMIN — Medication: at 10:12

## 2021-12-31 RX ADMIN — FAMOTIDINE 20 MG: 10 INJECTION INTRAVENOUS at 09:12

## 2021-12-31 RX ADMIN — GABAPENTIN 125 MG: 250 SOLUTION ORAL at 09:12

## 2021-12-31 RX ADMIN — GABAPENTIN 125 MG: 250 SOLUTION ORAL at 02:12

## 2021-12-31 RX ADMIN — OXYCODONE HYDROCHLORIDE 10 MG: 5 SOLUTION ORAL at 07:12

## 2021-12-31 RX ADMIN — DEXMEDETOMIDINE HYDROCHLORIDE 0.7 MCG/KG/HR: 4 INJECTION INTRAVENOUS at 03:12

## 2021-12-31 RX ADMIN — METHOCARBAMOL 500 MG: 500 TABLET ORAL at 06:12

## 2021-12-31 RX ADMIN — IOHEXOL 100 ML: 350 INJECTION, SOLUTION INTRAVENOUS at 11:12

## 2021-12-31 RX ADMIN — NITROGLYCERIN 0.5 INCH: 20 OINTMENT TOPICAL at 12:12

## 2021-12-31 RX ADMIN — PIPERACILLIN SODIUM AND TAZOBACTAM SODIUM 4.5 G: 4; .5 INJECTION, POWDER, FOR SOLUTION INTRAVENOUS at 09:12

## 2021-12-31 RX ADMIN — Medication: at 09:12

## 2021-12-31 RX ADMIN — FENTANYL CITRATE 50 MCG: 50 INJECTION, SOLUTION INTRAMUSCULAR; INTRAVENOUS at 03:12

## 2021-12-31 RX ADMIN — DEXMEDETOMIDINE HYDROCHLORIDE 1 MCG/KG/HR: 4 INJECTION INTRAVENOUS at 04:12

## 2021-12-31 RX ADMIN — NITROGLYCERIN 0.5 INCH: 20 OINTMENT TOPICAL at 06:12

## 2021-12-31 RX ADMIN — SODIUM CHLORIDE: 0.9 INJECTION, SOLUTION INTRAVENOUS at 05:12

## 2021-12-31 RX ADMIN — NITROGLYCERIN 0.5 INCH: 20 OINTMENT TOPICAL at 05:12

## 2021-12-31 NOTE — PROGRESS NOTES
[facility] Intensive [facility]  Nephrology  Progress Note    Patient Name: Chidi Castaneda  MRN: [id number]  Admission Date: 12/20/2021  Hospital Length of Stay: 11 days  Attending Provider: Kendall Gorman MD   Primary Care Physician: Primary Doctor No  Principal Problem:<principal problem not specified>    Subjective:     HPI: Chidi Castaneda is a 56 y.o. female w/ PMHx HTN, GERD s/p EGD 6/22/2021, migraines, ovarian cyst s/p cystectomy, and obesity who underwent an elective lap hysterectomy on 12/18/2021 at [facility] and was then transferred to [place], [place] for higher level of care when pt was found to have post-op somnolence, decrease PO intake, decrease urine output that progressed to anuria with a sharp rise in Cr from 0.9 to 2.8 (12/10). Pt was treated for sepsis with volume resuscitation and broad spectrum antibiotics, however pt continue to deteriorate and became tachycardic, hypotensive, and imaging showed an ileus. Pt was intubated since she was found to be acidotic with a ph of 7.28 despite a nonrebreather with 100%  FiO2  12/11 pt was taken back to the OR for washout and a bowel resection was done as she was found to have a small bowel perforation  12/15 pt was found to have a subscapular liver hematoma  12/18 pt was taken back to the OR for wound vacc exchange and removal of hematoma   12/19 general surgery team recommended no further surgical intervention since they did not find any active bleeding intraoperatively on 12/18 and felt that the ongoing bleeding was 2/2 consumptive coagulopathy and septic shock. They recommended to continue wound vacc and to consider higher level of care for pt  Pt was also being treated for a bacteremia as well as for intraabdominal infection, her antibiotics prior to transfer were: meropenem, flagyl, and vancomycin   Pt received tranexamic acid x1 and multiple units of blood products.    [place] [facility] [place] for higher  "level of care: embolization for a subscapular hematoma       Nephrology was consulted for: "dialysis, SUSAN"    Pt does not have any h/o renal disease prior to hospitalization (per chart review and per pt's )   EDW: 88 kg   Post-op renal ultrasound was normal (results of ultrasound and other medical records from the outside hospital are in the chart at the bedside, needs to be scanned into chart)   Pt was started on CRRT on 12/12 via a trialysis catheter,   On 12/20/21 morning nephrology at outside hospital had recommended CRRT-SLED however primary team requested iHD prior to transfer to [facility], it appears that pt was ordered for 4 hours however, pt was prepped for transfer and was unable to complete full session 2/2 transfer, per pt's , pt had 2L of fluid removed instead of the planned 4 liters. Of note, while pt was at [facility] pt's CRRT required the use of citrate to prevent clotting (briefly), however that apparently resolved and was able to get a session w/o citrate and obviously was able to tolerate iHD prior to transfer.   Upon arrival to [facility] pt was started on zosyn, she was started on NS 125ml/hr, was transfused 2 units of PRBCs, and remained off vasopressors.       Interval History: completed 8 hours of SCUF at midnight, this am tachy 120's, SBP in the range of 120's-160's, continues on precedex and  Fentanyl.  I/O: 3.4L/4.3L net - 860ml   Review of patient's allergies indicates:   Allergen Reactions    Tylox [oxycodone-acetaminophen]      "Jittery"     Current Facility-Administered Medications   Medication Frequency    0.9%  NaCl infusion (CRRT USE ONLY) Continuous    balsam peru-castor oiL Oint BID    dexmedetomidine (PRECEDEX) 400mcg/100mL 0.9% NaCL infusion Continuous    dextrose 50% injection 12.5 g PRN    dextrose 50% injection 25 g PRN    fentaNYL 2500 mcg in 0.9% sodium chloride 250 mL infusion premix (titrating) Continuous    gabapentin 250 mg/5 mL (5 mL) solution 125 mg " Q8H    glucagon (human recombinant) injection 1 mg PRN    heparin (porcine) injection 7,500 Units Q8H    HYDROmorphone injection 0.5 mg Q6H PRN    insulin aspart U-100 pen 0-5 Units Q6H PRN    magnesium citrate solution 296 mL Once    methocarbamoL tablet 500 mg QID    nitroGLYCERIN 2% TD oint ointment 0.5 inch Q6H    olanzapine zydis disintegrating tablet 10 mg Nightly PRN    oxyCODONE 5 mg/5 mL solution 10 mg Q4H PRN    oxyCODONE 5 mg/5 mL solution 5 mg Q4H PRN    piperacillin-tazobactam 4.5 g in sodium chloride 0.9% 100 mL IVPB (ready to mix system) Q12H    sodium chloride 0.9% flush 10 mL PRN    sodium chloride 0.9% flush 10 mL PRN       Objective:     Vital Signs (Most Recent):  Temp: 99.3 °F (37.4 °C) (12/31/21 1130)  Pulse: (!) 117 (12/31/21 1215)  Resp: 19 (12/31/21 1215)  BP: 116/81 (12/30/21 2100)  SpO2: 100 % (12/31/21 1215)  O2 Device (Oxygen Therapy): ventilator (12/31/21 1141) Vital Signs (24h Range):  Temp:  [97.4 °F (36.3 °C)-99.3 °F (37.4 °C)] 99.3 °F (37.4 °C)  Pulse:  [] 117  Resp:  [16-33] 19  SpO2:  [91 %-100 %] 100 %  BP: ()/(45-92) 116/81  Arterial Line BP: ()/(43-76) 146/52     Weight: 121.1 kg (267 lb) (12/28/21 1003)  Body mass index is 45.83 kg/m².  Body surface area is 2.34 meters squared.    I/O last 3 completed shifts:  In: 5330.4 [I.V.:2596.6; NG/GT:1535; IV Piggyback:1198.8]  Out: 4417 [Urine:70; Drains:110; Other:4237]    Physical Exam  Vitals and nursing note reviewed.   Constitutional:       General: She is not in acute distress.     Appearance: She is ill-appearing. She is not toxic-appearing or diaphoretic.   Cardiovascular:      Rate and Rhythm: Regular rhythm. Tachycardia present.      Pulses: Normal pulses.      Heart sounds: No murmur heard.      Pulmonary:      Effort: Respiratory distress present.      Breath sounds: No wheezing, rhonchi or rales.      Comments: tachypnea  Musculoskeletal:         General: No swelling, tenderness or deformity.       Right lower leg: Edema present.      Left lower leg: Edema present.   Skin:     General: Skin is warm.      Capillary Refill: Capillary refill takes 2 to 3 seconds.      Coloration: Skin is not jaundiced or pale.      Findings: Bruising present. No erythema, lesion or rash.      Comments: Necrotic areas in toes   Neurological:      Mental Status: She is alert.      Comments: Intubated    Psychiatric:      Comments: Intubated          Significant Labs:  ABGs:   Recent Labs   Lab 12/31/21  0509   PH 7.390   PCO2 37.0   HCO3 22.4*   POCSATURATED 97   BE -3     Cardiac Markers: No results for input(s): CKMB, TROPONINT, MYOGLOBIN in the last 168 hours.  CBC:   Recent Labs   Lab 12/31/21  0443   WBC 24.38*   RBC 2.63*   HGB 7.7*   HCT 24.7*      MCV 94   MCH 29.3   MCHC 31.2*     CMP:   Recent Labs   Lab 12/31/21  0443      CALCIUM 7.9*   ALBUMIN 1.9*   PROT 5.6*   *   K 5.2*   CO2 21*      BUN 45*   CREATININE 2.8*   ALKPHOS 170*   ALT 51*   AST 40   BILITOT 1.7*     Coagulation: No results for input(s): PT, INR, APTT in the last 168 hours.  LFTs:   Recent Labs   Lab 12/31/21  0443   ALT 51*   AST 40   ALKPHOS 170*   BILITOT 1.7*   PROT 5.6*   ALBUMIN 1.9*     Microbiology Results (last 7 days)     Procedure Component Value Units Date/Time    Culture, Respiratory with Gram Stain [[id number]]  (Abnormal)  (Susceptibility) Collected: 12/28/21 1046    Order Status: Completed Specimen: Respiratory from [facility], [facility] Updated: 12/30/21 0944     Respiratory Culture No S aureus or Pseudomonas isolated.      KLEBSIELLA AEROGENES  Few  Normal respiratory alba also present       Gram Stain (Respiratory) <10 epithelial cells per low power field.     Gram Stain (Respiratory) Rare WBC's     Gram Stain (Respiratory) No organisms seen    Narrative:      Right middle lobe, Right lower lobe Bronch        Specimen (24h ago, onward)            None        PTH: No results for input(s): PTH in the last 168  hours.  TSH: No results for input(s): TSH in the last 168 hours.  All labs within the past 24 hours have been reviewed.     Significant Imaging:  Labs: Reviewed  ECG: Reviewed    Assessment/Plan:     Bowel perforation  -Plan per primary team       SUSAN (acute kidney injury)  -oliguric SUSAN, ischemic ATN with multifactorial etiology, however most likely d/t severe hypotension as a result of septic shock 2/2 small bowel perforation and bacteremia   -BL sCr 1  -KRT started at OSH 12/12/2021 for acidosis and volume overload  Plan:   -remains anuric, will switch to SLED to improve clearance, plan for 12 hours with UF goal of 350-450ml/hr   -electrolytes: replace Phos to goal >3   -check RFP Q8H while on SLED  -strict I&Os  -anemia: hgb 7.7, stable     Subcapsular hematoma of liver  -Plan per primary team           Thank you for your consult. I will follow-up with patient. Please contact us if you have any additional questions.    Belinda Mckenna MD  Nephrology  [facility]

## 2021-12-31 NOTE — H&P
Vascular and Interventional Radiology History & Physical    Date:  12/31/2021      History of Present Illness:  Chidi Castaneda is a 56 y.o. female w/ recent hysterectomy who has large intraabdominal abscess. IR consulted for abscess drain placement.     Past Medical History:  No past medical history on file.    Past Surgical History:  Past Surgical History:   Procedure Laterality Date    CHOLECYSTECTOMY  12/23/2021    Procedure: CHOLECYSTECTOMY;  Surgeon: Kendall Gorman MD;  [facility];  Service: General;;    ESOPHAGOGASTRODUODENOSCOPY N/A 12/23/2021    Procedure: EGD (ESOPHAGOGASTRODUODENOSCOPY);  Surgeon: Kendall Gorman MD;  Location: [facility];  Service: General;  Laterality: N/A;    EVACUATION OF HEMATOMA  12/21/2021    Procedure: EVACUATION, HEMATOMA;  Surgeon: Kendall Gorman MD;  [facility];  Service: General;;    PERCUTANEOUS INSERTION OF GASTROSTOMY TUBE N/A 12/23/2021    Procedure: INSERTION, GASTROSTOMY TUBE, PERCUTANEOUS;  Surgeon: Kendall Gorman MD;  [facility];  Service: General;  Laterality: N/A;    REPLACEMENT OF WOUND VACUUM-ASSISTED CLOSURE DEVICE  12/21/2021    Procedure: REPLACEMENT, WOUND VAC;  Surgeon: Kendall Gorman MD;  [facility];  Service: General;;        Sedation History:    Denies any adverse reactions.  Denies problems laying flat.    Social History:        Home Medications:   Prior to Admission medications    Not on File       Inpatient Medications:    Current Facility-Administered Medications:     0.9%  NaCl infusion (CRRT USE ONLY), , Intravenous, Continuous, Anthony Steven MD, Last Rate: 200 mL/hr at 12/30/21 1800, Rate Verify at 12/30/21 1800    balsam peru-castor oiL Oint, , Topical (Top), BID, Víctor Valderrama MD, Given at 12/31/21 0910    dexmedetomidine (PRECEDEX) 400mcg/100mL 0.9% NaCL infusion, 0-1.4 mcg/kg/hr, Intravenous, Continuous, Jaylan Altman MD, Last Rate: 21.2 mL/hr at 12/31/21  1400, 0.7 mcg/kg/hr at 12/31/21 1400    dextrose 50% injection 12.5 g, 12.5 g, Intravenous, PRN, Joao Verdugo MD    dextrose 50% injection 25 g, 25 g, Intravenous, PRN, Víctor Valderrama MD    fentaNYL 2500 mcg in 0.9% sodium chloride 250 mL infusion premix (titrating), 0-200 mcg/hr, Intravenous, Continuous, Ricardo Zacarias MD, Stopped at 12/31/21 1245    gabapentin 250 mg/5 mL (5 mL) solution 125 mg, 125 mg, Per G Tube, Q8H, Jaylan Altman MD, 125 mg at 12/31/21 1400    glucagon (human recombinant) injection 1 mg, 1 mg, Intramuscular, PRN, Joao Verdugo MD    heparin (porcine) injection 7,500 Units, 7,500 Units, Subcutaneous, Q8H, Evan Cutler MD, 7,500 Units at 12/31/21 1400    HYDROmorphone injection 0.5 mg, 0.5 mg, Intravenous, Q6H PRN, Jaylan Altman MD, 0.5 mg at 12/28/21 0907    insulin aspart U-100 pen 0-5 Units, 0-5 Units, Subcutaneous, Q6H PRN, Joao Verdugo MD    magnesium citrate solution 296 mL, 296 mL, Oral, Once, Kendall Gorman MD    magnesium sulfate 2g in water 50mL IVPB (premix), 2 g, Intravenous, PRN, Belinda Mckenna MD    methocarbamoL tablet 500 mg, 500 mg, Per G Tube, QID, Jaylan Altman MD, 500 mg at 12/31/21 1400    nitroGLYCERIN 2% TD oint ointment 0.5 inch, 0.5 inch, Topical (Top), Q6H, Víctor Valderrama MD, 0.5 inch at 12/31/21 1225    olanzapine zydis disintegrating tablet 10 mg, 10 mg, Oral, Nightly PRN, Jaylan Altman MD, 10 mg at 12/27/21 2128    oxyCODONE 5 mg/5 mL solution 10 mg, 10 mg, Per G Tube, Q4H PRN, Jaylan Altman MD, 10 mg at 12/27/21 1612    oxyCODONE 5 mg/5 mL solution 5 mg, 5 mg, Per G Tube, Q4H PRN, Jaylan Altman MD    piperacillin-tazobactam 4.5 g in sodium chloride 0.9% 100 mL IVPB (ready to mix system), 4.5 g, Intravenous, Q12H, Joao Verdugo MD, Stopped at 12/31/21 1357    sodium chloride 0.9% flush 10 mL, 10 mL, Intravenous, PRN, Víctor Valderrama MD    sodium chloride 0.9% flush 10 mL, 10 mL, Intravenous, PRN, Joao Verdugo MD     Anticoagulants/Antiplatelets:   no  "anticoagulation    Allergies:   Review of patient's allergies indicates:   Allergen Reactions    Tylox [oxycodone-acetaminophen]      "Jittery"       Review of Systems:   As documented in primary provider H&P.    Vital Signs (Most Recent):  Temp: 99.3 °F (37.4 °C) (12/31/21 1130)  Pulse: (!) 112 (12/31/21 1400)  Resp: (!) 21 (12/31/21 1400)  BP: 116/81 (12/30/21 2100)  SpO2: 99 % (12/31/21 1400)    Physical Exam:  No acute distress, laying comfortably in bed, pleasant and cooperative  Regular rate and rhythm  Breathing unlabored  Abdomen benign  Extremities warm and well perfused    Sedation Exam:  ASA: III - Patient appears to have severe systemic disease not posing a constant threat to life   Mallampati: II (hard and soft palate, upper portion of tonsils anduvula visible)     Laboratory:  Lab Results   Component Value Date    INR 1.2 12/23/2021       Lab Results   Component Value Date    WBC 24.38 (H) 12/31/2021    HGB 7.7 (L) 12/31/2021    HCT 24.7 (L) 12/31/2021    MCV 94 12/31/2021     12/31/2021      Lab Results   Component Value Date     12/31/2021     (L) 12/31/2021    K 5.2 (H) 12/31/2021     12/31/2021    CO2 21 (L) 12/31/2021    BUN 45 (H) 12/31/2021    CREATININE 2.8 (H) 12/31/2021    CALCIUM 7.9 (L) 12/31/2021    MG 2.4 12/31/2021    ALT 51 (H) 12/31/2021    AST 40 12/31/2021    ALBUMIN 1.9 (L) 12/31/2021    BILITOT 1.7 (H) 12/31/2021       Imaging:  Reviewed.      ASSESSMENT/PLAN:   Pt is 56yoF w/p hysterectomy who presented w/ large intraabdominal abscess. IR consulted for abscess drain placement.   Procedure planned for today. Keep pt NPO                      Sedation Plan: moderate  Patient will undergo: abscess drain placement      Nadiya Khan MD  R[facility] Interventional/Diagnostic Radiology    "

## 2021-12-31 NOTE — PROGRESS NOTES
Pharmacist Renal Dose Adjustment Note    Chidi Castaneda is a 56 y.o. female being treated with the medication pepcid    Patient Data:    Vital Signs (Most Recent):  Temp: 99.1 °F (37.3 °C) (12/31/21 0345)  Pulse: 107 (12/31/21 0600)  Resp: 17 (12/31/21 0600)  BP: 116/81 (12/30/21 2100)  SpO2: 99 % (12/31/21 0600) Vital Signs (72h Range):  Temp:  [97 °F (36.1 °C)-102.1 °F (38.9 °C)]   Pulse:  []   Resp:  []   BP: ()/(45-92)   SpO2:  [83 %-100 %]   Arterial Line BP: ()/(44-90)      Recent Labs   Lab 12/30/21  1318 12/30/21  2111 12/31/21  0443   CREATININE 2.1* 2.5* 2.8*     Serum creatinine: 2.8 mg/dL (H) 12/31/21 0443  Estimated creatinine clearance: 29.5 mL/min (A)    Medication:pepcid dose: 20mg frequency bid will be changed to medication:pepcid dose:20mg frequency:daily    Pharmacist's Name: Ирина Dyer

## 2021-12-31 NOTE — SUBJECTIVE & OBJECTIVE
"Interval History: completed 8 hours of SCUF at midnight, this am tachy 120's, SBP in the range of 120's-160's, continues on precedex and  Fentanyl.  I/O: 3.4L/4.3L net - 860ml   Review of patient's allergies indicates:   Allergen Reactions    Tylox [oxycodone-acetaminophen]      "Jittery"     Current Facility-Administered Medications   Medication Frequency    0.9%  NaCl infusion (CRRT USE ONLY) Continuous    balsam peru-castor oiL Oint BID    dexmedetomidine (PRECEDEX) 400mcg/100mL 0.9% NaCL infusion Continuous    dextrose 50% injection 12.5 g PRN    dextrose 50% injection 25 g PRN    fentaNYL 2500 mcg in 0.9% sodium chloride 250 mL infusion premix (titrating) Continuous    gabapentin 250 mg/5 mL (5 mL) solution 125 mg Q8H    glucagon (human recombinant) injection 1 mg PRN    heparin (porcine) injection 7,500 Units Q8H    HYDROmorphone injection 0.5 mg Q6H PRN    insulin aspart U-100 pen 0-5 Units Q6H PRN    magnesium citrate solution 296 mL Once    methocarbamoL tablet 500 mg QID    nitroGLYCERIN 2% TD oint ointment 0.5 inch Q6H    olanzapine zydis disintegrating tablet 10 mg Nightly PRN    oxyCODONE 5 mg/5 mL solution 10 mg Q4H PRN    oxyCODONE 5 mg/5 mL solution 5 mg Q4H PRN    piperacillin-tazobactam 4.5 g in sodium chloride 0.9% 100 mL IVPB (ready to mix system) Q12H    sodium chloride 0.9% flush 10 mL PRN    sodium chloride 0.9% flush 10 mL PRN       Objective:     Vital Signs (Most Recent):  Temp: 99.3 °F (37.4 °C) (12/31/21 1130)  Pulse: (!) 117 (12/31/21 1215)  Resp: 19 (12/31/21 1215)  BP: 116/81 (12/30/21 2100)  SpO2: 100 % (12/31/21 1215)  O2 Device (Oxygen Therapy): ventilator (12/31/21 1141) Vital Signs (24h Range):  Temp:  [97.4 °F (36.3 °C)-99.3 °F (37.4 °C)] 99.3 °F (37.4 °C)  Pulse:  [] 117  Resp:  [16-33] 19  SpO2:  [91 %-100 %] 100 %  BP: ()/(45-92) 116/81  Arterial Line BP: ()/(43-76) 146/52     Weight: 121.1 kg (267 lb) (12/28/21 1003)  Body mass index is " 45.83 kg/m².  Body surface area is 2.34 meters squared.    I/O last 3 completed shifts:  In: 5330.4 [I.V.:2596.6; NG/GT:1535; IV Piggyback:1198.8]  Out: 4417 [Urine:70; Drains:110; Other:4237]    Physical Exam  Vitals and nursing note reviewed.   Constitutional:       General: She is not in acute distress.     Appearance: She is ill-appearing. She is not toxic-appearing or diaphoretic.   Cardiovascular:      Rate and Rhythm: Regular rhythm. Tachycardia present.      Pulses: Normal pulses.      Heart sounds: No murmur heard.      Pulmonary:      Effort: Respiratory distress present.      Breath sounds: No wheezing, rhonchi or rales.      Comments: tachypnea  Musculoskeletal:         General: No swelling, tenderness or deformity.      Right lower leg: Edema present.      Left lower leg: Edema present.   Skin:     General: Skin is warm.      Capillary Refill: Capillary refill takes 2 to 3 seconds.      Coloration: Skin is not jaundiced or pale.      Findings: Bruising present. No erythema, lesion or rash.      Comments: Necrotic areas in toes   Neurological:      Mental Status: She is alert.      Comments: Intubated    Psychiatric:      Comments: Intubated          Significant Labs:  ABGs:   Recent Labs   Lab 12/31/21  0509   PH 7.390   PCO2 37.0   HCO3 22.4*   POCSATURATED 97   BE -3     Cardiac Markers: No results for input(s): CKMB, TROPONINT, MYOGLOBIN in the last 168 hours.  CBC:   Recent Labs   Lab 12/31/21  0443   WBC 24.38*   RBC 2.63*   HGB 7.7*   HCT 24.7*      MCV 94   MCH 29.3   MCHC 31.2*     CMP:   Recent Labs   Lab 12/31/21  0443      CALCIUM 7.9*   ALBUMIN 1.9*   PROT 5.6*   *   K 5.2*   CO2 21*      BUN 45*   CREATININE 2.8*   ALKPHOS 170*   ALT 51*   AST 40   BILITOT 1.7*     Coagulation: No results for input(s): PT, INR, APTT in the last 168 hours.  LFTs:   Recent Labs   Lab 12/31/21  0443   ALT 51*   AST 40   ALKPHOS 170*   BILITOT 1.7*   PROT 5.6*   ALBUMIN 1.9*      Microbiology Results (last 7 days)     Procedure Component Value Units Date/Time    Culture, Respiratory with Gram Stain [[id number]]  (Abnormal)  (Susceptibility) Collected: 12/28/21 1046    Order Status: Completed Specimen: Respiratory from BAL, RML Updated: 12/30/21 0944     Respiratory Culture No S aureus or Pseudomonas isolated.      KLEBSIELLA AEROGENES  Few  Normal respiratory alba also present       Gram Stain (Respiratory) <10 epithelial cells per low power field.     Gram Stain (Respiratory) Rare WBC's     Gram Stain (Respiratory) No organisms seen    Narrative:      Right middle lobe, Right lower lobe Bronch        Specimen (24h ago, onward)            None        PTH: No results for input(s): PTH in the last 168 hours.  TSH: No results for input(s): TSH in the last 168 hours.  All labs within the past 24 hours have been reviewed.     Significant Imaging:  Labs: Reviewed  ECG: Reviewed

## 2021-12-31 NOTE — ASSESSMENT & PLAN NOTE
-oliguric SUSAN, ischemic ATN with multifactorial etiology, however most likely d/t severe hypotension as a result of septic shock 2/2 small bowel perforation and bacteremia   -BL sCr 1  -KRT started at OSH 12/12/2021 for acidosis and volume overload  Plan:   -remains anuric, will switch to SLED to improve clearance, plan for 12 hours with UF goal of 350-450ml/hr   -electrolytes: replace Phos to goal >3   -check RFP Q8H while on SLED  -strict I&Os  -anemia: hgb 7.7, stable

## 2021-12-31 NOTE — ASSESSMENT & PLAN NOTE
55yo female transfer from OSH after hysterectomy on 12/8 complicated by delayed recognition of small bowel injury requiring resection (in discontinuity) and at some point new bleed from the liver - transferred with open abdomen for higher level of care.  S/p ex-lap, liver packing 12/21  Open cholecystectomy, abdominal closure 12/23    - Could consider repeat CT chest/abd/pelvis prior to extubation today vs tomorrow - will discuss timing with staff  - Continue abx for vap  - Continue to wean vent  - Wound vac change 12/30, next due on Monday   - Transfuse for Hgb <7  - Advance TFs as tolerated to goal   - Continue REAGAN drains   - Strict I/Os  - Appreciate nephrology assistance for CRRT  - Remainder of care per SICU, appreciate assistance

## 2021-12-31 NOTE — PLAN OF CARE
Pt arrived to IR room 173 for abscess drain. Pt oriented to unit and staff. Plan of care reviewed with patient.  Comfort measures utilized. Pt safely transferred from bed to procedural table. Fall risk reviewed with patient, fall risk interventions maintained. Safety strap applied, positioner pillows utilized to minimize pressure points. Blankets applied. Pt prepped and draped utilizing standard sterile technique. Patient placed on continuous monitoring, as required by sedation policy. Timeouts completed utilizing standard universal time-out, per department and facility policy. RN to remain at bedside, continuous monitoring maintained. Pt resting comfortably. Denies pain/discomfort. Will continue to monitor. See flow sheets for monitoring, medication administration, and updates.

## 2021-12-31 NOTE — PROGRESS NOTES
"[facility]  General Surgery  Progress Note    Subjective:     History of Present Illness:  No notes on file    Post-Op Info:  Procedure(s) (LRB):  LAPAROTOMY, EXPLORATORY (N/A)  INSERTION, GASTROSTOMY TUBE, PERCUTANEOUS (N/A)  CHOLECYSTECTOMY  EGD (ESOPHAGOGASTRODUODENOSCOPY) (N/A)   8 Days Post-Op     Interval History: Fevers improved since starting abx   Remains intubated, tolerated PAV well  Wound vac changed yesterday       Medications:  Continuous Infusions:   sodium chloride 0.9% 200 mL/hr at 12/30/21 1800    dexmedetomidine (PRECEDEX) infusion 0.4 mcg/kg/hr (12/31/21 0705)    fentanyl 100 mcg/hr (12/31/21 0705)     Scheduled Meds:   balsam peru-castor oiL   Topical (Top) BID    famotidine (PF)  20 mg Intravenous Daily    gabapentin  125 mg Per G Tube Q8H    heparin (porcine)  7,500 Units Subcutaneous Q8H    magnesium citrate  296 mL Oral Once    methocarbamoL  500 mg Per G Tube QID    nitroGLYCERIN 2% TD oint  0.5 inch Topical (Top) Q6H    piperacillin-tazobactam (ZOSYN) IVPB  4.5 g Intravenous Q12H     PRN Meds:dextrose 50%, dextrose 50%, glucagon (human recombinant), HYDROmorphone, insulin aspart U-100, magnesium sulfate IVPB, olanzapine zydis, oxyCODONE, oxyCODONE, sodium chloride 0.9%, sodium chloride 0.9%     Review of patient's allergies indicates:   Allergen Reactions    Tylox [oxycodone-acetaminophen]      "Jittery"     Objective:     Vital Signs (Most Recent):  Temp: 98 °F (36.7 °C) (12/31/21 0700)  Pulse: (!) 113 (12/31/21 0730)  Resp: 18 (12/31/21 0730)  BP: 116/81 (12/30/21 2100)  SpO2: 99 % (12/31/21 0730) Vital Signs (24h Range):  Temp:  [97.4 °F (36.3 °C)-100.1 °F (37.8 °C)] 98 °F (36.7 °C)  Pulse:  [] 113  Resp:  [16-33] 18  SpO2:  [91 %-100 %] 99 %  BP: ()/(45-92) 116/81  Arterial Line BP: ()/(43-76) 151/57     Weight: 121.1 kg (267 lb)  Body mass index is 45.83 kg/m².    Intake/Output - Last 3 Shifts       12/29 0700  12/30 0659 12/30 " 0700  12/31 0659 12/31 0700  01/01 0659    I.V. (mL/kg) 3324.6 (27.5) 2134.8 (17.6) 80.3 (0.7)    Blood 0      NG/ 1095 40    IV Piggyback 1000.3 198.5     Total Intake(mL/kg) 4840 (40) 3428.3 (28.3) 120.3 (1)    Urine (mL/kg/hr) 25 (0) 45 (0) 5 (0)    Drains 78 56     Other 3384 4187     Stool       Total Output 3487 4288 5    Net +1353 -859.7 +115.3                 Physical Exam  Vitals and nursing note reviewed.   Constitutional:       General: She is not in acute distress.     Appearance: She is obese. She is not diaphoretic.   HENT:      Head: Normocephalic and atraumatic.      Mouth/Throat:      Mouth: Mucous membranes are moist.      Pharynx: Oropharynx is clear.   Eyes:      Extraocular Movements: Extraocular movements intact.      Conjunctiva/sclera: Conjunctivae normal.   Cardiovascular:      Rate and Rhythm: Regular rhythm. Tachycardia present.   Pulmonary:      Effort: No respiratory distress.      Comments: Intubated  Vent Mode: A/C  Oxygen Concentration (%):  (50-90) 50  Resp Rate Total:  (18 br/min-37 br/min) 26 br/min  Vt Set:  (320 mL) 320 mL  PEEP/CPAP:  (8 cmH20) 8 cmH20  Mean Airway Pressure:  (9.4 sjS81-44 cmH20) 13 cmH20  Abdominal:      General: There is no distension.      Palpations: Abdomen is soft.      Tenderness: There is no guarding or rebound.      Comments: Wound vac in place with good seal, no leak noted.   REAGAN drains with benign fluid, not bloody or bilious   Musculoskeletal:         General: No deformity.   Skin:     General: Skin is warm and dry.   Neurological:      Mental Status: She is alert.         Significant Labs:  I have reviewed all pertinent lab results within the past 24 hours.    Significant Diagnostics:  I have reviewed all pertinent imaging results/findings within the past 24 hours.    Assessment/Plan:     Subcapsular hematoma of liver  57yo female transfer from OSH after hysterectomy on 12/8 complicated by delayed recognition of small bowel injury requiring  resection (in discontinuity) and at some point new bleed from the liver - transferred with open abdomen for higher level of care.  S/p ex-lap, liver packing 12/21  Open cholecystectomy, abdominal closure 12/23    - Could consider repeat CT chest/abd/pelvis prior to extubation today vs tomorrow - will discuss timing with staff  - Continue abx for vap  - Continue to wean vent  - Wound vac change 12/30, next due on Monday   - Transfuse for Hgb <7  - Advance TFs as tolerated to goal   - Continue REAGAN drains   - Strict I/Os  - Appreciate nephrology assistance for CRRT  - Remainder of care per SICU, appreciate assistance         Angelica Ivy MD  General Surgery  [facility]

## 2021-12-31 NOTE — PLAN OF CARE
SICU PLAN OF CARE NOTE    Dx: <principal problem not specified>    Shift Events: No events overnight.  Remains tachycardic 100s-120, got as high as 130 for brief period this am. Tmax overnight 99.1. POC discussed with  and patient throughout shift     Goals of Care: MAP >65    Neuro: AAO x4, Follows Commands, and Moves All Extremities      Respiratory: Ventilator 40% 8 Peep AC/VC overnight on spontaneous during day, switched back this AM by RT.     Diet: NPO and Tube Feeds    Gtts: Fentanyl and Precedex    Urine Output: 30/shift    Drains: REAGAN Drain, total output 25 cc / shift    CRRT 10 hours SCUF 12/30, tolerated well. rinsed back without issues, see flowsheet for frequent documentation.    Wound Vac remains in place to abdomen, no issues overnight.    Restraints remain in place while intubated, patient and  demonstrate understanding on rational, see flowhseets for frequent documentation.     Labs/Accuchecks: Daily labs, q6 hour accucheck    Skin: Remains without new breakdown. Foam padding in use. Waffle mattress inflated and in use Pillows used for turning. Heel boots in use as well.

## 2021-12-31 NOTE — SUBJECTIVE & OBJECTIVE
Interval History/Significant Events:   JOVANI  Remains HDS without pressors  Precedex/Fentanyl for sedation  Spont this morning on vent, pending SBT today  Multimodals for pain control  TFs  Hgb stable  Heparin   Continue CRRT    Follow-up For: Procedure(s) (LRB):  LAPAROTOMY, EXPLORATORY (N/A)  INSERTION, GASTROSTOMY TUBE, PERCUTANEOUS (N/A)  CHOLECYSTECTOMY  EGD (ESOPHAGOGASTRODUODENOSCOPY) (N/A)    Post-Operative Day: 8 Days Post-Op    Objective:     Vital Signs (Most Recent):  Temp: 99.1 °F (37.3 °C) (12/31/21 0345)  Pulse: 107 (12/31/21 0600)  Resp: 17 (12/31/21 0600)  BP: 116/81 (12/30/21 2100)  SpO2: 99 % (12/31/21 0600) Vital Signs (24h Range):  Temp:  [97.4 °F (36.3 °C)-101.2 °F (38.4 °C)] 99.1 °F (37.3 °C)  Pulse:  [] 107  Resp:  [16-33] 17  SpO2:  [91 %-100 %] 99 %  BP: ()/(45-92) 116/81  Arterial Line BP: ()/(44-76) 119/44     Weight: 121.1 kg (267 lb)  Body mass index is 45.83 kg/m².      Intake/Output Summary (Last 24 hours) at 12/31/2021 0650  Last data filed at 12/31/2021 0600  Gross per 24 hour   Intake 3428.33 ml   Output 4288 ml   Net -859.67 ml       Physical Exam  Vitals and nursing note reviewed.   Constitutional:       General: She is awake.      Appearance: Normal appearance.      Interventions: She is intubated.      Comments: RAAS of 0  Intubated and sedated   HENT:      Head: Normocephalic and atraumatic.      Nose: Nose normal.      Mouth/Throat:      Mouth: Mucous membranes are moist.      Pharynx: Oropharynx is clear.   Eyes:      Conjunctiva/sclera: Conjunctivae normal.      Pupils: Pupils are equal, round, and reactive to light.   Cardiovascular:      Rate and Rhythm: Normal rate and regular rhythm.      Pulses: Normal pulses.      Heart sounds: Normal heart sounds.   Pulmonary:      Effort: Tachypnea present. No respiratory distress. She is intubated.      Comments: Crackles best appreciated at the bases.   Mechanical breath sounds  Abdominal:      General: Abdomen  is flat.      Comments: REAGAN drains and abdominal wound vac in place   Musculoskeletal:         General: Normal range of motion.      Cervical back: Normal range of motion and neck supple.      Right lower leg: Edema present.      Left lower leg: Edema present.   Skin:     General: Skin is warm and dry.      Capillary Refill: Capillary refill takes less than 2 seconds.   Psychiatric:         Mood and Affect: Mood normal.         Behavior: Behavior normal. Behavior is cooperative.         Vents:  Vent Mode: A/C (12/31/21 0400)  Ventilator Initiated: Yes (12/28/21 1104)  Set Rate: 18 BPM (12/31/21 0400)  Vt Set: 320 mL (12/31/21 0400)  Pressure Support: 60 cmH20 (12/31/21 0115)  PEEP/CPAP: 8 cmH20 (12/31/21 0400)  Oxygen Concentration (%): 40 (12/31/21 0600)  Peak Airway Pressure: 23 cmH2O (12/31/21 0400)  Plateau Pressure: 31 cmH20 (12/31/21 0400)  Total Ve: 10.4 mL (12/31/21 0400)  Negative Inspiratory Force (cm H2O): -28 (12/31/21 0400)  F/VT Ratio<105 (RSBI): (!) 86.05 (12/31/21 0400)    Lines/Drains/Airways     Central Venous Catheter Line            Trialysis (Dialysis) Catheter 12/30/21 1000 right internal jugular <1 day          Drain                 Urethral Catheter 12/20/21 1743 10 days         Closed/Suction Drain 12/23/21 1428 Right;Inferior RUQ Bulb 19 Fr. 7 days         Closed/Suction Drain 12/23/21 1429 Right;Superior RUQ Bulb 19 Fr. 7 days         Gastrostomy/Enterostomy 12/23/21 1338 Gastrostomy tube w/ balloon LUQ feeding 7 days          Airway                 Airway - Non-Surgical 12/28/21 1005 Endotracheal Tube 2 days         Airway - Non-Surgical 12/28/21 1045 Endotracheal Tube 2 days          Arterial Line            Arterial Line 12/18/21 0000 Right Radial 13 days                Significant Labs:    CBC/Anemia Profile:  Recent Labs   Lab 12/30/21  0314 12/30/21  1523 12/31/21  0443   WBC 18.90* 22.94* 24.38*   HGB 7.6* 7.4* 7.7*   HCT 23.7* 24.3* 24.7*    389 431   MCV 91 95 94   RDW 15.8*  15.7* 15.7*        Chemistries:  Recent Labs   Lab 12/30/21  0314 12/30/21  0859 12/30/21  1318 12/30/21  2111 12/31/21  0443   *   < > 135* 137 135*   K 4.7   < > 4.5 4.8 5.2*      < > 105 106 106   CO2 23   < > 24 23 21*   BUN 19   < > 30* 37* 45*   CREATININE 1.5*   < > 2.1* 2.5* 2.8*   CALCIUM 7.5*   < > 7.5* 7.5* 7.9*   ALBUMIN 1.9*   < > 1.8* 1.9* 1.9*   PROT 5.1*  --   --   --  5.6*   BILITOT 2.0*  --   --   --  1.7*   ALKPHOS 186*  --   --   --  170*   ALT 55*  --   --   --  51*   AST 54*  --   --   --  40   MG 1.8   < > 1.8 2.4 2.4   PHOS 2.3*   < > 2.4* 2.6* 3.0    < > = values in this interval not displayed.       All pertinent labs within the past 24 hours have been reviewed.    Significant Imaging:  I have reviewed all pertinent imaging results/findings within the past 24 hours.

## 2021-12-31 NOTE — SUBJECTIVE & OBJECTIVE
"Interval History: Fevers improved since starting abx   Remains intubated, tolerated PAV well  Wound vac changed yesterday       Medications:  Continuous Infusions:   sodium chloride 0.9% 200 mL/hr at 12/30/21 1800    dexmedetomidine (PRECEDEX) infusion 0.4 mcg/kg/hr (12/31/21 0705)    fentanyl 100 mcg/hr (12/31/21 0705)     Scheduled Meds:   balsam peru-castor oiL   Topical (Top) BID    famotidine (PF)  20 mg Intravenous Daily    gabapentin  125 mg Per G Tube Q8H    heparin (porcine)  7,500 Units Subcutaneous Q8H    magnesium citrate  296 mL Oral Once    methocarbamoL  500 mg Per G Tube QID    nitroGLYCERIN 2% TD oint  0.5 inch Topical (Top) Q6H    piperacillin-tazobactam (ZOSYN) IVPB  4.5 g Intravenous Q12H     PRN Meds:dextrose 50%, dextrose 50%, glucagon (human recombinant), HYDROmorphone, insulin aspart U-100, magnesium sulfate IVPB, olanzapine zydis, oxyCODONE, oxyCODONE, sodium chloride 0.9%, sodium chloride 0.9%     Review of patient's allergies indicates:   Allergen Reactions    Tylox [oxycodone-acetaminophen]      "Jittery"     Objective:     Vital Signs (Most Recent):  Temp: 98 °F (36.7 °C) (12/31/21 0700)  Pulse: (!) 113 (12/31/21 0730)  Resp: 18 (12/31/21 0730)  BP: 116/81 (12/30/21 2100)  SpO2: 99 % (12/31/21 0730) Vital Signs (24h Range):  Temp:  [97.4 °F (36.3 °C)-100.1 °F (37.8 °C)] 98 °F (36.7 °C)  Pulse:  [] 113  Resp:  [16-33] 18  SpO2:  [91 %-100 %] 99 %  BP: ()/(45-92) 116/81  Arterial Line BP: ()/(43-76) 151/57     Weight: 121.1 kg (267 lb)  Body mass index is 45.83 kg/m².    Intake/Output - Last 3 Shifts       12/29 0700 12/30 0659 12/30 0700  12/31 0659 12/31 0700  01/01 0659    I.V. (mL/kg) 3324.6 (27.5) 2134.8 (17.6) 80.3 (0.7)    Blood 0      NG/ 1095 40    IV Piggyback 1000.3 198.5     Total Intake(mL/kg) 4840 (40) 3428.3 (28.3) 120.3 (1)    Urine (mL/kg/hr) 25 (0) 45 (0) 5 (0)    Drains 78 56     Other 1914 7287     Stool       Total Output 2571 6540 " 5    Net +1353 -859.7 +115.3                 Physical Exam  Vitals and nursing note reviewed.   Constitutional:       General: She is not in acute distress.     Appearance: She is obese. She is not diaphoretic.   HENT:      Head: Normocephalic and atraumatic.      Mouth/Throat:      Mouth: Mucous membranes are moist.      Pharynx: Oropharynx is clear.   Eyes:      Extraocular Movements: Extraocular movements intact.      Conjunctiva/sclera: Conjunctivae normal.   Cardiovascular:      Rate and Rhythm: Regular rhythm. Tachycardia present.   Pulmonary:      Effort: No respiratory distress.      Comments: Intubated  Vent Mode: A/C  Oxygen Concentration (%):  (50-90) 50  Resp Rate Total:  (18 br/min-37 br/min) 26 br/min  Vt Set:  (320 mL) 320 mL  PEEP/CPAP:  (8 cmH20) 8 cmH20  Mean Airway Pressure:  (9.4 scP33-28 cmH20) 13 cmH20  Abdominal:      General: There is no distension.      Palpations: Abdomen is soft.      Tenderness: There is no guarding or rebound.      Comments: Wound vac in place with good seal, no leak noted.   REAGAN drains with benign fluid, not bloody or bilious   Musculoskeletal:         General: No deformity.   Skin:     General: Skin is warm and dry.   Neurological:      Mental Status: She is alert.         Significant Labs:  I have reviewed all pertinent lab results within the past 24 hours.    Significant Diagnostics:  I have reviewed all pertinent imaging results/findings within the past 24 hours.

## 2021-12-31 NOTE — PROGRESS NOTES
[facility]  Critical Care - Surgery  Progress Note    Patient Name: Chidi Castaneda  MRN: [id number]  Admission Date: 12/20/2021  Hospital Length of Stay: 11 days  Code Status: Full Code  Attending Provider: Kendall Gorman MD  Primary Care Provider: Primary Doctor No   Principal Problem: <principal problem not specified>    Subjective:     Hospital/ICU Course:  No notes on file    Interval History/Significant Events:   NAEON  Remains HDS without pressors  Precedex/Fentanyl for sedation  Spont this morning on vent, pending SBT today  CT CAP today per primary  Multimodals for pain control  TFs  Hgb stable  Heparin   Continue CRRT    Follow-up For: Procedure(s) (LRB):  LAPAROTOMY, EXPLORATORY (N/A)  INSERTION, GASTROSTOMY TUBE, PERCUTANEOUS (N/A)  CHOLECYSTECTOMY  EGD (ESOPHAGOGASTRODUODENOSCOPY) (N/A)    Post-Operative Day: 8 Days Post-Op    Objective:     Vital Signs (Most Recent):  Temp: 99.1 °F (37.3 °C) (12/31/21 0345)  Pulse: 107 (12/31/21 0600)  Resp: 17 (12/31/21 0600)  BP: 116/81 (12/30/21 2100)  SpO2: 99 % (12/31/21 0600) Vital Signs (24h Range):  Temp:  [97.4 °F (36.3 °C)-101.2 °F (38.4 °C)] 99.1 °F (37.3 °C)  Pulse:  [] 107  Resp:  [16-33] 17  SpO2:  [91 %-100 %] 99 %  BP: ()/(45-92) 116/81  Arterial Line BP: ()/(44-76) 119/44     Weight: 121.1 kg (267 lb)  Body mass index is 45.83 kg/m².      Intake/Output Summary (Last 24 hours) at 12/31/2021 0650  Last data filed at 12/31/2021 0600  Gross per 24 hour   Intake 3428.33 ml   Output 4288 ml   Net -859.67 ml       Physical Exam  Vitals and nursing note reviewed.   Constitutional:       General: She is awake.      Appearance: Normal appearance.      Interventions: She is intubated.      Comments: RAAS of 0, follows commands   HENT:      Head: Normocephalic and atraumatic.      Nose: Nose normal.      Mouth/Throat:      Mouth: Mucous membranes are moist.      Pharynx: Oropharynx is clear.   Eyes:       Conjunctiva/sclera: Conjunctivae normal.      Pupils: Pupils are equal, round, and reactive to light.   Cardiovascular:      Rate and Rhythm: Normal rate and regular rhythm.      Pulses: Normal pulses.      Heart sounds: Normal heart sounds.   Pulmonary:      Effort: Tachypnea present. No respiratory distress. She is intubated.      Comments: Crackles best appreciated at the bases.   Mechanical breath sounds  Abdominal:      General: Abdomen is flat.      Comments: REAGAN drains and abdominal wound vac in place, dressings CDI  Musculoskeletal:         General: Normal range of motion.      Cervical back: Normal range of motion and neck supple.      Right lower leg: Edema present.      Left lower leg: Edema present.   Skin:     General: Skin is warm and dry.      Capillary Refill: Capillary refill takes less than 2 seconds.   Psychiatric:         Mood and Affect: Mood normal.         Behavior: Behavior normal. Behavior is cooperative.         Vents:  Vent Mode: A/C (12/31/21 0400)  Ventilator Initiated: Yes (12/28/21 1104)  Set Rate: 18 BPM (12/31/21 0400)  Vt Set: 320 mL (12/31/21 0400)  Pressure Support: 60 cmH20 (12/31/21 0115)  PEEP/CPAP: 8 cmH20 (12/31/21 0400)  Oxygen Concentration (%): 40 (12/31/21 0600)  Peak Airway Pressure: 23 cmH2O (12/31/21 0400)  Plateau Pressure: 31 cmH20 (12/31/21 0400)  Total Ve: 10.4 mL (12/31/21 0400)  Negative Inspiratory Force (cm H2O): -28 (12/31/21 0400)  F/VT Ratio<105 (RSBI): (!) 86.05 (12/31/21 0400)    Lines/Drains/Airways     Central Venous Catheter Line            Trialysis (Dialysis) Catheter 12/30/21 1000 right internal jugular <1 day          Drain                 Urethral Catheter 12/20/21 1743 10 days         Closed/Suction Drain 12/23/21 1428 Right;Inferior RUQ Bulb 19 Fr. 7 days         Closed/Suction Drain 12/23/21 1429 Right;Superior RUQ Bulb 19 Fr. 7 days         Gastrostomy/Enterostomy 12/23/21 1338 Gastrostomy tube w/ balloon LUQ feeding 7 days          Airway                  Airway - Non-Surgical 12/28/21 1005 Endotracheal Tube 2 days         Airway - Non-Surgical 12/28/21 1045 Endotracheal Tube 2 days          Arterial Line            Arterial Line 12/18/21 0000 Right Radial 13 days                Significant Labs:    CBC/Anemia Profile:  Recent Labs   Lab 12/30/21  0314 12/30/21  1523 12/31/21  0443   WBC 18.90* 22.94* 24.38*   HGB 7.6* 7.4* 7.7*   HCT 23.7* 24.3* 24.7*    389 431   MCV 91 95 94   RDW 15.8* 15.7* 15.7*        Chemistries:  Recent Labs   Lab 12/30/21  0314 12/30/21  0859 12/30/21  1318 12/30/21  2111 12/31/21  0443   *   < > 135* 137 135*   K 4.7   < > 4.5 4.8 5.2*      < > 105 106 106   CO2 23   < > 24 23 21*   BUN 19   < > 30* 37* 45*   CREATININE 1.5*   < > 2.1* 2.5* 2.8*   CALCIUM 7.5*   < > 7.5* 7.5* 7.9*   ALBUMIN 1.9*   < > 1.8* 1.9* 1.9*   PROT 5.1*  --   --   --  5.6*   BILITOT 2.0*  --   --   --  1.7*   ALKPHOS 186*  --   --   --  170*   ALT 55*  --   --   --  51*   AST 54*  --   --   --  40   MG 1.8   < > 1.8 2.4 2.4   PHOS 2.3*   < > 2.4* 2.6* 3.0    < > = values in this interval not displayed.       All pertinent labs within the past 24 hours have been reviewed.    Significant Imaging:  I have reviewed all pertinent imaging results/findings within the past 24 hours.    Assessment/Plan:     Subcapsular hematoma of liver    Neuro/Psych:   -- Follows commands  -- Sedation/Pain: Precedex, Fentanyl     Cards:   -- HDS with MAP goal > 65   -- remains HDS without pressors  -- ECHO with EF 55%      Pulm:   -- Goal O2 sat > 90%  -- Bronch w/ BAL, results GNRs  -- Chest PT, IS, inhalational treatment, duo nebs - restart once extubated again  -- CTA Chest w PE protocol: No evidence of PE. Nonspecific bilateral airspace consolidation which could reflect pneumonia, aspiration, and/or other inflammatory process      Renal:  -- Keep billingsley for strict I/O  -- continue CRRT per nephrology  -- BUN/Cr increased  -- replete lytes PRN  -- Try to  approach Net 0 for hospitalization      FEN / GI:   -- Replace lytes as needed  -- Nutrition: TFs @40  -- s/p G tube  -- SLP following  -- CTA Abd/pel: Enchancing lesion in peripheral right hepatic lobe (1.4cm). Moderate volume free fluid in the abdomen or pelvis, possibility of peritonitis or developing abscess. Dependently within the collection there are lobular areas of hyperattenuation. Subcapsular fluid collection about the liver, with drain in-situ.  Small undrained subcapsular collection involving the posterior aspect of the spleen.      ID:   -- Tm: Febrile to Tmax of 102.1. WBC increased 18.9 from 16.5  -- Zosyn  -- repeat CT CAP today to eval elevated WBC        Heme/Onc:   -- H/H stable  -- CBC q12      Endo:   -- Gluc goal 140-180  -- no history of diabetes  -- SSI/accuchecks      PPx:   Feeding: NPO, TF  Analgesia/Sedation: oxy and dilaudid/Precedex and fentanyl  Thromboembolic prevention: SQH  HOB >30: yes  Stress Ulcer ppx: none  Glucose control: Critical care goal 140-180 g/dl, ISS    Lines/Drains/Airway: PEG, Ng, L radial larry, R IJ trialysis, L subclavian triple lumen  -Removing L subclavian today, replaced RIJ trialysis today      Dispo/Code Status/Palliative:   -- SICU / Full Code  -- discussed with SICU staff      Critical care was time spent personally by me on the following activities: development of treatment plan with patient or surrogate and bedside caregivers, discussions with consultants, evaluation of patient's response to treatment, examination of patient, ordering and performing treatments and interventions, ordering and review of laboratory studies, ordering and review of radiographic studies, pulse oximetry, re-evaluation of patient's condition.  This critical care time did not overlap with that of any other provider or involve time for any procedures.     Ricardo Zacarias MD  Critical Care - Surgery  [facility]

## 2021-12-31 NOTE — PROCEDURES
[facility]  Interventional Radiology  High Risk Procedure - Inpatient    Date: 12/31/2021 Time: 4:18 PM    Pre-Op Diagnosis: Pelvic abscess    Post-Op Diagnosis: same, infected hematoma    Procedure Performed by: Wagner Mace MD    Assistant: none    Procedure: CT guided abscess drainage    Specimen/Tissue Removed: 270 mL of foul smelling maroon fluid c/w infected old blood products    Estimated Blood Loss: Less than 5 mL    Procedure Note/Findings: CT guided drainage via left pelvic approach with placement of 14 Green[place]ic drain. Catheter secured in place and connected to suction grenade. No immediate post-procedure complications noted.            Please refer to dictated report for additional details.

## 2021-12-31 NOTE — ASSESSMENT & PLAN NOTE
  Neuro/Psych:   -- Follows commands  -- Sedation/Pain: Precedex, Fentanyl     Cards:   -- HDS with MAP goal > 65   -- remains HDS without pressors  -- ECHO with EF 55%      Pulm:   -- Goal O2 sat > 90%  -- Bronch w/ BAL, results GNRs  -- Chest PT, IS, inhalational treatment, duo nebs - restart once extubated again  -- CTA Chest w PE protocol: No evidence of PE. Nonspecific bilateral airspace consolidation which could reflect pneumonia, aspiration, and/or other inflammatory process      Renal:  -- Keep billingsley for strict I/O  -- continue CRRT per nephrology  -- BUN/Cr increased  -- replete lytes PRN  -- Try to approach Net 0 for hospitalization      FEN / GI:   -- Replace lytes as needed  -- Nutrition: TFs @40  -- s/p G tube  -- SLP following  -- CTA Abd/pel: Enchancing lesion in peripheral right hepatic lobe (1.4cm). Moderate volume free fluid in the abdomen or pelvis, possibility of peritonitis or developing abscess. Dependently within the collection there are lobular areas of hyperattenuation. Subcapsular fluid collection about the liver, with drain in-situ.  Small undrained subcapsular collection involving the posterior aspect of the spleen.      ID:   -- Tm: Febrile to Tmax of 102.1. WBC increased 18.9 from 16.5  -- Zosyn        Heme/Onc:   -- H/H stable  -- CBC q12      Endo:   -- Gluc goal 140-180  -- no history of diabetes  -- SSI/accuchecks      PPx:   Feeding: NPO, TF  Analgesia/Sedation: oxy and dilaudid/Precedex and fentanyl  Thromboembolic prevention: SQH  HOB >30: yes  Stress Ulcer ppx: none  Glucose control: Critical care goal 140-180 g/dl, ISS    Lines/Drains/Airway: PEG, Billingsley, L radial larry, R IJ trialysis, L subclavian triple lumen  -Removing L subclavian today, replaced RIJ trialysis today      Dispo/Code Status/Palliative:   -- SICU / Full Code  -- discussed with SICU staff

## 2022-01-01 LAB
ALBUMIN SERPL BCP-MCNC: 1.6 G/DL (ref 3.5–5.2)
ALBUMIN SERPL BCP-MCNC: 1.7 G/DL (ref 3.5–5.2)
ALBUMIN SERPL BCP-MCNC: 1.8 G/DL (ref 3.5–5.2)
ALBUMIN SERPL BCP-MCNC: 1.8 G/DL (ref 3.5–5.2)
ALLENS TEST: ABNORMAL
ALP SERPL-CCNC: 201 U/L (ref 55–135)
ALT SERPL W/O P-5'-P-CCNC: 47 U/L (ref 10–44)
ANION GAP SERPL CALC-SCNC: 8 MMOL/L (ref 8–16)
ANION GAP SERPL CALC-SCNC: 8 MMOL/L (ref 8–16)
ANION GAP SERPL CALC-SCNC: 9 MMOL/L (ref 8–16)
ANION GAP SERPL CALC-SCNC: 9 MMOL/L (ref 8–16)
ANISOCYTOSIS BLD QL SMEAR: SLIGHT
ANISOCYTOSIS BLD QL SMEAR: SLIGHT
AST SERPL-CCNC: 44 U/L (ref 10–40)
BASO STIPL BLD QL SMEAR: ABNORMAL
BASOPHILS # BLD AUTO: 0.09 K/UL (ref 0–0.2)
BASOPHILS # BLD AUTO: ABNORMAL K/UL (ref 0–0.2)
BASOPHILS NFR BLD: 0 % (ref 0–1.9)
BASOPHILS NFR BLD: 0.4 % (ref 0–1.9)
BILIRUB SERPL-MCNC: 1.6 MG/DL (ref 0.1–1)
BLD PROD TYP BPU: NORMAL
BLOOD UNIT EXPIRATION DATE: NORMAL
BLOOD UNIT TYPE CODE: 600
BLOOD UNIT TYPE: NORMAL
BUN SERPL-MCNC: 14 MG/DL (ref 6–20)
BUN SERPL-MCNC: 14 MG/DL (ref 6–20)
BUN SERPL-MCNC: 17 MG/DL (ref 6–20)
BUN SERPL-MCNC: 29 MG/DL (ref 6–20)
CALCIUM SERPL-MCNC: 7.5 MG/DL (ref 8.7–10.5)
CALCIUM SERPL-MCNC: 7.9 MG/DL (ref 8.7–10.5)
CALCIUM SERPL-MCNC: 8.1 MG/DL (ref 8.7–10.5)
CALCIUM SERPL-MCNC: 8.1 MG/DL (ref 8.7–10.5)
CHLORIDE SERPL-SCNC: 106 MMOL/L (ref 95–110)
CHLORIDE SERPL-SCNC: 108 MMOL/L (ref 95–110)
CO2 SERPL-SCNC: 23 MMOL/L (ref 23–29)
CO2 SERPL-SCNC: 24 MMOL/L (ref 23–29)
CODING SYSTEM: NORMAL
CREAT SERPL-MCNC: 1 MG/DL (ref 0.5–1.4)
CREAT SERPL-MCNC: 1.2 MG/DL (ref 0.5–1.4)
CREAT SERPL-MCNC: 1.2 MG/DL (ref 0.5–1.4)
CREAT SERPL-MCNC: 1.9 MG/DL (ref 0.5–1.4)
DIFFERENTIAL METHOD: ABNORMAL
DIFFERENTIAL METHOD: ABNORMAL
DISPENSE STATUS: NORMAL
EOSINOPHIL # BLD AUTO: 0.1 K/UL (ref 0–0.5)
EOSINOPHIL # BLD AUTO: ABNORMAL K/UL (ref 0–0.5)
EOSINOPHIL NFR BLD: 0 % (ref 0–8)
EOSINOPHIL NFR BLD: 0.3 % (ref 0–8)
ERYTHROCYTE [DISTWIDTH] IN BLOOD BY AUTOMATED COUNT: 15.3 % (ref 11.5–14.5)
ERYTHROCYTE [DISTWIDTH] IN BLOOD BY AUTOMATED COUNT: 15.7 % (ref 11.5–14.5)
EST. GFR  (AFRICAN AMERICAN): 34 ML/MIN/1.73 M^2
EST. GFR  (AFRICAN AMERICAN): 59.2 ML/MIN/1.73 M^2
EST. GFR  (AFRICAN AMERICAN): 59.2 ML/MIN/1.73 M^2
EST. GFR  (AFRICAN AMERICAN): >60 ML/MIN/1.73 M^2
EST. GFR  (NON AFRICAN AMERICAN): 29.5 ML/MIN/1.73 M^2
EST. GFR  (NON AFRICAN AMERICAN): 51.4 ML/MIN/1.73 M^2
EST. GFR  (NON AFRICAN AMERICAN): 51.4 ML/MIN/1.73 M^2
EST. GFR  (NON AFRICAN AMERICAN): >60 ML/MIN/1.73 M^2
GIANT PLATELETS BLD QL SMEAR: PRESENT
GLUCOSE SERPL-MCNC: 108 MG/DL (ref 70–110)
GLUCOSE SERPL-MCNC: 122 MG/DL (ref 70–110)
GLUCOSE SERPL-MCNC: 128 MG/DL (ref 70–110)
GLUCOSE SERPL-MCNC: 128 MG/DL (ref 70–110)
HCO3 UR-SCNC: 24.3 MMOL/L (ref 24–28)
HCT VFR BLD AUTO: 22 % (ref 37–48.5)
HCT VFR BLD AUTO: 25.7 % (ref 37–48.5)
HGB BLD-MCNC: 7 G/DL (ref 12–16)
HGB BLD-MCNC: 8 G/DL (ref 12–16)
HYPOCHROMIA BLD QL SMEAR: ABNORMAL
IMM GRANULOCYTES # BLD AUTO: 0.86 K/UL (ref 0–0.04)
IMM GRANULOCYTES # BLD AUTO: ABNORMAL K/UL (ref 0–0.04)
IMM GRANULOCYTES NFR BLD AUTO: 3.8 % (ref 0–0.5)
IMM GRANULOCYTES NFR BLD AUTO: ABNORMAL % (ref 0–0.5)
LYMPHOCYTES # BLD AUTO: 1.3 K/UL (ref 1–4.8)
LYMPHOCYTES # BLD AUTO: ABNORMAL K/UL (ref 1–4.8)
LYMPHOCYTES NFR BLD: 5.5 % (ref 18–48)
LYMPHOCYTES NFR BLD: 7 % (ref 18–48)
MAGNESIUM SERPL-MCNC: 1.8 MG/DL (ref 1.6–2.6)
MAGNESIUM SERPL-MCNC: 2.1 MG/DL (ref 1.6–2.6)
MAGNESIUM SERPL-MCNC: 2.2 MG/DL (ref 1.6–2.6)
MCH RBC QN AUTO: 28.7 PG (ref 27–31)
MCH RBC QN AUTO: 29.1 PG (ref 27–31)
MCHC RBC AUTO-ENTMCNC: 31.1 G/DL (ref 32–36)
MCHC RBC AUTO-ENTMCNC: 31.8 G/DL (ref 32–36)
MCV RBC AUTO: 90 FL (ref 82–98)
MCV RBC AUTO: 94 FL (ref 82–98)
METAMYELOCYTES NFR BLD MANUAL: 2 %
MONOCYTES # BLD AUTO: 2.1 K/UL (ref 0.3–1)
MONOCYTES # BLD AUTO: ABNORMAL K/UL (ref 0.3–1)
MONOCYTES NFR BLD: 9 % (ref 4–15)
MONOCYTES NFR BLD: 9 % (ref 4–15)
NEUTROPHILS # BLD AUTO: 18.6 K/UL (ref 1.8–7.7)
NEUTROPHILS NFR BLD: 80 % (ref 38–73)
NEUTROPHILS NFR BLD: 81 % (ref 38–73)
NEUTS BAND NFR BLD MANUAL: 2 %
NRBC BLD-RTO: 0 /100 WBC
NRBC BLD-RTO: 0 /100 WBC
PCO2 BLDA: 36.3 MMHG (ref 35–45)
PH SMN: 7.43 [PH] (ref 7.35–7.45)
PHOSPHATE SERPL-MCNC: 1.9 MG/DL (ref 2.7–4.5)
PHOSPHATE SERPL-MCNC: 2.2 MG/DL (ref 2.7–4.5)
PHOSPHATE SERPL-MCNC: 2.2 MG/DL (ref 2.7–4.5)
PHOSPHATE SERPL-MCNC: 3 MG/DL (ref 2.7–4.5)
PLATELET # BLD AUTO: 465 K/UL (ref 150–450)
PLATELET # BLD AUTO: 479 K/UL (ref 150–450)
PLATELET BLD QL SMEAR: ABNORMAL
PLATELET BLD QL SMEAR: ABNORMAL
PMV BLD AUTO: 11 FL (ref 9.2–12.9)
PMV BLD AUTO: 11.1 FL (ref 9.2–12.9)
PO2 BLDA: 78 MMHG (ref 80–100)
POC BE: 0 MMOL/L
POC SATURATED O2: 96 % (ref 95–100)
POC TCO2: 25 MMOL/L (ref 23–27)
POCT GLUCOSE: 114 MG/DL (ref 70–110)
POCT GLUCOSE: 125 MG/DL (ref 70–110)
POCT GLUCOSE: 129 MG/DL (ref 70–110)
POCT GLUCOSE: 145 MG/DL (ref 70–110)
POLYCHROMASIA BLD QL SMEAR: ABNORMAL
POLYCHROMASIA BLD QL SMEAR: ABNORMAL
POTASSIUM SERPL-SCNC: 4.8 MMOL/L (ref 3.5–5.1)
POTASSIUM SERPL-SCNC: 5.1 MMOL/L (ref 3.5–5.1)
POTASSIUM SERPL-SCNC: 5.2 MMOL/L (ref 3.5–5.1)
POTASSIUM SERPL-SCNC: 5.2 MMOL/L (ref 3.5–5.1)
PROT SERPL-MCNC: 5.7 G/DL (ref 6–8.4)
RBC # BLD AUTO: 2.44 M/UL (ref 4–5.4)
RBC # BLD AUTO: 2.75 M/UL (ref 4–5.4)
SAMPLE: ABNORMAL
SITE: ABNORMAL
SODIUM SERPL-SCNC: 137 MMOL/L (ref 136–145)
SODIUM SERPL-SCNC: 138 MMOL/L (ref 136–145)
SODIUM SERPL-SCNC: 139 MMOL/L (ref 136–145)
SODIUM SERPL-SCNC: 141 MMOL/L (ref 136–145)
TRANS ERYTHROCYTES VOL PATIENT: NORMAL ML
WBC # BLD AUTO: 22.92 K/UL (ref 3.9–12.7)
WBC # BLD AUTO: 26.96 K/UL (ref 3.9–12.7)

## 2022-01-01 PROCEDURE — 25000003 PHARM REV CODE 250: Performed by: STUDENT IN AN ORGANIZED HEALTH CARE EDUCATION/TRAINING PROGRAM

## 2022-01-01 PROCEDURE — 27000644 HC VENT IN-LINE ADAPTER

## 2022-01-01 PROCEDURE — 27000221 HC OXYGEN, UP TO 24 HOURS

## 2022-01-01 PROCEDURE — P9021 RED BLOOD CELLS UNIT: HCPCS | Performed by: SURGERY

## 2022-01-01 PROCEDURE — 99900035 HC TECH TIME PER 15 MIN (STAT)

## 2022-01-01 PROCEDURE — 25000242 PHARM REV CODE 250 ALT 637 W/ HCPCS

## 2022-01-01 PROCEDURE — 85007 BL SMEAR W/DIFF WBC COUNT: CPT | Performed by: SURGERY

## 2022-01-01 PROCEDURE — 27200966 HC CLOSED SUCTION SYSTEM

## 2022-01-01 PROCEDURE — 99233 SBSQ HOSP IP/OBS HIGH 50: CPT | Mod: ,,, | Performed by: INTERNAL MEDICINE

## 2022-01-01 PROCEDURE — 63600175 PHARM REV CODE 636 W HCPCS: Performed by: SURGERY

## 2022-01-01 PROCEDURE — 25000003 PHARM REV CODE 250: Performed by: SURGERY

## 2022-01-01 PROCEDURE — 85027 COMPLETE CBC AUTOMATED: CPT | Performed by: SURGERY

## 2022-01-01 PROCEDURE — 83735 ASSAY OF MAGNESIUM: CPT | Performed by: STUDENT IN AN ORGANIZED HEALTH CARE EDUCATION/TRAINING PROGRAM

## 2022-01-01 PROCEDURE — 63600175 PHARM REV CODE 636 W HCPCS: Performed by: STUDENT IN AN ORGANIZED HEALTH CARE EDUCATION/TRAINING PROGRAM

## 2022-01-01 PROCEDURE — 94664 DEMO&/EVAL PT USE INHALER: CPT

## 2022-01-01 PROCEDURE — 27000646 HC AEROBIKA DEVICE

## 2022-01-01 PROCEDURE — 25000242 PHARM REV CODE 250 ALT 637 W/ HCPCS: Performed by: SURGERY

## 2022-01-01 PROCEDURE — 63600175 PHARM REV CODE 636 W HCPCS

## 2022-01-01 PROCEDURE — 25000003 PHARM REV CODE 250

## 2022-01-01 PROCEDURE — 80100008 HC CRRT DAILY MAINTENANCE

## 2022-01-01 PROCEDURE — 27100171 HC OXYGEN HIGH FLOW UP TO 24 HOURS

## 2022-01-01 PROCEDURE — 99291 CRITICAL CARE FIRST HOUR: CPT | Mod: 24,25,, | Performed by: SURGERY

## 2022-01-01 PROCEDURE — 80069 RENAL FUNCTION PANEL: CPT | Performed by: STUDENT IN AN ORGANIZED HEALTH CARE EDUCATION/TRAINING PROGRAM

## 2022-01-01 PROCEDURE — 99900026 HC AIRWAY MAINTENANCE (STAT)

## 2022-01-01 PROCEDURE — 94640 AIRWAY INHALATION TREATMENT: CPT

## 2022-01-01 PROCEDURE — 84100 ASSAY OF PHOSPHORUS: CPT | Performed by: STUDENT IN AN ORGANIZED HEALTH CARE EDUCATION/TRAINING PROGRAM

## 2022-01-01 PROCEDURE — 80069 RENAL FUNCTION PANEL: CPT | Mod: 91 | Performed by: INTERNAL MEDICINE

## 2022-01-01 PROCEDURE — 63600175 PHARM REV CODE 636 W HCPCS: Performed by: INTERNAL MEDICINE

## 2022-01-01 PROCEDURE — 20000000 HC ICU ROOM

## 2022-01-01 PROCEDURE — 85025 COMPLETE CBC W/AUTO DIFF WBC: CPT | Performed by: SURGERY

## 2022-01-01 PROCEDURE — 37799 UNLISTED PX VASCULAR SURGERY: CPT

## 2022-01-01 PROCEDURE — 94799 UNLISTED PULMONARY SVC/PX: CPT

## 2022-01-01 PROCEDURE — 94760 N-INVAS EAR/PLS OXIMETRY 1: CPT

## 2022-01-01 PROCEDURE — 82803 BLOOD GASES ANY COMBINATION: CPT

## 2022-01-01 PROCEDURE — 99233 PR SUBSEQUENT HOSPITAL CARE,LEVL III: ICD-10-PCS | Mod: ,,, | Performed by: INTERNAL MEDICINE

## 2022-01-01 PROCEDURE — 83735 ASSAY OF MAGNESIUM: CPT | Mod: 91 | Performed by: INTERNAL MEDICINE

## 2022-01-01 PROCEDURE — 80053 COMPREHEN METABOLIC PANEL: CPT | Performed by: STUDENT IN AN ORGANIZED HEALTH CARE EDUCATION/TRAINING PROGRAM

## 2022-01-01 PROCEDURE — 90945 DIALYSIS ONE EVALUATION: CPT

## 2022-01-01 PROCEDURE — 94761 N-INVAS EAR/PLS OXIMETRY MLT: CPT

## 2022-01-01 PROCEDURE — 99291 PR CRITICAL CARE, E/M 30-74 MINUTES: ICD-10-PCS | Mod: 24,25,, | Performed by: SURGERY

## 2022-01-01 RX ORDER — FUROSEMIDE 10 MG/ML
20 INJECTION INTRAMUSCULAR; INTRAVENOUS ONCE
Status: COMPLETED | OUTPATIENT
Start: 2022-01-01 | End: 2022-01-01

## 2022-01-01 RX ORDER — PROPRANOLOL HYDROCHLORIDE 10 MG/1
10 TABLET ORAL 3 TIMES DAILY
Status: DISCONTINUED | OUTPATIENT
Start: 2022-01-01 | End: 2022-01-04

## 2022-01-01 RX ORDER — HYDROCODONE BITARTRATE AND ACETAMINOPHEN 500; 5 MG/1; MG/1
TABLET ORAL
Status: DISCONTINUED | OUTPATIENT
Start: 2022-01-01 | End: 2022-01-04

## 2022-01-01 RX ORDER — AMOXICILLIN 250 MG
1 CAPSULE ORAL DAILY
Status: DISCONTINUED | OUTPATIENT
Start: 2022-01-02 | End: 2022-01-04

## 2022-01-01 RX ORDER — MAGNESIUM SULFATE HEPTAHYDRATE 40 MG/ML
2 INJECTION, SOLUTION INTRAVENOUS
Status: DISPENSED | OUTPATIENT
Start: 2022-01-01 | End: 2022-01-02

## 2022-01-01 RX ORDER — LEVALBUTEROL INHALATION SOLUTION 0.63 MG/3ML
0.63 SOLUTION RESPIRATORY (INHALATION) EVERY 4 HOURS
Status: DISCONTINUED | OUTPATIENT
Start: 2022-01-01 | End: 2022-01-14 | Stop reason: HOSPADM

## 2022-01-01 RX ORDER — ONDANSETRON 2 MG/ML
8 INJECTION INTRAMUSCULAR; INTRAVENOUS EVERY 8 HOURS PRN
Status: DISCONTINUED | OUTPATIENT
Start: 2022-01-01 | End: 2022-01-14 | Stop reason: HOSPADM

## 2022-01-01 RX ORDER — LORAZEPAM 2 MG/ML
0.5 INJECTION INTRAMUSCULAR EVERY 6 HOURS PRN
Status: DISCONTINUED | OUTPATIENT
Start: 2022-01-01 | End: 2022-01-03

## 2022-01-01 RX ORDER — SODIUM,POTASSIUM PHOSPHATES 280-250MG
2 POWDER IN PACKET (EA) ORAL 3 TIMES DAILY PRN
Status: DISCONTINUED | OUTPATIENT
Start: 2022-01-01 | End: 2022-01-14 | Stop reason: HOSPADM

## 2022-01-01 RX ORDER — LEVALBUTEROL INHALATION SOLUTION 0.63 MG/3ML
0.63 SOLUTION RESPIRATORY (INHALATION) EVERY 4 HOURS
Status: DISCONTINUED | OUTPATIENT
Start: 2022-01-01 | End: 2022-01-01

## 2022-01-01 RX ADMIN — PROPRANOLOL HYDROCHLORIDE 10 MG: 10 TABLET ORAL at 03:01

## 2022-01-01 RX ADMIN — NITROGLYCERIN 0.5 INCH: 20 OINTMENT TOPICAL at 11:01

## 2022-01-01 RX ADMIN — ONDANSETRON 4 MG: 2 INJECTION INTRAMUSCULAR; INTRAVENOUS at 07:01

## 2022-01-01 RX ADMIN — GABAPENTIN 125 MG: 250 SOLUTION ORAL at 02:01

## 2022-01-01 RX ADMIN — LEVALBUTEROL HYDROCHLORIDE 0.63 MG: 0.63 SOLUTION RESPIRATORY (INHALATION) at 12:01

## 2022-01-01 RX ADMIN — GABAPENTIN 125 MG: 250 SOLUTION ORAL at 05:01

## 2022-01-01 RX ADMIN — Medication 5 MG: at 12:01

## 2022-01-01 RX ADMIN — ONDANSETRON 4 MG: 2 INJECTION INTRAMUSCULAR; INTRAVENOUS at 03:01

## 2022-01-01 RX ADMIN — METHOCARBAMOL 500 MG: 500 TABLET ORAL at 01:01

## 2022-01-01 RX ADMIN — Medication: at 09:01

## 2022-01-01 RX ADMIN — METHOCARBAMOL 500 MG: 500 TABLET ORAL at 09:01

## 2022-01-01 RX ADMIN — METHOCARBAMOL 500 MG: 500 TABLET ORAL at 05:01

## 2022-01-01 RX ADMIN — NITROGLYCERIN 0.5 INCH: 20 OINTMENT TOPICAL at 06:01

## 2022-01-01 RX ADMIN — LEVALBUTEROL HYDROCHLORIDE 0.63 MG: 0.63 SOLUTION RESPIRATORY (INHALATION) at 07:01

## 2022-01-01 RX ADMIN — PROPRANOLOL HYDROCHLORIDE 10 MG: 10 TABLET ORAL at 11:01

## 2022-01-01 RX ADMIN — MAGNESIUM SULFATE 2 G: 2 INJECTION INTRAVENOUS at 11:01

## 2022-01-01 RX ADMIN — PIPERACILLIN SODIUM AND TAZOBACTAM SODIUM 4.5 G: 4; .5 INJECTION, POWDER, FOR SOLUTION INTRAVENOUS at 10:01

## 2022-01-01 RX ADMIN — HYDROMORPHONE HYDROCHLORIDE 0.2 MG: 1 INJECTION, SOLUTION INTRAMUSCULAR; INTRAVENOUS; SUBCUTANEOUS at 08:01

## 2022-01-01 RX ADMIN — PROPRANOLOL HYDROCHLORIDE 10 MG: 10 TABLET ORAL at 09:01

## 2022-01-01 RX ADMIN — NITROGLYCERIN 0.5 INCH: 20 OINTMENT TOPICAL at 01:01

## 2022-01-01 RX ADMIN — NITROGLYCERIN 0.5 INCH: 20 OINTMENT TOPICAL at 05:01

## 2022-01-01 RX ADMIN — GABAPENTIN 125 MG: 250 SOLUTION ORAL at 09:01

## 2022-01-01 RX ADMIN — HEPARIN SODIUM 7500 UNITS: 5000 INJECTION INTRAVENOUS; SUBCUTANEOUS at 05:01

## 2022-01-01 RX ADMIN — OXYCODONE HYDROCHLORIDE 10 MG: 5 SOLUTION ORAL at 09:01

## 2022-01-01 RX ADMIN — HEPARIN SODIUM 7500 UNITS: 5000 INJECTION INTRAVENOUS; SUBCUTANEOUS at 09:01

## 2022-01-01 RX ADMIN — HEPARIN SODIUM 7500 UNITS: 5000 INJECTION INTRAVENOUS; SUBCUTANEOUS at 02:01

## 2022-01-01 RX ADMIN — VANCOMYCIN HYDROCHLORIDE 1500 MG: 1.5 INJECTION, POWDER, LYOPHILIZED, FOR SOLUTION INTRAVENOUS at 10:01

## 2022-01-01 RX ADMIN — FUROSEMIDE 20 MG: 10 INJECTION, SOLUTION INTRAMUSCULAR; INTRAVENOUS at 10:01

## 2022-01-01 RX ADMIN — SODIUM PHOSPHATE, MONOBASIC, MONOHYDRATE 20.01 MMOL: 276; 142 INJECTION, SOLUTION INTRAVENOUS at 02:01

## 2022-01-01 RX ADMIN — PIPERACILLIN SODIUM AND TAZOBACTAM SODIUM 4.5 G: 4; .5 INJECTION, POWDER, FOR SOLUTION INTRAVENOUS at 09:01

## 2022-01-01 RX ADMIN — LEVALBUTEROL HYDROCHLORIDE 0.63 MG: 0.63 SOLUTION RESPIRATORY (INHALATION) at 04:01

## 2022-01-01 RX ADMIN — OXYCODONE HYDROCHLORIDE 10 MG: 5 SOLUTION ORAL at 05:01

## 2022-01-01 RX ADMIN — SODIUM CHLORIDE: 0.9 INJECTION, SOLUTION INTRAVENOUS at 06:01

## 2022-01-01 NOTE — SUBJECTIVE & OBJECTIVE
"Interval History:   CT with intra-abdominal abscess s/p IR drainage   Extubated to nasal cannula  Tachycardia with anxiety this am       Medications:  Continuous Infusions:   sodium chloride 0.9% Stopped (01/01/22 0500)    dexmedetomidine (PRECEDEX) infusion Stopped (12/31/21 1755)    fentanyl Stopped (12/31/21 1245)     Scheduled Meds:   balsam peru-castor oiL   Topical (Top) BID    gabapentin  125 mg Per G Tube Q8H    heparin (porcine)  7,500 Units Subcutaneous Q8H    levalbuterol  0.63 mg Nebulization Q4H    magnesium citrate  296 mL Oral Once    methocarbamoL  500 mg Per G Tube QID    nitroGLYCERIN 2% TD oint  0.5 inch Topical (Top) Q6H    piperacillin-tazobactam (ZOSYN) IVPB  4.5 g Intravenous Q12H     PRN Meds:dextrose 50%, dextrose 50%, glucagon (human recombinant), HYDROmorphone, insulin aspart U-100, magnesium sulfate IVPB, melatonin, olanzapine zydis, oxyCODONE, oxyCODONE, potassium, sodium phosphates, sodium chloride 0.9%, sodium chloride 0.9%     Review of patient's allergies indicates:   Allergen Reactions    Tylox [oxycodone-acetaminophen]      "Jittery"     Objective:     Vital Signs (Most Recent):  Temp: 98.7 °F (37.1 °C) (01/01/22 0700)  Pulse: (!) 147 (01/01/22 0700)  Resp: (!) 28 (01/01/22 0700)  BP: 116/81 (12/30/21 2100)  SpO2: 98 % (01/01/22 0700) Vital Signs (24h Range):  Temp:  [98.7 °F (37.1 °C)-99.8 °F (37.7 °C)] 98.7 °F (37.1 °C)  Pulse:  [] 147  Resp:  [17-34] 28  SpO2:  [94 %-100 %] 98 %  Arterial Line BP: ()/(46-82) 134/58     Weight: 121.1 kg (267 lb)  Body mass index is 45.83 kg/m².    Intake/Output - Last 3 Shifts       12/30 0700 12/31 0659 12/31 0700 01/01 0659 01/01 0700  01/02 0659    I.V. (mL/kg) 2134.8 (17.6) 3744.4 (30.9)     Blood       NG/GT 1095 405 40    IV Piggyback 198.5 432.6 12.5    Total Intake(mL/kg) 3428.3 (28.3) 4581.9 (37.8) 52.5 (0.4)    Urine (mL/kg/hr) 45 (0) 55 (0) 10 (0.2)    Drains 56 316     Other 5535 6193 50    Total Output 7224 " 5053 60    Net -859.7 -471.1 -7.5                 Physical Exam  Vitals and nursing note reviewed.   Constitutional:       General: She is not in acute distress.     Appearance: She is obese. She is not diaphoretic.   HENT:      Head: Normocephalic and atraumatic.      Mouth/Throat:      Mouth: Mucous membranes are moist.      Pharynx: Oropharynx is clear.   Eyes:      Extraocular Movements: Extraocular movements intact.      Conjunctiva/sclera: Conjunctivae normal.   Cardiovascular:      Rate and Rhythm: Regular rhythm. Tachycardia present.   Pulmonary:      Effort: No respiratory distress.      Comments: Intubated  Vent Mode: A/C  Oxygen Concentration (%):  (50-90) 50  Resp Rate Total:  (18 br/min-37 br/min) 26 br/min  Vt Set:  (320 mL) 320 mL  PEEP/CPAP:  (8 cmH20) 8 cmH20  Mean Airway Pressure:  (9.4 qwS07-43 cmH20) 13 cmH20  Abdominal:      General: There is no distension.      Palpations: Abdomen is soft.      Tenderness: There is no guarding or rebound.      Comments: Wound vac in place with good seal, no leak noted.   REAGAN drains with benign fluid, not bloody or bilious   Musculoskeletal:         General: No deformity.   Skin:     General: Skin is warm and dry.   Neurological:      Mental Status: She is alert.         Significant Labs:  I have reviewed all pertinent lab results within the past 24 hours.    Significant Diagnostics:  I have reviewed all pertinent imaging results/findings within the past 24 hours.
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
## 2022-01-01 NOTE — PROGRESS NOTES
"Pharmacokinetic Initial Assessment: IV Vancomycin    Assessment/Plan:    Initiate intravenous vancomycin with loading dose of 1500 mg once. Start on pulsed dosing regimen.  Desired empiric serum trough concentration is 15 to 20 mcg/mL  Draw vancomycin random with 1/2 AM labs. Redose pending level and RRT plans.  Pharmacy will continue to follow and monitor vancomycin.      Please contact pharmacy at extension [phone] with any questions regarding this assessment.     Thank you for the consult,   Charla CELIA Reji       Patient brief summary:  Chidi Castaneda is a 56 y.o. female initiated on antimicrobial therapy with IV Vancomycin for treatment of suspected intra-abdominal infection    Drug Allergies:   Review of patient's allergies indicates:   Allergen Reactions    Tylox [oxycodone-acetaminophen]      "Jittery"       Actual Body Weight:   121kg    Renal Function:   Estimated Creatinine Clearance: 68.8 mL/min (based on SCr of 1.2 mg/dL).,     Dialysis Method (if applicable):  SLED x 12 hrs    CBC (last 72 hours):  Recent Labs   Lab Result Units 12/29/21  1423 12/29/21  2135 12/30/21  0314 12/30/21  1523 12/31/21  0443 12/31/21  1413 01/01/22  0348   WBC K/uL 16.68* 16.52* 18.90* 22.94* 24.38* 20.27* 22.92*   Hemoglobin g/dL 7.6* 7.7* 7.6* 7.4* 7.7* 7.2* 7.0*   Hematocrit % 24.1* 23.7* 23.7* 24.3* 24.7* 23.0* 22.0*   Platelets K/uL 349 407 407 389 431 422 479*   Gran % % 79.2* 81.6* 81.7* 85.4* 84.5* 82.6* 81.0*   Lymph % % 6.3* 6.4* 6.6* 4.8* 5.4* 6.2* 5.5*   Mono % % 9.4 8.4 8.3 7.0 6.8 7.8 9.0   Eosinophil % % 2.4 1.5 1.2 0.7 1.2 1.3 0.3   Basophil % % 0.2 0.2 0.3 0.2 0.3 0.3 0.4   Differential Method  Automated Automated Automated Automated Automated Automated Automated       Metabolic Panel (last 72 hours):  Recent Labs   Lab Result Units 12/29/21  1226 12/29/21  1423 12/29/21  2003 12/30/21  0314 12/30/21  0859 12/30/21  1316 12/30/21  1318 12/30/21  2111 12/31/21  0443 12/31/21  1413 12/31/21  2212 01/01/22  0348 "   Sodium mmol/L 136 136 136 135* 135* 134* 135* 137 135* 136 139 139  138   Potassium mmol/L 4.3  4.3 4.6 4.5  4.5 4.7 4.8 4.6 4.5 4.8 5.2* 5.2* 4.9 5.1  5.2*   Chloride mmol/L 104 104 105 104 104 104 105 106 106 107 106 106  106   CO2 mmol/L 25 25 24 23 25 25 24 23 21* 19* 22* 24  24   Glucose mg/dL 85 82 109 114* 117* 116* 116* 115* 104 115* 112* 128*  128*   BUN mg/dL 7 6 10 19 26* 30* 30* 37* 45* 59* 23* 14  14   Creatinine mg/dL 0.7 0.7 0.9 1.5* 1.9* 2.1* 2.1* 2.5* 2.8* 3.2* 1.5* 1.2  1.2   Albumin g/dL  --  2.1*  --  1.9*  --   --  1.8* 1.9* 1.9* 1.7* 1.8* 1.8*  1.8*   Total Bilirubin mg/dL  --   --   --  2.0*  --   --   --   --  1.7*  --   --  1.6*   Alkaline Phosphatase U/L  --   --   --  186*  --   --   --   --  170*  --   --  201*   AST U/L  --   --   --  54*  --   --   --   --  40  --   --  44*   ALT U/L  --   --   --  55*  --   --   --   --  51*  --   --  47*   Magnesium mg/dL  --  1.8  --  1.8  --   --  1.8 2.4 2.4 2.3 1.9 2.2   Phosphorus mg/dL  --  2.5*  --  2.3*  --   --  2.4* 2.6* 3.0 3.0 1.8* 2.2*  2.2*       Drug levels (last 3 results):  No results for input(s): VANCOMYCINRA, VANCOMYCINPE, VANCOMYCINTR in the last 72 hours.    Microbiologic Results:  Microbiology Results (last 7 days)     Procedure Component Value Units Date/Time    Gram stain [[id number]] Collected: 12/31/21 1556    Order Status: Completed Specimen: Abscess from Abdomen Updated: 12/31/21 2017     Gram Stain Result Moderate WBC's      Many Gram positive cocci      Many Gram negative rods    Narrative:      For IR drain placement 12/31/21    Fungus culture [[id number]] Collected: 12/31/21 1556    Order Status: Sent Specimen: Abscess from Abdomen Updated: 12/31/21 1630    Aerobic culture [[id number]] Collected: 12/31/21 1556    Order Status: Sent Specimen: Abscess from Abdomen Updated: 12/31/21 1630    Culture, Anaerobe [[id number]] Collected: 12/31/21 1556    Order Status: Sent Specimen: Abscess from Abdomen Updated:  12/31/21 1629    Culture, Respiratory with Gram Stain [[id number]]  (Abnormal)  (Susceptibility) Collected: 12/28/21 1046    Order Status: Completed Specimen: Respiratory from BAL, RML Updated: 12/30/21 0944     Respiratory Culture No S aureus or Pseudomonas isolated.      KLEBSIELLA AEROGENES  Few  Normal respiratory alba also present       Gram Stain (Respiratory) <10 epithelial cells per low power field.     Gram Stain (Respiratory) Rare WBC's     Gram Stain (Respiratory) No organisms seen    Narrative:      Right middle lobe, Right lower lobe Bronch

## 2022-01-01 NOTE — HOSPITAL COURSE
Patient admitted to SICU on 12/20 after transfer from OSH with Hypovolemic shock and liver hemorrhage after hysterectomy 12/8.  Patient received multiple transfusions and underwent ex lap here at [facility]. H&H now stable.  Abdomen is now closed with wound vac for subq and skin.  She became septic with pelvic abscess, IR drained on 12/31, now with limited output.  Patient also has small respiratory process, BAL positive for GNR.  Patient continues to have rising WBC and intermittent fevers post IR drainage.  Broadened antibiotics to meropenem, vancomycin, zosyn ID consulted.  Zosyn 12/30-1/3. Vanc Cultures back, now narrowed to meropenem (1/4- 1/24 per ID).  White count down trending and patient remains afebrile.  UOP has been improving. Stable for stepdown to [facility].
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
## 2022-01-01 NOTE — SUBJECTIVE & OBJECTIVE
"Interval History: patient extubated . S/p 12 hours of SLED yesterday. Negative 471 in last 24 hours. Patient is tachycardic   Review of patient's allergies indicates:   Allergen Reactions    Tylox [oxycodone-acetaminophen]      "Jittery"     Current Facility-Administered Medications   Medication Frequency    0.9%  NaCl infusion (CRRT USE ONLY) Continuous    balsam peru-castor oiL Oint BID    dexmedetomidine (PRECEDEX) 400mcg/100mL 0.9% NaCL infusion Continuous    dextrose 50% injection 12.5 g PRN    dextrose 50% injection 25 g PRN    fentaNYL 2500 mcg in 0.9% sodium chloride 250 mL infusion premix (titrating) Continuous    gabapentin 250 mg/5 mL (5 mL) solution 125 mg Q8H    glucagon (human recombinant) injection 1 mg PRN    heparin (porcine) injection 7,500 Units Q8H    HYDROmorphone injection 0.5 mg Q6H PRN    insulin aspart U-100 pen 0-5 Units Q6H PRN    levalbuterol nebulizer solution 0.63 mg Q4H    magnesium citrate solution 296 mL Once    magnesium sulfate 2g in water 50mL IVPB (premix) PRN    melatonin 1 mg/mL liquid (PEDS) 5 mg Nightly PRN    methocarbamoL tablet 500 mg QID    nitroGLYCERIN 2% TD oint ointment 0.5 inch Q6H    olanzapine zydis disintegrating tablet 10 mg Nightly PRN    oxyCODONE 5 mg/5 mL solution 10 mg Q4H PRN    oxyCODONE 5 mg/5 mL solution 5 mg Q4H PRN    piperacillin-tazobactam 4.5 g in sodium chloride 0.9% 100 mL IVPB (ready to mix system) Q12H    sodium chloride 0.9% flush 10 mL PRN    sodium chloride 0.9% flush 10 mL PRN       Objective:     Vital Signs (Most Recent):  Temp: 99.1 °F (37.3 °C) (12/31/21 2300)  Pulse: (!) 149 (01/01/22 0645)  Resp: (!) 33 (01/01/22 0645)  BP: 116/81 (12/30/21 2100)  SpO2: 98 % (01/01/22 0645)  O2 Device (Oxygen Therapy): nasal cannula (01/01/22 0600) Vital Signs (24h Range):  Temp:  [98 °F (36.7 °C)-99.8 °F (37.7 °C)] 99.1 °F (37.3 °C)  Pulse:  [] 149  Resp:  [17-34] 33  SpO2:  [94 %-100 %] 98 %  Arterial Line BP: " ()/(43-82) 129/58     Weight: 121.1 kg (267 lb) (12/28/21 1003)  Body mass index is 45.83 kg/m².  Body surface area is 2.34 meters squared.    I/O last 3 completed shifts:  In: 5482.5 [I.V.:3684.4; NG/GT:1500; IV Piggyback:298.1]  Out: 5116 [Urine:90; Drains:156; Other:4870]    Physical Exam  Vitals and nursing note reviewed.   Constitutional:       General: She is not in acute distress.     Appearance: She is ill-appearing. She is not toxic-appearing or diaphoretic.   Cardiovascular:      Rate and Rhythm: Regular rhythm. Tachycardia present.      Pulses: Normal pulses.      Heart sounds: No murmur heard.      Pulmonary:      Breath sounds: No wheezing, rhonchi or rales.      Comments: tachypnea  Musculoskeletal:         General: No swelling, tenderness or deformity.      Right lower leg: Edema present.      Left lower leg: Edema present.   Skin:     General: Skin is warm.      Capillary Refill: Capillary refill takes 2 to 3 seconds.      Coloration: Skin is not jaundiced or pale.      Findings: Bruising present. No erythema, lesion or rash.      Comments: Necrotic areas in toes   Neurological:      Mental Status: She is alert.      Comments: Intubated    Psychiatric:      Comments: Intubated          Significant Labs:  ABGs:   Recent Labs   Lab 12/31/21 1952   PH 7.425   PCO2 37.0   HCO3 24.2   POCSATURATED 99   BE 0     Cardiac Markers: No results for input(s): CKMB, TROPONINT, MYOGLOBIN in the last 168 hours.  CBC:   Recent Labs   Lab 01/01/22 0348   WBC 22.92*   RBC 2.44*   HGB 7.0*   HCT 22.0*   *   MCV 90   MCH 28.7   MCHC 31.8*     CMP:   Recent Labs   Lab 01/01/22 0348   *  128*   CALCIUM 8.1*  8.1*   ALBUMIN 1.8*  1.8*   PROT 5.7*     138   K 5.1  5.2*   CO2 24  24     106   BUN 14  14   CREATININE 1.2  1.2   ALKPHOS 201*   ALT 47*   AST 44*   BILITOT 1.6*     Coagulation: No results for input(s): PT, INR, APTT in the last 168 hours.  LFTs:   Recent Labs   Lab  01/01/22  0348   ALT 47*   AST 44*   ALKPHOS 201*   BILITOT 1.6*   PROT 5.7*   ALBUMIN 1.8*  1.8*     Microbiology Results (last 7 days)     Procedure Component Value Units Date/Time    Gram stain [[id number]] Collected: 12/31/21 1556    Order Status: Completed Specimen: Abscess from Abdomen Updated: 12/31/21 2017     Gram Stain Result Moderate WBC's      Many Gram positive cocci      Many Gram negative rods    Narrative:      For IR drain placement 12/31/21    Fungus culture [[id number]] Collected: 12/31/21 1556    Order Status: Sent Specimen: Abscess from Abdomen Updated: 12/31/21 1630    Aerobic culture [[id number]] Collected: 12/31/21 1556    Order Status: Sent Specimen: Abscess from Abdomen Updated: 12/31/21 1630    Culture, Anaerobe [[id number]] Collected: 12/31/21 1556    Order Status: Sent Specimen: Abscess from Abdomen Updated: 12/31/21 1629    Culture, Respiratory with Gram Stain [[id number]]  (Abnormal)  (Susceptibility) Collected: 12/28/21 1046    Order Status: Completed Specimen: Respiratory from BAL, RML Updated: 12/30/21 0944     Respiratory Culture No S aureus or Pseudomonas isolated.      KLEBSIELLA AEROGENES  Few  Normal respiratory alba also present       Gram Stain (Respiratory) <10 epithelial cells per low power field.     Gram Stain (Respiratory) Rare WBC's     Gram Stain (Respiratory) No organisms seen    Narrative:      Right middle lobe, Right lower lobe Bronch        Specimen (24h ago, onward)            None        PTH: No results for input(s): PTH in the last 168 hours.  TSH: No results for input(s): TSH in the last 168 hours.  All labs within the past 24 hours have been reviewed.     Significant Imaging:  Labs: Reviewed  ECG: Reviewed

## 2022-01-01 NOTE — PROGRESS NOTES
Orders to extubate per Dr. Altman. ABG within normal limits. NIF -30. Extubated to 4L NC SpO2 100%. Pt tolerated well. WCTM.

## 2022-01-01 NOTE — PLAN OF CARE
"      SICU PLAN OF CARE NOTE    Shift Events: Extubated to 4L NC doing well. SLED overnight. Tachycardiac, team aware.    Neuro: AAOx4. Follows commands and moves all extremities purposefully.    Vital Signs: /81 (BP Location: Left arm, Patient Position: Lying)   Pulse (!) 139   Temp 99.1 °F (37.3 °C) (Oral)   Resp (!) 27   Ht 5' 4" (1.626 m)   Wt 121.1 kg (267 lb)   SpO2 95%   BMI 45.83 kg/m²     Respiratory: 4L NC    Diet: NPO. TF @ 40    Urine Output: 10cc/shift    Drains: #1 L REAGAN: 16cc/shift, #2 REAGAN: 10cc/shift, R REAGAN: 190cc/shift dark red and odorous. WV not enough drainage to claim.      Labs/Accuchecks: Accuchecks Q6. CRRT and daily labs. Phos replaced overnight as ordered. MD aware of morning H&H, no orders to transfuse.    Skin: No new skin breakdown noted. Turned Q2hr. Heel and sacral foams in place. SCDs and heel boots on. Linen changed and CHG bath this AM. Immerse bed plugged in and working properly.       "

## 2022-01-01 NOTE — PROGRESS NOTES
"[facility]  General Surgery  Progress Note    Subjective:     History of Present Illness:  No notes on file    Post-Op Info:  Procedure(s) (LRB):  LAPAROTOMY, EXPLORATORY (N/A)  INSERTION, GASTROSTOMY TUBE, PERCUTANEOUS (N/A)  CHOLECYSTECTOMY  EGD (ESOPHAGOGASTRODUODENOSCOPY) (N/A)   9 Days Post-Op     Interval History:   CT with intra-abdominal abscess s/p IR drainage   Extubated to nasal cannula  Tachycardia with anxiety this am       Medications:  Continuous Infusions:   sodium chloride 0.9% Stopped (01/01/22 0500)    dexmedetomidine (PRECEDEX) infusion Stopped (12/31/21 1755)    fentanyl Stopped (12/31/21 1245)     Scheduled Meds:   balsam peru-castor oiL   Topical (Top) BID    gabapentin  125 mg Per G Tube Q8H    heparin (porcine)  7,500 Units Subcutaneous Q8H    levalbuterol  0.63 mg Nebulization Q4H    magnesium citrate  296 mL Oral Once    methocarbamoL  500 mg Per G Tube QID    nitroGLYCERIN 2% TD oint  0.5 inch Topical (Top) Q6H    piperacillin-tazobactam (ZOSYN) IVPB  4.5 g Intravenous Q12H     PRN Meds:dextrose 50%, dextrose 50%, glucagon (human recombinant), HYDROmorphone, insulin aspart U-100, magnesium sulfate IVPB, melatonin, olanzapine zydis, oxyCODONE, oxyCODONE, potassium, sodium phosphates, sodium chloride 0.9%, sodium chloride 0.9%     Review of patient's allergies indicates:   Allergen Reactions    Tylox [oxycodone-acetaminophen]      "Jittery"     Objective:     Vital Signs (Most Recent):  Temp: 98.7 °F (37.1 °C) (01/01/22 0700)  Pulse: (!) 147 (01/01/22 0700)  Resp: (!) 28 (01/01/22 0700)  BP: 116/81 (12/30/21 2100)  SpO2: 98 % (01/01/22 0700) Vital Signs (24h Range):  Temp:  [98.7 °F (37.1 °C)-99.8 °F (37.7 °C)] 98.7 °F (37.1 °C)  Pulse:  [] 147  Resp:  [17-34] 28  SpO2:  [94 %-100 %] 98 %  Arterial Line BP: ()/(46-82) 134/58     Weight: 121.1 kg (267 lb)  Body mass index is 45.83 kg/m².    Intake/Output - Last 3 Shifts       12/30 0700  12/31 " 0659 12/31 0700  01/01 0659 01/01 0700  01/02 0659    I.V. (mL/kg) 2134.8 (17.6) 3744.4 (30.9)     Blood       NG/GT 1095 405 40    IV Piggyback 198.5 432.6 12.5    Total Intake(mL/kg) 3428.3 (28.3) 4581.9 (37.8) 52.5 (0.4)    Urine (mL/kg/hr) 45 (0) 55 (0) 10 (0.2)    Drains 56 316     Other 4187 4682 50    Total Output 4288 5053 60    Net -859.7 -471.1 -7.5                 Physical Exam  Vitals and nursing note reviewed.   Constitutional:       General: She is not in acute distress.     Appearance: She is obese. She is not diaphoretic.   HENT:      Head: Normocephalic and atraumatic.      Mouth/Throat:      Mouth: Mucous membranes are moist.      Pharynx: Oropharynx is clear.   Eyes:      Extraocular Movements: Extraocular movements intact.      Conjunctiva/sclera: Conjunctivae normal.   Cardiovascular:      Rate and Rhythm: Regular rhythm. Tachycardia present.   Pulmonary:      Effort: No respiratory distress.      Comments: Intubated  Vent Mode: A/C  Oxygen Concentration (%):  (50-90) 50  Resp Rate Total:  (18 br/min-37 br/min) 26 br/min  Vt Set:  (320 mL) 320 mL  PEEP/CPAP:  (8 cmH20) 8 cmH20  Mean Airway Pressure:  (9.4 ngO75-26 cmH20) 13 cmH20  Abdominal:      General: There is no distension.      Palpations: Abdomen is soft.      Tenderness: There is no guarding or rebound.      Comments: Wound vac in place with good seal, no leak noted.   REAGAN drains with benign fluid, not bloody or bilious   Musculoskeletal:         General: No deformity.   Skin:     General: Skin is warm and dry.   Neurological:      Mental Status: She is alert.         Significant Labs:  I have reviewed all pertinent lab results within the past 24 hours.    Significant Diagnostics:  I have reviewed all pertinent imaging results/findings within the past 24 hours.    Assessment/Plan:     Subcapsular hematoma of liver  57yo female transfer from OSH after hysterectomy on 12/8 complicated by delayed recognition of small bowel injury requiring  resection (in discontinuity) and at some point new bleed from the liver - transferred with open abdomen for higher level of care.  S/p ex-lap, liver packing 12/21  Open cholecystectomy, abdominal closure 12/23    - Extubated 12/31, doing well   - Aggressive pulmonary hygiene, OOB to chair, PT/OT; atelectasis likely heavily contributed to last re-intubation  - Tachycardia and anxiety, similar to how she was with prior extubation. Would try to control her anxiety with low dose PRNs prior to initiating beta blockers  - CT 12/31 with pelvic abscess  - IR drain 12/31 - cultures pending   - Continue abx for vap  - Add vanc given gram positive cocci on gram stain from abscess cultures  - Continue to wean vent  - Wound vac change 12/30, next due on Monday   - Transfuse for Hgb <7  - Speech eval  - Continue REAGAN drains   - Strict I/Os  - Appreciate nephrology assistance for CRRT  - Remainder of care per SICU, appreciate assistance         Angelica Ivy MD  General Surgery  [facility]

## 2022-01-01 NOTE — PROGRESS NOTES
[facility] - [facility]  Nephrology  Progress Note    Patient Name: Chidi Castaneda  MRN: [id number]  Admission Date: 12/20/2021  Hospital Length of Stay: 12 days  Attending Provider: Kendall Gorman MD   Primary Care Physician: Primary Doctor No  Principal Problem:<principal problem not specified>    Subjective:     HPI: Chidi Castaneda is a 56 y.o. female w/ PMHx HTN, GERD s/p EGD 6/22/2021, migraines, ovarian cyst s/p cystectomy, and obesity who underwent an elective lap hysterectomy on 12/18/2021 at [facility] and was then transferred to [place], [place] for higher level of care when pt was found to have post-op somnolence, decrease PO intake, decrease urine output that progressed to anuria with a sharp rise in Cr from 0.9 to 2.8 (12/10). Pt was treated for sepsis with volume resuscitation and broad spectrum antibiotics, however pt continue to deteriorate and became tachycardic, hypotensive, and imaging showed an ileus. Pt was intubated since she was found to be acidotic with a ph of 7.28 despite a nonrebreather with 100%  FiO2  12/11 pt was taken back to the OR for washout and a bowel resection was done as she was found to have a small bowel perforation  12/15 pt was found to have a subscapular liver hematoma  12/18 pt was taken back to the OR for wound vacc exchange and removal of hematoma   12/19 general surgery team recommended no further surgical intervention since they did not find any active bleeding intraoperatively on 12/18 and felt that the ongoing bleeding was 2/2 consumptive coagulopathy and septic shock. They recommended to continue wound vacc and to consider higher level of care for pt  Pt was also being treated for a bacteremia as well as for intraabdominal infection, her antibiotics prior to transfer were: meropenem, flagyl, and vancomycin   Pt received tranexamic acid x1 and multiple units of blood products.    Pt was transferred to [facility] on 12/20/2021 for higher  "level of care: embolization for a subscapular hematoma       Nephrology was consulted for: "dialysis, SUSAN"    Pt does not have any h/o renal disease prior to hospitalization (per chart review and per pt's )   EDW: 88 kg   Post-op renal ultrasound was normal (results of ultrasound and other medical records from the outside hospital are in the chart at the bedside, needs to be scanned into chart)   Pt was started on CRRT on 12/12 via a trialysis catheter,   On 12/20/21 morning nephrology at outside hospital had recommended CRRT-SLED however primary team requested iHD prior to transfer to [facility], it appears that pt was ordered for 4 hours however, pt was prepped for transfer and was unable to complete full session 2/2 transfer, per pt's , pt had 2L of fluid removed instead of the planned 4 liters. Of note, while pt was at [facility] pt's CRRT required the use of citrate to prevent clotting (briefly), however that apparently resolved and was able to get a session w/o citrate and obviously was able to tolerate iHD prior to transfer.   Upon arrival to [facility] pt was started on zosyn, she was started on NS 125ml/hr, was transfused 2 units of PRBCs, and remained off vasopressors.       Interval History: patient extubated . S/p 12 hours of SLED yesterday. Negative 471 in last 24 hours. Patient is tachycardic   Review of patient's allergies indicates:   Allergen Reactions    Tylox [oxycodone-acetaminophen]      "Jittery"     Current Facility-Administered Medications   Medication Frequency    0.9%  NaCl infusion (CRRT USE ONLY) Continuous    balsam peru-castor oiL Oint BID    dexmedetomidine (PRECEDEX) 400mcg/100mL 0.9% NaCL infusion Continuous    dextrose 50% injection 12.5 g PRN    dextrose 50% injection 25 g PRN    fentaNYL 2500 mcg in 0.9% sodium chloride 250 mL infusion premix (titrating) Continuous    gabapentin 250 mg/5 mL (5 mL) solution 125 mg Q8H    glucagon (human recombinant) injection 1 mg " PRN    heparin (porcine) injection 7,500 Units Q8H    HYDROmorphone injection 0.5 mg Q6H PRN    insulin aspart U-100 pen 0-5 Units Q6H PRN    levalbuterol nebulizer solution 0.63 mg Q4H    magnesium citrate solution 296 mL Once    magnesium sulfate 2g in water 50mL IVPB (premix) PRN    melatonin 1 mg/mL liquid (PEDS) 5 mg Nightly PRN    methocarbamoL tablet 500 mg QID    nitroGLYCERIN 2% TD oint ointment 0.5 inch Q6H    olanzapine zydis disintegrating tablet 10 mg Nightly PRN    oxyCODONE 5 mg/5 mL solution 10 mg Q4H PRN    oxyCODONE 5 mg/5 mL solution 5 mg Q4H PRN    piperacillin-tazobactam 4.5 g in sodium chloride 0.9% 100 mL IVPB (ready to mix system) Q12H    sodium chloride 0.9% flush 10 mL PRN    sodium chloride 0.9% flush 10 mL PRN       Objective:     Vital Signs (Most Recent):  Temp: 99.1 °F (37.3 °C) (12/31/21 2300)  Pulse: (!) 149 (01/01/22 0645)  Resp: (!) 33 (01/01/22 0645)  BP: 116/81 (12/30/21 2100)  SpO2: 98 % (01/01/22 0645)  O2 Device (Oxygen Therapy): nasal cannula (01/01/22 0600) Vital Signs (24h Range):  Temp:  [98 °F (36.7 °C)-99.8 °F (37.7 °C)] 99.1 °F (37.3 °C)  Pulse:  [] 149  Resp:  [17-34] 33  SpO2:  [94 %-100 %] 98 %  Arterial Line BP: ()/(43-82) 129/58     Weight: 121.1 kg (267 lb) (12/28/21 1003)  Body mass index is 45.83 kg/m².  Body surface area is 2.34 meters squared.    I/O last 3 completed shifts:  In: 5482.5 [I.V.:3684.4; NG/GT:1500; IV Piggyback:298.1]  Out: 5116 [Urine:90; Drains:156; Other:4870]    Physical Exam  Vitals and nursing note reviewed.   Constitutional:       General: She is not in acute distress.     Appearance: She is ill-appearing. She is not toxic-appearing or diaphoretic.   Cardiovascular:      Rate and Rhythm: Regular rhythm. Tachycardia present.      Pulses: Normal pulses.      Heart sounds: No murmur heard.      Pulmonary:      Breath sounds: No wheezing, rhonchi or rales.     Musculoskeletal:         General: No swelling, tenderness  or deformity.      Right lower leg: Edema present.      Left lower leg: Edema present.   Skin:     General: Skin is warm.      Capillary Refill: Capillary refill takes 2 to 3 seconds.      Coloration: Skin is not jaundiced or pale.      Findings: Bruising present. No erythema, lesion or rash.      Comments: Necrotic areas in toes   Neurological:      Mental Status: She is alert.         Significant Labs:  ABGs:   Recent Labs   Lab 12/31/21 1952   PH 7.425   PCO2 37.0   HCO3 24.2   POCSATURATED 99   BE 0     Cardiac Markers: No results for input(s): CKMB, TROPONINT, MYOGLOBIN in the last 168 hours.  CBC:   Recent Labs   Lab 01/01/22 0348   WBC 22.92*   RBC 2.44*   HGB 7.0*   HCT 22.0*   *   MCV 90   MCH 28.7   MCHC 31.8*     CMP:   Recent Labs   Lab 01/01/22 0348   *  128*   CALCIUM 8.1*  8.1*   ALBUMIN 1.8*  1.8*   PROT 5.7*     138   K 5.1  5.2*   CO2 24  24     106   BUN 14  14   CREATININE 1.2  1.2   ALKPHOS 201*   ALT 47*   AST 44*   BILITOT 1.6*     Coagulation: No results for input(s): PT, INR, APTT in the last 168 hours.  LFTs:   Recent Labs   Lab 01/01/22 0348   ALT 47*   AST 44*   ALKPHOS 201*   BILITOT 1.6*   PROT 5.7*   ALBUMIN 1.8*  1.8*     Microbiology Results (last 7 days)     Procedure Component Value Units Date/Time    Gram stain [[id number]] Collected: 12/31/21 1556    Order Status: Completed Specimen: Abscess from Abdomen Updated: 12/31/21 2017     Gram Stain Result Moderate WBC's      Many Gram positive cocci      Many Gram negative rods    Narrative:      For IR drain placement 12/31/21    Fungus culture [[id number]] Collected: 12/31/21 1556    Order Status: Sent Specimen: Abscess from Abdomen Updated: 12/31/21 1630    Aerobic culture [[id number]] Collected: 12/31/21 1556    Order Status: Sent Specimen: Abscess from Abdomen Updated: 12/31/21 1630    Culture, Anaerobe [[id number]] Collected: 12/31/21 1556    Order Status: Sent Specimen: Abscess from Abdomen  Updated: 12/31/21 1629    Culture, Respiratory with Gram Stain [[id number]]  (Abnormal)  (Susceptibility) Collected: 12/28/21 1046    Order Status: Completed Specimen: Respiratory from BAL, RML Updated: 12/30/21 0944     Respiratory Culture No S aureus or Pseudomonas isolated.      KLEBSIELLA AEROGENES  Few  Normal respiratory alba also present       Gram Stain (Respiratory) <10 epithelial cells per low power field.     Gram Stain (Respiratory) Rare WBC's     Gram Stain (Respiratory) No organisms seen    Narrative:      Right middle lobe, Right lower lobe Bronch        Specimen (24h ago, onward)            None        PTH: No results for input(s): PTH in the last 168 hours.  TSH: No results for input(s): TSH in the last 168 hours.  All labs within the past 24 hours have been reviewed.     Significant Imaging:  Labs: Reviewed  ECG: Reviewed    Assessment/Plan:     Bowel perforation  -Plan per primary team       Septic shock  -secondary to bowel perforation  -per primary      SUSAN (acute kidney injury)  -oliguric SUSAN, ischemic ATN with multifactorial etiology, however most likely d/t severe hypotension as a result of septic shock 2/2 small bowel perforation and bacteremia   -BL sCr 1  -KRT started at OSH 12/12/2021 for acidosis and volume overload  Plan:   -remains anuric,had 12 hours of SLED yesterday completed 5 pm , plan for 12 hours of SLED this evening with UF goal of 350-450ml/hr   -electrolytes: replace Phos to goal >3   -check RFP Q8H while on SLED  -strict I&Os  -anemia: hgb 7    Subcapsular hematoma of liver  -Plan per primary team               Shima Mock MD  Nephrology  [facility] - [facility]

## 2022-01-01 NOTE — PLAN OF CARE
Plan of care reviewed with pt and spouse. Pt sent to IR for drain placement. Tolerated well. Vent settings CPAP 40% 8 peep. Plan to extubate. Tube feeding at 40cc/hr. SLED started x12hrs. Renal panel q8.

## 2022-01-01 NOTE — ASSESSMENT & PLAN NOTE
-oliguric SUSAN, ischemic ATN with multifactorial etiology, however most likely d/t severe hypotension as a result of septic shock 2/2 small bowel perforation and bacteremia   -BL sCr 1  -KRT started at OSH 12/12/2021 for acidosis and volume overload  Plan:   -remains anuric,had 12 hours of SLED yesterday completed 5 pm , plan for 12 hours of SLED this evening with UF goal of 350-450ml/hr   -electrolytes: replace Phos to goal >3   -check RFP Q8H while on SLED  -strict I&Os  -anemia: hgb 7

## 2022-01-01 NOTE — PROGRESS NOTES
Notified Dr. Altman of HR still 145-150s sinus rhythm. BP and SpO2 within normal limits. Pt resting comfortably. Suggested metoprolol but the team had discussed this yesterday and decided against it. No new orders at this time. Dr. Altman will speak with Dr. Dior in the AM about plan.
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
## 2022-01-01 NOTE — ASSESSMENT & PLAN NOTE
57yo female transfer from OSH after hysterectomy on 12/8 complicated by delayed recognition of small bowel injury requiring resection (in discontinuity) and at some point new bleed from the liver - transferred with open abdomen for higher level of care.  S/p ex-lap, liver packing 12/21  Open cholecystectomy, abdominal closure 12/23    - Extubated 12/31, doing well   - Aggressive pulmonary hygiene, OOB to chair, PT/OT; atelectasis likely heavily contributed to last re-intubation  - Tachycardia and anxiety, similar to how she was with prior extubation. Would try to control her anxiety with low dose PRNs prior to initiating beta blockers  - CT 12/31 with pelvic abscess  - IR drain 12/31 - cultures pending   - Continue abx for vap  - Add vanc given gram positive cocci on gram stain from abscess cultures  - Continue to wean vent  - Wound vac change 12/30, next due on Monday   - Transfuse for Hgb <7  - Speech eval  - Continue REAGAN drains   - Strict I/Os  - Appreciate nephrology assistance for CRRT  - Remainder of care per SICU, appreciate assistance

## 2022-01-01 NOTE — PROGRESS NOTES
Pt HR 140s NSR. Other VSS. Notified Dr. Altman. Dr. Altman stated that he has discussed this with Dr. Dior and it could be a combination of factors (pain, anxiety, infection). Pain medication administered. No new orders at this time. Will continue to monitor.

## 2022-01-02 LAB
ALBUMIN SERPL BCP-MCNC: 1.6 G/DL (ref 3.5–5.2)
ALBUMIN SERPL BCP-MCNC: 1.7 G/DL (ref 3.5–5.2)
ALP SERPL-CCNC: 180 U/L (ref 55–135)
ALT SERPL W/O P-5'-P-CCNC: 44 U/L (ref 10–44)
ANION GAP SERPL CALC-SCNC: 10 MMOL/L (ref 8–16)
ANION GAP SERPL CALC-SCNC: 8 MMOL/L (ref 8–16)
ANISOCYTOSIS BLD QL SMEAR: SLIGHT
ANISOCYTOSIS BLD QL SMEAR: SLIGHT
AST SERPL-CCNC: 41 U/L (ref 10–40)
BASO STIPL BLD QL SMEAR: ABNORMAL
BASOPHILS # BLD AUTO: ABNORMAL K/UL (ref 0–0.2)
BASOPHILS NFR BLD: 0 % (ref 0–1.9)
BASOPHILS NFR BLD: 0 % (ref 0–1.9)
BILIRUB SERPL-MCNC: 1.5 MG/DL (ref 0.1–1)
BUN SERPL-MCNC: 11 MG/DL (ref 6–20)
BUN SERPL-MCNC: 11 MG/DL (ref 6–20)
BUN SERPL-MCNC: 27 MG/DL (ref 6–20)
BUN SERPL-MCNC: 34 MG/DL (ref 6–20)
CALCIUM SERPL-MCNC: 7.5 MG/DL (ref 8.7–10.5)
CALCIUM SERPL-MCNC: 7.6 MG/DL (ref 8.7–10.5)
CALCIUM SERPL-MCNC: 7.9 MG/DL (ref 8.7–10.5)
CALCIUM SERPL-MCNC: 8 MG/DL (ref 8.7–10.5)
CHLORIDE SERPL-SCNC: 105 MMOL/L (ref 95–110)
CHLORIDE SERPL-SCNC: 105 MMOL/L (ref 95–110)
CHLORIDE SERPL-SCNC: 107 MMOL/L (ref 95–110)
CHLORIDE SERPL-SCNC: 108 MMOL/L (ref 95–110)
CO2 SERPL-SCNC: 23 MMOL/L (ref 23–29)
CO2 SERPL-SCNC: 23 MMOL/L (ref 23–29)
CO2 SERPL-SCNC: 26 MMOL/L (ref 23–29)
CO2 SERPL-SCNC: 26 MMOL/L (ref 23–29)
CREAT SERPL-MCNC: 0.8 MG/DL (ref 0.5–1.4)
CREAT SERPL-MCNC: 0.8 MG/DL (ref 0.5–1.4)
CREAT SERPL-MCNC: 1.5 MG/DL (ref 0.5–1.4)
CREAT SERPL-MCNC: 1.7 MG/DL (ref 0.5–1.4)
DIFFERENTIAL METHOD: ABNORMAL
DIFFERENTIAL METHOD: ABNORMAL
EOSINOPHIL # BLD AUTO: ABNORMAL K/UL (ref 0–0.5)
EOSINOPHIL NFR BLD: 0 % (ref 0–8)
EOSINOPHIL NFR BLD: 1.5 % (ref 0–8)
ERYTHROCYTE [DISTWIDTH] IN BLOOD BY AUTOMATED COUNT: 15.6 % (ref 11.5–14.5)
ERYTHROCYTE [DISTWIDTH] IN BLOOD BY AUTOMATED COUNT: 15.6 % (ref 11.5–14.5)
EST. GFR  (AFRICAN AMERICAN): 38.9 ML/MIN/1.73 M^2
EST. GFR  (AFRICAN AMERICAN): 45.2 ML/MIN/1.73 M^2
EST. GFR  (AFRICAN AMERICAN): >60 ML/MIN/1.73 M^2
EST. GFR  (AFRICAN AMERICAN): >60 ML/MIN/1.73 M^2
EST. GFR  (NON AFRICAN AMERICAN): 33.7 ML/MIN/1.73 M^2
EST. GFR  (NON AFRICAN AMERICAN): 39.2 ML/MIN/1.73 M^2
EST. GFR  (NON AFRICAN AMERICAN): >60 ML/MIN/1.73 M^2
EST. GFR  (NON AFRICAN AMERICAN): >60 ML/MIN/1.73 M^2
GLUCOSE SERPL-MCNC: 107 MG/DL (ref 70–110)
GLUCOSE SERPL-MCNC: 110 MG/DL (ref 70–110)
GLUCOSE SERPL-MCNC: 97 MG/DL (ref 70–110)
GLUCOSE SERPL-MCNC: 98 MG/DL (ref 70–110)
HCT VFR BLD AUTO: 23.4 % (ref 37–48.5)
HCT VFR BLD AUTO: 25.3 % (ref 37–48.5)
HGB BLD-MCNC: 7.4 G/DL (ref 12–16)
HGB BLD-MCNC: 7.9 G/DL (ref 12–16)
HYPOCHROMIA BLD QL SMEAR: ABNORMAL
IMM GRANULOCYTES # BLD AUTO: ABNORMAL K/UL (ref 0–0.04)
IMM GRANULOCYTES # BLD AUTO: ABNORMAL K/UL (ref 0–0.04)
IMM GRANULOCYTES NFR BLD AUTO: ABNORMAL % (ref 0–0.5)
IMM GRANULOCYTES NFR BLD AUTO: ABNORMAL % (ref 0–0.5)
LYMPHOCYTES # BLD AUTO: ABNORMAL K/UL (ref 1–4.8)
LYMPHOCYTES NFR BLD: 6.5 % (ref 18–48)
LYMPHOCYTES NFR BLD: 7 % (ref 18–48)
MAGNESIUM SERPL-MCNC: 2 MG/DL (ref 1.6–2.6)
MAGNESIUM SERPL-MCNC: 2 MG/DL (ref 1.6–2.6)
MAGNESIUM SERPL-MCNC: 2.1 MG/DL (ref 1.6–2.6)
MCH RBC QN AUTO: 29.2 PG (ref 27–31)
MCH RBC QN AUTO: 29.2 PG (ref 27–31)
MCHC RBC AUTO-ENTMCNC: 31.2 G/DL (ref 32–36)
MCHC RBC AUTO-ENTMCNC: 31.6 G/DL (ref 32–36)
MCV RBC AUTO: 93 FL (ref 82–98)
MCV RBC AUTO: 93 FL (ref 82–98)
METAMYELOCYTES NFR BLD MANUAL: 2 %
METAMYELOCYTES NFR BLD MANUAL: 3 %
MONOCYTES # BLD AUTO: ABNORMAL K/UL (ref 0.3–1)
MONOCYTES NFR BLD: 4 % (ref 4–15)
MONOCYTES NFR BLD: 6 % (ref 4–15)
MYELOCYTES NFR BLD MANUAL: 0.5 %
MYELOCYTES NFR BLD MANUAL: 4 %
NEUTROPHILS NFR BLD: 80 % (ref 38–73)
NEUTROPHILS NFR BLD: 81 % (ref 38–73)
NEUTS BAND NFR BLD MANUAL: 1 %
NEUTS BAND NFR BLD MANUAL: 2.5 %
NRBC BLD-RTO: 0 /100 WBC
NRBC BLD-RTO: 0 /100 WBC
PHOSPHATE SERPL-MCNC: 2.4 MG/DL (ref 2.7–4.5)
PHOSPHATE SERPL-MCNC: 2.4 MG/DL (ref 2.7–4.5)
PHOSPHATE SERPL-MCNC: 2.6 MG/DL (ref 2.7–4.5)
PHOSPHATE SERPL-MCNC: 2.6 MG/DL (ref 2.7–4.5)
PLATELET # BLD AUTO: 466 K/UL (ref 150–450)
PLATELET # BLD AUTO: 518 K/UL (ref 150–450)
PLATELET BLD QL SMEAR: ABNORMAL
PLATELET BLD QL SMEAR: ABNORMAL
PMV BLD AUTO: 10.5 FL (ref 9.2–12.9)
PMV BLD AUTO: 10.9 FL (ref 9.2–12.9)
POCT GLUCOSE: 110 MG/DL (ref 70–110)
POCT GLUCOSE: 117 MG/DL (ref 70–110)
POCT GLUCOSE: 119 MG/DL (ref 70–110)
POCT GLUCOSE: 127 MG/DL (ref 70–110)
POLYCHROMASIA BLD QL SMEAR: ABNORMAL
POLYCHROMASIA BLD QL SMEAR: ABNORMAL
POTASSIUM SERPL-SCNC: 4.9 MMOL/L (ref 3.5–5.1)
POTASSIUM SERPL-SCNC: 4.9 MMOL/L (ref 3.5–5.1)
POTASSIUM SERPL-SCNC: 5.2 MMOL/L (ref 3.5–5.1)
POTASSIUM SERPL-SCNC: 5.3 MMOL/L (ref 3.5–5.1)
PROT SERPL-MCNC: 5.6 G/DL (ref 6–8.4)
RBC # BLD AUTO: 2.53 M/UL (ref 4–5.4)
RBC # BLD AUTO: 2.71 M/UL (ref 4–5.4)
SODIUM SERPL-SCNC: 139 MMOL/L (ref 136–145)
SODIUM SERPL-SCNC: 140 MMOL/L (ref 136–145)
VANCOMYCIN SERPL-MCNC: 17 UG/ML
WBC # BLD AUTO: 22.11 K/UL (ref 3.9–12.7)
WBC # BLD AUTO: 23.52 K/UL (ref 3.9–12.7)
WBC OTHER NFR BLD MANUAL: 1 %

## 2022-01-02 PROCEDURE — 85007 BL SMEAR W/DIFF WBC COUNT: CPT | Performed by: SURGERY

## 2022-01-02 PROCEDURE — 94760 N-INVAS EAR/PLS OXIMETRY 1: CPT

## 2022-01-02 PROCEDURE — 83735 ASSAY OF MAGNESIUM: CPT | Mod: 91 | Performed by: INTERNAL MEDICINE

## 2022-01-02 PROCEDURE — 25000003 PHARM REV CODE 250: Performed by: STUDENT IN AN ORGANIZED HEALTH CARE EDUCATION/TRAINING PROGRAM

## 2022-01-02 PROCEDURE — 99291 PR CRITICAL CARE, E/M 30-74 MINUTES: ICD-10-PCS | Mod: 24,25,, | Performed by: SURGERY

## 2022-01-02 PROCEDURE — 25000242 PHARM REV CODE 250 ALT 637 W/ HCPCS: Performed by: SURGERY

## 2022-01-02 PROCEDURE — 63600175 PHARM REV CODE 636 W HCPCS: Performed by: STUDENT IN AN ORGANIZED HEALTH CARE EDUCATION/TRAINING PROGRAM

## 2022-01-02 PROCEDURE — 94761 N-INVAS EAR/PLS OXIMETRY MLT: CPT

## 2022-01-02 PROCEDURE — 27100171 HC OXYGEN HIGH FLOW UP TO 24 HOURS

## 2022-01-02 PROCEDURE — 99291 CRITICAL CARE FIRST HOUR: CPT | Mod: 24,25,, | Performed by: SURGERY

## 2022-01-02 PROCEDURE — 97530 THERAPEUTIC ACTIVITIES: CPT

## 2022-01-02 PROCEDURE — 97535 SELF CARE MNGMENT TRAINING: CPT

## 2022-01-02 PROCEDURE — 25000242 PHARM REV CODE 250 ALT 637 W/ HCPCS

## 2022-01-02 PROCEDURE — 94660 CPAP INITIATION&MGMT: CPT

## 2022-01-02 PROCEDURE — 92610 EVALUATE SWALLOWING FUNCTION: CPT

## 2022-01-02 PROCEDURE — 94668 MNPJ CHEST WALL SBSQ: CPT

## 2022-01-02 PROCEDURE — 27000221 HC OXYGEN, UP TO 24 HOURS

## 2022-01-02 PROCEDURE — 99900035 HC TECH TIME PER 15 MIN (STAT)

## 2022-01-02 PROCEDURE — 97163 PT EVAL HIGH COMPLEX 45 MIN: CPT

## 2022-01-02 PROCEDURE — 27000190 HC CPAP FULL FACE MASK W/VALVE

## 2022-01-02 PROCEDURE — 80069 RENAL FUNCTION PANEL: CPT | Performed by: INTERNAL MEDICINE

## 2022-01-02 PROCEDURE — 27200966 HC CLOSED SUCTION SYSTEM

## 2022-01-02 PROCEDURE — 94664 DEMO&/EVAL PT USE INHALER: CPT

## 2022-01-02 PROCEDURE — 25000003 PHARM REV CODE 250

## 2022-01-02 PROCEDURE — 97165 OT EVAL LOW COMPLEX 30 MIN: CPT

## 2022-01-02 PROCEDURE — 80053 COMPREHEN METABOLIC PANEL: CPT | Performed by: STUDENT IN AN ORGANIZED HEALTH CARE EDUCATION/TRAINING PROGRAM

## 2022-01-02 PROCEDURE — 85027 COMPLETE CBC AUTOMATED: CPT | Mod: 91 | Performed by: SURGERY

## 2022-01-02 PROCEDURE — 84100 ASSAY OF PHOSPHORUS: CPT | Performed by: STUDENT IN AN ORGANIZED HEALTH CARE EDUCATION/TRAINING PROGRAM

## 2022-01-02 PROCEDURE — 20000000 HC ICU ROOM

## 2022-01-02 PROCEDURE — 94640 AIRWAY INHALATION TREATMENT: CPT

## 2022-01-02 PROCEDURE — 94799 UNLISTED PULMONARY SVC/PX: CPT

## 2022-01-02 PROCEDURE — 99900026 HC AIRWAY MAINTENANCE (STAT)

## 2022-01-02 PROCEDURE — 27000646 HC AEROBIKA DEVICE

## 2022-01-02 PROCEDURE — 80202 ASSAY OF VANCOMYCIN: CPT | Performed by: SURGERY

## 2022-01-02 RX ORDER — ACETAMINOPHEN 650 MG/20.3ML
650 LIQUID ORAL ONCE
Status: COMPLETED | OUTPATIENT
Start: 2022-01-02 | End: 2022-01-02

## 2022-01-02 RX ORDER — AMOXICILLIN 250 MG
1 CAPSULE ORAL DAILY PRN
Status: DISCONTINUED | OUTPATIENT
Start: 2022-01-02 | End: 2022-01-14 | Stop reason: HOSPADM

## 2022-01-02 RX ORDER — POLYETHYLENE GLYCOL 3350 17 G/17G
17 POWDER, FOR SOLUTION ORAL DAILY
Status: DISCONTINUED | OUTPATIENT
Start: 2022-01-02 | End: 2022-01-04

## 2022-01-02 RX ADMIN — PROPRANOLOL HYDROCHLORIDE 10 MG: 10 TABLET ORAL at 04:01

## 2022-01-02 RX ADMIN — SODIUM PHOSPHATE, MONOBASIC, MONOHYDRATE 15 MMOL: 276; 142 INJECTION, SOLUTION INTRAVENOUS at 12:01

## 2022-01-02 RX ADMIN — OXYCODONE HYDROCHLORIDE 10 MG: 5 SOLUTION ORAL at 05:01

## 2022-01-02 RX ADMIN — METHOCARBAMOL 500 MG: 500 TABLET ORAL at 08:01

## 2022-01-02 RX ADMIN — METHOCARBAMOL 500 MG: 500 TABLET ORAL at 01:01

## 2022-01-02 RX ADMIN — LEVALBUTEROL HYDROCHLORIDE 0.63 MG: 0.63 SOLUTION RESPIRATORY (INHALATION) at 12:01

## 2022-01-02 RX ADMIN — POLYETHYLENE GLYCOL 3350 17 G: 17 POWDER, FOR SOLUTION ORAL at 08:01

## 2022-01-02 RX ADMIN — ACETAMINOPHEN 650 MG: 160 SOLUTION ORAL at 08:01

## 2022-01-02 RX ADMIN — NITROGLYCERIN 0.5 INCH: 20 OINTMENT TOPICAL at 12:01

## 2022-01-02 RX ADMIN — Medication: at 08:01

## 2022-01-02 RX ADMIN — METHOCARBAMOL 500 MG: 500 TABLET ORAL at 05:01

## 2022-01-02 RX ADMIN — HEPARIN SODIUM 7500 UNITS: 5000 INJECTION INTRAVENOUS; SUBCUTANEOUS at 05:01

## 2022-01-02 RX ADMIN — GABAPENTIN 125 MG: 250 SOLUTION ORAL at 09:01

## 2022-01-02 RX ADMIN — PROPRANOLOL HYDROCHLORIDE 10 MG: 10 TABLET ORAL at 08:01

## 2022-01-02 RX ADMIN — HEPARIN SODIUM 7500 UNITS: 5000 INJECTION INTRAVENOUS; SUBCUTANEOUS at 09:01

## 2022-01-02 RX ADMIN — SENNOSIDES AND DOCUSATE SODIUM 1 TABLET: 50; 8.6 TABLET ORAL at 08:01

## 2022-01-02 RX ADMIN — GABAPENTIN 125 MG: 250 SOLUTION ORAL at 05:01

## 2022-01-02 RX ADMIN — GABAPENTIN 125 MG: 250 SOLUTION ORAL at 03:01

## 2022-01-02 RX ADMIN — NITROGLYCERIN 0.5 INCH: 20 OINTMENT TOPICAL at 05:01

## 2022-01-02 RX ADMIN — LEVALBUTEROL HYDROCHLORIDE 0.63 MG: 0.63 SOLUTION RESPIRATORY (INHALATION) at 08:01

## 2022-01-02 RX ADMIN — PIPERACILLIN SODIUM AND TAZOBACTAM SODIUM 4.5 G: 4; .5 INJECTION, POWDER, FOR SOLUTION INTRAVENOUS at 09:01

## 2022-01-02 RX ADMIN — PIPERACILLIN SODIUM AND TAZOBACTAM SODIUM 4.5 G: 4; .5 INJECTION, POWDER, FOR SOLUTION INTRAVENOUS at 08:01

## 2022-01-02 RX ADMIN — OXYCODONE HYDROCHLORIDE 10 MG: 5 SOLUTION ORAL at 08:01

## 2022-01-02 RX ADMIN — LEVALBUTEROL HYDROCHLORIDE 0.63 MG: 0.63 SOLUTION RESPIRATORY (INHALATION) at 04:01

## 2022-01-02 RX ADMIN — HEPARIN SODIUM 7500 UNITS: 5000 INJECTION INTRAVENOUS; SUBCUTANEOUS at 03:01
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
## 2022-01-02 NOTE — PT/OT/SLP EVAL
Physical Therapy Evaluation    Patient Name:  Chdii Castaneda   MRN:  [id number]  Admit Date: 12/20/2021  Admitting Diagnosis:  <principal problem not specified>  Length of Stay: 13 days  Recent Surgery: Procedure(s) (LRB):  LAPAROTOMY, EXPLORATORY (N/A)  INSERTION, GASTROSTOMY TUBE, PERCUTANEOUS (N/A)  CHOLECYSTECTOMY  EGD (ESOPHAGOGASTRODUODENOSCOPY) (N/A) 10 Days Post-Op    Recommendations:     Discharge Recommendations:  rehabilitation facility   Discharge Equipment Recommendations: other (see comments) (TBD)   Barriers to discharge: None    Assessment:     Chidi Castaneda is a 56 y.o. female admitted with a medical diagnosis of <principal problem not specified>. Pt found alert and cooperative. Pt profoundly weak, having major decline from baseline mobility. Pt demo'd excellent motivation and participation. Pt required total A for all functional mobility performed today. Pt able to sit EOB and work on sitting balance and ADLs. Pt is an excellent IP rehab candidate due to age, good activity tolerance, decline from PLOF, good family support, and excellent motivation. Recommend IP rehab once pt is medically stable for discharge.      Problem List: weakness,impaired endurance,impaired self care skills,impaired functional mobilty,impaired balance,impaired cardiopulmonary response to activity  Rehab Prognosis: Good; patient would benefit from acute skilled PT services to address these deficits and reach maximum level of function.      Plan:     During this hospitalization, patient to be seen 4 x/week to address the identified rehab impairments via gait training,therapeutic activities,therapeutic exercises,neuromuscular re-education and progress towards the established goals.    · Plan of Care Expires:  01/27/22    Subjective   Communicated with RN prior to session.  Patient found HOB elevated upon PT entry to room, agreeable to evaluation. Chidi Castaneda's mother present during session.    Chief Complaint: No chief  "complaint on file.    Patient/Family Comments/goals: to get better -  Pt with excellent motivation   Pain/Comfort:  · Pain Rating 1: 0/10  · Pain Addressed 1: Reposition,Distraction  · Pain Rating Post-Intervention 1:  (abdominal pain; unrated)    Living Environment:  Pt lives with  in a [facility] with  entrance. Pt was ind with ambulation and ADLs . Patient uses DME as follows: none. DME owned (not currently used): none.    Patient reports they will have assistance from  and family upon discharge.    Objective:   Patient found with: telemetry,blood pressure cuff,central line,REAGAN drain,pulse ox (continuous),oxygen,wound vac,arterial line     General Precautions: Standard, Cardiac fall   Orthopedic Precautions:N/A   Braces: N/A   Oxygen Device: Nasal Cannula   Vitals: BP (!) 145/72   Pulse (!) 123   Temp 99.7 °F (37.6 °C) (Oral)   Resp (!) 22   Ht 5' 4" (1.626 m)   Wt 121.1 kg (267 lb)   SpO2 100%   BMI 45.83 kg/m²     Exams:  · Cognition:   · Alert and Cooperative  · Ox4  · Command following: Follows multistep  commands  · Fluency: clear/fluent    · RLE ROM: WFL  · RLE Strength: grossly 3+/5  · LLE ROM: WFL  · LLE Strength: grossly 3+/5    Outcome Measures:  AM-PAC 6 CLICK MOBILITY  Turning over in bed (including adjusting bedclothes, sheets and blankets)?: 2  Sitting down on and standing up from a chair with arms (e.g., wheelchair, bedside commode, etc.): 2  Moving from lying on back to sitting on the side of the bed?: 2  Moving to and from a bed to a chair (including a wheelchair)?: 1  Need to walk in hospital room?: 1  Climbing 3-5 steps with a railing?: 1  Basic Mobility Total Score: 9     Functional Mobility:  Additional staff present: OT  Bed Mobility:  · Supine to Sit: maximal assistance and 2 persons; HOB elevated  · Scooting anteriorly to EOB to have both feet planted on floor: total assistance and 2 persons  · Sit to Supine: maximal assistance and 2 persons; HOB flat    Sitting Balance at " Edge of Bed:   Assistance Level Required: max progressing to min A    Time: 12 minutes   Comments:   o Pt's sitting balance improved throughout sitting trial. Pt with L lean. Writing therapist facilitated weight bearing through B UEs to assist with sitting balance  o Pt with good head/neck and trunk control but had poor endurance re: difficulty maintaining midline and difficulty maintaining head/neck in neutral position  o Worked on activity tolerance sitting EOB, worked on tolerance to positional change, worked on sitting balance     Transfers:   · Sit <> Stand Transfer: maximal assistance and of 2 persons with no assistive device from EOB x 2 trials   · Pt unable to fully extend hips, maintaining a flexed posture  · Facilitation of hip and thoracic extension   · Verbal cuing for upright posture       Gait:   Not performed 2nd to pt still working on sitting     Therapeutic Activities, Exercises, & Education:   Educated pt on PT role/POC  Educated pt on importance of moving extremities daily   Provided daily orientation  Pt verbalized understanding    Patient left HOB elevated with all lines intact, call button in reach, RN notified and mother present.    GOALS:   Multidisciplinary Problems     Physical Therapy Goals        Problem: Physical Therapy Goal    Goal Priority Disciplines Outcome Goal Variances Interventions   Physical Therapy Goal     PT, PT/OT Ongoing, Progressing     Description: Goals to be met by: 2022    Patient will increase functional independence with mobility by performin. Supine to sit with Moderate Assistance  2. Sit to stand transfer with Moderate Assistance using LRAD  3. Gait  x 10 feet with Moderate Assistance using LRAD  4. Sitting at edge of bed x10 minutes with Stand-by Assistance  5. Stand for 3 minutes with Moderate Assistance using LRAD  6. Lower extremity exercise program x10 reps per handout, with independence                     History:     No past medical history  on file.    Past Surgical History:   Procedure Laterality Date    CHOLECYSTECTOMY  12/23/2021    Procedure: CHOLECYSTECTOMY;  Surgeon: Kendall Gorman MD;  Location: [facility] OR [address] [place] [address];  Service: General;;    ESOPHAGOGASTRODUODENOSCOPY N/A 12/23/2021    Procedure: EGD (ESOPHAGOGASTRODUODENOSCOPY);  Surgeon: Kendall Gorman MD;  [facility];  Service: General;  Laterality: N/A;    EVACUATION OF HEMATOMA  12/21/2021    Procedure: EVACUATION, HEMATOMA;  Surgeon: Kendall Gorman MD;  Location: [facility] OR [address] [place] [address];  Service: General;;    PERCUTANEOUS INSERTION OF GASTROSTOMY TUBE N/A 12/23/2021    Procedure: INSERTION, GASTROSTOMY TUBE, PERCUTANEOUS;  Surgeon: Kendall Gorman MD;  Location: [facility] OR [address] [place] [address];  Service: General;  Laterality: N/A;    REPLACEMENT OF WOUND VACUUM-ASSISTED CLOSURE DEVICE  12/21/2021    Procedure: REPLACEMENT, WOUND VAC;  Surgeon: Kendall Gorman MD;  Location: [facility] OR [address] [place] [address];  Service: General;;       Time Tracking:     PT Received On: 01/02/22  PT Start Time: 0923     PT Stop Time: 0953  PT Total Time (min): 30 min     Billable Minutes: Evaluation 5 and Therapeutic Activity 23

## 2022-01-02 NOTE — PLAN OF CARE
Problem: Occupational Therapy Goal  Goal: Occupational Therapy Goal  Description: Goals to be met by: 1/9/2022 (1 week)     Patient will increase functional independence with ADLs by performing:    UE Dressing with Maximum Assistance.  Grooming while seated with Moderate Assistance.  Toileting from bedside commode with Moderate Assistance for hygiene and clothing management.   Rolling to Bilateral with Moderate Assistance.   Supine to sit with Maximum Assistance.  Step transfer with Moderate Assistance  Toilet transfer to bedside commode with Moderate Assistance.    Evaluated pt and established OT POC. Continue OT as tolerated.  Renee Madden OT  1/2/2022    Outcome: Ongoing, Progressing

## 2022-01-02 NOTE — PROGRESS NOTES
"[facility]  General Surgery  Progress Note    Subjective:     History of Present Illness:  No notes on file    Post-Op Info:  Procedure(s) (LRB):  LAPAROTOMY, EXPLORATORY (N/A)  INSERTION, GASTROSTOMY TUBE, PERCUTANEOUS (N/A)  CHOLECYSTECTOMY  EGD (ESOPHAGOGASTRODUODENOSCOPY) (N/A)   10 Days Post-Op     Interval History:   Did well overnight  Tachycardia improved   Issues with nausea overnight, tube feeds at 10 cc;   No fevers      Medications:  Continuous Infusions:   sodium chloride 0.9% Stopped (01/02/22 0558)     Scheduled Meds:   balsam peru-castor oiL   Topical (Top) BID    gabapentin  125 mg Per G Tube Q8H    heparin (porcine)  7,500 Units Subcutaneous Q8H    levalbuterol  0.63 mg Nebulization Q4H    magnesium citrate  296 mL Oral Once    methocarbamoL  500 mg Per G Tube QID    nitroGLYCERIN 2% TD oint  0.5 inch Topical (Top) Q6H    piperacillin-tazobactam (ZOSYN) IVPB  4.5 g Intravenous Q12H    polyethylene glycol  17 g Oral Daily    promethazine (PHENERGAN) IVPB  12.5 mg Intravenous Once    propranoloL  10 mg Per G Tube TID    senna-docusate 8.6-50 mg  1 tablet Oral Daily     PRN Meds:sodium chloride, dextrose 50%, dextrose 50%, glucagon (human recombinant), HYDROmorphone, insulin aspart U-100, lorazepam, melatonin, olanzapine zydis, ondansetron, oxyCODONE, oxyCODONE, potassium, sodium phosphates, senna-docusate 8.6-50 mg, sodium chloride 0.9%, sodium chloride 0.9%, Pharmacy to dose Vancomycin consult **AND** vancomycin - pharmacy to dose     Review of patient's allergies indicates:   Allergen Reactions    Tylox [oxycodone-acetaminophen]      "Jittery"     Objective:     Vital Signs (Most Recent):  Temp: 98.9 °F (37.2 °C) (01/02/22 0700)  Pulse: (!) 116 (01/02/22 0802)  Resp: 20 (01/02/22 0802)  BP: 126/73 (01/02/22 0645)  SpO2: 95 % (01/02/22 0802) Vital Signs (24h Range):  Temp:  [98.8 °F (37.1 °C)-99.5 °F (37.5 °C)] 98.9 °F (37.2 °C)  Pulse:  [] 116  Resp:  " [16-39] 20  SpO2:  [95 %-100 %] 95 %  BP: (123-134)/(65-74) 126/73  Arterial Line BP: (114-163)/(53-76) 143/62     Weight: 121.1 kg (267 lb)  Body mass index is 45.83 kg/m².    Intake/Output - Last 3 Shifts       12/31 0700  01/01 0659 01/01 0700  01/02 0659 01/02 0700  01/03 0659    I.V. (mL/kg) 3744.4 (30.9) 2670.3 (22.1)     Blood  1099.2     NG/ 820     IV Piggyback 432.6 710     Total Intake(mL/kg) 4581.9 (37.8) 5299.5 (43.8)     Urine (mL/kg/hr) 55 (0) 40 (0)     Drains 316 181     Other 4682 4933     Total Output 5053 5154     Net -471.1 +145.5                  Physical Exam  Vitals and nursing note reviewed.   Constitutional:       General: She is not in acute distress.     Appearance: She is obese. She is not diaphoretic.   HENT:      Head: Normocephalic and atraumatic.      Mouth/Throat:      Mouth: Mucous membranes are moist.      Pharynx: Oropharynx is clear.   Eyes:      Extraocular Movements: Extraocular movements intact.      Conjunctiva/sclera: Conjunctivae normal.   Cardiovascular:      Rate and Rhythm: Regular rhythm. Tachycardia present.   Pulmonary:      Effort: No respiratory distress.      Comments: Intubated  Vent Mode: A/C  Oxygen Concentration (%):  (50-90) 50  Resp Rate Total:  (18 br/min-37 br/min) 26 br/min  Vt Set:  (320 mL) 320 mL  PEEP/CPAP:  (8 cmH20) 8 cmH20  Mean Airway Pressure:  (9.4 oaX02-22 cmH20) 13 cmH20  Abdominal:      General: There is no distension.      Palpations: Abdomen is soft.      Tenderness: There is no guarding or rebound.      Comments: Wound vac in place with good seal, no leak noted.   REAGAN drains with benign fluid, not bloody or bilious   Musculoskeletal:         General: No deformity.   Skin:     General: Skin is warm and dry.   Neurological:      Mental Status: She is alert.         Significant Labs:  I have reviewed all pertinent lab results within the past 24 hours.    Significant Diagnostics:  I have reviewed all pertinent imaging results/findings  within the past 24 hours.    Assessment/Plan:     Subcapsular hematoma of liver  55yo female transfer from OSH after hysterectomy on 12/8 complicated by delayed recognition of small bowel injury requiring resection (in discontinuity) and at some point new bleed from the liver - transferred with open abdomen for higher level of care.  S/p ex-lap, liver packing 12/21  Open cholecystectomy, abdominal closure 12/23    - Extubated 12/31, doing well   - Aggressive pulmonary hygiene, OOB to chair, PT/OT; atelectasis likely heavily contributed to last re-intubation  - Tachycardia improved s/p blood transfusion   - CT 12/31 with pelvic abscess s/p drain placement   - IR drain 12/31 - cultures pending   - Continue abx for vap  - Continue vanc   - Continue to wean vent  - Wound vac change 12/30, next due on Monday   - Recommend restarting ppi/H2B for symptomatic reflux   - Recommend bowel reg, suppository   - Transfuse for Hgb <7  - Speech eval  - Continue REAGAN drains   - Strict I/Os  - Appreciate nephrology assistance for CRRT; Will need to move towards HD   - Remainder of care per SICU, appreciate assistance         Angelica Ivy MD  General Surgery  [facility]

## 2022-01-02 NOTE — PROGRESS NOTES
At 2100, residuals were 125cc, a change from 20-40cc with assessment from the previous night. The patient is also nauseous, unresolved by IV zofran. Residuals were not returned and tube feeds turned down to 10cc/hr. 60cc of fluid also admin with meds at this time. Dr. Almonte notified.    At 2300, residuals 100cc. Orders from Dr. Almonte to turn off tube feeds for now and monitor residuals.     TM

## 2022-01-02 NOTE — PROGRESS NOTES
"Pharmacokinetic Assessment Follow Up: IV Vancomycin    Vancomycin serum concentration assessment and plan:    - Random level this morning is within goal range of 15-20mcg/mL  - Patient on pulsed dosing regimen of IV vancomycin in setting of SUSAN; s/p 12 hr SLED overnight   - No current RRT orders; pt anuric   - Hold IV vancomycin today and recheck level with 1/3 AM labs   - Future doses based on RRT plans and random level    Drug levels (last 3 results):  Recent Labs   Lab Result Units 01/02/22  0337   Vancomycin, Random ug/mL 17.0       Pharmacy will continue to follow and monitor vancomycin.    Please contact pharmacy at extension [phone] for questions regarding this assessment.    Thank you for the consult,   Charla Mendoza       Patient brief summary:  Chidi Castaneda is a 56 y.o. female initiated on antimicrobial therapy with IV Vancomycin for treatment of intra-abdominal infection    Drug Allergies:   Review of patient's allergies indicates:   Allergen Reactions    Tylox [oxycodone-acetaminophen]      "Jittery"       Actual Body Weight:   121kg    Renal Function:   Estimated Creatinine Clearance: 103.2 mL/min (based on SCr of 0.8 mg/dL).,     Dialysis Method (if applicable):  SLED x 12 hrs 1/1 -> 1/2    CBC (last 72 hours):  Recent Labs   Lab Result Units 12/30/21  1523 12/31/21  0443 12/31/21  1413 01/01/22  0348 01/01/22  1556 01/02/22  0337   WBC K/uL 22.94* 24.38* 20.27* 22.92* 26.96* 22.11*   Hemoglobin g/dL 7.4* 7.7* 7.2* 7.0* 8.0* 7.9*   Hematocrit % 24.3* 24.7* 23.0* 22.0* 25.7* 25.3*   Platelets K/uL 389 431 422 479* 465* 466*   Gran % % 85.4* 84.5* 82.6* 81.0* 80.0* 80.0*   Lymph % % 4.8* 5.4* 6.2* 5.5* 7.0* 6.5*   Mono % % 7.0 6.8 7.8 9.0 9.0 6.0   Eosinophil % % 0.7 1.2 1.3 0.3 0.0 1.5   Basophil % % 0.2 0.3 0.3 0.4 0.0 0.0   Differential Method  Automated Automated Automated Automated Manual Manual       Metabolic Panel (last 72 hours):  Recent Labs   Lab Result Units 12/30/21 2111 12/31/21  0443 " 12/31/21  1413 12/31/21  2212 01/01/22  0348 01/01/22  1556 01/01/22  2136 01/02/22  0337   Sodium mmol/L 137 135* 136 139 139  138 137 141 139  139   Potassium mmol/L 4.8 5.2* 5.2* 4.9 5.1  5.2* 5.2* 4.8 4.9  4.9   Chloride mmol/L 106 106 107 106 106  106 106 108 105  105   CO2 mmol/L 23 21* 19* 22* 24  24 23 24 26  26   Glucose mg/dL 115* 104 115* 112* 128*  128* 122* 108 97  98   BUN mg/dL 37* 45* 59* 23* 14  14 29* 17 11  11   Creatinine mg/dL 2.5* 2.8* 3.2* 1.5* 1.2  1.2 1.9* 1.0 0.8  0.8   Albumin g/dL 1.9* 1.9* 1.7* 1.8* 1.8*  1.8* 1.7* 1.6* 1.7*  1.7*   Total Bilirubin mg/dL  --  1.7*  --   --  1.6*  --   --  1.5*   Alkaline Phosphatase U/L  --  170*  --   --  201*  --   --  180*   AST U/L  --  40  --   --  44*  --   --  41*   ALT U/L  --  51*  --   --  47*  --   --  44   Magnesium mg/dL 2.4 2.4 2.3 1.9 2.2 2.1 1.8 2.0   Phosphorus mg/dL 2.6* 3.0 3.0 1.8* 2.2*  2.2* 3.0 1.9* 2.6*  2.6*       Vancomycin Administrations:  vancomycin given in the last 96 hours                   vancomycin 1.5 g in dextrose 5 % 250 mL IVPB (ready to mix) (mg) 1,500 mg New Bag 01/01/22 1029                Microbiologic Results:  Microbiology Results (last 7 days)     Procedure Component Value Units Date/Time    Aerobic culture [[id number]]  (Abnormal)  (Susceptibility) Collected: 12/31/21 1556    Order Status: Completed Specimen: Abscess from Abdomen Updated: 01/02/22 1326     Aerobic Bacterial Culture ESCHERICHIA COLI  Many      Narrative:      For IR drain placement 12/31/21    Culture, Anaerobe [[id number]] Collected: 12/31/21 1066    Order Status: Completed Specimen: Abscess from Abdomen Updated: 01/01/22 1430     Anaerobic Culture Culture in progress    Narrative:      For IR drain placement 12/31/21    Gram stain [[id number]] Collected: 12/31/21 1556    Order Status: Completed Specimen: Abscess from Abdomen Updated: 12/31/21 2017     Gram Stain Result Moderate WBC's      Many Gram positive cocci      Many  Gram negative rods    Narrative:      For IR drain placement 12/31/21    Fungus culture [[id number]] Collected: 12/31/21 1556    Order Status: Sent Specimen: Abscess from Abdomen Updated: 12/31/21 1630    Culture, Respiratory with Gram Stain [[id number]]  (Abnormal)  (Susceptibility) Collected: 12/28/21 1046    Order Status: Completed Specimen: Respiratory from BAL, RML Updated: 12/30/21 0944     Respiratory Culture No S aureus or Pseudomonas isolated.      KLEBSIELLA AEROGENES  Few  Normal respiratory alba also present       Gram Stain (Respiratory) <10 epithelial cells per low power field.     Gram Stain (Respiratory) Rare WBC's     Gram Stain (Respiratory) No organisms seen    Narrative:      Right middle lobe, Right lower lobe Bronch

## 2022-01-02 NOTE — PLAN OF CARE
Plan of care reviewed with pt and familly. Pt Stood at bedside x2 with pt. Speech eval, ok for nectar thick liquids, puree diet. Pain controlled with prn. Possible HD trial tomorrow

## 2022-01-02 NOTE — PLAN OF CARE
Problem: Physical Therapy Goal  Goal: Physical Therapy Goal  Description: Goals to be met by: 2022    Patient will increase functional independence with mobility by performin. Supine to sit with Moderate Assistance  2. Sit to stand transfer with Moderate Assistance using LRAD  3. Gait  x 10 feet with Moderate Assistance using LRAD  4. Sitting at edge of bed x10 minutes with Stand-by Assistance  5. Stand for 3 minutes with Moderate Assistance using LRAD  6. Lower extremity exercise program x10 reps per handout, with independence    Outcome: Ongoing, Progressing     Eval completed. Goals appropriate.

## 2022-01-02 NOTE — SUBJECTIVE & OBJECTIVE
Interval History/Significant Events: HR now improved to 90s-110s after receiving pRBCs, starting vanc and propanolol.  Tolerated CRRT well.  Had episode of nausea with 150cc residual TF in stomach.  TF turned off and given zofran and phenergan.  This morning, the patient denies any pain or nausea.  Satting well on 2L.    Follow-up For: Procedure(s) (LRB):  LAPAROTOMY, EXPLORATORY (N/A)  INSERTION, GASTROSTOMY TUBE, PERCUTANEOUS (N/A)  CHOLECYSTECTOMY  EGD (ESOPHAGOGASTRODUODENOSCOPY) (N/A)    Post-Operative Day: 10 Days Post-Op    Objective:     Vital Signs (Most Recent):  Temp: 98.9 °F (37.2 °C) (01/02/22 0700)  Pulse: (!) 111 (01/02/22 0700)  Resp: (!) 21 (01/02/22 0700)  BP: 126/73 (01/02/22 0645)  SpO2: 99 % (01/02/22 0700) Vital Signs (24h Range):  Temp:  [98.8 °F (37.1 °C)-99.5 °F (37.5 °C)] 98.9 °F (37.2 °C)  Pulse:  [] 111  Resp:  [16-39] 21  SpO2:  [96 %-100 %] 99 %  BP: (123-134)/(65-74) 126/73  Arterial Line BP: (114-163)/(53-76) 143/62     Weight: 121.1 kg (267 lb)  Body mass index is 45.83 kg/m².      Intake/Output Summary (Last 24 hours) at 1/2/2022 0755  Last data filed at 1/2/2022 0604  Gross per 24 hour   Intake 5247.03 ml   Output 5094 ml   Net 153.03 ml       Physical Exam  Vitals and nursing note reviewed.   Constitutional:       General: She is not in acute distress.     Appearance: She is obese. She is not diaphoretic.   HENT:      Head: Normocephalic and atraumatic.      Mouth/Throat:      Mouth: Mucous membranes are moist.      Pharynx: Oropharynx is clear.   Eyes:      Extraocular Movements: Extraocular movements intact.      Conjunctiva/sclera: Conjunctivae normal.   Cardiovascular:      Rate and Rhythm: Regular rhythm. Tachycardia present.   Pulmonary:      Effort: Pulmonary effort is normal. No respiratory distress.      Breath sounds: Normal breath sounds. No wheezing or rales.      Comments: Extubated, on 2 L NC  Abdominal:      General: There is no distension.      Palpations:  Abdomen is soft.      Tenderness: There is no guarding or rebound.      Comments: Wound vac in place with good seal, no leak noted.   REAGAN drains with benign fluid, not bloody or bilious   Musculoskeletal:         General: No deformity.      Cervical back: Normal range of motion and neck supple.   Skin:     General: Skin is warm and dry.   Neurological:      Mental Status: She is alert.         Lines/Drains/Airways     Central Venous Catheter Line            Trialysis (Dialysis) Catheter 12/30/21 1000 right internal jugular 2 days          Drain                 Closed/Suction Drain 12/23/21 1428 Right;Inferior RUQ Bulb 19 Fr. 9 days         Closed/Suction Drain 12/23/21 1429 Right;Superior RUQ Bulb 19 Fr. 9 days         Gastrostomy/Enterostomy 12/23/21 1338 Gastrostomy tube w/ balloon LUQ feeding 9 days         Closed/Suction Drain 12/31/21 1559 Left Abdomen Bulb 14 Fr. 1 day          Arterial Line            Arterial Line 12/18/21 0000 Right Radial 15 days                Significant Labs:    CBC/Anemia Profile:  Recent Labs   Lab 01/01/22  0348 01/01/22  1556 01/02/22  0337   WBC 22.92* 26.96* 22.11*   HGB 7.0* 8.0* 7.9*   HCT 22.0* 25.7* 25.3*   * 465* 466*   MCV 90 94 93   RDW 15.7* 15.3* 15.6*        Chemistries:  Recent Labs   Lab 01/01/22  0348 01/01/22  0348 01/01/22  1556 01/01/22  2136 01/02/22  0337     138   < > 137 141 139  139   K 5.1  5.2*   < > 5.2* 4.8 4.9  4.9     106   < > 106 108 105  105   CO2 24  24   < > 23 24 26  26   BUN 14  14   < > 29* 17 11  11   CREATININE 1.2  1.2   < > 1.9* 1.0 0.8  0.8   CALCIUM 8.1*  8.1*   < > 7.9* 7.5* 8.0*  7.9*   ALBUMIN 1.8*  1.8*   < > 1.7* 1.6* 1.7*  1.7*   PROT 5.7*  --   --   --  5.6*   BILITOT 1.6*  --   --   --  1.5*   ALKPHOS 201*  --   --   --  180*   ALT 47*  --   --   --  44   AST 44*  --   --   --  41*   MG 2.2   < > 2.1 1.8 2.0   PHOS 2.2*  2.2*   < > 3.0 1.9* 2.6*  2.6*    < > = values in this interval not  displayed.     Significant Imaging:  I have reviewed all pertinent imaging results/findings within the past 24 hours.

## 2022-01-02 NOTE — ASSESSMENT & PLAN NOTE
57yo female transfer from OSH after hysterectomy on 12/8 complicated by delayed recognition of small bowel injury requiring resection (in discontinuity) and at some point new bleed from the liver - transferred with open abdomen for higher level of care.  S/p ex-lap, liver packing 12/21  Open cholecystectomy, abdominal closure 12/23    - Extubated 12/31, doing well   - Aggressive pulmonary hygiene, OOB to chair, PT/OT; atelectasis likely heavily contributed to last re-intubation  - Tachycardia improved s/p blood transfusion   - CT 12/31 with pelvic abscess s/p drain placement   - IR drain 12/31 - cultures pending   - Continue abx for vap  - Continue vanc   - Continue to wean vent  - Wound vac change 12/30, next due on Monday   - Recommend restarting ppi/H2B for symptomatic reflux   - Recommend bowel reg, suppository   - Transfuse for Hgb <7  - Speech eval  - Continue REAGAN drains   - Strict I/Os  - Appreciate nephrology assistance for CRRT; Will need to move towards HD   - Remainder of care per SICU, appreciate assistance

## 2022-01-02 NOTE — PLAN OF CARE
Problem: SLP Goal  Goal: SLP Goal  Description: Speech Therapy Short Term Goals  Goal expected to be met by 1/16  1. Pt will participate in an ongoing assessment to determine the least restrictive and safest diet with possible updated goals to follow pending results.    Outcome: Ongoing, Progressing   Bedside swallow study completed. Recommend: Pureed diet, NECTAR thick liquids, and strict aspiration precautions. ST will continue to follow.

## 2022-01-02 NOTE — PLAN OF CARE
Plan of care reviewed with pt and spouse. Pt on 2L NC tolerated well. CRRT x12hrs, Tube feeds at 40cc/hr. 1 unit PRBC transfused. Plan for cardiac chair tomorrow.

## 2022-01-02 NOTE — PLAN OF CARE
SICU PLAN OF CARE NOTE     Shift Events: SLED overnight. TF stopped for increased residuals and nausea.     Neuro: AAOx4. Follows commands and moves all extremities purposefully.     Vital Signs: Afebrile. Sinus tachycardia 100-110s. MAPs >65. SpO2 100% on 2L NC and CPAP overnight.     Respiratory: 2L NC     Diet: NPO     Drains: #1 R REAGAN: 17 cc/shift, #2 R REAGAN: 14 cc/shift, L REAGAN: 100 cc/shift dark red and odorous. WV 50cc/shift.     Labs/Accuchecks: Accuchecks Q6. CRRT and daily labs. Phos and Mg replaced overnight as ordered.      Skin: No new skin breakdown noted. Turned Q2hr. Heel and sacral foams in place. SCDs and heel boots on. Full CHG bath this AM. Immerse bed plugged in and working properly.

## 2022-01-02 NOTE — PROGRESS NOTES
01/01/22 1815   Treatment   Treatment Type SLED   Treatment Status Restart;Order change   Dialysis Machine Number [id number]   Solutions Labeled and Current  Yes   Access Temporary Cath;Right;IJ   Catheter Dressing Intact  Yes   Alarms Engaged Yes     CRRT tx started as ordered without issue, pt left in NAD/VSS. Report given to primary RN, will increase UF to goal of 400ml/hr as tolerated.

## 2022-01-02 NOTE — PT/OT/SLP EVAL
Speech Language Pathology Evaluation  Bedside Swallow    Patient Name:  Chidi Castaneda   MRN:  [id number]   [id number] A    Admitting Diagnosis: <principal problem not specified>    Recommendations:                 General Recommendations:  Dysphagia therapy  Diet recommendations:  Puree, Nectar Thick   Aspiration Precautions:   · 1 small bite/sip at a time,   · Frequent oral care,   · HOB to 90 degrees,   · Meds crushed in puree,   · Continue to monitor for signs and symptoms of aspiration and discontinue oral feeding should you notice any of the following: watery eyes, reddened facial area, wet vocal quality, increased work of breathing, change in respiratory status, increased congestion, coughing, fever, etc.  General Precautions: Standard, aspiration,pureed diet,nectar thick  Communication strategies:  none    History:     No past medical history on file.    Past Surgical History:   Procedure Laterality Date    CHOLECYSTECTOMY  12/23/2021    Procedure: CHOLECYSTECTOMY;  Surgeon: Kendall Gorman MD;  Location: [address];  Service: General;;    ESOPHAGOGASTRODUODENOSCOPY N/A 12/23/2021    Procedure: EGD (ESOPHAGOGASTRODUODENOSCOPY);  Surgeon: Kendall Gorman MD;  Location: [address];  Service: General;  Laterality: N/A;    EVACUATION OF HEMATOMA  12/21/2021    Procedure: EVACUATION, HEMATOMA;  Surgeon: Kendall Gorman MD;  Location: [address];  Service: General;;    PERCUTANEOUS INSERTION OF GASTROSTOMY TUBE N/A 12/23/2021    Procedure: INSERTION, GASTROSTOMY TUBE, PERCUTANEOUS;  Surgeon: Kendall Gorman MD;  Location: [address];  Service: General;  Laterality: N/A;    REPLACEMENT OF WOUND VACUUM-ASSISTED CLOSURE DEVICE  12/21/2021    Procedure: REPLACEMENT, WOUND VAC;  Surgeon: Kendall Gorman MD;  Location: [address];  Service: General;;       MD note 1/2: Hospital/ICU Course:  Patient admitted to SICU on 12/20 after transfer from OSH with Hypovolemic shock and liver  hemorrhage after hysterectomy 12/8.  Patient receved multipel transfusions and underwent ex lap. Bleeding is not stopped, H&H stable.  Abdomen is now closed with wound vac for subq and skin.  Now septic with pelvic abscess, IR drained on 12/31.  On abx.  Patient also has likely respiratory process, BAL positive for GNR.  Extubated 12/3 to NC  Interval History/Significant Events: HR now improved to 90s-110s after receiving pRBCs, starting vanc and propanolol.  Tolerated CRRT well.  Had episode of nausea with 150cc residual TF in stomach.  TF turned off and given zofran and phenergan.  This morning, the patient denies any pain or nausea.  Satting well on 2L.    Prior Intubation HX:  12/20-12/17; 12/28-12/31    Chest X-Rays 12/30:  Support lines and tubes appear grossly stable in overall position.  Cardiomediastinal silhouette is midline and similar to prior.  No pneumothorax or definite new focal opacity.  Overall, no significant change in the cardiopulmonary status from prior earlier same day.    Prior diet: reg/thin    Subjective     Patient awake and cooperative. Mother present in room. Communicated with RN prior to entry.     Pain/Comfort:  · Pain Rating 1: 0/10    Respiratory Status: nasal cannula    Objective:     Oral Musculature Evaluation  · Oral Musculature: WFL  · Dentition: present and adequate  · Mucosal Quality: dry  · Mandibular Strength and Mobility: WFL  · Oral Labial Strength and Mobility: WFL  · Lingual Strength and Mobility: WFL  · Volitional Cough: weak  · Volitional Swallow: timely  · Voice Prior to PO Intake: severely dysphonic    Bedside Swallow Eval:   Consistencies Assessed:  · Thin liquids ice chip, teaspo sips x2, cup sip x1  · Nectar thick liquids cup and straw sips  · Puree teaspoon bites x4   · Patient declined solid trials    Oral Phase:   · WFL    Pharyngeal Phase:   · Coughing and reported difficulty with cup sip of thin liquids  · No overt s/s of aspiration with puree and nectar  thick trials  · Patient denies difficulties with puree/NTL    Compensatory Strategies  · None    Treatment: SLP provided patient and family education on SLP recommendations, SLP role, s/s and risks of aspiration, safe swallow precautions, and POC. Patient and patient's mother verbalized understanding and are in agreement with POC. Communicated recommendations with RN. White board updated.     Assessment:     Chidi Castaneda is a 56 y.o. female with an SLP diagnosis of Dysphagia. ST will continue to follow.     Goals:   Multidisciplinary Problems     SLP Goals        Problem: SLP Goal    Goal Priority Disciplines Outcome   SLP Goal     SLP Ongoing, Progressing   Description: Speech Therapy Short Term Goals  Goal expected to be met by 1/16  1. Pt will participate in an ongoing assessment to determine the least restrictive and safest diet with possible updated goals to follow pending results.                     Plan:     · Patient to be seen:  4 x/week   · Plan of Care expires:     · Plan of Care reviewed with:  patient,mother   · SLP Follow-Up:  Yes       Discharge recommendations:   (tbd)   Barriers to Discharge:  None    Time Tracking:     SLP Treatment Date:   01/02/22  Speech Start Time:  1155  Speech Stop Time:  1213     Speech Total Time (min):  18 min    Billable Minutes: Eval Swallow and Oral Function 10 and Self Care/Home Management Training 8    01/02/2022

## 2022-01-02 NOTE — SUBJECTIVE & OBJECTIVE
"Interval History:   Did well overnight  Tachycardia improved   Issues with nausea overnight, tube feeds at 10 cc;   No fevers      Medications:  Continuous Infusions:   sodium chloride 0.9% Stopped (01/02/22 0558)     Scheduled Meds:   balsam peru-castor oiL   Topical (Top) BID    gabapentin  125 mg Per G Tube Q8H    heparin (porcine)  7,500 Units Subcutaneous Q8H    levalbuterol  0.63 mg Nebulization Q4H    magnesium citrate  296 mL Oral Once    methocarbamoL  500 mg Per G Tube QID    nitroGLYCERIN 2% TD oint  0.5 inch Topical (Top) Q6H    piperacillin-tazobactam (ZOSYN) IVPB  4.5 g Intravenous Q12H    polyethylene glycol  17 g Oral Daily    promethazine (PHENERGAN) IVPB  12.5 mg Intravenous Once    propranoloL  10 mg Per G Tube TID    senna-docusate 8.6-50 mg  1 tablet Oral Daily     PRN Meds:sodium chloride, dextrose 50%, dextrose 50%, glucagon (human recombinant), HYDROmorphone, insulin aspart U-100, lorazepam, melatonin, olanzapine zydis, ondansetron, oxyCODONE, oxyCODONE, potassium, sodium phosphates, senna-docusate 8.6-50 mg, sodium chloride 0.9%, sodium chloride 0.9%, Pharmacy to dose Vancomycin consult **AND** vancomycin - pharmacy to dose     Review of patient's allergies indicates:   Allergen Reactions    Tylox [oxycodone-acetaminophen]      "Jittery"     Objective:     Vital Signs (Most Recent):  Temp: 98.9 °F (37.2 °C) (01/02/22 0700)  Pulse: (!) 116 (01/02/22 0802)  Resp: 20 (01/02/22 0802)  BP: 126/73 (01/02/22 0645)  SpO2: 95 % (01/02/22 0802) Vital Signs (24h Range):  Temp:  [98.8 °F (37.1 °C)-99.5 °F (37.5 °C)] 98.9 °F (37.2 °C)  Pulse:  [] 116  Resp:  [16-39] 20  SpO2:  [95 %-100 %] 95 %  BP: (123-134)/(65-74) 126/73  Arterial Line BP: (114-163)/(53-76) 143/62     Weight: 121.1 kg (267 lb)  Body mass index is 45.83 kg/m².    Intake/Output - Last 3 Shifts       12/31 0700  01/01 0659 01/01 0700 01/02 0659 01/02 0700  01/03 0659    I.V. (mL/kg) 3744.4 (30.9) 2670.3 (22.1)     " Blood  1099.2     NG/ 820     IV Piggyback 432.6 710     Total Intake(mL/kg) 4581.9 (37.8) 5299.5 (43.8)     Urine (mL/kg/hr) 55 (0) 40 (0)     Drains 316 181     Other 4682 4933     Total Output 5053 5154     Net -471.1 +145.5                  Physical Exam  Vitals and nursing note reviewed.   Constitutional:       General: She is not in acute distress.     Appearance: She is obese. She is not diaphoretic.   HENT:      Head: Normocephalic and atraumatic.      Mouth/Throat:      Mouth: Mucous membranes are moist.      Pharynx: Oropharynx is clear.   Eyes:      Extraocular Movements: Extraocular movements intact.      Conjunctiva/sclera: Conjunctivae normal.   Cardiovascular:      Rate and Rhythm: Regular rhythm. Tachycardia present.   Pulmonary:      Effort: No respiratory distress.      Comments: Intubated  Vent Mode: A/C  Oxygen Concentration (%):  (50-90) 50  Resp Rate Total:  (18 br/min-37 br/min) 26 br/min  Vt Set:  (320 mL) 320 mL  PEEP/CPAP:  (8 cmH20) 8 cmH20  Mean Airway Pressure:  (9.4 dsN97-26 cmH20) 13 cmH20  Abdominal:      General: There is no distension.      Palpations: Abdomen is soft.      Tenderness: There is no guarding or rebound.      Comments: Wound vac in place with good seal, no leak noted.   REAGAN drains with benign fluid, not bloody or bilious   Musculoskeletal:         General: No deformity.   Skin:     General: Skin is warm and dry.   Neurological:      Mental Status: She is alert.         Significant Labs:  I have reviewed all pertinent lab results within the past 24 hours.    Significant Diagnostics:  I have reviewed all pertinent imaging results/findings within the past 24 hours.
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
## 2022-01-02 NOTE — PT/OT/SLP EVAL
Occupational Therapy   Co-Evaluation and Co-treat  Co-treatment with PT for maximal pt participation, safety, and activity tolerance       Name: Chidi Castaneda  MRN: [id number]  Admitting Diagnosis:  <principal problem not specified>  Recent Surgery: Procedure(s) (LRB):  LAPAROTOMY, EXPLORATORY (N/A)  INSERTION, GASTROSTOMY TUBE, PERCUTANEOUS (N/A)  CHOLECYSTECTOMY  EGD (ESOPHAGOGASTRODUODENOSCOPY) (N/A) 10 Days Post-Op    Recommendations:     Discharge Recommendations: rehabilitation facility (tbd)  Discharge Equipment Recommendations:  other (see comments) (tbd)  Barriers to discharge:       Assessment:     Chidi Castaneda is a 56 y.o. female with a medical diagnosis of <principal problem not specified>.  She presents with impaired ADL and mobility performance deficits. Pt found upright and agreeable to therapy with pt's mother present and supportive. Pt stated she lives with her  and was (I) PTA. While in bed, pt's UEs assessed with pt showing notable deficits to BUEs (R>L) with limitations in overall strength and debility. During additional mobility asssessments today, pt required max A x2-total A x2 for bed mobility and sat EOB initially with max A however progressed to moments of min A. Pt stood x2 trials at EOB with max A x2 requiring cues for upright posturing and for blocking of knees. Pt unable to take any steps at this time. Pt was left upright in bed and voiced appreciation of session.  At this time, pt is a high fall risk and is not safe to return home. Pt would benefit from continued OT skilled services 3x/wk to improve daily living skills to optimize QOL.  Pt is recommended to discharge to rehab at this time.  Performance deficits affecting function: weakness,impaired endurance,impaired self care skills,impaired functional mobilty,gait instability,impaired balance,impaired cognition,decreased coordination,decreased lower extremity function,decreased upper extremity function,decreased safety  awareness,decreased ROM,impaired fine motor,impaired cardiopulmonary response to activity.      Rehab Prognosis: Good; patient would benefit from acute skilled OT services to address these deficits and reach maximum level of function.       Plan:     Patient to be seen 4 x/week to address the above listed problems via self-care/home management,therapeutic activities,therapeutic exercises,neuromuscular re-education  · Plan of Care Expires: 02/02/22  · Plan of Care Reviewed with: patient,mother    Subjective     Chief Complaint: feeling weak 2  Patient/Family Comments/goals: return home     Occupational Profile:  Living Environment: Pt lives with her  in a [facility] with threshold to enter. Pt has both a shower/tub combination for bathing as well as a WIS.   Previous level of function: I with ADLs and mobility  Roles and Routines: Pt does not work (Retired); enjoys watching TV   Equipment Used at Home:  none  Assistance upon Discharge: spouse as able     Pain/Comfort:  Pain Rating 1: 0/10  Pain Rating Post-Intervention 1: 0/10    Patients cultural, spiritual, Jainism conflicts given the current situation: no    Objective:     Communicated with: RN prior to session.  Patient found HOB elevated with blood pressure cuff,pulse ox (continuous),peripheral IV,telemetry,REAGAN drain,wound vac,oxygen upon OT entry to room.    General Precautions: Standard, fall,aspiration   Orthopedic Precautions:N/A   Braces: N/A  Respiratory Status: Nasal cannula, flow 10L  L/min    Occupational Performance:    Bed Mobility:    · Patient completed Rolling/Turning to Right with maximal assistance and 2 persons  · Patient completed Scooting/Bridging with maximal assistance and 2 persons  · Patient completed Supine to Sit with maximal assistance and 2 persons  · Patient completed Sit to Supine with total assistance and 2 persons    Functional Mobility/Transfers:  · Patient completed Sit <> Stand Transfer with maximal assistance and of 2 persons   with  hand-held assist   · Functional Mobility: Pt stood with max A x2 for two separate trials. Pt with excellent motivation and momentum rocking prior to stands however did require bilateral knee blocking and cues for upright posturing. Pt also sat several minutes at EOB for postural correction training to fix R lateral and posterior leaning (~10 minutes)    Activities of Daily Living:  · Grooming: maximal assistance to wash face at EOB (pt initiating using RUE however needed [place] and increased )  · Upper Body Dressing: total assistance adjusting gown fit at EOB   · Lower Body Dressing: total assistance donning  socks     Cognitive/Visual Perceptual:  Cognitive/Psychosocial Skills:     -       Oriented to: Person, Place, Time and Situation   -       Follows Commands/attention:Follows multistep  commands  -       Communication: dysphonia noted however vocalized to tolerance   -       Memory: No Deficits noted  -       Safety awareness/insight to disability: intact   -       Mood/Affect/Coping skills/emotional control: Appropriate to situation    Physical Exam:  Balance:    -       initiatlly required max A for EOB however progressed to min A given increased time for cues and postural correction (typically a R lateral lean); poor standing balance 2/2 LE weakness  Dominant hand:    -       right  Upper Extremity Range of Motion:     -       Right Upper Extremity: Deficits: limited at elbow, shoulder, wrist, and forearm (Fair digit ROM)  -       Left Upper Extremity: WFL except limited at shoulder   Upper Extremity Strength: -       Right Upper Extremity: Deficits: 4/5  -       Left Upper Extremity: Deficits: 4+/5   Strength:    -       Right Upper Extremity: WFL  -       Left Upper Extremity: WFL    AMPAC 6 Click ADL:  AMPAC Total Score: 6    Treatment & Education:  Pt educated on role of occupational therapy, POC, and safety during ADLs and functional mobility. Pt and OT discussed importance of safe,  continued mobility to optimize daily living skills. Pt verbalized understanding.   White board updated during session. Pt given instruction to call for medical staff/nurse for assistance.     Education:    Patient left HOB elevated with all lines intact, call button in reach, RN notified and pt's mother  present    GOALS:   Multidisciplinary Problems     Occupational Therapy Goals        Problem: Occupational Therapy Goal    Goal Priority Disciplines Outcome Interventions   Occupational Therapy Goal     OT, PT/OT Ongoing, Progressing    Description: Goals to be met by: 1/9/2022 (1 week)     Patient will increase functional independence with ADLs by performing:    UE Dressing with Maximum Assistance.  Grooming while seated with Moderate Assistance.  Toileting from bedside commode with Moderate Assistance for hygiene and clothing management.   Rolling to Bilateral with Moderate Assistance.   Supine to sit with Maximum Assistance.  Step transfer with Moderate Assistance  Toilet transfer to bedside commode with Moderate Assistance.                     History:     No past medical history on file.      Past Surgical History:   Procedure Laterality Date    CHOLECYSTECTOMY  12/23/2021    Procedure: CHOLECYSTECTOMY;  Surgeon: Kendall Gorman MD;  Location: [facility] OR [address] [place] [address];  Service: General;;    ESOPHAGOGASTRODUODENOSCOPY N/A 12/23/2021    Procedure: EGD (ESOPHAGOGASTRODUODENOSCOPY);  Surgeon: Kendall Gorman MD;  Location: [facility] OR [address] [place] [address];  Service: General;  Laterality: N/A;    EVACUATION OF HEMATOMA  12/21/2021    Procedure: EVACUATION, HEMATOMA;  Surgeon: Kendall Gorman MD;  Location: [facility] OR [address] [place] [address];  Service: General;;    PERCUTANEOUS INSERTION OF GASTROSTOMY TUBE N/A 12/23/2021    Procedure: INSERTION, GASTROSTOMY TUBE, PERCUTANEOUS;  Surgeon: Kendall Gorman MD;  Location: [facility] OR [address] [place] [address];  Service: General;  Laterality: N/A;    REPLACEMENT OF WOUND VACUUM-ASSISTED CLOSURE DEVICE  12/21/2021     Procedure: REPLACEMENT, WOUND VAC;  Surgeon: Kendall Gorman MD;  Location: [facility] OR [address] [place] [address];  Service: General;;       Time Tracking:     OT Date of Treatment: 01/02/22  OT Start Time: 0922  OT Stop Time: 0958  OT Total Time (min): 36 min    Billable Minutes:Evaluation 10 min  Self Care/Home Management 10 min  Therapeutic Activity 16 min    1/2/2022

## 2022-01-03 LAB
ABO + RH BLD: NORMAL
ALBUMIN SERPL BCP-MCNC: 1.6 G/DL (ref 3.5–5.2)
ALBUMIN SERPL BCP-MCNC: 1.7 G/DL (ref 3.5–5.2)
ALBUMIN SERPL BCP-MCNC: 1.7 G/DL (ref 3.5–5.2)
ALBUMIN SERPL BCP-MCNC: NORMAL G/DL (ref 3.5–5.2)
ALP SERPL-CCNC: 155 U/L (ref 55–135)
ALT SERPL W/O P-5'-P-CCNC: 38 U/L (ref 10–44)
ANION GAP SERPL CALC-SCNC: 10 MMOL/L (ref 8–16)
ANION GAP SERPL CALC-SCNC: NORMAL MMOL/L (ref 8–16)
ANISOCYTOSIS BLD QL SMEAR: SLIGHT
ANISOCYTOSIS BLD QL SMEAR: SLIGHT
AST SERPL-CCNC: 37 U/L (ref 10–40)
BASO STIPL BLD QL SMEAR: ABNORMAL
BASOPHILS # BLD AUTO: ABNORMAL K/UL (ref 0–0.2)
BASOPHILS # BLD AUTO: ABNORMAL K/UL (ref 0–0.2)
BASOPHILS NFR BLD: 1 % (ref 0–1.9)
BASOPHILS NFR BLD: 1 % (ref 0–1.9)
BILIRUB SERPL-MCNC: 1.3 MG/DL (ref 0.1–1)
BLD GP AB SCN CELLS X3 SERPL QL: NORMAL
BLD PROD TYP BPU: NORMAL
BLOOD UNIT EXPIRATION DATE: NORMAL
BLOOD UNIT TYPE CODE: 6200
BLOOD UNIT TYPE: NORMAL
BUN SERPL-MCNC: 42 MG/DL (ref 6–20)
BUN SERPL-MCNC: 43 MG/DL (ref 6–20)
BUN SERPL-MCNC: 57 MG/DL (ref 6–20)
BUN SERPL-MCNC: NORMAL MG/DL (ref 6–20)
CALCIUM SERPL-MCNC: 7.9 MG/DL (ref 8.7–10.5)
CALCIUM SERPL-MCNC: 8 MG/DL (ref 8.7–10.5)
CALCIUM SERPL-MCNC: 8.1 MG/DL (ref 8.7–10.5)
CALCIUM SERPL-MCNC: NORMAL MG/DL (ref 8.7–10.5)
CHLORIDE SERPL-SCNC: 104 MMOL/L (ref 95–110)
CHLORIDE SERPL-SCNC: 105 MMOL/L (ref 95–110)
CHLORIDE SERPL-SCNC: 105 MMOL/L (ref 95–110)
CHLORIDE SERPL-SCNC: NORMAL MMOL/L (ref 95–110)
CO2 SERPL-SCNC: 23 MMOL/L (ref 23–29)
CO2 SERPL-SCNC: 24 MMOL/L (ref 23–29)
CO2 SERPL-SCNC: 25 MMOL/L (ref 23–29)
CO2 SERPL-SCNC: NORMAL MMOL/L (ref 23–29)
CODING SYSTEM: NORMAL
CREAT SERPL-MCNC: 2.2 MG/DL (ref 0.5–1.4)
CREAT SERPL-MCNC: 2.2 MG/DL (ref 0.5–1.4)
CREAT SERPL-MCNC: 2.9 MG/DL (ref 0.5–1.4)
CREAT SERPL-MCNC: NORMAL MG/DL (ref 0.5–1.4)
DIFFERENTIAL METHOD: ABNORMAL
DIFFERENTIAL METHOD: ABNORMAL
DISPENSE STATUS: NORMAL
EOSINOPHIL # BLD AUTO: ABNORMAL K/UL (ref 0–0.5)
EOSINOPHIL # BLD AUTO: ABNORMAL K/UL (ref 0–0.5)
EOSINOPHIL NFR BLD: 0 % (ref 0–8)
EOSINOPHIL NFR BLD: 0 % (ref 0–8)
ERYTHROCYTE [DISTWIDTH] IN BLOOD BY AUTOMATED COUNT: 15.5 % (ref 11.5–14.5)
ERYTHROCYTE [DISTWIDTH] IN BLOOD BY AUTOMATED COUNT: 15.9 % (ref 11.5–14.5)
EST. GFR  (AFRICAN AMERICAN): 20.4 ML/MIN/1.73 M^2
EST. GFR  (AFRICAN AMERICAN): 28.5 ML/MIN/1.73 M^2
EST. GFR  (AFRICAN AMERICAN): 28.5 ML/MIN/1.73 M^2
EST. GFR  (AFRICAN AMERICAN): NORMAL ML/MIN/1.73 M^2
EST. GFR  (NON AFRICAN AMERICAN): 17.7 ML/MIN/1.73 M^2
EST. GFR  (NON AFRICAN AMERICAN): 24.7 ML/MIN/1.73 M^2
EST. GFR  (NON AFRICAN AMERICAN): 24.7 ML/MIN/1.73 M^2
EST. GFR  (NON AFRICAN AMERICAN): NORMAL ML/MIN/1.73 M^2
GLUCOSE SERPL-MCNC: 100 MG/DL (ref 70–110)
GLUCOSE SERPL-MCNC: 101 MG/DL (ref 70–110)
GLUCOSE SERPL-MCNC: 99 MG/DL (ref 70–110)
GLUCOSE SERPL-MCNC: NORMAL MG/DL (ref 70–110)
HCT VFR BLD AUTO: 22.3 % (ref 37–48.5)
HCT VFR BLD AUTO: 23.3 % (ref 37–48.5)
HGB BLD-MCNC: 6.9 G/DL (ref 12–16)
HGB BLD-MCNC: 7.2 G/DL (ref 12–16)
HYPOCHROMIA BLD QL SMEAR: ABNORMAL
HYPOCHROMIA BLD QL SMEAR: ABNORMAL
IMM GRANULOCYTES # BLD AUTO: ABNORMAL K/UL (ref 0–0.04)
IMM GRANULOCYTES # BLD AUTO: ABNORMAL K/UL (ref 0–0.04)
IMM GRANULOCYTES NFR BLD AUTO: ABNORMAL % (ref 0–0.5)
IMM GRANULOCYTES NFR BLD AUTO: ABNORMAL % (ref 0–0.5)
LYMPHOCYTES # BLD AUTO: ABNORMAL K/UL (ref 1–4.8)
LYMPHOCYTES # BLD AUTO: ABNORMAL K/UL (ref 1–4.8)
LYMPHOCYTES NFR BLD: 10 % (ref 18–48)
LYMPHOCYTES NFR BLD: 5 % (ref 18–48)
MAGNESIUM SERPL-MCNC: 2.1 MG/DL (ref 1.6–2.6)
MAGNESIUM SERPL-MCNC: 2.1 MG/DL (ref 1.6–2.6)
MAGNESIUM SERPL-MCNC: NORMAL MG/DL (ref 1.6–2.6)
MCH RBC QN AUTO: 28.8 PG (ref 27–31)
MCH RBC QN AUTO: 29 PG (ref 27–31)
MCHC RBC AUTO-ENTMCNC: 30.9 G/DL (ref 32–36)
MCHC RBC AUTO-ENTMCNC: 30.9 G/DL (ref 32–36)
MCV RBC AUTO: 93 FL (ref 82–98)
MCV RBC AUTO: 94 FL (ref 82–98)
METAMYELOCYTES NFR BLD MANUAL: 3 %
METAMYELOCYTES NFR BLD MANUAL: 3 %
MONOCYTES # BLD AUTO: ABNORMAL K/UL (ref 0.3–1)
MONOCYTES # BLD AUTO: ABNORMAL K/UL (ref 0.3–1)
MONOCYTES NFR BLD: 11 % (ref 4–15)
MONOCYTES NFR BLD: 5 % (ref 4–15)
MYELOCYTES NFR BLD MANUAL: 1 %
MYELOCYTES NFR BLD MANUAL: 1 %
NEUTROPHILS NFR BLD: 74 % (ref 38–73)
NEUTROPHILS NFR BLD: 82 % (ref 38–73)
NEUTS BAND NFR BLD MANUAL: 3 %
NRBC BLD-RTO: 0 /100 WBC
NRBC BLD-RTO: 0 /100 WBC
NUM UNITS TRANS PACKED RBC: NORMAL
OVALOCYTES BLD QL SMEAR: ABNORMAL
PHOSPHATE SERPL-MCNC: 3.4 MG/DL (ref 2.7–4.5)
PHOSPHATE SERPL-MCNC: 3.4 MG/DL (ref 2.7–4.5)
PHOSPHATE SERPL-MCNC: 4 MG/DL (ref 2.7–4.5)
PHOSPHATE SERPL-MCNC: 4 MG/DL (ref 2.7–4.5)
PHOSPHATE SERPL-MCNC: NORMAL MG/DL (ref 2.7–4.5)
PLATELET # BLD AUTO: 407 K/UL (ref 150–450)
PLATELET # BLD AUTO: 496 K/UL (ref 150–450)
PLATELET BLD QL SMEAR: ABNORMAL
PMV BLD AUTO: 10.5 FL (ref 9.2–12.9)
PMV BLD AUTO: 10.9 FL (ref 9.2–12.9)
POCT GLUCOSE: 125 MG/DL (ref 70–110)
POCT GLUCOSE: 93 MG/DL (ref 70–110)
POIKILOCYTOSIS BLD QL SMEAR: SLIGHT
POLYCHROMASIA BLD QL SMEAR: ABNORMAL
POTASSIUM SERPL-SCNC: 5.5 MMOL/L (ref 3.5–5.1)
POTASSIUM SERPL-SCNC: 5.5 MMOL/L (ref 3.5–5.1)
POTASSIUM SERPL-SCNC: 6 MMOL/L (ref 3.5–5.1)
POTASSIUM SERPL-SCNC: NORMAL MMOL/L (ref 3.5–5.1)
PROT SERPL-MCNC: 5.3 G/DL (ref 6–8.4)
RBC # BLD AUTO: 2.38 M/UL (ref 4–5.4)
RBC # BLD AUTO: 2.5 M/UL (ref 4–5.4)
SODIUM SERPL-SCNC: 137 MMOL/L (ref 136–145)
SODIUM SERPL-SCNC: 139 MMOL/L (ref 136–145)
SODIUM SERPL-SCNC: 140 MMOL/L (ref 136–145)
SODIUM SERPL-SCNC: NORMAL MMOL/L (ref 136–145)
SPHEROCYTES BLD QL SMEAR: ABNORMAL
VANCOMYCIN SERPL-MCNC: 15.7 UG/ML
WBC # BLD AUTO: 21.66 K/UL (ref 3.9–12.7)
WBC # BLD AUTO: 26.52 K/UL (ref 3.9–12.7)

## 2022-01-03 PROCEDURE — 80053 COMPREHEN METABOLIC PANEL: CPT | Performed by: STUDENT IN AN ORGANIZED HEALTH CARE EDUCATION/TRAINING PROGRAM

## 2022-01-03 PROCEDURE — 25000242 PHARM REV CODE 250 ALT 637 W/ HCPCS

## 2022-01-03 PROCEDURE — 27000221 HC OXYGEN, UP TO 24 HOURS

## 2022-01-03 PROCEDURE — 94664 DEMO&/EVAL PT USE INHALER: CPT

## 2022-01-03 PROCEDURE — 94761 N-INVAS EAR/PLS OXIMETRY MLT: CPT

## 2022-01-03 PROCEDURE — 36430 TRANSFUSION BLD/BLD COMPNT: CPT

## 2022-01-03 PROCEDURE — 93010 ELECTROCARDIOGRAM REPORT: CPT | Mod: ,,, | Performed by: INTERNAL MEDICINE

## 2022-01-03 PROCEDURE — 63600175 PHARM REV CODE 636 W HCPCS: Performed by: STUDENT IN AN ORGANIZED HEALTH CARE EDUCATION/TRAINING PROGRAM

## 2022-01-03 PROCEDURE — 94799 UNLISTED PULMONARY SVC/PX: CPT

## 2022-01-03 PROCEDURE — 99900035 HC TECH TIME PER 15 MIN (STAT)

## 2022-01-03 PROCEDURE — 25000242 PHARM REV CODE 250 ALT 637 W/ HCPCS: Performed by: SURGERY

## 2022-01-03 PROCEDURE — 99291 CRITICAL CARE FIRST HOUR: CPT | Mod: ,,, | Performed by: STUDENT IN AN ORGANIZED HEALTH CARE EDUCATION/TRAINING PROGRAM

## 2022-01-03 PROCEDURE — P9016 RBC LEUKOCYTES REDUCED: HCPCS | Performed by: STUDENT IN AN ORGANIZED HEALTH CARE EDUCATION/TRAINING PROGRAM

## 2022-01-03 PROCEDURE — 84100 ASSAY OF PHOSPHORUS: CPT | Performed by: STUDENT IN AN ORGANIZED HEALTH CARE EDUCATION/TRAINING PROGRAM

## 2022-01-03 PROCEDURE — 83735 ASSAY OF MAGNESIUM: CPT | Mod: 91 | Performed by: INTERNAL MEDICINE

## 2022-01-03 PROCEDURE — 27000646 HC AEROBIKA DEVICE

## 2022-01-03 PROCEDURE — 25000003 PHARM REV CODE 250: Performed by: STUDENT IN AN ORGANIZED HEALTH CARE EDUCATION/TRAINING PROGRAM

## 2022-01-03 PROCEDURE — 92526 ORAL FUNCTION THERAPY: CPT

## 2022-01-03 PROCEDURE — 20000000 HC ICU ROOM

## 2022-01-03 PROCEDURE — 99233 PR SUBSEQUENT HOSPITAL CARE,LEVL III: ICD-10-PCS | Mod: ,,, | Performed by: INTERNAL MEDICINE

## 2022-01-03 PROCEDURE — 80100014 HC HEMODIALYSIS 1:1

## 2022-01-03 PROCEDURE — 97110 THERAPEUTIC EXERCISES: CPT

## 2022-01-03 PROCEDURE — 93005 ELECTROCARDIOGRAM TRACING: CPT

## 2022-01-03 PROCEDURE — 80069 RENAL FUNCTION PANEL: CPT | Mod: 91 | Performed by: SURGERY

## 2022-01-03 PROCEDURE — 99291 PR CRITICAL CARE, E/M 30-74 MINUTES: ICD-10-PCS | Mod: ,,, | Performed by: STUDENT IN AN ORGANIZED HEALTH CARE EDUCATION/TRAINING PROGRAM

## 2022-01-03 PROCEDURE — 94640 AIRWAY INHALATION TREATMENT: CPT

## 2022-01-03 PROCEDURE — 99223 PR INITIAL HOSPITAL CARE,LEVL III: ICD-10-PCS | Mod: ,,, | Performed by: INTERNAL MEDICINE

## 2022-01-03 PROCEDURE — 93010 EKG 12-LEAD: ICD-10-PCS | Mod: ,,, | Performed by: INTERNAL MEDICINE

## 2022-01-03 PROCEDURE — 25000003 PHARM REV CODE 250

## 2022-01-03 PROCEDURE — 97535 SELF CARE MNGMENT TRAINING: CPT

## 2022-01-03 PROCEDURE — 86901 BLOOD TYPING SEROLOGIC RH(D): CPT | Performed by: STUDENT IN AN ORGANIZED HEALTH CARE EDUCATION/TRAINING PROGRAM

## 2022-01-03 PROCEDURE — 63600175 PHARM REV CODE 636 W HCPCS: Performed by: SURGERY

## 2022-01-03 PROCEDURE — 80202 ASSAY OF VANCOMYCIN: CPT | Performed by: SURGERY

## 2022-01-03 PROCEDURE — 97530 THERAPEUTIC ACTIVITIES: CPT

## 2022-01-03 PROCEDURE — 85027 COMPLETE CBC AUTOMATED: CPT | Performed by: SURGERY

## 2022-01-03 PROCEDURE — 99223 1ST HOSP IP/OBS HIGH 75: CPT | Mod: ,,, | Performed by: INTERNAL MEDICINE

## 2022-01-03 PROCEDURE — 94668 MNPJ CHEST WALL SBSQ: CPT

## 2022-01-03 PROCEDURE — 80069 RENAL FUNCTION PANEL: CPT | Performed by: INTERNAL MEDICINE

## 2022-01-03 PROCEDURE — 25000003 PHARM REV CODE 250: Performed by: SURGERY

## 2022-01-03 PROCEDURE — 83735 ASSAY OF MAGNESIUM: CPT | Mod: 91 | Performed by: SURGERY

## 2022-01-03 PROCEDURE — 63600175 PHARM REV CODE 636 W HCPCS

## 2022-01-03 PROCEDURE — 84100 ASSAY OF PHOSPHORUS: CPT | Mod: 91 | Performed by: STUDENT IN AN ORGANIZED HEALTH CARE EDUCATION/TRAINING PROGRAM

## 2022-01-03 PROCEDURE — 85007 BL SMEAR W/DIFF WBC COUNT: CPT | Performed by: SURGERY

## 2022-01-03 PROCEDURE — 99233 SBSQ HOSP IP/OBS HIGH 50: CPT | Mod: ,,, | Performed by: INTERNAL MEDICINE

## 2022-01-03 RX ORDER — SODIUM CHLORIDE 9 MG/ML
INJECTION, SOLUTION INTRAVENOUS ONCE
Status: DISCONTINUED | OUTPATIENT
Start: 2022-01-03 | End: 2022-01-07

## 2022-01-03 RX ORDER — HEPARIN SODIUM 1000 [USP'U]/ML
1000 INJECTION, SOLUTION INTRAVENOUS; SUBCUTANEOUS
Status: DISCONTINUED | OUTPATIENT
Start: 2022-01-03 | End: 2022-01-06

## 2022-01-03 RX ORDER — SCOLOPAMINE TRANSDERMAL SYSTEM 1 MG/1
1 PATCH, EXTENDED RELEASE TRANSDERMAL
Status: DISCONTINUED | OUTPATIENT
Start: 2022-01-03 | End: 2022-01-14 | Stop reason: HOSPADM

## 2022-01-03 RX ORDER — FAMOTIDINE 20 MG/1
20 TABLET, FILM COATED ORAL DAILY
Status: DISCONTINUED | OUTPATIENT
Start: 2022-01-03 | End: 2022-01-03

## 2022-01-03 RX ORDER — MEROPENEM AND SODIUM CHLORIDE 1 G/50ML
1 INJECTION, SOLUTION INTRAVENOUS
Status: DISCONTINUED | OUTPATIENT
Start: 2022-01-03 | End: 2022-01-04

## 2022-01-03 RX ORDER — PANTOPRAZOLE SODIUM 40 MG/1
40 FOR SUSPENSION ORAL DAILY
Status: DISCONTINUED | OUTPATIENT
Start: 2022-01-03 | End: 2022-01-10

## 2022-01-03 RX ORDER — BISACODYL 10 MG
10 SUPPOSITORY, RECTAL RECTAL ONCE
Status: DISCONTINUED | OUTPATIENT
Start: 2022-01-03 | End: 2022-01-04

## 2022-01-03 RX ORDER — HYDROCODONE BITARTRATE AND ACETAMINOPHEN 500; 5 MG/1; MG/1
TABLET ORAL
Status: DISCONTINUED | OUTPATIENT
Start: 2022-01-03 | End: 2022-01-07

## 2022-01-03 RX ORDER — VANCOMYCIN HCL IN 5 % DEXTROSE 1G/250ML
1000 PLASTIC BAG, INJECTION (ML) INTRAVENOUS ONCE
Status: COMPLETED | OUTPATIENT
Start: 2022-01-03 | End: 2022-01-03

## 2022-01-03 RX ORDER — SYRING-NEEDL,DISP,INSUL,0.3 ML 29 G X1/2"
296 SYRINGE, EMPTY DISPOSABLE MISCELLANEOUS ONCE
Status: COMPLETED | OUTPATIENT
Start: 2022-01-03 | End: 2022-01-03

## 2022-01-03 RX ORDER — DIAZEPAM 5 MG/5ML
2 SOLUTION ORAL EVERY 6 HOURS PRN
Status: DISCONTINUED | OUTPATIENT
Start: 2022-01-03 | End: 2022-01-04

## 2022-01-03 RX ADMIN — LEVALBUTEROL HYDROCHLORIDE 0.63 MG: 0.63 SOLUTION RESPIRATORY (INHALATION) at 03:01

## 2022-01-03 RX ADMIN — PANTOPRAZOLE SODIUM 40 MG: 40 GRANULE, DELAYED RELEASE ORAL at 08:01

## 2022-01-03 RX ADMIN — LEVALBUTEROL HYDROCHLORIDE 0.63 MG: 0.63 SOLUTION RESPIRATORY (INHALATION) at 04:01

## 2022-01-03 RX ADMIN — MEROPENEM AND SODIUM CHLORIDE 1 G: 1 INJECTION, SOLUTION INTRAVENOUS at 08:01

## 2022-01-03 RX ADMIN — HEPARIN SODIUM 7500 UNITS: 5000 INJECTION INTRAVENOUS; SUBCUTANEOUS at 01:01

## 2022-01-03 RX ADMIN — LEVALBUTEROL HYDROCHLORIDE 0.63 MG: 0.63 SOLUTION RESPIRATORY (INHALATION) at 12:01

## 2022-01-03 RX ADMIN — LEVALBUTEROL HYDROCHLORIDE 0.63 MG: 0.63 SOLUTION RESPIRATORY (INHALATION) at 07:01

## 2022-01-03 RX ADMIN — Medication 296 ML: at 08:01

## 2022-01-03 RX ADMIN — NITROGLYCERIN 0.5 INCH: 20 OINTMENT TOPICAL at 12:01

## 2022-01-03 RX ADMIN — METHOCARBAMOL 500 MG: 500 TABLET ORAL at 05:01

## 2022-01-03 RX ADMIN — NITROGLYCERIN 0.5 INCH: 20 OINTMENT TOPICAL at 06:01

## 2022-01-03 RX ADMIN — PROPRANOLOL HYDROCHLORIDE 10 MG: 10 TABLET ORAL at 02:01

## 2022-01-03 RX ADMIN — GABAPENTIN 125 MG: 250 SOLUTION ORAL at 01:01

## 2022-01-03 RX ADMIN — MEROPENEM AND SODIUM CHLORIDE 1 G: 1 INJECTION, SOLUTION INTRAVENOUS at 09:01

## 2022-01-03 RX ADMIN — OXYCODONE HYDROCHLORIDE 10 MG: 5 SOLUTION ORAL at 09:01

## 2022-01-03 RX ADMIN — METHOCARBAMOL 500 MG: 500 TABLET ORAL at 08:01

## 2022-01-03 RX ADMIN — SENNOSIDES AND DOCUSATE SODIUM 1 TABLET: 50; 8.6 TABLET ORAL at 08:01

## 2022-01-03 RX ADMIN — METHOCARBAMOL 500 MG: 500 TABLET ORAL at 01:01

## 2022-01-03 RX ADMIN — HEPARIN SODIUM 7500 UNITS: 5000 INJECTION INTRAVENOUS; SUBCUTANEOUS at 06:01

## 2022-01-03 RX ADMIN — Medication: at 09:01

## 2022-01-03 RX ADMIN — Medication: at 08:01

## 2022-01-03 RX ADMIN — METHOCARBAMOL 500 MG: 500 TABLET ORAL at 09:01

## 2022-01-03 RX ADMIN — PROPRANOLOL HYDROCHLORIDE 10 MG: 10 TABLET ORAL at 09:01

## 2022-01-03 RX ADMIN — NITROGLYCERIN 0.5 INCH: 20 OINTMENT TOPICAL at 01:01

## 2022-01-03 RX ADMIN — SODIUM PHOSPHATE, MONOBASIC, MONOHYDRATE 15 MMOL: 276; 142 INJECTION, SOLUTION INTRAVENOUS at 01:01

## 2022-01-03 RX ADMIN — GABAPENTIN 125 MG: 250 SOLUTION ORAL at 06:01

## 2022-01-03 RX ADMIN — PROPRANOLOL HYDROCHLORIDE 10 MG: 10 TABLET ORAL at 08:01

## 2022-01-03 RX ADMIN — NITROGLYCERIN 0.5 INCH: 20 OINTMENT TOPICAL at 09:01

## 2022-01-03 RX ADMIN — HEPARIN SODIUM 7500 UNITS: 5000 INJECTION INTRAVENOUS; SUBCUTANEOUS at 09:01

## 2022-01-03 RX ADMIN — LEVALBUTEROL HYDROCHLORIDE 0.63 MG: 0.63 SOLUTION RESPIRATORY (INHALATION) at 11:01

## 2022-01-03 RX ADMIN — HYDROMORPHONE HYDROCHLORIDE 0.5 MG: 1 INJECTION, SOLUTION INTRAMUSCULAR; INTRAVENOUS; SUBCUTANEOUS at 09:01

## 2022-01-03 RX ADMIN — POLYETHYLENE GLYCOL 3350 17 G: 17 POWDER, FOR SOLUTION ORAL at 08:01

## 2022-01-03 RX ADMIN — VANCOMYCIN HYDROCHLORIDE 1000 MG: 1 INJECTION, POWDER, LYOPHILIZED, FOR SOLUTION INTRAVENOUS at 09:01

## 2022-01-03 RX ADMIN — GABAPENTIN 125 MG: 250 SOLUTION ORAL at 09:01

## 2022-01-03 NOTE — PLAN OF CARE
Problem: SLP Goal  Goal: SLP Goal  Description: Speech Therapy Short Term Goals  Goal expected to be met by 1/16  1. Pt will participate in an ongoing assessment to determine the least restrictive and safest diet with possible updated goals to follow pending results.  Outcome: Ongoing, Progressing     Patient seen for ongoing assessment. SLP recommending an upgrade to a Mechanical Soft Diet (Level 5)/Nectar-thick Liquids with strict aspiration precautions. ST to continue to follow.     Peter Carranza CCC-SLP  Speech-Language Pathology  Pager: [phone]

## 2022-01-03 NOTE — SUBJECTIVE & OBJECTIVE
"Interval History: stable 2L fio2 despite 1.2L positive fluid balance last 24h, potassium increased 5.5    Review of patient's allergies indicates:   Allergen Reactions    Tylox [oxycodone-acetaminophen]      "Jittery"     Current Facility-Administered Medications   Medication Frequency    0.9%  NaCl infusion (CRRT USE ONLY) Continuous    0.9%  NaCl infusion (for blood administration) Q24H PRN    0.9%  NaCl infusion (for blood administration) Q24H PRN    0.9%  NaCl infusion Once    balsam peru-castor oiL Oint BID    bisacodyL suppository 10 mg Once    dextrose 50% injection 12.5 g PRN    dextrose 50% injection 25 g PRN    diazePAM 1 mg/mL oral solution 2 mg Q6H PRN    gabapentin 250 mg/5 mL (5 mL) solution 125 mg Q8H    glucagon (human recombinant) injection 1 mg PRN    heparin (porcine) injection 1,000 Units PRN    heparin (porcine) injection 7,500 Units Q8H    HYDROmorphone injection 0.5 mg Q6H PRN    insulin aspart U-100 pen 0-5 Units Q6H PRN    levalbuterol nebulizer solution 0.63 mg Q4H    melatonin 1 mg/mL liquid (PEDS) 5 mg Nightly PRN    meropenem-0.9% sodium chloride 1 g/50 mL IVPB Q12H    methocarbamoL tablet 500 mg QID    nitroGLYCERIN 2% TD oint ointment 0.5 inch Q6H    olanzapine zydis disintegrating tablet 10 mg Nightly PRN    ondansetron injection 8 mg Q8H PRN    oxyCODONE 5 mg/5 mL solution 10 mg Q4H PRN    oxyCODONE 5 mg/5 mL solution 5 mg Q4H PRN    pantoprazole suspension 40 mg Daily    polyethylene glycol packet 17 g Daily    potassium, sodium phosphates 280-160-250 mg packet 2 packet TID PRN    propranoloL tablet 10 mg TID    scopolamine 1.3-1.5 mg (1 mg over 3 days) 1 patch Q3 Days    senna-docusate 8.6-50 mg per tablet 1 tablet Daily    senna-docusate 8.6-50 mg per tablet 1 tablet Daily PRN    sodium chloride 0.9% bolus 250 mL PRN    sodium chloride 0.9% flush 10 mL PRN    sodium chloride 0.9% flush 10 mL PRN    vancomycin - pharmacy to dose pharmacy to " manage frequency       Objective:     Vital Signs (Most Recent):  Temp: 98.6 °F (37 °C) (01/03/22 1200)  Pulse: (!) 121 (01/03/22 1300)  Resp: (!) 24 (01/03/22 1300)  BP: 133/74 (01/03/22 1400)  SpO2: 97 % (01/03/22 1300)  O2 Device (Oxygen Therapy): nasal cannula w/ humidification (01/03/22 1123) Vital Signs (24h Range):  Temp:  [98.6 °F (37 °C)-100.7 °F (38.2 °C)] 98.6 °F (37 °C)  Pulse:  [105-131] 121  Resp:  [16-95] 24  SpO2:  [85 %-100 %] 97 %  BP: (113-172)/(57-89) 133/74  Arterial Line BP: ()/(51-75) 98/51     Weight: 121.1 kg (267 lb) (12/28/21 1003)  Body mass index is 45.83 kg/m².  Body surface area is 2.34 meters squared.    I/O last 3 completed shifts:  In: 4487.8 [I.V.:2420.3; Blood:326.7; NG/GT:1015; IV Piggyback:725.8]  Out: 5245 [Drains:312; Other:4933]    Physical Exam  Constitutional:       Appearance: She is obese. She is ill-appearing.   HENT:      Mouth/Throat:      Mouth: Mucous membranes are moist.   Eyes:      Pupils: Pupils are equal, round, and reactive to light.   Cardiovascular:      Rate and Rhythm: Normal rate and regular rhythm.   Pulmonary:      Effort: No respiratory distress.      Comments: NC  Abdominal:      General: There is no distension.      Comments: Wound vac   Musculoskeletal:         General: No deformity.      Right lower leg: Edema present.      Left lower leg: Edema present.   Skin:     Coloration: Skin is not jaundiced.   Neurological:      General: No focal deficit present.         Significant Labs:  All labs within the past 24 hours have been reviewed.     Significant Imaging:  Labs: Reviewed

## 2022-01-03 NOTE — PT/OT/SLP EVAL
Physical Therapy Co-Treatment    Patient Name:  Chidi Castaneda   MRN:  [id number]  Admitting Diagnosis:  <principal problem not specified>   Recent Surgery: Procedure(s) (LRB):  LAPAROTOMY, EXPLORATORY (N/A)  INSERTION, GASTROSTOMY TUBE, PERCUTANEOUS (N/A)  CHOLECYSTECTOMY  EGD (ESOPHAGOGASTRODUODENOSCOPY) (N/A) 11 Days Post-Op  Admit Date: 12/20/2021  Length of Stay: 14 days    Recommendations:     Discharge Recommendations:  rehabilitation facility   Discharge Equipment Recommendations: other (see comments) (tbd pending progress)   Barriers to discharge: weakness, impaired activity tolerance, debility, increased fall risk, decreased static/dynamic balance    Assessment:     Chidi Castaneda is a 56 y.o. female admitted with a medical diagnosis of <principal problem not specified>.  She presents with the following impairments/functional limitations:  weakness,impaired endurance,impaired self care skills,impaired functional mobilty,gait instability,impaired balance,decreased lower extremity function,decreased upper extremity function,pain,decreased coordination,impaired coordination,impaired fine motor,impaired skin,edema,impaired cardiopulmonary response to activity. Pt tolerated session well today. Pt remains motivated to participate and improve with therapy. Improved activity tolerance noted on this date with increased time spent EOB with VSS. Pt still presents with impaired dynamic balance and requiring assist to keep COM over [place]. Pt still diffusely weak and requiring substantial assist for all mobility and transfers and remains well below baseline function.    Pt is an excellent candidate for inpatient rehabilitation, as pt has a qualifying diagnosis, displays good activity tolerance, has experienced decline from PLOF, has good family support, and is motivated to participate in therapy. Pt's clinical condition meets full inpatient rehab criteria. Inpatient rehab will provide to total interdisciplinary treatment  "approach needed. Pt is at high risk of unplanned readmission due to fall risk. The lower level of care cannot provide total interdisciplinary approach needed.     Rehab Prognosis: Good; patient would benefit from acute skilled PT services to address these deficits and reach maximum level of function.    Recent Surgery: Procedure(s) (LRB):  LAPAROTOMY, EXPLORATORY (N/A)  INSERTION, GASTROSTOMY TUBE, PERCUTANEOUS (N/A)  CHOLECYSTECTOMY  EGD (ESOPHAGOGASTRODUODENOSCOPY) (N/A) 11 Days Post-Op    Plan:     During this hospitalization, patient to be seen 4 x/week to address the identified rehab impairments via therapeutic activities,gait training,therapeutic exercises,neuromuscular re-education and progress toward the following goals:    · Plan of Care Expires:  01/27/22    Subjective     RN notified prior to session.  present upon PT entrance into room.    Chief Complaint: "I want to get to the cardiac chair"  Patient/Family Comments/goals: get stronger  Pain/Comfort:  · Pain Rating 1: 1/10  · Location - Side 1: Bilateral  · Location 1: leg  · Pain Addressed 1: Reposition,Distraction  · Pain Rating Post-Intervention 1: 0/10      Objective:     Additional staff present: OT for cotx due to pt's multiple medical comorbidities and functional deficits req'ing two skilled therapists to appropriately progress pt's musculoskeletal strength, neuromuscular control, and endurance while taking into consideration severe medical acuity in the ICU; Partial cotx with SLP     Patient found HOB elevated with: telemetry,blood pressure cuff,pulse ox (continuous),Trialysis,wound vac,REAGAN drain,PEG Tube   Cognition:   · Alert and Cooperative  · AxOx3  · Command following: Follows multistep verbal commands  · Fluency: clear, fluent; weak voice  General Precautions: Standard, Cardiac aspiration,fall,nectar thick   Orthopedic Precautions:N/A   Braces: N/A   Body mass index is 45.83 kg/m².  Oxygen Device: Nasal Cannula 2L  Vitals: /77  " " Pulse (!) 118   Temp 98.6 °F (37 °C)   Resp (!) 22   Ht 5' 4" (1.626 m)   Wt 121.1 kg (267 lb)   SpO2 97%   BMI 45.83 kg/m²     Outcome Measures:  AM-PAC 6 CLICK MOBILITY  Turning over in bed (including adjusting bedclothes, sheets and blankets)?: 2  Sitting down on and standing up from a chair with arms (e.g., wheelchair, bedside commode, etc.): 2  Moving from lying on back to sitting on the side of the bed?: 2  Moving to and from a bed to a chair (including a wheelchair)?: 1  Need to walk in hospital room?: 1  Climbing 3-5 steps with a railing?: 1  Basic Mobility Total Score: 9     Functional Mobility:    Bed Mobility:   · Supine to Sit: maximal assistance; from Rt side of bed  · Scooting anteriorly to EOB to have both feet planted on floor: maximal assistance  · Sit to Supine: total assistance and 2 persons; to Rt side of bed    Sitting Balance at Edge of Bed:   Static Sitting Balance: Poor+ : able to maintain balance with minimal assistance from individual or chair   Dynamic Sitting Balance: Poor: able to sit unsupported with moderate assistance and reach ipsilaterally or anteriorly. Unable to cross midline.   Assistance Level Required: Minimal Assistance   Time: 25 minutes   Postural deviations noted: slouched posture, rounded shoulders, forward head, poor midline awareness, trunk deviated right and posterior pelvic tilt   Comments: Time EOB focused on cardiopulmonary and musculoskeletal endurance to tolerate unsupported sitting. PT assisted pt with improving tolerance to upright positioning and endurance of postural musculature by providing periods of unsupported sitting with rest breaks every 10 minutes to recover. Pt able to accept internal and external perturbations to balance while seated EOB with appropriate trunk response to remain upright with intermittent LOB to the Rt and BUE support. Able to perform reaches inside [place] but required increased therapist assist to maintain balance (moderate " assist) during dynamic task and preventing internal perturbations..    Transfers/Gait: Deferred due to pt's performance with above listed functional mobility     Education:   Time provided for education, counseling and discussion of health disposition in regards to patient's current status   All questions answered within PT scope of practice and to patient's satisfaction   PT role in POC to address current functional deficits   Pt educated on proper body mechanics, safety techniques, and energy conservation with PT facilitation and cueing throughout session   Whiteboard updated with therapist name and pt's current mobility status documented above   Patient is appropriate for drawsheet transfer to/from cardiac chair with nursing staff    Patient left HOB elevated with all lines intact, call button in reach, RN notified and MD present for wound vac exchange.    GOALS:   Multidisciplinary Problems     Physical Therapy Goals        Problem: Physical Therapy Goal    Goal Priority Disciplines Outcome Goal Variances Interventions   Physical Therapy Goal     PT, PT/OT Ongoing, Progressing     Description: Goals to be met by: 2022    Patient will increase functional independence with mobility by performin. Supine to sit with Moderate Assistance  2. Sit to stand transfer with Moderate Assistance using LRAD  3. Gait  x 10 feet with Moderate Assistance using LRAD  4. Sitting at edge of bed x10 minutes with Stand-by Assistance  5. Stand for 3 minutes with Moderate Assistance using LRAD  6. Lower extremity exercise program x10 reps per handout, with independence                   Time Tracking:     PT Received On: 22  PT Start Time: 859     PT Stop Time: 933  PT Total Time (min): 34 min       Billable Minutes:   · Therapeutic Activity 10 minutes and Therapeutic Exercise 20 minutes    Treatment Type: Treatment  PT/PTA: PT       Kim Lee PT, DPT  1/3/2022  Pager: [phone]

## 2022-01-03 NOTE — PLAN OF CARE
01/03/22 1417   Post-Acute Status   Post-Acute Authorization Placement   Post-Acute Placement Status Referrals Sent     LUISA met with Pt and Pt  at bedside. Discussed therapy recs for rehab placement and provided list. Addressed questions and reported need to send referrals to obtain accepting options. The patient agreeable and will review the list for preferences asap. LUISA sent rehab referral via [facility] to [facility] for review. LUISA will continue to follow    3:03 PM    LUISA received a phone call from Meet [phone] with [facility] that stated he was going to come by tomorrow 1-4-22 to meet with the patient and her family to discuss the facility. LUISA notified the patient and her  about the meeting for 1-4-22 with the representative from [facility].  LUISA will continue to follow      Philip Borges LMSW  Case Management [facility]  Ext: [phone]

## 2022-01-03 NOTE — PROGRESS NOTES
A-line waveform dampened, difficult to pull back. Does not improve with repositioning. Orders to d/c per Dr. Omer.

## 2022-01-03 NOTE — SUBJECTIVE & OBJECTIVE
Interval History/Significant Events:   No acute issues overnight. Febrile to 100.7 @1500 responsive to tylenol, afebrile since. VSS  Pain controlled well on current regimen  No nausea / vomiting  Received 1U pRBC ON for H&H of 6.9      Follow-up For: Procedure(s) (LRB):  LAPAROTOMY, EXPLORATORY (N/A)  INSERTION, GASTROSTOMY TUBE, PERCUTANEOUS (N/A)  CHOLECYSTECTOMY  EGD (ESOPHAGOGASTRODUODENOSCOPY) (N/A)    Post-Operative Day: 11 Days Post-Op    Objective:     Vital Signs (Most Recent):  Temp: 99 °F (37.2 °C) (01/03/22 0504)  Pulse: (!) 120 (01/03/22 0457)  Resp: (!) 26 (01/03/22 0457)  BP: (!) 151/76 (01/03/22 0445)  SpO2: 100 % (01/03/22 0457) Vital Signs (24h Range):  Temp:  [98.7 °F (37.1 °C)-100.7 °F (38.2 °C)] 99 °F (37.2 °C)  Pulse:  [105-131] 120  Resp:  [16-95] 26  SpO2:  [85 %-100 %] 100 %  BP: (113-172)/(57-86) 151/76  Arterial Line BP: ()/(51-75) 98/51     Weight: 121.1 kg (267 lb)  Body mass index is 45.83 kg/m².      Intake/Output Summary (Last 24 hours) at 1/3/2022 0541  Last data filed at 1/3/2022 0400  Gross per 24 hour   Intake 1382.68 ml   Output 758 ml   Net 624.68 ml       Physical Exam  Vitals and nursing note reviewed.   Constitutional:       General: She is not in acute distress.     Appearance: She is obese. She is not diaphoretic.   HENT:      Head: Normocephalic and atraumatic.      Mouth/Throat:      Mouth: Mucous membranes are moist.      Pharynx: Oropharynx is clear.   Eyes:      Extraocular Movements: Extraocular movements intact.      Conjunctiva/sclera: Conjunctivae normal.   Cardiovascular:      Rate and Rhythm: Regular rhythm. Tachycardia present.   Pulmonary:      Effort: Pulmonary effort is normal. No respiratory distress.      Breath sounds: Normal breath sounds. No wheezing or rales.      Comments: Extubated, on 2 L NC  Abdominal:      General: There is no distension.      Palpations: Abdomen is soft.      Tenderness: There is no guarding or rebound.      Comments: Wound  vac in place with good seal, no leak noted.   REAGAN drains with benign fluid, not bloody or bilious   Musculoskeletal:         General: No deformity.      Cervical back: Normal range of motion and neck supple.   Skin:     General: Skin is warm and dry.   Neurological:      Mental Status: She is alert.         Vents:  Vent Mode: Spont (12/31/21 2000)  Ventilator Initiated: Yes (12/28/21 1104)  Set Rate: 18 BPM (12/31/21 0400)  Vt Set: 320 mL (12/31/21 0400)  Pressure Support: 60 cmH20 (12/31/21 0115)  PEEP/CPAP: 5 cmH20 (12/31/21 2000)  Oxygen Concentration (%): 100 (01/02/22 2130)  Peak Airway Pressure: 24 cmH2O (12/31/21 2000)  Plateau Pressure: 31 cmH20 (12/31/21 2000)  Total Ve: 9.78 mL (12/31/21 2000)  Negative Inspiratory Force (cm H2O): -30 (12/31/21 2030)  F/VT Ratio<105 (RSBI): (!) 47.52 (12/31/21 2000)    Lines/Drains/Airways     Central Venous Catheter Line            Trialysis (Dialysis) Catheter 12/30/21 1000 right internal jugular 3 days          Drain                 Closed/Suction Drain 12/23/21 1428 Right;Inferior RUQ Bulb 19 Fr. 10 days         Closed/Suction Drain 12/23/21 1429 Right;Superior RUQ Bulb 19 Fr. 10 days         Gastrostomy/Enterostomy 12/23/21 1338 Gastrostomy tube w/ balloon LUQ feeding 10 days         Closed/Suction Drain 12/31/21 1559 Left Abdomen Bulb 14 Fr. 2 days                Significant Labs:    CBC/Anemia Profile:  Recent Labs   Lab 01/02/22 0337 01/02/22  1630 01/03/22 0228   WBC 22.11* 23.52* 26.52*   HGB 7.9* 7.4* 6.9*   HCT 25.3* 23.4* 22.3*   * 518* 496*   MCV 93 93 94   RDW 15.6* 15.6* 15.9*        Chemistries:  Recent Labs   Lab 01/02/22  0337 01/02/22  0337 01/02/22  1630 01/02/22  2104 01/03/22 0228     139   < > 140 139 139  140   K 4.9  4.9   < > 5.2* 5.3* 5.5*  5.5*     105   < > 107 108 105  105   CO2 26  26   < > 23 23 24  25   BUN 11  11   < > 27* 34* 43*  42*   CREATININE 0.8  0.8   < > 1.5* 1.7* 2.2*  2.2*   CALCIUM 8.0*  7.9*    < > 7.6* 7.5* 7.9*  8.0*   ALBUMIN 1.7*  1.7*   < > 1.7* 1.6* 1.7*  1.6*   PROT 5.6*  --   --   --  5.3*   BILITOT 1.5*  --   --   --  1.3*   ALKPHOS 180*  --   --   --  155*   ALT 44  --   --   --  38   AST 41*  --   --   --  37   MG 2.0   < > 2.1 2.0 2.1   PHOS 2.6*  2.6*   < > 2.4* 2.4* 3.4  3.4    < > = values in this interval not displayed.         Significant Imaging:  I have reviewed all pertinent imaging results/findings within the past 24 hours.

## 2022-01-03 NOTE — ASSESSMENT & PLAN NOTE
57yo female transfer from OSH after hysterectomy on 12/8 complicated by delayed recognition of small bowel injury requiring resection (in discontinuity) and at some point new bleed from the liver - transferred with open abdomen for higher level of care.  S/p ex-lap, liver packing 12/21  Open cholecystectomy, abdominal closure 12/23    - Extubated 12/31, doing well   - Aggressive pulmonary hygiene, OOB to chair, PT/OT;  - CT 12/31 with pelvic abscess s/p drain placement   - IR drain 12/31 - cultures pending   - Continue abx for vap  - Continue vanc   - Wound vac change 12/30, next due today  - Recommend restarting ppi/H2B for symptomatic reflux   - Recommend bowel reg, suppository   - Transfuse for Hgb <7, needs 1x unit today  - Speech eval - cleared for nectar thick liquids   - Continue REAGAN drains   - Strict I/Os  - Possible trial of HD today  - Remainder of care per SICU, appreciate assistance

## 2022-01-03 NOTE — PROGRESS NOTES
"Pharmacokinetic Assessment Follow Up: IV Vancomycin    Vancomycin Regimen Assessment & Plan:  - Vancomycin level drawn with morning labs today resulted as 15.7 mcg/mL, goal trough 10-20 mcg/mL.  - Nephrology following for RRT. No note from yesterday but RRT appears to have been held according to charting. Will continue pulse dosing.  - Administer vancomycin 1000 mg IV x1 dose today since level is <20. Draw vancomycin level with morning labs tomorrow. Will re-dose as needed depending on level and RRT plans.    Drug levels (last 3 results):  Recent Labs   Lab Result Units 01/02/22  0337 01/03/22  0333   Vancomycin, Random ug/mL 17.0 15.7     Pharmacy will continue to follow and monitor vancomycin.    Please contact pharmacy at extension [phone] for questions regarding this assessment.    Thank you for the consult,   Mariano Gerard     Patient brief summary:  Chidi Castaneda is a 56 y.o. female initiated on antimicrobial therapy with IV Vancomycin for treatment of intra-abdominal infection    The patient's current regimen is pulse dosing.    Drug Allergies:   Review of patient's allergies indicates:   Allergen Reactions    Tylox [oxycodone-acetaminophen]      "Jittery"       Actual Body Weight:   121.1 kg    Renal Function:   Estimated Creatinine Clearance: 37.5 mL/min (A) (based on SCr of 2.2 mg/dL (H)).,     Dialysis Method (if applicable):  SLED  "

## 2022-01-03 NOTE — PT/OT/SLP PROGRESS
Occupational Therapy  Co- Treatment    Name: Chidi Castaneda  MRN: [id number]  Admitting Diagnosis:  S/p exp-lap, puneet, PEG  11 Days Post-Op    Recommendations:     Discharge Recommendations: rehabilitation facility  Discharge Equipment Recommendations:   (TBD)  Barriers to discharge:  None    Assessment:     Chidi Castaneda is a 56 y.o. female with a medical diagnosis of  Subcapsular hematoma of liver.  She presents with increased tolerance for EOB sitting. Pt sat EOB with Min A x 25 min. Pt with R lateral lean. Pt continues with profound weakness and need for total A for self-care. Performance deficits affecting function are weakness,impaired functional mobilty,gait instability,impaired endurance,decreased upper extremity function,decreased lower extremity function,impaired self care skills,impaired cardiopulmonary response to activity. Pt appropriate for cotreat due to acuity and complexity of pt's medical status with 2 skilled disciplines needed to optimize pts functional performance in ICU setting.    Rehab Prognosis:  Good; patient would benefit from acute skilled OT services to address these deficits and reach maximum level of function.       Plan:     Patient to be seen 4 x/week to address the above listed problems via self-care/home management,therapeutic activities,therapeutic exercises,neuromuscular re-education  · Plan of Care Expires: 02/02/22  · Plan of Care Reviewed with: patient,spouse    Subjective     Pain/Comfort:  · Pain Rating 1: 1/10  · Location - Side 1: Bilateral  · Location - Orientation 1: generalized  · Location 1: leg  · Pain Addressed 1: Reposition,Distraction  · Pain Rating Post-Intervention 1: 1/10    Objective:     Communicated with: RN prior to session.  Patient found supine with blood pressure cuff,pulse ox (continuous),telemetry,wound vac,Trialysis,REAGAN drain,PEG Tube upon OT entry to room.    General Precautions: Standard, fall,aspiration,nectar thick   Orthopedic Precautions:N/A    Braces:    Respiratory Status: Nasal cannula, flow 2 L/min     Occupational Performance:     Bed Mobility:    · Patient completed Supine to Sit with maximal assistance  · Patient completed Sit to Supine with total assistance and 2 persons     Functional Mobility/Transfers:  · NT  · Functional Mobility: NT    Activities of Daily Living:  · Grooming: total assistance with [place] A and UE support to wash face      AMPAC 6 Click ADL: 6    Treatment & Education:  Pt ed on OT POC  Daily orientation provided  Tx focused on static sitting balance, upright tolerance and ROM ex's  Pt ed on ROM ex's 3x daily for increased overall strength and endurance  Pt/family ed on UE positioning and ex's for edema management    Patient left HOB elevated with all lines intact, call button in reach, RN notified and spouse presentEducation:      GOALS:   Multidisciplinary Problems     Occupational Therapy Goals        Problem: Occupational Therapy Goal    Goal Priority Disciplines Outcome Interventions   Occupational Therapy Goal     OT, PT/OT Ongoing, Progressing    Description: Goals to be met by: 1/9/2022 (1 week)     Patient will increase functional independence with ADLs by performing:    UE Dressing with Maximum Assistance.  Grooming while seated with Moderate Assistance.  Toileting from bedside commode with Moderate Assistance for hygiene and clothing management.   Rolling to Bilateral with Moderate Assistance.   Supine to sit with Maximum Assistance.  Step transfer with Moderate Assistance  Toilet transfer to bedside commode with Moderate Assistance.                     Time Tracking:     OT Date of Treatment: 01/03/22  OT Start Time: 0858  OT Stop Time: 0931  OT Total Time (min): 33 min    Billable Minutes:Self Care/Home Management 10  Therapeutic Activity 20               1/3/2022
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
## 2022-01-03 NOTE — SUBJECTIVE & OBJECTIVE
"No past medical history on file.    Past Surgical History:   Procedure Laterality Date    CHOLECYSTECTOMY  12/23/2021    Procedure: CHOLECYSTECTOMY;  Surgeon: Kendall Gorman MD;  Location: [facility] OR [address] [place] [address];  Service: General;;    ESOPHAGOGASTRODUODENOSCOPY N/A 12/23/2021    Procedure: EGD (ESOPHAGOGASTRODUODENOSCOPY);  Surgeon: Kendall Gorman MD;  [facility];  Service: General;  Laterality: N/A;    EVACUATION OF HEMATOMA  12/21/2021    Procedure: EVACUATION, HEMATOMA;  Surgeon: Kendall Gorman MD;  Location: [facility] OR [address] [place] [address];  Service: General;;    PERCUTANEOUS INSERTION OF GASTROSTOMY TUBE N/A 12/23/2021    Procedure: INSERTION, GASTROSTOMY TUBE, PERCUTANEOUS;  Surgeon: Kendall Gorman MD;  Location: [facility] OR [address] [place] [address];  Service: General;  Laterality: N/A;    REPLACEMENT OF WOUND VACUUM-ASSISTED CLOSURE DEVICE  12/21/2021    Procedure: REPLACEMENT, WOUND VAC;  Surgeon: Kendall Gorman MD;  Location: [facility] OR [address] [place] [address];  Service: General;;       Review of patient's allergies indicates:   Allergen Reactions    Tylox [oxycodone-acetaminophen]      "Jittery"       Medications:  No medications prior to admission.     Antibiotics (From admission, onward)            Start     Stop Route Frequency Ordered    01/03/22 0830  meropenem-0.9% sodium chloride 1 g/50 mL IVPB         -- IV Every 12 hours (non-standard times) 01/03/22 0723    01/01/22 1043  vancomycin - pharmacy to dose  (vancomycin IVPB)        "And" Linked Group Details    -- IV pharmacy to manage frequency 01/01/22 0944        Antifungals (From admission, onward)            None        Antivirals (From admission, onward)    None             There is no immunization history on file for this patient.    Family History    None       Social History     Socioeconomic History    Marital status: Unknown     Review of Systems   Constitutional: Negative for chills and fever.   HENT: Negative for congestion and sore throat.    Respiratory: " Positive for shortness of breath. Negative for cough.    Gastrointestinal: Negative for abdominal pain, diarrhea and nausea.   Genitourinary: Negative for difficulty urinating and dysuria.   Musculoskeletal: Negative for back pain and myalgias.   Neurological: Positive for weakness. Negative for dizziness and headaches.   Psychiatric/Behavioral: Negative for confusion.     Objective:     Vital Signs (Most Recent):  Temp: 98.7 °F (37.1 °C) (01/03/22 1530)  Pulse: (!) 120 (01/03/22 1715)  Resp: (!) 25 (01/03/22 1715)  BP: (!) 93/55 (01/03/22 1715)  SpO2: 99 % (01/03/22 1715) Vital Signs (24h Range):  Temp:  [98.6 °F (37 °C)-100.7 °F (38.2 °C)] 98.7 °F (37.1 °C)  Pulse:  [105-131] 120  Resp:  [16-95] 25  SpO2:  [85 %-100 %] 99 %  BP: ()/(55-96) 93/55  Arterial Line BP: ()/(51-68) 98/51     Weight: 121.1 kg (267 lb)  Body mass index is 45.83 kg/m².    Estimated Creatinine Clearance: 28.5 mL/min (A) (based on SCr of 2.9 mg/dL (H)).    Physical Exam  Vitals reviewed.   Constitutional:       Appearance: She is ill-appearing.   HENT:      Head: Normocephalic and atraumatic.      Comments: Rt IJ CVC     Mouth/Throat:      Mouth: Mucous membranes are moist.   Eyes:      Conjunctiva/sclera: Conjunctivae normal.      Pupils: Pupils are equal, round, and reactive to light.   Cardiovascular:      Rate and Rhythm: Regular rhythm. Tachycardia present.   Pulmonary:      Effort: No respiratory distress.      Breath sounds: No wheezing or rales.   Abdominal:      General: There is no distension.      Tenderness: There is no abdominal tenderness.      Comments: RUQ 2 drains in place with serosanguineous output  LLQ drain with bloody output  Midline incision with wound vac in place and drain with serous output   Musculoskeletal:         General: Swelling present.      Cervical back: Normal range of motion and neck supple.   Skin:     General: Skin is warm and dry.   Neurological:      General: No focal deficit present.       Mental Status: She is alert and oriented to person, place, and time.         Significant Labs:   Blood Culture: No results for input(s): LABBLOO in the last 4320 hours.  Wound Culture:   Recent Labs   Lab 12/31/21  1556   LABAERO ESCHERICHIA COLI  Many  *  GRAM NEGATIVE MO  Many  Identification and susceptibility pending  *  ENTEROCOCCUS SPECIES  Many  Identification and susceptibility pending  *     All pertinent labs within the past 24 hours have been reviewed.    Significant Imaging: I have reviewed all pertinent imaging results/findings within the past 24 hours.

## 2022-01-03 NOTE — PROGRESS NOTES
"[facility]  General Surgery  Progress Note    Subjective:     History of Present Illness:  No notes on file    Post-Op Info:  Procedure(s) (LRB):  LAPAROTOMY, EXPLORATORY (N/A)  INSERTION, GASTROSTOMY TUBE, PERCUTANEOUS (N/A)  CHOLECYSTECTOMY  EGD (ESOPHAGOGASTRODUODENOSCOPY) (N/A)   11 Days Post-Op     Interval History: NAEON, pain well controlled, underwent SLED yesterday, tolerated well, had some nausea and gtube was unclamped, only 150 from gtube when vented, TF held remainder of night, AF, tachycardic, pressures stable    Medications:  Continuous Infusions:   sodium chloride 0.9% Stopped (01/02/22 0558)     Scheduled Meds:   balsam peru-castor oiL   Topical (Top) BID    famotidine  20 mg Per G Tube Daily    gabapentin  125 mg Per G Tube Q8H    heparin (porcine)  7,500 Units Subcutaneous Q8H    levalbuterol  0.63 mg Nebulization Q4H    magnesium citrate  296 mL Oral Once    magnesium citrate  296 mL Oral Once    meropenem (MERREM) IVPB  1 g Intravenous Q12H    methocarbamoL  500 mg Per G Tube QID    nitroGLYCERIN 2% TD oint  0.5 inch Topical (Top) Q6H    polyethylene glycol  17 g Oral Daily    propranoloL  10 mg Per G Tube TID    senna-docusate 8.6-50 mg  1 tablet Oral Daily    vancomycin (VANCOCIN) IVPB  1,000 mg Intravenous Once     PRN Meds:sodium chloride, sodium chloride, dextrose 50%, dextrose 50%, glucagon (human recombinant), HYDROmorphone, insulin aspart U-100, lorazepam, melatonin, olanzapine zydis, ondansetron, oxyCODONE, oxyCODONE, potassium, sodium phosphates, senna-docusate 8.6-50 mg, sodium chloride 0.9%, sodium chloride 0.9%, Pharmacy to dose Vancomycin consult **AND** vancomycin - pharmacy to dose     Review of patient's allergies indicates:   Allergen Reactions    Tylox [oxycodone-acetaminophen]      "Jittery"     Objective:     Vital Signs (Most Recent):  Temp: 99.1 °F (37.3 °C) (01/03/22 0519)  Pulse: (!) 127 (01/03/22 0714)  Resp: (!) 27 (01/03/22 " 0714)  BP: (!) 151/76 (01/03/22 0445)  SpO2: 98 % (01/03/22 0714) Vital Signs (24h Range):  Temp:  [98.7 °F (37.1 °C)-100.7 °F (38.2 °C)] 99.1 °F (37.3 °C)  Pulse:  [105-131] 127  Resp:  [16-95] 27  SpO2:  [85 %-100 %] 98 %  BP: (113-172)/(57-86) 151/76  Arterial Line BP: ()/(51-75) 98/51     Weight: 121.1 kg (267 lb)  Body mass index is 45.83 kg/m².    Intake/Output - Last 3 Shifts       01/01 0700 01/02 0659 01/02 0700 01/03 0659 01/03 0700 01/04 0659    I.V. (mL/kg) 2670.3 (22.1)      Blood 1099.2 326.7     NG/ 765     IV Piggyback 710 376     Total Intake(mL/kg) 5299.5 (43.8) 1467.6 (12.1)     Urine (mL/kg/hr) 40 (0)      Drains 181 181     Other 4933 50     Total Output 5154 231     Net +145.5 +1236.6                  Physical Exam  Vitals and nursing note reviewed.   Constitutional:       General: She is not in acute distress.     Appearance: She is obese. She is not diaphoretic.   HENT:      Head: Normocephalic and atraumatic.      Mouth/Throat:      Mouth: Mucous membranes are moist.      Pharynx: Oropharynx is clear.   Eyes:      Extraocular Movements: Extraocular movements intact.      Conjunctiva/sclera: Conjunctivae normal.   Cardiovascular:      Rate and Rhythm: Regular rhythm. Tachycardia present.   Pulmonary:      Effort: No respiratory distress.   Abdominal:      General: There is no distension.      Palpations: Abdomen is soft.      Tenderness: There is no guarding or rebound.      Comments: Wound vac in place with good seal, no leak noted.   REAGAN drains with benign fluid, not bloody or bilious   Musculoskeletal:         General: No deformity.   Skin:     General: Skin is warm and dry.   Neurological:      Mental Status: She is alert.         Significant Labs:  CBC:   Recent Labs   Lab 01/03/22 0228   WBC 26.52*   RBC 2.38*   HGB 6.9*   HCT 22.3*   *   MCV 94   MCH 29.0   MCHC 30.9*     CMP:   Recent Labs   Lab 01/03/22 0228     99   CALCIUM 7.9*  8.0*   ALBUMIN 1.7*   1.6*   PROT 5.3*     140   K 5.5*  5.5*   CO2 24  25     105   BUN 43*  42*   CREATININE 2.2*  2.2*   ALKPHOS 155*   ALT 38   AST 37   BILITOT 1.3*       Significant Diagnostics:  I have reviewed all pertinent imaging results/findings within the past 24 hours.    Assessment/Plan:     Subcapsular hematoma of liver  57yo female transfer from OSH after hysterectomy on 12/8 complicated by delayed recognition of small bowel injury requiring resection (in discontinuity) and at some point new bleed from the liver - transferred with open abdomen for higher level of care.  S/p ex-lap, liver packing 12/21  Open cholecystectomy, abdominal closure 12/23    - Extubated 12/31, doing well   - Aggressive pulmonary hygiene, OOB to chair, PT/OT;  - CT 12/31 with pelvic abscess s/p drain placement   - IR drain 12/31 - cultures pending   - Continue abx for vap  - Continue vanc   - Wound vac change 12/30, next due today  - Recommend restarting ppi/H2B for symptomatic reflux   - Recommend bowel reg, suppository   - Transfuse for Hgb <7, needs 1x unit today  - Speech eval - cleared for nectar thick liquids   - Continue REAGAN drains   - Strict I/Os  - Possible trial of HD today  - Remainder of care per SICU, appreciate assistance         Fran Lorenzo MD  General Surgery  [facility]

## 2022-01-03 NOTE — PT/OT/SLP PROGRESS
Speech Language Pathology Co-Treatment    Patient Name:  Chidi Castaneda   MRN:  [id number]  Admitting Diagnosis:   1. Tachycardia    2. Septic shock    3. Hypotension due to blood loss    4. Subcapsular hematoma of liver    5. Increased heart rate    6. Hyperkalemia    7. SUSAN (acute kidney injury)    8. Bowel perforation    9. Encounter for weaning from ventilator    10. Acute blood loss anemia      Recommendations:                 General Recommendations:  Ongoing swallow assessment  Diet recommendations:  Mechanical soft, Liquid Diet Level: Nectar Thick   Aspiration Precautions: 1 bite/sip at a time, Assistance with meals and Assistance with thickening liquids, Feed only when awake/alert, HOB to 90 degrees, Meds whole 1 at a time, No straws, Small bites/sips and Strict aspiration precautions   To achieve nectar-thickened liquids, please use 6 oz of any liquid to 1 packet of thickener.  General Precautions: Standard, aspiration,fall,nectar thick  Communication strategies:  none    Subjective     Patient awake and alert  Communicated with nurse prior to session     Pain/Comfort:  · Pain Rating 1: 0/10  · Pain Rating Post-Intervention 1: 0/10    Respiratory Status: Nasal cannula    Objective:     Has the patient been evaluated by SLP for swallowing?   Yes  Keep patient NPO? No     Patient sitting up on EOB working with PT/OT and spouse present during session. Patient and spouse reported reported no overt difficulties with current diet. She continued to exhibit moderately reduced vocal quality and intensity during session though improved from prior session. Volitional cough/throt clear improved as well though moderate congestion noted. She tolerated trials of nectar-thick liquids x3 (via cup-edge) and soft solids x2 (moist arthur crackers) with no overt signs of aspiration. She demonstrated immediate coughing following self-fed cup-edge sip of thin liquids. SLP educated patient and spouse regarding POC for ST and they  verbalized understanding. Patient left in bed with PT/OT and spouse present. Recs communicated to team.     Assessment:     Chidi Castaneda is a 56 y.o. female with an SLP diagnosis of Dysphagia and Dysphonia. ST to continue to follow.    Goals:   Multidisciplinary Problems     SLP Goals        Problem: SLP Goal    Goal Priority Disciplines Outcome   SLP Goal     SLP Ongoing, Progressing   Description: Speech Therapy Short Term Goals  Goal expected to be met by 1/16  1. Pt will participate in an ongoing assessment to determine the least restrictive and safest diet with possible updated goals to follow pending results.                     Plan:     · Patient to be seen:  4 x/week   · Plan of Care expires:  01/31/22  · Plan of Care reviewed with:  patient,spouse   · SLP Follow-Up:  Yes       Discharge recommendations:  rehabilitation facility   Barriers to Discharge:  None    Time Tracking:     SLP Treatment Date:   01/03/22  Speech Start Time:  0912  Speech Stop Time:  0923     Speech Total Time (min):  11 min    Billable Minutes: Treatment Swallowing Dysfunction 11    Peter Carranza CCC-SLP  Speech-Language Pathology  Pager: [phone]   01/03/2022

## 2022-01-03 NOTE — PROGRESS NOTES
Bedside Hd initiated, good blood flow noted, /  , uf net set for 3 liters as tolerated . B/P 130/67 , pulse 115 , o2 sat  99% on nasal cannula 2 liters . Patient  Alert  And stable .

## 2022-01-03 NOTE — PLAN OF CARE
Problem: Occupational Therapy Goal  Goal: Occupational Therapy Goal  Description: Goals to be met by: 1/9/2022 (1 week)     Patient will increase functional independence with ADLs by performing:    UE Dressing with Maximum Assistance.  Grooming while seated with Moderate Assistance.  Toileting from bedside commode with Moderate Assistance for hygiene and clothing management.   Rolling to Bilateral with Moderate Assistance.   Supine to sit with Maximum Assistance.  Step transfer with Moderate Assistance  Toilet transfer to bedside commode with Moderate Assistance.    Outcome: Ongoing, Progressing

## 2022-01-03 NOTE — HPI
Ms. Castaneda is a 55y/o F pt with hx of HFrEF who underwent elective laparoscopic hysterectomy on 12/8/21 c/b small bowel perfortion s/p partial bowel resection on 12/11, subcapsular liver hematoma transferred to [facility] in shock with SUSAN. Underwent ex-lap 12/21, open cholecystectomy and abdominal closure on 12/21. Extubated 12/31. Found to have a pelvic abscess s/p drain placement (cx 12/31 grew ecoli) and a Rt sided loculated pleural effusion. RCx 12/28 grew kleb aerogenes. ID consulted for meropenem.     Per OSH- BCx 12/11 and 12/13 grew enterobacter aerogenes (S to cefepime). BCx 12/15 were Neg.  Since arrival to [facility] has been on pip-tazo (12/10-12/24, 12/30-1/2) and vanc (1/1-1/3).

## 2022-01-03 NOTE — SUBJECTIVE & OBJECTIVE
"Interval History: NAEON, pain well controlled, underwent SLED yesterday, tolerated well, had some nausea and gtube was unclamped, only 150 from gtube when vented, TF held remainder of night, AF, tachycardic, pressures stable    Medications:  Continuous Infusions:   sodium chloride 0.9% Stopped (01/02/22 0558)     Scheduled Meds:   balsam peru-castor oiL   Topical (Top) BID    famotidine  20 mg Per G Tube Daily    gabapentin  125 mg Per G Tube Q8H    heparin (porcine)  7,500 Units Subcutaneous Q8H    levalbuterol  0.63 mg Nebulization Q4H    magnesium citrate  296 mL Oral Once    magnesium citrate  296 mL Oral Once    meropenem (MERREM) IVPB  1 g Intravenous Q12H    methocarbamoL  500 mg Per G Tube QID    nitroGLYCERIN 2% TD oint  0.5 inch Topical (Top) Q6H    polyethylene glycol  17 g Oral Daily    propranoloL  10 mg Per G Tube TID    senna-docusate 8.6-50 mg  1 tablet Oral Daily    vancomycin (VANCOCIN) IVPB  1,000 mg Intravenous Once     PRN Meds:sodium chloride, sodium chloride, dextrose 50%, dextrose 50%, glucagon (human recombinant), HYDROmorphone, insulin aspart U-100, lorazepam, melatonin, olanzapine zydis, ondansetron, oxyCODONE, oxyCODONE, potassium, sodium phosphates, senna-docusate 8.6-50 mg, sodium chloride 0.9%, sodium chloride 0.9%, Pharmacy to dose Vancomycin consult **AND** vancomycin - pharmacy to dose     Review of patient's allergies indicates:   Allergen Reactions    Tylox [oxycodone-acetaminophen]      "Jittery"     Objective:     Vital Signs (Most Recent):  Temp: 99.1 °F (37.3 °C) (01/03/22 0519)  Pulse: (!) 127 (01/03/22 0714)  Resp: (!) 27 (01/03/22 0714)  BP: (!) 151/76 (01/03/22 0445)  SpO2: 98 % (01/03/22 0714) Vital Signs (24h Range):  Temp:  [98.7 °F (37.1 °C)-100.7 °F (38.2 °C)] 99.1 °F (37.3 °C)  Pulse:  [105-131] 127  Resp:  [16-95] 27  SpO2:  [85 %-100 %] 98 %  BP: (113-172)/(57-86) 151/76  Arterial Line BP: ()/(51-75) 98/51     Weight: 121.1 kg (267 lb)  Body " mass index is 45.83 kg/m².    Intake/Output - Last 3 Shifts       01/01 0700  01/02 0659 01/02 0700  01/03 0659 01/03 0700  01/04 0659    I.V. (mL/kg) 2670.3 (22.1)      Blood 1099.2 326.7     NG/ 765     IV Piggyback 710 376     Total Intake(mL/kg) 5299.5 (43.8) 1467.6 (12.1)     Urine (mL/kg/hr) 40 (0)      Drains 181 181     Other 4933 50     Total Output 5154 231     Net +145.5 +1236.6                  Physical Exam  Vitals and nursing note reviewed.   Constitutional:       General: She is not in acute distress.     Appearance: She is obese. She is not diaphoretic.   HENT:      Head: Normocephalic and atraumatic.      Mouth/Throat:      Mouth: Mucous membranes are moist.      Pharynx: Oropharynx is clear.   Eyes:      Extraocular Movements: Extraocular movements intact.      Conjunctiva/sclera: Conjunctivae normal.   Cardiovascular:      Rate and Rhythm: Regular rhythm. Tachycardia present.   Pulmonary:      Effort: No respiratory distress.   Abdominal:      General: There is no distension.      Palpations: Abdomen is soft.      Tenderness: There is no guarding or rebound.      Comments: Wound vac in place with good seal, no leak noted.   REAGAN drains with benign fluid, not bloody or bilious   Musculoskeletal:         General: No deformity.   Skin:     General: Skin is warm and dry.   Neurological:      Mental Status: She is alert.         Significant Labs:  CBC:   Recent Labs   Lab 01/03/22 0228   WBC 26.52*   RBC 2.38*   HGB 6.9*   HCT 22.3*   *   MCV 94   MCH 29.0   MCHC 30.9*     CMP:   Recent Labs   Lab 01/03/22 0228     99   CALCIUM 7.9*  8.0*   ALBUMIN 1.7*  1.6*   PROT 5.3*     140   K 5.5*  5.5*   CO2 24  25     105   BUN 43*  42*   CREATININE 2.2*  2.2*   ALKPHOS 155*   ALT 38   AST 37   BILITOT 1.3*       Significant Diagnostics:  I have reviewed all pertinent imaging results/findings within the past 24 hours.

## 2022-01-03 NOTE — ASSESSMENT & PLAN NOTE
-oliguric SUSAN, ischemic ATN with multifactorial etiology, however most likely d/t severe hypotension as a result of septic shock 2/2 small bowel perforation and bacteremia   -BL sCr 1  -KRT started at OSH 12/12/2021 for acidosis and volume overload  -still anuric  -stable 2L NC despite 1.2L positive fluid blanance  -needs clearance of potassium and UF   -HD today
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
## 2022-01-03 NOTE — PLAN OF CARE
SICU PLAN OF CARE NOTE     Shift Events: NAEON. A-line d/c.     Neuro: AAOx4. Follows commands and moves all extremities purposefully.     Vital Signs: T max 100.7, tylenol given. Sinus tachycardia 100-110s. MAPs >65. SpO2 100% on 2L NC and CPAP overnight.     Respiratory: 2L NC     Diet: Thickened liquids and pureed     Drains: #1 R REAGAN: 24 cc/shift, #2 R REAGAN: 12 cc/shift, L REAGAN: 90 cc/shift dark red and odorous. WV non measurable amount.     Labs/Accuchecks: Accuchecks Q6. Renal and daily labs. Phos replaced overnight as ordered. K 5.5 this AM, plans to get HD today, orders for EKG, no arrhythmias detected. H&H 6.9 and 22.3. 1U PRBC administered.     Skin: No new skin breakdown noted. Turned Q2hr. Heel and sacral foams in place. SCDs and heel boots on. Immerse bed plugged in and working properly.

## 2022-01-03 NOTE — CONSULTS
[facility]  Infectious Disease  Consult Note    Patient Name: Chidi Castaneda  MRN: [id number]  Admission Date: 12/20/2021  Hospital Length of Stay: 14 days  Attending Physician: Kendall Gorman MD  Primary Care Provider: Primary Doctor No     Isolation Status: No active isolations    Patient information was obtained from patient, past medical records and ER records.      Inpatient consult to Infectious Diseases  Consult performed by: Guera Telles MD  Consult ordered by: Jaylan Altman MD        Assessment/Plan:     Septic shock  57y/o F pt with hx of HFrEF who underwent elective laparoscopic hysterectomy on 12/8/21 c/b small bowel perfortion s/p partial bowel resection on 12/11, subcapsular liver hematoma [place] [facility] [place] SUSAN. Underwent ex-lap 12/21, open cholecystectomy and abdominal closure on 12/21. Extubated 12/31. Found to have a pelvic abscess s/p drain placement (cx 12/31 grew ecoli, GNR, enterococcus) and a Rt sided loculated pleural effusion. RCx 12/28 grew kleb aerogenes. ID consulted for meropenem.     Per OSH- BCx 12/11 and 12/13 grew enterobacter aerogenes (S to cefepime). BCx 12/15 were Neg.  Since arrival [place] [facility] has been on pip-tazo (12/10-12/24, 12/30-1/2) and vanc (1/1-1/3). Broadened with meropenem 1/2 for worsening shock and leukocytosis.    Recommendations:  --diagnostic thora of rt loculated pleural effusion- send pleural fluid for cell count with diff, gram stain, protein, ldh, aerobic/ anaerobic/ fungal/ afb cultures    --awaiting records from OSH hx    --continue vancomycin pending enterococcus speciation and susceptibilities as well as thora results    --OK to continue meropenem pending ID of unidentified GNR from pelvic abscess    --will f/u BCx to ensure clearance of kleb aerogenes bacteremia          Thank you for your consult. I will follow-up with patient. Please contact us if you have any additional questions.    Guera SNOW  "MD Koki  Infectious Disease  [facility] - Surgical Intensive Care    Subjective:     Principal Problem: <principal problem not specified>    HPI: Ms. Castaneda is a 57y/o F pt with hx of HFrEF who underwent elective laparoscopic hysterectomy on 12/8/21 c/b small bowel perfortion s/p partial bowel resection on 12/11, subcapsular liver hematoma [place] [facility] [place] SUSAN. Underwent ex-lap 12/21, open cholecystectomy and abdominal closure on 12/21. Extubated 12/31. Found to have a pelvic abscess s/p drain placement (cx 12/31 grew ecoli) and a Rt sided loculated pleural effusion. RCx 12/28 grew kleb aerogenes. ID consulted for meropenem.     Per OSH- BCx 12/11 and 12/13 grew enterobacter aerogenes (S to cefepime). BCx 12/15 were Neg.  [place] [facility] [place] pip-tazo (12/10-12/24, 12/30-1/2) and vanc (1/1-1/3).      No past medical history on file.    Past Surgical History:   Procedure Laterality Date    CHOLECYSTECTOMY  12/23/2021    Procedure: CHOLECYSTECTOMY;  Surgeon: Kendall Gorman MD;  Location: [facility] OR [address] [place] [address];  Service: General;;    ESOPHAGOGASTRODUODENOSCOPY N/A 12/23/2021    Procedure: EGD (ESOPHAGOGASTRODUODENOSCOPY);  Surgeon: Kendall Gorman MD;  Location: [facility] OR [address] [place] [address];  Service: General;  Laterality: N/A;    EVACUATION OF HEMATOMA  12/21/2021    Procedure: EVACUATION, HEMATOMA;  Surgeon: Kendall Gorman MD;  Location: [facility] OR [address] [place] [address];  Service: General;;    PERCUTANEOUS INSERTION OF GASTROSTOMY TUBE N/A 12/23/2021    Procedure: INSERTION, GASTROSTOMY TUBE, PERCUTANEOUS;  Surgeon: Kendall Gorman MD;  Location: [facility] OR [address] [place] [address];  Service: General;  Laterality: N/A;    REPLACEMENT OF WOUND VACUUM-ASSISTED CLOSURE DEVICE  12/21/2021    Procedure: REPLACEMENT, WOUND VAC;  Surgeon: Kendall Gorman MD;  Location: [facility] OR [address] [place] [address];  Service: General;;       Review of patient's allergies indicates:   Allergen Reactions    Tylox [oxycodone-acetaminophen]      "Jittery" " "      Medications:  No medications prior to admission.     Antibiotics (From admission, onward)            Start     Stop Route Frequency Ordered    01/03/22 0830  meropenem-0.9% sodium chloride 1 g/50 mL IVPB         -- IV Every 12 hours (non-standard times) 01/03/22 0723    01/01/22 1043  vancomycin - pharmacy to dose  (vancomycin IVPB)        "And" Linked Group Details    -- IV pharmacy to manage frequency 01/01/22 0944        Antifungals (From admission, onward)            None        Antivirals (From admission, onward)    None             There is no immunization history on file for this patient.    Family History    None       Social History     Socioeconomic History    Marital status: Unknown     Review of Systems   Constitutional: Negative for chills and fever.   HENT: Negative for congestion and sore throat.    Respiratory: Positive for shortness of breath. Negative for cough.    Gastrointestinal: Negative for abdominal pain, diarrhea and nausea.   Genitourinary: Negative for difficulty urinating and dysuria.   Musculoskeletal: Negative for back pain and myalgias.   Neurological: Positive for weakness. Negative for dizziness and headaches.   Psychiatric/Behavioral: Negative for confusion.     Objective:     Vital Signs (Most Recent):  Temp: 98.7 °F (37.1 °C) (01/03/22 1530)  Pulse: (!) 120 (01/03/22 1715)  Resp: (!) 25 (01/03/22 1715)  BP: (!) 93/55 (01/03/22 1715)  SpO2: 99 % (01/03/22 1715) Vital Signs (24h Range):  Temp:  [98.6 °F (37 °C)-100.7 °F (38.2 °C)] 98.7 °F (37.1 °C)  Pulse:  [105-131] 120  Resp:  [16-95] 25  SpO2:  [85 %-100 %] 99 %  BP: ()/(55-96) 93/55  Arterial Line BP: ()/(51-68) 98/51     Weight: 121.1 kg (267 lb)  Body mass index is 45.83 kg/m².    Estimated Creatinine Clearance: 28.5 mL/min (A) (based on SCr of 2.9 mg/dL (H)).    Physical Exam  Vitals reviewed.   Constitutional:       Appearance: She is ill-appearing.   HENT:      Head: Normocephalic and atraumatic.      " Comments: Rt IJ CVC     Mouth/Throat:      Mouth: Mucous membranes are moist.   Eyes:      Conjunctiva/sclera: Conjunctivae normal.      Pupils: Pupils are equal, round, and reactive to light.   Cardiovascular:      Rate and Rhythm: Regular rhythm. Tachycardia present.   Pulmonary:      Effort: No respiratory distress.      Breath sounds: No wheezing or rales.   Abdominal:      General: There is no distension.      Tenderness: There is no abdominal tenderness.      Comments: RUQ 2 drains in place with serosanguineous output  LLQ drain with bloody output  Midline incision with wound vac in place and drain with serous output   Musculoskeletal:         General: Swelling present.      Cervical back: Normal range of motion and neck supple.   Skin:     General: Skin is warm and dry.   Neurological:      General: No focal deficit present.      Mental Status: She is alert and oriented to person, place, and time.         Significant Labs:   Blood Culture: No results for input(s): LABBLOO in the last 4320 hours.  Wound Culture:   Recent Labs   Lab 12/31/21  1556   LABAERO ESCHERICHIA COLI  Many  *  GRAM NEGATIVE MO  Many  Identification and susceptibility pending  *  ENTEROCOCCUS SPECIES  Many  Identification and susceptibility pending  *     All pertinent labs within the past 24 hours have been reviewed.    Significant Imaging: I have reviewed all pertinent imaging results/findings within the past 24 hours.

## 2022-01-03 NOTE — PLAN OF CARE
[facility]  Discharge Reassessment    Primary Care Provider: Primary Doctor No    Expected Discharge Date: 1/7/2022    Reassessment (most recent)     Discharge Reassessment - 01/03/22 1250        Discharge Reassessment    Assessment Type Discharge Planning Brief Assessment     Communicated SHANA with patient/caregiver Date not available/Unable to determine     Discharge Plan A Rehab     Discharge Plan B Skilled Nursing Facility     DME Needed Upon Discharge  other (see comments)   TBD    Discharge Barriers Identified None     Why the patient remains in the hospital Requires continued medical care        Post-Acute Status    Post-Acute Authorization Placement     Post-Acute Placement Status Pending medical clearance/testing                According to the MD's Note, NAEON, pain well controlled, underwent SLED yesterday, tolerated well, had some nausea and gtube was unclamped, only 150 from gtube when vented, TF held remainder of night, AF, tachycardic, pressures stable.    The patient is not medically stable to discharge at this time and remains in the SICU. The SW will continue to follow-up with the patient to assist with discharge planning needs.         Philip Borges LMSW  Case Management [facility]  Ext: [phone]

## 2022-01-03 NOTE — PROGRESS NOTES
[facility]  Nephrology  Progress Note    Patient Name: Chidi Castaneda  MRN: [id number]  Admission Date: 12/20/2021  Hospital Length of Stay: 14 days  Attending Provider: Kendall Gorman MD   Primary Care Physician: Primary Doctor No  Principal Problem:<principal problem not specified>    Subjective:     HPI: Chidi Castaneda is a 56 y.o. female w/ PMHx HTN, GERD s/p EGD 6/22/2021, migraines, ovarian cyst s/p cystectomy, and obesity who underwent an elective lap hysterectomy on 12/18/2021 at [facility] and was then transferred to [place], [place] for higher level of care when pt was found to have post-op somnolence, decrease PO intake, decrease urine output that progressed to anuria with a sharp rise in Cr from 0.9 to 2.8 (12/10). Pt was treated for sepsis with volume resuscitation and broad spectrum antibiotics, however pt continue to deteriorate and became tachycardic, hypotensive, and imaging showed an ileus. Pt was intubated since she was found to be acidotic with a ph of 7.28 despite a nonrebreather with 100%  FiO2  12/11 pt was taken back to the OR for washout and a bowel resection was done as she was found to have a small bowel perforation  12/15 pt was found to have a subscapular liver hematoma  12/18 pt was taken back to the OR for wound vacc exchange and removal of hematoma   12/19 general surgery team recommended no further surgical intervention since they did not find any active bleeding intraoperatively on 12/18 and felt that the ongoing bleeding was 2/2 consumptive coagulopathy and septic shock. They recommended to continue wound vacc and to consider higher level of care for pt  Pt was also being treated for a bacteremia as well as for intraabdominal infection, her antibiotics prior to transfer were: meropenem, flagyl, and vancomycin   Pt received tranexamic acid x1 and multiple units of blood products.    Pt was [place] [facility] [place]21 for higher  "level of care: embolization for a subscapular hematoma       Nephrology was consulted for: "dialysis, SUSAN"    Pt does not have any h/o renal disease prior to hospitalization (per chart review and per pt's )   EDW: 88 kg   Post-op renal ultrasound was normal (results of ultrasound and other medical records from the outside hospital are in the chart at the bedside, needs to be scanned into chart)   Pt was started on CRRT on 12/12 via a trialysis catheter,   On 12/20/21 morning nephrology at outside hospital had recommended CRRT-SLED however primary team requested iHD prior to [place] [facility] it appears that pt was ordered for 4 hours however, pt was prepped for transfer and was unable to complete full session 2/2 transfer, per pt's , pt had 2L of fluid removed instead of the planned 4 liters. Of note, while pt was at [facility] pt's CRRT required the use of citrate to prevent clotting (briefly), however that apparently resolved and was able to get a session w/o citrate and obviously was able to tolerate iHD prior to transfer.   [place] [facility] [place] started on zosyn, she was started on NS 125ml/hr, was transfused 2 units of PRBCs, and remained off vasopressors.       Interval History: stable 2L fio2 despite 1.2L positive fluid balance last 24h, potassium increased 5.5    Review of patient's allergies indicates:   Allergen Reactions    Tylox [oxycodone-acetaminophen]      "Jittery"     Current Facility-Administered Medications   Medication Frequency    0.9%  NaCl infusion (CRRT USE ONLY) Continuous    0.9%  NaCl infusion (for blood administration) Q24H PRN    0.9%  NaCl infusion (for blood administration) Q24H PRN    0.9%  NaCl infusion Once    balsam peru-castor oiL Oint BID    bisacodyL suppository 10 mg Once    dextrose 50% injection 12.5 g PRN    dextrose 50% injection 25 g PRN    diazePAM 1 mg/mL oral solution 2 mg Q6H PRN    gabapentin 250 mg/5 mL (5 mL) solution 125 mg Q8H "    glucagon (human recombinant) injection 1 mg PRN    heparin (porcine) injection 1,000 Units PRN    heparin (porcine) injection 7,500 Units Q8H    HYDROmorphone injection 0.5 mg Q6H PRN    insulin aspart U-100 pen 0-5 Units Q6H PRN    levalbuterol nebulizer solution 0.63 mg Q4H    melatonin 1 mg/mL liquid (PEDS) 5 mg Nightly PRN    meropenem-0.9% sodium chloride 1 g/50 mL IVPB Q12H    methocarbamoL tablet 500 mg QID    nitroGLYCERIN 2% TD oint ointment 0.5 inch Q6H    olanzapine zydis disintegrating tablet 10 mg Nightly PRN    ondansetron injection 8 mg Q8H PRN    oxyCODONE 5 mg/5 mL solution 10 mg Q4H PRN    oxyCODONE 5 mg/5 mL solution 5 mg Q4H PRN    pantoprazole suspension 40 mg Daily    polyethylene glycol packet 17 g Daily    potassium, sodium phosphates 280-160-250 mg packet 2 packet TID PRN    propranoloL tablet 10 mg TID    scopolamine 1.3-1.5 mg (1 mg over 3 days) 1 patch Q3 Days    senna-docusate 8.6-50 mg per tablet 1 tablet Daily    senna-docusate 8.6-50 mg per tablet 1 tablet Daily PRN    sodium chloride 0.9% bolus 250 mL PRN    sodium chloride 0.9% flush 10 mL PRN    sodium chloride 0.9% flush 10 mL PRN    vancomycin - pharmacy to dose pharmacy to manage frequency       Objective:     Vital Signs (Most Recent):  Temp: 98.6 °F (37 °C) (01/03/22 1200)  Pulse: (!) 121 (01/03/22 1300)  Resp: (!) 24 (01/03/22 1300)  BP: 133/74 (01/03/22 1400)  SpO2: 97 % (01/03/22 1300)  O2 Device (Oxygen Therapy): nasal cannula w/ humidification (01/03/22 1123) Vital Signs (24h Range):  Temp:  [98.6 °F (37 °C)-100.7 °F (38.2 °C)] 98.6 °F (37 °C)  Pulse:  [105-131] 121  Resp:  [16-95] 24  SpO2:  [85 %-100 %] 97 %  BP: (113-172)/(57-89) 133/74  Arterial Line BP: ()/(51-75) 98/51     Weight: 121.1 kg (267 lb) (12/28/21 1003)  Body mass index is 45.83 kg/m².  Body surface area is 2.34 meters squared.    I/O last 3 completed shifts:  In: 4487.8 [I.V.:2420.3; Blood:326.7; NG/GT:1015; IV  Piggyback:725.8]  Out: 5245 [Drains:312; Other:4933]    Physical Exam  Constitutional:       Appearance: She is obese. She is ill-appearing.   HENT:      Mouth/Throat:      Mouth: Mucous membranes are moist.   Eyes:      Pupils: Pupils are equal, round, and reactive to light.   Cardiovascular:      Rate and Rhythm: Normal rate and regular rhythm.   Pulmonary:      Effort: No respiratory distress.      Comments: NC  Abdominal:      General: There is no distension.      Comments: Wound vac   Musculoskeletal:         General: No deformity.      Right lower leg: Edema present.      Left lower leg: Edema present.   Skin:     Coloration: Skin is not jaundiced.   Neurological:      General: No focal deficit present.         Significant Labs:  All labs within the past 24 hours have been reviewed.     Significant Imaging:  Labs: Reviewed    Assessment/Plan:     SUSAN (acute kidney injury)  -oliguric SUSAN, ischemic ATN with multifactorial etiology, however most likely d/t severe hypotension as a result of septic shock 2/2 small bowel perforation and bacteremia   -BL sCr 1  -KRT started at OSH 12/12/2021 for acidosis and volume overload  -still anuric  -stable 2L NC despite 1.2L positive fluid blanance  -needs clearance of potassium and UF   -HD today    Volume overload  -CTA and CXR pulmonary edema  -now improved after regular UF with KRT    Metabolic acidosis  -controlled with KRT    Bowel perforation  -per primary      Septic shock  -secondary to bowel perforation  -per primary      Subcapsular hematoma of liver  -per primary            Anthony Steven MD  Nephrology  [facility]

## 2022-01-03 NOTE — PHYSICIAN QUERY
PT Name: Chidi Castaneda  MR #: [id number]     DOCUMENTATION CLARIFICATION     CDS: Brandy Capley, RN  Email: [email or web address]    This form is a permanent document in the medical record.     Query Date: January 3, 2022    By submitting this query, we are merely seeking further clarification of documentation.  Please utilize your independent clinical judgment when addressing the question(s) below.    The Medical Record contains the following:     Indicators   Supporting Clinical Findings Location in Medical Record    Non-blanchable erythema/redness      Ulcer/Injury/Skin Breakdown      Deep Tissue Injury     X Wound care consult Wound care consult received from RN for assessment of nose, heels and upper back. Per chart review, patient transferred from outside hospital to [facility] [place] level of care. She is now s/p lap hysterectomy complicated by multiple bowel injuries. Has undergone ex-lap with small bowel resection and anastomosis and now with hemorrhage from liver. Patient arrived intubated, sedated, on multiple pressors with ongoing blood loss from abdominal vac dressing. Assessment and pictures listed below.     Left nare- present on admit- purple discoloration with partial thickness exposed at this time- small amount of serosanguinous drainage - nurse reports injury was found after NG tube removal from OSH         Wound care consult 12/22, filed 1/3    Acute/Chronic Illness     X Medication/Treatment Nursing to apply Sween moisturizer (pink top) to left nare BID   Wound care routine Order [id number] 12/22       X Other Altered Skin Integrity 12/21/21 0700   Left Nare   Purple or maroon localized area of discolored intact skin or non-intact skin or a blood-filled blister.                 Date First Assessed/Time First Assessed: 12/21/21 0700   Altered Skin Integrity Present on Admission: yes   Side: Left   Location: Nare   Description of Altered Skin Integrity: Purple or maroon localized area of discolored  intact skin or non-intact    LDA flow sheet 12/21     The clinical guidelines noted are only a system guideline. It does not replace the providers clinical judgment.    Per the National Pressure Injury Advisory Panel:   A pressure injury is localized damage to the skin and underlying soft tissue usually over a bony prominence or related to a medical or other device. The injury can present as intact skin or an open ulcer and may be painful. The injury occurs as a result of intense and/or prolonged pressure or pressure in combination with shear. The tolerance of soft tissue for pressure and shear may also be affected by microclimate, nutrition, perfusion, co-morbidities and condition of the soft tissue.       Medical Device Related Pressure Injury: This describes an etiology. Medical device related pressure injuries result from the use of devices designed and applied for diagnostic or therapeutic purposes. The resultant pressure injury generally conforms to the pattern or shape of the device. The injury should be staged using the staging system.    Mucosal Membrane Pressure Injury: Mucosal membrane pressure injury is found on mucous membranes with a history of a medical device in use at the location of the injury. Due to the anatomy of the tissue these ulcers cannot be staged.       Provider, please provide the integumentary diagnosis related to the documentation of left nare:     [   ] Medical Device Related Mucosal Membrane Pressure Injury/Decubitus Ulcer, Unstageable   [   ] Mucosal Membrane Pressure Injury   [ x ] Other Integumentary Diagnosis (please specify):___Clinically insignificant, skin intact___________   [  ] Clinically Undetermined          Please document in your progress notes daily for the duration of treatment until resolved and include in your discharge summary.    Reference:    SONYA Mosquera., MICA Aguero., Goldberg, M., SONYA Rowe, SONYA Kessler, & ALINA Mcallister (2016). Revised National Pressure  Ulcer Advisory Panel Pressure Injury Staging System: Revised Pressure Injury Staging System. J Wound Ostomy Continence Nurs, 43(6), 585-597. doi:10.1097/won.1247234202258879    Form No.61780

## 2022-01-04 PROBLEM — I99.8 ISCHEMIA OF BOTH LOWER EXTREMITIES: Status: ACTIVE | Noted: 2022-01-04

## 2022-01-04 LAB
ALBUMIN SERPL BCP-MCNC: 1.7 G/DL (ref 3.5–5.2)
ALBUMIN SERPL BCP-MCNC: 1.8 G/DL (ref 3.5–5.2)
ALP SERPL-CCNC: 140 U/L (ref 55–135)
ALT SERPL W/O P-5'-P-CCNC: 36 U/L (ref 10–44)
ANION GAP SERPL CALC-SCNC: 10 MMOL/L (ref 8–16)
ANION GAP SERPL CALC-SCNC: 11 MMOL/L (ref 8–16)
ANION GAP SERPL CALC-SCNC: 12 MMOL/L (ref 8–16)
ANION GAP SERPL CALC-SCNC: 12 MMOL/L (ref 8–16)
ANION GAP SERPL CALC-SCNC: 8 MMOL/L (ref 8–16)
ANISOCYTOSIS BLD QL SMEAR: SLIGHT
ANISOCYTOSIS BLD QL SMEAR: SLIGHT
AST SERPL-CCNC: 31 U/L (ref 10–40)
BACTERIA SPEC AEROBE CULT: ABNORMAL
BASOPHILS # BLD AUTO: ABNORMAL K/UL (ref 0–0.2)
BASOPHILS NFR BLD: 0 % (ref 0–1.9)
BASOPHILS NFR BLD: 0 % (ref 0–1.9)
BASOPHILS NFR BLD: 1 % (ref 0–1.9)
BILIRUB SERPL-MCNC: 1.1 MG/DL (ref 0.1–1)
BUN SERPL-MCNC: 31 MG/DL (ref 6–20)
BUN SERPL-MCNC: 35 MG/DL (ref 6–20)
BUN SERPL-MCNC: 36 MG/DL (ref 6–20)
BUN SERPL-MCNC: 44 MG/DL (ref 6–20)
BUN SERPL-MCNC: 52 MG/DL (ref 6–20)
CALCIUM SERPL-MCNC: 7.9 MG/DL (ref 8.7–10.5)
CALCIUM SERPL-MCNC: 8.2 MG/DL (ref 8.7–10.5)
CALCIUM SERPL-MCNC: 8.3 MG/DL (ref 8.7–10.5)
CHLORIDE SERPL-SCNC: 102 MMOL/L (ref 95–110)
CHLORIDE SERPL-SCNC: 103 MMOL/L (ref 95–110)
CHLORIDE SERPL-SCNC: 104 MMOL/L (ref 95–110)
CHLORIDE SERPL-SCNC: 104 MMOL/L (ref 95–110)
CHLORIDE SERPL-SCNC: 108 MMOL/L (ref 95–110)
CO2 SERPL-SCNC: 20 MMOL/L (ref 23–29)
CO2 SERPL-SCNC: 22 MMOL/L (ref 23–29)
CO2 SERPL-SCNC: 22 MMOL/L (ref 23–29)
CO2 SERPL-SCNC: 23 MMOL/L (ref 23–29)
CO2 SERPL-SCNC: 24 MMOL/L (ref 23–29)
CREAT SERPL-MCNC: 2 MG/DL (ref 0.5–1.4)
CREAT SERPL-MCNC: 2.3 MG/DL (ref 0.5–1.4)
CREAT SERPL-MCNC: 2.4 MG/DL (ref 0.5–1.4)
CREAT SERPL-MCNC: 2.9 MG/DL (ref 0.5–1.4)
CREAT SERPL-MCNC: 3.2 MG/DL (ref 0.5–1.4)
DIFFERENTIAL METHOD: ABNORMAL
EOSINOPHIL # BLD AUTO: ABNORMAL K/UL (ref 0–0.5)
EOSINOPHIL NFR BLD: 0 % (ref 0–8)
EOSINOPHIL NFR BLD: 0 % (ref 0–8)
EOSINOPHIL NFR BLD: 2 % (ref 0–8)
ERYTHROCYTE [DISTWIDTH] IN BLOOD BY AUTOMATED COUNT: 15.6 % (ref 11.5–14.5)
ERYTHROCYTE [DISTWIDTH] IN BLOOD BY AUTOMATED COUNT: 15.7 % (ref 11.5–14.5)
ERYTHROCYTE [DISTWIDTH] IN BLOOD BY AUTOMATED COUNT: 15.7 % (ref 11.5–14.5)
EST. GFR  (AFRICAN AMERICAN): 18.1 ML/MIN/1.73 M^2
EST. GFR  (AFRICAN AMERICAN): 20.4 ML/MIN/1.73 M^2
EST. GFR  (AFRICAN AMERICAN): 25.6 ML/MIN/1.73 M^2
EST. GFR  (AFRICAN AMERICAN): 27 ML/MIN/1.73 M^2
EST. GFR  (AFRICAN AMERICAN): 31.9 ML/MIN/1.73 M^2
EST. GFR  (NON AFRICAN AMERICAN): 15.7 ML/MIN/1.73 M^2
EST. GFR  (NON AFRICAN AMERICAN): 17.7 ML/MIN/1.73 M^2
EST. GFR  (NON AFRICAN AMERICAN): 22.2 ML/MIN/1.73 M^2
EST. GFR  (NON AFRICAN AMERICAN): 23.4 ML/MIN/1.73 M^2
EST. GFR  (NON AFRICAN AMERICAN): 27.7 ML/MIN/1.73 M^2
GLUCOSE SERPL-MCNC: 101 MG/DL (ref 70–110)
GLUCOSE SERPL-MCNC: 109 MG/DL (ref 70–110)
GLUCOSE SERPL-MCNC: 111 MG/DL (ref 70–110)
GLUCOSE SERPL-MCNC: 96 MG/DL (ref 70–110)
GLUCOSE SERPL-MCNC: 96 MG/DL (ref 70–110)
HCT VFR BLD AUTO: 26.7 % (ref 37–48.5)
HCT VFR BLD AUTO: 27.3 % (ref 37–48.5)
HCT VFR BLD AUTO: 27.9 % (ref 37–48.5)
HGB BLD-MCNC: 8.6 G/DL (ref 12–16)
HGB BLD-MCNC: 8.8 G/DL (ref 12–16)
HGB BLD-MCNC: 8.9 G/DL (ref 12–16)
HYPOCHROMIA BLD QL SMEAR: ABNORMAL
HYPOCHROMIA BLD QL SMEAR: ABNORMAL
IMM GRANULOCYTES # BLD AUTO: ABNORMAL K/UL (ref 0–0.04)
IMM GRANULOCYTES NFR BLD AUTO: ABNORMAL % (ref 0–0.5)
LYMPHOCYTES # BLD AUTO: ABNORMAL K/UL (ref 1–4.8)
LYMPHOCYTES NFR BLD: 5 % (ref 18–48)
LYMPHOCYTES NFR BLD: 6 % (ref 18–48)
LYMPHOCYTES NFR BLD: 7 % (ref 18–48)
MAGNESIUM SERPL-MCNC: 2 MG/DL (ref 1.6–2.6)
MAGNESIUM SERPL-MCNC: 2 MG/DL (ref 1.6–2.6)
MAGNESIUM SERPL-MCNC: 2.1 MG/DL (ref 1.6–2.6)
MAGNESIUM SERPL-MCNC: 2.1 MG/DL (ref 1.6–2.6)
MCH RBC QN AUTO: 29 PG (ref 27–31)
MCH RBC QN AUTO: 29 PG (ref 27–31)
MCH RBC QN AUTO: 29.5 PG (ref 27–31)
MCHC RBC AUTO-ENTMCNC: 31.9 G/DL (ref 32–36)
MCHC RBC AUTO-ENTMCNC: 32.2 G/DL (ref 32–36)
MCHC RBC AUTO-ENTMCNC: 32.2 G/DL (ref 32–36)
MCV RBC AUTO: 90 FL (ref 82–98)
MCV RBC AUTO: 90 FL (ref 82–98)
MCV RBC AUTO: 92 FL (ref 82–98)
METAMYELOCYTES NFR BLD MANUAL: 1 %
METAMYELOCYTES NFR BLD MANUAL: 1 %
METAMYELOCYTES NFR BLD MANUAL: 2 %
MONOCYTES # BLD AUTO: ABNORMAL K/UL (ref 0.3–1)
MONOCYTES NFR BLD: 3 % (ref 4–15)
MONOCYTES NFR BLD: 7 % (ref 4–15)
MONOCYTES NFR BLD: 7 % (ref 4–15)
MYELOCYTES NFR BLD MANUAL: 1 %
MYELOCYTES NFR BLD MANUAL: 3 %
MYELOCYTES NFR BLD MANUAL: 6 %
NEUTROPHILS NFR BLD: 73 % (ref 38–73)
NEUTROPHILS NFR BLD: 78 % (ref 38–73)
NEUTROPHILS NFR BLD: 80 % (ref 38–73)
NEUTS BAND NFR BLD MANUAL: 11 %
NEUTS BAND NFR BLD MANUAL: 2 %
NEUTS BAND NFR BLD MANUAL: 3 %
NRBC BLD-RTO: 0 /100 WBC
OVALOCYTES BLD QL SMEAR: ABNORMAL
OVALOCYTES BLD QL SMEAR: ABNORMAL
PHOSPHATE SERPL-MCNC: 2.8 MG/DL (ref 2.7–4.5)
PHOSPHATE SERPL-MCNC: 3.3 MG/DL (ref 2.7–4.5)
PHOSPHATE SERPL-MCNC: 3.4 MG/DL (ref 2.7–4.5)
PLATELET # BLD AUTO: 470 K/UL (ref 150–450)
PLATELET # BLD AUTO: 501 K/UL (ref 150–450)
PLATELET # BLD AUTO: 521 K/UL (ref 150–450)
PLATELET BLD QL SMEAR: ABNORMAL
PMV BLD AUTO: 10.7 FL (ref 9.2–12.9)
POCT GLUCOSE: 110 MG/DL (ref 70–110)
POIKILOCYTOSIS BLD QL SMEAR: SLIGHT
POIKILOCYTOSIS BLD QL SMEAR: SLIGHT
POLYCHROMASIA BLD QL SMEAR: ABNORMAL
POLYCHROMASIA BLD QL SMEAR: ABNORMAL
POTASSIUM SERPL-SCNC: 4.4 MMOL/L (ref 3.5–5.1)
POTASSIUM SERPL-SCNC: 4.5 MMOL/L (ref 3.5–5.1)
POTASSIUM SERPL-SCNC: 4.6 MMOL/L (ref 3.5–5.1)
POTASSIUM SERPL-SCNC: 4.7 MMOL/L (ref 3.5–5.1)
POTASSIUM SERPL-SCNC: 4.8 MMOL/L (ref 3.5–5.1)
PROMYELOCYTES NFR BLD MANUAL: 1 %
PROT SERPL-MCNC: 5.7 G/DL (ref 6–8.4)
RBC # BLD AUTO: 2.97 M/UL (ref 4–5.4)
RBC # BLD AUTO: 3.02 M/UL (ref 4–5.4)
RBC # BLD AUTO: 3.03 M/UL (ref 4–5.4)
SODIUM SERPL-SCNC: 134 MMOL/L (ref 136–145)
SODIUM SERPL-SCNC: 135 MMOL/L (ref 136–145)
SODIUM SERPL-SCNC: 138 MMOL/L (ref 136–145)
SODIUM SERPL-SCNC: 138 MMOL/L (ref 136–145)
SODIUM SERPL-SCNC: 140 MMOL/L (ref 136–145)
VANCOMYCIN SERPL-MCNC: 20.9 UG/ML
WBC # BLD AUTO: 31.02 K/UL (ref 3.9–12.7)
WBC # BLD AUTO: 33.64 K/UL (ref 3.9–12.7)
WBC # BLD AUTO: 33.97 K/UL (ref 3.9–12.7)

## 2022-01-04 PROCEDURE — 99233 PR SUBSEQUENT HOSPITAL CARE,LEVL III: ICD-10-PCS | Mod: ,,, | Performed by: INTERNAL MEDICINE

## 2022-01-04 PROCEDURE — 94664 DEMO&/EVAL PT USE INHALER: CPT

## 2022-01-04 PROCEDURE — 85007 BL SMEAR W/DIFF WBC COUNT: CPT | Performed by: STUDENT IN AN ORGANIZED HEALTH CARE EDUCATION/TRAINING PROGRAM

## 2022-01-04 PROCEDURE — 94760 N-INVAS EAR/PLS OXIMETRY 1: CPT

## 2022-01-04 PROCEDURE — 25000242 PHARM REV CODE 250 ALT 637 W/ HCPCS: Performed by: SURGERY

## 2022-01-04 PROCEDURE — 20000000 HC ICU ROOM

## 2022-01-04 PROCEDURE — 25000003 PHARM REV CODE 250: Performed by: SURGERY

## 2022-01-04 PROCEDURE — 80069 RENAL FUNCTION PANEL: CPT | Mod: 91 | Performed by: INTERNAL MEDICINE

## 2022-01-04 PROCEDURE — 27000646 HC AEROBIKA DEVICE

## 2022-01-04 PROCEDURE — 92526 ORAL FUNCTION THERAPY: CPT

## 2022-01-04 PROCEDURE — 84100 ASSAY OF PHOSPHORUS: CPT | Performed by: STUDENT IN AN ORGANIZED HEALTH CARE EDUCATION/TRAINING PROGRAM

## 2022-01-04 PROCEDURE — 94761 N-INVAS EAR/PLS OXIMETRY MLT: CPT

## 2022-01-04 PROCEDURE — 80202 ASSAY OF VANCOMYCIN: CPT | Performed by: SURGERY

## 2022-01-04 PROCEDURE — 25000242 PHARM REV CODE 250 ALT 637 W/ HCPCS

## 2022-01-04 PROCEDURE — 63600175 PHARM REV CODE 636 W HCPCS: Performed by: STUDENT IN AN ORGANIZED HEALTH CARE EDUCATION/TRAINING PROGRAM

## 2022-01-04 PROCEDURE — 25000003 PHARM REV CODE 250: Performed by: STUDENT IN AN ORGANIZED HEALTH CARE EDUCATION/TRAINING PROGRAM

## 2022-01-04 PROCEDURE — 63600175 PHARM REV CODE 636 W HCPCS

## 2022-01-04 PROCEDURE — 85027 COMPLETE CBC AUTOMATED: CPT | Performed by: STUDENT IN AN ORGANIZED HEALTH CARE EDUCATION/TRAINING PROGRAM

## 2022-01-04 PROCEDURE — 99233 SBSQ HOSP IP/OBS HIGH 50: CPT | Mod: ,,, | Performed by: INTERNAL MEDICINE

## 2022-01-04 PROCEDURE — 85027 COMPLETE CBC AUTOMATED: CPT | Mod: 91 | Performed by: SURGERY

## 2022-01-04 PROCEDURE — 94799 UNLISTED PULMONARY SVC/PX: CPT

## 2022-01-04 PROCEDURE — 25000003 PHARM REV CODE 250

## 2022-01-04 PROCEDURE — 97535 SELF CARE MNGMENT TRAINING: CPT

## 2022-01-04 PROCEDURE — 63600175 PHARM REV CODE 636 W HCPCS: Mod: JG | Performed by: STUDENT IN AN ORGANIZED HEALTH CARE EDUCATION/TRAINING PROGRAM

## 2022-01-04 PROCEDURE — 94640 AIRWAY INHALATION TREATMENT: CPT

## 2022-01-04 PROCEDURE — 99291 PR CRITICAL CARE, E/M 30-74 MINUTES: ICD-10-PCS | Mod: ,,, | Performed by: STUDENT IN AN ORGANIZED HEALTH CARE EDUCATION/TRAINING PROGRAM

## 2022-01-04 PROCEDURE — 63600175 PHARM REV CODE 636 W HCPCS: Performed by: SURGERY

## 2022-01-04 PROCEDURE — 83735 ASSAY OF MAGNESIUM: CPT | Mod: 91 | Performed by: INTERNAL MEDICINE

## 2022-01-04 PROCEDURE — 80053 COMPREHEN METABOLIC PANEL: CPT | Performed by: STUDENT IN AN ORGANIZED HEALTH CARE EDUCATION/TRAINING PROGRAM

## 2022-01-04 PROCEDURE — 85007 BL SMEAR W/DIFF WBC COUNT: CPT | Mod: 91 | Performed by: SURGERY

## 2022-01-04 PROCEDURE — 99900035 HC TECH TIME PER 15 MIN (STAT)

## 2022-01-04 PROCEDURE — 99291 CRITICAL CARE FIRST HOUR: CPT | Mod: ,,, | Performed by: STUDENT IN AN ORGANIZED HEALTH CARE EDUCATION/TRAINING PROGRAM

## 2022-01-04 PROCEDURE — 27200966 HC CLOSED SUCTION SYSTEM

## 2022-01-04 RX ORDER — MEROPENEM AND SODIUM CHLORIDE 500 MG/50ML
500 INJECTION, SOLUTION INTRAVENOUS
Status: DISCONTINUED | OUTPATIENT
Start: 2022-01-04 | End: 2022-01-10

## 2022-01-04 RX ORDER — DIAZEPAM 5 MG/5ML
2 SOLUTION ORAL EVERY 6 HOURS PRN
Status: DISCONTINUED | OUTPATIENT
Start: 2022-01-04 | End: 2022-01-04

## 2022-01-04 RX ORDER — AMOXICILLIN 250 MG
1 CAPSULE ORAL DAILY
Status: DISCONTINUED | OUTPATIENT
Start: 2022-01-05 | End: 2022-01-05

## 2022-01-04 RX ORDER — FLUCONAZOLE 2 MG/ML
200 INJECTION, SOLUTION INTRAVENOUS
Status: DISCONTINUED | OUTPATIENT
Start: 2022-01-04 | End: 2022-01-06

## 2022-01-04 RX ORDER — PROPRANOLOL HYDROCHLORIDE 20 MG/1
20 TABLET ORAL 3 TIMES DAILY
Status: DISCONTINUED | OUTPATIENT
Start: 2022-01-04 | End: 2022-01-10

## 2022-01-04 RX ORDER — POLYETHYLENE GLYCOL 3350 17 G/17G
17 POWDER, FOR SOLUTION ORAL DAILY
Status: DISCONTINUED | OUTPATIENT
Start: 2022-01-05 | End: 2022-01-05

## 2022-01-04 RX ADMIN — OXYCODONE HYDROCHLORIDE 5 MG: 5 SOLUTION ORAL at 08:01

## 2022-01-04 RX ADMIN — HEPARIN SODIUM 7500 UNITS: 5000 INJECTION INTRAVENOUS; SUBCUTANEOUS at 06:01

## 2022-01-04 RX ADMIN — METHOCARBAMOL 500 MG: 500 TABLET ORAL at 12:01

## 2022-01-04 RX ADMIN — Medication: at 09:01

## 2022-01-04 RX ADMIN — PANTOPRAZOLE SODIUM 40 MG: 40 GRANULE, DELAYED RELEASE ORAL at 08:01

## 2022-01-04 RX ADMIN — GABAPENTIN 125 MG: 250 SOLUTION ORAL at 03:01

## 2022-01-04 RX ADMIN — EPOETIN ALFA-EPBX 6060 UNITS: 4000 INJECTION, SOLUTION INTRAVENOUS; SUBCUTANEOUS at 12:01

## 2022-01-04 RX ADMIN — PROPRANOLOL HYDROCHLORIDE 10 MG: 10 TABLET ORAL at 08:01

## 2022-01-04 RX ADMIN — NITROGLYCERIN 0.5 INCH: 20 OINTMENT TOPICAL at 05:01

## 2022-01-04 RX ADMIN — MEROPENEM AND SODIUM CHLORIDE 500 MG: 500 INJECTION, SOLUTION INTRAVENOUS at 10:01

## 2022-01-04 RX ADMIN — GABAPENTIN 125 MG: 250 SOLUTION ORAL at 09:01

## 2022-01-04 RX ADMIN — OXYCODONE HYDROCHLORIDE 5 MG: 5 SOLUTION ORAL at 07:01

## 2022-01-04 RX ADMIN — LEVALBUTEROL HYDROCHLORIDE 0.63 MG: 0.63 SOLUTION RESPIRATORY (INHALATION) at 03:01

## 2022-01-04 RX ADMIN — Medication: at 08:01

## 2022-01-04 RX ADMIN — SENNOSIDES AND DOCUSATE SODIUM 1 TABLET: 50; 8.6 TABLET ORAL at 08:01

## 2022-01-04 RX ADMIN — HEPARIN SODIUM 7500 UNITS: 5000 INJECTION INTRAVENOUS; SUBCUTANEOUS at 09:01

## 2022-01-04 RX ADMIN — METHOCARBAMOL 500 MG: 500 TABLET ORAL at 08:01

## 2022-01-04 RX ADMIN — MEROPENEM AND SODIUM CHLORIDE 500 MG: 500 INJECTION, SOLUTION INTRAVENOUS at 09:01

## 2022-01-04 RX ADMIN — HYDROMORPHONE HYDROCHLORIDE 0.5 MG: 1 INJECTION, SOLUTION INTRAMUSCULAR; INTRAVENOUS; SUBCUTANEOUS at 09:01

## 2022-01-04 RX ADMIN — POLYETHYLENE GLYCOL 3350 17 G: 17 POWDER, FOR SOLUTION ORAL at 08:01

## 2022-01-04 RX ADMIN — METHOCARBAMOL 500 MG: 500 TABLET ORAL at 05:01

## 2022-01-04 RX ADMIN — LEVALBUTEROL HYDROCHLORIDE 0.63 MG: 0.63 SOLUTION RESPIRATORY (INHALATION) at 11:01

## 2022-01-04 RX ADMIN — HEPARIN SODIUM 7500 UNITS: 5000 INJECTION INTRAVENOUS; SUBCUTANEOUS at 03:01

## 2022-01-04 RX ADMIN — PROPRANOLOL HYDROCHLORIDE 20 MG: 20 TABLET ORAL at 09:01

## 2022-01-04 RX ADMIN — NITROGLYCERIN 0.5 INCH: 20 OINTMENT TOPICAL at 12:01

## 2022-01-04 RX ADMIN — HYDROMORPHONE HYDROCHLORIDE 0.5 MG: 1 INJECTION, SOLUTION INTRAMUSCULAR; INTRAVENOUS; SUBCUTANEOUS at 02:01

## 2022-01-04 RX ADMIN — PROPRANOLOL HYDROCHLORIDE 20 MG: 20 TABLET ORAL at 03:01

## 2022-01-04 RX ADMIN — GABAPENTIN 125 MG: 250 SOLUTION ORAL at 05:01

## 2022-01-04 RX ADMIN — LEVALBUTEROL HYDROCHLORIDE 0.63 MG: 0.63 SOLUTION RESPIRATORY (INHALATION) at 07:01

## 2022-01-04 RX ADMIN — METHOCARBAMOL 500 MG: 500 TABLET ORAL at 09:01

## 2022-01-04 RX ADMIN — FLUCONAZOLE 200 MG: 2 INJECTION, SOLUTION INTRAVENOUS at 08:01

## 2022-01-04 RX ADMIN — LEVALBUTEROL HYDROCHLORIDE 0.63 MG: 0.63 SOLUTION RESPIRATORY (INHALATION) at 01:01

## 2022-01-04 RX ADMIN — LEVALBUTEROL HYDROCHLORIDE 0.63 MG: 0.63 SOLUTION RESPIRATORY (INHALATION) at 08:01

## 2022-01-04 NOTE — ASSESSMENT & PLAN NOTE
55y/o F pt with hx of HFrEF who underwent elective laparoscopic hysterectomy on 12/8/21 c/b small bowel perfortion s/p partial bowel resection on 12/11, subcapsular liver hematoma, klebsiella aerogenes bacteremia [place] [facility] [place] SUSAN. Underwent ex-lap 12/21, open cholecystectomy and abdominal closure on 12/21. Extubated 12/31. Found to have a pelvic abscess s/p drain placement (cx 12/31 grew ecoli, klebsiella aerogenes (R to ertapenem and pip-tazo), enterococcus avium (S to ampicillin) and a Rt sided loculated pleural effusion. RCx 12/28 grew kleb aerogenes. ID consulted for meropenem.     Per OSH- BCx 12/11 and 12/13 grew klebsiella aerogenes (S to cefepime). BCx 12/15 were Neg.  [place] [facility] been on pip-tazo (12/10-12/24, 12/30-1/2) and vanc (1/1-1/3). Broadened with meropenem 1/2 for worsening shock and leukocytosis.    Recommendations:  --diagnostic thora of rt loculated pleural effusion and for source control- send pleural fluid for cell count with diff, gram stain, protein, ldh, aerobic/ anaerobic/ fungal/ afb cultures    --STOP vancomycin    --Continue meropenem for enterococcus, bacteroides and e coli and klebsiella in pelvic abscess. Plan to treat for atleast 3 weeks, but will likely extend duration pending resolution of pleural effusion. Last day 1/24/22.  If no plan for thora, repeat chest imaging in 3 wks (approx 1/24) prior to stopping abx.    --will f/u BCx to ensure clearance of kleb aerogenes bacteremia

## 2022-01-04 NOTE — PROGRESS NOTES
[facility]  Nephrology  Progress Note    Patient Name: Chidi Castaneda  MRN: [id number]  Admission Date: 12/20/2021  Hospital Length of Stay: 15 days  Attending Provider: Kendall Gorman MD   Primary Care Physician: Primary Doctor No  Principal Problem:<principal problem not specified>    Subjective:     HPI: Chidi Castaneda is a 56 y.o. female w/ PMHx HTN, GERD s/p EGD 6/22/2021, migraines, ovarian cyst s/p cystectomy, and obesity who underwent an elective lap hysterectomy on 12/18/2021 at [facility] and was then transferred to [place], [place] for higher level of care when pt was found to have post-op somnolence, decrease PO intake, decrease urine output that progressed to anuria with a sharp rise in Cr from 0.9 to 2.8 (12/10). Pt was treated for sepsis with volume resuscitation and broad spectrum antibiotics, however pt continue to deteriorate and became tachycardic, hypotensive, and imaging showed an ileus. Pt was intubated since she was found to be acidotic with a ph of 7.28 despite a nonrebreather with 100%  FiO2  12/11 pt was taken back to the OR for washout and a bowel resection was done as she was found to have a small bowel perforation  12/15 pt was found to have a subscapular liver hematoma  12/18 pt was taken back to the OR for wound vacc exchange and removal of hematoma   12/19 general surgery team recommended no further surgical intervention since they did not find any active bleeding intraoperatively on 12/18 and felt that the ongoing bleeding was 2/2 consumptive coagulopathy and septic shock. They recommended to continue wound vacc and to consider higher level of care for pt  Pt was also being treated for a bacteremia as well as for intraabdominal infection, her antibiotics prior to transfer were: meropenem, flagyl, and vancomycin   Pt received tranexamic acid x1 and multiple units of blood products.    Pt was transferred to [facility] on 12/20/2021 for higher  "level of care: embolization for a subscapular hematoma       Nephrology was consulted for: "dialysis, SUSAN"    Pt does not have any h/o renal disease prior to hospitalization (per chart review and per pt's )   EDW: 88 kg   Post-op renal ultrasound was normal (results of ultrasound and other medical records from the outside hospital are in the chart at the bedside, needs to be scanned into chart)   Pt was started on CRRT on 12/12 via a trialysis catheter,   On 12/20/21 morning nephrology at outside hospital had recommended CRRT-SLED however primary team requested iHD prior to transfer to [facility], it appears that pt was ordered for 4 hours however, pt was prepped for transfer and was unable to complete full session 2/2 transfer, per pt's , pt had 2L of fluid removed instead of the planned 4 liters. Of note, while pt was at [facility] pt's CRRT required the use of citrate to prevent clotting (briefly), however that apparently resolved and was able to get a session w/o citrate and obviously was able to tolerate iHD prior to transfer.   Upon arrival to [facility] pt was started on zosyn, she was started on NS 125ml/hr, was transfused 2 units of PRBCs, and remained off vasopressors.       Interval History: tolerated HD well with 2L net UF    Review of patient's allergies indicates:   Allergen Reactions    Tylox [oxycodone-acetaminophen]      "Jittery"     Current Facility-Administered Medications   Medication Frequency    0.9%  NaCl infusion (CRRT USE ONLY) Continuous    0.9%  NaCl infusion (for blood administration) Q24H PRN    0.9%  NaCl infusion (for blood administration) Q24H PRN    0.9%  NaCl infusion Once    balsam peru-castor oiL Oint BID    dextrose 50% injection 25 g PRN    fluconazole (DIFLUCAN) IVPB 200 mg 100 mL Q24H    gabapentin 250 mg/5 mL (5 mL) solution 125 mg Q8H    heparin (porcine) injection 1,000 Units PRN    heparin (porcine) injection 7,500 Units Q8H    HYDROmorphone injection " 0.5 mg Q6H PRN    levalbuterol nebulizer solution 0.63 mg Q4H    melatonin 1 mg/mL liquid (PEDS) 5 mg Nightly PRN    meropenem-0.9% sodium chloride 500 mg/50 mL IVPB Q12H    methocarbamoL tablet 500 mg QID    nitroGLYCERIN 2% TD oint ointment 0.5 inch Q6H    ondansetron injection 8 mg Q8H PRN    oxyCODONE 5 mg/5 mL solution 10 mg Q4H PRN    oxyCODONE 5 mg/5 mL solution 5 mg Q4H PRN    pantoprazole suspension 40 mg Daily    [START ON 1/5/2022] polyethylene glycol packet 17 g Daily    potassium, sodium phosphates 280-160-250 mg packet 2 packet TID PRN    propranoloL tablet 20 mg TID    scopolamine 1.3-1.5 mg (1 mg over 3 days) 1 patch Q3 Days    senna-docusate 8.6-50 mg per tablet 1 tablet Daily PRN    [START ON 1/5/2022] senna-docusate 8.6-50 mg per tablet 1 tablet Daily    sodium chloride 0.9% bolus 250 mL PRN    sodium chloride 0.9% flush 10 mL PRN    sodium chloride 0.9% flush 10 mL PRN    vancomycin - pharmacy to dose pharmacy to manage frequency       Objective:     Vital Signs (Most Recent):  Temp: 98.4 °F (36.9 °C) (01/04/22 0715)  Pulse: (!) 118 (01/04/22 0824)  Resp: (!) 27 (01/04/22 0824)  BP: (!) 146/65 (01/04/22 0745)  SpO2: 99 % (01/04/22 0824)  O2 Device (Oxygen Therapy): room air (01/04/22 0824) Vital Signs (24h Range):  Temp:  [98.1 °F (36.7 °C)-99 °F (37.2 °C)] 98.4 °F (36.9 °C)  Pulse:  [106-131] 118  Resp:  [19-36] 27  SpO2:  [95 %-100 %] 99 %  BP: ()/(55-96) 146/65     Weight: 121.1 kg (267 lb) (12/28/21 1003)  Body mass index is 45.83 kg/m².  Body surface area is 2.34 meters squared.    I/O last 3 completed shifts:  In: 3964.9 [P.O.:674; Blood:326.7; Other:800; NG/GT:1475; IV Piggyback:689.2]  Out: 3478 [Drains:428; Other:3050]    Physical Exam  Constitutional:       Appearance: She is obese. She is ill-appearing.   HENT:      Mouth/Throat:      Mouth: Mucous membranes are moist.   Eyes:      Pupils: Pupils are equal, round, and reactive to light.   Cardiovascular:       Rate and Rhythm: Normal rate and regular rhythm.   Pulmonary:      Effort: No respiratory distress.      Comments: NC  Abdominal:      General: There is no distension.      Comments: Wound vac   Musculoskeletal:         General: No deformity.      Right lower leg: Edema present.      Left lower leg: Edema present.   Skin:     Coloration: Skin is not jaundiced.   Neurological:      General: No focal deficit present.         Significant Labs:  All labs within the past 24 hours have been reviewed.     Significant Imaging:  Labs: Reviewed  X-Ray: Reviewed    Assessment/Plan:     SUSAN (acute kidney injury)  -oliguric SUSAN, ischemic ATN with multifactorial etiology, however most likely d/t severe hypotension as a result of septic shock 2/2 small bowel perforation and bacteremia   -BL sCr 1  -KRT started at OSH 12/12/2021 for acidosis and volume overload  -still anuric  -stable 2L NC and improvement in right sided pleural effusion with net negative 600cc fluid balance  -tolerated HD well  -unless other issues arise will prepare for another session tomorrow    Acute blood loss anemia  -likely component of iron restricted erythropoiesis   -ordered iron panel with am labs  -started weekly epo    Volume overload  -CTA and CXR pulmonary edema  -now improved after regular UF with KRT    Metabolic acidosis  -controlled with KRT    Bowel perforation  -per primary      Septic shock  -secondary to bowel perforation  -shock resolved and tolerated HD  -per primary      Subcapsular hematoma of liver  -per primary        Anthony Steven MD  Nephrology  [facility]

## 2022-01-04 NOTE — ASSESSMENT & PLAN NOTE
-likely component of iron restricted erythropoiesis   -ordered iron panel with am labs  -started weekly epo

## 2022-01-04 NOTE — SUBJECTIVE & OBJECTIVE
Interval History/Significant Events:   - NAEON.  However, patient continues to have rising WBC despite broadening of antibiotics. No fevers overnight.  Her pain is well controlled with the current regimen and her N/V has improved.   - Patient is now on RA      Follow-up For: Procedure(s) (LRB):  LAPAROTOMY, EXPLORATORY (N/A)  INSERTION, GASTROSTOMY TUBE, PERCUTANEOUS (N/A)  CHOLECYSTECTOMY  EGD (ESOPHAGOGASTRODUODENOSCOPY) (N/A)    Post-Operative Day: 12 Days Post-Op    Objective:     Vital Signs (Most Recent):  Temp: 98.8 °F (37.1 °C) (01/04/22 0300)  Pulse: (!) 119 (01/04/22 0630)  Resp: (!) 27 (01/04/22 0630)  BP: 127/66 (01/04/22 0630)  SpO2: 99 % (01/04/22 0630) Vital Signs (24h Range):  Temp:  [98.1 °F (36.7 °C)-99 °F (37.2 °C)] 98.8 °F (37.1 °C)  Pulse:  [106-131] 119  Resp:  [19-36] 27  SpO2:  [95 %-100 %] 99 %  BP: ()/(55-96) 127/66     Weight: 121.1 kg (267 lb)  Body mass index is 45.83 kg/m².      Intake/Output Summary (Last 24 hours) at 1/4/2022 0649  Last data filed at 1/4/2022 0500  Gross per 24 hour   Intake 2762.27 ml   Output 3352 ml   Net -589.73 ml       Physical Exam  Vitals reviewed.   Constitutional:       Appearance: She is obese. She is ill-appearing.   HENT:      Head: Normocephalic and atraumatic.      Comments: Rt IJ CVC     Mouth/Throat:      Mouth: Mucous membranes are moist.   Eyes:      Conjunctiva/sclera: Conjunctivae normal.      Pupils: Pupils are equal, round, and reactive to light.   Cardiovascular:      Rate and Rhythm: Regular rhythm. Tachycardia present.   Pulmonary:      Effort: No respiratory distress.      Breath sounds: No wheezing or rales.   Abdominal:      General: There is no distension.      Tenderness: There is no abdominal tenderness.      Comments: RUQ 2 drains in place with serosanguineous output  LLQ drain with bloody output  Midline incision with wound vac in place and drain with serous output   Musculoskeletal:         General: Swelling present.       Cervical back: Normal range of motion and neck supple.   Skin:     General: Skin is warm and dry.   Neurological:      General: No focal deficit present.      Mental Status: She is alert and oriented to person, place, and time.         Vents:  Vent Mode: Spont (12/31/21 2000)  Ventilator Initiated: Yes (12/28/21 1104)  Set Rate: 18 BPM (12/31/21 0400)  Vt Set: 320 mL (12/31/21 0400)  Pressure Support: 60 cmH20 (12/31/21 0115)  PEEP/CPAP: 5 cmH20 (12/31/21 2000)  Oxygen Concentration (%): 28 (01/04/22 0313)  Peak Airway Pressure: 24 cmH2O (12/31/21 2000)  Plateau Pressure: 31 cmH20 (12/31/21 2000)  Total Ve: 9.78 mL (12/31/21 2000)  Negative Inspiratory Force (cm H2O): -30 (12/31/21 2030)  F/VT Ratio<105 (RSBI): (!) 47.52 (12/31/21 2000)    Lines/Drains/Airways     Central Venous Catheter Line            Trialysis (Dialysis) Catheter 12/30/21 1000 right internal jugular 4 days          Drain                 Closed/Suction Drain 12/23/21 1428 Right;Inferior RUQ Bulb 19 Fr. 11 days         Closed/Suction Drain 12/23/21 1429 Right;Superior RUQ Bulb 19 Fr. 11 days         Gastrostomy/Enterostomy 12/23/21 1338 Gastrostomy tube w/ balloon LUQ feeding 11 days         Closed/Suction Drain 12/31/21 1559 Left Abdomen Bulb 14 Fr. 3 days                Significant Labs:    CBC/Anemia Profile:  Recent Labs   Lab 01/03/22 0228 01/03/22  1350 01/04/22  0316   WBC 26.52* 21.66* 33.64*  33.97*   HGB 6.9* 7.2* 8.8*  8.9*   HCT 22.3* 23.3* 27.3*  27.9*   * 407 501*  470*   MCV 94 93 90  92   RDW 15.9* 15.5* 15.6*  15.7*        Chemistries:  Recent Labs   Lab 01/03/22 0228 01/03/22  1350 01/03/22  1455 01/03/22 2227 01/04/22  0316     140   < > 137 140 138  138   K 5.5*  5.5*   < > 6.0* 4.5 4.8  4.7     105   < > 104 108 104  104   CO2 24  25   < > 23 20* 22*  23   BUN 43*  42*   < > 57* 31* 35*  36*   CREATININE 2.2*  2.2*   < > 2.9* 2.0* 2.4*  2.3*   CALCIUM 7.9*  8.0*   < > 8.1* 7.9* 8.2*   8.3*   ALBUMIN 1.7*  1.6*   < > 1.7* 1.7* 1.7*  1.8*   PROT 5.3*  --   --   --  5.7*   BILITOT 1.3*  --   --   --  1.1*   ALKPHOS 155*  --   --   --  140*   ALT 38  --   --   --  36   AST 37  --   --   --  31   MG 2.1   < > 2.1 2.0 2.1   PHOS 3.4  3.4   < > 4.0  4.0 2.8 3.3  3.3    < > = values in this interval not displayed.       All pertinent labs within the past 24 hours have been reviewed.    Significant Imaging:  I have reviewed all pertinent imaging results/findings within the past 24 hours.

## 2022-01-04 NOTE — ASSESSMENT & PLAN NOTE
55yo female transfer from OSH after hysterectomy on 12/8 complicated by delayed recognition of small bowel injury requiring resection (in discontinuity) and at some point new bleed from the liver - transferred with open abdomen for higher level of care.  S/p ex-lap, liver packing 12/21  Open cholecystectomy, abdominal closure 12/23  IR drain placed into intra-abdominal abscess 12/31 - Cx growing e. Coli, enterococcus, and an unidentified GNR, f/u susceptibilities    - Aggressive pulmonary hygiene, OOB to chair, PT/OT;  - CT 12/31 with pelvic abscess s/p drain placement   - Continue abx for vap  - Continue vanc, merrem  - Wound vac change biweekly - last performed 1/3  - Recommend bowel reg  - Transfuse for Hgb <7  - Speech eval - cleared for nectar thick liquids   - Continue REAGAN drains   - Strict I/Os    - Remainder of care per SICU, appreciate assistance
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
## 2022-01-04 NOTE — SUBJECTIVE & OBJECTIVE
"Interval History: tolerated HD well with 2L net UF    Review of patient's allergies indicates:   Allergen Reactions    Tylox [oxycodone-acetaminophen]      "Jittery"     Current Facility-Administered Medications   Medication Frequency    0.9%  NaCl infusion (CRRT USE ONLY) Continuous    0.9%  NaCl infusion (for blood administration) Q24H PRN    0.9%  NaCl infusion (for blood administration) Q24H PRN    0.9%  NaCl infusion Once    balsam peru-castor oiL Oint BID    dextrose 50% injection 25 g PRN    fluconazole (DIFLUCAN) IVPB 200 mg 100 mL Q24H    gabapentin 250 mg/5 mL (5 mL) solution 125 mg Q8H    heparin (porcine) injection 1,000 Units PRN    heparin (porcine) injection 7,500 Units Q8H    HYDROmorphone injection 0.5 mg Q6H PRN    levalbuterol nebulizer solution 0.63 mg Q4H    melatonin 1 mg/mL liquid (PEDS) 5 mg Nightly PRN    meropenem-0.9% sodium chloride 500 mg/50 mL IVPB Q12H    methocarbamoL tablet 500 mg QID    nitroGLYCERIN 2% TD oint ointment 0.5 inch Q6H    ondansetron injection 8 mg Q8H PRN    oxyCODONE 5 mg/5 mL solution 10 mg Q4H PRN    oxyCODONE 5 mg/5 mL solution 5 mg Q4H PRN    pantoprazole suspension 40 mg Daily    [START ON 1/5/2022] polyethylene glycol packet 17 g Daily    potassium, sodium phosphates 280-160-250 mg packet 2 packet TID PRN    propranoloL tablet 20 mg TID    scopolamine 1.3-1.5 mg (1 mg over 3 days) 1 patch Q3 Days    senna-docusate 8.6-50 mg per tablet 1 tablet Daily PRN    [START ON 1/5/2022] senna-docusate 8.6-50 mg per tablet 1 tablet Daily    sodium chloride 0.9% bolus 250 mL PRN    sodium chloride 0.9% flush 10 mL PRN    sodium chloride 0.9% flush 10 mL PRN    vancomycin - pharmacy to dose pharmacy to manage frequency       Objective:     Vital Signs (Most Recent):  Temp: 98.4 °F (36.9 °C) (01/04/22 0715)  Pulse: (!) 118 (01/04/22 0824)  Resp: (!) 27 (01/04/22 0824)  BP: (!) 146/65 (01/04/22 0745)  SpO2: 99 % (01/04/22 0824)  O2 Device (Oxygen " Therapy): room air (01/04/22 0824) Vital Signs (24h Range):  Temp:  [98.1 °F (36.7 °C)-99 °F (37.2 °C)] 98.4 °F (36.9 °C)  Pulse:  [106-131] 118  Resp:  [19-36] 27  SpO2:  [95 %-100 %] 99 %  BP: ()/(55-96) 146/65     Weight: 121.1 kg (267 lb) (12/28/21 1003)  Body mass index is 45.83 kg/m².  Body surface area is 2.34 meters squared.    I/O last 3 completed shifts:  In: 3964.9 [P.O.:674; Blood:326.7; Other:800; NG/GT:1475; IV Piggyback:689.2]  Out: 3478 [Drains:428; Other:3050]    Physical Exam  Constitutional:       Appearance: She is obese. She is ill-appearing.   HENT:      Mouth/Throat:      Mouth: Mucous membranes are moist.   Eyes:      Pupils: Pupils are equal, round, and reactive to light.   Cardiovascular:      Rate and Rhythm: Normal rate and regular rhythm.   Pulmonary:      Effort: No respiratory distress.      Comments: NC  Abdominal:      General: There is no distension.      Comments: Wound vac   Musculoskeletal:         General: No deformity.      Right lower leg: Edema present.      Left lower leg: Edema present.   Skin:     Coloration: Skin is not jaundiced.   Neurological:      General: No focal deficit present.         Significant Labs:  All labs within the past 24 hours have been reviewed.     Significant Imaging:  Labs: Reviewed  X-Ray: Reviewed

## 2022-01-04 NOTE — PLAN OF CARE
SICU PLAN OF CARE NOTE     Shift Events: NAEON. Tolerated HD well. BM.     Neuro: AAOx4. Follows commands and moves all extremities purposefully.     Vital Signs: T max 99.0. Sinus tachycardia 100-110s. MAPs >65. SpO2 100% on 2L NC.     Respiratory: 2L NC     Diet: Thickened liquids and mechanical soft. TF @ 40     Drains: #1 R REAGAN: 4 cc/shift, #2 R REAGAN: 8 cc/shift, L REAGAN: 80 cc/shift dark red and odorous. WV 50cc/shift     Labs/Accuchecks: Renal and daily labs. WBC count elevated this AM, team aware.     Skin: No new skin breakdown noted. Turned Q2hr. Heel and sacral foams in place. SCDs and heel boots on. Immerse bed plugged in and working properly. Linen changed and full CHG bath given.

## 2022-01-04 NOTE — SUBJECTIVE & OBJECTIVE
"Scheduled Meds:   sodium chloride 0.9%   Intravenous Once    balsam peru-castor oiL   Topical (Top) BID    epoetin genie-epbx  50 Units/kg Intravenous Q7 Days    fluconazole (DIFLUCAN) IVPB  200 mg Intravenous Q24H    gabapentin  125 mg Per G Tube Q8H    heparin (porcine)  7,500 Units Subcutaneous Q8H    levalbuterol  0.63 mg Nebulization Q4H    meropenem (MERREM) IVPB  500 mg Intravenous Q12H    methocarbamoL  500 mg Per G Tube QID    nitroGLYCERIN 2% TD oint  0.5 inch Topical (Top) Q6H    pantoprazole  40 mg Per G Tube Daily    [START ON 1/5/2022] polyethylene glycol  17 g Per G Tube Daily    propranoloL  20 mg Per G Tube TID    scopolamine  1 patch Transdermal Q3 Days    [START ON 1/5/2022] senna-docusate 8.6-50 mg  1 tablet Per G Tube Daily     Continuous Infusions:   sodium chloride 0.9% Stopped (01/02/22 0558)     PRN Meds:sodium chloride, dextrose 50%, heparin (porcine), HYDROmorphone, melatonin, ondansetron, oxyCODONE, oxyCODONE, potassium, sodium phosphates, senna-docusate 8.6-50 mg, sodium chloride 0.9%, sodium chloride 0.9%, sodium chloride 0.9%, Pharmacy to dose Vancomycin consult **AND** vancomycin - pharmacy to dose    Review of patient's allergies indicates:   Allergen Reactions    Tylox [oxycodone-acetaminophen]      "Jittery"        No past medical history on file.  Past Surgical History:   Procedure Laterality Date    CHOLECYSTECTOMY  12/23/2021    Procedure: CHOLECYSTECTOMY;  Surgeon: Kendall Gorman MD;  Location: [facility] OR [address] [place] [address];  Service: General;;    ESOPHAGOGASTRODUODENOSCOPY N/A 12/23/2021    Procedure: EGD (ESOPHAGOGASTRODUODENOSCOPY);  Surgeon: Kendall Gorman MD;  Location: [facility] OR [address] [place] [address];  Service: General;  Laterality: N/A;    EVACUATION OF HEMATOMA  12/21/2021    Procedure: EVACUATION, HEMATOMA;  Surgeon: Kendall oGrman MD;  Location: [facility] OR [address] [place] [address];  Service: General;;    PERCUTANEOUS INSERTION OF GASTROSTOMY TUBE N/A 12/23/2021    Procedure: INSERTION, " GASTROSTOMY TUBE, PERCUTANEOUS;  Surgeon: Kendall Gorman MD;  Location: [facility] OR 2ND FLR;  Service: General;  Laterality: N/A;    REPLACEMENT OF WOUND VACUUM-ASSISTED CLOSURE DEVICE  12/21/2021    Procedure: REPLACEMENT, WOUND VAC;  Surgeon: Kendall Gorman MD;  Location: [facility] OR 2ND FLR;  Service: General;;       Family History    None       Tobacco Use    Smoking status: Not on file    Smokeless tobacco: Not on file   Substance and Sexual Activity    Alcohol use: Not on file    Drug use: Not on file    Sexual activity: Not on file     Review of Systems   Skin: Positive for wound.       Objective:     Vital Signs (Most Recent):  Temp: 97.8 °F (36.6 °C) (01/04/22 1200)  Pulse: (!) 112 (01/04/22 1300)  Resp: (!) 22 (01/04/22 1300)  BP: 131/72 (01/04/22 1300)  SpO2: 100 % (01/04/22 1300) Vital Signs (24h Range):  Temp:  [97.8 °F (36.6 °C)-99 °F (37.2 °C)] 97.8 °F (36.6 °C)  Pulse:  [104-124] 112  Resp:  [19-36] 22  SpO2:  [92 %-100 %] 100 %  BP: ()/(55-88) 131/72     Weight: 121.1 kg (267 lb)  Body mass index is 45.83 kg/m².     Physical Exam  Constitutional:       Appearance: Normal appearance.   Musculoskeletal:         General: Swelling present.      Left lower leg: Edema present.   Skin:     General: Skin is warm and dry.      Findings: Lesion present.   Neurological:      Mental Status: She is alert.       Laboratory:  All pertinent labs reviewed within the last 24 hours.    Diagnostic Results:  None

## 2022-01-04 NOTE — CARE UPDATE
Care Plan    POC reviewed with Chidi Castaneda and family. Questions and concerns addressed. No acute events today. Pt progressing toward goals. Will continue to monitor. See below and flowsheets for full assessment and VS info.       Neuro:  Rox Coma Scale  Best Eye Response: 4-->(E4) spontaneous  Best Motor Response: 6-->(M6) obeys commands  Best Verbal Response: 1-->(V1) none  Ashley Coma Scale Score: 11  Assessment Qualifiers: no eye obstruction present,patient not sedated/intubated  Pupil PERRLA: yes  24 hr Temp:  [98.1 °F (36.7 °C)-100.7 °F (38.2 °C)]      CV:  Rhythm: sinus tachycardia  DVT prophylaxis: VTE Required Core Measure: (SCDs) Sequential compression device initiated/maintained    Resp:  O2 Device (Oxygen Therapy): nasal cannula w/ humidification  Vent Mode: Spont  Set Rate: 18 BPM  Oxygen Concentration (%): 100  Vt Set: 320 mL  PEEP/CPAP: 5 cmH20  Pressure Support: 60 cmH20    GI/:  GI prophylaxis: yes  Diet/Nutrition Received: tube feeding  Last Bowel Movement: 01/03/22  Voiding Characteristics: oliguria   Intake/Output Summary (Last 24 hours) at 1/3/2022 1834  Last data filed at 1/3/2022 1700  Gross per 24 hour   Intake 2604.89 ml   Output 436 ml   Net 2168.89 ml       Labs/Accuchecks:  Recent Labs   Lab 01/03/22  1350   WBC 21.66*   RBC 2.50*   HGB 7.2*   HCT 23.3*         Recent Labs   Lab 01/03/22  0228 01/03/22  1350 01/03/22  1455     140   < > 137   K 5.5*  5.5*   < > 6.0*   CO2 24  25   < > 23     105   < > 104   BUN 43*  42*   < > 57*   CREATININE 2.2*  2.2*   < > 2.9*   ALKPHOS 155*  --   --    ALT 38  --   --    AST 37  --   --    BILITOT 1.3*  --   --     < > = values in this interval not displayed.    No results for input(s): PROTIME, INR, APTT, HEPANTIXA in the last 168 hours. No results for input(s): CPK, CPKMB, TROPONINI, MB in the last 168 hours.    Electrolytes: Contraindicated; pt getting first HD trail today  Accuchecks: Q6H    Gtts/LDAs:    sodium chloride 0.9% Stopped (01/02/22 0558)       Lines/Drains/Airways     Central Venous Catheter Line            Trialysis (Dialysis) Catheter 12/30/21 1000 right internal jugular 4 days          Drain                 Closed/Suction Drain 12/23/21 1428 Right;Inferior RUQ Bulb 19 Fr. 11 days         Closed/Suction Drain 12/23/21 1429 Right;Superior RUQ Bulb 19 Fr. 11 days         Gastrostomy/Enterostomy 12/23/21 1338 Gastrostomy tube w/ balloon LUQ feeding 11 days         Closed/Suction Drain 12/31/21 1559 Left Abdomen Bulb 14 Fr. 3 days                Skin/Wounds:  Bathing/Skin Care: linen changed,bath, complete Date: 1/3/2022   Wounds: Yes , abdominal incision, wound vac changed per general surgery  Wound care consulted: No

## 2022-01-04 NOTE — SUBJECTIVE & OBJECTIVE
"Interval History: Tolerated HD yesterday, remains tachycardic, Af, pressure stable, WBC up this morning    Medications:  Continuous Infusions:   sodium chloride 0.9% Stopped (01/02/22 0558)     Scheduled Meds:   sodium chloride 0.9%   Intravenous Once    balsam peru-castor oiL   Topical (Top) BID    bisacodyL  10 mg Rectal Once    fluconazole (DIFLUCAN) IVPB  200 mg Intravenous Q24H    gabapentin  125 mg Per G Tube Q8H    heparin (porcine)  7,500 Units Subcutaneous Q8H    levalbuterol  0.63 mg Nebulization Q4H    meropenem (MERREM) IVPB  1 g Intravenous Q12H    methocarbamoL  500 mg Per G Tube QID    nitroGLYCERIN 2% TD oint  0.5 inch Topical (Top) Q6H    pantoprazole  40 mg Per G Tube Daily    polyethylene glycol  17 g Oral Daily    propranoloL  10 mg Per G Tube TID    scopolamine  1 patch Transdermal Q3 Days    senna-docusate 8.6-50 mg  1 tablet Oral Daily     PRN Meds:sodium chloride, sodium chloride, dextrose 50%, diazePAM, heparin (porcine), HYDROmorphone, melatonin, olanzapine zydis, ondansetron, oxyCODONE, oxyCODONE, potassium, sodium phosphates, senna-docusate 8.6-50 mg, sodium chloride 0.9%, sodium chloride 0.9%, sodium chloride 0.9%, Pharmacy to dose Vancomycin consult **AND** vancomycin - pharmacy to dose     Review of patient's allergies indicates:   Allergen Reactions    Tylox [oxycodone-acetaminophen]      "Jittery"     Objective:     Vital Signs (Most Recent):  Temp: 98.8 °F (37.1 °C) (01/04/22 0300)  Pulse: (!) 119 (01/04/22 0630)  Resp: (!) 27 (01/04/22 0630)  BP: 127/66 (01/04/22 0630)  SpO2: 99 % (01/04/22 0630) Vital Signs (24h Range):  Temp:  [98.1 °F (36.7 °C)-99 °F (37.2 °C)] 98.8 °F (37.1 °C)  Pulse:  [106-131] 119  Resp:  [19-36] 27  SpO2:  [95 %-100 %] 99 %  BP: ()/(55-96) 127/66     Weight: 121.1 kg (267 lb)  Body mass index is 45.83 kg/m².    Intake/Output - Last 3 Shifts       01/02 0700 01/03 0659 01/03 0700 01/04 0659 01/04 0700 01/05 0659    P.O.  674     " I.V. (mL/kg)       Blood 326.7      Other  800     NG/ 975     IV Piggyback 376 313.3     Total Intake(mL/kg) 1467.6 (12.1) 2762.3 (22.8)     Urine (mL/kg/hr)  0 (0)     Drains 181 302     Other 50 3050     Stool  0     Total Output 231 3352     Net +1236.6 -589.7            Urine Occurrence  1 x     Stool Occurrence  2 x           Physical Exam  Vitals and nursing note reviewed.   Constitutional:       General: She is not in acute distress.     Appearance: She is obese. She is not diaphoretic.   HENT:      Head: Normocephalic and atraumatic.      Mouth/Throat:      Mouth: Mucous membranes are moist.      Pharynx: Oropharynx is clear.   Eyes:      Extraocular Movements: Extraocular movements intact.      Conjunctiva/sclera: Conjunctivae normal.   Cardiovascular:      Rate and Rhythm: Regular rhythm. Tachycardia present.   Pulmonary:      Effort: No respiratory distress.   Abdominal:      General: There is no distension.      Palpations: Abdomen is soft.      Tenderness: There is no guarding or rebound.      Comments: Wound vac in place with good seal, no leak noted.   REAGAN drains with benign fluid, not bloody or bilious   Musculoskeletal:         General: No deformity.   Skin:     General: Skin is warm and dry.   Neurological:      Mental Status: She is alert.         Significant Labs:  CBC:   Recent Labs   Lab 01/04/22 0316   WBC 33.64*  33.97*   RBC 3.03*  3.02*   HGB 8.8*  8.9*   HCT 27.3*  27.9*   *  470*   MCV 90  92   MCH 29.0  29.5   MCHC 32.2  31.9*     CMP:   Recent Labs   Lab 01/04/22 0316   GLU 96  96   CALCIUM 8.2*  8.3*   ALBUMIN 1.7*  1.8*   PROT 5.7*     138   K 4.8  4.7   CO2 22*  23     104   BUN 35*  36*   CREATININE 2.4*  2.3*   ALKPHOS 140*   ALT 36   AST 31   BILITOT 1.1*       Significant Diagnostics:  I have reviewed all pertinent imaging results/findings within the past 24 hours.
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
## 2022-01-04 NOTE — HPI
Chidi Castaneda is a 56 year old female with PMH of sleep apnea, GERD, DVT (not on antiboagulation), diverticulitis, vaughn's esophagus, and HFrEF (last EF 40%). She was transferred from an OSH with hypovolemic shock and liver hemorrhage after hysterectomy on 12/8. She was transferred from [facility] for embolization vs OR takeback for suspected hepatic bleed and a higher level of care. Wound care consulted for multiple skin injuries noted present on admission.

## 2022-01-04 NOTE — PROGRESS NOTES
"Pharmacokinetic Assessment Follow Up: IV Vancomycin    Vancomycin Regimen Assessment & Plan:  - Vancomycin level drawn with morning labs today resulted as 20.9 mcg/mL, goal trough 10-20 mcg/mL.  - Nephrology following for RRT. Plans for HD tomorrow  - Will hold on redosing today  - Draw vancomycin level with morning labs tomorrow. Will re-dose as needed depending on level and RRT plans.    Drug levels (last 3 results):  Recent Labs   Lab Result Units 01/02/22  0337 01/03/22  0333 01/04/22  0316   Vancomycin, Random ug/mL 17.0 15.7 20.9     Pharmacy will continue to follow and monitor vancomycin.    Please contact pharmacy at extension [phone] for questions regarding this assessment.    Thank you for the consult,   Migdalia Biswas     Patient brief summary:  Chidi Castaneda is a 56 y.o. female initiated on antimicrobial therapy with IV Vancomycin for treatment of intra-abdominal infection    The patient's current regimen is pulse dosing.    Drug Allergies:   Review of patient's allergies indicates:   Allergen Reactions    Tylox [oxycodone-acetaminophen]      "Jittery"       Actual Body Weight:   121.1 kg    Renal Function:   Estimated Creatinine Clearance: 28.5 mL/min (A) (based on SCr of 2.9 mg/dL (H)).,     Dialysis Method (if applicable):  SLED  "

## 2022-01-04 NOTE — ASSESSMENT & PLAN NOTE
-oliguric SUSAN, ischemic ATN with multifactorial etiology, however most likely d/t severe hypotension as a result of septic shock 2/2 small bowel perforation and bacteremia   -BL sCr 1  -KRT started at OSH 12/12/2021 for acidosis and volume overload  -still anuric  -stable 2L NC and improvement in right sided pleural effusion with net negative 600cc fluid balance  -tolerated HD well  -unless other issues arise will prepare for another session tomorrow

## 2022-01-04 NOTE — PROGRESS NOTES
HD complete d, blood returned and  cather ports flushed with normal saline and capped and secured. Post B/P 112/72 , pulse 119. Ow sat on nasal  cannula 100% on 2 liters

## 2022-01-04 NOTE — CONSULTS
[facility]  Skin Integrity [facility]  Consult Note    Patient Name: Chidi Castaneda  MRN: [id number]  Admission Date: 12/20/2021  Hospital Length of Stay: 15 days  Attending Physician: Kendall Gorman MD  Primary Care Provider: Primary Doctor No     Consults  Subjective:     History of Present Illness:  Chidi Castaneda is a 56 year old female with PMH of sleep apnea, GERD, DVT (not on antiboagulation), diverticulitis, vaughn's esophagus, and HFrEF (last EF 40%). She was transferred from an OSH with hypovolemic shock and liver hemorrhage after hysterectomy on 12/8. She was transferred from [facility] for embolization vs OR takeback for suspected hepatic bleed and a higher level of care. Wound care consulted for multiple skin injuries noted present on admission.          Scheduled Meds:   sodium chloride 0.9%   Intravenous Once    balsam peru-castor oiL   Topical (Top) BID    epoetin genie-epbx  50 Units/kg Intravenous Q7 Days    fluconazole (DIFLUCAN) IVPB  200 mg Intravenous Q24H    gabapentin  125 mg Per G Tube Q8H    heparin (porcine)  7,500 Units Subcutaneous Q8H    levalbuterol  0.63 mg Nebulization Q4H    meropenem (MERREM) IVPB  500 mg Intravenous Q12H    methocarbamoL  500 mg Per G Tube QID    nitroGLYCERIN 2% TD oint  0.5 inch Topical (Top) Q6H    pantoprazole  40 mg Per G Tube Daily    [START ON 1/5/2022] polyethylene glycol  17 g Per G Tube Daily    propranoloL  20 mg Per G Tube TID    scopolamine  1 patch Transdermal Q3 Days    [START ON 1/5/2022] senna-docusate 8.6-50 mg  1 tablet Per G Tube Daily     Continuous Infusions:   sodium chloride 0.9% Stopped (01/02/22 0558)     PRN Meds:sodium chloride, dextrose 50%, heparin (porcine), HYDROmorphone, melatonin, ondansetron, oxyCODONE, oxyCODONE, potassium, sodium phosphates, senna-docusate 8.6-50 mg, sodium chloride 0.9%, sodium chloride 0.9%, sodium chloride 0.9%, Pharmacy to dose Vancomycin consult **AND**  "vancomycin - pharmacy to dose    Review of patient's allergies indicates:   Allergen Reactions    Tylox [oxycodone-acetaminophen]      "Jittery"        No past medical history on file.  Past Surgical History:   Procedure Laterality Date    CHOLECYSTECTOMY  12/23/2021    Procedure: CHOLECYSTECTOMY;  Surgeon: Kendall Gorman MD;  Location: [facility] OR 2ND FLR;  Service: General;;    ESOPHAGOGASTRODUODENOSCOPY N/A 12/23/2021    Procedure: EGD (ESOPHAGOGASTRODUODENOSCOPY);  Surgeon: Kendall Gorman MD;  Location: [facility] OR 2ND FLR;  Service: General;  Laterality: N/A;    EVACUATION OF HEMATOMA  12/21/2021    Procedure: EVACUATION, HEMATOMA;  Surgeon: Kendall Gorman MD;  Location: [facility] OR [facility] FLR;  Service: General;;    PERCUTANEOUS INSERTION OF GASTROSTOMY TUBE N/A 12/23/2021    Procedure: INSERTION, GASTROSTOMY TUBE, PERCUTANEOUS;  Surgeon: Kendall Gorman MD;  [facility];  Service: General;  Laterality: N/A;    REPLACEMENT OF WOUND VACUUM-ASSISTED CLOSURE DEVICE  12/21/2021    Procedure: REPLACEMENT, WOUND VAC;  Surgeon: Kendall Gorman MD;  [facility];  Service: General;;       Family History    None       Tobacco Use    Smoking status: Not on file    Smokeless tobacco: Not on file   Substance and Sexual Activity    Alcohol use: Not on file    Drug use: Not on file    Sexual activity: Not on file     Review of Systems   Skin: Positive for wound.       Objective:     Vital Signs (Most Recent):  Temp: 97.8 °F (36.6 °C) (01/04/22 1200)  Pulse: (!) 112 (01/04/22 1300)  Resp: (!) 22 (01/04/22 1300)  BP: 131/72 (01/04/22 1300)  SpO2: 100 % (01/04/22 1300) Vital Signs (24h Range):  Temp:  [97.8 °F (36.6 °C)-99 °F (37.2 °C)] 97.8 °F (36.6 °C)  Pulse:  [104-124] 112  Resp:  [19-36] 22  SpO2:  [92 %-100 %] 100 %  BP: ()/(55-88) 131/72     Weight: 121.1 kg (267 lb)  Body mass index is 45.83 kg/m².     Physical Exam  Constitutional:       Appearance: Normal appearance. "   Musculoskeletal:         General: Swelling present.      Left lower leg: Edema present.   Skin:     General: Skin is warm and dry.      Findings: Lesion present.   Neurological:      Mental Status: She is alert.       Laboratory:  All pertinent labs reviewed within the last 24 hours.    Diagnostic Results:  None          Assessment/Plan:         [facility] Skin Integrity Evaluation    Skin Integrity [facility] evaluation of patient as part of the comprehensive skin care team.   She has been admitted for 15 days. Skin injury was noted on 12/21/21. POA yes. PT and RD are also involved in her care. Pt observed sitting up to cardiac chair today.       Right heel        Right toes        Left toes        Ischemia of both lower extremities  - Pt being evaluated for multiple skin injuries.  - Injuries to bilateral toes and right heel likely vasopressor induced ischemia. Skin noted intact.  - Skin intact to left heel with blanchable redness.  - Nursing to maintain pressure injury prevention measures.   - continue Nitropaste to toes and BPCO bid/prn to heels.         Left nare    - Left nare, Clinically insignificant, skin intact            Thank you for your consult. I will follow-up with this patient weekly. Please contact us if you have any additional questions.      Yany Madden NP  Skin [facility]ity [facility]  [facility]

## 2022-01-04 NOTE — PROGRESS NOTES
"[facility]  General Surgery  Progress Note    Subjective:     History of Present Illness:  No notes on file    Post-Op Info:  Procedure(s) (LRB):  LAPAROTOMY, EXPLORATORY (N/A)  INSERTION, GASTROSTOMY TUBE, PERCUTANEOUS (N/A)  CHOLECYSTECTOMY  EGD (ESOPHAGOGASTRODUODENOSCOPY) (N/A)   12 Days Post-Op     Interval History: Tolerated HD yesterday, remains tachycardic, Af, pressure stable, WBC up this morning    Medications:  Continuous Infusions:   sodium chloride 0.9% Stopped (01/02/22 0558)     Scheduled Meds:   sodium chloride 0.9%   Intravenous Once    balsam peru-castor oiL   Topical (Top) BID    bisacodyL  10 mg Rectal Once    fluconazole (DIFLUCAN) IVPB  200 mg Intravenous Q24H    gabapentin  125 mg Per G Tube Q8H    heparin (porcine)  7,500 Units Subcutaneous Q8H    levalbuterol  0.63 mg Nebulization Q4H    meropenem (MERREM) IVPB  1 g Intravenous Q12H    methocarbamoL  500 mg Per G Tube QID    nitroGLYCERIN 2% TD oint  0.5 inch Topical (Top) Q6H    pantoprazole  40 mg Per G Tube Daily    polyethylene glycol  17 g Oral Daily    propranoloL  10 mg Per G Tube TID    scopolamine  1 patch Transdermal Q3 Days    senna-docusate 8.6-50 mg  1 tablet Oral Daily     PRN Meds:sodium chloride, sodium chloride, dextrose 50%, diazePAM, heparin (porcine), HYDROmorphone, melatonin, olanzapine zydis, ondansetron, oxyCODONE, oxyCODONE, potassium, sodium phosphates, senna-docusate 8.6-50 mg, sodium chloride 0.9%, sodium chloride 0.9%, sodium chloride 0.9%, Pharmacy to dose Vancomycin consult **AND** vancomycin - pharmacy to dose     Review of patient's allergies indicates:   Allergen Reactions    Tylox [oxycodone-acetaminophen]      "Jittery"     Objective:     Vital Signs (Most Recent):  Temp: 98.8 °F (37.1 °C) (01/04/22 0300)  Pulse: (!) 119 (01/04/22 0630)  Resp: (!) 27 (01/04/22 0630)  BP: 127/66 (01/04/22 0630)  SpO2: 99 % (01/04/22 0630) Vital Signs (24h Range):  Temp:  [98.1 °F " (36.7 °C)-99 °F (37.2 °C)] 98.8 °F (37.1 °C)  Pulse:  [106-131] 119  Resp:  [19-36] 27  SpO2:  [95 %-100 %] 99 %  BP: ()/(55-96) 127/66     Weight: 121.1 kg (267 lb)  Body mass index is 45.83 kg/m².    Intake/Output - Last 3 Shifts       01/02 0700  01/03 0659 01/03 0700 01/04 0659 01/04 0700  01/05 0659    P.O.  674     I.V. (mL/kg)       Blood 326.7      Other  800     NG/ 975     IV Piggyback 376 313.3     Total Intake(mL/kg) 1467.6 (12.1) 2762.3 (22.8)     Urine (mL/kg/hr)  0 (0)     Drains 181 302     Other 50 3050     Stool  0     Total Output 231 3352     Net +1236.6 -589.7            Urine Occurrence  1 x     Stool Occurrence  2 x           Physical Exam  Vitals and nursing note reviewed.   Constitutional:       General: She is not in acute distress.     Appearance: She is obese. She is not diaphoretic.   HENT:      Head: Normocephalic and atraumatic.      Mouth/Throat:      Mouth: Mucous membranes are moist.      Pharynx: Oropharynx is clear.   Eyes:      Extraocular Movements: Extraocular movements intact.      Conjunctiva/sclera: Conjunctivae normal.   Cardiovascular:      Rate and Rhythm: Regular rhythm. Tachycardia present.   Pulmonary:      Effort: No respiratory distress.   Abdominal:      General: There is no distension.      Palpations: Abdomen is soft.      Tenderness: There is no guarding or rebound.      Comments: Wound vac in place with good seal, no leak noted.   REAGAN drains with benign fluid, not bloody or bilious   Musculoskeletal:         General: No deformity.   Skin:     General: Skin is warm and dry.   Neurological:      Mental Status: She is alert.         Significant Labs:  CBC:   Recent Labs   Lab 01/04/22  0316   WBC 33.64*  33.97*   RBC 3.03*  3.02*   HGB 8.8*  8.9*   HCT 27.3*  27.9*   *  470*   MCV 90  92   MCH 29.0  29.5   MCHC 32.2  31.9*     CMP:   Recent Labs   Lab 01/04/22  0316   GLU 96  96   CALCIUM 8.2*  8.3*   ALBUMIN 1.7*  1.8*   PROT 5.7*      138   K 4.8  4.7   CO2 22*  23     104   BUN 35*  36*   CREATININE 2.4*  2.3*   ALKPHOS 140*   ALT 36   AST 31   BILITOT 1.1*       Significant Diagnostics:  I have reviewed all pertinent imaging results/findings within the past 24 hours.    Assessment/Plan:     Subcapsular hematoma of liver  55yo female transfer from OSH after hysterectomy on 12/8 complicated by delayed recognition of small bowel injury requiring resection (in discontinuity) and at some point new bleed from the liver - transferred with open abdomen for higher level of care.  S/p ex-lap, liver packing 12/21  Open cholecystectomy, abdominal closure 12/23  IR drain placed into intra-abdominal abscess 12/31 - Cx growing e. Coli, enterococcus, and an unidentified GNR, f/u susceptibilities    - Aggressive pulmonary hygiene, OOB to chair, PT/OT;  - CT 12/31 with pelvic abscess s/p drain placement   - Continue abx for vap  - Continue vanc, merrem  - Wound vac change biweekly - last performed 1/3  - Recommend bowel reg  - Transfuse for Hgb <7  - Speech eval - cleared for nectar thick liquids   - Continue REAGAN drains   - Strict I/Os    - Remainder of care per SICU, appreciate assistance         Fran Lorenzo MD  General Surgery  [facility]

## 2022-01-04 NOTE — ASSESSMENT & PLAN NOTE
- Pt being evaluated for multiple skin injuries.  - Injuries to bilateral toes and right heel likely vasopressor induced ischemia. Skin noted intact.  - Skin intact to left heel with blanchable redness.  - Nursing to maintain pressure injury prevention measures.   - continue Nitropaste to toes and BPCO bid/prn to heels.

## 2022-01-04 NOTE — PROGRESS NOTES
[facility]  Infectious Disease  Progress Note    Patient Name: Chidi Castaneda  MRN: [id number]  Admission Date: 12/20/2021  Length of Stay: 15 days  Attending Physician: Kendall Gorman MD  Primary Care Provider: Primary Doctor No    Isolation Status: No active isolations  Assessment/Plan:      Septic shock  55y/o F pt with hx of HFrEF who underwent elective laparoscopic hysterectomy on 12/8/21 c/b small bowel perfortion s/p partial bowel resection on 12/11, subcapsular liver hematoma, klebsiella aerogenes bacteremia [place] [facility] [place] SUSAN. Underwent ex-lap 12/21, open cholecystectomy and abdominal closure on 12/21. Extubated 12/31. Found to have a pelvic abscess s/p drain placement (cx 12/31 grew ecoli, klebsiella aerogenes (R to ertapenem and pip-tazo), enterococcus avium (S to ampicillin) and a Rt sided loculated pleural effusion. RCx 12/28 grew kleb aerogenes. ID consulted for meropenem.     Per OSH- BCx 12/11 and 12/13 grew klebsiella aerogenes (S to cefepime). BCx 12/15 were Neg.  Since arrival to [facility] has been on pip-tazo (12/10-12/24, 12/30-1/2) and vanc (1/1-1/3). Broadened with meropenem 1/2 for worsening shock and leukocytosis.    Recommendations:  --diagnostic thora of rt loculated pleural effusion and for source control- send pleural fluid for cell count with diff, gram stain, protein, ldh, aerobic/ anaerobic/ fungal/ afb cultures    --STOP vancomycin    --Continue meropenem for enterococcus, bacteroides and e coli and klebsiella in pelvic abscess. Plan to treat for atleast 3 weeks, but will likely extend duration pending resolution of pleural effusion. Last day 1/24/22.  If no plan for thora, repeat chest imaging in 3 wks (approx 1/24) prior to stopping abx.    --will f/u BCx to ensure clearance of kleb aerogenes bacteremia    --please notify ID near time of discharge for final recs and OPAT      Anticipated Disposition: tbd    Thank you for your consult. I will  sign off. Please contact us if you have any additional questions.    Guera Telles MD  Infectious Disease  [facility] - Surgical Intensive Care    Subjective:     Principal Problem:<principal problem not specified>    HPI: Ms. Castaneda is a 57y/o F pt with hx of HFrEF who underwent elective laparoscopic hysterectomy on 12/8/21 c/b small bowel perfortion s/p partial bowel resection on 12/11, subcapsular liver hematoma transferred to [facility] in shock with SUSAN. Underwent ex-lap 12/21, open cholecystectomy and abdominal closure on 12/21. Extubated 12/31. Found to have a pelvic abscess s/p drain placement (cx 12/31 grew ecoli) and a Rt sided loculated pleural effusion. RCx 12/28 grew kleb aerogenes. ID consulted for meropenem.     Per OSH- BCx 12/11 and 12/13 grew enterobacter aerogenes (S to cefepime). BCx 12/15 were Neg.  Since arrival to [facility] has been on pip-tazo (12/10-12/24, 12/30-1/2) and vanc (1/1-1/3).    Interval History: remained afebrile and HD stable overnight.  at bedside updated on plan of care.    Review of Systems   Constitutional: Negative for chills and fever.   HENT: Negative for congestion and trouble swallowing.    Respiratory: Positive for cough and shortness of breath.    Gastrointestinal: Positive for abdominal pain. Negative for abdominal distention, diarrhea, nausea and vomiting.   Genitourinary: Negative for difficulty urinating and dysuria.   Musculoskeletal: Negative for arthralgias and back pain.   Allergic/Immunologic: Negative for immunocompromised state.   Neurological: Positive for weakness. Negative for dizziness and headaches.   Psychiatric/Behavioral: Negative for confusion.     Objective:     Vital Signs (Most Recent):  Temp: 97.8 °F (36.6 °C) (01/04/22 1200)  Pulse: (!) 119 (01/04/22 1556)  Resp: (!) 29 (01/04/22 1556)  BP: (!) 162/112 (01/04/22 1500)  SpO2: 99 % (01/04/22 1556) Vital Signs (24h Range):  Temp:  [97.8 °F (36.6 °C)-99 °F (37.2 °C)] 97.8 °F (36.6 °C)  Pulse:   [104-124] 119  Resp:  [19-36] 29  SpO2:  [92 %-100 %] 99 %  BP: ()/() 162/112     Weight: 121.1 kg (267 lb)  Body mass index is 45.83 kg/m².    Estimated Creatinine Clearance: 35.9 mL/min (A) (based on SCr of 2.3 mg/dL (H)).    Physical Exam  Vitals reviewed.   Constitutional:       Appearance: She is ill-appearing.   HENT:      Head: Normocephalic and atraumatic.      Comments: Rt IJ CVC     Mouth/Throat:      Mouth: Mucous membranes are moist.   Eyes:      Conjunctiva/sclera: Conjunctivae normal.      Pupils: Pupils are equal, round, and reactive to light.   Cardiovascular:      Rate and Rhythm: Regular rhythm. Tachycardia present.   Pulmonary:      Effort: No respiratory distress.      Breath sounds: No wheezing or rales.   Abdominal:      General: There is no distension.      Tenderness: There is no abdominal tenderness.      Comments: RUQ 2 drains in place with serosanguineous output  LLQ drain with brownish  Midline incision with wound vac in place and drain with serous output   Musculoskeletal:         General: Swelling present.      Cervical back: Normal range of motion and neck supple.   Skin:     General: Skin is warm and dry.   Neurological:      General: No focal deficit present.      Mental Status: She is alert and oriented to person, place, and time.         Significant Labs:   Blood Culture: No results for input(s): LABBLOO in the last 4320 hours.  Wound Culture:   Recent Labs   Lab 12/31/21  1556   LABAERO ESCHERICHIA COLI  Many  *  KLEBSIELLA AEROGENES  Many  *  ENTEROCOCCUS  AVIUM  Many  *     All pertinent labs within the past 24 hours have been reviewed.    Significant Imaging: I have reviewed all pertinent imaging results/findings within the past 24 hours.

## 2022-01-04 NOTE — SUBJECTIVE & OBJECTIVE
Interval History: remained afebrile and HD stable overnight.  at bedside updated on plan of care.    Review of Systems   Constitutional: Negative for chills and fever.   HENT: Negative for congestion and trouble swallowing.    Respiratory: Positive for cough and shortness of breath.    Gastrointestinal: Positive for abdominal pain. Negative for abdominal distention, diarrhea, nausea and vomiting.   Genitourinary: Negative for difficulty urinating and dysuria.   Musculoskeletal: Negative for arthralgias and back pain.   Allergic/Immunologic: Negative for immunocompromised state.   Neurological: Positive for weakness. Negative for dizziness and headaches.   Psychiatric/Behavioral: Negative for confusion.     Objective:     Vital Signs (Most Recent):  Temp: 97.8 °F (36.6 °C) (01/04/22 1200)  Pulse: (!) 119 (01/04/22 1556)  Resp: (!) 29 (01/04/22 1556)  BP: (!) 162/112 (01/04/22 1500)  SpO2: 99 % (01/04/22 1556) Vital Signs (24h Range):  Temp:  [97.8 °F (36.6 °C)-99 °F (37.2 °C)] 97.8 °F (36.6 °C)  Pulse:  [104-124] 119  Resp:  [19-36] 29  SpO2:  [92 %-100 %] 99 %  BP: ()/() 162/112     Weight: 121.1 kg (267 lb)  Body mass index is 45.83 kg/m².    Estimated Creatinine Clearance: 35.9 mL/min (A) (based on SCr of 2.3 mg/dL (H)).    Physical Exam  Vitals reviewed.   Constitutional:       Appearance: She is ill-appearing.   HENT:      Head: Normocephalic and atraumatic.      Comments: Rt IJ CVC     Mouth/Throat:      Mouth: Mucous membranes are moist.   Eyes:      Conjunctiva/sclera: Conjunctivae normal.      Pupils: Pupils are equal, round, and reactive to light.   Cardiovascular:      Rate and Rhythm: Regular rhythm. Tachycardia present.   Pulmonary:      Effort: No respiratory distress.      Breath sounds: No wheezing or rales.   Abdominal:      General: There is no distension.      Tenderness: There is no abdominal tenderness.      Comments: RUQ 2 drains in place with serosanguineous output  LLQ drain  with brownish  Midline incision with wound vac in place and drain with serous output   Musculoskeletal:         General: Swelling present.      Cervical back: Normal range of motion and neck supple.   Skin:     General: Skin is warm and dry.   Neurological:      General: No focal deficit present.      Mental Status: She is alert and oriented to person, place, and time.         Significant Labs:   Blood Culture: No results for input(s): LABBLOO in the last 4320 hours.  Wound Culture:   Recent Labs   Lab 12/31/21  1556   LABAERO ESCHERICHIA COLI  Many  *  KLEBSIELLA AEROGENES  Many  *  ENTEROCOCCUS  AVIUM  Many  *     All pertinent labs within the past 24 hours have been reviewed.    Significant Imaging: I have reviewed all pertinent imaging results/findings within the past 24 hours.

## 2022-01-04 NOTE — PT/OT/SLP PROGRESS
"Speech Language Pathology Treatment    Patient Name:  Chidi Castaneda   MRN:  [id number]  Admitting Diagnosis: <principal problem not specified>    Recommendations:                 General Recommendations:  Dysphagia therapy  Diet recommendations:  Dental Soft, Liquid Diet Level: Thin   Aspiration Precautions: 1 bite/sip at a time, Eliminate distractions, Feed only when awake/alert, HOB to 90 degrees, Meds whole 1 at a time, Small bites/sips and Standard aspiration precautions   General Precautions: Standard, aspiration  Communication strategies:  none    Subjective     Patient awake and alert. Communicated with RN prior to session.     "I just drank water"  Pain/Comfort:  ·  No c/p pain.     Respiratory Status: Nasal cannula    Objective:     Has the patient been evaluated by SLP for swallowing?   Yes  Keep patient NPO? No   Current Respiratory Status:        Patient with thin liquids noted on bedside table upon entry.  Patient denies difficulty. Tolerated thin liquids via tsp cup and straw across 6oz with overall no overt signs of airway compromise - throat clear x1.  Sp02 remained at 97% throughout thin trials.  Regular solid trials x4 also tolerated well with no overt signs of airway compromise. Patient appears appropriate for diet advancement to dysphagia soft (level 6) and thin liquids at this time. Skilled education was provided to patient  re: diet recs, standard aspiration precautions of which to follow, and ongoing ST plan of care. Patient verbalized understanding and recalled aspiration precautions.     Assessment:     Chidi Castaneda is a 56 y.o. female with an SLP diagnosis of Dysphagia.      Goals:   Multidisciplinary Problems     SLP Goals        Problem: SLP Goal    Goal Priority Disciplines Outcome   SLP Goal     SLP Ongoing, Progressing   Description: Speech Therapy Short Term Goals  Goal expected to be met by 1/16  1. Pt will participate in an ongoing assessment to determine the least restrictive " and safest diet with possible updated goals to follow pending results.                     Plan:     · Patient to be seen:  4 x/week   · Plan of Care expires:  01/31/22  · Plan of Care reviewed with:  patient   · SLP Follow-Up:  Yes       Discharge recommendations:  rehabilitation facility   Barriers to Discharge:  None    Time Tracking:     SLP Treatment Date:   01/04/22  Speech Start Time:  0735  Speech Stop Time:  0750     Speech Total Time (min):  15 min    Billable Minutes: Treatment Swallowing Dysfunction 7 and Self Care/Home Management Training 8    01/04/2022

## 2022-01-05 LAB
ALBUMIN SERPL BCP-MCNC: 1.7 G/DL (ref 3.5–5.2)
ALLENS TEST: ABNORMAL
ALP SERPL-CCNC: 146 U/L (ref 55–135)
ALT SERPL W/O P-5'-P-CCNC: 30 U/L (ref 10–44)
ANION GAP SERPL CALC-SCNC: 10 MMOL/L (ref 8–16)
ANION GAP SERPL CALC-SCNC: 11 MMOL/L (ref 8–16)
ANION GAP SERPL CALC-SCNC: 8 MMOL/L (ref 8–16)
ANION GAP SERPL CALC-SCNC: 9 MMOL/L (ref 8–16)
ANISOCYTOSIS BLD QL SMEAR: SLIGHT
ANISOCYTOSIS BLD QL SMEAR: SLIGHT
AST SERPL-CCNC: 29 U/L (ref 10–40)
BACTERIA SPEC ANAEROBE CULT: ABNORMAL
BASO STIPL BLD QL SMEAR: ABNORMAL
BASO STIPL BLD QL SMEAR: ABNORMAL
BASOPHILS # BLD AUTO: ABNORMAL K/UL (ref 0–0.2)
BASOPHILS # BLD AUTO: ABNORMAL K/UL (ref 0–0.2)
BASOPHILS NFR BLD: 1.5 % (ref 0–1.9)
BASOPHILS NFR BLD: 1.5 % (ref 0–1.9)
BILIRUB SERPL-MCNC: 0.9 MG/DL (ref 0.1–1)
BUN SERPL-MCNC: 31 MG/DL (ref 6–20)
BUN SERPL-MCNC: 52 MG/DL (ref 6–20)
BUN SERPL-MCNC: 55 MG/DL (ref 6–20)
BUN SERPL-MCNC: 57 MG/DL (ref 6–20)
CALCIUM SERPL-MCNC: 8.2 MG/DL (ref 8.7–10.5)
CALCIUM SERPL-MCNC: 8.2 MG/DL (ref 8.7–10.5)
CALCIUM SERPL-MCNC: 8.3 MG/DL (ref 8.7–10.5)
CALCIUM SERPL-MCNC: 8.3 MG/DL (ref 8.7–10.5)
CHLORIDE SERPL-SCNC: 101 MMOL/L (ref 95–110)
CHLORIDE SERPL-SCNC: 102 MMOL/L (ref 95–110)
CHLORIDE SERPL-SCNC: 102 MMOL/L (ref 95–110)
CHLORIDE SERPL-SCNC: 99 MMOL/L (ref 95–110)
CO2 SERPL-SCNC: 21 MMOL/L (ref 23–29)
CO2 SERPL-SCNC: 21 MMOL/L (ref 23–29)
CO2 SERPL-SCNC: 22 MMOL/L (ref 23–29)
CO2 SERPL-SCNC: 27 MMOL/L (ref 23–29)
CREAT SERPL-MCNC: 2.4 MG/DL (ref 0.5–1.4)
CREAT SERPL-MCNC: 3.2 MG/DL (ref 0.5–1.4)
CREAT SERPL-MCNC: 3.4 MG/DL (ref 0.5–1.4)
CREAT SERPL-MCNC: 3.5 MG/DL (ref 0.5–1.4)
DIFFERENTIAL METHOD: ABNORMAL
DIFFERENTIAL METHOD: ABNORMAL
EOSINOPHIL # BLD AUTO: ABNORMAL K/UL (ref 0–0.5)
EOSINOPHIL # BLD AUTO: ABNORMAL K/UL (ref 0–0.5)
EOSINOPHIL NFR BLD: 1.5 % (ref 0–8)
EOSINOPHIL NFR BLD: 1.5 % (ref 0–8)
ERYTHROCYTE [DISTWIDTH] IN BLOOD BY AUTOMATED COUNT: 16 % (ref 11.5–14.5)
ERYTHROCYTE [DISTWIDTH] IN BLOOD BY AUTOMATED COUNT: 16 % (ref 11.5–14.5)
EST. GFR  (AFRICAN AMERICAN): 16.2 ML/MIN/1.73 M^2
EST. GFR  (AFRICAN AMERICAN): 16.8 ML/MIN/1.73 M^2
EST. GFR  (AFRICAN AMERICAN): 18.1 ML/MIN/1.73 M^2
EST. GFR  (AFRICAN AMERICAN): 25.6 ML/MIN/1.73 M^2
EST. GFR  (NON AFRICAN AMERICAN): 14.1 ML/MIN/1.73 M^2
EST. GFR  (NON AFRICAN AMERICAN): 14.6 ML/MIN/1.73 M^2
EST. GFR  (NON AFRICAN AMERICAN): 15.7 ML/MIN/1.73 M^2
EST. GFR  (NON AFRICAN AMERICAN): 22.2 ML/MIN/1.73 M^2
FERRITIN SERPL-MCNC: 1031 NG/ML (ref 20–300)
GLUCOSE SERPL-MCNC: 93 MG/DL (ref 70–110)
GLUCOSE SERPL-MCNC: 96 MG/DL (ref 70–110)
HCO3 UR-SCNC: 28.7 MMOL/L (ref 24–28)
HCT VFR BLD AUTO: 28 % (ref 37–48.5)
HCT VFR BLD AUTO: 28 % (ref 37–48.5)
HCT VFR BLD CALC: 28 %PCV (ref 36–54)
HGB BLD-MCNC: 8.8 G/DL (ref 12–16)
HGB BLD-MCNC: 8.8 G/DL (ref 12–16)
IMM GRANULOCYTES # BLD AUTO: ABNORMAL K/UL (ref 0–0.04)
IMM GRANULOCYTES # BLD AUTO: ABNORMAL K/UL (ref 0–0.04)
IMM GRANULOCYTES NFR BLD AUTO: ABNORMAL % (ref 0–0.5)
IMM GRANULOCYTES NFR BLD AUTO: ABNORMAL % (ref 0–0.5)
IRON SERPL-MCNC: 39 UG/DL (ref 30–160)
LYMPHOCYTES # BLD AUTO: ABNORMAL K/UL (ref 1–4.8)
LYMPHOCYTES # BLD AUTO: ABNORMAL K/UL (ref 1–4.8)
LYMPHOCYTES NFR BLD: 8.5 % (ref 18–48)
LYMPHOCYTES NFR BLD: 8.5 % (ref 18–48)
MAGNESIUM SERPL-MCNC: 2 MG/DL (ref 1.6–2.6)
MAGNESIUM SERPL-MCNC: 2.2 MG/DL (ref 1.6–2.6)
MAGNESIUM SERPL-MCNC: 2.2 MG/DL (ref 1.6–2.6)
MCH RBC QN AUTO: 29 PG (ref 27–31)
MCH RBC QN AUTO: 29 PG (ref 27–31)
MCHC RBC AUTO-ENTMCNC: 31.4 G/DL (ref 32–36)
MCHC RBC AUTO-ENTMCNC: 31.4 G/DL (ref 32–36)
MCV RBC AUTO: 92 FL (ref 82–98)
MCV RBC AUTO: 92 FL (ref 82–98)
METAMYELOCYTES NFR BLD MANUAL: 2.5 %
METAMYELOCYTES NFR BLD MANUAL: 2.5 %
MONOCYTES # BLD AUTO: ABNORMAL K/UL (ref 0.3–1)
MONOCYTES # BLD AUTO: ABNORMAL K/UL (ref 0.3–1)
MONOCYTES NFR BLD: 2.5 % (ref 4–15)
MONOCYTES NFR BLD: 2.5 % (ref 4–15)
MYELOCYTES NFR BLD MANUAL: 1 %
MYELOCYTES NFR BLD MANUAL: 1 %
NEUTROPHILS NFR BLD: 76.5 % (ref 38–73)
NEUTROPHILS NFR BLD: 76.5 % (ref 38–73)
NEUTS BAND NFR BLD MANUAL: 5 %
NEUTS BAND NFR BLD MANUAL: 5 %
NRBC BLD-RTO: 0 /100 WBC
NRBC BLD-RTO: 0 /100 WBC
PCO2 BLDA: 35.4 MMHG (ref 35–45)
PH SMN: 7.52 [PH] (ref 7.35–7.45)
PHOSPHATE SERPL-MCNC: 3 MG/DL (ref 2.7–4.5)
PHOSPHATE SERPL-MCNC: 4 MG/DL (ref 2.7–4.5)
PHOSPHATE SERPL-MCNC: 4.6 MG/DL (ref 2.7–4.5)
PLATELET # BLD AUTO: 593 K/UL (ref 150–450)
PLATELET # BLD AUTO: 593 K/UL (ref 150–450)
PLATELET BLD QL SMEAR: ABNORMAL
PLATELET BLD QL SMEAR: ABNORMAL
PMV BLD AUTO: 10.8 FL (ref 9.2–12.9)
PMV BLD AUTO: 10.8 FL (ref 9.2–12.9)
PO2 BLDA: 44 MMHG (ref 80–100)
POC BE: 6 MMOL/L
POC IONIZED CALCIUM: 1.16 MMOL/L (ref 1.06–1.42)
POC SATURATED O2: 84 % (ref 95–100)
POC TCO2: 30 MMOL/L (ref 23–27)
POCT GLUCOSE: 102 MG/DL (ref 70–110)
POLYCHROMASIA BLD QL SMEAR: ABNORMAL
POLYCHROMASIA BLD QL SMEAR: ABNORMAL
POTASSIUM BLD-SCNC: 3.7 MMOL/L (ref 3.5–5.1)
POTASSIUM SERPL-SCNC: 3.8 MMOL/L (ref 3.5–5.1)
POTASSIUM SERPL-SCNC: 4.7 MMOL/L (ref 3.5–5.1)
POTASSIUM SERPL-SCNC: 4.8 MMOL/L (ref 3.5–5.1)
POTASSIUM SERPL-SCNC: 4.9 MMOL/L (ref 3.5–5.1)
PROMYELOCYTES NFR BLD MANUAL: 1 %
PROMYELOCYTES NFR BLD MANUAL: 1 %
PROT SERPL-MCNC: 5.7 G/DL (ref 6–8.4)
RBC # BLD AUTO: 3.03 M/UL (ref 4–5.4)
RBC # BLD AUTO: 3.03 M/UL (ref 4–5.4)
SAMPLE: ABNORMAL
SATURATED IRON: 19 % (ref 20–50)
SITE: ABNORMAL
SODIUM BLD-SCNC: 137 MMOL/L (ref 136–145)
SODIUM SERPL-SCNC: 131 MMOL/L (ref 136–145)
SODIUM SERPL-SCNC: 133 MMOL/L (ref 136–145)
SODIUM SERPL-SCNC: 134 MMOL/L (ref 136–145)
SODIUM SERPL-SCNC: 135 MMOL/L (ref 136–145)
TOTAL IRON BINDING CAPACITY: 201 UG/DL (ref 250–450)
TRANSFERRIN SERPL-MCNC: 136 MG/DL (ref 200–375)
VANCOMYCIN SERPL-MCNC: 17.8 UG/ML
WBC # BLD AUTO: 33.02 K/UL (ref 3.9–12.7)
WBC # BLD AUTO: 33.02 K/UL (ref 3.9–12.7)

## 2022-01-05 PROCEDURE — 94799 UNLISTED PULMONARY SVC/PX: CPT

## 2022-01-05 PROCEDURE — 84100 ASSAY OF PHOSPHORUS: CPT | Performed by: STUDENT IN AN ORGANIZED HEALTH CARE EDUCATION/TRAINING PROGRAM

## 2022-01-05 PROCEDURE — 27000646 HC AEROBIKA DEVICE

## 2022-01-05 PROCEDURE — 25000003 PHARM REV CODE 250

## 2022-01-05 PROCEDURE — 63600175 PHARM REV CODE 636 W HCPCS

## 2022-01-05 PROCEDURE — 99291 CRITICAL CARE FIRST HOUR: CPT | Mod: ,,, | Performed by: STUDENT IN AN ORGANIZED HEALTH CARE EDUCATION/TRAINING PROGRAM

## 2022-01-05 PROCEDURE — 36600 WITHDRAWAL OF ARTERIAL BLOOD: CPT

## 2022-01-05 PROCEDURE — 63600175 PHARM REV CODE 636 W HCPCS: Performed by: SURGERY

## 2022-01-05 PROCEDURE — 94760 N-INVAS EAR/PLS OXIMETRY 1: CPT

## 2022-01-05 PROCEDURE — 94761 N-INVAS EAR/PLS OXIMETRY MLT: CPT

## 2022-01-05 PROCEDURE — 27000221 HC OXYGEN, UP TO 24 HOURS

## 2022-01-05 PROCEDURE — 20000000 HC ICU ROOM

## 2022-01-05 PROCEDURE — 97530 THERAPEUTIC ACTIVITIES: CPT

## 2022-01-05 PROCEDURE — 94664 DEMO&/EVAL PT USE INHALER: CPT

## 2022-01-05 PROCEDURE — 80202 ASSAY OF VANCOMYCIN: CPT | Performed by: SURGERY

## 2022-01-05 PROCEDURE — 27200966 HC CLOSED SUCTION SYSTEM

## 2022-01-05 PROCEDURE — 80100014 HC HEMODIALYSIS 1:1

## 2022-01-05 PROCEDURE — 63600175 PHARM REV CODE 636 W HCPCS: Performed by: STUDENT IN AN ORGANIZED HEALTH CARE EDUCATION/TRAINING PROGRAM

## 2022-01-05 PROCEDURE — 85007 BL SMEAR W/DIFF WBC COUNT: CPT | Performed by: STUDENT IN AN ORGANIZED HEALTH CARE EDUCATION/TRAINING PROGRAM

## 2022-01-05 PROCEDURE — 27100171 HC OXYGEN HIGH FLOW UP TO 24 HOURS

## 2022-01-05 PROCEDURE — 94640 AIRWAY INHALATION TREATMENT: CPT

## 2022-01-05 PROCEDURE — 85027 COMPLETE CBC AUTOMATED: CPT | Performed by: STUDENT IN AN ORGANIZED HEALTH CARE EDUCATION/TRAINING PROGRAM

## 2022-01-05 PROCEDURE — 80069 RENAL FUNCTION PANEL: CPT | Performed by: INTERNAL MEDICINE

## 2022-01-05 PROCEDURE — 25000003 PHARM REV CODE 250: Performed by: STUDENT IN AN ORGANIZED HEALTH CARE EDUCATION/TRAINING PROGRAM

## 2022-01-05 PROCEDURE — 99900026 HC AIRWAY MAINTENANCE (STAT)

## 2022-01-05 PROCEDURE — 25000242 PHARM REV CODE 250 ALT 637 W/ HCPCS: Performed by: SURGERY

## 2022-01-05 PROCEDURE — 99291 PR CRITICAL CARE, E/M 30-74 MINUTES: ICD-10-PCS | Mod: ,,, | Performed by: STUDENT IN AN ORGANIZED HEALTH CARE EDUCATION/TRAINING PROGRAM

## 2022-01-05 PROCEDURE — 80053 COMPREHEN METABOLIC PANEL: CPT | Performed by: STUDENT IN AN ORGANIZED HEALTH CARE EDUCATION/TRAINING PROGRAM

## 2022-01-05 PROCEDURE — 82728 ASSAY OF FERRITIN: CPT | Performed by: STUDENT IN AN ORGANIZED HEALTH CARE EDUCATION/TRAINING PROGRAM

## 2022-01-05 PROCEDURE — 99900035 HC TECH TIME PER 15 MIN (STAT)

## 2022-01-05 PROCEDURE — 83735 ASSAY OF MAGNESIUM: CPT | Mod: 91 | Performed by: INTERNAL MEDICINE

## 2022-01-05 PROCEDURE — 25000003 PHARM REV CODE 250: Performed by: SURGERY

## 2022-01-05 PROCEDURE — 83540 ASSAY OF IRON: CPT | Performed by: STUDENT IN AN ORGANIZED HEALTH CARE EDUCATION/TRAINING PROGRAM

## 2022-01-05 PROCEDURE — 25000242 PHARM REV CODE 250 ALT 637 W/ HCPCS

## 2022-01-05 PROCEDURE — 92526 ORAL FUNCTION THERAPY: CPT

## 2022-01-05 PROCEDURE — 82803 BLOOD GASES ANY COMBINATION: CPT

## 2022-01-05 RX ORDER — HEPARIN SODIUM 1000 [USP'U]/ML
1000 INJECTION, SOLUTION INTRAVENOUS; SUBCUTANEOUS
Status: DISCONTINUED | OUTPATIENT
Start: 2022-01-05 | End: 2022-01-10

## 2022-01-05 RX ORDER — SODIUM CHLORIDE 9 MG/ML
INJECTION, SOLUTION INTRAVENOUS ONCE
Status: COMPLETED | OUTPATIENT
Start: 2022-01-05 | End: 2022-01-05

## 2022-01-05 RX ORDER — HYDROMORPHONE HYDROCHLORIDE 1 MG/ML
0.5 INJECTION, SOLUTION INTRAMUSCULAR; INTRAVENOUS; SUBCUTANEOUS
Status: DISCONTINUED | OUTPATIENT
Start: 2022-01-05 | End: 2022-01-14 | Stop reason: HOSPADM

## 2022-01-05 RX ADMIN — FLUCONAZOLE 200 MG: 2 INJECTION, SOLUTION INTRAVENOUS at 08:01

## 2022-01-05 RX ADMIN — LEVALBUTEROL HYDROCHLORIDE 0.63 MG: 0.63 SOLUTION RESPIRATORY (INHALATION) at 07:01

## 2022-01-05 RX ADMIN — LEVALBUTEROL HYDROCHLORIDE 0.63 MG: 0.63 SOLUTION RESPIRATORY (INHALATION) at 08:01

## 2022-01-05 RX ADMIN — SODIUM CHLORIDE: 0.9 INJECTION, SOLUTION INTRAVENOUS at 03:01

## 2022-01-05 RX ADMIN — GABAPENTIN 125 MG: 250 SOLUTION ORAL at 02:01

## 2022-01-05 RX ADMIN — Medication: at 09:01

## 2022-01-05 RX ADMIN — LEVALBUTEROL HYDROCHLORIDE 0.63 MG: 0.63 SOLUTION RESPIRATORY (INHALATION) at 04:01

## 2022-01-05 RX ADMIN — HEPARIN SODIUM 7500 UNITS: 5000 INJECTION INTRAVENOUS; SUBCUTANEOUS at 02:01

## 2022-01-05 RX ADMIN — PANTOPRAZOLE SODIUM 40 MG: 40 GRANULE, DELAYED RELEASE ORAL at 08:01

## 2022-01-05 RX ADMIN — HEPARIN SODIUM 7500 UNITS: 5000 INJECTION INTRAVENOUS; SUBCUTANEOUS at 11:01

## 2022-01-05 RX ADMIN — LEVALBUTEROL HYDROCHLORIDE 0.63 MG: 0.63 SOLUTION RESPIRATORY (INHALATION) at 12:01

## 2022-01-05 RX ADMIN — METHOCARBAMOL 500 MG: 500 TABLET ORAL at 08:01

## 2022-01-05 RX ADMIN — METHOCARBAMOL 500 MG: 500 TABLET ORAL at 09:01

## 2022-01-05 RX ADMIN — OXYCODONE HYDROCHLORIDE 5 MG: 5 SOLUTION ORAL at 09:01

## 2022-01-05 RX ADMIN — GABAPENTIN 125 MG: 250 SOLUTION ORAL at 09:01

## 2022-01-05 RX ADMIN — NITROGLYCERIN 0.5 INCH: 20 OINTMENT TOPICAL at 01:01

## 2022-01-05 RX ADMIN — OXYCODONE HYDROCHLORIDE 10 MG: 5 SOLUTION ORAL at 03:01

## 2022-01-05 RX ADMIN — NITROGLYCERIN 0.5 INCH: 20 OINTMENT TOPICAL at 11:01

## 2022-01-05 RX ADMIN — MEROPENEM AND SODIUM CHLORIDE 500 MG: 500 INJECTION, SOLUTION INTRAVENOUS at 09:01

## 2022-01-05 RX ADMIN — NITROGLYCERIN 0.5 INCH: 20 OINTMENT TOPICAL at 06:01

## 2022-01-05 RX ADMIN — MEROPENEM AND SODIUM CHLORIDE 500 MG: 500 INJECTION, SOLUTION INTRAVENOUS at 10:01

## 2022-01-05 RX ADMIN — HEPARIN SODIUM 7500 UNITS: 5000 INJECTION INTRAVENOUS; SUBCUTANEOUS at 06:01

## 2022-01-05 RX ADMIN — Medication: at 08:01

## 2022-01-05 RX ADMIN — METHOCARBAMOL 500 MG: 500 TABLET ORAL at 02:01

## 2022-01-05 RX ADMIN — PROPRANOLOL HYDROCHLORIDE 20 MG: 20 TABLET ORAL at 08:01

## 2022-01-05 RX ADMIN — PROPRANOLOL HYDROCHLORIDE 20 MG: 20 TABLET ORAL at 02:01

## 2022-01-05 RX ADMIN — OXYCODONE HYDROCHLORIDE 5 MG: 5 SOLUTION ORAL at 05:01

## 2022-01-05 RX ADMIN — METHOCARBAMOL 500 MG: 500 TABLET ORAL at 05:01

## 2022-01-05 RX ADMIN — GABAPENTIN 125 MG: 250 SOLUTION ORAL at 06:01

## 2022-01-05 RX ADMIN — HYDROMORPHONE HYDROCHLORIDE 0.5 MG: 1 INJECTION, SOLUTION INTRAMUSCULAR; INTRAVENOUS; SUBCUTANEOUS at 08:01

## 2022-01-05 RX ADMIN — NITROGLYCERIN 0.5 INCH: 20 OINTMENT TOPICAL at 05:01

## 2022-01-05 NOTE — PROGRESS NOTES
Pharmacokinetic Assessment Follow Up: IV Vancomycin    Therapy with vancomycin discontinued by provider. Pharmacy will sign off, please re-consult as needed.    Preeti Blanco, PharmD, BCCCP  [id number]

## 2022-01-05 NOTE — PLAN OF CARE
"SICU PLAN OF CARE NOTE    Goals of Care:  MAP >65, wound vac management, pain control     Vital Signs:  /74 (BP Location: Left arm, Patient Position: Lying)   Pulse 101   Temp 98.6 °F (37 °C) (Oral)   Resp (!) 23   Ht 5' 4" (1.626 m)   Wt 121.1 kg (267 lb)   SpO2 96%   BMI 45.83 kg/m²     Cardiac:  sinus tachycardia     Resp:  SpO2 95% on room air. Pt refused BiPAP overnight. RT and MD made aware, WCTM.     Neuro:  AAO x4, Follows Commands and Moves All Extremities    Urine Output: Multiple unmeasured urine occurrences during shift, pt complaints of urinary urgency and freq dribbling. MD notified and bladder scan performed with minimal urine noted. WCTM at this time.    Drains: all REAGAN drains output monitored appropriately during shift. See flow sheets for details.     Mid abdominal wound vac in place. SICU MD notified and to bedside for wound vac alarms. Seal checked and working properly.     Diet:  Tube Feeds @ 40 cc/shift     Labs/Accuchecks:  All labs trended reviewed and replaced accordingly.     Skin: all skin monitored for redness and breakdown during shift. All wound care orders performed accordingly. Heels and sacrum protected with foam dressings in place. All pressure points protected. Wound vac and REAGAN drains monitored for redness and bleeding, all dressings remain CDI. Immerse bed plugged into wall and working properly.     Shift Events:  All VSS at this time. No acute events over night.  See flowsheet for further assessment/details. Pt  at bedside and updated on current condition/plan of care, questions answered, and emotional support provided.   MD updated on current condition, vitals, labs, and gtts.      "

## 2022-01-05 NOTE — PT/OT/SLP PROGRESS
Speech Language Pathology Treatment    Patient Name:  Chidi Castaneda   MRN:  [id number]  Admitting Diagnosis: <principal problem not specified>    Recommendations:                 General Recommendations:  Follow-up not indicated  Diet recommendations:  Regular, Liquid Diet Level: Thin   Aspiration Precautions: 1 bite/sip at a time, Assistance with meals, HOB to 90 degrees, No straws, Small bites/sips and Standard aspiration precautions   General Precautions: Standard, aspiration  Communication strategies:  none    Subjective     Patient awake and alert  Communicated with nurse prior to session     Pain/Comfort:  · Pain Rating 1: 0/10  · Pain Rating Post-Intervention 1: 0/10    Respiratory Status: Room air    Objective:     Has the patient been evaluated by SLP for swallowing?   Yes  Keep patient NPO? No     Patient sitting up in bed with spouse present during session. Patient and spouse reported no overt difficulties with current diet since upgrade. Her vocal quality and intensity were both improved from prior sessions though not yet at baseline. She tolerated thin liquids x6 (via cup-edge) and solids x3 (arthur crackers) with no overt signs of aspiration. Patient independently adhered to safe swallow precautions to good effect with trials. SLP educated patient and spouse regarding recs. Patient left in bed with call light within reach. Recs communicated to team.    Assessment:     Chidi Castaneda is a 56 y.o. female who presents with a safe oropharyngeal swallow at this time. Patient's dysphonia is resolving and does not warrant acute ST at this time.    Goals:   Multidisciplinary Problems     SLP Goals     Not on file          Multidisciplinary Problems (Resolved)        Problem: SLP Goal    Goal Priority Disciplines Outcome   SLP Goal   (Resolved)     SLP Met   Description: Speech Therapy Short Term Goals  Goal expected to be met by 1/16  1. Pt will participate in an ongoing assessment to determine the least  restrictive and safest diet with possible updated goals to follow pending results.                     Plan:     · Patient to be seen:  4 x/week   · Plan of Care expires:  01/31/22  · Plan of Care reviewed with:  patient,spouse   · SLP Follow-Up:  No       Discharge recommendations:  rehabilitation facility   Barriers to Discharge:  None    Time Tracking:     SLP Treatment Date:   01/05/22  Speech Start Time:  0931  Speech Stop Time:  0944     Speech Total Time (min):  13 min    Billable Minutes: Treatment Swallowing Dysfunction 13    Peter Carranza CCC-SLP  Speech-Language Pathology  Pager: [phone]   01/05/2022
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
## 2022-01-05 NOTE — PT/OT/SLP PROGRESS
Occupational Therapy  Co- Treatment    Name: Chidi Castaneda  MRN: [id number]  Admitting Diagnosis:  subcapsular hematoma of liver     Recommendations:     Discharge Recommendations: rehabilitation facility      Assessment:     Chidi Castaneda is a 56 y.o. female. . Performance deficits affecting function are weakness,impaired endurance,impaired self care skills,impaired functional mobilty,gait instability,decreased upper extremity function,decreased lower extremity function.   Pt tolerated session well with good effort and performance. Pt is not safe for d/c home at this time and to benefit from post acute therapy prior to return home.   Rehab Prognosis:  Good; patient would benefit from acute skilled OT services to address these deficits and reach maximum level of function.       Plan:     Patient to be seen 4 x/week to address the above listed problems via self-care/home management,therapeutic activities,therapeutic exercises  · Plan of Care Expires: 02/02/22  · Plan of Care Reviewed with: patient,spouse    Subjective   Pt agreeable to therapy.   Pt reports persistent dizziness.     Pain/Comfort:  · Pain Rating 1: 0/10    Objective:     Communicated with: nsg prior to session.  Patient found supine in bed with REAGAN drains, wound vac, tele, pulse ox, BP cuff.  Co-tx completed this date to optimize functional performance and safety given impaired tolerance for activity in setting of ICU.     General Precautions: Standard, fall,aspiration,nectar thick     Occupational Performance:     Bed Mobility:    Supine<>sit with TOTAL A x 2.   Cues needed for technique with rolling to allow for decreased abdominal strain.     Activities of Daily Living:  Pt unable to engage with self care skills as reports of dizziness persistent throughout sitting EOB and remained upon return supine.     [facility] 6 Click ADL: 6    Treatment & Education:  Pt tolerated sitting EOB approx 5 min with SBA; however, pt with reports of dizziness which  remained persistent. VSS monitored and remained stable.   Once supine, pt reports continued dizziness. Nsg arrived to room to assess pt.  Education provided re: OT POC and safety with functional mobility/ADl skills.     Patient left supine with all lines intact, call button in reach and nsg and sposue presentEducation:      GOALS:   Multidisciplinary Problems     Occupational Therapy Goals        Problem: Occupational Therapy Goal    Goal Priority Disciplines Outcome Interventions   Occupational Therapy Goal     OT, PT/OT Ongoing, Progressing    Description: Goals to be met by: 1/9/2022 (1 week)     Patient will increase functional independence with ADLs by performing:    UE Dressing with Maximum Assistance.  Grooming while seated with Moderate Assistance.  Toileting from bedside commode with Moderate Assistance for hygiene and clothing management.   Rolling to Bilateral with Moderate Assistance.   Supine to sit with Maximum Assistance.  Step transfer with Moderate Assistance  Toilet transfer to bedside commode with Moderate Assistance.                     Time Tracking:     OT Date of Treatment: 01/05/22  OT Start Time: 1102  OT Stop Time: 1120  OT Total Time (min): 18 min    Billable Minutes:Therapeutic Activity 18    OT/SHYANN: OT          1/5/2022

## 2022-01-05 NOTE — SUBJECTIVE & OBJECTIVE
Interval History/Significant Events:   - Several loose BMs overnight.   - Patient also had 1x large unmeasured urinary output.  She notes increased urgency and small leakage of urine with movements.    - Alarms noted on wound vac indicating occlusion overnight but resolved with exchanging of the chris pad.  - WBC continues to rise     Follow-up For: Procedure(s) (LRB):  LAPAROTOMY, EXPLORATORY (N/A)  INSERTION, GASTROSTOMY TUBE, PERCUTANEOUS (N/A)  CHOLECYSTECTOMY  EGD (ESOPHAGOGASTRODUODENOSCOPY) (N/A)    Post-Operative Day: 13 Days Post-Op    Objective:     Vital Signs (Most Recent):  Temp: 99.3 °F (37.4 °C) (01/05/22 0300)  Pulse: (!) 111 (01/05/22 0615)  Resp: (!) 24 (01/05/22 0615)  BP: 138/68 (01/05/22 0615)  SpO2: (!) 94 % (01/05/22 0615) Vital Signs (24h Range):  Temp:  [97.8 °F (36.6 °C)-99.3 °F (37.4 °C)] 99.3 °F (37.4 °C)  Pulse:  [100-122] 111  Resp:  [12-38] 24  SpO2:  [90 %-100 %] 94 %  BP: (116-162)/() 138/68     Weight: 121.1 kg (267 lb)  Body mass index is 45.83 kg/m².      Intake/Output Summary (Last 24 hours) at 1/5/2022 0628  Last data filed at 1/5/2022 0600  Gross per 24 hour   Intake 1799.45 ml   Output 275 ml   Net 1524.45 ml       Physical Exam  Vitals reviewed.   Constitutional:       Appearance: She is ill-appearing.   HENT:      Head: Normocephalic and atraumatic.      Comments: Rt IJ CVC     Mouth/Throat:      Mouth: Mucous membranes are moist.   Eyes:      Conjunctiva/sclera: Conjunctivae normal.      Pupils: Pupils are equal, round, and reactive to light.   Cardiovascular:      Rate and Rhythm: Regular rhythm. Tachycardia present.   Pulmonary:      Effort: No respiratory distress.      Breath sounds: No wheezing or rales.   Abdominal:      General: There is no distension.      Tenderness: There is no abdominal tenderness.      Comments: RUQ 2 drains in place with serosanguineous output  LLQ drain with brownish  Midline incision with wound vac in place and drain with serous output    Musculoskeletal:         General: Swelling present.      Cervical back: Normal range of motion and neck supple.   Skin:     General: Skin is warm and dry.   Neurological:      General: No focal deficit present.      Mental Status: She is alert and oriented to person, place, and time.         Vents:  Vent Mode: Spont (12/31/21 2000)  Ventilator Initiated: Yes (12/28/21 1104)  Set Rate: 18 BPM (12/31/21 0400)  Vt Set: 320 mL (12/31/21 0400)  Pressure Support: 60 cmH20 (12/31/21 0115)  PEEP/CPAP: 5 cmH20 (12/31/21 2000)  Oxygen Concentration (%): 28 (01/04/22 0313)  Peak Airway Pressure: 24 cmH2O (12/31/21 2000)  Plateau Pressure: 31 cmH20 (12/31/21 2000)  Total Ve: 9.78 mL (12/31/21 2000)  Negative Inspiratory Force (cm H2O): -30 (12/31/21 2030)  F/VT Ratio<105 (RSBI): (!) 47.52 (12/31/21 2000)    Lines/Drains/Airways     Central Venous Catheter Line            Trialysis (Dialysis) Catheter 12/30/21 1000 right internal jugular 5 days          Drain                 Closed/Suction Drain 12/23/21 1428 Right;Inferior RUQ Bulb 19 Fr. 12 days         Closed/Suction Drain 12/23/21 1429 Right;Superior RUQ Bulb 19 Fr. 12 days         Gastrostomy/Enterostomy 12/23/21 1338 Gastrostomy tube w/ balloon LUQ feeding 12 days         Closed/Suction Drain 12/31/21 1559 Left Abdomen Bulb 14 Fr. 4 days                Significant Labs:    CBC/Anemia Profile:  Recent Labs   Lab 01/04/22  0316 01/04/22  1202 01/05/22  0247   WBC 33.64*  33.97* 31.02* 33.02*  33.02*   HGB 8.8*  8.9* 8.6* 8.8*  8.8*   HCT 27.3*  27.9* 26.7* 28.0*  28.0*   *  470* 521* 593*  593*   MCV 90  92 90 92  92   RDW 15.6*  15.7* 15.7* 16.0*  16.0*   IRON  --   --  39   FERRITIN  --   --  1,031*        Chemistries:  Recent Labs   Lab 01/04/22  0316 01/04/22  0316 01/04/22  1534 01/04/22  2149 01/05/22  0247     138   < > 135* 134* 133*  134*   K 4.8  4.7   < > 4.4 4.6 4.7  4.8     104   < > 103 102 102  102   CO2 22*  23   < >  22* 24 21*  21*   BUN 35*  36*   < > 44* 52* 55*  52*   CREATININE 2.4*  2.3*   < > 2.9* 3.2* 3.4*  3.2*   CALCIUM 8.2*  8.3*   < > 7.9* 7.9* 8.3*  8.2*   ALBUMIN 1.7*  1.8*   < > 1.7* 1.7* 1.7*  1.7*   PROT 5.7*  --   --   --  5.7*   BILITOT 1.1*  --   --   --  0.9   ALKPHOS 140*  --   --   --  146*   ALT 36  --   --   --  30   AST 31  --   --   --  29   MG 2.1   < > 2.0 2.1 2.2   PHOS 3.3  3.3   < > 3.3 3.4 4.0  4.0  4.0    < > = values in this interval not displayed.       All pertinent labs within the past 24 hours have been reviewed.    Significant Imaging:  I have reviewed all pertinent imaging results/findings within the past 24 hours.

## 2022-01-05 NOTE — ASSESSMENT & PLAN NOTE
-oliguric SUSAN, ischemic ATN with multifactorial etiology, however most likely d/t severe hypotension as a result of septic shock 2/2 small bowel perforation and bacteremia   -BL sCr 1  -KRT started at OSH 12/12/2021 for acidosis and volume overload  -started to urinate  -on RA with net 1.4L positive fluid balance  -will need accurate measurement of urine output in near future with billingsley catheter which is less risky than dialysis  -today HD for clearance and UF

## 2022-01-05 NOTE — PROGRESS NOTES
[facility]  Adult Nutrition  Progress Note    SUMMARY       Recommendations    1. Continue Mechanical Soft diet    2. Add Novasource Renal TID    3. Encourage PO intake    4. Decrease TF rate to 25 mL/hr - 1200 kcal, 54 g protein, and 430 mL free water   - PO intake ~50%    - Adjust rate as need per PO intake    Goals: Pt to tolerate >/= 75% EEN/EPN by f/u date  Nutrition Goal Status: goal met  Communication of RD Recs: reviewed with RN (POC)    Assessment and Plan    Nutrition Problem  Increased nutrient needs    Related to (etiology):   Physiological demands    Signs and Symptoms (as evidenced by):   Dialysis; post op     Interventions (treatment strategy):  Collaboration with other providers  Enteral nutrition  Texture modified diet    Nutrition Diagnosis Status:   New        Nutrition Problem  Inadequate oral intake     Related to (etiology):   Inability to consume sufficient energy     Signs and Symptoms (as evidenced by):   NPO, intubated      Interventions (treatment strategy):  Collaboration with other providers  Enteral nutrition   Texture modified diet     Nutrition Diagnosis Status:   Resolved    Malnutrition Assessment                 Orbital Region (Subcutaneous Fat Loss): well nourished   [place] Region (Muscle Loss): well nourished  Clavicle Bone Region (Muscle Loss): well nourished                 Reason for Assessment    Reason For Assessment: RD follow-up  Diagnosis:  (Subcapsular hematoma of liver)  Relevant Medical History: GERD, DVT not on antiboagulation, diverticulitis, vaughn's esophagus, HFrEF  Interdisciplinary Rounds: attended    General Information Comments: S/p ex lap/wound washout/wound vac replacement/ hemetoma evacuation. Now s/p G -tube placement. Extubated on 1/1/2022. TF @ goal; tolerating well. Diet advanced; tolerating well. Plans for dialysis today. Partial NFPE completed 12/22, nourished w/ no physical indicators of malnutrition.    Nutrition Discharge  "Planning: Adequate PO intake    Nutrition Risk Screen    Nutrition Risk Screen: tube feeding or parenteral nutrition    Nutrition/Diet History    Spiritual, Cultural Beliefs, [employer]al Practices, Values that Affect Care: no  Food Allergies: NKFA  Factors Affecting Nutritional Intake: difficulty/impaired swallowing    Anthropometrics    Temp: 98.4 °F (36.9 °C)  Height Method:  (PER )  Height: 5' 4" (162.6 cm)  Height (inches): 64 in  Weight Method: Bed Scale  Weight: 121.1 kg (267 lb)  Weight (lb): 267 lb  Ideal Body Weight (IBW), Female: 120 lb  % Ideal Body Weight, Female (lb): 222.5 %  BMI (Calculated): 45.8  BMI Grade: greater than 40 - morbid obesity       Lab/Procedures/Meds    Pertinent Labs Reviewed: reviewed  Pertinent Labs Comments: Na 134, BUN 52, creat 3.2, GFR 15.7  Pertinent Medications Reviewed: reviewed  Pertinent Medications Comments: gabapentin, pantoprazole, NaCl    Estimated/Assessed Needs    Weight Used For Calorie Calculations: 121.1 kg (266 lb 15.6 oz)  Energy Calorie Requirements (kcal): 1796 kcal/day  Energy Need Method: [place]-St Jeor (PAL 1.0)  Protein Requirements:  g/day (1.5-2.0 g/kg; intubated, dialysis, wound present)  Weight Used For Protein Calculations: 54.4 kg (120 lb) (IBW)  Fluid Requirements (mL): 1 mL/kcal or per MD  Estimated Fluid Requirement Method: RDA Method  RDA Method (mL): 1796         Nutrition Prescription Ordered    Current Diet Order: Mechanical Soft (L5)  Current Nutrition Support Formula Ordered: Novasource Renal  Current Nutrition Support Rate Ordered: 40 (ml)  Current Nutrition Support Frequency Ordered: mL/hr x 24 hrs    Evaluation of Received Nutrient/Fluid Intake    Enteral Calories (kcal): 1920  Enteral Protein (gm): 87  Enteral (Free Water) Fluid (mL): 988  % Kcal Needs: 107  % Protein Needs: 100  I/O: +4.9L since admit  Energy Calories Required: exceeds needs  Protein Required: meeting needs  Fluid Required: meeting needs  Comments: LBM: " 1/4  Tolerance: tolerating  % Intake of Estimated Energy Needs: 75 - 100 %  % Meal Intake: NPO    Nutrition Risk    Level of Risk/Frequency of Follow-up: low     Monitor and Evaluation    Food and Nutrient Intake: energy intake,enteral nutrition intake  Food and Nutrient Adminstration: diet order,enteral and parenteral nutrition administration  Anthropometric Measurements: weight,weight change  Biochemical Data, Medical Tests and Procedures: electrolyte and renal panel,gastrointestinal profile,glucose/endocrine profile,inflammatory profile,lipid profile  Nutrition-Focused Physical Findings: overall appearance     Nutrition Follow-Up    RD Follow-up?: Yes

## 2022-01-05 NOTE — PROGRESS NOTES
Arrived in patient's room for bedside HD TX.  Pt. Awake, alert and responsive.  No distress noted.  HD TX started via RIJ HD catheter.

## 2022-01-05 NOTE — PT/OT/SLP PROGRESS
Physical Therapy Co-Treatment    Patient Name:  Chidi Castaneda   MRN:  [id number]  Admitting Diagnosis:  <principal problem not specified>   Recent Surgery: Procedure(s) (LRB):  LAPAROTOMY, EXPLORATORY (N/A)  INSERTION, GASTROSTOMY TUBE, PERCUTANEOUS (N/A)  CHOLECYSTECTOMY  EGD (ESOPHAGOGASTRODUODENOSCOPY) (N/A) 13 Days Post-Op  Admit Date: 12/20/2021  Length of Stay: 16 days    Recommendations:     Discharge Recommendations:  rehabilitation facility   Discharge Equipment Recommendations: other (see comments) (tbd pending progress)   Barriers to discharge: weakness, debility, impaired activity tolerance, decreased endurance, impaired cardiopulmonary response to activity, current medical condition, impaired skin integrity    Assessment:     Chidi Castaneda is a 56 y.o. female admitted with a medical diagnosis of <principal problem not specified>.  She presents with the following impairments/functional limitations:  weakness,impaired endurance,impaired self care skills,impaired functional mobilty,gait instability,impaired balance,decreased upper extremity function,decreased lower extremity function,decreased safety awareness,decreased coordination,impaired coordination,impaired fine motor,impaired skin,edema,impaired cardiopulmonary response to activity. Pt tolerated session fairly on this date. Pt demo'ed improved sitting balance on this date pointing towards improved postural strength and overall muscular control and endurance. Pt was limited by dizziness while EOB which worsened as time EOB increased. VSS throughout but due to worsening dizziness pt was returned to supine.     Pt is an excellent candidate for inpatient rehabilitation, as pt has a qualifying diagnosis, displays good activity tolerance, has experienced decline from PLOF, has good family support, and is motivated to participate in therapy. Pt's clinical condition meets full inpatient rehab criteria. Inpatient rehab will provide to total  "interdisciplinary treatment approach needed. Pt is at high risk of unplanned readmission due to fall risk. The lower level of care cannot provide total interdisciplinary approach needed.     Rehab Prognosis: Good; patient would benefit from acute skilled PT services to address these deficits and reach maximum level of function.    Recent Surgery: Procedure(s) (LRB):  LAPAROTOMY, EXPLORATORY (N/A)  INSERTION, GASTROSTOMY TUBE, PERCUTANEOUS (N/A)  CHOLECYSTECTOMY  EGD (ESOPHAGOGASTRODUODENOSCOPY) (N/A) 13 Days Post-Op    Plan:     During this hospitalization, patient to be seen 4 x/week to address the identified rehab impairments via gait training,therapeutic activities,therapeutic exercises,neuromuscular re-education and progress toward the following goals:    · Plan of Care Expires:  01/27/22    Subjective     RN notified prior to session.  present upon PT entrance into room.    Chief Complaint: Pt with no complaints  Patient/Family Comments/goals: get stronger  Pain/Comfort:  · Pain Rating 1: 0/10  · Pain Rating Post-Intervention 1: 0/10      Objective:     Additional staff present: OT for cotx due to pt's multiple medical comorbidities and functional deficits req'ing two skilled therapists to appropriately progress pt's musculoskeletal strength, neuromuscular control, and endurance while taking into consideration severe medical acuity in the ICU    Patient found HOB elevated with: telemetry,blood pressure cuff,pulse ox (continuous),REAGAN drain,Trialysis,wound vac,PEG Tube   Cognition:   · Alert and Cooperative  · AxOx4  · Command following: Follows multistep verbal commands  · Fluency: clear/fluent  General Precautions: Standard, Cardiac aspiration,fall   Orthopedic Precautions:N/A   Braces: N/A   Body mass index is 45.83 kg/m².  Oxygen Device: Room Air  Vitals: BP (!) 140/85 (BP Location: Left arm, Patient Position: Lying)   Pulse 97   Temp 98.4 °F (36.9 °C) (Oral)   Resp (!) 22   Ht 5' 4" (1.626 m)   Wt " 121.1 kg (267 lb)   SpO2 98%   BMI 45.83 kg/m²     Outcome Measures:  AM-PAC 6 CLICK MOBILITY  Turning over in bed (including adjusting bedclothes, sheets and blankets)?: 2  Sitting down on and standing up from a chair with arms (e.g., wheelchair, bedside commode, etc.): 1  Moving from lying on back to sitting on the side of the bed?: 2  Moving to and from a bed to a chair (including a wheelchair)?: 1  Need to walk in hospital room?: 1  Climbing 3-5 steps with a railing?: 1  Basic Mobility Total Score: 8     Functional Mobility:    Bed Mobility:   · Scooting to HOB via supine bridge: total assistance and 2 persons  · Supine to Sit: total assistance and 2 persons; from Rt side of bed  · Scooting anteriorly to EOB to have both feet planted on floor: total assistance  · Sit to Supine: total assistance and 2 persons; to Rt side of bed    Sitting Balance at Edge of Bed:   Static Sitting Balance: Fair : able to sit unsupported without balance loss and without UE support   Dynamic Sitting Balance: Fair- : able to reach ipsilaterally but unable to weight shift   Assistance Level Required: CGA to SBA   Time: ~5 minutes   Postural deviations noted: no deviations noted   Comments: Time EOB focused on tolerance to upright positioning and core control. Pt able to sit with good posture and head control with BUE assist and no LOB during static tasks on this date. Pt wit c /o worsening dizziness while EOB. VSS and BP WNL. Pt with increased blinking and widening eyes and reporting worsening dizziness so was returned to supine. Pt reported dizziness persisted while EOB. RN arrived to room and was informed of pt's complaint.    Transfers/Gait: Deferred due to pt's performance with above listed functional mobility    Education:   Time provided for education, counseling and discussion of health disposition in regards to patient's current status   All questions answered within PT scope of practice and to patient's  satisfaction   PT role in POC to address current functional deficits   Pt educated on proper body mechanics, safety techniques, and energy conservation with PT facilitation and cueing throughout session   Call nursing/pct to transfer to chair/use bathroom. Pt stated understanding.   Whiteboard updated with therapist name and pt's current mobility status documented above   Patient is appropriate for drawsheet transfer to/from cardiac chair with nursing staff    Patient left HOB elevated with all lines intact, call button in reach, RN notified and RN and  present.    GOALS:   Multidisciplinary Problems     Physical Therapy Goals        Problem: Physical Therapy Goal    Goal Priority Disciplines Outcome Goal Variances Interventions   Physical Therapy Goal     PT, PT/OT Ongoing, Progressing     Description: Goals to be met by: 2022    Patient will increase functional independence with mobility by performin. Supine to sit with Moderate Assistance  2. Sit to stand transfer with Moderate Assistance using LRAD  3. Gait  x 10 feet with Moderate Assistance using LRAD  4. Sitting at edge of bed x10 minutes with Stand-by Assistance  5. Stand for 3 minutes with Moderate Assistance using LRAD  6. Lower extremity exercise program x10 reps per handout, with independence                   Time Tracking:     PT Received On: 22  PT Start Time: 1102     PT Stop Time: 1123  PT Total Time (min): 21 min     Billable Minutes:   · Therapeutic Activity 15 minutes    Treatment Type: Treatment  PT/PTA: PT       Kim Lee PT, DPT  2022  Pager: [phone]

## 2022-01-05 NOTE — SUBJECTIVE & OBJECTIVE
"Interval History:   NAEON, AF. Remains tachycardic  WBC up slightly 33 <-- 31  Seen by speech, wound care, nephrology  Dialysis today     Medications:  Continuous Infusions:   sodium chloride 0.9% Stopped (01/02/22 0558)     Scheduled Meds:   sodium chloride 0.9%   Intravenous Once    balsam peru-castor oiL   Topical (Top) BID    epoetin genie-epbx  50 Units/kg Intravenous Q7 Days    fluconazole (DIFLUCAN) IVPB  200 mg Intravenous Q24H    gabapentin  125 mg Per G Tube Q8H    heparin (porcine)  7,500 Units Subcutaneous Q8H    levalbuterol  0.63 mg Nebulization Q4H    meropenem (MERREM) IVPB  500 mg Intravenous Q12H    methocarbamoL  500 mg Per G Tube QID    nitroGLYCERIN 2% TD oint  0.5 inch Topical (Top) Q6H    pantoprazole  40 mg Per G Tube Daily    propranoloL  20 mg Per G Tube TID    scopolamine  1 patch Transdermal Q3 Days     PRN Meds:sodium chloride, dextrose 50%, heparin (porcine), HYDROmorphone, melatonin, ondansetron, oxyCODONE, oxyCODONE, potassium, sodium phosphates, senna-docusate 8.6-50 mg, sodium chloride 0.9%, sodium chloride 0.9%, sodium chloride 0.9%, Pharmacy to dose Vancomycin consult **AND** vancomycin - pharmacy to dose     Review of patient's allergies indicates:   Allergen Reactions    Tylox [oxycodone-acetaminophen]      "Jittery"     Objective:     Vital Signs (Most Recent):  Temp: 99.3 °F (37.4 °C) (01/05/22 0300)  Pulse: 110 (01/05/22 0745)  Resp: (!) 29 (01/05/22 0745)  BP: (!) 152/73 (01/05/22 0745)  SpO2: 95 % (01/05/22 0745) Vital Signs (24h Range):  Temp:  [97.8 °F (36.6 °C)-99.3 °F (37.4 °C)] 99.3 °F (37.4 °C)  Pulse:  [100-122] 110  Resp:  [12-38] 29  SpO2:  [90 %-100 %] 95 %  BP: (116-162)/() 152/73     Weight: 121.1 kg (267 lb)  Body mass index is 45.83 kg/m².    Intake/Output - Last 3 Shifts       01/03 0700  01/04 0659 01/04 0700 01/05 0659 01/05 0700  01/06 0659    P.O. 674 480     Blood       Other 800      NG/ 1120 80    IV Piggyback 313.3 199.5     " Total Intake(mL/kg) 2762.3 (22.8) 1799.5 (14.9) 80 (0.7)    Urine (mL/kg/hr) 0 (0) 10 (0)     Drains 302 275 40    Other 3050 0     Stool 0 0     Total Output 3352 285 40    Net -589.7 +1514.5 +40           Urine Occurrence 1 x 4 x     Stool Occurrence 2 x 8 x           Physical Exam  Vitals and nursing note reviewed.   Constitutional:       General: She is not in acute distress.     Appearance: She is obese. She is not diaphoretic.   HENT:      Head: Normocephalic and atraumatic.      Mouth/Throat:      Mouth: Mucous membranes are moist.      Pharynx: Oropharynx is clear.   Eyes:      Extraocular Movements: Extraocular movements intact.      Conjunctiva/sclera: Conjunctivae normal.   Cardiovascular:      Rate and Rhythm: Regular rhythm. Tachycardia present.   Pulmonary:      Effort: No respiratory distress.   Abdominal:      General: There is no distension.      Palpations: Abdomen is soft.      Tenderness: There is no guarding or rebound.      Comments: Wound vac in place with good seal, no leak noted.   RUQ REAGAN drains x2 with benign fluid, not bloody or bilious  LLQ IR drain with dark output   Musculoskeletal:         General: No deformity.   Skin:     General: Skin is warm and dry.   Neurological:      General: No focal deficit present.      Mental Status: She is alert and oriented to person, place, and time.         Significant Labs:  CBC:   Recent Labs   Lab 01/05/22 0247   WBC 33.02*  33.02*   RBC 3.03*  3.03*   HGB 8.8*  8.8*   HCT 28.0*  28.0*   *  593*   MCV 92  92   MCH 29.0  29.0   MCHC 31.4*  31.4*     CMP:   Recent Labs   Lab 01/05/22 0247   GLU 96  96   CALCIUM 8.3*  8.2*   ALBUMIN 1.7*  1.7*   PROT 5.7*   *  134*   K 4.7  4.8   CO2 21*  21*     102   BUN 55*  52*   CREATININE 3.4*  3.2*   ALKPHOS 146*   ALT 30   AST 29   BILITOT 0.9       Significant Diagnostics:  I have reviewed all pertinent imaging results/findings within the past 24 hours.

## 2022-01-05 NOTE — SUBJECTIVE & OBJECTIVE
"Interval History: increased urine output, will need assessment of urine output with billingsley catheter in near future    Review of patient's allergies indicates:   Allergen Reactions    Tylox [oxycodone-acetaminophen]      "Jittery"     Current Facility-Administered Medications   Medication Frequency    0.9%  NaCl infusion (CRRT USE ONLY) Continuous    0.9%  NaCl infusion (for blood administration) Q24H PRN    0.9%  NaCl infusion Once    0.9%  NaCl infusion Once    balsam peru-castor oiL Oint BID    dextrose 50% injection 25 g PRN    epoetin genie-epbx injection 6,060 Units Q7 Days    fluconazole (DIFLUCAN) IVPB 200 mg 100 mL Q24H    gabapentin 250 mg/5 mL (5 mL) solution 125 mg Q8H    heparin (porcine) injection 1,000 Units PRN    heparin (porcine) injection 1,000 Units PRN    heparin (porcine) injection 7,500 Units Q8H    HYDROmorphone injection 0.5 mg Q6H PRN    levalbuterol nebulizer solution 0.63 mg Q4H    melatonin 1 mg/mL liquid (PEDS) 5 mg Nightly PRN    meropenem-0.9% sodium chloride 500 mg/50 mL IVPB Q12H    methocarbamoL tablet 500 mg QID    nitroGLYCERIN 2% TD oint ointment 0.5 inch Q6H    ondansetron injection 8 mg Q8H PRN    oxyCODONE 5 mg/5 mL solution 10 mg Q4H PRN    oxyCODONE 5 mg/5 mL solution 5 mg Q4H PRN    pantoprazole suspension 40 mg Daily    potassium, sodium phosphates 280-160-250 mg packet 2 packet TID PRN    propranoloL tablet 20 mg TID    scopolamine 1.3-1.5 mg (1 mg over 3 days) 1 patch Q3 Days    senna-docusate 8.6-50 mg per tablet 1 tablet Daily PRN    sodium chloride 0.9% bolus 250 mL PRN    sodium chloride 0.9% bolus 250 mL PRN    sodium chloride 0.9% flush 10 mL PRN    sodium chloride 0.9% flush 10 mL PRN       Objective:     Vital Signs (Most Recent):  Temp: 98.1 °F (36.7 °C) (01/05/22 1500)  Pulse: 101 (01/05/22 1545)  Resp: (!) 28 (01/05/22 1545)  BP: 122/62 (01/05/22 1545)  SpO2: 98 % (01/05/22 1545)  O2 Device (Oxygen Therapy): room air (01/05/22 " 1317) Vital Signs (24h Range):  Temp:  [98.1 °F (36.7 °C)-99.3 °F (37.4 °C)] 98.1 °F (36.7 °C)  Pulse:  [] 101  Resp:  [12-38] 28  SpO2:  [90 %-100 %] 98 %  BP: (121-159)/(62-97) 122/62     Weight: 121.1 kg (267 lb) (12/28/21 1003)  Body mass index is 45.83 kg/m².  Body surface area is 2.34 meters squared.    I/O last 3 completed shifts:  In: 2399.5 [P.O.:600; NG/GT:1600; IV Piggyback:199.5]  Out: 467 [Urine:10; Drains:407; Other:50]    Physical Exam  Constitutional:       Appearance: She is obese. She is ill-appearing.   HENT:      Mouth/Throat:      Mouth: Mucous membranes are moist.   Eyes:      Pupils: Pupils are equal, round, and reactive to light.   Cardiovascular:      Rate and Rhythm: Normal rate and regular rhythm.   Pulmonary:      Effort: No respiratory distress.      Comments: NC  Abdominal:      General: There is no distension.      Comments: Wound vac   Musculoskeletal:         General: No deformity.      Right lower leg: Edema present.      Left lower leg: Edema present.   Skin:     Coloration: Skin is not jaundiced.   Neurological:      General: No focal deficit present.         Significant Labs:  All labs within the past 24 hours have been reviewed.     Significant Imaging:  Labs: Reviewed

## 2022-01-05 NOTE — PLAN OF CARE
Problem: SLP Goal  Goal: SLP Goal  Description: Speech Therapy Short Term Goals  Goal expected to be met by 1/16  1. Pt will participate in an ongoing assessment to determine the least restrictive and safest diet with possible updated goals to follow pending results.  Outcome: Met     Patient seen for ongoing swallow assessment. SLP recommending an upgrade to a Regular Diet/Thin Liquids with strict aspiration precautions.     Peter Carranza CCC-SLP  Speech-Language Pathology  Pager: [phone]

## 2022-01-05 NOTE — PROGRESS NOTES
"[facility]  General Surgery  Progress Note    Subjective:     History of Present Illness:  No notes on file    Post-Op Info:  Procedure(s) (LRB):  LAPAROTOMY, EXPLORATORY (N/A)  INSERTION, GASTROSTOMY TUBE, PERCUTANEOUS (N/A)  CHOLECYSTECTOMY  EGD (ESOPHAGOGASTRODUODENOSCOPY) (N/A)   13 Days Post-Op     Interval History:   NAEON, AF. Remains tachycardic  WBC up slightly 33 <-- 31  Seen by speech, wound care, nephrology  Dialysis today     Medications:  Continuous Infusions:   sodium chloride 0.9% Stopped (01/02/22 0558)     Scheduled Meds:   sodium chloride 0.9%   Intravenous Once    balsam peru-castor oiL   Topical (Top) BID    epoetin genie-epbx  50 Units/kg Intravenous Q7 Days    fluconazole (DIFLUCAN) IVPB  200 mg Intravenous Q24H    gabapentin  125 mg Per G Tube Q8H    heparin (porcine)  7,500 Units Subcutaneous Q8H    levalbuterol  0.63 mg Nebulization Q4H    meropenem (MERREM) IVPB  500 mg Intravenous Q12H    methocarbamoL  500 mg Per G Tube QID    nitroGLYCERIN 2% TD oint  0.5 inch Topical (Top) Q6H    pantoprazole  40 mg Per G Tube Daily    propranoloL  20 mg Per G Tube TID    scopolamine  1 patch Transdermal Q3 Days     PRN Meds:sodium chloride, dextrose 50%, heparin (porcine), HYDROmorphone, melatonin, ondansetron, oxyCODONE, oxyCODONE, potassium, sodium phosphates, senna-docusate 8.6-50 mg, sodium chloride 0.9%, sodium chloride 0.9%, sodium chloride 0.9%, Pharmacy to dose Vancomycin consult **AND** vancomycin - pharmacy to dose     Review of patient's allergies indicates:   Allergen Reactions    Tylox [oxycodone-acetaminophen]      "Jittery"     Objective:     Vital Signs (Most Recent):  Temp: 99.3 °F (37.4 °C) (01/05/22 0300)  Pulse: 110 (01/05/22 0745)  Resp: (!) 29 (01/05/22 0745)  BP: (!) 152/73 (01/05/22 0745)  SpO2: 95 % (01/05/22 0745) Vital Signs (24h Range):  Temp:  [97.8 °F (36.6 °C)-99.3 °F (37.4 °C)] 99.3 °F (37.4 °C)  Pulse:  [100-122] 110  Resp:  " [12-38] 29  SpO2:  [90 %-100 %] 95 %  BP: (116-162)/() 152/73     Weight: 121.1 kg (267 lb)  Body mass index is 45.83 kg/m².    Intake/Output - Last 3 Shifts       01/03 0700  01/04 0659 01/04 0700  01/05 0659 01/05 0700  01/06 0659    P.O. 674 480     Blood       Other 800      NG/ 1120 80    IV Piggyback 313.3 199.5     Total Intake(mL/kg) 2762.3 (22.8) 1799.5 (14.9) 80 (0.7)    Urine (mL/kg/hr) 0 (0) 10 (0)     Drains 302 275 40    Other 3050 0     Stool 0 0     Total Output 3352 285 40    Net -589.7 +1514.5 +40           Urine Occurrence 1 x 4 x     Stool Occurrence 2 x 8 x           Physical Exam  Vitals and nursing note reviewed.   Constitutional:       General: She is not in acute distress.     Appearance: She is obese. She is not diaphoretic.   HENT:      Head: Normocephalic and atraumatic.      Mouth/Throat:      Mouth: Mucous membranes are moist.      Pharynx: Oropharynx is clear.   Eyes:      Extraocular Movements: Extraocular movements intact.      Conjunctiva/sclera: Conjunctivae normal.   Cardiovascular:      Rate and Rhythm: Regular rhythm. Tachycardia present.   Pulmonary:      Effort: No respiratory distress.   Abdominal:      General: There is no distension.      Palpations: Abdomen is soft.      Tenderness: There is no guarding or rebound.      Comments: Wound vac in place with good seal, no leak noted.   RUQ REAGAN drains x2 with benign fluid, not bloody or bilious  LLQ IR drain with dark output   Musculoskeletal:         General: No deformity.   Skin:     General: Skin is warm and dry.   Neurological:      General: No focal deficit present.      Mental Status: She is alert and oriented to person, place, and time.         Significant Labs:  CBC:   Recent Labs   Lab 01/05/22  0247   WBC 33.02*  33.02*   RBC 3.03*  3.03*   HGB 8.8*  8.8*   HCT 28.0*  28.0*   *  593*   MCV 92  92   MCH 29.0  29.0   MCHC 31.4*  31.4*     CMP:   Recent Labs   Lab 01/05/22  0247   GLU 96  96    CALCIUM 8.3*  8.2*   ALBUMIN 1.7*  1.7*   PROT 5.7*   *  134*   K 4.7  4.8   CO2 21*  21*     102   BUN 55*  52*   CREATININE 3.4*  3.2*   ALKPHOS 146*   ALT 30   AST 29   BILITOT 0.9       Significant Diagnostics:  I have reviewed all pertinent imaging results/findings within the past 24 hours.    Assessment/Plan:     Subcapsular hematoma of liver  57yo female transfer from OSH after hysterectomy on 12/8 complicated by delayed recognition of small bowel injury requiring resection (in discontinuity) and at some point new bleed from the liver - transferred with open abdomen for higher level of care.  S/p ex-lap, liver packing 12/21  Open cholecystectomy, abdominal closure 12/23  IR drain placed into intra-abdominal abscess 12/31 - Cx growing e. Coli, enterococcus, and an unidentified GNR, f/u susceptibilities    - Aggressive pulmonary hygiene, OOB to chair, PT/OT;  - CT 12/31 with pelvic abscess s/p drain placement. Will need re-scan CT CAP soon; to discuss with staff   - White count continuing to climb (33 today). Discussed reviewing possible line exchange with SICU   - Continue vanc, micheal; fluc added 01/04  - VAP; BAL 12/28 with klebsiella  - Pelvic abscess 12/31; bacteroides, klebsiella, e coli, enterococcus   - Appreciate ID assistance   - Wound vac change biweekly - last performed 1/3, due 1/06  - Recommend bowel reg  - Transfuse for Hgb <7  - Speech eval - advance to dental soft  - Continue REAGAN drains and IR drain; flush BID with 10 cc NS   - Strict I/Os    - Remainder of care per SICU, appreciate assistance         Angelica Ivy MD  General Surgery  [facility] - [facility]

## 2022-01-05 NOTE — PLAN OF CARE
9:58 AM      [facility] the patient pending medical stability. SW will continue to follow.           Philip Borges LMSW  Case Management [facility]  Ext: [phone]

## 2022-01-05 NOTE — ASSESSMENT & PLAN NOTE
55yo female transfer from OSH after hysterectomy on 12/8 complicated by delayed recognition of small bowel injury requiring resection (in discontinuity) and at some point new bleed from the liver - transferred with open abdomen for higher level of care.  S/p ex-lap, liver packing 12/21  Open cholecystectomy, abdominal closure 12/23  IR drain placed into intra-abdominal abscess 12/31 - Cx growing e. Coli, enterococcus, and an unidentified GNR, f/u susceptibilities    - Aggressive pulmonary hygiene, OOB to chair, PT/OT;  - CT 12/31 with pelvic abscess s/p drain placement. Will need re-scan CT CAP soon; to discuss with staff   - White count continuing to climb (33 today). Discussed reviewing possible line exchange with SICU   - Continue vanc, micheal; fluc added 01/04  - VAP; BAL 12/28 with klebsiella  - Pelvic abscess 12/31; bacteroides, klebsiella, e coli, enterococcus   - Appreciate ID assistance   - Wound vac change biweekly - last performed 1/3, due 1/06  - Recommend bowel reg  - Transfuse for Hgb <7  - Speech eval - advance to dental soft  - Continue REAGAN drains and IR drain; flush BID with 10 cc NS   - Strict I/Os    - Remainder of care per SICU, appreciate assistance

## 2022-01-06 LAB
ALBUMIN SERPL BCP-MCNC: 1.6 G/DL (ref 3.5–5.2)
ALBUMIN SERPL BCP-MCNC: 1.6 G/DL (ref 3.5–5.2)
ALBUMIN SERPL BCP-MCNC: 1.7 G/DL (ref 3.5–5.2)
ALP SERPL-CCNC: 137 U/L (ref 55–135)
ALT SERPL W/O P-5'-P-CCNC: 24 U/L (ref 10–44)
ANION GAP SERPL CALC-SCNC: 10 MMOL/L (ref 8–16)
ANION GAP SERPL CALC-SCNC: 10 MMOL/L (ref 8–16)
ANION GAP SERPL CALC-SCNC: 9 MMOL/L (ref 8–16)
ANISOCYTOSIS BLD QL SMEAR: SLIGHT
AST SERPL-CCNC: 26 U/L (ref 10–40)
BASO STIPL BLD QL SMEAR: ABNORMAL
BASOPHILS NFR BLD: 0 % (ref 0–1.9)
BILIRUB SERPL-MCNC: 0.7 MG/DL (ref 0.1–1)
BUN SERPL-MCNC: 38 MG/DL (ref 6–20)
BUN SERPL-MCNC: 39 MG/DL (ref 6–20)
BUN SERPL-MCNC: 45 MG/DL (ref 6–20)
CALCIUM SERPL-MCNC: 8.1 MG/DL (ref 8.7–10.5)
CALCIUM SERPL-MCNC: 8.1 MG/DL (ref 8.7–10.5)
CALCIUM SERPL-MCNC: 8.2 MG/DL (ref 8.7–10.5)
CHLORIDE SERPL-SCNC: 100 MMOL/L (ref 95–110)
CHLORIDE SERPL-SCNC: 101 MMOL/L (ref 95–110)
CHLORIDE SERPL-SCNC: 101 MMOL/L (ref 95–110)
CO2 SERPL-SCNC: 23 MMOL/L (ref 23–29)
CO2 SERPL-SCNC: 24 MMOL/L (ref 23–29)
CO2 SERPL-SCNC: 24 MMOL/L (ref 23–29)
CREAT SERPL-MCNC: 2.6 MG/DL (ref 0.5–1.4)
CREAT SERPL-MCNC: 3.2 MG/DL (ref 0.5–1.4)
CREAT SERPL-MCNC: 3.4 MG/DL (ref 0.5–1.4)
DIFFERENTIAL METHOD: ABNORMAL
EOSINOPHIL NFR BLD: 0 % (ref 0–8)
ERYTHROCYTE [DISTWIDTH] IN BLOOD BY AUTOMATED COUNT: 16 % (ref 11.5–14.5)
EST. GFR  (AFRICAN AMERICAN): 16.8 ML/MIN/1.73 M^2
EST. GFR  (AFRICAN AMERICAN): 18.1 ML/MIN/1.73 M^2
EST. GFR  (AFRICAN AMERICAN): 23.2 ML/MIN/1.73 M^2
EST. GFR  (NON AFRICAN AMERICAN): 14.6 ML/MIN/1.73 M^2
EST. GFR  (NON AFRICAN AMERICAN): 15.7 ML/MIN/1.73 M^2
EST. GFR  (NON AFRICAN AMERICAN): 20.2 ML/MIN/1.73 M^2
FINAL PATHOLOGIC DIAGNOSIS: NORMAL
GLUCOSE SERPL-MCNC: 104 MG/DL (ref 70–110)
GLUCOSE SERPL-MCNC: 106 MG/DL (ref 70–110)
GLUCOSE SERPL-MCNC: 107 MG/DL (ref 70–110)
GROSS: NORMAL
HCT VFR BLD AUTO: 26.7 % (ref 37–48.5)
HGB BLD-MCNC: 8.4 G/DL (ref 12–16)
IMM GRANULOCYTES # BLD AUTO: ABNORMAL K/UL (ref 0–0.04)
IMM GRANULOCYTES NFR BLD AUTO: ABNORMAL % (ref 0–0.5)
LYMPHOCYTES NFR BLD: 4 % (ref 18–48)
Lab: NORMAL
MAGNESIUM SERPL-MCNC: 2 MG/DL (ref 1.6–2.6)
MAGNESIUM SERPL-MCNC: 2.1 MG/DL (ref 1.6–2.6)
MAGNESIUM SERPL-MCNC: 2.1 MG/DL (ref 1.6–2.6)
MCH RBC QN AUTO: 29.1 PG (ref 27–31)
MCHC RBC AUTO-ENTMCNC: 31.5 G/DL (ref 32–36)
MCV RBC AUTO: 92 FL (ref 82–98)
METAMYELOCYTES NFR BLD MANUAL: 5 %
MONOCYTES NFR BLD: 3 % (ref 4–15)
MYELOCYTES NFR BLD MANUAL: 1 %
NEUTROPHILS NFR BLD: 82 % (ref 38–73)
NEUTS BAND NFR BLD MANUAL: 5 %
NRBC BLD-RTO: 0 /100 WBC
OVALOCYTES BLD QL SMEAR: ABNORMAL
PHOSPHATE SERPL-MCNC: 3.4 MG/DL (ref 2.7–4.5)
PHOSPHATE SERPL-MCNC: 4.5 MG/DL (ref 2.7–4.5)
PHOSPHATE SERPL-MCNC: 4.7 MG/DL (ref 2.7–4.5)
PLATELET # BLD AUTO: 555 K/UL (ref 150–450)
PMV BLD AUTO: 10.7 FL (ref 9.2–12.9)
POIKILOCYTOSIS BLD QL SMEAR: SLIGHT
POLYCHROMASIA BLD QL SMEAR: ABNORMAL
POTASSIUM SERPL-SCNC: 4.5 MMOL/L (ref 3.5–5.1)
POTASSIUM SERPL-SCNC: 4.5 MMOL/L (ref 3.5–5.1)
POTASSIUM SERPL-SCNC: 4.6 MMOL/L (ref 3.5–5.1)
PROT SERPL-MCNC: 5.7 G/DL (ref 6–8.4)
RBC # BLD AUTO: 2.89 M/UL (ref 4–5.4)
SODIUM SERPL-SCNC: 134 MMOL/L (ref 136–145)
WBC # BLD AUTO: 29.43 K/UL (ref 3.9–12.7)

## 2022-01-06 PROCEDURE — 84100 ASSAY OF PHOSPHORUS: CPT | Performed by: STUDENT IN AN ORGANIZED HEALTH CARE EDUCATION/TRAINING PROGRAM

## 2022-01-06 PROCEDURE — 63600175 PHARM REV CODE 636 W HCPCS: Performed by: STUDENT IN AN ORGANIZED HEALTH CARE EDUCATION/TRAINING PROGRAM

## 2022-01-06 PROCEDURE — 94664 DEMO&/EVAL PT USE INHALER: CPT

## 2022-01-06 PROCEDURE — 94799 UNLISTED PULMONARY SVC/PX: CPT

## 2022-01-06 PROCEDURE — 63600175 PHARM REV CODE 636 W HCPCS: Performed by: SURGERY

## 2022-01-06 PROCEDURE — 94640 AIRWAY INHALATION TREATMENT: CPT

## 2022-01-06 PROCEDURE — 85027 COMPLETE CBC AUTOMATED: CPT | Performed by: STUDENT IN AN ORGANIZED HEALTH CARE EDUCATION/TRAINING PROGRAM

## 2022-01-06 PROCEDURE — 25000242 PHARM REV CODE 250 ALT 637 W/ HCPCS

## 2022-01-06 PROCEDURE — 99291 CRITICAL CARE FIRST HOUR: CPT | Mod: ,,, | Performed by: STUDENT IN AN ORGANIZED HEALTH CARE EDUCATION/TRAINING PROGRAM

## 2022-01-06 PROCEDURE — 25000003 PHARM REV CODE 250

## 2022-01-06 PROCEDURE — 94760 N-INVAS EAR/PLS OXIMETRY 1: CPT

## 2022-01-06 PROCEDURE — 25000003 PHARM REV CODE 250: Performed by: SURGERY

## 2022-01-06 PROCEDURE — 25000242 PHARM REV CODE 250 ALT 637 W/ HCPCS: Performed by: SURGERY

## 2022-01-06 PROCEDURE — 94761 N-INVAS EAR/PLS OXIMETRY MLT: CPT

## 2022-01-06 PROCEDURE — 99900035 HC TECH TIME PER 15 MIN (STAT)

## 2022-01-06 PROCEDURE — 25000003 PHARM REV CODE 250: Performed by: STUDENT IN AN ORGANIZED HEALTH CARE EDUCATION/TRAINING PROGRAM

## 2022-01-06 PROCEDURE — 80053 COMPREHEN METABOLIC PANEL: CPT | Performed by: STUDENT IN AN ORGANIZED HEALTH CARE EDUCATION/TRAINING PROGRAM

## 2022-01-06 PROCEDURE — 80069 RENAL FUNCTION PANEL: CPT | Mod: 91 | Performed by: INTERNAL MEDICINE

## 2022-01-06 PROCEDURE — 99291 PR CRITICAL CARE, E/M 30-74 MINUTES: ICD-10-PCS | Mod: ,,, | Performed by: STUDENT IN AN ORGANIZED HEALTH CARE EDUCATION/TRAINING PROGRAM

## 2022-01-06 PROCEDURE — 83735 ASSAY OF MAGNESIUM: CPT | Mod: 91 | Performed by: INTERNAL MEDICINE

## 2022-01-06 PROCEDURE — 27200966 HC CLOSED SUCTION SYSTEM

## 2022-01-06 PROCEDURE — 27000221 HC OXYGEN, UP TO 24 HOURS

## 2022-01-06 PROCEDURE — 20000000 HC ICU ROOM

## 2022-01-06 PROCEDURE — 94668 MNPJ CHEST WALL SBSQ: CPT

## 2022-01-06 PROCEDURE — 25500020 PHARM REV CODE 255: Performed by: SURGERY

## 2022-01-06 PROCEDURE — 85007 BL SMEAR W/DIFF WBC COUNT: CPT | Performed by: STUDENT IN AN ORGANIZED HEALTH CARE EDUCATION/TRAINING PROGRAM

## 2022-01-06 RX ORDER — FLUCONAZOLE 200 MG/1
200 TABLET ORAL DAILY
Status: DISCONTINUED | OUTPATIENT
Start: 2022-01-07 | End: 2022-01-09

## 2022-01-06 RX ADMIN — LEVALBUTEROL HYDROCHLORIDE 0.63 MG: 0.63 SOLUTION RESPIRATORY (INHALATION) at 08:01

## 2022-01-06 RX ADMIN — NITROGLYCERIN 0.5 INCH: 20 OINTMENT TOPICAL at 05:01

## 2022-01-06 RX ADMIN — PROPRANOLOL HYDROCHLORIDE 20 MG: 20 TABLET ORAL at 09:01

## 2022-01-06 RX ADMIN — HEPARIN SODIUM 7500 UNITS: 5000 INJECTION INTRAVENOUS; SUBCUTANEOUS at 09:01

## 2022-01-06 RX ADMIN — Medication: at 09:01

## 2022-01-06 RX ADMIN — LEVALBUTEROL HYDROCHLORIDE 0.63 MG: 0.63 SOLUTION RESPIRATORY (INHALATION) at 04:01

## 2022-01-06 RX ADMIN — METHOCARBAMOL 500 MG: 500 TABLET ORAL at 04:01

## 2022-01-06 RX ADMIN — GABAPENTIN 125 MG: 250 SOLUTION ORAL at 05:01

## 2022-01-06 RX ADMIN — HYDROMORPHONE HYDROCHLORIDE 0.5 MG: 1 INJECTION, SOLUTION INTRAMUSCULAR; INTRAVENOUS; SUBCUTANEOUS at 12:01

## 2022-01-06 RX ADMIN — METHOCARBAMOL 500 MG: 500 TABLET ORAL at 09:01

## 2022-01-06 RX ADMIN — LEVALBUTEROL HYDROCHLORIDE 0.63 MG: 0.63 SOLUTION RESPIRATORY (INHALATION) at 07:01

## 2022-01-06 RX ADMIN — NITROGLYCERIN 0.5 INCH: 20 OINTMENT TOPICAL at 11:01

## 2022-01-06 RX ADMIN — OXYCODONE HYDROCHLORIDE 10 MG: 5 SOLUTION ORAL at 09:01

## 2022-01-06 RX ADMIN — IOHEXOL 100 ML: 350 INJECTION, SOLUTION INTRAVENOUS at 08:01

## 2022-01-06 RX ADMIN — SCOPALAMINE 1 PATCH: 1 PATCH, EXTENDED RELEASE TRANSDERMAL at 09:01

## 2022-01-06 RX ADMIN — LEVALBUTEROL HYDROCHLORIDE 0.63 MG: 0.63 SOLUTION RESPIRATORY (INHALATION) at 12:01

## 2022-01-06 RX ADMIN — LEVALBUTEROL HYDROCHLORIDE 0.63 MG: 0.63 SOLUTION RESPIRATORY (INHALATION) at 11:01

## 2022-01-06 RX ADMIN — GABAPENTIN 125 MG: 250 SOLUTION ORAL at 01:01

## 2022-01-06 RX ADMIN — Medication: at 08:01

## 2022-01-06 RX ADMIN — FLUCONAZOLE 200 MG: 2 INJECTION, SOLUTION INTRAVENOUS at 09:01

## 2022-01-06 RX ADMIN — HEPARIN SODIUM 7500 UNITS: 5000 INJECTION INTRAVENOUS; SUBCUTANEOUS at 05:01

## 2022-01-06 RX ADMIN — PROPRANOLOL HYDROCHLORIDE 20 MG: 20 TABLET ORAL at 03:01

## 2022-01-06 RX ADMIN — NITROGLYCERIN 0.5 INCH: 20 OINTMENT TOPICAL at 12:01

## 2022-01-06 RX ADMIN — HEPARIN SODIUM 7500 UNITS: 5000 INJECTION INTRAVENOUS; SUBCUTANEOUS at 01:01

## 2022-01-06 RX ADMIN — MEROPENEM AND SODIUM CHLORIDE 500 MG: 500 INJECTION, SOLUTION INTRAVENOUS at 10:01

## 2022-01-06 RX ADMIN — GABAPENTIN 125 MG: 250 SOLUTION ORAL at 09:01

## 2022-01-06 RX ADMIN — METHOCARBAMOL 500 MG: 500 TABLET ORAL at 01:01

## 2022-01-06 RX ADMIN — MEROPENEM AND SODIUM CHLORIDE 500 MG: 500 INJECTION, SOLUTION INTRAVENOUS at 08:01

## 2022-01-06 RX ADMIN — OXYCODONE HYDROCHLORIDE 10 MG: 5 SOLUTION ORAL at 03:01

## 2022-01-06 RX ADMIN — HYDROMORPHONE HYDROCHLORIDE 0.5 MG: 1 INJECTION, SOLUTION INTRAMUSCULAR; INTRAVENOUS; SUBCUTANEOUS at 06:01

## 2022-01-06 RX ADMIN — PANTOPRAZOLE SODIUM 40 MG: 40 GRANULE, DELAYED RELEASE ORAL at 09:01

## 2022-01-06 NOTE — PLAN OF CARE
[facility]  Discharge Reassessment    Primary Care Provider: Primary Doctor No    Expected Discharge Date: 1/7/2022    Reassessment (most recent)     Discharge Reassessment - 01/06/22 1115        Discharge Reassessment    Assessment Type Discharge Planning Reassessment     Communicated SHANA with patient/caregiver Date not available/Unable to determine     Discharge Plan A Rehab     Discharge Plan B Rehab     DME Needed Upon Discharge  other (see comments)   TBD    Why the patient remains in the hospital Requires continued medical care        Post-Acute Status    Post-Acute Authorization Placement     Post-Acute Placement Status Pending medical clearance/testing               Per MD's note, The patient is complaining of abdominal pain with continued leukocytosis.  Tolerated HD yesterday with 1.8L taken off.  TF discontinued at 9pm.  DVT U/S negative b/l. denies any SOB, chest pain, fevers.    The patient is not medically stable to discharge at this time and remains in SICU. The patient has been accepted into [facility] in [place], [place].  The SW will continue to follow-up to assist the patient with discharge needs.         Philip Borges LMSW  Case Management [facility]  Ext: [phone]

## 2022-01-06 NOTE — PROGRESS NOTES
"[facility]  General Surgery  Progress Note    Subjective:     History of Present Illness:  No notes on file    Post-Op Info:  Procedure(s) (LRB):  LAPAROTOMY, EXPLORATORY (N/A)  INSERTION, GASTROSTOMY TUBE, PERCUTANEOUS (N/A)  CHOLECYSTECTOMY  EGD (ESOPHAGOGASTRODUODENOSCOPY) (N/A)   14 Days Post-Op     Interval History:   Increasing pain, particularly on L  Thick drainage from LLQ drain, intermittently not holding suction  Still tachycardic     Medications:  Continuous Infusions:   sodium chloride 0.9% Stopped (01/02/22 0558)     Scheduled Meds:   sodium chloride 0.9%   Intravenous Once    balsam peru-castor oiL   Topical (Top) BID    epoetin genie-epbx  50 Units/kg Intravenous Q7 Days    fluconazole (DIFLUCAN) IVPB  200 mg Intravenous Q24H    gabapentin  125 mg Per G Tube Q8H    heparin (porcine)  7,500 Units Subcutaneous Q8H    levalbuterol  0.63 mg Nebulization Q4H    meropenem (MERREM) IVPB  500 mg Intravenous Q12H    methocarbamoL  500 mg Per G Tube QID    nitroGLYCERIN 2% TD oint  0.5 inch Topical (Top) Q6H    pantoprazole  40 mg Per G Tube Daily    propranoloL  20 mg Per G Tube TID    scopolamine  1 patch Transdermal Q3 Days     PRN Meds:sodium chloride, dextrose 50%, heparin (porcine), HYDROmorphone, melatonin, ondansetron, oxyCODONE, oxyCODONE, potassium, sodium phosphates, senna-docusate 8.6-50 mg, sodium chloride 0.9%, sodium chloride 0.9%, sodium chloride 0.9%     Review of patient's allergies indicates:   Allergen Reactions    Tylox [oxycodone-acetaminophen]      "Jittery"     Objective:     Vital Signs (Most Recent):  Temp: 98.2 °F (36.8 °C) (01/06/22 0300)  Pulse: (!) 114 (01/06/22 0804)  Resp: (!) 24 (01/06/22 0804)  BP: 118/60 (01/06/22 0630)  SpO2: 96 % (01/06/22 0804) Vital Signs (24h Range):  Temp:  [98 °F (36.7 °C)-98.4 °F (36.9 °C)] 98.2 °F (36.8 °C)  Pulse:  [] 114  Resp:  [18-39] 24  SpO2:  [92 %-100 %] 96 %  BP: ()/(52-85) 118/60     Weight: " 121.1 kg (267 lb)  Body mass index is 45.83 kg/m².    Intake/Output - Last 3 Shifts       01/04 0700 01/05 0659 01/05 0700 01/06 0659 01/06 0700 01/07 0659    P.O. 480      I.V. (mL/kg)  270.4 (2.2)     Other  500     NG/GT 1120 980     IV Piggyback 199.5 148.6     Total Intake(mL/kg) 1799.5 (14.9) 1899 (15.7)     Urine (mL/kg/hr) 10 (0) 0 (0)     Drains 275 255     Other 0 2576     Stool 0      Total Output 285 2831     Net +1514.5 -932.1            Urine Occurrence 4 x 1 x     Stool Occurrence 8 x            Physical Exam  Vitals and nursing note reviewed.   Constitutional:       General: She is not in acute distress.     Appearance: She is obese. She is not diaphoretic.   HENT:      Head: Normocephalic and atraumatic.      Mouth/Throat:      Mouth: Mucous membranes are moist.      Pharynx: Oropharynx is clear.   Eyes:      Extraocular Movements: Extraocular movements intact.      Conjunctiva/sclera: Conjunctivae normal.   Cardiovascular:      Rate and Rhythm: Regular rhythm. Tachycardia present.   Pulmonary:      Effort: No respiratory distress.   Abdominal:      General: There is no distension.      Palpations: Abdomen is soft.      Tenderness: There is no guarding or rebound.      Comments: Wound vac in place with good seal, no leak noted.   RUQ REAGAN drains x2 with benign fluid, not bloody or bilious  LLQ IR drain with dark output   Musculoskeletal:         General: No deformity.   Skin:     General: Skin is warm and dry.   Neurological:      General: No focal deficit present.      Mental Status: She is alert and oriented to person, place, and time.         Significant Labs:  CBC:   Recent Labs   Lab 01/06/22  0338   WBC 29.43*   RBC 2.89*   HGB 8.4*   HCT 26.7*   *   MCV 92   MCH 29.1   MCHC 31.5*     CMP:   Recent Labs   Lab 01/06/22  0338      CALCIUM 8.2*   ALBUMIN 1.7*   PROT 5.7*   *   K 4.5   CO2 24      BUN 38*   CREATININE 2.6*   ALKPHOS 137*   ALT 24   AST 26   BILITOT 0.7        Significant Diagnostics:  I have reviewed all pertinent imaging results/findings within the past 24 hours.    Assessment/Plan:     Subcapsular hematoma of liver  55yo female transfer from OSH after hysterectomy on 12/8 complicated by delayed recognition of small bowel injury requiring resection (in discontinuity) and at some point new bleed from the liver - transferred with open abdomen for higher level of care.  S/p ex-lap, liver packing 12/21  Open cholecystectomy, abdominal closure 12/23  IR drain placed into intra-abdominal abscess 12/31 - Cx growing e. Coli, enterococcus, and an unidentified GNR, f/u susceptibilities    - Aggressive pulmonary hygiene, OOB to chair, PT/OT;  - CT 12/31 with pelvic abscess s/p drain placement. Repeat CT today    - Likely re-consult IR after scan   - Continue vanc, micheal; fluc added 01/04  - VAP; BAL 12/28 with klebsiella  - Pelvic abscess 12/31; bacteroides, klebsiella, e coli, enterococcus   - Appreciate ID assistance   - Wound vac change biweekly - last performed 1/3, due 1/06  - Recommend bowel reg  - Transfuse for Hgb <7  - Speech eval - advance to dental soft  - Continue REAGAN drains and IR drain; flush BID with 10 cc NS   - Strict I/Os    - Remainder of care per SICU, appreciate assistance         Elieser Toledo MD  General Surgery  [facility]

## 2022-01-06 NOTE — PLAN OF CARE
"SICU PLAN OF CARE NOTE    Goals of Care:  MAP >65, pain control, wound vac management    Vital Signs:  BP (!) 115/57   Pulse 108   Temp 98.2 °F (36.8 °C) (Oral)   Resp 18   Ht 5' 4" (1.626 m)   Wt 121.1 kg (267 lb)   SpO2 96%   BMI 45.83 kg/m²     Cardiac:  sinus tachycardia     Resp:  SpO2 96% on room air, pt refused bipap overnight    Neuro:  AAO x4, Follows Commands, and Moves All Extremities, pt voice hoarse and weak    Urine Output:  minimal urine output during shift, pure wick in place     Drains:    All drains monitored for output during shift, see flowsheet for details  MD notified and to bedside for increased pain at L drain site, drain flushed appropriately, drainage remains dark red/brown and thick, WCTM.     Diet:  Regular Diet and Tube Feeds @ 40 cc/hr     Labs/Accuchecks:  all labs trended and reviewed.     Skin:  all skin monitored for redness and breakdown during shift. All wound care orders performed accordingly. Heels and sacrum protected with foam dressings in place. All pressure points protected. Wound vac and REAGAN drains monitored for redness and bleeding, all dressings remain CDI. Immerse bed plugged into wall and working properly.      Shift Events:  All VSS at this time. See flowsheet for further assessment/details. Spouse at bedside and updated on current condition/plan of care, questions answered, and emotional support provided.   MD updated on current condition, vitals, labs, and gtts.    "

## 2022-01-06 NOTE — PROGRESS NOTES
HD TX complete. Net fluid removal is 1876 ml.  VSS.  HD catheter saline flushed and capped.  Report given to primary nurse.
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
## 2022-01-06 NOTE — ASSESSMENT & PLAN NOTE
55yo female transfer from OSH after hysterectomy on 12/8 complicated by delayed recognition of small bowel injury requiring resection (in discontinuity) and at some point new bleed from the liver - transferred with open abdomen for higher level of care.  S/p ex-lap, liver packing 12/21  Open cholecystectomy, abdominal closure 12/23  IR drain placed into intra-abdominal abscess 12/31 - Cx growing e. Coli, enterococcus, and an unidentified GNR, f/u susceptibilities    - Aggressive pulmonary hygiene, OOB to chair, PT/OT;  - CT 12/31 with pelvic abscess s/p drain placement. Repeat CT today    - Likely re-consult IR after scan   - Continue vanc, micheal; fluc added 01/04  - VAP; BAL 12/28 with klebsiella  - Pelvic abscess 12/31; bacteroides, klebsiella, e coli, enterococcus   - Appreciate ID assistance   - Wound vac change biweekly - last performed 1/3, due 1/06  - Recommend bowel reg  - Transfuse for Hgb <7  - Speech eval - advance to dental soft  - Continue REAGAN drains and IR drain; flush BID with 10 cc NS   - Strict I/Os    - Remainder of care per SICU, appreciate assistance
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
## 2022-01-06 NOTE — SUBJECTIVE & OBJECTIVE
"Interval History: increased urine output    Review of patient's allergies indicates:   Allergen Reactions    Tylox [oxycodone-acetaminophen]      "Jittery"     Current Facility-Administered Medications   Medication Frequency    0.9%  NaCl infusion (CRRT USE ONLY) Continuous    0.9%  NaCl infusion (for blood administration) Q24H PRN    0.9%  NaCl infusion Once    balsam peru-castor oiL Oint BID    dextrose 50% injection 25 g PRN    epoetin genie-epbx injection 6,060 Units Q7 Days    fluconazole (DIFLUCAN) IVPB 200 mg 100 mL Q24H    gabapentin 250 mg/5 mL (5 mL) solution 125 mg Q8H    heparin (porcine) injection 1,000 Units PRN    heparin (porcine) injection 7,500 Units Q8H    HYDROmorphone injection 0.5 mg Q3H PRN    levalbuterol nebulizer solution 0.63 mg Q4H    melatonin 1 mg/mL liquid (PEDS) 5 mg Nightly PRN    meropenem-0.9% sodium chloride 500 mg/50 mL IVPB Q12H    methocarbamoL tablet 500 mg QID    nitroGLYCERIN 2% TD oint ointment 0.5 inch Q6H    ondansetron injection 8 mg Q8H PRN    oxyCODONE 5 mg/5 mL solution 10 mg Q4H PRN    oxyCODONE 5 mg/5 mL solution 5 mg Q4H PRN    pantoprazole suspension 40 mg Daily    potassium, sodium phosphates 280-160-250 mg packet 2 packet TID PRN    propranoloL tablet 20 mg TID    scopolamine 1.3-1.5 mg (1 mg over 3 days) 1 patch Q3 Days    senna-docusate 8.6-50 mg per tablet 1 tablet Daily PRN    sodium chloride 0.9% bolus 250 mL PRN    sodium chloride 0.9% flush 10 mL PRN    sodium chloride 0.9% flush 10 mL PRN       Objective:     Vital Signs (Most Recent):  Temp: 98.5 °F (36.9 °C) (01/06/22 0800)  Pulse: (!) 114 (01/06/22 0804)  Resp: (!) 24 (01/06/22 0804)  BP: 128/72 (01/06/22 0800)  SpO2: 96 % (01/06/22 0804)  O2 Device (Oxygen Therapy): room air (01/06/22 0804) Vital Signs (24h Range):  Temp:  [98 °F (36.7 °C)-98.5 °F (36.9 °C)] 98.5 °F (36.9 °C)  Pulse:  [] 114  Resp:  [18-39] 24  SpO2:  [92 %-100 %] 96 %  BP: ()/(52-85) 128/72 "     Weight: 121.1 kg (267 lb) (12/28/21 1003)  Body mass index is 45.83 kg/m².  Body surface area is 2.34 meters squared.    I/O last 3 completed shifts:  In: 2529 [I.V.:270.4; Other:500; NG/GT:1560; IV Piggyback:198.6]  Out: 2956 [Urine:10; Drains:370; Other:2576]    Physical Exam  Constitutional:       Appearance: She is obese. She is ill-appearing.   HENT:      Mouth/Throat:      Mouth: Mucous membranes are moist.   Eyes:      Pupils: Pupils are equal, round, and reactive to light.   Cardiovascular:      Rate and Rhythm: Normal rate and regular rhythm.   Pulmonary:      Effort: No respiratory distress.      Comments: NC  Abdominal:      General: There is no distension.      Comments: Wound vac   Musculoskeletal:         General: No deformity.      Right lower leg: Edema present.      Left lower leg: Edema present.   Skin:     Coloration: Skin is not jaundiced.   Neurological:      General: No focal deficit present.         Significant Labs:  All labs within the past 24 hours have been reviewed.     Significant Imaging:  Labs: Reviewed

## 2022-01-06 NOTE — CARE UPDATE
SICU Update    Wound vac exchanged at bedside. Patient tolerated very well. Subcutaneous tissues are healthy with good granulation tissue. Fascia with some fibrinous exudate. See pictures below. New wound vac placed with white sponge over fascia and black sponge on top. Vac functioning well without leak.            Von Ponce MD  General Surgery PGY-2  01/06/2022

## 2022-01-06 NOTE — SUBJECTIVE & OBJECTIVE
Interval History/Significant Events:   The patient is complaining of abdominal pain with continued leukocytosis.  Tolerated HD yesterday with 1.8L taken off.  TF discontinued at 9pm.  DVT U/S negative b/l. denies any SOB, chest pain, fevers.    Follow-up For: Procedure(s) (LRB):  LAPAROTOMY, EXPLORATORY (N/A)  INSERTION, GASTROSTOMY TUBE, PERCUTANEOUS (N/A)  CHOLECYSTECTOMY  EGD (ESOPHAGOGASTRODUODENOSCOPY) (N/A)    Post-Operative Day: 14 Days Post-Op    Objective:     Vital Signs (Most Recent):  Temp: 98.5 °F (36.9 °C) (01/06/22 0800)  Pulse: (!) 114 (01/06/22 0804)  Resp: (!) 24 (01/06/22 0804)  BP: 128/72 (01/06/22 0800)  SpO2: 96 % (01/06/22 0804) Vital Signs (24h Range):  Temp:  [98 °F (36.7 °C)-98.5 °F (36.9 °C)] 98.5 °F (36.9 °C)  Pulse:  [] 114  Resp:  [18-39] 24  SpO2:  [92 %-100 %] 96 %  BP: ()/(52-85) 128/72     Weight: 121.1 kg (267 lb)  Body mass index is 45.83 kg/m².      Intake/Output Summary (Last 24 hours) at 1/6/2022 0906  Last data filed at 1/6/2022 0600  Gross per 24 hour   Intake 1596.85 ml   Output 2791 ml   Net -1194.15 ml       Physical Exam  Vitals reviewed.   Constitutional:       Appearance: She is ill-appearing.   HENT:      Head: Normocephalic and atraumatic.      Comments: Rt IJ CVC     Mouth/Throat:      Mouth: Mucous membranes are moist.   Eyes:      Conjunctiva/sclera: Conjunctivae normal.      Pupils: Pupils are equal, round, and reactive to light.   Cardiovascular:      Rate and Rhythm: Regular rhythm. Tachycardia present.   Pulmonary:      Effort: No respiratory distress.      Breath sounds: No wheezing or rales.   Abdominal:      General: There is no distension.      Tenderness: There is no abdominal tenderness.      Comments: RUQ 2 drains in place with serosanguineous output  LLQ drain with brownish  Midline incision with wound vac in place and drain with serous output   Musculoskeletal:         General: Swelling present.      Cervical back: Normal range of motion  and neck supple.   Skin:     General: Skin is warm and dry.   Neurological:      General: No focal deficit present.      Mental Status: She is alert and oriented to person, place, and time.         Lines/Drains/Airways     Central Venous Catheter Line            Trialysis (Dialysis) Catheter 12/30/21 1000 right internal jugular 6 days          Drain                 Closed/Suction Drain 12/23/21 1428 Right;Inferior RUQ Bulb 19 Fr. 13 days         Closed/Suction Drain 12/23/21 1429 Right;Superior RUQ Bulb 19 Fr. 13 days         Gastrostomy/Enterostomy 12/23/21 1338 Gastrostomy tube w/ balloon LUQ feeding 13 days         Closed/Suction Drain 12/31/21 1559 Left Abdomen Bulb 14 Fr. 5 days                Significant Labs:    CBC/Anemia Profile:  Recent Labs   Lab 01/04/22  1202 01/04/22  1202 01/05/22  0247 01/05/22  2139 01/06/22  0338   WBC 31.02*  --  33.02*  33.02*  --  29.43*   HGB 8.6*  --  8.8*  8.8*  --  8.4*   HCT 26.7*   < > 28.0*  28.0* 28* 26.7*   *  --  593*  593*  --  555*   MCV 90  --  92  92  --  92   RDW 15.7*  --  16.0*  16.0*  --  16.0*   IRON  --   --  39  --   --    FERRITIN  --   --  1,031*  --   --     < > = values in this interval not displayed.        Chemistries:  Recent Labs   Lab 01/05/22  0247 01/05/22  0247 01/05/22  1411 01/05/22  2142 01/06/22  0338   *  134*   < > 131* 135* 134*   K 4.7  4.8   < > 4.9 3.8 4.5     102   < > 101 99 101   CO2 21*  21*   < > 22* 27 24   BUN 55*  52*   < > 57* 31* 38*   CREATININE 3.4*  3.2*   < > 3.5* 2.4* 2.6*   CALCIUM 8.3*  8.2*   < > 8.3* 8.2* 8.2*   ALBUMIN 1.7*  1.7*   < > 1.7* 1.7* 1.7*   PROT 5.7*  --   --   --  5.7*   BILITOT 0.9  --   --   --  0.7   ALKPHOS 146*  --   --   --  137*   ALT 30  --   --   --  24   AST 29  --   --   --  26   MG 2.2   < > 2.2 2.0 2.0   PHOS 4.0  4.0  4.0   < > 4.6* 3.0 3.4    < > = values in this interval not displayed.       Significant Imaging:  I have reviewed all pertinent imaging  results/findings within the past 24 hours.

## 2022-01-06 NOTE — SUBJECTIVE & OBJECTIVE
"Interval History:   Increasing pain, particularly on L  Thick drainage from LLQ drain, intermittently not holding suction  Still tachycardic     Medications:  Continuous Infusions:   sodium chloride 0.9% Stopped (01/02/22 0558)     Scheduled Meds:   sodium chloride 0.9%   Intravenous Once    balsam peru-castor oiL   Topical (Top) BID    epoetin genie-epbx  50 Units/kg Intravenous Q7 Days    fluconazole (DIFLUCAN) IVPB  200 mg Intravenous Q24H    gabapentin  125 mg Per G Tube Q8H    heparin (porcine)  7,500 Units Subcutaneous Q8H    levalbuterol  0.63 mg Nebulization Q4H    meropenem (MERREM) IVPB  500 mg Intravenous Q12H    methocarbamoL  500 mg Per G Tube QID    nitroGLYCERIN 2% TD oint  0.5 inch Topical (Top) Q6H    pantoprazole  40 mg Per G Tube Daily    propranoloL  20 mg Per G Tube TID    scopolamine  1 patch Transdermal Q3 Days     PRN Meds:sodium chloride, dextrose 50%, heparin (porcine), HYDROmorphone, melatonin, ondansetron, oxyCODONE, oxyCODONE, potassium, sodium phosphates, senna-docusate 8.6-50 mg, sodium chloride 0.9%, sodium chloride 0.9%, sodium chloride 0.9%     Review of patient's allergies indicates:   Allergen Reactions    Tylox [oxycodone-acetaminophen]      "Jittery"     Objective:     Vital Signs (Most Recent):  Temp: 98.2 °F (36.8 °C) (01/06/22 0300)  Pulse: (!) 114 (01/06/22 0804)  Resp: (!) 24 (01/06/22 0804)  BP: 118/60 (01/06/22 0630)  SpO2: 96 % (01/06/22 0804) Vital Signs (24h Range):  Temp:  [98 °F (36.7 °C)-98.4 °F (36.9 °C)] 98.2 °F (36.8 °C)  Pulse:  [] 114  Resp:  [18-39] 24  SpO2:  [92 %-100 %] 96 %  BP: ()/(52-85) 118/60     Weight: 121.1 kg (267 lb)  Body mass index is 45.83 kg/m².    Intake/Output - Last 3 Shifts       01/04 0700 01/05 0659 01/05 0700 01/06 0659 01/06 0700 01/07 0659    P.O. 480      I.V. (mL/kg)  270.4 (2.2)     Other  500     NG/GT 1120 980     IV Piggyback 199.5 148.6     Total Intake(mL/kg) 1799.5 (14.9) 1899 (15.7)     Urine " (mL/kg/hr) 10 (0) 0 (0)     Drains 275 255     Other 0 2576     Stool 0      Total Output 285 2831     Net +1514.5 -932.1            Urine Occurrence 4 x 1 x     Stool Occurrence 8 x            Physical Exam  Vitals and nursing note reviewed.   Constitutional:       General: She is not in acute distress.     Appearance: She is obese. She is not diaphoretic.   HENT:      Head: Normocephalic and atraumatic.      Mouth/Throat:      Mouth: Mucous membranes are moist.      Pharynx: Oropharynx is clear.   Eyes:      Extraocular Movements: Extraocular movements intact.      Conjunctiva/sclera: Conjunctivae normal.   Cardiovascular:      Rate and Rhythm: Regular rhythm. Tachycardia present.   Pulmonary:      Effort: No respiratory distress.   Abdominal:      General: There is no distension.      Palpations: Abdomen is soft.      Tenderness: There is no guarding or rebound.      Comments: Wound vac in place with good seal, no leak noted.   RUQ REAGAN drains x2 with benign fluid, not bloody or bilious  LLQ IR drain with dark output   Musculoskeletal:         General: No deformity.   Skin:     General: Skin is warm and dry.   Neurological:      General: No focal deficit present.      Mental Status: She is alert and oriented to person, place, and time.         Significant Labs:  CBC:   Recent Labs   Lab 01/06/22  0338   WBC 29.43*   RBC 2.89*   HGB 8.4*   HCT 26.7*   *   MCV 92   MCH 29.1   MCHC 31.5*     CMP:   Recent Labs   Lab 01/06/22  0338      CALCIUM 8.2*   ALBUMIN 1.7*   PROT 5.7*   *   K 4.5   CO2 24      BUN 38*   CREATININE 2.6*   ALKPHOS 137*   ALT 24   AST 26   BILITOT 0.7       Significant Diagnostics:  I have reviewed all pertinent imaging results/findings within the past 24 hours.

## 2022-01-07 LAB
ALBUMIN SERPL BCP-MCNC: 1.6 G/DL (ref 3.5–5.2)
ALBUMIN SERPL BCP-MCNC: 1.7 G/DL (ref 3.5–5.2)
ALP SERPL-CCNC: 126 U/L (ref 55–135)
ALT SERPL W/O P-5'-P-CCNC: 18 U/L (ref 10–44)
ANION GAP SERPL CALC-SCNC: 11 MMOL/L (ref 8–16)
ANION GAP SERPL CALC-SCNC: 12 MMOL/L (ref 8–16)
ANISOCYTOSIS BLD QL SMEAR: SLIGHT
AST SERPL-CCNC: 26 U/L (ref 10–40)
BASO STIPL BLD QL SMEAR: ABNORMAL
BASOPHILS # BLD AUTO: ABNORMAL K/UL (ref 0–0.2)
BASOPHILS NFR BLD: 1 % (ref 0–1.9)
BILIRUB SERPL-MCNC: 0.7 MG/DL (ref 0.1–1)
BUN SERPL-MCNC: 47 MG/DL (ref 6–20)
BUN SERPL-MCNC: 54 MG/DL (ref 6–20)
CA-I BLDV-SCNC: 1.22 MMOL/L (ref 1.06–1.42)
CALCIUM SERPL-MCNC: 8.1 MG/DL (ref 8.7–10.5)
CALCIUM SERPL-MCNC: 8.4 MG/DL (ref 8.7–10.5)
CHLORIDE SERPL-SCNC: 97 MMOL/L (ref 95–110)
CHLORIDE SERPL-SCNC: 98 MMOL/L (ref 95–110)
CO2 SERPL-SCNC: 23 MMOL/L (ref 23–29)
CO2 SERPL-SCNC: 24 MMOL/L (ref 23–29)
CREAT SERPL-MCNC: 3.6 MG/DL (ref 0.5–1.4)
CREAT SERPL-MCNC: 3.7 MG/DL (ref 0.5–1.4)
DIFFERENTIAL METHOD: ABNORMAL
DOHLE BOD BLD QL SMEAR: PRESENT
EOSINOPHIL # BLD AUTO: ABNORMAL K/UL (ref 0–0.5)
EOSINOPHIL NFR BLD: 1 % (ref 0–8)
ERYTHROCYTE [DISTWIDTH] IN BLOOD BY AUTOMATED COUNT: 16.4 % (ref 11.5–14.5)
EST. GFR  (AFRICAN AMERICAN): 15.2 ML/MIN/1.73 M^2
EST. GFR  (AFRICAN AMERICAN): 15.7 ML/MIN/1.73 M^2
EST. GFR  (NON AFRICAN AMERICAN): 13.2 ML/MIN/1.73 M^2
EST. GFR  (NON AFRICAN AMERICAN): 13.6 ML/MIN/1.73 M^2
GIANT PLATELETS BLD QL SMEAR: PRESENT
GLUCOSE SERPL-MCNC: 104 MG/DL (ref 70–110)
GLUCOSE SERPL-MCNC: 98 MG/DL (ref 70–110)
HCT VFR BLD AUTO: 27 % (ref 37–48.5)
HGB BLD-MCNC: 8.2 G/DL (ref 12–16)
HYPOCHROMIA BLD QL SMEAR: ABNORMAL
IMM GRANULOCYTES # BLD AUTO: ABNORMAL K/UL (ref 0–0.04)
IMM GRANULOCYTES NFR BLD AUTO: ABNORMAL % (ref 0–0.5)
LYMPHOCYTES # BLD AUTO: ABNORMAL K/UL (ref 1–4.8)
LYMPHOCYTES NFR BLD: 10 % (ref 18–48)
MAGNESIUM SERPL-MCNC: 2.1 MG/DL (ref 1.6–2.6)
MCH RBC QN AUTO: 29.4 PG (ref 27–31)
MCHC RBC AUTO-ENTMCNC: 30.4 G/DL (ref 32–36)
MCV RBC AUTO: 97 FL (ref 82–98)
METAMYELOCYTES NFR BLD MANUAL: 6 %
MONOCYTES # BLD AUTO: ABNORMAL K/UL (ref 0.3–1)
MONOCYTES NFR BLD: 7 % (ref 4–15)
MYELOCYTES NFR BLD MANUAL: 3 %
NEUTROPHILS NFR BLD: 67 % (ref 38–73)
NEUTS BAND NFR BLD MANUAL: 5 %
NRBC BLD-RTO: 0 /100 WBC
OVALOCYTES BLD QL SMEAR: ABNORMAL
PHOSPHATE SERPL-MCNC: 4.7 MG/DL (ref 2.7–4.5)
PHOSPHATE SERPL-MCNC: 5 MG/DL (ref 2.7–4.5)
PLATELET # BLD AUTO: 560 K/UL (ref 150–450)
PLATELET BLD QL SMEAR: ABNORMAL
PMV BLD AUTO: 10.4 FL (ref 9.2–12.9)
POIKILOCYTOSIS BLD QL SMEAR: SLIGHT
POLYCHROMASIA BLD QL SMEAR: ABNORMAL
POTASSIUM SERPL-SCNC: 4.3 MMOL/L (ref 3.5–5.1)
POTASSIUM SERPL-SCNC: 4.5 MMOL/L (ref 3.5–5.1)
PROT SERPL-MCNC: 5.6 G/DL (ref 6–8.4)
RBC # BLD AUTO: 2.79 M/UL (ref 4–5.4)
SODIUM SERPL-SCNC: 132 MMOL/L (ref 136–145)
SODIUM SERPL-SCNC: 133 MMOL/L (ref 136–145)
WBC # BLD AUTO: 24.36 K/UL (ref 3.9–12.7)

## 2022-01-07 PROCEDURE — 99900035 HC TECH TIME PER 15 MIN (STAT)

## 2022-01-07 PROCEDURE — 99233 SBSQ HOSP IP/OBS HIGH 50: CPT | Mod: ,,, | Performed by: INTERNAL MEDICINE

## 2022-01-07 PROCEDURE — 94664 DEMO&/EVAL PT USE INHALER: CPT

## 2022-01-07 PROCEDURE — 25000003 PHARM REV CODE 250: Performed by: STUDENT IN AN ORGANIZED HEALTH CARE EDUCATION/TRAINING PROGRAM

## 2022-01-07 PROCEDURE — 82330 ASSAY OF CALCIUM: CPT | Performed by: SURGERY

## 2022-01-07 PROCEDURE — 97110 THERAPEUTIC EXERCISES: CPT

## 2022-01-07 PROCEDURE — 63600175 PHARM REV CODE 636 W HCPCS: Performed by: STUDENT IN AN ORGANIZED HEALTH CARE EDUCATION/TRAINING PROGRAM

## 2022-01-07 PROCEDURE — 99233 PR SUBSEQUENT HOSPITAL CARE,LEVL III: ICD-10-PCS | Mod: ,,, | Performed by: INTERNAL MEDICINE

## 2022-01-07 PROCEDURE — 25000003 PHARM REV CODE 250: Performed by: SURGERY

## 2022-01-07 PROCEDURE — 27200966 HC CLOSED SUCTION SYSTEM

## 2022-01-07 PROCEDURE — 25000003 PHARM REV CODE 250

## 2022-01-07 PROCEDURE — 94668 MNPJ CHEST WALL SBSQ: CPT

## 2022-01-07 PROCEDURE — 80100014 HC HEMODIALYSIS 1:1

## 2022-01-07 PROCEDURE — 80053 COMPREHEN METABOLIC PANEL: CPT | Performed by: STUDENT IN AN ORGANIZED HEALTH CARE EDUCATION/TRAINING PROGRAM

## 2022-01-07 PROCEDURE — 63600175 PHARM REV CODE 636 W HCPCS: Performed by: SURGERY

## 2022-01-07 PROCEDURE — 99291 CRITICAL CARE FIRST HOUR: CPT | Mod: ,,, | Performed by: STUDENT IN AN ORGANIZED HEALTH CARE EDUCATION/TRAINING PROGRAM

## 2022-01-07 PROCEDURE — 83735 ASSAY OF MAGNESIUM: CPT | Performed by: SURGERY

## 2022-01-07 PROCEDURE — 80069 RENAL FUNCTION PANEL: CPT | Performed by: INTERNAL MEDICINE

## 2022-01-07 PROCEDURE — 81001 URINALYSIS AUTO W/SCOPE: CPT | Performed by: STUDENT IN AN ORGANIZED HEALTH CARE EDUCATION/TRAINING PROGRAM

## 2022-01-07 PROCEDURE — 84100 ASSAY OF PHOSPHORUS: CPT | Performed by: STUDENT IN AN ORGANIZED HEALTH CARE EDUCATION/TRAINING PROGRAM

## 2022-01-07 PROCEDURE — 20000000 HC ICU ROOM

## 2022-01-07 PROCEDURE — 25000242 PHARM REV CODE 250 ALT 637 W/ HCPCS: Performed by: SURGERY

## 2022-01-07 PROCEDURE — 94761 N-INVAS EAR/PLS OXIMETRY MLT: CPT

## 2022-01-07 PROCEDURE — 94640 AIRWAY INHALATION TREATMENT: CPT

## 2022-01-07 PROCEDURE — 25000242 PHARM REV CODE 250 ALT 637 W/ HCPCS

## 2022-01-07 PROCEDURE — 94799 UNLISTED PULMONARY SVC/PX: CPT

## 2022-01-07 PROCEDURE — 27000646 HC AEROBIKA DEVICE

## 2022-01-07 PROCEDURE — 85027 COMPLETE CBC AUTOMATED: CPT | Performed by: STUDENT IN AN ORGANIZED HEALTH CARE EDUCATION/TRAINING PROGRAM

## 2022-01-07 PROCEDURE — 99291 PR CRITICAL CARE, E/M 30-74 MINUTES: ICD-10-PCS | Mod: ,,, | Performed by: STUDENT IN AN ORGANIZED HEALTH CARE EDUCATION/TRAINING PROGRAM

## 2022-01-07 PROCEDURE — 85007 BL SMEAR W/DIFF WBC COUNT: CPT | Performed by: STUDENT IN AN ORGANIZED HEALTH CARE EDUCATION/TRAINING PROGRAM

## 2022-01-07 PROCEDURE — 94760 N-INVAS EAR/PLS OXIMETRY 1: CPT

## 2022-01-07 PROCEDURE — 97530 THERAPEUTIC ACTIVITIES: CPT

## 2022-01-07 RX ORDER — SODIUM CHLORIDE 9 MG/ML
INJECTION, SOLUTION INTRAVENOUS ONCE
Status: DISCONTINUED | OUTPATIENT
Start: 2022-01-07 | End: 2022-01-07

## 2022-01-07 RX ORDER — HEPARIN SODIUM 1000 [USP'U]/ML
1000 INJECTION, SOLUTION INTRAVENOUS; SUBCUTANEOUS
Status: DISCONTINUED | OUTPATIENT
Start: 2022-01-07 | End: 2022-01-14 | Stop reason: HOSPADM

## 2022-01-07 RX ORDER — DIPHENHYDRAMINE HCL 25 MG
25 CAPSULE ORAL ONCE
Status: COMPLETED | OUTPATIENT
Start: 2022-01-07 | End: 2022-01-07

## 2022-01-07 RX ORDER — SODIUM CHLORIDE 9 MG/ML
INJECTION, SOLUTION INTRAVENOUS CONTINUOUS
Status: DISCONTINUED | OUTPATIENT
Start: 2022-01-07 | End: 2022-01-14 | Stop reason: HOSPADM

## 2022-01-07 RX ADMIN — HEPARIN SODIUM 7500 UNITS: 5000 INJECTION INTRAVENOUS; SUBCUTANEOUS at 09:01

## 2022-01-07 RX ADMIN — HEPARIN SODIUM 7500 UNITS: 5000 INJECTION INTRAVENOUS; SUBCUTANEOUS at 01:01

## 2022-01-07 RX ADMIN — OXYCODONE HYDROCHLORIDE 5 MG: 5 SOLUTION ORAL at 04:01

## 2022-01-07 RX ADMIN — GABAPENTIN 125 MG: 250 SOLUTION ORAL at 09:01

## 2022-01-07 RX ADMIN — METHOCARBAMOL 500 MG: 500 TABLET ORAL at 04:01

## 2022-01-07 RX ADMIN — MEROPENEM AND SODIUM CHLORIDE 500 MG: 500 INJECTION, SOLUTION INTRAVENOUS at 09:01

## 2022-01-07 RX ADMIN — HEPARIN SODIUM 7500 UNITS: 5000 INJECTION INTRAVENOUS; SUBCUTANEOUS at 06:01

## 2022-01-07 RX ADMIN — METHOCARBAMOL 500 MG: 500 TABLET ORAL at 08:01

## 2022-01-07 RX ADMIN — FUROSEMIDE 200 MG: 10 INJECTION, SOLUTION INTRAMUSCULAR; INTRAVENOUS at 09:01

## 2022-01-07 RX ADMIN — Medication: at 09:01

## 2022-01-07 RX ADMIN — FLUCONAZOLE 200 MG: 200 TABLET ORAL at 09:01

## 2022-01-07 RX ADMIN — PROMETHAZINE HYDROCHLORIDE 12.5 MG: 25 INJECTION INTRAMUSCULAR; INTRAVENOUS at 10:01

## 2022-01-07 RX ADMIN — LEVALBUTEROL HYDROCHLORIDE 0.63 MG: 0.63 SOLUTION RESPIRATORY (INHALATION) at 07:01

## 2022-01-07 RX ADMIN — SODIUM CHLORIDE 500 ML: 0.9 INJECTION, SOLUTION INTRAVENOUS at 08:01

## 2022-01-07 RX ADMIN — NITROGLYCERIN 0.5 INCH: 20 OINTMENT TOPICAL at 01:01

## 2022-01-07 RX ADMIN — METHOCARBAMOL 500 MG: 500 TABLET ORAL at 09:01

## 2022-01-07 RX ADMIN — MEROPENEM AND SODIUM CHLORIDE 500 MG: 500 INJECTION, SOLUTION INTRAVENOUS at 08:01

## 2022-01-07 RX ADMIN — NITROGLYCERIN 0.5 INCH: 20 OINTMENT TOPICAL at 05:01

## 2022-01-07 RX ADMIN — DIPHENHYDRAMINE HYDROCHLORIDE 25 MG: 25 CAPSULE ORAL at 08:01

## 2022-01-07 RX ADMIN — ONDANSETRON 8 MG: 2 INJECTION INTRAMUSCULAR; INTRAVENOUS at 08:01

## 2022-01-07 RX ADMIN — Medication: at 08:01

## 2022-01-07 RX ADMIN — PROPRANOLOL HYDROCHLORIDE 20 MG: 20 TABLET ORAL at 08:01

## 2022-01-07 RX ADMIN — NITROGLYCERIN 0.5 INCH: 20 OINTMENT TOPICAL at 06:01

## 2022-01-07 RX ADMIN — METHOCARBAMOL 500 MG: 500 TABLET ORAL at 01:01

## 2022-01-07 RX ADMIN — NITROGLYCERIN 0.5 INCH: 20 OINTMENT TOPICAL at 11:01

## 2022-01-07 RX ADMIN — PANTOPRAZOLE SODIUM 40 MG: 40 GRANULE, DELAYED RELEASE ORAL at 09:01

## 2022-01-07 RX ADMIN — GABAPENTIN 125 MG: 250 SOLUTION ORAL at 06:01

## 2022-01-07 RX ADMIN — LEVALBUTEROL HYDROCHLORIDE 0.63 MG: 0.63 SOLUTION RESPIRATORY (INHALATION) at 03:01

## 2022-01-07 RX ADMIN — PROPRANOLOL HYDROCHLORIDE 20 MG: 20 TABLET ORAL at 03:01

## 2022-01-07 RX ADMIN — PROPRANOLOL HYDROCHLORIDE 20 MG: 20 TABLET ORAL at 09:01

## 2022-01-07 RX ADMIN — GABAPENTIN 125 MG: 250 SOLUTION ORAL at 01:01

## 2022-01-07 RX ADMIN — LEVALBUTEROL HYDROCHLORIDE 0.63 MG: 0.63 SOLUTION RESPIRATORY (INHALATION) at 11:01

## 2022-01-07 NOTE — PROGRESS NOTES
[facility]  Nephrology  Progress Note    Patient Name: Chidi Castaneda  MRN: [id number]  Admission Date: 12/20/2021  Hospital Length of Stay: 18 days  Attending Provider: Kendall Gorman MD   Primary Care Physician: Primary Doctor No  Principal Problem:<principal problem not specified>    Subjective:     HPI: Chidi Castaneda is a 56 y.o. female w/ PMHx HTN, GERD s/p EGD 6/22/2021, migraines, ovarian cyst s/p cystectomy, and obesity who underwent an elective lap hysterectomy on 12/18/2021 at [facility] and was then transferred to [place], [place] for higher level of care when pt was found to have post-op somnolence, decrease PO intake, decrease urine output that progressed to anuria with a sharp rise in Cr from 0.9 to 2.8 (12/10). Pt was treated for sepsis with volume resuscitation and broad spectrum antibiotics, however pt continue to deteriorate and became tachycardic, hypotensive, and imaging showed an ileus. Pt was intubated since she was found to be acidotic with a ph of 7.28 despite a nonrebreather with 100%  FiO2  12/11 pt was taken back to the OR for washout and a bowel resection was done as she was found to have a small bowel perforation  12/15 pt was found to have a subscapular liver hematoma  12/18 pt was taken back to the OR for wound vacc exchange and removal of hematoma   12/19 general surgery team recommended no further surgical intervention since they did not find any active bleeding intraoperatively on 12/18 and felt that the ongoing bleeding was 2/2 consumptive coagulopathy and septic shock. They recommended to continue wound vacc and to consider higher level of care for pt  Pt was also being treated for a bacteremia as well as for intraabdominal infection, her antibiotics prior to transfer were: meropenem, flagyl, and vancomycin   Pt received tranexamic acid x1 and multiple units of blood products.    Pt was transferred to [facility] on 12/20/2021 for higher  "level of care: embolization for a subscapular hematoma       Nephrology was consulted for: "dialysis, SUSAN"    Pt does not have any h/o renal disease prior to hospitalization (per chart review and per pt's )   EDW: 88 kg   Post-op renal ultrasound was normal (results of ultrasound and other medical records from the outside hospital are in the chart at the bedside, needs to be scanned into chart)   Pt was started on CRRT on 12/12 via a trialysis catheter,   On 12/20/21 morning nephrology at outside hospital had recommended CRRT-SLED however primary team requested iHD prior to transfer to [facility], it appears that pt was ordered for 4 hours however, pt was prepped for transfer and was unable to complete full session 2/2 transfer, per pt's , pt had 2L of fluid removed instead of the planned 4 liters. Of note, while pt was at [facility] pt's CRRT required the use of citrate to prevent clotting (briefly), however that apparently resolved and was able to get a session w/o citrate and obviously was able to tolerate iHD prior to transfer.   Upon arrival to [facility] pt was started on zosyn, she was started on NS 125ml/hr, was transfused 2 units of PRBCs, and remained off vasopressors.       Interval History: increased urine output    Review of patient's allergies indicates:   Allergen Reactions    Tylox [oxycodone-acetaminophen]      "Jittery"     Current Facility-Administered Medications   Medication Frequency    0.9%  NaCl infusion (CRRT USE ONLY) Continuous    0.9%  NaCl infusion (for blood administration) Q24H PRN    0.9%  NaCl infusion Once    balsam peru-castor oiL Oint BID    dextrose 50% injection 25 g PRN    epoetin genie-epbx injection 6,060 Units Q7 Days    fluconazole (DIFLUCAN) IVPB 200 mg 100 mL Q24H    gabapentin 250 mg/5 mL (5 mL) solution 125 mg Q8H    heparin (porcine) injection 1,000 Units PRN    heparin (porcine) injection 7,500 Units Q8H    HYDROmorphone injection 0.5 mg Q3H PRN    " levalbuterol nebulizer solution 0.63 mg Q4H    melatonin 1 mg/mL liquid (PEDS) 5 mg Nightly PRN    meropenem-0.9% sodium chloride 500 mg/50 mL IVPB Q12H    methocarbamoL tablet 500 mg QID    nitroGLYCERIN 2% TD oint ointment 0.5 inch Q6H    ondansetron injection 8 mg Q8H PRN    oxyCODONE 5 mg/5 mL solution 10 mg Q4H PRN    oxyCODONE 5 mg/5 mL solution 5 mg Q4H PRN    pantoprazole suspension 40 mg Daily    potassium, sodium phosphates 280-160-250 mg packet 2 packet TID PRN    propranoloL tablet 20 mg TID    scopolamine 1.3-1.5 mg (1 mg over 3 days) 1 patch Q3 Days    senna-docusate 8.6-50 mg per tablet 1 tablet Daily PRN    sodium chloride 0.9% bolus 250 mL PRN    sodium chloride 0.9% flush 10 mL PRN    sodium chloride 0.9% flush 10 mL PRN       Objective:     Vital Signs (Most Recent):  Temp: 98.5 °F (36.9 °C) (01/06/22 0800)  Pulse: (!) 114 (01/06/22 0804)  Resp: (!) 24 (01/06/22 0804)  BP: 128/72 (01/06/22 0800)  SpO2: 96 % (01/06/22 0804)  O2 Device (Oxygen Therapy): room air (01/06/22 0804) Vital Signs (24h Range):  Temp:  [98 °F (36.7 °C)-98.5 °F (36.9 °C)] 98.5 °F (36.9 °C)  Pulse:  [] 114  Resp:  [18-39] 24  SpO2:  [92 %-100 %] 96 %  BP: ()/(52-85) 128/72     Weight: 121.1 kg (267 lb) (12/28/21 1003)  Body mass index is 45.83 kg/m².  Body surface area is 2.34 meters squared.    I/O last 3 completed shifts:  In: 2529 [I.V.:270.4; Other:500; NG/GT:1560; IV Piggyback:198.6]  Out: 2956 [Urine:10; Drains:370; Other:2576]    Physical Exam  Constitutional:       Appearance: She is obese. She is ill-appearing.   HENT:      Mouth/Throat:      Mouth: Mucous membranes are moist.   Eyes:      Pupils: Pupils are equal, round, and reactive to light.   Cardiovascular:      Rate and Rhythm: Normal rate and regular rhythm.   Pulmonary:      Effort: No respiratory distress.      Comments: NC  Abdominal:      General: There is no distension.      Comments: Wound vac   Musculoskeletal:         General:  No deformity.      Right lower leg: Edema present.      Left lower leg: Edema present.   Skin:     Coloration: Skin is not jaundiced.   Neurological:      General: No focal deficit present.         Significant Labs:  All labs within the past 24 hours have been reviewed.     Significant Imaging:  Labs: Reviewed    Assessment/Plan:     SUSAN (acute kidney injury)  -oliguric SUSAN, ischemic ATN with multifactorial etiology, however most likely d/t severe hypotension as a result of septic shock 2/2 small bowel perforation and bacteremia   -BL sCr 1  -KRT started at OSH 12/12/2021 for acidosis and volume overload  -started to urinate  -on RA with net even fluid balance  -agree with furosemide bolus 200, if urine output improves will likely transition to furosemide drip  -HD today for clearance    Acute blood loss anemia  -iron restricted erythropoiesis   -tsat low  -weekly epo    Septic shock  -secondary to bowel perforation  -shock resolved and tolerated HD  -per primary      Subcapsular hematoma of liver  -per primary          Anthony Steven MD  Nephrology  [facility]

## 2022-01-07 NOTE — PT/OT/SLP PROGRESS
Physical Therapy Co-Treatment    Patient Name:  Chidi Castaneda   MRN:  [id number]  Admitting Diagnosis:  <principal problem not specified>   Recent Surgery: Procedure(s) (LRB):  LAPAROTOMY, EXPLORATORY (N/A)  INSERTION, GASTROSTOMY TUBE, PERCUTANEOUS (N/A)  CHOLECYSTECTOMY  EGD (ESOPHAGOGASTRODUODENOSCOPY) (N/A) 15 Days Post-Op  Admit Date: 12/20/2021  Length of Stay: 18 days    Recommendations:     Discharge Recommendations:  rehabilitation facility   Discharge Equipment Recommendations: other (see comments) (tbd pending progress)   Barriers to discharge: debility, weakness, impaired activity tolerance, impaired dynamic balance, increased risk for falls, impaired skin integriy    Assessment:     Chidi Castaneda is a 56 y.o. female admitted with a medical diagnosis of <principal problem not specified>.  She presents with the following impairments/functional limitations:  weakness,impaired endurance,impaired self care skills,impaired functional mobilty,gait instability,impaired balance,decreased coordination,decreased upper extremity function,decreased lower extremity function,pain,impaired coordination,impaired cardiopulmonary response to activity,impaired fine motor,edema. Pt tolerated session well today. Pt demo'ed improvements in cardiopulmonary endurance as well as core control on this date as seen by increased time spent sitting EOB as well as decreased level of assist required. Pt still with c/o dizziness while EOB with VSS. Pt with improved anticipatory balance strategies on this date and was able to perform dynamic balance tasks such as UE and LE therex with no LOB noted. Pt remains motivated to participate and improve with PT. Pt will continue to benefit from skilled PT services during this hospital admit to continue to improve transfer ability and efficiency as well as continue to progress pt's ambulation distance and cardiopulmonary endurance to maximize pt's functional independence and return to  PL[facility].    Pt is an excellent candidate for inpatient rehabilitation, as pt has a qualifying diagnosis, displays good activity tolerance, has experienced decline from PLOF, has good family support, and is motivated to participate in therapy. Pt's clinical condition meets full inpatient rehab criteria. Inpatient rehab will provide to total interdisciplinary treatment approach needed. Pt is at high risk of unplanned readmission due to fall risk. The lower level of care cannot provide total interdisciplinary approach needed.     Rehab Prognosis: Fair; patient would benefit from acute skilled PT services to address these deficits and reach maximum level of function.    Recent Surgery: Procedure(s) (LRB):  LAPAROTOMY, EXPLORATORY (N/A)  INSERTION, GASTROSTOMY TUBE, PERCUTANEOUS (N/A)  CHOLECYSTECTOMY  EGD (ESOPHAGOGASTRODUODENOSCOPY) (N/A) 15 Days Post-Op    Plan:     During this hospitalization, patient to be seen 4 x/week to address the identified rehab impairments via gait training,therapeutic activities,therapeutic exercises,neuromuscular re-education and progress toward the following goals:    · Plan of Care Expires:  01/27/22    Subjective     RN notified prior to session.  present upon PT entrance into room.    Chief Complaint: Pt with no complaints  Patient/Family Comments/goals: get stronger  Pain/Comfort:  · Pain Rating 1: 0/10  · Pain Rating Post-Intervention 1: 0/10      Objective:     Additional staff present: OT for cotx due to pt's multiple medical comorbidities and functional deficits req'ing two skilled therapists to appropriately progress pt's musculoskeletal strength, neuromuscular control, and endurance while taking into consideration severe medical acuity in the ICU    Patient found HOB elevated with: telemetry,blood pressure cuff,pulse ox (continuous),Trialysis,wound vac,billingsley catheter,PEG Tube,REAGAN drain   Cognition:   · Alert and Cooperative  · AxOx4  · Command following: Follows multistep  "verbal commands  · Fluency: clear/fluent  General Precautions: Standard, Cardiac fall,aspiration   Orthopedic Precautions:N/A   Braces: N/A   Body mass index is 45.83 kg/m².  Oxygen Device: Room Air  Vitals: /64   Pulse 104   Temp 98.6 °F (37 °C) (Oral)   Resp (!) 31   Ht 5' 4" (1.626 m)   Wt 121.1 kg (267 lb)   SpO2 99%   BMI 45.83 kg/m²     Outcome Measures:  AM-PAC 6 CLICK MOBILITY  Turning over in bed (including adjusting bedclothes, sheets and blankets)?: 2  Sitting down on and standing up from a chair with arms (e.g., wheelchair, bedside commode, etc.): 1  Moving from lying on back to sitting on the side of the bed?: 2  Moving to and from a bed to a chair (including a wheelchair)?: 1  Need to walk in hospital room?: 1  Climbing 3-5 steps with a railing?: 1  Basic Mobility Total Score: 8     Functional Mobility:    Bed Mobility:   · Scooting to HOB via supine bridge: total assistance and 2 persons  · Supine to Sit: maximal assistance; from Rt side of bed  · Scooting anteriorly to EOB to have both feet planted on floor: moderate assistance  · Sit to Supine: maximal assistance; to Rt side of bed    Sitting Balance at Edge of Bed:   Static Sitting Balance: Fair : able to sit unsupported without balance loss and without UE support   Dynamic Sitting Balance: Poor+ : able to sit unsupported with minimal assistance and reach to ipsilateral side but unable to weight shift   Assistance Level Required: Contact Guard Assistance to Moderate Assistance   Time: 10 minutes   Postural deviations noted: slouched posture, rounded shoulders and forward head   Comments: Time EOB focused primarily on tolerance to upright positioning, cardiopulmonary tolerance to activity, and endurance of postural musculature to perform dynamic sitting to prepare for tasks in the home. Pt able to accept internal perturbations to balance while seated EOB with appropriate trunk response to remain upright with occasional LOB and no " UE support. Pt demo'ed increased difficulty maintaining balance while EOB when performing LE therex and decreasing VINCENZO while sitting EOB. Pt with posterior LOB and attempting to correct but required occasional Mod A due to postural weakness. While EOB pt performed LAQ, ankle pumps, bicep curls, arm raises 1 x 10 each to work on strength as well as improving activity tolerance. Pt with c/o dizziness while EOB but it did not resolve. Dizziness did not get worse per patient report.    Transfers/Gait: deferred 2/2 pt dizziness; pt agreeable to attempt sit <> stand next session even if dizziness EOB remains     Education:   Time provided for education, counseling and discussion of health disposition in regards to patient's current status   All questions answered within PT scope of practice and to patient's satisfaction   PT role in POC to address current functional deficits   Pt educated on proper body mechanics, safety techniques, and energy conservation with PT facilitation and cueing throughout session   Whiteboard updated with therapist name and pt's current mobility status documented above   Patient is appropriate for drawsheet transfer to/from cardiac chair with nursing staff    Patient left HOB elevated with all lines intact, call button in reach, RN notified and  present.    GOALS:   Multidisciplinary Problems     Physical Therapy Goals        Problem: Physical Therapy Goal    Goal Priority Disciplines Outcome Goal Variances Interventions   Physical Therapy Goal     PT, PT/OT Ongoing, Progressing     Description: Goals to be met by: 2022    Patient will increase functional independence with mobility by performin. Supine to sit with Moderate Assistance  2. Sit to stand transfer with Moderate Assistance using LRAD  3. Gait  x 10 feet with Moderate Assistance using LRAD  4. Sitting at edge of bed x10 minutes with Stand-by Assistance  5. Stand for 3 minutes with Moderate Assistance using  LRAD  6. Lower extremity exercise program x10 reps per handout, with independence                   Time Tracking:     PT Received On: 01/07/22  PT Start Time: 1030     PT Stop Time: 1056  PT Total Time (min): 26 min     Billable Minutes:   · Therapeutic Exercise 26 minutes    Treatment Type: Treatment  PT/PTA: PT       Kim Lee PT, DPT  1/7/2022  Pager: [phone]

## 2022-01-07 NOTE — SUBJECTIVE & OBJECTIVE
Interval History/Significant Events:   No acute events overnight.  Ct abd/pelvs showed no new fluid collections and improvement of draining pelvic abscess.  Her UO is improved to 15-20cc/hr.  She reports improved abdominal pain today.    Follow-up For: Procedure(s) (LRB):  LAPAROTOMY, EXPLORATORY (N/A)  INSERTION, GASTROSTOMY TUBE, PERCUTANEOUS (N/A)  CHOLECYSTECTOMY  EGD (ESOPHAGOGASTRODUODENOSCOPY) (N/A)    Post-Operative Day: 15 Days Post-Op    Objective:     Vital Signs (Most Recent):  Temp: 98.6 °F (37 °C) (01/07/22 0315)  Pulse: 106 (01/07/22 0630)  Resp: (!) 27 (01/07/22 0630)  BP: 125/77 (01/07/22 0630)  SpO2: 100 % (01/07/22 0630) Vital Signs (24h Range):  Temp:  [97.7 °F (36.5 °C)-99 °F (37.2 °C)] 98.6 °F (37 °C)  Pulse:  [] 106  Resp:  [17-37] 27  SpO2:  [90 %-100 %] 100 %  BP: ()/(50-79) 125/77     Weight: 121.1 kg (267 lb)  Body mass index is 45.83 kg/m².      Intake/Output Summary (Last 24 hours) at 1/7/2022 0639  Last data filed at 1/7/2022 0600  Gross per 24 hour   Intake 1039.09 ml   Output 1009 ml   Net 30.09 ml       Physical Exam  Vitals reviewed.   Constitutional:       General: She is not in acute distress.     Appearance: She is not ill-appearing.   HENT:      Head: Normocephalic and atraumatic.      Comments: Rt IJ CVC     Mouth/Throat:      Mouth: Mucous membranes are moist.   Eyes:      Extraocular Movements: Extraocular movements intact.      Conjunctiva/sclera: Conjunctivae normal.   Cardiovascular:      Rate and Rhythm: Regular rhythm. Tachycardia present.      Pulses: Normal pulses.      Heart sounds: Normal heart sounds. No murmur heard.      Pulmonary:      Effort: No respiratory distress.      Breath sounds: Normal breath sounds. No wheezing, rhonchi or rales.      Comments: Intermittent tachypnea  Abdominal:      General: There is no distension.      Palpations: Abdomen is soft.      Tenderness: There is abdominal tenderness (mild diffuse lower abdominal pain).       Comments: RUQ 2 drains in place with serosanguineous output  LLQ drain with brownish  Midline incision with wound vac in place and drain with serous output   Musculoskeletal:         General: No swelling.      Cervical back: Normal range of motion and neck supple.   Skin:     General: Skin is warm and dry.   Neurological:      General: No focal deficit present.      Mental Status: She is alert and oriented to person, place, and time.           Lines/Drains/Airways     Central Venous Catheter Line            Trialysis (Dialysis) Catheter 12/30/21 1000 right internal jugular 7 days          Drain                 Closed/Suction Drain 12/23/21 1428 Right;Inferior RUQ Bulb 19 Fr. 14 days         Closed/Suction Drain 12/23/21 1429 Right;Superior RUQ Bulb 19 Fr. 14 days         Gastrostomy/Enterostomy 12/23/21 1338 Gastrostomy tube w/ balloon LUQ feeding 14 days         Closed/Suction Drain 12/31/21 1559 Left Abdomen Bulb 14 Fr. 6 days         Urethral Catheter 01/06/22 1220 <1 day                Significant Labs:    CBC/Anemia Profile:  Recent Labs   Lab 01/05/22  2139 01/06/22  0338 01/07/22  0316   WBC  --  29.43* 24.36*   HGB  --  8.4* 8.2*   HCT 28* 26.7* 27.0*   PLT  --  555* 560*   MCV  --  92 97   RDW  --  16.0* 16.4*        Chemistries:  Recent Labs   Lab 01/06/22  0338 01/06/22  0338 01/06/22  1410 01/06/22  2205 01/07/22  0316   *   < > 134* 134* 132*   K 4.5   < > 4.6 4.5 4.5      < > 101 100 98   CO2 24   < > 23 24 23   BUN 38*   < > 39* 45* 47*   CREATININE 2.6*   < > 3.2* 3.4* 3.6*   CALCIUM 8.2*   < > 8.1* 8.1* 8.1*   ALBUMIN 1.7*   < > 1.6* 1.6* 1.6*   PROT 5.7*  --   --   --  5.6*   BILITOT 0.7  --   --   --  0.7   ALKPHOS 137*  --   --   --  126   ALT 24  --   --   --  18   AST 26  --   --   --  26   MG 2.0   < > 2.1 2.1 2.1   PHOS 3.4   < > 4.5 4.7* 4.7*    < > = values in this interval not displayed.       Significant Imaging:  I have reviewed all pertinent imaging results/findings within  the past 24 hours.

## 2022-01-07 NOTE — PROGRESS NOTES
"[facility]  General Surgery  Progress Note    Subjective:     History of Present Illness:  No notes on file    Post-Op Info:  Procedure(s) (LRB):  LAPAROTOMY, EXPLORATORY (N/A)  INSERTION, GASTROSTOMY TUBE, PERCUTANEOUS (N/A)  CHOLECYSTECTOMY  EGD (ESOPHAGOGASTRODUODENOSCOPY) (N/A)   15 Days Post-Op     Interval History:   Slowly decreasing leukocytosis   CT performed yesterday, decreased intra-abdominal fluid collections   Increasing UOP    Medications:  Continuous Infusions:   sodium chloride 0.9% Stopped (01/02/22 0558)     Scheduled Meds:   sodium chloride 0.9%   Intravenous Once    balsam peru-castor oiL   Topical (Top) BID    epoetin genie-epbx  50 Units/kg Intravenous Q7 Days    fluconazole  200 mg Per G Tube Daily    gabapentin  125 mg Per G Tube Q8H    heparin (porcine)  7,500 Units Subcutaneous Q8H    levalbuterol  0.63 mg Nebulization Q4H    meropenem (MERREM) IVPB  500 mg Intravenous Q12H    methocarbamoL  500 mg Per G Tube QID    nitroGLYCERIN 2% TD oint  0.5 inch Topical (Top) Q6H    pantoprazole  40 mg Per G Tube Daily    propranoloL  20 mg Per G Tube TID    scopolamine  1 patch Transdermal Q3 Days     PRN Meds:sodium chloride, dextrose 50%, heparin (porcine), HYDROmorphone, melatonin, ondansetron, oxyCODONE, oxyCODONE, potassium, sodium phosphates, senna-docusate 8.6-50 mg, sodium chloride 0.9%, sodium chloride 0.9%, sodium chloride 0.9%     Review of patient's allergies indicates:   Allergen Reactions    Tylox [oxycodone-acetaminophen]      "Jittery"     Objective:     Vital Signs (Most Recent):  Temp: 98.6 °F (37 °C) (01/07/22 0315)  Pulse: 103 (01/07/22 0700)  Resp: (!) 21 (01/07/22 0700)  BP: 118/67 (01/07/22 0700)  SpO2: 100 % (01/07/22 0700) Vital Signs (24h Range):  Temp:  [97.7 °F (36.5 °C)-99 °F (37.2 °C)] 98.6 °F (37 °C)  Pulse:  [] 103  Resp:  [17-37] 21  SpO2:  [90 %-100 %] 100 %  BP: ()/(50-79) 118/67     Weight: 121.1 kg (267 lb)  Body " mass index is 45.83 kg/m².    Intake/Output - Last 3 Shifts       01/05 0700  01/06 0659 01/06 0700 01/07 0659 01/07 0700 01/08 0659    P.O.  150     I.V. (mL/kg) 270.4 (2.2)      Other 500      NG/ 680     IV Piggyback 148.6 209.1     Total Intake(mL/kg) 1899 (15.7) 1039.1 (8.6)     Urine (mL/kg/hr) 0 (0) 440 (0.2)     Drains 255 219     Other 2576 350     Stool       Total Output 2831 1009     Net -932.1 +30.1            Urine Occurrence 1 x            Physical Exam  Vitals and nursing note reviewed.   Constitutional:       General: She is not in acute distress.     Appearance: She is obese. She is not diaphoretic.   HENT:      Head: Normocephalic and atraumatic.      Mouth/Throat:      Mouth: Mucous membranes are moist.      Pharynx: Oropharynx is clear.   Eyes:      Extraocular Movements: Extraocular movements intact.      Conjunctiva/sclera: Conjunctivae normal.   Cardiovascular:      Rate and Rhythm: Regular rhythm. Tachycardia present.   Pulmonary:      Effort: No respiratory distress.   Abdominal:      General: There is no distension.      Palpations: Abdomen is soft.      Tenderness: There is no guarding or rebound.      Comments: Wound vac in place with good seal, no leak noted.   RUQ REAGAN drains x2 with benign fluid, not bloody or bilious  LLQ IR drain with dark output   Musculoskeletal:         General: No deformity.   Skin:     General: Skin is warm and dry.   Neurological:      General: No focal deficit present.      Mental Status: She is alert and oriented to person, place, and time.         Significant Labs:  CBC:   Recent Labs   Lab 01/07/22 0316   WBC 24.36*   RBC 2.79*   HGB 8.2*   HCT 27.0*   *   MCV 97   MCH 29.4   MCHC 30.4*     CMP:   Recent Labs   Lab 01/07/22 0316      CALCIUM 8.1*   ALBUMIN 1.6*   PROT 5.6*   *   K 4.5   CO2 23   CL 98   BUN 47*   CREATININE 3.6*   ALKPHOS 126   ALT 18   AST 26   BILITOT 0.7       Significant Diagnostics:  I have reviewed all  pertinent imaging results/findings within the past 24 hours.    Assessment/Plan:     Subcapsular hematoma of liver  57yo female transfer from OSH after hysterectomy on 12/8 complicated by delayed recognition of small bowel injury requiring resection (in discontinuity) and at some point new bleed from the liver - transferred with open abdomen for higher level of care.  S/p ex-lap, liver packing 12/21  Open cholecystectomy, abdominal closure 12/23  IR drain placed into intra-abdominal abscess 12/31 - Cx growing e. Coli, enterococcus, and an unidentified GNR, f/u susceptibilities    - most critical part of care is PT/OT OOB at this point, severely debilitated   - Aggressive pulmonary hygiene, OOB to chair, PT/OT  - CT 12/31 with pelvic abscess s/p drain placement.   - Continue vanc, micheal; fluc added 01/04  - VAP; BAL 12/28 with klebsiella  - Pelvic abscess 12/31; bacteroides, klebsiella, e coli, enterococcus   - Appreciate ID assistance   - Wound vac change biweekly - last performed 1/3, due 1/06  - Recommend bowel reg  - Transfuse for Hgb <7  - Speech eval - advance to dental soft  - Continue REAGAN drains and IR drain; flush BID with 10 cc NS   - Strict I/Os    - Remainder of care per SICU, appreciate assistance         Elieser Toledo MD  General Surgery  [facility]

## 2022-01-07 NOTE — PT/OT/SLP PROGRESS
Occupational Therapy   Co-Treatment    Name: Chidi Castaneda  MRN: [id number]  1. Admitting Diagnosis:  Subcapsular hematoma of liver        15 Days Post-Op    Recommendations:     Discharge Recommendations: rehabilitation facility  Discharge Equipment Recommendations:   (TBD)  Barriers to discharge:  None    Assessment:     2. Chidi Castaneda is a 56 y.o. female with a medical diagnosis of Subcapsular hematoma of liver s/p evacuation 12/23/21.  She presents with increased EOB tolerance and ability to actively participate in self-care tasks. Pt sat EOB x 10 min c/o dizziness, but vss. Pt required Mod A for sitting balance during dynamic tasks. Static sitting fluctuated between CGA and SBA. Performance deficits affecting function are weakness,impaired functional mobilty,impaired cardiopulmonary response to activity,gait instability,impaired endurance,impaired balance,decreased upper extremity function,decreased lower extremity function,impaired self care skills,edema,decreased ROM,impaired fine motor. Pt appropriate for cotreat due to acuity and complexity of pt's medical status with 2 skilled disciplines needed to optimize pts functional performance in ICU setting.    Rehab Prognosis:  ; patient would benefit from acute skilled OT services to address these deficits and reach maximum level of function.       Plan:     Patient to be seen 4 x/week to address the above listed problems via self-care/home management,therapeutic activities,therapeutic exercises,neuromuscular re-education  · Plan of Care Expires: 02/02/22  · Plan of Care Reviewed with: spouse,patient    Subjective     Pain/Comfort:  · Pain Rating 1: 0/10  · Pain Rating Post-Intervention 1: 0/10    Objective:     Communicated with: RN prior to session.  Patient found supine with blood pressure cuff,pulse ox (continuous),telemetry,wound vac,PEG Tube,REAGAN drain,Trialysis upon OT entry to room.    General Precautions: Standard, fall,aspiration   Orthopedic  Precautions:N/A   Braces:    Respiratory Status: Room air     Occupational Performance:     Bed Mobility:    · Patient completed Rolling/Turning to Right with maximal assistance  · Patient completed Scooting/Bridging with moderate assistance to EOB  · Patient completed Supine to Sit with maximal assistance  · Patient completed Sit to Supine with maximal assistance     Functional Mobility/Transfers:  Deferred 2* dizziness; pt agreeable to work towards next session    Activities of Daily Living:  · Grooming: maximal assistance    · Lower Body Dressing: total assistance        AMPAC 6 Click ADL: 11    Treatment & Education:  Pt ed on OT POC  Pt ed on hand grasps above heart for edema management and completed 10 reps on each side  Pt ed on visual focus, pursed lip breathing and ankle pumps to help alleviate dizziness  Pt/spouse ed on continued ROM ex's 3x daily for increased overall strength and endurance    Patient left supine with all lines intact, call button in reach, RN notified and spouse presentEducation:      GOALS:   Multidisciplinary Problems     Occupational Therapy Goals        Problem: Occupational Therapy Goal    Goal Priority Disciplines Outcome Interventions   Occupational Therapy Goal     OT, PT/OT Ongoing, Progressing    Description: Goals to be met by: 1/9/2022 (1 week)     Patient will increase functional independence with ADLs by performing:    UE Dressing with Maximum Assistance.  Grooming while seated with Moderate Assistance.  Toileting from bedside commode with Moderate Assistance for hygiene and clothing management.   Rolling to Bilateral with Moderate Assistance.   Supine to sit with Maximum Assistance.  Step transfer with Moderate Assistance  Toilet transfer to bedside commode with Moderate Assistance.                     Time Tracking:     OT Date of Treatment: 01/07/22  OT Start Time: 1030  OT Stop Time: 1056  OT Total Time (min): 26 min    Billable Minutes:Therapeutic Exercise 26                1/7/2022

## 2022-01-07 NOTE — SUBJECTIVE & OBJECTIVE
"Interval History:   Slowly decreasing leukocytosis   CT performed yesterday, decreased intra-abdominal fluid collections   Increasing UOP    Medications:  Continuous Infusions:   sodium chloride 0.9% Stopped (01/02/22 0558)     Scheduled Meds:   sodium chloride 0.9%   Intravenous Once    balsam peru-castor oiL   Topical (Top) BID    epoetin genie-epbx  50 Units/kg Intravenous Q7 Days    fluconazole  200 mg Per G Tube Daily    gabapentin  125 mg Per G Tube Q8H    heparin (porcine)  7,500 Units Subcutaneous Q8H    levalbuterol  0.63 mg Nebulization Q4H    meropenem (MERREM) IVPB  500 mg Intravenous Q12H    methocarbamoL  500 mg Per G Tube QID    nitroGLYCERIN 2% TD oint  0.5 inch Topical (Top) Q6H    pantoprazole  40 mg Per G Tube Daily    propranoloL  20 mg Per G Tube TID    scopolamine  1 patch Transdermal Q3 Days     PRN Meds:sodium chloride, dextrose 50%, heparin (porcine), HYDROmorphone, melatonin, ondansetron, oxyCODONE, oxyCODONE, potassium, sodium phosphates, senna-docusate 8.6-50 mg, sodium chloride 0.9%, sodium chloride 0.9%, sodium chloride 0.9%     Review of patient's allergies indicates:   Allergen Reactions    Tylox [oxycodone-acetaminophen]      "Jittery"     Objective:     Vital Signs (Most Recent):  Temp: 98.6 °F (37 °C) (01/07/22 0315)  Pulse: 103 (01/07/22 0700)  Resp: (!) 21 (01/07/22 0700)  BP: 118/67 (01/07/22 0700)  SpO2: 100 % (01/07/22 0700) Vital Signs (24h Range):  Temp:  [97.7 °F (36.5 °C)-99 °F (37.2 °C)] 98.6 °F (37 °C)  Pulse:  [] 103  Resp:  [17-37] 21  SpO2:  [90 %-100 %] 100 %  BP: ()/(50-79) 118/67     Weight: 121.1 kg (267 lb)  Body mass index is 45.83 kg/m².    Intake/Output - Last 3 Shifts       01/05 0700 01/06 0659 01/06 0700 01/07 0659 01/07 0700  01/08 0659    P.O.  150     I.V. (mL/kg) 270.4 (2.2)      Other 500      NG/ 680     IV Piggyback 148.6 209.1     Total Intake(mL/kg) 1899 (15.7) 1039.1 (8.6)     Urine (mL/kg/hr) 0 (0) 440 (0.2)     " Drains 255 219     Other 2576 350     Stool       Total Output 2831 1009     Net -932.1 +30.1            Urine Occurrence 1 x            Physical Exam  Vitals and nursing note reviewed.   Constitutional:       General: She is not in acute distress.     Appearance: She is obese. She is not diaphoretic.   HENT:      Head: Normocephalic and atraumatic.      Mouth/Throat:      Mouth: Mucous membranes are moist.      Pharynx: Oropharynx is clear.   Eyes:      Extraocular Movements: Extraocular movements intact.      Conjunctiva/sclera: Conjunctivae normal.   Cardiovascular:      Rate and Rhythm: Regular rhythm. Tachycardia present.   Pulmonary:      Effort: No respiratory distress.   Abdominal:      General: There is no distension.      Palpations: Abdomen is soft.      Tenderness: There is no guarding or rebound.      Comments: Wound vac in place with good seal, no leak noted.   RUQ REAGAN drains x2 with benign fluid, not bloody or bilious  LLQ IR drain with dark output   Musculoskeletal:         General: No deformity.   Skin:     General: Skin is warm and dry.   Neurological:      General: No focal deficit present.      Mental Status: She is alert and oriented to person, place, and time.         Significant Labs:  CBC:   Recent Labs   Lab 01/07/22 0316   WBC 24.36*   RBC 2.79*   HGB 8.2*   HCT 27.0*   *   MCV 97   MCH 29.4   MCHC 30.4*     CMP:   Recent Labs   Lab 01/07/22 0316      CALCIUM 8.1*   ALBUMIN 1.6*   PROT 5.6*   *   K 4.5   CO2 23   CL 98   BUN 47*   CREATININE 3.6*   ALKPHOS 126   ALT 18   AST 26   BILITOT 0.7       Significant Diagnostics:  I have reviewed all pertinent imaging results/findings within the past 24 hours.

## 2022-01-07 NOTE — PLAN OF CARE
9:47 AM    The SW met with the patient, the patient's  andMeet from [facility] at bedside to discuss discharge placement.  Mr. Dsouza reported that the patient has been accepted into [facility] and he had submitted the auth for services to her insurance company.  All questions were answered in regards to the patient's discharge placement for rehab. SW faxed updated clinicals to [facility] via [facility] for review.  The patient has been accepted pending medical stability. The SW will continue to follow.           Philip Borges LMSW  [facility]  Ext: [phone]     Bed: 08  Expected date:   Expected time:   Means of arrival:   Comments:
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
## 2022-01-07 NOTE — PLAN OF CARE
No acute events today. All VS stable. Patient on room air. No continuous infusions. CT of abdomen and pelvis done today. Patient tube feeds restarted and tolerating well. Diet reordered. Pain well controlled with PRN pain medication. Ng placed today for close I/O monitoring. U/O 10-15cc/hr. Plan of care reviewed with patient and .

## 2022-01-07 NOTE — ASSESSMENT & PLAN NOTE
-oliguric SUSAN, ischemic ATN with multifactorial etiology, however most likely d/t severe hypotension as a result of septic shock 2/2 small bowel perforation and bacteremia   -BL sCr 1  -KRT started at OSH 12/12/2021 for acidosis and volume overload  -started to urinate  -on RA with net even fluid balance  -agree with furosemide bolus 200, if urine output improves will likely transition to furosemide drip  -HD today for clearance

## 2022-01-07 NOTE — ASSESSMENT & PLAN NOTE
57yo female transfer from OSH after hysterectomy on 12/8 complicated by delayed recognition of small bowel injury requiring resection (in discontinuity) and at some point new bleed from the liver - transferred with open abdomen for higher level of care.  S/p ex-lap, liver packing 12/21  Open cholecystectomy, abdominal closure 12/23  IR drain placed into intra-abdominal abscess 12/31 - Cx growing e. Coli, enterococcus, and an unidentified GNR, f/u susceptibilities    - most critical part of care is PT/OT OOB at this point, severely debilitated   - Aggressive pulmonary hygiene, OOB to chair, PT/OT  - CT 12/31 with pelvic abscess s/p drain placement.   - Continue vanc, micheal; fluc added 01/04  - VAP; BAL 12/28 with klebsiella  - Pelvic abscess 12/31; bacteroides, klebsiella, e coli, enterococcus   - Appreciate ID assistance   - Wound vac change biweekly - last performed 1/3, due 1/06  - Recommend bowel reg  - Transfuse for Hgb <7  - Speech eval - advance to dental soft  - Continue REAGAN drains and IR drain; flush BID with 10 cc NS   - Strict I/Os    - Remainder of care per SICU, appreciate assistance

## 2022-01-08 LAB
ALBUMIN SERPL BCP-MCNC: 1.6 G/DL (ref 3.5–5.2)
ALP SERPL-CCNC: 111 U/L (ref 55–135)
ALT SERPL W/O P-5'-P-CCNC: 22 U/L (ref 10–44)
ANION GAP SERPL CALC-SCNC: 10 MMOL/L (ref 8–16)
ANION GAP SERPL CALC-SCNC: 7 MMOL/L (ref 8–16)
ANION GAP SERPL CALC-SCNC: 8 MMOL/L (ref 8–16)
ANISOCYTOSIS BLD QL SMEAR: SLIGHT
AST SERPL-CCNC: 32 U/L (ref 10–40)
BACTERIA #/AREA URNS AUTO: ABNORMAL /HPF
BASO STIPL BLD QL SMEAR: ABNORMAL
BASOPHILS # BLD AUTO: ABNORMAL K/UL (ref 0–0.2)
BASOPHILS NFR BLD: 0 % (ref 0–1.9)
BILIRUB SERPL-MCNC: 0.7 MG/DL (ref 0.1–1)
BILIRUB UR QL STRIP: NEGATIVE
BUN SERPL-MCNC: 28 MG/DL (ref 6–20)
BUN SERPL-MCNC: 32 MG/DL (ref 6–20)
BUN SERPL-MCNC: 34 MG/DL (ref 6–20)
CA-I BLDV-SCNC: 1.11 MMOL/L (ref 1.06–1.42)
CALCIUM SERPL-MCNC: 8.4 MG/DL (ref 8.7–10.5)
CHLORIDE SERPL-SCNC: 98 MMOL/L (ref 95–110)
CHLORIDE SERPL-SCNC: 99 MMOL/L (ref 95–110)
CHLORIDE SERPL-SCNC: 99 MMOL/L (ref 95–110)
CLARITY UR REFRACT.AUTO: ABNORMAL
CO2 SERPL-SCNC: 24 MMOL/L (ref 23–29)
CO2 SERPL-SCNC: 27 MMOL/L (ref 23–29)
CO2 SERPL-SCNC: 28 MMOL/L (ref 23–29)
COLOR UR AUTO: ABNORMAL
CREAT SERPL-MCNC: 2.4 MG/DL (ref 0.5–1.4)
CREAT SERPL-MCNC: 2.8 MG/DL (ref 0.5–1.4)
CREAT SERPL-MCNC: 2.9 MG/DL (ref 0.5–1.4)
DIFFERENTIAL METHOD: ABNORMAL
DOHLE BOD BLD QL SMEAR: PRESENT
EOSINOPHIL # BLD AUTO: ABNORMAL K/UL (ref 0–0.5)
EOSINOPHIL NFR BLD: 1 % (ref 0–8)
ERYTHROCYTE [DISTWIDTH] IN BLOOD BY AUTOMATED COUNT: 16.6 % (ref 11.5–14.5)
EST. GFR  (AFRICAN AMERICAN): 20.4 ML/MIN/1.73 M^2
EST. GFR  (AFRICAN AMERICAN): 21.3 ML/MIN/1.73 M^2
EST. GFR  (AFRICAN AMERICAN): 25.6 ML/MIN/1.73 M^2
EST. GFR  (NON AFRICAN AMERICAN): 17.7 ML/MIN/1.73 M^2
EST. GFR  (NON AFRICAN AMERICAN): 18.4 ML/MIN/1.73 M^2
EST. GFR  (NON AFRICAN AMERICAN): 22.2 ML/MIN/1.73 M^2
GIANT PLATELETS BLD QL SMEAR: PRESENT
GLUCOSE SERPL-MCNC: 101 MG/DL (ref 70–110)
GLUCOSE SERPL-MCNC: 91 MG/DL (ref 70–110)
GLUCOSE SERPL-MCNC: 95 MG/DL (ref 70–110)
GLUCOSE UR QL STRIP: NEGATIVE
HCT VFR BLD AUTO: 25.3 % (ref 37–48.5)
HGB BLD-MCNC: 7.8 G/DL (ref 12–16)
HGB UR QL STRIP: ABNORMAL
HYALINE CASTS UR QL AUTO: 5 /LPF
HYPOCHROMIA BLD QL SMEAR: ABNORMAL
IMM GRANULOCYTES # BLD AUTO: ABNORMAL K/UL (ref 0–0.04)
IMM GRANULOCYTES NFR BLD AUTO: ABNORMAL % (ref 0–0.5)
KETONES UR QL STRIP: NEGATIVE
LEUKOCYTE ESTERASE UR QL STRIP: ABNORMAL
LYMPHOCYTES # BLD AUTO: ABNORMAL K/UL (ref 1–4.8)
LYMPHOCYTES NFR BLD: 9 % (ref 18–48)
MAGNESIUM SERPL-MCNC: 2 MG/DL (ref 1.6–2.6)
MAGNESIUM SERPL-MCNC: 2.1 MG/DL (ref 1.6–2.6)
MAGNESIUM SERPL-MCNC: 2.2 MG/DL (ref 1.6–2.6)
MCH RBC QN AUTO: 28.9 PG (ref 27–31)
MCHC RBC AUTO-ENTMCNC: 30.8 G/DL (ref 32–36)
MCV RBC AUTO: 94 FL (ref 82–98)
METAMYELOCYTES NFR BLD MANUAL: 4 %
MICROSCOPIC COMMENT: ABNORMAL
MONOCYTES # BLD AUTO: ABNORMAL K/UL (ref 0.3–1)
MONOCYTES NFR BLD: 7 % (ref 4–15)
MYELOCYTES NFR BLD MANUAL: 2 %
NEUTROPHILS NFR BLD: 73 % (ref 38–73)
NEUTS BAND NFR BLD MANUAL: 4 %
NITRITE UR QL STRIP: NEGATIVE
NRBC BLD-RTO: 0 /100 WBC
OVALOCYTES BLD QL SMEAR: ABNORMAL
PH UR STRIP: 5 [PH] (ref 5–8)
PHOSPHATE SERPL-MCNC: 4.1 MG/DL (ref 2.7–4.5)
PHOSPHATE SERPL-MCNC: 4.9 MG/DL (ref 2.7–4.5)
PHOSPHATE SERPL-MCNC: 4.9 MG/DL (ref 2.7–4.5)
PHOSPHATE SERPL-MCNC: 5.1 MG/DL (ref 2.7–4.5)
PHOSPHATE SERPL-MCNC: 5.1 MG/DL (ref 2.7–4.5)
PLATELET # BLD AUTO: 555 K/UL (ref 150–450)
PLATELET BLD QL SMEAR: ABNORMAL
PMV BLD AUTO: 10.3 FL (ref 9.2–12.9)
POIKILOCYTOSIS BLD QL SMEAR: SLIGHT
POLYCHROMASIA BLD QL SMEAR: ABNORMAL
POTASSIUM SERPL-SCNC: 3.7 MMOL/L (ref 3.5–5.1)
POTASSIUM SERPL-SCNC: 3.8 MMOL/L (ref 3.5–5.1)
POTASSIUM SERPL-SCNC: 3.8 MMOL/L (ref 3.5–5.1)
PROT SERPL-MCNC: 6 G/DL (ref 6–8.4)
PROT UR QL STRIP: ABNORMAL
RBC # BLD AUTO: 2.7 M/UL (ref 4–5.4)
RBC #/AREA URNS AUTO: 10 /HPF (ref 0–4)
SODIUM SERPL-SCNC: 133 MMOL/L (ref 136–145)
SODIUM SERPL-SCNC: 133 MMOL/L (ref 136–145)
SODIUM SERPL-SCNC: 134 MMOL/L (ref 136–145)
SP GR UR STRIP: 1.02 (ref 1–1.03)
SQUAMOUS #/AREA URNS AUTO: 1 /HPF
URN SPEC COLLECT METH UR: ABNORMAL
WBC # BLD AUTO: 21.27 K/UL (ref 3.9–12.7)
WBC #/AREA URNS AUTO: 35 /HPF (ref 0–5)

## 2022-01-08 PROCEDURE — 85007 BL SMEAR W/DIFF WBC COUNT: CPT | Performed by: STUDENT IN AN ORGANIZED HEALTH CARE EDUCATION/TRAINING PROGRAM

## 2022-01-08 PROCEDURE — 94640 AIRWAY INHALATION TREATMENT: CPT

## 2022-01-08 PROCEDURE — 99291 CRITICAL CARE FIRST HOUR: CPT | Mod: ,,, | Performed by: STUDENT IN AN ORGANIZED HEALTH CARE EDUCATION/TRAINING PROGRAM

## 2022-01-08 PROCEDURE — 94664 DEMO&/EVAL PT USE INHALER: CPT

## 2022-01-08 PROCEDURE — 84100 ASSAY OF PHOSPHORUS: CPT | Performed by: SURGERY

## 2022-01-08 PROCEDURE — 25000003 PHARM REV CODE 250: Performed by: SURGERY

## 2022-01-08 PROCEDURE — 63600175 PHARM REV CODE 636 W HCPCS: Performed by: STUDENT IN AN ORGANIZED HEALTH CARE EDUCATION/TRAINING PROGRAM

## 2022-01-08 PROCEDURE — 99900035 HC TECH TIME PER 15 MIN (STAT)

## 2022-01-08 PROCEDURE — 94761 N-INVAS EAR/PLS OXIMETRY MLT: CPT

## 2022-01-08 PROCEDURE — 63600175 PHARM REV CODE 636 W HCPCS: Performed by: SURGERY

## 2022-01-08 PROCEDURE — 20000000 HC ICU ROOM

## 2022-01-08 PROCEDURE — 25000003 PHARM REV CODE 250

## 2022-01-08 PROCEDURE — 25000003 PHARM REV CODE 250: Performed by: STUDENT IN AN ORGANIZED HEALTH CARE EDUCATION/TRAINING PROGRAM

## 2022-01-08 PROCEDURE — 99291 PR CRITICAL CARE, E/M 30-74 MINUTES: ICD-10-PCS | Mod: ,,, | Performed by: STUDENT IN AN ORGANIZED HEALTH CARE EDUCATION/TRAINING PROGRAM

## 2022-01-08 PROCEDURE — 82330 ASSAY OF CALCIUM: CPT | Performed by: SURGERY

## 2022-01-08 PROCEDURE — 83735 ASSAY OF MAGNESIUM: CPT | Performed by: SURGERY

## 2022-01-08 PROCEDURE — 94799 UNLISTED PULMONARY SVC/PX: CPT

## 2022-01-08 PROCEDURE — 27000646 HC AEROBIKA DEVICE

## 2022-01-08 PROCEDURE — 94760 N-INVAS EAR/PLS OXIMETRY 1: CPT

## 2022-01-08 PROCEDURE — 80053 COMPREHEN METABOLIC PANEL: CPT | Performed by: SURGERY

## 2022-01-08 PROCEDURE — 27200966 HC CLOSED SUCTION SYSTEM

## 2022-01-08 PROCEDURE — 85027 COMPLETE CBC AUTOMATED: CPT | Performed by: STUDENT IN AN ORGANIZED HEALTH CARE EDUCATION/TRAINING PROGRAM

## 2022-01-08 PROCEDURE — 80069 RENAL FUNCTION PANEL: CPT | Mod: 91 | Performed by: SURGERY

## 2022-01-08 PROCEDURE — 25000242 PHARM REV CODE 250 ALT 637 W/ HCPCS: Performed by: SURGERY

## 2022-01-08 PROCEDURE — 25000242 PHARM REV CODE 250 ALT 637 W/ HCPCS

## 2022-01-08 RX ORDER — FUROSEMIDE 10 MG/ML
100 INJECTION INTRAMUSCULAR; INTRAVENOUS ONCE
Status: DISCONTINUED | OUTPATIENT
Start: 2022-01-09 | End: 2022-01-08

## 2022-01-08 RX ADMIN — OXYCODONE HYDROCHLORIDE 5 MG: 5 SOLUTION ORAL at 01:01

## 2022-01-08 RX ADMIN — MEROPENEM AND SODIUM CHLORIDE 500 MG: 500 INJECTION, SOLUTION INTRAVENOUS at 09:01

## 2022-01-08 RX ADMIN — HEPARIN SODIUM 7500 UNITS: 5000 INJECTION INTRAVENOUS; SUBCUTANEOUS at 09:01

## 2022-01-08 RX ADMIN — GABAPENTIN 125 MG: 250 SOLUTION ORAL at 05:01

## 2022-01-08 RX ADMIN — NITROGLYCERIN 0.5 INCH: 20 OINTMENT TOPICAL at 05:01

## 2022-01-08 RX ADMIN — METHOCARBAMOL 500 MG: 500 TABLET ORAL at 09:01

## 2022-01-08 RX ADMIN — LEVALBUTEROL HYDROCHLORIDE 0.63 MG: 0.63 SOLUTION RESPIRATORY (INHALATION) at 08:01

## 2022-01-08 RX ADMIN — HEPARIN SODIUM 7500 UNITS: 5000 INJECTION INTRAVENOUS; SUBCUTANEOUS at 05:01

## 2022-01-08 RX ADMIN — Medication: at 09:01

## 2022-01-08 RX ADMIN — METHOCARBAMOL 500 MG: 500 TABLET ORAL at 05:01

## 2022-01-08 RX ADMIN — OXYCODONE HYDROCHLORIDE 10 MG: 5 SOLUTION ORAL at 09:01

## 2022-01-08 RX ADMIN — LEVALBUTEROL HYDROCHLORIDE 0.63 MG: 0.63 SOLUTION RESPIRATORY (INHALATION) at 04:01

## 2022-01-08 RX ADMIN — LEVALBUTEROL HYDROCHLORIDE 0.63 MG: 0.63 SOLUTION RESPIRATORY (INHALATION) at 07:01

## 2022-01-08 RX ADMIN — PANTOPRAZOLE SODIUM 40 MG: 40 GRANULE, DELAYED RELEASE ORAL at 09:01

## 2022-01-08 RX ADMIN — NITROGLYCERIN 0.5 INCH: 20 OINTMENT TOPICAL at 12:01

## 2022-01-08 RX ADMIN — GABAPENTIN 125 MG: 250 SOLUTION ORAL at 09:01

## 2022-01-08 RX ADMIN — PROPRANOLOL HYDROCHLORIDE 20 MG: 20 TABLET ORAL at 09:01

## 2022-01-08 RX ADMIN — LEVALBUTEROL HYDROCHLORIDE 0.63 MG: 0.63 SOLUTION RESPIRATORY (INHALATION) at 11:01

## 2022-01-08 RX ADMIN — METHOCARBAMOL 500 MG: 500 TABLET ORAL at 02:01

## 2022-01-08 RX ADMIN — OXYCODONE HYDROCHLORIDE 5 MG: 5 SOLUTION ORAL at 07:01

## 2022-01-08 RX ADMIN — Medication 5 MG: at 09:01

## 2022-01-08 RX ADMIN — FUROSEMIDE 100 MG: 10 INJECTION, SOLUTION INTRAMUSCULAR; INTRAVENOUS at 12:01

## 2022-01-08 RX ADMIN — FLUCONAZOLE 200 MG: 200 TABLET ORAL at 09:01

## 2022-01-08 RX ADMIN — GABAPENTIN 125 MG: 250 SOLUTION ORAL at 01:01

## 2022-01-08 RX ADMIN — HEPARIN SODIUM 7500 UNITS: 5000 INJECTION INTRAVENOUS; SUBCUTANEOUS at 01:01

## 2022-01-08 RX ADMIN — PROPRANOLOL HYDROCHLORIDE 20 MG: 20 TABLET ORAL at 02:01
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
## 2022-01-08 NOTE — SUBJECTIVE & OBJECTIVE
"Interval History: No acute events. AF with borderline tachycardia and HDS. OOB x3 hrs yesterday and ate about 75% of dinner (salad). Tolerating tube feeds at 40 mL/hr. REAGAN drains with scant thin output.     Medications:  Continuous Infusions:   sodium chloride 0.9% Stopped (01/08/22 0200)     Scheduled Meds:   balsam peru-castor oiL   Topical (Top) BID    epoetin genie-epbx  50 Units/kg Intravenous Q7 Days    fluconazole  200 mg Per G Tube Daily    gabapentin  125 mg Per G Tube Q8H    heparin (porcine)  7,500 Units Subcutaneous Q8H    levalbuterol  0.63 mg Nebulization Q4H    meropenem (MERREM) IVPB  500 mg Intravenous Q12H    methocarbamoL  500 mg Per G Tube QID    nitroGLYCERIN 2% TD oint  0.5 inch Topical (Top) Q6H    pantoprazole  40 mg Per G Tube Daily    propranoloL  20 mg Per G Tube TID    scopolamine  1 patch Transdermal Q3 Days     PRN Meds:dextrose 50%, heparin (porcine), heparin (porcine), HYDROmorphone, melatonin, ondansetron, oxyCODONE, oxyCODONE, potassium, sodium phosphates, senna-docusate 8.6-50 mg, sodium chloride 0.9%     Review of patient's allergies indicates:   Allergen Reactions    Tylox [oxycodone-acetaminophen]      "Jittery"     Objective:     Vital Signs (Most Recent):  Temp: 99.1 °F (37.3 °C) (01/08/22 0305)  Pulse: 97 (01/08/22 0700)  Resp: (!) 21 (01/08/22 0700)  BP: 117/69 (01/08/22 0700)  SpO2: 98 % (01/08/22 0700) Vital Signs (24h Range):  Temp:  [98.4 °F (36.9 °C)-99.1 °F (37.3 °C)] 99.1 °F (37.3 °C)  Pulse:  [] 97  Resp:  [13-35] 21  SpO2:  [93 %-100 %] 98 %  BP: ()/(51-76) 117/69     Weight: 121.1 kg (267 lb)  Body mass index is 45.83 kg/m².    Intake/Output - Last 3 Shifts       01/06 0700  01/07 0659 01/07 0700 01/08 0659 01/08 0700 01/09 0659    P.O. 150      I.V. (mL/kg)  41.5 (0.3)     Other  360     NG/ 720     IV Piggyback 209.1 190.1     Total Intake(mL/kg) 1039.1 (8.6) 1311.6 (10.8)     Urine (mL/kg/hr) 440 (0.2) 855 (0.3)     Drains 219 195 "     Other 350 550     Total Output 1009 1600     Net +30.1 -288.5                  Physical Exam  Vitals and nursing note reviewed.   Constitutional:       General: She is not in acute distress.     Appearance: She is obese. She is not diaphoretic.   HENT:      Head: Normocephalic and atraumatic.      Mouth/Throat:      Mouth: Mucous membranes are moist.      Pharynx: Oropharynx is clear.   Eyes:      Extraocular Movements: Extraocular movements intact.      Conjunctiva/sclera: Conjunctivae normal.   Cardiovascular:      Rate and Rhythm: Regular rhythm. Tachycardia present.   Pulmonary:      Effort: No respiratory distress.   Abdominal:      General: There is no distension.      Palpations: Abdomen is soft.      Tenderness: There is no guarding or rebound.      Comments: Wound vac in place with good seal, no leak noted.   RUQ REAGAN drains x2 with benign fluid, not bloody or bilious  LLQ IR drain with dark output   Musculoskeletal:         General: No deformity.   Skin:     General: Skin is warm and dry.   Neurological:      General: No focal deficit present.      Mental Status: She is alert and oriented to person, place, and time.         Significant Labs:  I have reviewed all pertinent lab results within the past 24 hours.  CBC:   Recent Labs   Lab 01/08/22  0310   WBC 21.27*   RBC 2.70*   HGB 7.8*   HCT 25.3*   *   MCV 94   MCH 28.9   MCHC 30.8*     BMP:   Recent Labs   Lab 01/08/22  0310   GLU 91   *   K 3.8   CL 99   CO2 27   BUN 28*   CREATININE 2.4*   CALCIUM 8.4*   MG 2.0       Significant Diagnostics:  I have reviewed all pertinent imaging results/findings within the past 24 hours.

## 2022-01-08 NOTE — SUBJECTIVE & OBJECTIVE
Interval History/Significant Events:   - Patient received benadryl for pruritis  - Received phenergan and tube feeds stopped overnight for nausea/dry heaving  - UOp: 15-20cc/hr after Lasix challenge    Follow-up For: Procedure(s) (LRB):  LAPAROTOMY, EXPLORATORY (N/A)  INSERTION, GASTROSTOMY TUBE, PERCUTANEOUS (N/A)  CHOLECYSTECTOMY  EGD (ESOPHAGOGASTRODUODENOSCOPY) (N/A)    Post-Operative Day: 16 Days Post-Op    Objective:     Vital Signs (Most Recent):  Temp: 99.1 °F (37.3 °C) (01/08/22 0305)  Pulse: 101 (01/08/22 0530)  Resp: (!) 23 (01/08/22 0530)  BP: 114/68 (01/08/22 0530)  SpO2: 99 % (01/08/22 0530) Vital Signs (24h Range):  Temp:  [98.4 °F (36.9 °C)-99.1 °F (37.3 °C)] 99.1 °F (37.3 °C)  Pulse:  [] 101  Resp:  [13-35] 23  SpO2:  [93 %-100 %] 99 %  BP: ()/(51-77) 114/68     Weight: 121.1 kg (267 lb)  Body mass index is 45.83 kg/m².      Intake/Output Summary (Last 24 hours) at 1/8/2022 0601  Last data filed at 1/8/2022 0500  Gross per 24 hour   Intake 1271.55 ml   Output 1570 ml   Net -298.45 ml       Physical Exam  Vitals reviewed.   Constitutional:       General: She is not in acute distress.     Appearance: She is not ill-appearing.   HENT:      Head: Normocephalic and atraumatic.      Comments: Rt IJ CVC     Mouth/Throat:      Mouth: Mucous membranes are moist.   Eyes:      Extraocular Movements: Extraocular movements intact.      Conjunctiva/sclera: Conjunctivae normal.   Cardiovascular:      Rate and Rhythm: Regular rhythm. Tachycardia present.      Pulses: Normal pulses.      Heart sounds: Normal heart sounds. No murmur heard.      Pulmonary:      Effort: No respiratory distress.      Breath sounds: Normal breath sounds. No wheezing, rhonchi or rales.      Comments: Intermittent tachypnea  Abdominal:      General: There is no distension.      Palpations: Abdomen is soft.      Tenderness: There is abdominal tenderness (mild diffuse lower abdominal pain).      Comments: RUQ 2 drains in place  with serosanguineous output  LLQ drain with brown fluid  Midline incision with wound vac in place and drain with serous output   Musculoskeletal:         General: No swelling.      Cervical back: Normal range of motion and neck supple.   Skin:     General: Skin is warm and dry.      Coloration: Skin is not jaundiced.   Neurological:      General: No focal deficit present.      Mental Status: She is alert and oriented to person, place, and time.         Vents:  Vent Mode: Spont (12/31/21 2000)  Ventilator Initiated: Yes (12/28/21 1104)  Set Rate: 18 BPM (12/31/21 0400)  Vt Set: 320 mL (12/31/21 0400)  Pressure Support: 60 cmH20 (12/31/21 0115)  PEEP/CPAP: 5 cmH20 (12/31/21 2000)  Oxygen Concentration (%): 28 (01/04/22 0313)  Peak Airway Pressure: 24 cmH2O (12/31/21 2000)  Plateau Pressure: 31 cmH20 (12/31/21 2000)  Total Ve: 9.78 mL (12/31/21 2000)  Negative Inspiratory Force (cm H2O): -30 (12/31/21 2030)  F/VT Ratio<105 (RSBI): (!) 47.52 (12/31/21 2000)    Lines/Drains/Airways     Central Venous Catheter Line            Trialysis (Dialysis) Catheter 12/30/21 1000 right internal jugular 8 days          Drain                 Closed/Suction Drain 12/23/21 1428 Right;Inferior RUQ Bulb 19 Fr. 15 days         Closed/Suction Drain 12/23/21 1429 Right;Superior RUQ Bulb 19 Fr. 15 days         Gastrostomy/Enterostomy 12/23/21 1338 Gastrostomy tube w/ balloon LUQ feeding 15 days         Closed/Suction Drain 12/31/21 1559 Left Abdomen Bulb 14 Fr. 7 days         Urethral Catheter 01/06/22 1220 1 day                Significant Labs:    CBC/Anemia Profile:  Recent Labs   Lab 01/07/22 0316 01/08/22 0310   WBC 24.36* 21.27*   HGB 8.2* 7.8*   HCT 27.0* 25.3*   * 555*   MCV 97 94   RDW 16.4* 16.6*        Chemistries:  Recent Labs   Lab 01/06/22 2205 01/06/22 2205 01/07/22 0316 01/07/22  1403 01/08/22 0310   *   < > 132* 133* 134*   K 4.5   < > 4.5 4.3 3.8      < > 98 97 99   CO2 24   < > 23 24 27   BUN 45*   <  > 47* 54* 28*   CREATININE 3.4*   < > 3.6* 3.7* 2.4*   CALCIUM 8.1*   < > 8.1* 8.4* 8.4*   ALBUMIN 1.6*   < > 1.6* 1.7* 1.6*   PROT  --   --  5.6*  --  6.0   BILITOT  --   --  0.7  --  0.7   ALKPHOS  --   --  126  --  111   ALT  --   --  18  --  22   AST  --   --  26  --  32   MG 2.1  --  2.1  --  2.0   PHOS 4.7*   < > 4.7* 5.0* 4.1    < > = values in this interval not displayed.       CMP:   Recent Labs   Lab 01/07/22  0316 01/07/22  1403 01/08/22  0310   * 133* 134*   K 4.5 4.3 3.8   CL 98 97 99   CO2 23 24 27    98 91   BUN 47* 54* 28*   CREATININE 3.6* 3.7* 2.4*   CALCIUM 8.1* 8.4* 8.4*   PROT 5.6*  --  6.0   ALBUMIN 1.6* 1.7* 1.6*   BILITOT 0.7  --  0.7   ALKPHOS 126  --  111   AST 26  --  32   ALT 18  --  22   ANIONGAP 11 12 8   EGFRNONAA 13.6* 13.2* 22.2*     All pertinent labs within the past 24 hours have been reviewed.    Significant Imaging:  I have reviewed all pertinent imaging results/findings within the past 24 hours.  CT: I have reviewed all pertinent results/findings within the past 24 hours and my personal findings are: no significant intra-abdominal fluid collections that need to be drained.
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
## 2022-01-08 NOTE — ASSESSMENT & PLAN NOTE
55yo female transfer from OSH after hysterectomy on 12/8 complicated by delayed recognition of small bowel injury requiring resection (in discontinuity) and at some point new bleed from the liver - transferred with open abdomen for higher level of care.  S/p ex-lap, liver packing 12/21  Open cholecystectomy, abdominal closure 12/23  IR drain placed into intra-abdominal abscess 12/31 - Cx growing e. Coli, enterococcus, and an unidentified GNR, f/u susceptibilities    - most critical part of care is PT/OT OOB at this point, severely debilitated   - Aggressive pulmonary hygiene, OOB to chair, PT/OT  - CT 12/31 with pelvic abscess s/p drain placement.   - Continue vanc, micheal; fluc added 01/04  - VAP; BAL 12/28 with klebsiella  - Pelvic abscess 12/31; bacteroides, klebsiella, e coli, enterococcus   - Appreciate ID assistance   - Wound vac change biweekly - last performed 1/6, next due 1/10  - Recommend bowel reg  - Transfuse for Hgb <7  - Speech eval - advance to dental soft  - Continue REAGAN drains and IR drain; flush BID with 10 cc NS   - Strict I/Os  - Remainder of care per SICU, appreciate assistance    - Dispo: rehab

## 2022-01-08 NOTE — PROGRESS NOTES
"[facility]  General Surgery  Progress Note    Subjective:     History of Present Illness:  No notes on file    Post-Op Info:  Procedure(s) (LRB):  LAPAROTOMY, EXPLORATORY (N/A)  INSERTION, GASTROSTOMY TUBE, PERCUTANEOUS (N/A)  CHOLECYSTECTOMY  EGD (ESOPHAGOGASTRODUODENOSCOPY) (N/A)   16 Days Post-Op     Interval History: No acute events. AF with borderline tachycardia and HDS. OOB x3 hrs yesterday and ate about 75% of dinner (salad). Tolerating tube feeds at 40 mL/hr. REAGAN drains with scant thin output. CRRT running.    Medications:  Continuous Infusions:   sodium chloride 0.9% Stopped (01/08/22 0200)     Scheduled Meds:   balsam peru-castor oiL   Topical (Top) BID    epoetin genie-epbx  50 Units/kg Intravenous Q7 Days    fluconazole  200 mg Per G Tube Daily    gabapentin  125 mg Per G Tube Q8H    heparin (porcine)  7,500 Units Subcutaneous Q8H    levalbuterol  0.63 mg Nebulization Q4H    meropenem (MERREM) IVPB  500 mg Intravenous Q12H    methocarbamoL  500 mg Per G Tube QID    nitroGLYCERIN 2% TD oint  0.5 inch Topical (Top) Q6H    pantoprazole  40 mg Per G Tube Daily    propranoloL  20 mg Per G Tube TID    scopolamine  1 patch Transdermal Q3 Days     PRN Meds:dextrose 50%, heparin (porcine), heparin (porcine), HYDROmorphone, melatonin, ondansetron, oxyCODONE, oxyCODONE, potassium, sodium phosphates, senna-docusate 8.6-50 mg, sodium chloride 0.9%     Review of patient's allergies indicates:   Allergen Reactions    Tylox [oxycodone-acetaminophen]      "Jittery"     Objective:     Vital Signs (Most Recent):  Temp: 99.1 °F (37.3 °C) (01/08/22 0305)  Pulse: 97 (01/08/22 0700)  Resp: (!) 21 (01/08/22 0700)  BP: 117/69 (01/08/22 0700)  SpO2: 98 % (01/08/22 0700) Vital Signs (24h Range):  Temp:  [98.4 °F (36.9 °C)-99.1 °F (37.3 °C)] 99.1 °F (37.3 °C)  Pulse:  [] 97  Resp:  [13-35] 21  SpO2:  [93 %-100 %] 98 %  BP: ()/(51-76) 117/69     Weight: 121.1 kg (267 lb)  Body mass " index is 45.83 kg/m².    Intake/Output - Last 3 Shifts       01/06 0700  01/07 0659 01/07 0700 01/08 0659 01/08 0700 01/09 0659    P.O. 150      I.V. (mL/kg)  41.5 (0.3)     Other  360     NG/ 720     IV Piggyback 209.1 190.1     Total Intake(mL/kg) 1039.1 (8.6) 1311.6 (10.8)     Urine (mL/kg/hr) 440 (0.2) 855 (0.3)     Drains 219 195     Other 350 550     Total Output 1009 1600     Net +30.1 -288.5                  Physical Exam  Vitals and nursing note reviewed.   Constitutional:       General: She is not in acute distress.     Appearance: She is obese. She is not diaphoretic.   HENT:      Head: Normocephalic and atraumatic.      Mouth/Throat:      Mouth: Mucous membranes are moist.      Pharynx: Oropharynx is clear.   Eyes:      Extraocular Movements: Extraocular movements intact.      Conjunctiva/sclera: Conjunctivae normal.   Cardiovascular:      Rate and Rhythm: Regular rhythm. Tachycardia present.   Pulmonary:      Effort: No respiratory distress.   Abdominal:      General: There is no distension.      Palpations: Abdomen is soft.      Tenderness: There is no guarding or rebound.      Comments: Wound vac in place with good seal, no leak noted.   RUQ REAGAN drains x2 with benign fluid, not bloody or bilious  LLQ IR drain with dark output   Musculoskeletal:         General: No deformity.   Skin:     General: Skin is warm and dry.   Neurological:      General: No focal deficit present.      Mental Status: She is alert and oriented to person, place, and time.         Significant Labs:  I have reviewed all pertinent lab results within the past 24 hours.  CBC:   Recent Labs   Lab 01/08/22  0310   WBC 21.27*   RBC 2.70*   HGB 7.8*   HCT 25.3*   *   MCV 94   MCH 28.9   MCHC 30.8*     BMP:   Recent Labs   Lab 01/08/22  0310   GLU 91   *   K 3.8   CL 99   CO2 27   BUN 28*   CREATININE 2.4*   CALCIUM 8.4*   MG 2.0       Significant Diagnostics:  I have reviewed all pertinent imaging results/findings  within the past 24 hours.    Assessment/Plan:     Subcapsular hematoma of liver  57yo female transfer from OSH after hysterectomy on 12/8 complicated by delayed recognition of small bowel injury requiring resection (in discontinuity) and at some point new bleed from the liver - transferred with open abdomen for higher level of care.  S/p ex-lap, liver packing 12/21  Open cholecystectomy, abdominal closure 12/23  IR drain placed into intra-abdominal abscess 12/31 - Cx growing e. Coli, enterococcus, and an unidentified GNR, f/u susceptibilities    - most critical part of care is PT/OT OOB at this point, severely debilitated   - Aggressive pulmonary hygiene, OOB to chair, PT/OT  - CT 12/31 with pelvic abscess s/p drain placement.   - Continue vanc, micheal; fluc added 01/04  - VAP; BAL 12/28 with klebsiella  - Pelvic abscess 12/31; bacteroides, klebsiella, e coli, enterococcus   - Appreciate ID assistance   - Nephrology for dialysis, appreciate recs  - Wound vac change biweekly - last performed 1/6, next due 1/10  - Recommend bowel reg  - Transfuse for Hgb <7  - Speech eval - advance to dental soft  - Continue REAGAN drains and IR drain; flush BID with 10 cc NS   - Strict I/Os  - Remainder of care per SICU, appreciate assistance  - Dispo: rehab           Katie Mireles MD  General Surgery  [facility]

## 2022-01-08 NOTE — PROGRESS NOTES
[facility]  Nephrology  Progress Note     Patient Name: Chidi Castaneda  MRN: [id number]  Admission Date: 12/20/2021  Hospital Length of Stay: 19 days  Attending Provider: Kendall Gorman MD   Primary Care Physician: Primary Doctor No  Principal Problem: <principal problem not specified>    Subjective:     HPI: Chidi Castaneda is a 56 y.o. female w/ PMHx HTN, GERD s/p EGD 6/22/2021, migraines, ovarian cyst s/p cystectomy, and obesity who underwent an elective lap hysterectomy on 12/18/2021 at [facility] and was then transferred to [place], [place] for higher level of care when pt was found to have post-op somnolence, decrease PO intake, decrease urine output that progressed to anuria with a sharp rise in Cr from 0.9 to 2.8 (12/10). Pt was treated for sepsis with volume resuscitation and broad spectrum antibiotics, however pt continue to deteriorate and became tachycardic, hypotensive, and imaging showed an ileus. Pt was intubated since she was found to be acidotic with a ph of 7.28 despite a nonrebreather with 100%  FiO2  12/11 pt was taken back to the OR for washout and a bowel resection was done as she was found to have a small bowel perforation  12/15 pt was found to have a subscapular liver hematoma  12/18 pt was taken back to the OR for wound vacc exchange and removal of hematoma   12/19 general surgery team recommended no further surgical intervention since they did not find any active bleeding intraoperatively on 12/18 and felt that the ongoing bleeding was 2/2 consumptive coagulopathy and septic shock. They recommended to continue wound vacc and to consider higher level of care for pt  Pt was also being treated for a bacteremia as well as for intraabdominal infection, her antibiotics prior to transfer were: meropenem, flagyl, and vancomycin   Pt received tranexamic acid x1 and multiple units of blood products.    Pt was transferred to [facility] on 12/20/2021 for higher  "level of care: embolization for a subscapular hematoma       Nephrology was consulted for: "dialysis, SUSAN"    Pt does not have any h/o renal disease prior to hospitalization (per chart review and per pt's )   EDW: 88 kg   Post-op renal ultrasound was normal (results of ultrasound and other medical records from the outside hospital are in the chart at the bedside, needs to be scanned into chart)   Pt was started on CRRT on 12/12 via a trialysis catheter,   On 12/20/21 morning nephrology at outside hospital had recommended CRRT-SLED however primary team requested iHD prior to transfer to [facility], it appears that pt was ordered for 4 hours however, pt was prepped for transfer and was unable to complete full session 2/2 transfer, per pt's , pt had 2L of fluid removed instead of the planned 4 liters. Of note, while pt was at [facility] pt's CRRT required the use of citrate to prevent clotting (briefly), however that apparently resolved and was able to get a session w/o citrate and obviously was able to tolerate iHD prior to transfer.   Upon arrival to [facility] pt was started on zosyn, she was started on NS 125ml/hr, was transfused 2 units of PRBCs, and remained off vasopressors.       Interval History: s/p 3 hrs HD yesterday. No net UF removed. UOP: 855cc    Review of patient's allergies indicates:   Allergen Reactions    Tylox [oxycodone-acetaminophen]      "Jittery"      Current Facility-Administered Medications   Medication Frequency    0.9%  NaCl infusion Continuous    balsam peru-castor oiL Oint BID    dextrose 50% injection 25 g PRN    epoetin genie-epbx injection 6,060 Units Q7 Days    fluconazole tablet 200 mg Daily    furosemide (LASIX) 100 mg in sodium chloride 0.9% IVPB Once    gabapentin 250 mg/5 mL (5 mL) solution 125 mg Q8H    heparin (porcine) injection 1,000 Units PRN    heparin (porcine) injection 1,000 Units PRN    heparin (porcine) injection 7,500 Units Q8H    HYDROmorphone " injection 0.5 mg Q3H PRN    levalbuterol nebulizer solution 0.63 mg Q4H    melatonin 1 mg/mL liquid (PEDS) 5 mg Nightly PRN    meropenem-0.9% sodium chloride 500 mg/50 mL IVPB Q12H    methocarbamoL tablet 500 mg QID    nitroGLYCERIN 2% TD oint ointment 0.5 inch Q6H    ondansetron injection 8 mg Q8H PRN    oxyCODONE 5 mg/5 mL solution 10 mg Q4H PRN    oxyCODONE 5 mg/5 mL solution 5 mg Q4H PRN    pantoprazole suspension 40 mg Daily    potassium, sodium phosphates 280-160-250 mg packet 2 packet TID PRN    propranoloL tablet 20 mg TID    scopolamine 1.3-1.5 mg (1 mg over 3 days) 1 patch Q3 Days    senna-docusate 8.6-50 mg per tablet 1 tablet Daily PRN    sodium chloride 0.9% bolus 250 mL PRN       Objective:     Vital Signs (Most Recent):  Temp: 98.4 °F (36.9 °C) (01/08/22 1115)  Pulse: 93 (01/08/22 1200)  Resp: 18 (01/08/22 1200)  BP: (!) 105/58 (01/08/22 1200)  SpO2: 97 % (01/08/22 1200)  O2 Device (Oxygen Therapy): room air (01/08/22 1200) Vital Signs (24h Range):  Temp:  [98.4 °F (36.9 °C)-99.1 °F (37.3 °C)] 98.4 °F (36.9 °C)  Pulse:  [] 93  Resp:  [13-35] 18  SpO2:  [93 %-100 %] 97 %  BP: ()/(51-76) 105/58   Weight: 121.1 kg (267 lb) (12/28/21 1003)  Body mass index is 45.83 kg/m².  Body surface area is 2.34 meters squared.      Intake/Output Summary (Last 24 hours) at 1/8/2022 1235  Last data filed at 1/8/2022 1200  Gross per 24 hour   Intake 1270.21 ml   Output 1267 ml   Net 3.21 ml     I/O last 3 completed shifts:  In: 2041.1 [P.O.:150; I.V.:41.5; Other:360; NG/GT:1200; IV Piggyback:289.6]  Out: 2334 [Urine:1085; Drains:349; Other:900]  Net IO Since Admission: 3,900.79 mL [01/08/22 1235]     Physical Exam  Constitutional:       Appearance: She is obese. She is ill-appearing.   HENT:      Mouth/Throat:      Mouth: Mucous membranes are moist.   Eyes:      Pupils: Pupils are equal, round, and reactive to light.   Cardiovascular:      Rate and Rhythm: Normal rate and regular rhythm.    Pulmonary:      Effort: No respiratory distress.   Abdominal:      General: There is no distension.      Comments: Wound vac   Musculoskeletal:         General: No deformity.      Right lower leg: Edema present.      Left lower leg: Edema present.     Significant Labs:  All labs within the past 24 hours have been reviewed.    Assessment/Plan:     Acute blood loss anemia  -iron restricted erythropoiesis   -tsat low  -weekly epo    Volume overload  -CTA and CXR pulmonary edema  -now improved after regular UF with KRT    Metabolic acidosis  -controlled with KRT    Bowel perforation  -per primary      Septic shock  -secondary to bowel perforation  -shock resolved and tolerated HD  -per primary      SUSAN (acute kidney injury)  -oliguric SUSAN, ischemic ATN with multifactorial etiology, however most likely d/t severe hypotension as a result of septic shock 2/2 small bowel perforation and bacteremia   -BL sCr 1  -KRT started at OSH 12/12/2021 for acidosis and volume overload  -started to urinate  -HD yesterday. Adequate clearance.  -Adequate response to lasix.      Subcapsular hematoma of liver  -per primary    PLAN/RECOMMENDATIONS    -No uremia, volume overload, or electrolytes imbalances requiring urgent hemodialysis at this moment.  -Start lasix drip 100 mg IV over the course of 24 hrs.        Thank you for your consult. I will follow-up with patient. Please contact us if you have any additional questions.    Marquis Nunez MD  Nephrology  [facility]

## 2022-01-09 LAB
ALBUMIN SERPL BCP-MCNC: 1.5 G/DL (ref 3.5–5.2)
ALBUMIN SERPL BCP-MCNC: 1.6 G/DL (ref 3.5–5.2)
ALBUMIN SERPL BCP-MCNC: 1.6 G/DL (ref 3.5–5.2)
ALP SERPL-CCNC: 102 U/L (ref 55–135)
ALT SERPL W/O P-5'-P-CCNC: 21 U/L (ref 10–44)
ANION GAP SERPL CALC-SCNC: 11 MMOL/L (ref 8–16)
ANION GAP SERPL CALC-SCNC: 11 MMOL/L (ref 8–16)
ANION GAP SERPL CALC-SCNC: 12 MMOL/L (ref 8–16)
ANISOCYTOSIS BLD QL SMEAR: SLIGHT
AST SERPL-CCNC: 33 U/L (ref 10–40)
BASO STIPL BLD QL SMEAR: ABNORMAL
BASOPHILS # BLD AUTO: ABNORMAL K/UL (ref 0–0.2)
BASOPHILS NFR BLD: 0 % (ref 0–1.9)
BILIRUB SERPL-MCNC: 0.5 MG/DL (ref 0.1–1)
BUN SERPL-MCNC: 38 MG/DL (ref 6–20)
BUN SERPL-MCNC: 42 MG/DL (ref 6–20)
BUN SERPL-MCNC: 46 MG/DL (ref 6–20)
CA-I BLDV-SCNC: 1.1 MMOL/L (ref 1.06–1.42)
CA-I BLDV-SCNC: 1.1 MMOL/L (ref 1.06–1.42)
CA-I BLDV-SCNC: 1.15 MMOL/L (ref 1.06–1.42)
CALCIUM SERPL-MCNC: 8.4 MG/DL (ref 8.7–10.5)
CALCIUM SERPL-MCNC: 8.4 MG/DL (ref 8.7–10.5)
CALCIUM SERPL-MCNC: 8.5 MG/DL (ref 8.7–10.5)
CHLORIDE SERPL-SCNC: 96 MMOL/L (ref 95–110)
CHLORIDE SERPL-SCNC: 97 MMOL/L (ref 95–110)
CHLORIDE SERPL-SCNC: 98 MMOL/L (ref 95–110)
CO2 SERPL-SCNC: 24 MMOL/L (ref 23–29)
CO2 SERPL-SCNC: 24 MMOL/L (ref 23–29)
CO2 SERPL-SCNC: 25 MMOL/L (ref 23–29)
CREAT SERPL-MCNC: 2.9 MG/DL (ref 0.5–1.4)
CREAT SERPL-MCNC: 3 MG/DL (ref 0.5–1.4)
CREAT SERPL-MCNC: 3.1 MG/DL (ref 0.5–1.4)
DIFFERENTIAL METHOD: ABNORMAL
EOSINOPHIL # BLD AUTO: ABNORMAL K/UL (ref 0–0.5)
EOSINOPHIL NFR BLD: 0.7 % (ref 0–8)
ERYTHROCYTE [DISTWIDTH] IN BLOOD BY AUTOMATED COUNT: 16.9 % (ref 11.5–14.5)
EST. GFR  (AFRICAN AMERICAN): 18.8 ML/MIN/1.73 M^2
EST. GFR  (AFRICAN AMERICAN): 19.6 ML/MIN/1.73 M^2
EST. GFR  (AFRICAN AMERICAN): 20.4 ML/MIN/1.73 M^2
EST. GFR  (NON AFRICAN AMERICAN): 16.3 ML/MIN/1.73 M^2
EST. GFR  (NON AFRICAN AMERICAN): 17 ML/MIN/1.73 M^2
EST. GFR  (NON AFRICAN AMERICAN): 17.7 ML/MIN/1.73 M^2
FINAL PATHOLOGIC DIAGNOSIS: NORMAL
GLUCOSE SERPL-MCNC: 100 MG/DL (ref 70–110)
GLUCOSE SERPL-MCNC: 103 MG/DL (ref 70–110)
GLUCOSE SERPL-MCNC: 111 MG/DL (ref 70–110)
GROSS: NORMAL
HCT VFR BLD AUTO: 24.9 % (ref 37–48.5)
HGB BLD-MCNC: 7.6 G/DL (ref 12–16)
HYPOCHROMIA BLD QL SMEAR: ABNORMAL
IMM GRANULOCYTES # BLD AUTO: ABNORMAL K/UL (ref 0–0.04)
IMM GRANULOCYTES NFR BLD AUTO: ABNORMAL % (ref 0–0.5)
LYMPHOCYTES # BLD AUTO: ABNORMAL K/UL (ref 1–4.8)
LYMPHOCYTES NFR BLD: 9.3 % (ref 18–48)
Lab: NORMAL
MAGNESIUM SERPL-MCNC: 1.9 MG/DL (ref 1.6–2.6)
MAGNESIUM SERPL-MCNC: 2 MG/DL (ref 1.6–2.6)
MAGNESIUM SERPL-MCNC: 2.1 MG/DL (ref 1.6–2.6)
MCH RBC QN AUTO: 29.2 PG (ref 27–31)
MCHC RBC AUTO-ENTMCNC: 30.5 G/DL (ref 32–36)
MCV RBC AUTO: 96 FL (ref 82–98)
METAMYELOCYTES NFR BLD MANUAL: 1.3 %
MONOCYTES # BLD AUTO: ABNORMAL K/UL (ref 0.3–1)
MONOCYTES NFR BLD: 4.7 % (ref 4–15)
MYELOCYTES NFR BLD MANUAL: 1.3 %
NEUTROPHILS NFR BLD: 81.4 % (ref 38–73)
NEUTS BAND NFR BLD MANUAL: 1.3 %
NRBC BLD-RTO: 0 /100 WBC
PHOSPHATE SERPL-MCNC: 5.3 MG/DL (ref 2.7–4.5)
PHOSPHATE SERPL-MCNC: 5.3 MG/DL (ref 2.7–4.5)
PHOSPHATE SERPL-MCNC: 5.4 MG/DL (ref 2.7–4.5)
PHOSPHATE SERPL-MCNC: 5.5 MG/DL (ref 2.7–4.5)
PHOSPHATE SERPL-MCNC: 5.5 MG/DL (ref 2.7–4.5)
PLATELET # BLD AUTO: 589 K/UL (ref 150–450)
PLATELET BLD QL SMEAR: ABNORMAL
PMV BLD AUTO: 10.3 FL (ref 9.2–12.9)
POLYCHROMASIA BLD QL SMEAR: ABNORMAL
POTASSIUM SERPL-SCNC: 3.7 MMOL/L (ref 3.5–5.1)
POTASSIUM SERPL-SCNC: 3.7 MMOL/L (ref 3.5–5.1)
POTASSIUM SERPL-SCNC: 3.8 MMOL/L (ref 3.5–5.1)
PROT SERPL-MCNC: 5.9 G/DL (ref 6–8.4)
RBC # BLD AUTO: 2.6 M/UL (ref 4–5.4)
SODIUM SERPL-SCNC: 132 MMOL/L (ref 136–145)
SODIUM SERPL-SCNC: 133 MMOL/L (ref 136–145)
SODIUM SERPL-SCNC: 133 MMOL/L (ref 136–145)
WBC # BLD AUTO: 15.91 K/UL (ref 3.9–12.7)

## 2022-01-09 PROCEDURE — 99900035 HC TECH TIME PER 15 MIN (STAT)

## 2022-01-09 PROCEDURE — 27000221 HC OXYGEN, UP TO 24 HOURS

## 2022-01-09 PROCEDURE — 27200966 HC CLOSED SUCTION SYSTEM

## 2022-01-09 PROCEDURE — 85007 BL SMEAR W/DIFF WBC COUNT: CPT | Performed by: STUDENT IN AN ORGANIZED HEALTH CARE EDUCATION/TRAINING PROGRAM

## 2022-01-09 PROCEDURE — 94668 MNPJ CHEST WALL SBSQ: CPT

## 2022-01-09 PROCEDURE — 63600175 PHARM REV CODE 636 W HCPCS: Performed by: STUDENT IN AN ORGANIZED HEALTH CARE EDUCATION/TRAINING PROGRAM

## 2022-01-09 PROCEDURE — 94640 AIRWAY INHALATION TREATMENT: CPT

## 2022-01-09 PROCEDURE — 94761 N-INVAS EAR/PLS OXIMETRY MLT: CPT

## 2022-01-09 PROCEDURE — 25000003 PHARM REV CODE 250: Performed by: INTERNAL MEDICINE

## 2022-01-09 PROCEDURE — 83735 ASSAY OF MAGNESIUM: CPT | Mod: 91 | Performed by: SURGERY

## 2022-01-09 PROCEDURE — 84100 ASSAY OF PHOSPHORUS: CPT | Performed by: SURGERY

## 2022-01-09 PROCEDURE — 94760 N-INVAS EAR/PLS OXIMETRY 1: CPT

## 2022-01-09 PROCEDURE — 25000242 PHARM REV CODE 250 ALT 637 W/ HCPCS

## 2022-01-09 PROCEDURE — 25000003 PHARM REV CODE 250: Performed by: SURGERY

## 2022-01-09 PROCEDURE — 99900026 HC AIRWAY MAINTENANCE (STAT)

## 2022-01-09 PROCEDURE — 99291 CRITICAL CARE FIRST HOUR: CPT | Mod: ,,, | Performed by: STUDENT IN AN ORGANIZED HEALTH CARE EDUCATION/TRAINING PROGRAM

## 2022-01-09 PROCEDURE — 85027 COMPLETE CBC AUTOMATED: CPT | Performed by: STUDENT IN AN ORGANIZED HEALTH CARE EDUCATION/TRAINING PROGRAM

## 2022-01-09 PROCEDURE — 82330 ASSAY OF CALCIUM: CPT | Mod: 91 | Performed by: SURGERY

## 2022-01-09 PROCEDURE — 80053 COMPREHEN METABOLIC PANEL: CPT | Performed by: SURGERY

## 2022-01-09 PROCEDURE — 99291 PR CRITICAL CARE, E/M 30-74 MINUTES: ICD-10-PCS | Mod: ,,, | Performed by: STUDENT IN AN ORGANIZED HEALTH CARE EDUCATION/TRAINING PROGRAM

## 2022-01-09 PROCEDURE — 25000003 PHARM REV CODE 250

## 2022-01-09 PROCEDURE — 20000000 HC ICU ROOM

## 2022-01-09 PROCEDURE — 94664 DEMO&/EVAL PT USE INHALER: CPT

## 2022-01-09 PROCEDURE — 27100171 HC OXYGEN HIGH FLOW UP TO 24 HOURS

## 2022-01-09 PROCEDURE — 25000003 PHARM REV CODE 250: Performed by: STUDENT IN AN ORGANIZED HEALTH CARE EDUCATION/TRAINING PROGRAM

## 2022-01-09 PROCEDURE — 80069 RENAL FUNCTION PANEL: CPT | Mod: 91 | Performed by: SURGERY

## 2022-01-09 PROCEDURE — 63600175 PHARM REV CODE 636 W HCPCS: Performed by: SURGERY

## 2022-01-09 PROCEDURE — 25000242 PHARM REV CODE 250 ALT 637 W/ HCPCS: Performed by: SURGERY

## 2022-01-09 PROCEDURE — 63600175 PHARM REV CODE 636 W HCPCS: Performed by: INTERNAL MEDICINE

## 2022-01-09 PROCEDURE — 27000646 HC AEROBIKA DEVICE

## 2022-01-09 RX ORDER — FLUCONAZOLE 200 MG/1
200 TABLET ORAL DAILY
Status: DISCONTINUED | OUTPATIENT
Start: 2022-01-09 | End: 2022-01-10

## 2022-01-09 RX ORDER — DRONABINOL 2.5 MG/1
2.5 CAPSULE ORAL 2 TIMES DAILY
Status: DISCONTINUED | OUTPATIENT
Start: 2022-01-09 | End: 2022-01-14 | Stop reason: HOSPADM

## 2022-01-09 RX ADMIN — HEPARIN SODIUM 7500 UNITS: 5000 INJECTION INTRAVENOUS; SUBCUTANEOUS at 09:01

## 2022-01-09 RX ADMIN — LEVALBUTEROL HYDROCHLORIDE 0.63 MG: 0.63 SOLUTION RESPIRATORY (INHALATION) at 12:01

## 2022-01-09 RX ADMIN — LEVALBUTEROL HYDROCHLORIDE 0.63 MG: 0.63 SOLUTION RESPIRATORY (INHALATION) at 08:01

## 2022-01-09 RX ADMIN — PANTOPRAZOLE SODIUM 40 MG: 40 GRANULE, DELAYED RELEASE ORAL at 08:01

## 2022-01-09 RX ADMIN — GABAPENTIN 125 MG: 250 SOLUTION ORAL at 03:01

## 2022-01-09 RX ADMIN — FLUCONAZOLE 200 MG: 200 TABLET ORAL at 08:01

## 2022-01-09 RX ADMIN — Medication: at 09:01

## 2022-01-09 RX ADMIN — LEVALBUTEROL HYDROCHLORIDE 0.63 MG: 0.63 SOLUTION RESPIRATORY (INHALATION) at 03:01

## 2022-01-09 RX ADMIN — PROPRANOLOL HYDROCHLORIDE 20 MG: 20 TABLET ORAL at 09:01

## 2022-01-09 RX ADMIN — GABAPENTIN 125 MG: 250 SOLUTION ORAL at 09:01

## 2022-01-09 RX ADMIN — MEROPENEM AND SODIUM CHLORIDE 500 MG: 500 INJECTION, SOLUTION INTRAVENOUS at 09:01

## 2022-01-09 RX ADMIN — FUROSEMIDE 100 MG: 10 INJECTION, SOLUTION INTRAMUSCULAR; INTRAVENOUS at 12:01

## 2022-01-09 RX ADMIN — PROPRANOLOL HYDROCHLORIDE 20 MG: 20 TABLET ORAL at 08:01

## 2022-01-09 RX ADMIN — GABAPENTIN 125 MG: 250 SOLUTION ORAL at 07:01

## 2022-01-09 RX ADMIN — NITROGLYCERIN 0.5 INCH: 20 OINTMENT TOPICAL at 12:01

## 2022-01-09 RX ADMIN — ONDANSETRON 8 MG: 2 INJECTION INTRAMUSCULAR; INTRAVENOUS at 10:01

## 2022-01-09 RX ADMIN — LEVALBUTEROL HYDROCHLORIDE 0.63 MG: 0.63 SOLUTION RESPIRATORY (INHALATION) at 07:01

## 2022-01-09 RX ADMIN — OXYCODONE HYDROCHLORIDE 5 MG: 5 SOLUTION ORAL at 03:01

## 2022-01-09 RX ADMIN — HEPARIN SODIUM 7500 UNITS: 5000 INJECTION INTRAVENOUS; SUBCUTANEOUS at 03:01

## 2022-01-09 RX ADMIN — METHOCARBAMOL 500 MG: 500 TABLET ORAL at 08:01

## 2022-01-09 RX ADMIN — NITROGLYCERIN 0.5 INCH: 20 OINTMENT TOPICAL at 06:01

## 2022-01-09 RX ADMIN — HEPARIN SODIUM 7500 UNITS: 5000 INJECTION INTRAVENOUS; SUBCUTANEOUS at 06:01

## 2022-01-09 RX ADMIN — METHOCARBAMOL 500 MG: 500 TABLET ORAL at 03:01

## 2022-01-09 RX ADMIN — SCOPALAMINE 1 PATCH: 1 PATCH, EXTENDED RELEASE TRANSDERMAL at 09:01

## 2022-01-09 RX ADMIN — DRONABINOL 2.5 MG: 2.5 CAPSULE ORAL at 08:01

## 2022-01-09 RX ADMIN — METHOCARBAMOL 500 MG: 500 TABLET ORAL at 09:01

## 2022-01-09 RX ADMIN — PROPRANOLOL HYDROCHLORIDE 20 MG: 20 TABLET ORAL at 03:01

## 2022-01-09 RX ADMIN — Medication: at 08:01

## 2022-01-09 RX ADMIN — NITROGLYCERIN 0.5 INCH: 20 OINTMENT TOPICAL at 07:01

## 2022-01-09 NOTE — ASSESSMENT & PLAN NOTE
55yo female transfer from OSH after hysterectomy on 12/8 complicated by delayed recognition of small bowel injury requiring resection (in discontinuity) and at some point new bleed from the liver - transferred with open abdomen for higher level of care.  S/p ex-lap, liver packing 12/21  Open cholecystectomy, abdominal closure 12/23  IR drain placed into intra-abdominal abscess 12/31 - Cx growing e. Coli, enterococcus, and an unidentified GNR, f/u susceptibilities    - most critical part of care is PT/OT OOB at this point, severely debilitated   - Lasix gtt per nephro, ARF appears to be improving   - Aggressive pulmonary hygiene, OOB to chair, PT/OT  - CT 12/31 with pelvic abscess s/p drain placement.   - Continue vanc, micheal; fluc added 01/04  - VAP; BAL 12/28 with klebsiella  - Pelvic abscess 12/31; bacteroides, klebsiella, e coli, enterococcus   - Appreciate ID assistance   - Wound vac change biweekly - last performed 1/6, next due 1/10  - bowel reg  - Transfuse for Hgb <7  - Speech eval - advance to dental soft  - Continue REAGAN drains and IR drain; flush BID with 10 cc NS   - Strict I/Os  - Remainder of care per SICU, appreciate assistance    - Dispo: rehab likely this week

## 2022-01-09 NOTE — PROGRESS NOTES
"[facility]  General Surgery  Progress Note    Subjective:     History of Present Illness:  No notes on file    Post-Op Info:  Procedure(s) (LRB):  LAPAROTOMY, EXPLORATORY (N/A)  INSERTION, GASTROSTOMY TUBE, PERCUTANEOUS (N/A)  CHOLECYSTECTOMY  EGD (ESOPHAGOGASTRODUODENOSCOPY) (N/A)   17 Days Post-Op     Interval History:   Got up to chair x 12hrs yesterday  Tolerating more of her diet  Making some urine, on Lasix gtt     Medications:  Continuous Infusions:   sodium chloride 0.9% Stopped (01/08/22 1053)     Scheduled Meds:   balsam peru-castor oiL   Topical (Top) BID    dronabinoL  2.5 mg Oral BID    epoetin genie-epbx  50 Units/kg Intravenous Q7 Days    fluconazole  200 mg Per G Tube Daily    furosemide (LASIX) IVPB in NS IV bolus  100 mg Intravenous Once    furosemide (LASIX) IVPB in NS IV bolus  100 mg Intravenous Once    gabapentin  125 mg Per G Tube Q8H    heparin (porcine)  7,500 Units Subcutaneous Q8H    levalbuterol  0.63 mg Nebulization Q4H    meropenem (MERREM) IVPB  500 mg Intravenous Q12H    methocarbamoL  500 mg Per G Tube QID    nitroGLYCERIN 2% TD oint  0.5 inch Topical (Top) Q6H    pantoprazole  40 mg Per G Tube Daily    propranoloL  20 mg Per G Tube TID    scopolamine  1 patch Transdermal Q3 Days     PRN Meds:dextrose 50%, heparin (porcine), heparin (porcine), HYDROmorphone, melatonin, ondansetron, oxyCODONE, oxyCODONE, potassium, sodium phosphates, senna-docusate 8.6-50 mg, sodium chloride 0.9%     Review of patient's allergies indicates:   Allergen Reactions    Tylox [oxycodone-acetaminophen]      "Jittery"     Objective:     Vital Signs (Most Recent):  Temp: 99.3 °F (37.4 °C) (01/09/22 0700)  Pulse: 104 (01/09/22 0830)  Resp: (!) 24 (01/09/22 0715)  BP: 124/65 (01/09/22 0830)  SpO2: 96 % (01/09/22 0830) Vital Signs (24h Range):  Temp:  [98.2 °F (36.8 °C)-99.3 °F (37.4 °C)] 99.3 °F (37.4 °C)  Pulse:  [] 104  Resp:  [13-28] 24  SpO2:  [93 %-100 %] 96 " %  BP: ()/(50-68) 124/65     Weight: 121.1 kg (267 lb)  Body mass index is 45.83 kg/m².    Intake/Output - Last 3 Shifts       01/07 0700 01/08 0659 01/08 0700 01/09 0659 01/09 0700  01/10 0659    P.O.       I.V. (mL/kg) 41.5 (0.3) 0.3 (0)     Other 360      NG/ 850     IV Piggyback 190.1 131.3 6    Total Intake(mL/kg) 1311.6 (10.8) 981.7 (8.1) 6 (0)    Urine (mL/kg/hr) 855 (0.3) 340 (0.1) 90 (0.4)    Drains 195 73     Other 550 450     Total Output 1600 863 90    Net -288.5 +118.7 -84                 Physical Exam  Vitals and nursing note reviewed.   Constitutional:       General: She is not in acute distress.     Appearance: She is obese. She is not diaphoretic.   HENT:      Head: Normocephalic and atraumatic.      Mouth/Throat:      Mouth: Mucous membranes are moist.      Pharynx: Oropharynx is clear.   Eyes:      Extraocular Movements: Extraocular movements intact.      Conjunctiva/sclera: Conjunctivae normal.   Cardiovascular:      Rate and Rhythm: Regular rhythm. Tachycardia present.   Pulmonary:      Effort: No respiratory distress.   Abdominal:      General: There is no distension.      Palpations: Abdomen is soft.      Tenderness: There is no guarding or rebound.      Comments: Wound vac in place with good seal, no leak noted.   RUQ REAGAN drains x2 with benign fluid, not bloody or bilious  LLQ IR drain with thin SS output   Musculoskeletal:         General: No deformity.   Skin:     General: Skin is warm and dry.   Neurological:      General: No focal deficit present.      Mental Status: She is alert and oriented to person, place, and time.         Significant Labs:  I have reviewed all pertinent lab results within the past 24 hours.  CBC:   Recent Labs   Lab 01/09/22  0321   WBC 15.91*   RBC 2.60*   HGB 7.6*   HCT 24.9*   *   MCV 96   MCH 29.2   MCHC 30.5*     BMP:   Recent Labs   Lab 01/09/22  0321      *   K 3.8   CL 98   CO2 24   BUN 38*   CREATININE 2.9*   CALCIUM 8.4*    MG 2.1       Significant Diagnostics:  I have reviewed all pertinent imaging results/findings within the past 24 hours.    Assessment/Plan:     Subcapsular hematoma of liver  55yo female transfer from OSH after hysterectomy on 12/8 complicated by delayed recognition of small bowel injury requiring resection (in discontinuity) and at some point new bleed from the liver - transferred with open abdomen for higher level of care.  S/p ex-lap, liver packing 12/21  Open cholecystectomy, abdominal closure 12/23  IR drain placed into intra-abdominal abscess 12/31 - Cx growing e. Coli, enterococcus, and an unidentified GNR, f/u susceptibilities    - most critical part of care is PT/OT OOB at this point, severely debilitated   - Lasix gtt per nephro, ARF appears to be improving   - Aggressive pulmonary hygiene, OOB to chair, PT/OT  - CT 12/31 with pelvic abscess s/p drain placement.   - Continue vanc, micheal; fluc added 01/04  - VAP; BAL 12/28 with klebsiella  - Pelvic abscess 12/31; bacteroides, klebsiella, e coli, enterococcus   - Appreciate ID assistance   - Wound vac change biweekly - last performed 1/6, next due 1/10  - bowel reg  - Transfuse for Hgb <7  - Speech eval - advance to dental soft  - Continue REAGAN drains and IR drain; flush BID with 10 cc NS   - Strict I/Os  - Remainder of care per SICU, appreciate assistance    - Dispo: rehab likely this week          Elieser Toledo MD  General Surgery  [facility]

## 2022-01-09 NOTE — SUBJECTIVE & OBJECTIVE
"Interval History:   Got up to chair x 12hrs yesterday  Tolerating more of her diet  Making some urine, on Lasix gtt     Medications:  Continuous Infusions:   sodium chloride 0.9% Stopped (01/08/22 1053)     Scheduled Meds:   balsam peru-castor oiL   Topical (Top) BID    dronabinoL  2.5 mg Oral BID    epoetin genie-epbx  50 Units/kg Intravenous Q7 Days    fluconazole  200 mg Per G Tube Daily    furosemide (LASIX) IVPB in NS IV bolus  100 mg Intravenous Once    furosemide (LASIX) IVPB in NS IV bolus  100 mg Intravenous Once    gabapentin  125 mg Per G Tube Q8H    heparin (porcine)  7,500 Units Subcutaneous Q8H    levalbuterol  0.63 mg Nebulization Q4H    meropenem (MERREM) IVPB  500 mg Intravenous Q12H    methocarbamoL  500 mg Per G Tube QID    nitroGLYCERIN 2% TD oint  0.5 inch Topical (Top) Q6H    pantoprazole  40 mg Per G Tube Daily    propranoloL  20 mg Per G Tube TID    scopolamine  1 patch Transdermal Q3 Days     PRN Meds:dextrose 50%, heparin (porcine), heparin (porcine), HYDROmorphone, melatonin, ondansetron, oxyCODONE, oxyCODONE, potassium, sodium phosphates, senna-docusate 8.6-50 mg, sodium chloride 0.9%     Review of patient's allergies indicates:   Allergen Reactions    Tylox [oxycodone-acetaminophen]      "Jittery"     Objective:     Vital Signs (Most Recent):  Temp: 99.3 °F (37.4 °C) (01/09/22 0700)  Pulse: 104 (01/09/22 0830)  Resp: (!) 24 (01/09/22 0715)  BP: 124/65 (01/09/22 0830)  SpO2: 96 % (01/09/22 0830) Vital Signs (24h Range):  Temp:  [98.2 °F (36.8 °C)-99.3 °F (37.4 °C)] 99.3 °F (37.4 °C)  Pulse:  [] 104  Resp:  [13-28] 24  SpO2:  [93 %-100 %] 96 %  BP: ()/(50-68) 124/65     Weight: 121.1 kg (267 lb)  Body mass index is 45.83 kg/m².    Intake/Output - Last 3 Shifts       01/07 0700 01/08 0659 01/08 0700  01/09 0659 01/09 0700  01/10 0659    P.O.       I.V. (mL/kg) 41.5 (0.3) 0.3 (0)     Other 360      NG/ 850     IV Piggyback 190.1 131.3 6    Total " Intake(mL/kg) 1311.6 (10.8) 981.7 (8.1) 6 (0)    Urine (mL/kg/hr) 855 (0.3) 340 (0.1) 90 (0.4)    Drains 195 73     Other 550 450     Total Output 1600 863 90    Net -288.5 +118.7 -84                 Physical Exam  Vitals and nursing note reviewed.   Constitutional:       General: She is not in acute distress.     Appearance: She is obese. She is not diaphoretic.   HENT:      Head: Normocephalic and atraumatic.      Mouth/Throat:      Mouth: Mucous membranes are moist.      Pharynx: Oropharynx is clear.   Eyes:      Extraocular Movements: Extraocular movements intact.      Conjunctiva/sclera: Conjunctivae normal.   Cardiovascular:      Rate and Rhythm: Regular rhythm. Tachycardia present.   Pulmonary:      Effort: No respiratory distress.   Abdominal:      General: There is no distension.      Palpations: Abdomen is soft.      Tenderness: There is no guarding or rebound.      Comments: Wound vac in place with good seal, no leak noted.   RUQ REAGAN drains x2 with benign fluid, not bloody or bilious  LLQ IR drain with thin SS output   Musculoskeletal:         General: No deformity.   Skin:     General: Skin is warm and dry.   Neurological:      General: No focal deficit present.      Mental Status: She is alert and oriented to person, place, and time.         Significant Labs:  I have reviewed all pertinent lab results within the past 24 hours.  CBC:   Recent Labs   Lab 01/09/22  0321   WBC 15.91*   RBC 2.60*   HGB 7.6*   HCT 24.9*   *   MCV 96   MCH 29.2   MCHC 30.5*     BMP:   Recent Labs   Lab 01/09/22  0321      *   K 3.8   CL 98   CO2 24   BUN 38*   CREATININE 2.9*   CALCIUM 8.4*   MG 2.1       Significant Diagnostics:  I have reviewed all pertinent imaging results/findings within the past 24 hours.
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
## 2022-01-09 NOTE — SUBJECTIVE & OBJECTIVE
Interval History/Significant Events:   - UOP ~ 15ml/ hr over the last 24 hours.  Currently on lasix ggt challenge per nephrology  - WBC down trending to 15k this AM.  - Patient endorses continued minimal appetite.  - wound vac change today    Follow-up For: Procedure(s) (LRB):  LAPAROTOMY, EXPLORATORY (N/A)  INSERTION, GASTROSTOMY TUBE, PERCUTANEOUS (N/A)  CHOLECYSTECTOMY  EGD (ESOPHAGOGASTRODUODENOSCOPY) (N/A)    Post-Operative Day: 17 Days Post-Op    Objective:     Vital Signs (Most Recent):  Temp: 98.7 °F (37.1 °C) (01/09/22 0300)  Pulse: 102 (01/09/22 0700)  Resp: (!) 23 (01/09/22 0700)  BP: 102/61 (01/09/22 0600)  SpO2: 98 % (01/09/22 0700) Vital Signs (24h Range):  Temp:  [98.2 °F (36.8 °C)-98.7 °F (37.1 °C)] 98.7 °F (37.1 °C)  Pulse:  [] 102  Resp:  [13-28] 23  SpO2:  [93 %-100 %] 98 %  BP: ()/(50-70) 102/61     Weight: 121.1 kg (267 lb)  Body mass index is 45.83 kg/m².      Intake/Output Summary (Last 24 hours) at 1/9/2022 0743  Last data filed at 1/9/2022 0500  Gross per 24 hour   Intake 941.67 ml   Output 838 ml   Net 103.67 ml       Physical Exam    Vents:  Vent Mode: Spont (12/31/21 2000)  Ventilator Initiated: Yes (12/28/21 1104)  Set Rate: 18 BPM (12/31/21 0400)  Vt Set: 320 mL (12/31/21 0400)  Pressure Support: 60 cmH20 (12/31/21 0115)  PEEP/CPAP: 5 cmH20 (12/31/21 2000)  Oxygen Concentration (%): 28 (01/09/22 0500)  Peak Airway Pressure: 24 cmH2O (12/31/21 2000)  Plateau Pressure: 31 cmH20 (12/31/21 2000)  Total Ve: 9.78 mL (12/31/21 2000)  Negative Inspiratory Force (cm H2O): -30 (12/31/21 2030)  F/VT Ratio<105 (RSBI): (!) 47.52 (12/31/21 2000)    Lines/Drains/Airways     Central Venous Catheter Line            Trialysis (Dialysis) Catheter 12/30/21 1000 right internal jugular 9 days          Drain                 Closed/Suction Drain 12/23/21 1428 Right;Inferior RUQ Bulb 19 Fr. 16 days         Closed/Suction Drain 12/23/21 1429 Right;Superior RUQ Bulb 19 Fr. 16 days          Gastrostomy/Enterostomy 12/23/21 1338 Gastrostomy tube w/ balloon LUQ feeding 16 days         Closed/Suction Drain 12/31/21 1559 Left Abdomen Bulb 14 Fr. 8 days         Urethral Catheter 01/06/22 1220 2 days                Significant Labs:    CBC/Anemia Profile:  Recent Labs   Lab 01/08/22  0310 01/09/22  0321   WBC 21.27* 15.91*   HGB 7.8* 7.6*   HCT 25.3* 24.9*   * 589*   MCV 94 96   RDW 16.6* 16.9*        Chemistries:  Recent Labs   Lab 01/08/22  0310 01/08/22  0310 01/08/22  1411 01/08/22  2204 01/09/22  0321   *   < > 133* 133* 133*   K 3.8   < > 3.7 3.8 3.8   CL 99   < > 98 99 98   CO2 27   < > 28 24 24   BUN 28*   < > 32* 34* 38*   CREATININE 2.4*   < > 2.8* 2.9* 2.9*   CALCIUM 8.4*   < > 8.4* 8.4* 8.4*   ALBUMIN 1.6*   < > 1.6* 1.6* 1.5*   PROT 6.0  --   --   --  5.9*   BILITOT 0.7  --   --   --  0.5   ALKPHOS 111  --   --   --  102   ALT 22  --   --   --  21   AST 32  --   --   --  33   MG 2.0   < > 2.1 2.2 2.1   PHOS 4.1   < > 4.9*  4.9* 5.1*  5.1* 5.4*    < > = values in this interval not displayed.       All pertinent labs within the past 24 hours have been reviewed.    Significant Imaging:  I have reviewed all pertinent imaging results/findings within the past 24 hours.

## 2022-01-09 NOTE — PROGRESS NOTES
[facility]  Nephrology  Progress Note     Patient Name: Chidi Castaneda  MRN: [id number]  Admission Date: 12/20/2021  Hospital Length of Stay: 20 days  Attending Provider: Kendall Gorman MD   Primary Care Physician: Primary Doctor No  Principal Problem: <principal problem not specified>    Subjective:     HPI: Chidi Castaneda is a 56 y.o. female w/ PMHx HTN, GERD s/p EGD 6/22/2021, migraines, ovarian cyst s/p cystectomy, and obesity who underwent an elective lap hysterectomy on 12/18/2021 at [facility] and was then transferred to [place], [place] for higher level of care when pt was found to have post-op somnolence, decrease PO intake, decrease urine output that progressed to anuria with a sharp rise in Cr from 0.9 to 2.8 (12/10). Pt was treated for sepsis with volume resuscitation and broad spectrum antibiotics, however pt continue to deteriorate and became tachycardic, hypotensive, and imaging showed an ileus. Pt was intubated since she was found to be acidotic with a ph of 7.28 despite a nonrebreather with 100%  FiO2  12/11 pt was taken back to the OR for washout and a bowel resection was done as she was found to have a small bowel perforation  12/15 pt was found to have a subscapular liver hematoma  12/18 pt was taken back to the OR for wound vacc exchange and removal of hematoma   12/19 general surgery team recommended no further surgical intervention since they did not find any active bleeding intraoperatively on 12/18 and felt that the ongoing bleeding was 2/2 consumptive coagulopathy and septic shock. They recommended to continue wound vacc and to consider higher level of care for pt  Pt was also being treated for a bacteremia as well as for intraabdominal infection, her antibiotics prior to transfer were: meropenem, flagyl, and vancomycin   Pt received tranexamic acid x1 and multiple units of blood products.    [place] [facility] [place] for higher  "level of care: embolization for a subscapular hematoma       Nephrology was consulted for: "dialysis, SUSAN"    Pt does not have any h/o renal disease prior to hospitalization (per chart review and per pt's )   EDW: 88 kg   Post-op renal ultrasound was normal (results of ultrasound and other medical records from the outside hospital are in the chart at the bedside, needs to be scanned into chart)   Pt was started on CRRT on 12/12 via a trialysis catheter,   On 12/20/21 morning nephrology at outside hospital had recommended CRRT-SLED however primary team requested iHD prior to transfer to [facility], it appears that pt was ordered for 4 hours however, pt was prepped for transfer and was unable to complete full session 2/2 transfer, per pt's , pt had 2L of fluid removed instead of the planned 4 liters. Of note, while pt was at [facility] pt's CRRT required the use of citrate to prevent clotting (briefly), however that apparently resolved and was able to get a session w/o citrate and obviously was able to tolerate iHD prior to transfer.   Upon arrival to [facility] pt was started on zosyn, she was started on NS 125ml/hr, was transfused 2 units of PRBCs, and remained off vasopressors.       Interval History: s/p 3 hrs HD yesterday. No net UF removed. UOP: 855cc    Review of patient's allergies indicates:   Allergen Reactions    Tylox [oxycodone-acetaminophen]      "Jittery"      Current Facility-Administered Medications   Medication Frequency    0.9%  NaCl infusion Continuous    balsam peru-castor oiL Oint BID    dextrose 50% injection 25 g PRN    dronabinoL capsule 2.5 mg BID    epoetin genie-epbx injection 6,060 Units Q7 Days    fluconazole tablet 200 mg Daily    furosemide (LASIX) 100 mg in sodium chloride 0.9% IVPB Once    gabapentin 250 mg/5 mL (5 mL) solution 125 mg Q8H    heparin (porcine) injection 1,000 Units PRN    heparin (porcine) injection 1,000 Units PRN    heparin (porcine) injection 7,500 " Units Q8H    HYDROmorphone injection 0.5 mg Q3H PRN    levalbuterol nebulizer solution 0.63 mg Q4H    melatonin 1 mg/mL liquid (PEDS) 5 mg Nightly PRN    meropenem-0.9% sodium chloride 500 mg/50 mL IVPB Q12H    methocarbamoL tablet 500 mg QID    nitroGLYCERIN 2% TD oint ointment 0.5 inch Q6H    ondansetron injection 8 mg Q8H PRN    oxyCODONE 5 mg/5 mL solution 10 mg Q4H PRN    oxyCODONE 5 mg/5 mL solution 5 mg Q4H PRN    pantoprazole suspension 40 mg Daily    potassium, sodium phosphates 280-160-250 mg packet 2 packet TID PRN    propranoloL tablet 20 mg TID    scopolamine 1.3-1.5 mg (1 mg over 3 days) 1 patch Q3 Days    senna-docusate 8.6-50 mg per tablet 1 tablet Daily PRN    sodium chloride 0.9% bolus 250 mL PRN       Objective:     Vital Signs (Most Recent):  Temp: 99.9 °F (37.7 °C) (01/09/22 1100)  Pulse: 102 (01/09/22 1257)  Resp: 20 (01/09/22 1257)  BP: 107/62 (01/09/22 1100)  SpO2: 98 % (01/09/22 1257)  O2 Device (Oxygen Therapy): room air (01/09/22 1257) Vital Signs (24h Range):  Temp:  [98.2 °F (36.8 °C)-99.9 °F (37.7 °C)] 99.9 °F (37.7 °C)  Pulse:  [] 102  Resp:  [15-28] 20  SpO2:  [93 %-100 %] 98 %  BP: ()/(50-73) 107/62   Weight: 121.1 kg (267 lb) (12/28/21 1003)  Body mass index is 45.83 kg/m².  Body surface area is 2.34 meters squared.      Intake/Output Summary (Last 24 hours) at 1/9/2022 1359  Last data filed at 1/9/2022 1100  Gross per 24 hour   Intake 731.95 ml   Output 903 ml   Net -171.05 ml     I/O last 3 completed shifts:  In: 1335.5 [I.V.:41.8; Other:10; NG/GT:1050; IV Piggyback:233.7]  Out: 1216 [Urine:605; Drains:161; Other:450]  Net IO Since Admission: 3,770.66 mL [01/09/22 1359]     Physical Exam  Constitutional:       Appearance: She is obese. She is ill-appearing.   HENT:      Mouth/Throat:      Mouth: Mucous membranes are moist.   Eyes:      Pupils: Pupils are equal, round, and reactive to light.   Cardiovascular:      Rate and Rhythm: Normal rate and regular  rhythm.   Pulmonary:      Effort: No respiratory distress.   Abdominal:      General: There is no distension.      Comments: Wound vac   Musculoskeletal:         General: No deformity.      Right lower leg: Edema present.      Left lower leg: Edema present.     Significant Labs:  All labs within the past 24 hours have been reviewed.    Assessment/Plan:     Acute blood loss anemia  -iron restricted erythropoiesis   -tsat low  -weekly epo    Volume overload  -CTA and CXR pulmonary edema  -now improved after regular UF with KRT    Metabolic acidosis  -controlled with KRT    Bowel perforation  -per primary      Septic shock  -secondary to bowel perforation  -shock resolved and tolerated HD  -per primary      SUSAN (acute kidney injury)  -oliguric SUSAN, ischemic ATN with multifactorial etiology, however most likely d/t severe hypotension as a result of septic shock 2/2 small bowel perforation and bacteremia   -BL sCr 1  -KRT started at OSH 12/12/2021 for acidosis and volume overload  -started to urinate  -HD yesterday. Adequate clearance.  -Adequate response to lasix.      Subcapsular hematoma of liver  -per primary    PLAN/RECOMMENDATIONS    -No uremia, volume overload, or electrolytes imbalances requiring urgent hemodialysis at this moment.  -continue lasix drip 100 mg IV over the course of 24 hrs.        Thank you for your consult. I will follow-up with patient. Please contact us if you have any additional questions.    Marquis Nnuez MD  Nephrology  [facility]
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
## 2022-01-10 LAB
ALBUMIN SERPL BCP-MCNC: 1.6 G/DL (ref 3.5–5.2)
ALBUMIN SERPL BCP-MCNC: 1.6 G/DL (ref 3.5–5.2)
ALP SERPL-CCNC: 103 U/L (ref 55–135)
ALT SERPL W/O P-5'-P-CCNC: 23 U/L (ref 10–44)
ANION GAP SERPL CALC-SCNC: 10 MMOL/L (ref 8–16)
ANION GAP SERPL CALC-SCNC: 9 MMOL/L (ref 8–16)
AST SERPL-CCNC: 42 U/L (ref 10–40)
BASOPHILS # BLD AUTO: 0.07 K/UL (ref 0–0.2)
BASOPHILS NFR BLD: 0.5 % (ref 0–1.9)
BILIRUB SERPL-MCNC: 0.5 MG/DL (ref 0.1–1)
BUN SERPL-MCNC: 48 MG/DL (ref 6–20)
BUN SERPL-MCNC: 50 MG/DL (ref 6–20)
CA-I BLDV-SCNC: 1.09 MMOL/L (ref 1.06–1.42)
CALCIUM SERPL-MCNC: 8.5 MG/DL (ref 8.7–10.5)
CALCIUM SERPL-MCNC: 8.8 MG/DL (ref 8.7–10.5)
CHLORIDE SERPL-SCNC: 96 MMOL/L (ref 95–110)
CHLORIDE SERPL-SCNC: 98 MMOL/L (ref 95–110)
CO2 SERPL-SCNC: 25 MMOL/L (ref 23–29)
CO2 SERPL-SCNC: 26 MMOL/L (ref 23–29)
CREAT SERPL-MCNC: 2.9 MG/DL (ref 0.5–1.4)
CREAT SERPL-MCNC: 3.1 MG/DL (ref 0.5–1.4)
DIFFERENTIAL METHOD: ABNORMAL
EOSINOPHIL # BLD AUTO: 0.2 K/UL (ref 0–0.5)
EOSINOPHIL NFR BLD: 1 % (ref 0–8)
ERYTHROCYTE [DISTWIDTH] IN BLOOD BY AUTOMATED COUNT: 16.4 % (ref 11.5–14.5)
EST. GFR  (AFRICAN AMERICAN): 18.8 ML/MIN/1.73 M^2
EST. GFR  (AFRICAN AMERICAN): 20.4 ML/MIN/1.73 M^2
EST. GFR  (NON AFRICAN AMERICAN): 16.3 ML/MIN/1.73 M^2
EST. GFR  (NON AFRICAN AMERICAN): 17.7 ML/MIN/1.73 M^2
GLUCOSE SERPL-MCNC: 108 MG/DL (ref 70–110)
GLUCOSE SERPL-MCNC: 91 MG/DL (ref 70–110)
HCT VFR BLD AUTO: 25.7 % (ref 37–48.5)
HGB BLD-MCNC: 7.9 G/DL (ref 12–16)
IMM GRANULOCYTES # BLD AUTO: 0.67 K/UL (ref 0–0.04)
IMM GRANULOCYTES NFR BLD AUTO: 4.4 % (ref 0–0.5)
LYMPHOCYTES # BLD AUTO: 1.5 K/UL (ref 1–4.8)
LYMPHOCYTES NFR BLD: 9.8 % (ref 18–48)
MAGNESIUM SERPL-MCNC: 2 MG/DL (ref 1.6–2.6)
MCH RBC QN AUTO: 29.3 PG (ref 27–31)
MCHC RBC AUTO-ENTMCNC: 30.7 G/DL (ref 32–36)
MCV RBC AUTO: 95 FL (ref 82–98)
MONOCYTES # BLD AUTO: 1.1 K/UL (ref 0.3–1)
MONOCYTES NFR BLD: 7.2 % (ref 4–15)
NEUTROPHILS # BLD AUTO: 11.8 K/UL (ref 1.8–7.7)
NEUTROPHILS NFR BLD: 77.1 % (ref 38–73)
NRBC BLD-RTO: 0 /100 WBC
PHOSPHATE SERPL-MCNC: 5.9 MG/DL (ref 2.7–4.5)
PHOSPHATE SERPL-MCNC: 6.4 MG/DL (ref 2.7–4.5)
PLATELET # BLD AUTO: 573 K/UL (ref 150–450)
PMV BLD AUTO: 10.3 FL (ref 9.2–12.9)
POTASSIUM SERPL-SCNC: 3.8 MMOL/L (ref 3.5–5.1)
POTASSIUM SERPL-SCNC: 3.8 MMOL/L (ref 3.5–5.1)
PROT SERPL-MCNC: 6.2 G/DL (ref 6–8.4)
RBC # BLD AUTO: 2.7 M/UL (ref 4–5.4)
SODIUM SERPL-SCNC: 131 MMOL/L (ref 136–145)
SODIUM SERPL-SCNC: 133 MMOL/L (ref 136–145)
WBC # BLD AUTO: 15.24 K/UL (ref 3.9–12.7)

## 2022-01-10 PROCEDURE — 27000646 HC AEROBIKA DEVICE

## 2022-01-10 PROCEDURE — 94664 DEMO&/EVAL PT USE INHALER: CPT

## 2022-01-10 PROCEDURE — 25000242 PHARM REV CODE 250 ALT 637 W/ HCPCS: Performed by: SURGERY

## 2022-01-10 PROCEDURE — 80053 COMPREHEN METABOLIC PANEL: CPT | Performed by: SURGERY

## 2022-01-10 PROCEDURE — 63600175 PHARM REV CODE 636 W HCPCS: Performed by: SURGERY

## 2022-01-10 PROCEDURE — 84100 ASSAY OF PHOSPHORUS: CPT | Performed by: SURGERY

## 2022-01-10 PROCEDURE — 85025 COMPLETE CBC W/AUTO DIFF WBC: CPT | Performed by: STUDENT IN AN ORGANIZED HEALTH CARE EDUCATION/TRAINING PROGRAM

## 2022-01-10 PROCEDURE — 83735 ASSAY OF MAGNESIUM: CPT | Performed by: SURGERY

## 2022-01-10 PROCEDURE — 94761 N-INVAS EAR/PLS OXIMETRY MLT: CPT

## 2022-01-10 PROCEDURE — 97530 THERAPEUTIC ACTIVITIES: CPT

## 2022-01-10 PROCEDURE — 20600001 HC STEP DOWN PRIVATE ROOM

## 2022-01-10 PROCEDURE — 94799 UNLISTED PULMONARY SVC/PX: CPT

## 2022-01-10 PROCEDURE — 25000003 PHARM REV CODE 250: Performed by: STUDENT IN AN ORGANIZED HEALTH CARE EDUCATION/TRAINING PROGRAM

## 2022-01-10 PROCEDURE — 94640 AIRWAY INHALATION TREATMENT: CPT

## 2022-01-10 PROCEDURE — 97110 THERAPEUTIC EXERCISES: CPT

## 2022-01-10 PROCEDURE — 94760 N-INVAS EAR/PLS OXIMETRY 1: CPT

## 2022-01-10 PROCEDURE — 27200966 HC CLOSED SUCTION SYSTEM

## 2022-01-10 PROCEDURE — 63600175 PHARM REV CODE 636 W HCPCS: Performed by: STUDENT IN AN ORGANIZED HEALTH CARE EDUCATION/TRAINING PROGRAM

## 2022-01-10 PROCEDURE — 97535 SELF CARE MNGMENT TRAINING: CPT

## 2022-01-10 PROCEDURE — 25000242 PHARM REV CODE 250 ALT 637 W/ HCPCS

## 2022-01-10 PROCEDURE — 80069 RENAL FUNCTION PANEL: CPT | Performed by: SURGERY

## 2022-01-10 PROCEDURE — 99900035 HC TECH TIME PER 15 MIN (STAT)

## 2022-01-10 PROCEDURE — 82330 ASSAY OF CALCIUM: CPT | Performed by: SURGERY

## 2022-01-10 PROCEDURE — 25000003 PHARM REV CODE 250: Performed by: SURGERY

## 2022-01-10 PROCEDURE — 25000003 PHARM REV CODE 250

## 2022-01-10 RX ORDER — PROPRANOLOL HYDROCHLORIDE 20 MG/1
20 TABLET ORAL 2 TIMES DAILY
Status: DISCONTINUED | OUTPATIENT
Start: 2022-01-10 | End: 2022-01-14 | Stop reason: HOSPADM

## 2022-01-10 RX ORDER — ACETAMINOPHEN 650 MG/20.3ML
650 LIQUID ORAL EVERY 6 HOURS
Status: DISCONTINUED | OUTPATIENT
Start: 2022-01-10 | End: 2022-01-14 | Stop reason: HOSPADM

## 2022-01-10 RX ORDER — MEROPENEM AND SODIUM CHLORIDE 1 G/50ML
1 INJECTION, SOLUTION INTRAVENOUS
Status: DISCONTINUED | OUTPATIENT
Start: 2022-01-10 | End: 2022-01-14

## 2022-01-10 RX ADMIN — HEPARIN SODIUM 7500 UNITS: 5000 INJECTION INTRAVENOUS; SUBCUTANEOUS at 02:01

## 2022-01-10 RX ADMIN — NITROGLYCERIN 0.5 INCH: 20 OINTMENT TOPICAL at 12:01

## 2022-01-10 RX ADMIN — METHOCARBAMOL 500 MG: 500 TABLET ORAL at 12:01

## 2022-01-10 RX ADMIN — DRONABINOL 2.5 MG: 2.5 CAPSULE ORAL at 08:01

## 2022-01-10 RX ADMIN — MEROPENEM AND SODIUM CHLORIDE 1 G: 1 INJECTION, SOLUTION INTRAVENOUS at 09:01

## 2022-01-10 RX ADMIN — ACETAMINOPHEN 650 MG: 160 SOLUTION ORAL at 12:01

## 2022-01-10 RX ADMIN — MEROPENEM AND SODIUM CHLORIDE 1 G: 1 INJECTION, SOLUTION INTRAVENOUS at 08:01

## 2022-01-10 RX ADMIN — NITROGLYCERIN 0.5 INCH: 20 OINTMENT TOPICAL at 05:01

## 2022-01-10 RX ADMIN — NITROGLYCERIN 0.5 INCH: 20 OINTMENT TOPICAL at 07:01

## 2022-01-10 RX ADMIN — HEPARIN SODIUM 7500 UNITS: 5000 INJECTION INTRAVENOUS; SUBCUTANEOUS at 07:01

## 2022-01-10 RX ADMIN — HEPARIN SODIUM 7500 UNITS: 5000 INJECTION INTRAVENOUS; SUBCUTANEOUS at 09:01

## 2022-01-10 RX ADMIN — OXYCODONE HYDROCHLORIDE 10 MG: 5 SOLUTION ORAL at 02:01

## 2022-01-10 RX ADMIN — LEVALBUTEROL HYDROCHLORIDE 0.63 MG: 0.63 SOLUTION RESPIRATORY (INHALATION) at 03:01

## 2022-01-10 RX ADMIN — Medication: at 08:01

## 2022-01-10 RX ADMIN — ACETAMINOPHEN 650 MG: 160 SOLUTION ORAL at 05:01

## 2022-01-10 RX ADMIN — GABAPENTIN 125 MG: 250 SOLUTION ORAL at 09:01

## 2022-01-10 RX ADMIN — LEVALBUTEROL HYDROCHLORIDE 0.63 MG: 0.63 SOLUTION RESPIRATORY (INHALATION) at 08:01

## 2022-01-10 RX ADMIN — PANTOPRAZOLE SODIUM 40 MG: 40 GRANULE, DELAYED RELEASE ORAL at 08:01

## 2022-01-10 RX ADMIN — OXYCODONE HYDROCHLORIDE 10 MG: 5 SOLUTION ORAL at 08:01

## 2022-01-10 RX ADMIN — GABAPENTIN 125 MG: 250 SOLUTION ORAL at 07:01

## 2022-01-10 RX ADMIN — METHOCARBAMOL 500 MG: 500 TABLET ORAL at 08:01

## 2022-01-10 RX ADMIN — PROPRANOLOL HYDROCHLORIDE 20 MG: 20 TABLET ORAL at 08:01

## 2022-01-10 RX ADMIN — LEVALBUTEROL HYDROCHLORIDE 0.63 MG: 0.63 SOLUTION RESPIRATORY (INHALATION) at 12:01

## 2022-01-10 RX ADMIN — FLUCONAZOLE 200 MG: 200 TABLET ORAL at 08:01

## 2022-01-10 RX ADMIN — GABAPENTIN 125 MG: 250 SOLUTION ORAL at 02:01

## 2022-01-10 NOTE — PT/OT/SLP PROGRESS
Physical Therapy Co-Treatment with Occupational Therapy  Co-treatment with OT for maximal pt participation, safety, and activity tolerance      Patient Name:  Chidi Castaneda   MRN:  [id number]    Recommendations:     Discharge Recommendations:  rehabilitation facility   Discharge Equipment Recommendations:  (tbd pending progress)   Barriers to discharge: evolving clinical presentation    Assessment:     Chidi Castaneda is a 56 y.o. female admitted with a medical diagnosis of <principal problem not specified>.  She presents with the following impairments/functional limitations:  weakness,gait instability,impaired endurance,impaired balance,decreased lower extremity function,decreased safety awareness,impaired functional mobilty,pain,impaired skin. Pt agreeable to attempt OOB mobility today though she is still experiencing leg pain and dizziness when changing positions. Pt sits EOB with max A for torso control and positioning, sits upright for ~12 minutes, stands EOB with mod A x2, then back into supine with mod A for torso and leg placement.    Rehab Prognosis: Good; patient would benefit from acute skilled PT services to address these deficits and reach maximum level of function.    Recent Surgery: Procedure(s) (LRB):  LAPAROTOMY, EXPLORATORY (N/A)  INSERTION, GASTROSTOMY TUBE, PERCUTANEOUS (N/A)  CHOLECYSTECTOMY  EGD (ESOPHAGOGASTRODUODENOSCOPY) (N/A) 18 Days Post-Op    Plan:     During this hospitalization, patient to be seen 4 x/week to address the identified rehab impairments via gait training,therapeutic activities,therapeutic exercises,neuromuscular re-education and progress toward the following goals:    · Plan of Care Expires:  01/27/22    Subjective     Chief Complaint: dizziness  Patient/Family Comments/goals: Continue to get stronger  Pain/Comfort:  · Pain Rating 1:  (pain reported in legs but not specific value given)  · Location - Side 1: Bilateral  · Location - Orientation 1: generalized  · Location 1:  leg  · Pain Addressed 1: Distraction,Reposition      Objective:     Communicated with Nruse prior to session.  Patient found supine with telemetry,Trialysis,wound vac,billingsley catheter,pulse ox (continuous),blood pressure cuff,REAGAN drain,PEG Tube (REAGAN drain x3) upon PT entry to room.     General Precautions: Standard, fall   Orthopedic Precautions:N/A   Braces: N/A  Respiratory Status: Room air     Functional Mobility:  · Bed Mobility:     · Rolling Left:  minimum assistance  · Rolling Right: minimum assistance  · Supine to Sit: maximal assistance x2  · Sit to Supine: moderate assistance x2  · Transfers:     · Sit to Stand:  moderate assistance and of 2 persons with no AD  · Gait: deferred due to decreased standing endurance  · Balance: sitting balance fair+ requiring SBA for static sitting and CGA for dynamic sitting. Mod A x2 in standing for upright posture.      AM-PAC 6 CLICK MOBILITY  Turning over in bed (including adjusting bedclothes, sheets and blankets)?: 2  Sitting down on and standing up from a chair with arms (e.g., wheelchair, bedside commode, etc.): 2  Moving from lying on back to sitting on the side of the bed?: 2  Moving to and from a bed to a chair (including a wheelchair)?: 2  Need to walk in hospital room?: 1  Climbing 3-5 steps with a railing?: 1  Basic Mobility Total Score: 10       Therapeutic Activities and Exercises:    Educated patient on POC, verbalizes understanding   Seated therex performed  · Seated marches x7 Yoan w/ minimal manual assistance for full hip flexion  · Seated LAQs x7 Yoan with quad squeeze at top of movement  · Pt maintains sitting EOB for 12 minutes and performs grooming with OT    Patient left supine with all lines intact, call button in reach, nurse notified and SCDs replaced..    GOALS:   Multidisciplinary Problems     Physical Therapy Goals        Problem: Physical Therapy Goal    Goal Priority Disciplines Outcome Goal Variances Interventions   Physical Therapy Goal     PT,  PT/OT Ongoing, Progressing     Description: Goals to be met by: 2022    Patient will increase functional independence with mobility by performin. Supine to sit with Moderate Assistance  2. Sit to stand transfer with Moderate Assistance using LRAD  3. Gait  x 10 feet with Moderate Assistance using LRAD  4. Sitting at edge of bed x10 minutes with Stand-by Assistance  5. Stand for 3 minutes with Moderate Assistance using LRAD  6. Lower extremity exercise program x10 reps per handout, with independence                     Time Tracking:     PT Received On: 01/10/22  PT Start Time: 849     PT Stop Time: 915  PT Total Time (min): 26 min     Billable Minutes: Therapeutic Activity 15 min and Therapeutic Exercise 10 min    Treatment Type: Treatment  PT/PTA: PT     PTA Visit Number: 0     01/10/2022

## 2022-01-10 NOTE — PLAN OF CARE
[facility]  Discharge Reassessment    Primary Care Provider: Primary Doctor No    Expected Discharge Date: 1/14/2022    Reassessment (most recent)     Discharge Reassessment - 01/10/22 1035        Discharge Reassessment    Assessment Type Discharge Planning Reassessment     Communicated SHANA with patient/caregiver Date not available/Unable to determine     Discharge Plan A Rehab     Discharge Plan B Rehab     DME Needed Upon Discharge  other (see comments)   TBD    Discharge Barriers Identified None     Why the patient remains in the hospital Requires continued medical care        Post-Acute Status    Post-Acute Authorization Placement     Post-Acute Placement Status Pending medical clearance/testing               Per MD's Note, - UOP 1500cc over the last 24 hours. On lasix ggt challenge per nephrology. No signs of volume overload or electrolyte imbalances.  - WBC stable at 15k this AM.  - Remains with minimal appetite and fatique    The patient is not medically stable to discharge at this time.  The patient has been accepted into [facility] in [place], [place].  The SW faxed updated clinicals to [facility] in [place], [place] for review. The SW will continue to follow up to assist with discharge needs.

## 2022-01-10 NOTE — SUBJECTIVE & OBJECTIVE
"Interval History: NAEON, tolerating TF, OOBTC x7 hrs, UOP 1.4L    Medications:  Continuous Infusions:   sodium chloride 0.9% Stopped (01/08/22 1053)     Scheduled Meds:   balsam peru-castor oiL   Topical (Top) BID    dronabinoL  2.5 mg Oral BID    epoetin genie-epbx  50 Units/kg Intravenous Q7 Days    fluconazole  200 mg Per G Tube Daily    furosemide (LASIX) IVPB in NS IV bolus  100 mg Intravenous Once    gabapentin  125 mg Per G Tube Q8H    heparin (porcine)  7,500 Units Subcutaneous Q8H    levalbuterol  0.63 mg Nebulization Q4H    meropenem (MERREM) IVPB  500 mg Intravenous Q12H    methocarbamoL  500 mg Per G Tube QID    nitroGLYCERIN 2% TD oint  0.5 inch Topical (Top) Q6H    pantoprazole  40 mg Per G Tube Daily    propranoloL  20 mg Per G Tube TID    scopolamine  1 patch Transdermal Q3 Days     PRN Meds:dextrose 50%, heparin (porcine), heparin (porcine), HYDROmorphone, melatonin, ondansetron, oxyCODONE, oxyCODONE, potassium, sodium phosphates, senna-docusate 8.6-50 mg, sodium chloride 0.9%     Review of patient's allergies indicates:   Allergen Reactions    Tylox [oxycodone-acetaminophen]      "Jittery"     Objective:     Vital Signs (Most Recent):  Temp: 98.6 °F (37 °C) (01/10/22 0300)  Pulse: 100 (01/10/22 0645)  Resp: (!) 23 (01/10/22 0645)  BP: 121/70 (01/10/22 0630)  SpO2: (!) 93 % (01/10/22 0645) Vital Signs (24h Range):  Temp:  [98.6 °F (37 °C)-99.9 °F (37.7 °C)] 98.6 °F (37 °C)  Pulse:  [] 100  Resp:  [17-27] 23  SpO2:  [93 %-100 %] 93 %  BP: ()/(55-77) 121/70     Weight: 121.1 kg (267 lb)  Body mass index is 45.83 kg/m².    Intake/Output - Last 3 Shifts       01/08 0700 01/09 0659 01/09 0700  01/10 0659 01/10 0700  01/11 0659    I.V. (mL/kg) 0.3 (0)      Other       NG/ 540     IV Piggyback 131.3 151.4     Total Intake(mL/kg) 981.7 (8.1) 691.4 (5.7)     Urine (mL/kg/hr) 340 (0.1) 1595 (0.5)     Drains 73 146     Other 450 100     Total Output 863 1841     Net +118.7 " -1149.7                  Physical Exam  Vitals and nursing note reviewed.   Constitutional:       General: She is not in acute distress.     Appearance: She is obese. She is not diaphoretic.   HENT:      Head: Normocephalic and atraumatic.      Mouth/Throat:      Mouth: Mucous membranes are moist.      Pharynx: Oropharynx is clear.   Eyes:      Extraocular Movements: Extraocular movements intact.      Conjunctiva/sclera: Conjunctivae normal.   Cardiovascular:      Rate and Rhythm: Regular rhythm. Tachycardia present.   Pulmonary:      Effort: No respiratory distress.   Abdominal:      General: There is no distension.      Palpations: Abdomen is soft.      Tenderness: There is no guarding or rebound.      Comments: Wound vac in place with good seal, no leak noted.   RUQ REAGAN drains x2 with benign fluid, not bloody or bilious  LLQ IR drain with thin SS output   Musculoskeletal:         General: No deformity.   Skin:     General: Skin is warm and dry.   Neurological:      General: No focal deficit present.      Mental Status: She is alert and oriented to person, place, and time.         Significant Labs:  CBC:   Recent Labs   Lab 01/10/22  0333   WBC 15.24*   RBC 2.70*   HGB 7.9*   HCT 25.7*   *   MCV 95   MCH 29.3   MCHC 30.7*     CMP:   Recent Labs   Lab 01/10/22  0333      CALCIUM 8.8   ALBUMIN 1.6*   PROT 6.2   *   K 3.8   CO2 25   CL 98   BUN 48*   CREATININE 3.1*   ALKPHOS 103   ALT 23   AST 42*   BILITOT 0.5       Significant Diagnostics:  I have reviewed all pertinent imaging results/findings within the past 24 hours.

## 2022-01-10 NOTE — SUBJECTIVE & OBJECTIVE
"Interval History: 1.7L urine output last 24 hours with stable electrolytes    Review of patient's allergies indicates:   Allergen Reactions    Tylox [oxycodone-acetaminophen]      "Jittery"     Current Facility-Administered Medications   Medication Frequency    0.9%  NaCl infusion Continuous    acetaminophen oral solution 650 mg Q6H    balsam peru-castor oiL Oint BID    dextrose 50% injection 25 g PRN    dronabinoL capsule 2.5 mg BID    epoetin genie-epbx injection 6,060 Units Q7 Days    furosemide (LASIX) 100 mg in sodium chloride 0.9% IVPB Once    gabapentin 250 mg/5 mL (5 mL) solution 125 mg Q8H    heparin (porcine) injection 1,000 Units PRN    heparin (porcine) injection 7,500 Units Q8H    HYDROmorphone injection 0.5 mg Q3H PRN    levalbuterol nebulizer solution 0.63 mg Q4H    melatonin 1 mg/mL liquid (PEDS) 5 mg Nightly PRN    meropenem-0.9% sodium chloride 1 g/50 mL IVPB Q12H    methocarbamoL tablet 500 mg QID    nitroGLYCERIN 2% TD oint ointment 0.5 inch Q6H    ondansetron injection 8 mg Q8H PRN    oxyCODONE 5 mg/5 mL solution 10 mg Q4H PRN    oxyCODONE 5 mg/5 mL solution 5 mg Q4H PRN    potassium, sodium phosphates 280-160-250 mg packet 2 packet TID PRN    propranoloL tablet 20 mg BID    scopolamine 1.3-1.5 mg (1 mg over 3 days) 1 patch Q3 Days    senna-docusate 8.6-50 mg per tablet 1 tablet Daily PRN    sodium chloride 0.9% bolus 250 mL PRN       Objective:     Vital Signs (Most Recent):  Temp: 98.4 °F (36.9 °C) (01/10/22 1215)  Pulse: 99 (01/10/22 1215)  Resp: (!) 26 (01/10/22 1215)  BP: 114/69 (01/10/22 1215)  SpO2: 100 % (01/10/22 1215)  O2 Device (Oxygen Therapy): room air (01/10/22 1205) Vital Signs (24h Range):  Temp:  [98.3 °F (36.8 °C)-99.7 °F (37.6 °C)] 98.4 °F (36.9 °C)  Pulse:  [] 99  Resp:  [16-36] 26  SpO2:  [93 %-100 %] 100 %  BP: ()/(55-77) 114/69     Weight: 121.1 kg (267 lb) (12/28/21 1003)  Body mass index is 45.83 kg/m².  Body surface area is 2.34 " meters squared.    I/O last 3 completed shifts:  In: 1043.5 [NG/GT:820; IV Piggyback:223.5]  Out: 2461 [Urine:1875; Drains:186; Other:400]    Physical Exam  Constitutional:       Appearance: She is obese. She is ill-appearing.   HENT:      Mouth/Throat:      Mouth: Mucous membranes are moist.   Eyes:      Pupils: Pupils are equal, round, and reactive to light.   Cardiovascular:      Rate and Rhythm: Normal rate and regular rhythm.   Pulmonary:      Effort: No respiratory distress.      Comments: NC  Abdominal:      General: There is no distension.      Comments: Wound vac   Musculoskeletal:         General: No deformity.      Right lower leg: Edema present.      Left lower leg: Edema present.   Skin:     Coloration: Skin is not jaundiced.   Neurological:      General: No focal deficit present.         Significant Labs:  All labs within the past 24 hours have been reviewed.     Significant Imaging:  Labs: Reviewed

## 2022-01-10 NOTE — ASSESSMENT & PLAN NOTE
55yo female transfer from OSH after hysterectomy on 12/8 complicated by delayed recognition of small bowel injury requiring resection (in discontinuity) and at some point new bleed from the liver - transferred with open abdomen for higher level of care.  S/p ex-lap, liver packing 12/21  Open cholecystectomy, abdominal closure 12/23  IR drain placed into intra-abdominal abscess 12/31 - Cx growing e. Coli, enterococcus, and an unidentified GNR, f/u susceptibilities    - most critical part of care is PT/OT OOB at this point, severely debilitated   - Lasix gtt per nephro, ARF appears to be improving  - Aggressive pulmonary hygiene, OOB to chair, PT/OT  - CT 12/31 with pelvic abscess s/p drain placement.   - Continue vanc, micheal; fluc added 01/04   - VAP; BAL 12/28 with klebsiella    - Pelvic abscess 12/31; bacteroides, klebsiella, e coli, enterococcus   - Appreciate ID assistance   - Wound vac change biweekly - last performed 1/6, next due 1/10  - bowel reg  - Transfuse for Hgb <7  - Speech eval - advance to dental soft  - Continue REAGAN drains and IR drain; flush BID with 10 cc NS     - Strict I/Os   - Remainder of care per SICU, appreciate assistance    - Dispo: rehab likely this week pending nephrology recs

## 2022-01-10 NOTE — SIGNIFICANT EVENT
Wound Vac change at this time.  Pain medication given before vac change.  Patient tolerated well.  Will continue to monitor.

## 2022-01-10 NOTE — ASSESSMENT & PLAN NOTE
-oliguric SUSAN, ischemic ATN with multifactorial etiology, however most likely d/t severe hypotension as a result of septic shock 2/2 small bowel perforation and bacteremia   -BL sCr 1  -KRT started at OSH 12/12/2021 for acidosis and volume overload  -started to urinate  -on RA with net negative fluid balance and 1.7L urine output  -no improvement in creatinine, but not volume overloaded, no severe acidosis, no severe electrolyte abnormalities, no signs of uremia  -hold dialysis today
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
## 2022-01-10 NOTE — CONSULTS
Verbal consult for nocturnal feeds.    Rec Novasource Renal @ 60 mL/hr x 12 hrs providing pt with 1440 kcal, 65 g protein, and 516 mL free water   - Run from 7 pm - 7 am   - Adjust rate as needed as PO intake decreases or increases   - Rate provides ~80% EEN/EPN to allow for appetite to return during the day      Please re-consult as needed.    Thanks!  [id number]

## 2022-01-10 NOTE — PT/OT/SLP PROGRESS
Occupational Therapy  Co- Treatment    Name: Chidi Castaneda  MRN: [id number]  Admitting Diagnosis: subcapsular hematoma of liver   18 Days Post-Op    Recommendations:     Discharge Recommendations: rehabilitation facility    Assessment:     Chidi Castaneda is a 56 y.o. female .   Performance deficits affecting function are weakness,impaired endurance,impaired self care skills,impaired functional mobilty,gait instability,impaired balance,decreased upper extremity function,decreased lower extremity function.   Pt tolerated session well with good effort and performance. Pt is progressing well. Continue to anticipate pt is appropriate for t/f to IP REHAB prior to return home.   Rehab Prognosis:  Good; patient would benefit from acute skilled OT services to address these deficits and reach maximum level of function.       Plan:     Patient to be seen 4 x/week to address the above listed problems via self-care/home management,therapeutic activities,therapeutic exercises  · Plan of Care Expires: 02/02/22  · Plan of Care Reviewed with: patient    Subjective   Pt agreeable to therapy.   Pt reports dizziness throughout session   Pain/Comfort:  · Pain Rating 1: 0/10    Objective:     Communicated with: nsg prior to session.  Patient found supine in bed with REAGAN drain, wound vac, tele, pulse ox and BP cuff.   Co-tx completed this date to optimize functional performance and safety given impaired tolerance for activity in setting of ICU.     General Precautions: Standard, fall     Occupational Performance:     Bed Mobility:    · Patient completed Supine to Sit with moderate assistance  · Patient completed Sit to Supine with maximal assistance     Functional Mobility/Transfers:  · One standing trial completed with MOD A x 2. Pt able to fully clear buttocks from bed.     Activities of Daily Living:  · G/H: MIN A   · LE dressing: TOTAL A  · Toileting: TOTAL A     AMPAC 6 Click ADL: 11    Treatment & Education:  Pt tolerated sitting  EOB approx 12 min with SBA for static sitting. Pt requiring CGA to MIN A for dynamic sitting tasks including self care skills.   Pt required MIN A to complete oral care with proximal support provided to left UE to achieve distal mobility. Pt able to wash face with right hand also with MIN A. Overall, left UE is stronger and more functional than right UE.   Pt continued to reports persistent dizziness. All vital signs stable including BP.         Patient left supine in bed with needs in reach.   OT recommended nsg completed medichair t/f later this date to continue to increased pt's tolerance for upright activity.   GOALS:   Multidisciplinary Problems     Occupational Therapy Goals        Problem: Occupational Therapy Goal    Goal Priority Disciplines Outcome Interventions   Occupational Therapy Goal     OT, PT/OT Ongoing, Progressing    Description: Goals to be met by: 1/9/2022 (1 week)     Patient will increase functional independence with ADLs by performing:    UE Dressing with Maximum Assistance.  Grooming while seated with Moderate Assistance.  Toileting from bedside commode with Moderate Assistance for hygiene and clothing management.   Rolling to Bilateral with Moderate Assistance.   Supine to sit with Maximum Assistance.  Step transfer with Moderate Assistance  Toilet transfer to bedside commode with Moderate Assistance.                     Time Tracking:     OT Date of Treatment: 01/10/22  OT Start Time: 0849  OT Stop Time: 0915  OT Total Time (min): 26 min    Billable Minutes:Self Care/Home Management 15  Therapeutic Activity 11    OT/SHYANN: OT          1/10/2022
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
## 2022-01-10 NOTE — PROGRESS NOTES
[facility]  Nephrology  Progress Note    Patient Name: Chidi Castaneda  MRN: [id number]  Admission Date: 12/20/2021  Hospital Length of Stay: 21 days  Attending Provider: Kendall Gorman MD   Primary Care Physician: Primary Doctor No  Principal Problem:<principal problem not specified>    Subjective:     HPI: Chidi Castaneda is a 56 y.o. female w/ PMHx HTN, GERD s/p EGD 6/22/2021, migraines, ovarian cyst s/p cystectomy, and obesity who underwent an elective lap hysterectomy on 12/18/2021 at [facility] and was then transferred to [place], [place] for higher level of care when pt was found to have post-op somnolence, decrease PO intake, decrease urine output that progressed to anuria with a sharp rise in Cr from 0.9 to 2.8 (12/10). Pt was treated for sepsis with volume resuscitation and broad spectrum antibiotics, however pt continue to deteriorate and became tachycardic, hypotensive, and imaging showed an ileus. Pt was intubated since she was found to be acidotic with a ph of 7.28 despite a nonrebreather with 100%  FiO2  12/11 pt was taken back to the OR for washout and a bowel resection was done as she was found to have a small bowel perforation  12/15 pt was found to have a subscapular liver hematoma  12/18 pt was taken back to the OR for wound vacc exchange and removal of hematoma   12/19 general surgery team recommended no further surgical intervention since they did not find any active bleeding intraoperatively on 12/18 and felt that the ongoing bleeding was 2/2 consumptive coagulopathy and septic shock. They recommended to continue wound vacc and to consider higher level of care for pt  Pt was also being treated for a bacteremia as well as for intraabdominal infection, her antibiotics prior to transfer were: meropenem, flagyl, and vancomycin   Pt received tranexamic acid x1 and multiple units of blood products.    [place] [facility] [place] for higher  "level of care: embolization for a subscapular hematoma       Nephrology was consulted for: "dialysis, SUSAN"    Pt does not have any h/o renal disease prior to hospitalization (per chart review and per pt's )   EDW: 88 kg   Post-op renal ultrasound was normal (results of ultrasound and other medical records from the outside hospital are in the chart at the bedside, needs to be scanned into chart)   Pt was started on CRRT on 12/12 via a trialysis catheter,   On 12/20/21 morning nephrology at outside hospital had recommended CRRT-SLED however primary team requested iHD prior to transfer to [facility], it appears that pt was ordered for 4 hours however, pt was prepped for transfer and was unable to complete full session 2/2 transfer, per pt's , pt had 2L of fluid removed instead of the planned 4 liters. Of note, while pt was at [facility] pt's CRRT required the use of citrate to prevent clotting (briefly), however that apparently resolved and was able to get a session w/o citrate and obviously was able to tolerate iHD prior to transfer.   Upon arrival to [facility] pt was started on zosyn, she was started on NS 125ml/hr, was transfused 2 units of PRBCs, and remained off vasopressors.       Interval History: 1.7L urine output last 24 hours with stable electrolytes    Review of patient's allergies indicates:   Allergen Reactions    Tylox [oxycodone-acetaminophen]      "Jittery"     Current Facility-Administered Medications   Medication Frequency    0.9%  NaCl infusion Continuous    acetaminophen oral solution 650 mg Q6H    balsam peru-castor oiL Oint BID    dextrose 50% injection 25 g PRN    dronabinoL capsule 2.5 mg BID    epoetin genie-epbx injection 6,060 Units Q7 Days    furosemide (LASIX) 100 mg in sodium chloride 0.9% IVPB Once    gabapentin 250 mg/5 mL (5 mL) solution 125 mg Q8H    heparin (porcine) injection 1,000 Units PRN    heparin (porcine) injection 7,500 Units Q8H    HYDROmorphone injection " 0.5 mg Q3H PRN    levalbuterol nebulizer solution 0.63 mg Q4H    melatonin 1 mg/mL liquid (PEDS) 5 mg Nightly PRN    meropenem-0.9% sodium chloride 1 g/50 mL IVPB Q12H    methocarbamoL tablet 500 mg QID    nitroGLYCERIN 2% TD oint ointment 0.5 inch Q6H    ondansetron injection 8 mg Q8H PRN    oxyCODONE 5 mg/5 mL solution 10 mg Q4H PRN    oxyCODONE 5 mg/5 mL solution 5 mg Q4H PRN    potassium, sodium phosphates 280-160-250 mg packet 2 packet TID PRN    propranoloL tablet 20 mg BID    scopolamine 1.3-1.5 mg (1 mg over 3 days) 1 patch Q3 Days    senna-docusate 8.6-50 mg per tablet 1 tablet Daily PRN    sodium chloride 0.9% bolus 250 mL PRN       Objective:     Vital Signs (Most Recent):  Temp: 98.4 °F (36.9 °C) (01/10/22 1215)  Pulse: 99 (01/10/22 1215)  Resp: (!) 26 (01/10/22 1215)  BP: 114/69 (01/10/22 1215)  SpO2: 100 % (01/10/22 1215)  O2 Device (Oxygen Therapy): room air (01/10/22 1205) Vital Signs (24h Range):  Temp:  [98.3 °F (36.8 °C)-99.7 °F (37.6 °C)] 98.4 °F (36.9 °C)  Pulse:  [] 99  Resp:  [16-36] 26  SpO2:  [93 %-100 %] 100 %  BP: ()/(55-77) 114/69     Weight: 121.1 kg (267 lb) (12/28/21 1003)  Body mass index is 45.83 kg/m².  Body surface area is 2.34 meters squared.    I/O last 3 completed shifts:  In: 1043.5 [NG/GT:820; IV Piggyback:223.5]  Out: 2461 [Urine:1875; Drains:186; Other:400]    Physical Exam  Constitutional:       Appearance: She is obese. She is ill-appearing.   HENT:      Mouth/Throat:      Mouth: Mucous membranes are moist.   Eyes:      Pupils: Pupils are equal, round, and reactive to light.   Cardiovascular:      Rate and Rhythm: Normal rate and regular rhythm.   Pulmonary:      Effort: No respiratory distress.      Comments: NC  Abdominal:      General: There is no distension.      Comments: Wound vac   Musculoskeletal:         General: No deformity.      Right lower leg: Edema present.      Left lower leg: Edema present.   Skin:     Coloration: Skin is not  jaundiced.   Neurological:      General: No focal deficit present.         Significant Labs:  All labs within the past 24 hours have been reviewed.     Significant Imaging:  Labs: Reviewed    Assessment/Plan:     SUSAN (acute kidney injury)  -oliguric SUSAN, ischemic ATN with multifactorial etiology, however most likely d/t severe hypotension as a result of septic shock 2/2 small bowel perforation and bacteremia   -BL sCr 1  -KRT started at OSH 12/12/2021 for acidosis and volume overload  -started to urinate  -on RA with net negative fluid balance and 1.7L urine output  -no improvement in creatinine, but not volume overloaded, no severe acidosis, no severe electrolyte abnormalities, no signs of uremia  -hold dialysis today      Acute blood loss anemia  -iron restricted erythropoiesis   -tsat low  -weekly epo    Volume overload  -CTA and CXR pulmonary edema  -now improved after regular UF with KRT    Metabolic acidosis  -controlled with KRT    Bowel perforation  -per primary      Septic shock  -secondary to bowel perforation  -shock resolved and tolerated HD  -per primary      Subcapsular hematoma of liver  -per primary            Anthony Steven MD  Nephrology  [facility]

## 2022-01-10 NOTE — SUBJECTIVE & OBJECTIVE
Interval History/Significant Events:   - UOP 1500cc over the last 24 hours. On lasix ggt challenge per nephrology. No signs of volume overload or electrolyte imbalances.  - WBC stable at 15k this AM.  - Remains with minimal appetite and fatique    Follow-up For: Procedure(s) (LRB):  LAPAROTOMY, EXPLORATORY (N/A)  INSERTION, GASTROSTOMY TUBE, PERCUTANEOUS (N/A)  CHOLECYSTECTOMY  EGD (ESOPHAGOGASTRODUODENOSCOPY) (N/A)    Post-Operative Day: 17 Days Post-Op    Objective:     Vital Signs (Most Recent):  Temp: 98.6 °F (37 °C) (01/10/22 0300)  Pulse: 100 (01/10/22 0645)  Resp: (!) 23 (01/10/22 0645)  BP: 121/70 (01/10/22 0630)  SpO2: (!) 93 % (01/10/22 0645) Vital Signs (24h Range):  Temp:  [98.6 °F (37 °C)-99.9 °F (37.7 °C)] 98.6 °F (37 °C)  Pulse:  [] 100  Resp:  [17-27] 23  SpO2:  [93 %-100 %] 93 %  BP: ()/(55-77) 121/70     Weight: 121.1 kg (267 lb)  Body mass index is 45.83 kg/m².      Intake/Output Summary (Last 24 hours) at 1/10/2022 0730  Last data filed at 1/10/2022 0600  Gross per 24 hour   Intake 647.42 ml   Output 1796 ml   Net -1148.58 ml       Physical Exam  Vitals and nursing note reviewed.   Constitutional:       General: She is not in acute distress.     Appearance: She is obese. She is not diaphoretic.   HENT:      Head: Normocephalic and atraumatic.      Mouth/Throat:      Mouth: Mucous membranes are moist.      Pharynx: Oropharynx is clear.   Eyes:      Extraocular Movements: Extraocular movements intact.      Conjunctiva/sclera: Conjunctivae normal.   Cardiovascular:      Rate and Rhythm: Normal rate and regular rhythm.   Pulmonary:      Effort: No respiratory distress.   Abdominal:      General: There is no distension.      Palpations: Abdomen is soft.      Tenderness: There is no guarding or rebound.      Comments: Wound vac in place with good seal, no leak noted.   RUQ REAGAN drains x2 with benign fluid, not bloody or bilious  LLQ IR drain with thin SS output   Musculoskeletal:          General: No deformity.   Skin:     General: Skin is warm and dry.   Neurological:      General: No focal deficit present.      Mental Status: She is alert and oriented to person, place, and time.         Vents:  Vent Mode: Spont (12/31/21 2000)  Ventilator Initiated: Yes (12/28/21 1104)  Set Rate: 18 BPM (12/31/21 0400)  Vt Set: 320 mL (12/31/21 0400)  Pressure Support: 60 cmH20 (12/31/21 0115)  PEEP/CPAP: 5 cmH20 (12/31/21 2000)  Oxygen Concentration (%): 28 (01/09/22 0500)  Peak Airway Pressure: 24 cmH2O (12/31/21 2000)  Plateau Pressure: 31 cmH20 (12/31/21 2000)  Total Ve: 9.78 mL (12/31/21 2000)  Negative Inspiratory Force (cm H2O): -30 (12/31/21 2030)  F/VT Ratio<105 (RSBI): (!) 47.52 (12/31/21 2000)    Lines/Drains/Airways     Central Venous Catheter Line            Trialysis (Dialysis) Catheter 12/30/21 1000 right internal jugular 10 days          Drain                 Closed/Suction Drain 12/23/21 1428 Right;Inferior RUQ Bulb 19 Fr. 17 days         Closed/Suction Drain 12/23/21 1429 Right;Superior RUQ Bulb 19 Fr. 17 days         Gastrostomy/Enterostomy 12/23/21 1338 Gastrostomy tube w/ balloon LUQ feeding 17 days         Closed/Suction Drain 12/31/21 1559 Left Abdomen Bulb 14 Fr. 9 days         Urethral Catheter 01/06/22 1220 3 days                Significant Labs:    CBC/Anemia Profile:  Recent Labs   Lab 01/09/22  0321 01/10/22  0333   WBC 15.91* 15.24*   HGB 7.6* 7.9*   HCT 24.9* 25.7*   * 573*   MCV 96 95   RDW 16.9* 16.4*        Chemistries:  Recent Labs   Lab 01/09/22  0321 01/09/22  0321 01/09/22  1540 01/09/22  2144 01/10/22  0333   *   < > 132* 133* 133*   K 3.8   < > 3.7 3.7 3.8   CL 98   < > 96 97 98   CO2 24   < > 25 24 25   BUN 38*   < > 42* 46* 48*   CREATININE 2.9*   < > 3.0* 3.1* 3.1*   CALCIUM 8.4*   < > 8.4* 8.5* 8.8   ALBUMIN 1.5*   < > 1.6* 1.6* 1.6*   PROT 5.9*  --   --   --  6.2   BILITOT 0.5  --   --   --  0.5   ALKPHOS 102  --   --   --  103   ALT 21  --   --   --  23    AST 33  --   --   --  42*   MG 2.1   < > 2.0 1.9 2.0   PHOS 5.4*   < > 5.3*  5.3* 5.5*  5.5* 5.9*    < > = values in this interval not displayed.       All pertinent labs within the past 24 hours have been reviewed.    Significant Imaging:  I have reviewed all pertinent imaging results/findings within the past 24 hours.

## 2022-01-10 NOTE — PROGRESS NOTES
"[facility]  General Surgery  Progress Note    Subjective:     History of Present Illness:  No notes on file    Post-Op Info:  Procedure(s) (LRB):  LAPAROTOMY, EXPLORATORY (N/A)  INSERTION, GASTROSTOMY TUBE, PERCUTANEOUS (N/A)  CHOLECYSTECTOMY  EGD (ESOPHAGOGASTRODUODENOSCOPY) (N/A)   18 Days Post-Op     Interval History: NAEON, tolerating TF, OOBTC x7 hrs, UOP 1.4L    Medications:  Continuous Infusions:   sodium chloride 0.9% Stopped (01/08/22 1053)     Scheduled Meds:   balsam peru-castor oiL   Topical (Top) BID    dronabinoL  2.5 mg Oral BID    epoetin genie-epbx  50 Units/kg Intravenous Q7 Days    fluconazole  200 mg Per G Tube Daily    furosemide (LASIX) IVPB in NS IV bolus  100 mg Intravenous Once    gabapentin  125 mg Per G Tube Q8H    heparin (porcine)  7,500 Units Subcutaneous Q8H    levalbuterol  0.63 mg Nebulization Q4H    meropenem (MERREM) IVPB  500 mg Intravenous Q12H    methocarbamoL  500 mg Per G Tube QID    nitroGLYCERIN 2% TD oint  0.5 inch Topical (Top) Q6H    pantoprazole  40 mg Per G Tube Daily    propranoloL  20 mg Per G Tube TID    scopolamine  1 patch Transdermal Q3 Days     PRN Meds:dextrose 50%, heparin (porcine), heparin (porcine), HYDROmorphone, melatonin, ondansetron, oxyCODONE, oxyCODONE, potassium, sodium phosphates, senna-docusate 8.6-50 mg, sodium chloride 0.9%     Review of patient's allergies indicates:   Allergen Reactions    Tylox [oxycodone-acetaminophen]      "Jittery"     Objective:     Vital Signs (Most Recent):  Temp: 98.6 °F (37 °C) (01/10/22 0300)  Pulse: 100 (01/10/22 0645)  Resp: (!) 23 (01/10/22 0645)  BP: 121/70 (01/10/22 0630)  SpO2: (!) 93 % (01/10/22 0645) Vital Signs (24h Range):  Temp:  [98.6 °F (37 °C)-99.9 °F (37.7 °C)] 98.6 °F (37 °C)  Pulse:  [] 100  Resp:  [17-27] 23  SpO2:  [93 %-100 %] 93 %  BP: ()/(55-77) 121/70     Weight: 121.1 kg (267 lb)  Body mass index is 45.83 kg/m².    Intake/Output - Last 3 Shifts  "      01/08 0700  01/09 0659 01/09 0700  01/10 0659 01/10 0700 01/11 0659    I.V. (mL/kg) 0.3 (0)      Other       NG/ 540     IV Piggyback 131.3 151.4     Total Intake(mL/kg) 981.7 (8.1) 691.4 (5.7)     Urine (mL/kg/hr) 340 (0.1) 1595 (0.5)     Drains 73 146     Other 450 100     Total Output 863 1841     Net +118.7 -1149.7                  Physical Exam  Vitals and nursing note reviewed.   Constitutional:       General: She is not in acute distress.     Appearance: She is obese. She is not diaphoretic.   HENT:      Head: Normocephalic and atraumatic.      Mouth/Throat:      Mouth: Mucous membranes are moist.      Pharynx: Oropharynx is clear.   Eyes:      Extraocular Movements: Extraocular movements intact.      Conjunctiva/sclera: Conjunctivae normal.   Cardiovascular:      Rate and Rhythm: Regular rhythm. Tachycardia present.   Pulmonary:      Effort: No respiratory distress.   Abdominal:      General: There is no distension.      Palpations: Abdomen is soft.      Tenderness: There is no guarding or rebound.      Comments: Wound vac in place with good seal, no leak noted.   RUQ REAGAN drains x2 with benign fluid, not bloody or bilious  LLQ IR drain with thin SS output   Musculoskeletal:         General: No deformity.   Skin:     General: Skin is warm and dry.   Neurological:      General: No focal deficit present.      Mental Status: She is alert and oriented to person, place, and time.         Significant Labs:  CBC:   Recent Labs   Lab 01/10/22  0333   WBC 15.24*   RBC 2.70*   HGB 7.9*   HCT 25.7*   *   MCV 95   MCH 29.3   MCHC 30.7*     CMP:   Recent Labs   Lab 01/10/22  0333      CALCIUM 8.8   ALBUMIN 1.6*   PROT 6.2   *   K 3.8   CO2 25   CL 98   BUN 48*   CREATININE 3.1*   ALKPHOS 103   ALT 23   AST 42*   BILITOT 0.5       Significant Diagnostics:  I have reviewed all pertinent imaging results/findings within the past 24 hours.    Assessment/Plan:     Subcapsular hematoma of  liver  55yo female transfer from OSH after hysterectomy on 12/8 complicated by delayed recognition of small bowel injury requiring resection (in discontinuity) and at some point new bleed from the liver - transferred with open abdomen for higher level of care.  S/p ex-lap, liver packing 12/21  Open cholecystectomy, abdominal closure 12/23  IR drain placed into intra-abdominal abscess 12/31 - Cx growing e. Coli, enterococcus, and an unidentified GNR, f/u susceptibilities    - most critical part of care is PT/OT OOB at this point, severely debilitated   - Lasix gtt per nephro, ARF appears to be improving  - Aggressive pulmonary hygiene, OOB to chair, PT/OT  - CT 12/31 with pelvic abscess s/p drain placement.   - Continue vanc, micheal; fluc added 01/04   - VAP; BAL 12/28 with klebsiella    - Pelvic abscess 12/31; bacteroides, klebsiella, e coli, enterococcus   - Appreciate ID assistance   - Wound vac change biweekly - last performed 1/6, next due 1/10  - bowel reg  - Transfuse for Hgb <7  - Speech eval - advance to dental soft  - Continue REAGAN drains and IR drain; flush BID with 10 cc NS     - Strict I/Os   - Remainder of care per SICU, appreciate assistance    - Dispo: rehab likely this week pending nephrology recs          Fran Lorenzo MD  General Surgery  [facility]

## 2022-01-11 LAB
BASOPHILS # BLD AUTO: 0.05 K/UL (ref 0–0.2)
BASOPHILS NFR BLD: 0.4 % (ref 0–1.9)
CA-I BLDV-SCNC: 1.14 MMOL/L (ref 1.06–1.42)
DIFFERENTIAL METHOD: ABNORMAL
EOSINOPHIL # BLD AUTO: 0.2 K/UL (ref 0–0.5)
EOSINOPHIL NFR BLD: 1.3 % (ref 0–8)
ERYTHROCYTE [DISTWIDTH] IN BLOOD BY AUTOMATED COUNT: 15.9 % (ref 11.5–14.5)
HCT VFR BLD AUTO: 24.9 % (ref 37–48.5)
HGB BLD-MCNC: 7.7 G/DL (ref 12–16)
IMM GRANULOCYTES # BLD AUTO: 0.52 K/UL (ref 0–0.04)
IMM GRANULOCYTES NFR BLD AUTO: 3.7 % (ref 0–0.5)
LYMPHOCYTES # BLD AUTO: 1.3 K/UL (ref 1–4.8)
LYMPHOCYTES NFR BLD: 9.1 % (ref 18–48)
MAGNESIUM SERPL-MCNC: 2 MG/DL (ref 1.6–2.6)
MCH RBC QN AUTO: 28.4 PG (ref 27–31)
MCHC RBC AUTO-ENTMCNC: 30.9 G/DL (ref 32–36)
MCV RBC AUTO: 92 FL (ref 82–98)
MONOCYTES # BLD AUTO: 0.9 K/UL (ref 0.3–1)
MONOCYTES NFR BLD: 6.6 % (ref 4–15)
NEUTROPHILS # BLD AUTO: 11.2 K/UL (ref 1.8–7.7)
NEUTROPHILS NFR BLD: 78.9 % (ref 38–73)
NRBC BLD-RTO: 0 /100 WBC
PLATELET # BLD AUTO: 641 K/UL (ref 150–450)
PMV BLD AUTO: 10.3 FL (ref 9.2–12.9)
RBC # BLD AUTO: 2.71 M/UL (ref 4–5.4)
WBC # BLD AUTO: 14.22 K/UL (ref 3.9–12.7)

## 2022-01-11 PROCEDURE — 99900035 HC TECH TIME PER 15 MIN (STAT)

## 2022-01-11 PROCEDURE — 63600175 PHARM REV CODE 636 W HCPCS: Performed by: STUDENT IN AN ORGANIZED HEALTH CARE EDUCATION/TRAINING PROGRAM

## 2022-01-11 PROCEDURE — 97110 THERAPEUTIC EXERCISES: CPT

## 2022-01-11 PROCEDURE — 25000242 PHARM REV CODE 250 ALT 637 W/ HCPCS: Performed by: STUDENT IN AN ORGANIZED HEALTH CARE EDUCATION/TRAINING PROGRAM

## 2022-01-11 PROCEDURE — 27000646 HC AEROBIKA DEVICE

## 2022-01-11 PROCEDURE — 94640 AIRWAY INHALATION TREATMENT: CPT

## 2022-01-11 PROCEDURE — 25000003 PHARM REV CODE 250: Performed by: STUDENT IN AN ORGANIZED HEALTH CARE EDUCATION/TRAINING PROGRAM

## 2022-01-11 PROCEDURE — 27000221 HC OXYGEN, UP TO 24 HOURS

## 2022-01-11 PROCEDURE — 83735 ASSAY OF MAGNESIUM: CPT | Performed by: SURGERY

## 2022-01-11 PROCEDURE — 94664 DEMO&/EVAL PT USE INHALER: CPT

## 2022-01-11 PROCEDURE — 85025 COMPLETE CBC W/AUTO DIFF WBC: CPT | Performed by: SURGERY

## 2022-01-11 PROCEDURE — 94799 UNLISTED PULMONARY SVC/PX: CPT

## 2022-01-11 PROCEDURE — 97530 THERAPEUTIC ACTIVITIES: CPT

## 2022-01-11 PROCEDURE — 63600175 PHARM REV CODE 636 W HCPCS: Mod: JG | Performed by: STUDENT IN AN ORGANIZED HEALTH CARE EDUCATION/TRAINING PROGRAM

## 2022-01-11 PROCEDURE — 94761 N-INVAS EAR/PLS OXIMETRY MLT: CPT

## 2022-01-11 PROCEDURE — 20600001 HC STEP DOWN PRIVATE ROOM

## 2022-01-11 PROCEDURE — 97535 SELF CARE MNGMENT TRAINING: CPT

## 2022-01-11 PROCEDURE — 82330 ASSAY OF CALCIUM: CPT | Performed by: SURGERY

## 2022-01-11 RX ADMIN — EPOETIN ALFA-EPBX 6060 UNITS: 4000 INJECTION, SOLUTION INTRAVENOUS; SUBCUTANEOUS at 10:01

## 2022-01-11 RX ADMIN — MEROPENEM AND SODIUM CHLORIDE 1 G: 1 INJECTION, SOLUTION INTRAVENOUS at 09:01

## 2022-01-11 RX ADMIN — NITROGLYCERIN 0.5 INCH: 20 OINTMENT TOPICAL at 12:01

## 2022-01-11 RX ADMIN — GABAPENTIN 125 MG: 250 SOLUTION ORAL at 05:01

## 2022-01-11 RX ADMIN — LEVALBUTEROL HYDROCHLORIDE 0.63 MG: 0.63 SOLUTION RESPIRATORY (INHALATION) at 12:01

## 2022-01-11 RX ADMIN — DRONABINOL 2.5 MG: 2.5 CAPSULE ORAL at 09:01

## 2022-01-11 RX ADMIN — ACETAMINOPHEN 650 MG: 160 SOLUTION ORAL at 11:01

## 2022-01-11 RX ADMIN — Medication: at 09:01

## 2022-01-11 RX ADMIN — LEVALBUTEROL HYDROCHLORIDE 0.63 MG: 0.63 SOLUTION RESPIRATORY (INHALATION) at 03:01

## 2022-01-11 RX ADMIN — Medication: at 08:01

## 2022-01-11 RX ADMIN — ACETAMINOPHEN 650 MG: 160 SOLUTION ORAL at 12:01

## 2022-01-11 RX ADMIN — NITROGLYCERIN 0.5 INCH: 20 OINTMENT TOPICAL at 05:01

## 2022-01-11 RX ADMIN — METHOCARBAMOL 500 MG: 500 TABLET ORAL at 08:01

## 2022-01-11 RX ADMIN — METHOCARBAMOL 500 MG: 500 TABLET ORAL at 12:01

## 2022-01-11 RX ADMIN — PROPRANOLOL HYDROCHLORIDE 20 MG: 20 TABLET ORAL at 08:01

## 2022-01-11 RX ADMIN — GABAPENTIN 125 MG: 250 SOLUTION ORAL at 09:01

## 2022-01-11 RX ADMIN — DRONABINOL 2.5 MG: 2.5 CAPSULE ORAL at 08:01

## 2022-01-11 RX ADMIN — LEVALBUTEROL HYDROCHLORIDE 0.63 MG: 0.63 SOLUTION RESPIRATORY (INHALATION) at 08:01

## 2022-01-11 RX ADMIN — LEVALBUTEROL HYDROCHLORIDE 0.63 MG: 0.63 SOLUTION RESPIRATORY (INHALATION) at 07:01

## 2022-01-11 RX ADMIN — HEPARIN SODIUM 7500 UNITS: 5000 INJECTION INTRAVENOUS; SUBCUTANEOUS at 09:01

## 2022-01-11 RX ADMIN — LEVALBUTEROL HYDROCHLORIDE 0.63 MG: 0.63 SOLUTION RESPIRATORY (INHALATION) at 11:01

## 2022-01-11 RX ADMIN — HEPARIN SODIUM 7500 UNITS: 5000 INJECTION INTRAVENOUS; SUBCUTANEOUS at 05:01

## 2022-01-11 RX ADMIN — PROPRANOLOL HYDROCHLORIDE 20 MG: 20 TABLET ORAL at 09:01

## 2022-01-11 RX ADMIN — MEROPENEM AND SODIUM CHLORIDE 1 G: 1 INJECTION, SOLUTION INTRAVENOUS at 08:01

## 2022-01-11 RX ADMIN — METHOCARBAMOL 500 MG: 500 TABLET ORAL at 05:01

## 2022-01-11 RX ADMIN — OXYCODONE HYDROCHLORIDE 10 MG: 5 SOLUTION ORAL at 02:01

## 2022-01-11 RX ADMIN — ONDANSETRON 8 MG: 2 INJECTION INTRAMUSCULAR; INTRAVENOUS at 06:01

## 2022-01-11 RX ADMIN — ACETAMINOPHEN 650 MG: 160 SOLUTION ORAL at 05:01

## 2022-01-11 RX ADMIN — HEPARIN SODIUM 7500 UNITS: 5000 INJECTION INTRAVENOUS; SUBCUTANEOUS at 02:01

## 2022-01-11 RX ADMIN — METHOCARBAMOL 500 MG: 500 TABLET ORAL at 09:01

## 2022-01-11 RX ADMIN — GABAPENTIN 125 MG: 250 SOLUTION ORAL at 02:01

## 2022-01-11 NOTE — PROGRESS NOTES
Patient arrived to floor from ICU, patient stable, alert and oriented with  at bedside. All drains and wound vac assessed and working approprietly. Ng in place upon arrival. Patient denies any pain or discomfort at this time. Vitals stable.

## 2022-01-11 NOTE — PROGRESS NOTES
"[facility] - [facility]  General Surgery  Progress Note    Subjective:     History of Present Illness:  No notes on file    Post-Op Info:  Procedure(s) (LRB):  LAPAROTOMY, EXPLORATORY (N/A)  INSERTION, GASTROSTOMY TUBE, PERCUTANEOUS (N/A)  CHOLECYSTECTOMY  EGD (ESOPHAGOGASTRODUODENOSCOPY) (N/A)   19 Days Post-Op     Interval History: NAEON, tolerating TF, OOBTC x7 hrs, UOP 1688L. Stepped down to [facility]; Dialysis held yesterday given UOP.     Medications:  Continuous Infusions:   sodium chloride 0.9% Stopped (01/08/22 1053)     Scheduled Meds:   acetaminophen  650 mg Oral Q6H    balsam peru-castor oiL   Topical (Top) BID    dronabinoL  2.5 mg Oral BID    epoetin genie-epbx  50 Units/kg Intravenous Q7 Days    gabapentin  125 mg Per G Tube Q8H    heparin (porcine)  7,500 Units Subcutaneous Q8H    levalbuterol  0.63 mg Nebulization Q4H    meropenem (MERREM) IVPB  1 g Intravenous Q12H    methocarbamoL  500 mg Per G Tube QID    nitroGLYCERIN 2% TD oint  0.5 inch Topical (Top) Q6H    propranoloL  20 mg Per G Tube BID    scopolamine  1 patch Transdermal Q3 Days     PRN Meds:dextrose 50%, heparin (porcine), HYDROmorphone, melatonin, ondansetron, oxyCODONE, oxyCODONE, potassium, sodium phosphates, senna-docusate 8.6-50 mg, sodium chloride 0.9%     Review of patient's allergies indicates:   Allergen Reactions    Tylox [oxycodone-acetaminophen]      "Jittery"     Objective:     Vital Signs (Most Recent):  Temp: 98.1 °F (36.7 °C) (01/11/22 0418)  Pulse: 106 (01/11/22 0744)  Resp: 18 (01/11/22 0744)  BP: 97/66 (01/11/22 0418)  SpO2: 95 % (01/11/22 0744) Vital Signs (24h Range):  Temp:  [97.8 °F (36.6 °C)-98.4 °F (36.9 °C)] 98.1 °F (36.7 °C)  Pulse:  [] 106  Resp:  [16-36] 18  SpO2:  [91 %-100 %] 95 %  BP: ()/(57-69) 97/66     Weight: 121.1 kg (267 lb)  Body mass index is 45.83 kg/m².    Intake/Output - Last 3 Shifts       01/09 0700  01/10 0659 01/10 0700  01/11 0659 01/11 0700 01/12 0659    P.O.  240     I.V. " (mL/kg)       NG/      IV Piggyback 151.4 72.9     Total Intake(mL/kg) 691.4 (5.7) 312.9 (2.6)     Urine (mL/kg/hr) 1595 (0.5) 1763 (0.6)     Drains 146 100     Other 100 100     Total Output 1841 1963     Net -1149.7 -1650.1                  Physical Exam  Vitals and nursing note reviewed.   Constitutional:       General: She is not in acute distress.     Appearance: She is obese. She is not diaphoretic.   HENT:      Head: Normocephalic and atraumatic.      Mouth/Throat:      Mouth: Mucous membranes are moist.      Pharynx: Oropharynx is clear.   Eyes:      Extraocular Movements: Extraocular movements intact.      Conjunctiva/sclera: Conjunctivae normal.   Cardiovascular:      Rate and Rhythm: Regular rhythm. Tachycardia present.   Pulmonary:      Effort: No respiratory distress.   Abdominal:      General: There is no distension.      Palpations: Abdomen is soft.      Tenderness: There is no guarding or rebound.      Comments: Wound vac in place with good seal, no leak noted.   RUQ REAGAN drains x2 with benign fluid, not bloody or bilious  LLQ IR drain with thin SS output   Musculoskeletal:         General: No deformity.   Skin:     General: Skin is warm and dry.   Neurological:      General: No focal deficit present.      Mental Status: She is alert and oriented to person, place, and time.         Significant Labs:  CBC:   Recent Labs   Lab 01/11/22  0543   WBC 14.22*   RBC 2.71*   HGB 7.7*   HCT 24.9*   *   MCV 92   MCH 28.4   MCHC 30.9*     CMP:   Recent Labs   Lab 01/10/22  0333 01/10/22  0333 01/10/22  2023      < > 91   CALCIUM 8.8   < > 8.5*   ALBUMIN 1.6*   < > 1.6*   PROT 6.2  --   --    *   < > 131*   K 3.8   < > 3.8   CO2 25   < > 26   CL 98   < > 96   BUN 48*   < > 50*   CREATININE 3.1*   < > 2.9*   ALKPHOS 103  --   --    ALT 23  --   --    AST 42*  --   --    BILITOT 0.5  --   --     < > = values in this interval not displayed.       Significant Diagnostics:  I have reviewed all  pertinent imaging results/findings within the past 24 hours.    Assessment/Plan:     Subcapsular hematoma of liver  55yo female transfer from OSH after hysterectomy on 12/8 complicated by delayed recognition of small bowel injury requiring resection (in discontinuity) and at some point new bleed from the liver - transferred with open abdomen for higher level of care.  S/p ex-lap, liver packing 12/21  Open cholecystectomy, abdominal closure 12/23  IR drain placed into intra-abdominal abscess 12/31 - Cx growing e. Coli, enterococcus, and an unidentified GNR, f/u susceptibilities    - PT/OT reordered  - Aggressive pulmonary hygiene, OOB to chair, PT/OT  - Renal diet, Tube feedings nightly  - Nutrition and speech following, appreciate assistance   - bowel reg  - Appreciate ID assistance   - VAP; BAL 12/28 with klebsiella    - CT 12/31 with pelvic abscess s/p drain placement.   - Pelvic abscess 12/31; bacteroides, klebsiella, e coli, enterococcus   --  fluconazole IV (1/4 - 1/9   -- discontinued Vanc stopped 1/3   -- ontinue Meropenem for 3 week course (1/4-1/24, repeat CT Chest prior to stopping abx)  - Wound vac change biweekly - last performed 1/9, next due 01/12. Will need home wound vac.   - Continue REAGAN drains and IR drain; flush BID with 10 cc NS . Will likely pull one saeed-hepatic drain today.     - Strict I/Os   - SUSAN/ARF present on admission now resolving. Cr stable ~ 3 range, however UOP improving and electrolytes WNL  - Nephro following, appreciate assistance. Last HD 01/08, held yesterday given UOP    - Dispo: rehab, possibly this week pending nephrology recs          Angelica Ivy MD  General Surgery  [facility]

## 2022-01-11 NOTE — PT/OT/SLP PROGRESS
Physical Therapy Co-Treatment with OT    Patient Name:  Chidi Castaneda   MRN:  [id number]    Pre-op Diagnosis: Subcapsular hematoma of liver [K76.89]    Procedure(s):  LAPAROTOMY, EXPLORATORY  INSERTION, GASTROSTOMY TUBE, PERCUTANEOUS  CHOLECYSTECTOMY  EGD (ESOPHAGOGASTRODUODENOSCOPY)     *Co-treatment with OT due to patient complexity and need for two skilled therapists to ensure safe mobilization.    Recommendations:     Discharge Recommendations:  rehabilitation facility   Discharge Equipment Recommendations:  (tbd)   Barriers to discharge: None    Highest Level of Mobility: T/f to chair   Assistance Required: mod(a)X2 via stand pivot    Assessment:     Chidi Castaneda is a 56 y.o. female admitted with a medical diagnosis of subscapular hematoma of liver.  She presents with the following impairments/functional limitations:  weakness,impaired endurance,impaired self care skills,decreased lower extremity function,impaired functional mobilty,gait instability,decreased safety awareness,pain,impaired balance,impaired cardiopulmonary response to activity,edema,impaired skin. Chidi Castaneda would benefit from acute PT intervention to address listed functional deficits, provide patient/caregiver education, reduce fall risk, and maximize (I) and safety with functional mobility.    Pt met with HOB elevated and agreeable to PT treatment. Today's treatment took place in two sessions. During the first session, pt was assisted to the bedside chair with mod(A)x2 persons. Therapists returned after 3 hours to assist pt back to bed. Pt required max(A)x2 to transfer back to bed via stand pivot. Pt continues to be very motivated and was pleased with her progress today.     Pt is progressing towards acute PT goals appropriately and continues to benefit from acute PT sessions. After hospital discharge, pt would benefit from inpatient rehab to maximize rehab potential. Pt is very motivated to return to PLOF and can tolerate 3 hours of  "intensive therapy services.     Rehab Prognosis: Good; patient would benefit from acute skilled PT services to address these deficits and reach maximum level of function.      Plan:     During this hospitalization, patient to be seen 4 x/week to address the identified rehab impairments via gait training,therapeutic activities,therapeutic exercises,neuromuscular re-education and progress toward the following goals:    · Plan of Care Expires:  01/27/22    This plan of care has been discussed with the patient/caregiver, who was included in its development and is in agreement with the identified goals and treatment plan.     Subjective     Communicated with RN prior to session.  Patient agreeable to participate.     Pain/Comfort:  · Pain Rating 1: 2/10 (abdomen 1st session)  · Location - Side 1: Bilateral  · Location - Orientation 1: generalized  · Location 1: abdomen  · Pain Addressed 1: Distraction,Reposition  · Pain Rating Post-Intervention 1: 0/10  · Pain Rating 2: 8/10 (abdomen 2nd session)  · Location - Side 2: Bilateral  · Location - Orientation 2: generalized  · Location 2: abdomen  · Pain Addressed 2: Reposition,Distraction,Nurse notified  · Pain Rating Post-Intervention 2:  (unrated at end of 2nd session)    Chief Complaint: Abdominal pain  Patient/Family Comments/goals: "I need to get up to the chair today and get out of this bed"      Objective:     Patient found HOB elevated with telemetry,Trialysis,pulse ox (continuous),oxygen,wound vac,billingsley catheter,REAGAN drain  upon PT entry to room.    General Precautions: Standard, fall   Orthopedic Precautions:N/A   Braces: N/A   *Pt presents on 2.5 L O2 via NC 1st session, presents on 1L 2nd session   -SpO2 remained >90%      Exams:     Cognition:  o Pt is alert and oriented x 4  o Command following: intact    Functional Mobility:    Bed Mobility:  · Supine to Sit: Maximum Assistance on L side of bed  · Via logroll  · Pt with difficulty advancing B LEs, edema " noted  · Pt expressed dizziness, vitals WNL.  · SpO2 92%  · Sit to Supine: Total Assistancex2 persons  · Rolling L: Maximum Assistance  · Scooting anteriorly to EOB to plant feet on floor: Maximum Assistance  · Scooting/Bridging in supine to HOB: Maximum Assistance and 2 persons via drawsheet    Transfers:   · Sit to Stand Transfer:  ·  Moderate Assistance and 2 persons  from EOB with HHAx2   · Maximal assistance from bedside chair (built-up surface w/ blankets)  · Bed to Chair: Moderate Assistance and 2 persons with HHAx2 via stand pivot  · PT positioned in front blocking B knees to prevent buckling, OT positioned behind guiding hips towards chair  · Pt tolerated sitting in chair 3 hours  · Chair to bed: Maximal Assistance and 2 persons with HHAx2 via stand pivot  · PT positioned in front blocking B knees to prevent buckling, OT positioned behind pt guiding hips towards EOB             Gait:  · Patient unable at this time    Balance:  · Static Sit:   · Stand-By Assist at EOB  · Normal: Patient able to maintain steady balance without handhold support.  · Static Stand:   · Mod-max(A) with Hand-held assist x 2  · Poor: Patient requires handhold support and moderate to maximal assistance to maintain position  · Dynamic Stand:  · Poor: Patient unable to accept challenge or move without loss of balance.      Therapeutic Activities/Exercises      Patient assisted with functional mobility as noted above   PT stressed the importance of time OOB  o Pt able to tolerate 3 hours in chair today   Patient instructed to perform APs and heel slides while up in chair with LEs elevated to reduce LE edema   Patient educated on the importance of early mobility to prevent functional decline during hospital stay   Patient was instructed to utilize staff assistance for mobility/transfers.   Patient educated on PT POC and role of PT in acute care   White board updated regarding patient's safest level of mobility with staff  assistance, RN also updated.     AM-PAC 6 CLICK MOBILITY  Turning over in bed (including adjusting bedclothes, sheets and blankets)?: 2  Sitting down on and standing up from a chair with arms (e.g., wheelchair, bedside commode, etc.): 2  Moving from lying on back to sitting on the side of the bed?: 2  Moving to and from a bed to a chair (including a wheelchair)?: 2  Need to walk in hospital room?: 1  Climbing 3-5 steps with a railing?: 1  Basic Mobility Total Score: 10     Patient left HOB elevated with all lines intact, call button in reach, RN notified and family present.        History/Goals:     PAST MEDICAL HISTORY:  No past medical history on file.    Past Surgical History:   Procedure Laterality Date    CHOLECYSTECTOMY  12/23/2021    Procedure: CHOLECYSTECTOMY;  Surgeon: Kendall Gorman MD;  Location: [facility] OR [address] [place] [address];  Service: General;;    ESOPHAGOGASTRODUODENOSCOPY N/A 12/23/2021    Procedure: EGD (ESOPHAGOGASTRODUODENOSCOPY);  Surgeon: Kendall Gorman MD;  Location: [facility] OR [address] [place] [address];  Service: General;  Laterality: N/A;    EVACUATION OF HEMATOMA  12/21/2021    Procedure: EVACUATION, HEMATOMA;  Surgeon: Kendall Gorman MD;  Location: [facility] OR [address] [place] [address];  Service: General;;    PERCUTANEOUS INSERTION OF GASTROSTOMY TUBE N/A 12/23/2021    Procedure: INSERTION, GASTROSTOMY TUBE, PERCUTANEOUS;  Surgeon: Kendall Gorman MD;  Location: [facility] OR [address] [place] [address];  Service: General;  Laterality: N/A;    REPLACEMENT OF WOUND VACUUM-ASSISTED CLOSURE DEVICE  12/21/2021    Procedure: REPLACEMENT, WOUND VAC;  Surgeon: Kendall Gorman MD;  Location: [facility] OR [address] [place] [address];  Service: General;;       GOALS:   Multidisciplinary Problems     Physical Therapy Goals        Problem: Physical Therapy Goal    Goal Priority Disciplines Outcome Goal Variances Interventions   Physical Therapy Goal     PT, PT/OT Ongoing, Progressing     Description: Goals to be met by: 1/17/2022    Patient will increase functional independence with mobility  by performin. Supine to sit with Moderate Assistance  2. Sit to stand transfer with Moderate Assistance using LRAD  3. Gait  x 10 feet with Moderate Assistance using LRAD  4. Sitting at edge of bed x10 minutes with Stand-by Assistance  5. Stand for 3 minutes with Moderate Assistance using LRAD  6. Lower extremity exercise program x10 reps per handout, with independence                     Time Tracking:     PT Received On: 22  PT Start Time 1: 1006     PT Stop Time 1: 1035   PT Start time 2: 1336  PT stop time 2: 1353  PT Total Time (min): 29 min +17 min=46 min    Billable Minutes: Therapeutic Activity 10 and Therapeutic Exercise 25      Kristy Mcclelland, PT  2022   Pager# [phone]

## 2022-01-11 NOTE — PLAN OF CARE
"  [facility]    FACILITY TRANSFER ORDERS      Patient Name: Chidi Castaneda  YOB: 1965    PCP: Primary Doctor No   PCP Address: None  PCP Phone Number: None  PCP Fax: None    Encounter Date: 01/14/2022    Admit to: Acute Rehab     Vital Signs:  Routine    Diagnoses:   Active Hospital Problems    Diagnosis  POA    *Bowel perforation [K63.1]  Yes    Ischemia of both lower extremities [I99.8]  Yes    Increased heart rate [R00.0]  Yes    SUSAN (acute kidney injury) [N17.9]  Yes    Metabolic acidosis [E87.2]  Yes    Volume overload [E87.70]  Yes    Subcapsular hematoma of liver [K76.89]  Yes      Resolved Hospital Problems    Diagnosis Date Resolved POA    Encounter for weaning from ventilator [Z99.11] 01/14/2022 Not Applicable    Acute blood loss anemia [D62] 01/14/2022 Yes    Septic shock [A41.9, R65.21] 01/14/2022 Yes       Allergies:  Review of patient's allergies indicates:   Allergen Reactions    Tylox [oxycodone-acetaminophen]      "Jittery"       Diet: regular diet    Activities: Activity as tolerated, Bathroom privileges with assistance, Dangle at bedside, Up with assistance and Weight bearing as tolerated    Nursing: Routine nursing care     Labs: CBC and BMP twice weekly     CONSULTS:    Physical Therapy to evaluate and treat. , Occupational Therapy to evaluate and treat. and  to evaluate for community resources/long-range planning.    MISCELLANEOUS CARE:  PEG Care: Clean site every 24 hours.  and Routine Skin for Bedridden Patients: Apply moisture barrier cream to all skin folds and wet areas in perineal area daily and after baths and all bowel movements. Ok to vent g-tube when needed to relieve significant nausea and/or emesis.    ID/ABX  -  Continue Meropenem for 3 week course (1/4-1/24, repeat CT Chest prior to stopping abx)  Patient will need outpatient CT chest prior to discontinuation of meropenam with ID follow up     WOUND CARE ORDERS  Yes: Wound Vac:  "  Location:  Midline           Dressing changes every Monday, Wednesday and Friday.        ET Consult      Medications: Review discharge medications with patient and family and provide education.      Current Discharge Medication List      START taking these medications    Details   dronabinoL (MARINOL) 2.5 MG capsule Take 1 capsule (2.5 mg total) by mouth 2 (two) times daily before meals.  Qty: 60 capsule, Refills: 0      epoetin genie-epbx (RETACRIT) 4,000 unit/mL injection Inject 1.51 mLs (6,040 Units total) into the skin every 7 days.  Qty: 6.04 mL, Refills: 0      gabapentin (NEURONTIN) 250 mg/5 mL solution 2.5 mLs (125 mg total) by Per G Tube route every 8 (eight) hours.  Qty: 225 mL, Refills: 0      meropenem-0.9% sodium chloride 1 gram/50 mL PgBk Inject 50 mLs (1 g total) into the vein every 12 (twelve) hours. for 10 days  Qty: 1000 mL, Refills: 0      methocarbamoL (ROBAXIN) 500 MG Tab 1 tablet (500 mg total) by Per G Tube route 4 (four) times daily. for 10 days  Qty: 40 tablet, Refills: 0      oxyCODONE (ROXICODONE) 5 mg/5 mL Soln 10 mLs (10 mg total) by Per G Tube route every 6 (six) hours as needed.  Qty: 280 mL, Refills: 0    Comments: Quantity prescribed more than 7 day supply? No      propranoloL (INDERAL) 20 MG tablet 1 tablet (20 mg total) by Per G Tube route 2 (two) times a day.  Qty: 60 tablet, Refills: 0    Comments: .                Immunizations Administered as of 1/14/2022     No immunizations on file.          .    Some patients may experience side effects after vaccination.  These may include fever, headache, muscle or joint aches.  Most symptoms resolve with 24-48 hours and do not require urgent medical evaluation unless they persist for more than 72 hours or symptoms are concerning for an unrelated medical condition.          _________________________________  Angelica Ivy MD  01/14/2022

## 2022-01-11 NOTE — SUBJECTIVE & OBJECTIVE
"Interval History: NAEON, tolerating TF, OOBTC x7 hrs, UOP 1688L. Stepped down to [facility]; Dialysis held yesterday given UOP.     Medications:  Continuous Infusions:   sodium chloride 0.9% Stopped (01/08/22 1053)     Scheduled Meds:   acetaminophen  650 mg Oral Q6H    balsam peru-castor oiL   Topical (Top) BID    dronabinoL  2.5 mg Oral BID    epoetin genie-epbx  50 Units/kg Intravenous Q7 Days    gabapentin  125 mg Per G Tube Q8H    heparin (porcine)  7,500 Units Subcutaneous Q8H    levalbuterol  0.63 mg Nebulization Q4H    meropenem (MERREM) IVPB  1 g Intravenous Q12H    methocarbamoL  500 mg Per G Tube QID    nitroGLYCERIN 2% TD oint  0.5 inch Topical (Top) Q6H    propranoloL  20 mg Per G Tube BID    scopolamine  1 patch Transdermal Q3 Days     PRN Meds:dextrose 50%, heparin (porcine), HYDROmorphone, melatonin, ondansetron, oxyCODONE, oxyCODONE, potassium, sodium phosphates, senna-docusate 8.6-50 mg, sodium chloride 0.9%     Review of patient's allergies indicates:   Allergen Reactions    Tylox [oxycodone-acetaminophen]      "Jittery"     Objective:     Vital Signs (Most Recent):  Temp: 98.1 °F (36.7 °C) (01/11/22 0418)  Pulse: 106 (01/11/22 0744)  Resp: 18 (01/11/22 0744)  BP: 97/66 (01/11/22 0418)  SpO2: 95 % (01/11/22 0744) Vital Signs (24h Range):  Temp:  [97.8 °F (36.6 °C)-98.4 °F (36.9 °C)] 98.1 °F (36.7 °C)  Pulse:  [] 106  Resp:  [16-36] 18  SpO2:  [91 %-100 %] 95 %  BP: ()/(57-69) 97/66     Weight: 121.1 kg (267 lb)  Body mass index is 45.83 kg/m².    Intake/Output - Last 3 Shifts       01/09 0700  01/10 0659 01/10 0700  01/11 0659 01/11 0700  01/12 0659    P.O.  240     I.V. (mL/kg)       NG/      IV Piggyback 151.4 72.9     Total Intake(mL/kg) 691.4 (5.7) 312.9 (2.6)     Urine (mL/kg/hr) 1595 (0.5) 1763 (0.6)     Drains 146 100     Other 100 100     Total Output 1841 1963     Net -1149.7 -1650.1                  Physical Exam  Vitals and nursing note reviewed. "   Constitutional:       General: She is not in acute distress.     Appearance: She is obese. She is not diaphoretic.   HENT:      Head: Normocephalic and atraumatic.      Mouth/Throat:      Mouth: Mucous membranes are moist.      Pharynx: Oropharynx is clear.   Eyes:      Extraocular Movements: Extraocular movements intact.      Conjunctiva/sclera: Conjunctivae normal.   Cardiovascular:      Rate and Rhythm: Regular rhythm. Tachycardia present.   Pulmonary:      Effort: No respiratory distress.   Abdominal:      General: There is no distension.      Palpations: Abdomen is soft.      Tenderness: There is no guarding or rebound.      Comments: Wound vac in place with good seal, no leak noted.   RUQ REAGAN drains x2 with benign fluid, not bloody or bilious  LLQ IR drain with thin SS output   Musculoskeletal:         General: No deformity.   Skin:     General: Skin is warm and dry.   Neurological:      General: No focal deficit present.      Mental Status: She is alert and oriented to person, place, and time.         Significant Labs:  CBC:   Recent Labs   Lab 01/11/22  0543   WBC 14.22*   RBC 2.71*   HGB 7.7*   HCT 24.9*   *   MCV 92   MCH 28.4   MCHC 30.9*     CMP:   Recent Labs   Lab 01/10/22  0333 01/10/22  0333 01/10/22  2023      < > 91   CALCIUM 8.8   < > 8.5*   ALBUMIN 1.6*   < > 1.6*   PROT 6.2  --   --    *   < > 131*   K 3.8   < > 3.8   CO2 25   < > 26   CL 98   < > 96   BUN 48*   < > 50*   CREATININE 3.1*   < > 2.9*   ALKPHOS 103  --   --    ALT 23  --   --    AST 42*  --   --    BILITOT 0.5  --   --     < > = values in this interval not displayed.       Significant Diagnostics:  I have reviewed all pertinent imaging results/findings within the past 24 hours.

## 2022-01-11 NOTE — ASSESSMENT & PLAN NOTE
57yo female transfer from OSH after hysterectomy on 12/8 complicated by delayed recognition of small bowel injury requiring resection (in discontinuity) and at some point new bleed from the liver - transferred with open abdomen for higher level of care.  S/p ex-lap, liver packing 12/21  Open cholecystectomy, abdominal closure 12/23  IR drain placed into intra-abdominal abscess 12/31 - Cx growing e. Coli, enterococcus, and an unidentified GNR, f/u susceptibilities    - PT/OT reordered  - Aggressive pulmonary hygiene, OOB to chair, PT/OT  - Renal diet, Tube feedings nightly  - Nutrition and speech following, appreciate assistance   - bowel reg  - Appreciate ID assistance   - VAP; BAL 12/28 with klebsiella    - CT 12/31 with pelvic abscess s/p drain placement.   - Pelvic abscess 12/31; bacteroides, klebsiella, e coli, enterococcus   --  fluconazole IV (1/4 - 1/9   -- discontinued Vanc stopped 1/3   -- ontinue Meropenem for 3 week course (1/4-1/24, repeat CT Chest prior to stopping abx)  - Wound vac change biweekly - last performed 1/9, next due 01/12. Will need home wound vac.   - Continue REAGAN drains and IR drain; flush BID with 10 cc NS . Will likely pull one saeed-hepatic drain today.     - Strict I/Os   - SUSAN/ARF present on admission now resolving. Cr stable ~ 3 range, however UOP improving and electrolytes WNL  - Nephro following, appreciate assistance. Last HD 01/08, held yesterday given UOP    - Dispo: rehab, possibly this week pending nephrology recs

## 2022-01-11 NOTE — PROGRESS NOTES
Plan of care reviewed with patient and , verbalizes understanding. AAOx4, VSS. Wound vac intact, drain output recorded. Pt still making urine after Lasix gtt stopped, 30-75ml/hr. Pt with not much of a appetite, eating 25% with each meal, reports no N/V. Patent stood up with PT at side of the bed. Patient states pain as well controlled. Stepdown orders in place, WCTM.

## 2022-01-11 NOTE — PT/OT/SLP PROGRESS
Occupational Therapy   Co-Treatment with PT    Name: Chidi Castaneda  MRN: [id number]  Admitting Diagnosis:  <principal problem not specified>  19 Days Post-Op    Recommendations:     Discharge Recommendations: rehabilitation facility  Discharge Equipment Recommendations:   (TBD)  Barriers to discharge:   (increased level of assistance required)    Assessment:     Chidi Castaneda is a 56 y.o. female with a medical diagnosis of <principal problem not specified>.  She presents with good motivation and participation. Pt with improvement in activity tolerance, able to sit in bedside chair 3 hours, writing therapist returned for second visit to transfer pt chair>bed. Upon second visit, pt with noted increase in B LE edema. She had episodes of nausea, no emesis and dizziness with transitional movements. Performance deficits affecting function are weakness,impaired endurance,impaired self care skills,impaired functional mobilty,gait instability,impaired balance,decreased upper extremity function,decreased lower extremity function,decreased safety awareness,pain,impaired skin,impaired cardiopulmonary response to activity. Pt would benefit from skilled OT services in order to maximize independence with ADLs and facilitate safe discharge. Because of patient's significant decline from PLOF, patient would benefit from Inpatient Rehab to maximize return to PLOF. Pt is an excellent candidate for inpatient rehabilitation, as she has a qualifying diagnosis, displays good activity tolerance, has experienced decline from PLOF, has good family support, and is motivated to participate in therapy.       Rehab Prognosis:  Good; patient would benefit from acute skilled OT services to address these deficits and reach maximum level of function.       Plan:     Patient to be seen 4 x/week to address the above listed problems via self-care/home management,therapeutic activities,therapeutic exercises,neuromuscular re-education  · Plan of Care  Expires: 02/10/22  · Plan of Care Reviewed with: patient    Subjective     Pain/Comfort:  · Pain Rating 1: 2/10 (abdomen first session)  · Pain Rating Post-Intervention 1: 8/10 (abdomen second session)    Objective:     Communicated with: RN and PT prior to session.  Patient found HOB elevated with telemetry,Trialysis,wound vac,billingsley catheter,pulse ox (continuous),REAGAN drain,PEG Tube,oxygen upon OT entry to room.    General Precautions: Standard, fall   Orthopedic Precautions:N/A   Braces: N/A  Respiratory Status: Nasal cannula, flow 2.5 L/min first session; 1L second session; SpO2 >90% throughout both sessions.     Occupational Performance:     Bed Mobility:    · Patient completed Scooting/Bridging with maximal assistance  · Patient completed Supine to Sit with maximal assistance and 2 persons  · Patient completed Sit to Supine with total assistance and 2 persons     Functional Mobility/Transfers:  · Patient completed Sit <> Stand Transfer with moderate assistance  with  hand-held assist from EOB  · Patient completed Bed > Chair Transfer using Step Transfer technique with moderate assistance and of 2 persons with hand-held assist  · Patient completed Sit <> Stand Transfer with maximal assistance  with  hand-held assist from bedside chair (chair built up with blankets)  · Patient completed Chair>Bed Transfer using Stand Pivot technique with maximal assistance and of 2 persons with hand-held assist    Activities of Daily Living:  · Feeding:  set-up (A) to drink water using L UE  · Grooming: stand by assistance to perform facial hygiene  · Lower Body Dressing: total assistance to don  socks x2 and doff  socks      AMPAC 6 Click ADL: 11    Treatment & Education:  -Therapist provided facilitation and instruction of proper body mechanics, energy conservation, and fall prevention strategies during tasks listed above.  -Pt educated on role of OT, POC and goals for therapy  -pt with noted decreased strength in R UE;  encouraged pt to perform hand pumps with stress ball throughout the day  -Pt educated on importance of OOB activities with staff member assistance and sitting OOB majority of the day.   -Pt verbalized understanding. Pt expressed no further concerns/questions  -Whiteboard updated  -Co-tx with PT performed due to need for education and assistance from two skilled therapy disciplines at pt's current functional level.      Patient left HOB elevated with all lines intact, call button in reach, RN notified and family presentEducation:      GOALS:   Multidisciplinary Problems     Occupational Therapy Goals        Problem: Occupational Therapy Goal    Goal Priority Disciplines Outcome Interventions   Occupational Therapy Goal     OT, PT/OT Ongoing, Progressing    Description: Goals to be met by: 1/9/2022 (1 week)     Patient will increase functional independence with ADLs by performing:    UE Dressing with Maximum Assistance.  Grooming while seated with Moderate Assistance.  Toileting from bedside commode with Moderate Assistance for hygiene and clothing management.   Rolling to Bilateral with Moderate Assistance.   Supine to sit with Maximum Assistance.  Step transfer with Moderate Assistance  Toilet transfer to bedside commode with Moderate Assistance.                     Time Tracking:     OT Date of Treatment: 01/11/22  OT Start Time: 1006  OT Stop Time: 1034   OT Start Time: 1337  OT Stop Time: 1353  OT Total Time (min): 28 min+16=44 minutes    Billable Minutes:Self Care/Home Management 23  Therapeutic Activity 10    OT/SHYANN: OT          1/11/2022

## 2022-01-11 NOTE — NURSING TRANSFER
Nursing Transfer Note      1/11/2022     Reason patient is being transferred: step down orders    Transfer To: [facility] 1054    Transfer via bed    Transfer with cardiac monitoring    Transported by RN x1 and PCT    Medicines sent: N/A    Any special needs or follow-up needed: no    Chart send with patient: Yes    Notified: spouse    Patient reassessed at: 2230, 01-10-22     Upon arrival to floor: cardiac monitor applied, patient oriented to room, call bell in reach and bed in lowest position

## 2022-01-12 LAB
ALBUMIN SERPL BCP-MCNC: 1.6 G/DL (ref 3.5–5.2)
ALP SERPL-CCNC: 101 U/L (ref 55–135)
ALT SERPL W/O P-5'-P-CCNC: 20 U/L (ref 10–44)
ANION GAP SERPL CALC-SCNC: 13 MMOL/L (ref 8–16)
AST SERPL-CCNC: 27 U/L (ref 10–40)
BASOPHILS # BLD AUTO: 0.07 K/UL (ref 0–0.2)
BASOPHILS NFR BLD: 0.6 % (ref 0–1.9)
BILIRUB SERPL-MCNC: 0.5 MG/DL (ref 0.1–1)
BUN SERPL-MCNC: 50 MG/DL (ref 6–20)
CALCIUM SERPL-MCNC: 8.5 MG/DL (ref 8.7–10.5)
CHLORIDE SERPL-SCNC: 98 MMOL/L (ref 95–110)
CO2 SERPL-SCNC: 26 MMOL/L (ref 23–29)
CREAT SERPL-MCNC: 2.4 MG/DL (ref 0.5–1.4)
DIFFERENTIAL METHOD: ABNORMAL
EOSINOPHIL # BLD AUTO: 0.2 K/UL (ref 0–0.5)
EOSINOPHIL NFR BLD: 1.4 % (ref 0–8)
ERYTHROCYTE [DISTWIDTH] IN BLOOD BY AUTOMATED COUNT: 15.9 % (ref 11.5–14.5)
EST. GFR  (AFRICAN AMERICAN): 25.6 ML/MIN/1.73 M^2
EST. GFR  (NON AFRICAN AMERICAN): 22.2 ML/MIN/1.73 M^2
GLUCOSE SERPL-MCNC: 119 MG/DL (ref 70–110)
HCT VFR BLD AUTO: 24.7 % (ref 37–48.5)
HGB BLD-MCNC: 7.7 G/DL (ref 12–16)
IMM GRANULOCYTES # BLD AUTO: 0.55 K/UL (ref 0–0.04)
IMM GRANULOCYTES NFR BLD AUTO: 4.7 % (ref 0–0.5)
LYMPHOCYTES # BLD AUTO: 1.1 K/UL (ref 1–4.8)
LYMPHOCYTES NFR BLD: 9.6 % (ref 18–48)
MAGNESIUM SERPL-MCNC: 2 MG/DL (ref 1.6–2.6)
MCH RBC QN AUTO: 28.8 PG (ref 27–31)
MCHC RBC AUTO-ENTMCNC: 31.2 G/DL (ref 32–36)
MCV RBC AUTO: 93 FL (ref 82–98)
MONOCYTES # BLD AUTO: 0.7 K/UL (ref 0.3–1)
MONOCYTES NFR BLD: 6.2 % (ref 4–15)
NEUTROPHILS # BLD AUTO: 9.2 K/UL (ref 1.8–7.7)
NEUTROPHILS NFR BLD: 77.5 % (ref 38–73)
NRBC BLD-RTO: 0 /100 WBC
PHOSPHATE SERPL-MCNC: 6 MG/DL (ref 2.7–4.5)
PLATELET # BLD AUTO: 568 K/UL (ref 150–450)
PMV BLD AUTO: 10 FL (ref 9.2–12.9)
POTASSIUM SERPL-SCNC: 3.4 MMOL/L (ref 3.5–5.1)
PROT SERPL-MCNC: 6.3 G/DL (ref 6–8.4)
RBC # BLD AUTO: 2.67 M/UL (ref 4–5.4)
SODIUM SERPL-SCNC: 137 MMOL/L (ref 136–145)
WBC # BLD AUTO: 11.8 K/UL (ref 3.9–12.7)

## 2022-01-12 PROCEDURE — 25000003 PHARM REV CODE 250: Performed by: STUDENT IN AN ORGANIZED HEALTH CARE EDUCATION/TRAINING PROGRAM

## 2022-01-12 PROCEDURE — 25000242 PHARM REV CODE 250 ALT 637 W/ HCPCS: Performed by: STUDENT IN AN ORGANIZED HEALTH CARE EDUCATION/TRAINING PROGRAM

## 2022-01-12 PROCEDURE — 84100 ASSAY OF PHOSPHORUS: CPT | Performed by: SURGERY

## 2022-01-12 PROCEDURE — 97110 THERAPEUTIC EXERCISES: CPT

## 2022-01-12 PROCEDURE — 80053 COMPREHEN METABOLIC PANEL: CPT | Performed by: SURGERY

## 2022-01-12 PROCEDURE — 94761 N-INVAS EAR/PLS OXIMETRY MLT: CPT

## 2022-01-12 PROCEDURE — 99900035 HC TECH TIME PER 15 MIN (STAT)

## 2022-01-12 PROCEDURE — 94640 AIRWAY INHALATION TREATMENT: CPT

## 2022-01-12 PROCEDURE — 63600175 PHARM REV CODE 636 W HCPCS: Performed by: STUDENT IN AN ORGANIZED HEALTH CARE EDUCATION/TRAINING PROGRAM

## 2022-01-12 PROCEDURE — 97530 THERAPEUTIC ACTIVITIES: CPT

## 2022-01-12 PROCEDURE — 94664 DEMO&/EVAL PT USE INHALER: CPT

## 2022-01-12 PROCEDURE — 94799 UNLISTED PULMONARY SVC/PX: CPT

## 2022-01-12 PROCEDURE — 85025 COMPLETE CBC W/AUTO DIFF WBC: CPT | Performed by: SURGERY

## 2022-01-12 PROCEDURE — 97535 SELF CARE MNGMENT TRAINING: CPT

## 2022-01-12 PROCEDURE — 25500020 PHARM REV CODE 255: Performed by: STUDENT IN AN ORGANIZED HEALTH CARE EDUCATION/TRAINING PROGRAM

## 2022-01-12 PROCEDURE — 20600001 HC STEP DOWN PRIVATE ROOM

## 2022-01-12 PROCEDURE — 83735 ASSAY OF MAGNESIUM: CPT | Performed by: SURGERY

## 2022-01-12 RX ADMIN — LEVALBUTEROL HYDROCHLORIDE 0.63 MG: 0.63 SOLUTION RESPIRATORY (INHALATION) at 08:01

## 2022-01-12 RX ADMIN — DRONABINOL 2.5 MG: 2.5 CAPSULE ORAL at 08:01

## 2022-01-12 RX ADMIN — IOHEXOL 15 ML: 350 INJECTION, SOLUTION INTRAVENOUS at 02:01

## 2022-01-12 RX ADMIN — ACETAMINOPHEN 650 MG: 160 SOLUTION ORAL at 05:01

## 2022-01-12 RX ADMIN — LEVALBUTEROL HYDROCHLORIDE 0.63 MG: 0.63 SOLUTION RESPIRATORY (INHALATION) at 04:01

## 2022-01-12 RX ADMIN — Medication: at 09:01

## 2022-01-12 RX ADMIN — Medication: at 08:01

## 2022-01-12 RX ADMIN — GABAPENTIN 125 MG: 250 SOLUTION ORAL at 01:01

## 2022-01-12 RX ADMIN — LEVALBUTEROL HYDROCHLORIDE 0.63 MG: 0.63 SOLUTION RESPIRATORY (INHALATION) at 12:01

## 2022-01-12 RX ADMIN — NITROGLYCERIN 0.5 INCH: 20 OINTMENT TOPICAL at 12:01

## 2022-01-12 RX ADMIN — NITROGLYCERIN 0.5 INCH: 20 OINTMENT TOPICAL at 05:01

## 2022-01-12 RX ADMIN — IOHEXOL: 350 INJECTION, SOLUTION INTRAVENOUS at 03:01

## 2022-01-12 RX ADMIN — MEROPENEM AND SODIUM CHLORIDE 1 G: 1 INJECTION, SOLUTION INTRAVENOUS at 08:01

## 2022-01-12 RX ADMIN — METHOCARBAMOL 500 MG: 500 TABLET ORAL at 05:01

## 2022-01-12 RX ADMIN — METHOCARBAMOL 500 MG: 500 TABLET ORAL at 08:01

## 2022-01-12 RX ADMIN — METHOCARBAMOL 500 MG: 500 TABLET ORAL at 01:01

## 2022-01-12 RX ADMIN — GABAPENTIN 125 MG: 250 SOLUTION ORAL at 08:01

## 2022-01-12 RX ADMIN — GABAPENTIN 125 MG: 250 SOLUTION ORAL at 05:01

## 2022-01-12 RX ADMIN — HYDROMORPHONE HYDROCHLORIDE 0.5 MG: 1 INJECTION, SOLUTION INTRAMUSCULAR; INTRAVENOUS; SUBCUTANEOUS at 11:01

## 2022-01-12 RX ADMIN — HEPARIN SODIUM 7500 UNITS: 5000 INJECTION INTRAVENOUS; SUBCUTANEOUS at 05:01

## 2022-01-12 RX ADMIN — LEVALBUTEROL HYDROCHLORIDE 0.63 MG: 0.63 SOLUTION RESPIRATORY (INHALATION) at 05:01

## 2022-01-12 RX ADMIN — NITROGLYCERIN 0.5 INCH: 20 OINTMENT TOPICAL at 01:01

## 2022-01-12 RX ADMIN — HEPARIN SODIUM 7500 UNITS: 5000 INJECTION INTRAVENOUS; SUBCUTANEOUS at 08:01

## 2022-01-12 RX ADMIN — PROPRANOLOL HYDROCHLORIDE 20 MG: 20 TABLET ORAL at 08:01

## 2022-01-12 RX ADMIN — HEPARIN SODIUM 7500 UNITS: 5000 INJECTION INTRAVENOUS; SUBCUTANEOUS at 01:01

## 2022-01-12 NOTE — PROGRESS NOTES
[facility]  Adult Nutrition  Progress Note    SUMMARY       Recommendations    1. Continue renal diet as tolerated.    - Add Novasource Renal once daily.    - As medically able, liberalize diet.     2. Continue TF of Novasource Renal @ 60 mL/hr x 12 hrs providing pt with 1440 kcal, 65 g protein, and 516 mL free water.    - If hindering po intake, decrease TF.    3. RD following.    Goals: Pt to tolerate >/= 75% EEN/EPN by f/u date  Nutrition Goal Status: goal met  Communication of RD Recs:  (POC)    Assessment and Plan    Nutrition Problem  Increased nutrient needs     Related to (etiology):   Physiological demands     Signs and Symptoms (as evidenced by):   Dialysis; post op      Interventions (treatment strategy):  Collaboration of nutrition care with other providers     Nutrition Diagnosis Status:   New     Malnutrition Assessment                 Orbital Region (Subcutaneous Fat Loss): well nourished   Smiley Region (Muscle Loss): well nourished  Clavicle Bone Region (Muscle Loss): well nourished                 Reason for Assessment    Reason For Assessment: RD follow-up  Diagnosis:  (Subcapsular hematoma of liver)  Relevant Medical History: GERD, DVT not on antiboagulation, diverticulitis, vaughn's esophagus, HFrEF  Interdisciplinary Rounds: did not attend  General Information Comments: S/p open cholecystectomy, abdominal closure 12/23. Wound vac present. Pt resting in bed with family member at bedside. Pt with poor appetite and also does not like hospital meal options provided. Pt agreed to try Novasource ONS. Tolerating nocturnal TF. Denies N/V/D/C. Partial NFPE completed 12/22, nourished w/ no physical indicators of malnutrition.  Nutrition Discharge Planning: Regular Diet, texture per SLP; renal as warranted    Nutrition Risk Screen    Nutrition Risk Screen: tube feeding or parenteral nutrition    Nutrition/Diet History    Spiritual, Cultural Beliefs, Yarsani Practices, Values that Affect Care:  "no  Food Allergies: NKFA  Factors Affecting Nutritional Intake: decreased appetite    Anthropometrics    Temp: 98.2 °F (36.8 °C)  Height Method:  (PER )  Height: 5' 4" (162.6 cm)  Height (inches): 64 in  Weight Method: Bed Scale  Weight: 121.1 kg (267 lb)  Weight (lb): 267 lb  Ideal Body Weight (IBW), Female: 120 lb  % Ideal Body Weight, Female (lb): 222.5 %  BMI (Calculated): 45.8  BMI Grade: greater than 40 - morbid obesity     Lab/Procedures/Meds    Pertinent Labs Reviewed: reviewed  Pertinent Labs Comments: K 3.4, BUN 50, Cr 2.4, GFR 22.2, glucose 119, phos 6  Pertinent Medications Reviewed: reviewed  Pertinent Medications Comments: methocarbamol, propranolol, IVF     Estimated/Assessed Needs    Weight Used For Calorie Calculations: 121.1 kg (266 lb 15.6 oz)  Energy Calorie Requirements (kcal): 1796 kcal/day  Energy Need Method: Peach-St Jeor (PAL 1.0)  Protein Requirements:  g/day (1.5-2.0 g/kg; intubated, dialysis, wound present)  Weight Used For Protein Calculations: 54.4 kg (120 lb) (IBW)  Fluid Requirements (mL): 1 mL/kcal or per MD  Estimated Fluid Requirement Method: RDA Method  RDA Method (mL): 1796     Nutrition Prescription Ordered    Current Diet Order: Renal  Current Nutrition Support Formula Ordered: Novasource Renal  Current Nutrition Support Rate Ordered: 60 (ml)  Current Nutrition Support Frequency Ordered: mL/hr x 12 hrs    Evaluation of Received Nutrient/Fluid Intake    Enteral Calories (kcal): 1440  Enteral Protein (gm): 65  Enteral (Free Water) Fluid (mL): 516  % Kcal Needs: 80  % Protein Needs: 75  I/O: -1.817L since 12/29  Energy Calories Required: meeting needs  Protein Required: meeting needs  Fluid Required:  (per MD)  Comments: LBM 1/12  Tolerance: tolerating  % Intake of Estimated Energy Needs: 75 - 100 %  % Meal Intake: 0 - 25 %    Nutrition Risk    Level of Risk/Frequency of Follow-up: low     Monitor and Evaluation    Food and Nutrient Intake: energy intake,enteral " nutrition intake  Food and Nutrient Adminstration: diet order,enteral and parenteral nutrition administration  Anthropometric Measurements: weight,weight change  Biochemical Data, Medical Tests and Procedures: electrolyte and renal panel,gastrointestinal profile,glucose/endocrine profile,inflammatory profile,lipid profile  Nutrition-Focused Physical Findings: overall appearance     Nutrition Follow-Up    RD Follow-up?: Yes

## 2022-01-12 NOTE — PROGRESS NOTES
[facility]  Nephrology  Progress Note    Patient Name: Chidi Castaneda  MRN: [id number]  Admission Date: 12/20/2021  Hospital Length of Stay: 23 days  Attending Provider: Kendall Gorman MD   Primary Care Physician: Primary Doctor No  Principal Problem:<principal problem not specified>    Subjective:     HPI: Chidi Castaneda is a 56 y.o. female w/ PMHx HTN, GERD s/p EGD 6/22/2021, migraines, ovarian cyst s/p cystectomy, and obesity who underwent an elective lap hysterectomy on 12/18/2021 at [facility] and was then transferred to [place], [place] for higher level of care when pt was found to have post-op somnolence, decrease PO intake, decrease urine output that progressed to anuria with a sharp rise in Cr from 0.9 to 2.8 (12/10). Pt was treated for sepsis with volume resuscitation and broad spectrum antibiotics, however pt continue to deteriorate and became tachycardic, hypotensive, and imaging showed an ileus. Pt was intubated since she was found to be acidotic with a ph of 7.28 despite a nonrebreather with 100%  FiO2  12/11 pt was taken back to the OR for washout and a bowel resection was done as she was found to have a small bowel perforation  12/15 pt was found to have a subscapular liver hematoma  12/18 pt was taken back to the OR for wound vacc exchange and removal of hematoma   12/19 general surgery team recommended no further surgical intervention since they did not find any active bleeding intraoperatively on 12/18 and felt that the ongoing bleeding was 2/2 consumptive coagulopathy and septic shock. They recommended to continue wound vacc and to consider higher level of care for pt  Pt was also being treated for a bacteremia as well as for intraabdominal infection, her antibiotics prior to transfer were: meropenem, flagyl, and vancomycin   Pt received tranexamic acid x1 and multiple units of blood products.    Pt was transferred to [facility] on 12/20/2021 for higher level of care:  "embolization for a subscapular hematoma       Nephrology was consulted for: "dialysis, SUSAN"    Pt does not have any h/o renal disease prior to hospitalization (per chart review and per pt's )   EDW: 88 kg   Post-op renal ultrasound was normal (results of ultrasound and other medical records from the outside hospital are in the chart at the bedside, needs to be scanned into chart)   Pt was started on CRRT on 12/12 via a trialysis catheter,   On 12/20/21 morning nephrology at outside hospital had recommended CRRT-SLED however primary team requested iHD prior to [facility], it appears that pt was ordered for 4 hours however, pt was prepped for transfer and was unable to complete full session 2/2 transfer, per pt's , pt had 2L of fluid removed instead of the planned 4 liters. Of note, while pt was at [facility] pt's CRRT required the use of citrate to prevent clotting (briefly), however that apparently resolved and was able to get a session w/o citrate and obviously was able to tolerate iHD prior to transfer.   Upon [facility] pt was started on zosyn, she was started on NS 125ml/hr, was transfused 2 units of PRBCs, and remained off vasopressors.       Interval History: continued improvement in serum creatinine and persistent urine output    Review of patient's allergies indicates:   Allergen Reactions    Tylox [oxycodone-acetaminophen]      "Jittery"     Current Facility-Administered Medications   Medication Frequency    0.9%  NaCl infusion Continuous    acetaminophen oral solution 650 mg Q6H    balsam peru-castor oiL Oint BID    dextrose 50% injection 25 g PRN    dronabinoL capsule 2.5 mg BID    epoetin genie-epbx injection 6,060 Units Q7 Days    gabapentin 250 mg/5 mL (5 mL) solution 125 mg Q8H    heparin (porcine) injection 1,000 Units PRN    heparin (porcine) injection 7,500 Units Q8H    HYDROmorphone injection 0.5 mg Q3H PRN    levalbuterol nebulizer solution 0.63 mg Q4H    " melatonin 1 mg/mL liquid (PEDS) 5 mg Nightly PRN    meropenem-0.9% sodium chloride 1 g/50 mL IVPB Q12H    methocarbamoL tablet 500 mg QID    nitroGLYCERIN 2% TD oint ointment 0.5 inch Q6H    ondansetron injection 8 mg Q8H PRN    oxyCODONE 5 mg/5 mL solution 10 mg Q4H PRN    oxyCODONE 5 mg/5 mL solution 5 mg Q4H PRN    potassium, sodium phosphates 280-160-250 mg packet 2 packet TID PRN    propranoloL tablet 20 mg BID    scopolamine 1.3-1.5 mg (1 mg over 3 days) 1 patch Q3 Days    senna-docusate 8.6-50 mg per tablet 1 tablet Daily PRN    sodium chloride 0.9% bolus 250 mL PRN       Objective:     Vital Signs (Most Recent):  Temp: 98.2 °F (36.8 °C) (01/12/22 0832)  Pulse: 104 (01/12/22 0846)  Resp: 16 (01/12/22 1102)  BP: 101/60 (01/12/22 0832)  SpO2: (!) 88 % (01/12/22 0846)  O2 Device (Oxygen Therapy): room air (01/12/22 0846) Vital Signs (24h Range):  Temp:  [98.2 °F (36.8 °C)-99.6 °F (37.6 °C)] 98.2 °F (36.8 °C)  Pulse:  [] 104  Resp:  [16-23] 16  SpO2:  [84 %-100 %] 88 %  BP: ()/(60-70) 101/60     Weight: 121.1 kg (267 lb) (12/28/21 1003)  Body mass index is 45.83 kg/m².  Body surface area is 2.34 meters squared.    I/O last 3 completed shifts:  In: 1013.1 [P.O.:1000; IV Piggyback:13.1]  Out: 2523 [Urine:2143; Drains:130; Other:250]    Physical Exam  Constitutional:       Appearance: She is obese. She is ill-appearing.   HENT:      Mouth/Throat:      Mouth: Mucous membranes are moist.   Eyes:      Pupils: Pupils are equal, round, and reactive to light.   Cardiovascular:      Rate and Rhythm: Normal rate and regular rhythm.   Pulmonary:      Effort: No respiratory distress.      Comments: NC  Abdominal:      General: There is no distension.      Comments: Wound vac   Musculoskeletal:         General: No swelling or deformity.      Right lower leg: Edema present.      Left lower leg: Edema present.   Skin:     Coloration: Skin is not jaundiced.   Neurological:      General: No focal deficit  present.         Significant Labs:  All labs within the past 24 hours have been reviewed.     Significant Imaging:  Labs: Reviewed    Assessment/Plan:     SUSAN (acute kidney injury)  -initially oliguric SUSAN, ischemic ATN with multifactial etiology, however most likely d/t severe hypotension as a result of septic shock 2/2 small bowel perforation and bacteremia   -BL sCr 1  -KRT started at OSH 12/12/2021 for acidosis and volume overload; last dialysis 1/7/22  -on RA with net negative fluid balance  -improvement in serum creatinine and stable urine output  -encourage oral hydration  -continue renal diet until phos improves and replace electrolytes as needed  -remove dialysis catheter    Signing off, please call with any questions or concerns    Acute blood loss anemia  -iron restricted erythropoiesis   -tsat low  -weekly epo; can stop when she leaves      Anthony Steven MD  Nephrology  [facility]

## 2022-01-12 NOTE — ASSESSMENT & PLAN NOTE
-initially oliguric SUSAN, ischemic ATN with multifactial etiology, however most likely d/t severe hypotension as a result of septic shock 2/2 small bowel perforation and bacteremia   -BL sCr 1  -KRT started at OSH 12/12/2021 for acidosis and volume overload; last dialysis 1/7/22  -on RA with net negative fluid balance  -improvement in serum creatinine and stable urine output  -encourage oral hydration  -continue renal diet until phos improves and replace electrolytes as needed  -remove dialysis catheter    Signing off, please call with any questions or concerns

## 2022-01-12 NOTE — PROGRESS NOTES
"[facility] - [facility]  General Surgery  Progress Note    Subjective:     History of Present Illness:  No notes on file    Post-Op Info:  Procedure(s) (LRB):  LAPAROTOMY, EXPLORATORY (N/A)  INSERTION, GASTROSTOMY TUBE, PERCUTANEOUS (N/A)  CHOLECYSTECTOMY  EGD (ESOPHAGOGASTRODUODENOSCOPY) (N/A)   20 Days Post-Op     Interval History: NAEON, OOBTC daily, UOP 1050L. Wound vac issues overnight, will change today. Tolerating about 25% of meals and tube feeds at goal.     Medications:  Continuous Infusions:   sodium chloride 0.9% Stopped (01/08/22 1053)     Scheduled Meds:   acetaminophen  650 mg Oral Q6H    balsam peru-castor oiL   Topical (Top) BID    dronabinoL  2.5 mg Oral BID    epoetin genie-epbx  50 Units/kg Intravenous Q7 Days    gabapentin  125 mg Per G Tube Q8H    heparin (porcine)  7,500 Units Subcutaneous Q8H    levalbuterol  0.63 mg Nebulization Q4H    meropenem (MERREM) IVPB  1 g Intravenous Q12H    methocarbamoL  500 mg Per G Tube QID    nitroGLYCERIN 2% TD oint  0.5 inch Topical (Top) Q6H    propranoloL  20 mg Per G Tube BID    scopolamine  1 patch Transdermal Q3 Days     PRN Meds:dextrose 50%, heparin (porcine), HYDROmorphone, melatonin, ondansetron, oxyCODONE, oxyCODONE, potassium, sodium phosphates, senna-docusate 8.6-50 mg, sodium chloride 0.9%     Review of patient's allergies indicates:   Allergen Reactions    Tylox [oxycodone-acetaminophen]      "Jittery"     Objective:     Vital Signs (Most Recent):  Temp: 98.2 °F (36.8 °C) (01/12/22 0832)  Pulse: 104 (01/12/22 0846)  Resp: 16 (01/12/22 1102)  BP: 101/60 (01/12/22 0832)  SpO2: (!) 88 % (01/12/22 0846) Vital Signs (24h Range):  Temp:  [98.2 °F (36.8 °C)-99.6 °F (37.6 °C)] 98.2 °F (36.8 °C)  Pulse:  [] 104  Resp:  [16-23] 16  SpO2:  [84 %-100 %] 88 %  BP: ()/(60-70) 101/60     Weight: 121.1 kg (267 lb)  Body mass index is 45.83 kg/m².    Intake/Output - Last 3 Shifts       01/10 0700 01/11 0659 01/11 0700 01/12 0659 01/12 " 0700  01/13 0659    P.O. 240 1000     NG/GT       IV Piggyback 72.9      Total Intake(mL/kg) 312.9 (2.6) 1000 (8.3)     Urine (mL/kg/hr) 1763 (0.6) 1050 (0.4)     Drains 100 75     Other 100 150     Total Output 1963 1275     Net -1650.1 -275            Stool Occurrence  0 x           Physical Exam  Vitals and nursing note reviewed.   Constitutional:       General: She is not in acute distress.     Appearance: She is obese. She is not diaphoretic.   HENT:      Head: Normocephalic and atraumatic.      Mouth/Throat:      Mouth: Mucous membranes are moist.      Pharynx: Oropharynx is clear.   Eyes:      Extraocular Movements: Extraocular movements intact.      Conjunctiva/sclera: Conjunctivae normal.   Cardiovascular:      Rate and Rhythm: Regular rhythm. Tachycardia present.   Pulmonary:      Effort: No respiratory distress.   Abdominal:      General: There is no distension.      Palpations: Abdomen is soft.      Tenderness: There is no guarding or rebound.      Comments: Wound vac in place with good seal, no leak noted.   RUQ REAGAN drains x2 with benign fluid, not bloody or bilious  LLQ IR drain with thin SS output   Musculoskeletal:         General: No deformity.   Skin:     General: Skin is warm and dry.   Neurological:      General: No focal deficit present.      Mental Status: She is alert and oriented to person, place, and time.         Significant Labs:  CBC:   Recent Labs   Lab 01/12/22  0505   WBC 11.80   RBC 2.67*   HGB 7.7*   HCT 24.7*   *   MCV 93   MCH 28.8   MCHC 31.2*     CMP:   Recent Labs   Lab 01/12/22  0505   *   CALCIUM 8.5*   ALBUMIN 1.6*   PROT 6.3      K 3.4*   CO2 26   CL 98   BUN 50*   CREATININE 2.4*   ALKPHOS 101   ALT 20   AST 27   BILITOT 0.5       Significant Diagnostics:  I have reviewed all pertinent imaging results/findings within the past 24 hours.    Assessment/Plan:     Subcapsular hematoma of liver  57yo female transfer from OSH after hysterectomy on 12/8  complicated by delayed recognition of small bowel injury requiring resection (in discontinuity) and at some point new bleed from the liver - transferred with open abdomen for higher level of care.  S/p ex-lap, liver packing 12/21  Open cholecystectomy, abdominal closure 12/23  IR drain placed into intra-abdominal abscess 12/31 - Cx growing e. Coli, enterococcus, and an unidentified GNR, f/u susceptibilities    - PT/OT   - Aggressive pulmonary hygiene, OOB to chair, PT/OT  - Renal diet, Tube feedings nightly  - Nutrition and speech following, appreciate assistance   - bowel reg  - Appreciate ID assistance   - VAP; BAL 12/28 with klebsiella    - CT 12/31 with pelvic abscess s/p drain placement.   - Pelvic abscess 12/31; bacteroides, klebsiella, e coli, enterococcus   --  fluconazole IV (1/4 - 1/9   -- discontinued Vanc stopped 1/3   -- Continue Meropenem for 3 week course (1/4-1/24, repeat CT Chest prior to stopping abx)  - Wound vac change biweekly - change today 01/12  - Will need home wound vac   - Continue REAGAN drains and IR drain; flush BID with 10 cc NS . Inferior REAGAN dc'd 01/11. Will repeatCT scan prior to DC and possibly remove remaining drains vs leave in until outpatient follow up.   - Strict I/Os   - SUSAN/ARF present on admission now resolving. Cr stable ~ 3 range, however UOP improving and electrolytes WNL  - Nephro following, appreciate assistance. Last HD 01/08, held given UOP    - Dispo: rehab, possibly this week pending nephrology recs          Angelica Ivy MD  General Surgery  [facility]

## 2022-01-12 NOTE — PLAN OF CARE
Plan of care reviewed with patient and , verbalizes understanding. AAOx4, VSS. Wound vac alarming blockage detect, MD on call made aware, drain output recorded. Pt still making urine  500mls total output for the shift. Pt with not much of a appetite, eating 25% with each meal, reports no N/V. Patent stood up with PT  upto the chair.Pain managed with PRN pain meds, Bed low, locked and call light in reach.

## 2022-01-12 NOTE — PLAN OF CARE
01/12/22 1330   Post-Acute Status   Post-Acute Authorization Placement   Post-Acute Placement Status Pending medical clearance/testing   LUISA [facility] with [facility] at [facility] ([phone]). She stated she has pt on the list to admit on Friday. She is contacting pt's insurance to change admit date. LUISA also spoke with pt's spouse regarding dispo.      Yarely Guzman LMSW  [facility]  [phone]

## 2022-01-12 NOTE — PT/OT/SLP PROGRESS
Physical Therapy Co-Treatment with OT    Patient Name:  Chidi Castaneda   MRN:  [id number]    Pre-op Diagnosis: Subcapsular hematoma of liver [K76.89]    Procedure(s):  LAPAROTOMY, EXPLORATORY  INSERTION, GASTROSTOMY TUBE, PERCUTANEOUS  CHOLECYSTECTOMY  EGD (ESOPHAGOGASTRODUODENOSCOPY)     *Co-treatment with OT due to patient complexity and need for two skilled therapists to ensure safe mobilization.    Recommendations:     Discharge Recommendations:  rehabilitation facility   Discharge Equipment Recommendations:  (tbd)   Barriers to discharge: None    Highest Level of Mobility: STS  Assistance Required: Mod(A)X2 persons    Assessment:     Chidi Castaneda is a 56 y.o. female admitted with a medical diagnosis of subscapular hematoma of liver.  She presents with the following impairments/functional limitations:  weakness,impaired endurance,impaired coordination,impaired self care skills,decreased upper extremity function,decreased lower extremity function,impaired functional mobilty,gait instability,decreased safety awareness,impaired balance,pain,impaired cardiopulmonary response to activity,edema. Chidi Castaneda would benefit from acute PT intervention to address listed functional deficits, provide patient/caregiver education, reduce fall risk, and maximize (I) and safety with functional mobility.    Pt met with HOB elevated, family present and agreeable to PT treatment. Pt participates well during session, but is limited by severe nausea after standing trial. Pt continues to require max(A) for bed mobility, however she is able to assist more with advancing her LEs as compared to yesterday's session      Pt is progressing towards acute PT goals appropriately and continues to benefit from acute PT sessions. After hospital discharge, pt would benefit from inpatient rehab to maximize rehab potential. Pt is very motivated and can tolerate 3 hours of intensive therapy services.    Rehab Prognosis: Good; patient would  "benefit from acute skilled PT services to address these deficits and reach maximum level of function.      Plan:     During this hospitalization, patient to be seen 4 x/week to address the identified rehab impairments via gait training,therapeutic activities,therapeutic exercises,neuromuscular re-education and progress toward the following goals:    · Plan of Care Expires:  01/27/22    This plan of care has been discussed with the patient/caregiver, who was included in its development and is in agreement with the identified goals and treatment plan.     Subjective     Communicated with RN prior to session.  Patient agreeable to participate.     Pain/Comfort:  · Pain Rating 1:  (unrated abdominal pain)  · Location - Side 1: Bilateral  · Location - Orientation 1: generalized  · Location 1: abdomen  · Pain Addressed 1: Reposition,Distraction  · Pain Rating Post-Intervention 1:  (unchanged throughout session)    Chief Complaint: Abdominal pain  Patient/Family Comments/goals: "I'm gonna keep trying"      Objective:     Patient found HOB elevated with telemetry,Trialysis,pulse ox (continuous),oxygen,wound vac,billingsley catheter,REAGAN drain  upon PT entry to room.    General Precautions: Standard, fall   Orthopedic Precautions:N/A   Braces: N/A         Exams:     Cognition:  o Pt is alert and oriented x 4  o Command following: intact    Functional Mobility:    Bed Mobility:  · Supine to Sit: Maximum Assistance on L side of bed  · Sit to Supine: Maximum Assistance  · Rolling L: Maximum Assistance  · Scooting anteriorly to EOB to plant feet on floor: Maximum Assistance  · Scooting/Bridging in supine to HOB: Maximum Assistance and 2 persons via drawsheet    Transfers:   · Sit to Stand Transfer: Moderate Assistance and 2 persons  from EOB with HHAx2   · Pt with good use of rocking momentum  · PT provided tactile cues for improved upright posture as pt demos a downward gaze and decreased hip extension   · Pt expressed extreme nausea " after standing trial and was able to spit oral secretions into emesis bag. No episode of emesis  · Bed to Chair: Activity did not occur, deferred, as pt's family member states pt is scheduled for a wound vac change today and needs to be in bed                Balance:  · Static Sit:   · Initially SBA, however pt required mod(A) when fatigued  at EOB x 15 minutes  · Fair: Patient able to maintain balance with handhold support; may require occasional minimal assistance.   · Pt demos a L-sided lean  · Static Stand:   · Mod Assist with Hand-held assist x 2      Therapeutic Activities/Exercises      Patient assisted with functional mobility as noted above   STSx1 from EOB   Discussed continued recs for IPR   Patient educated on the importance of early mobility to prevent functional decline during hospital stay   Patient educated on PT POC and role of PT in acute care   White board updated regarding patient's safest level of mobility with staff assistance, RN also updated.     AM-PAC 6 CLICK MOBILITY  Turning over in bed (including adjusting bedclothes, sheets and blankets)?: 2  Sitting down on and standing up from a chair with arms (e.g., wheelchair, bedside commode, etc.): 2  Moving from lying on back to sitting on the side of the bed?: 2  Moving to and from a bed to a chair (including a wheelchair)?: 2  Need to walk in hospital room?: 1  Climbing 3-5 steps with a railing?: 1  Basic Mobility Total Score: 10     Patient left HOB elevated with all lines intact, call button in reach, RN notified and family present.        History/Goals:     PAST MEDICAL HISTORY:  No past medical history on file.    Past Surgical History:   Procedure Laterality Date    CHOLECYSTECTOMY  12/23/2021    Procedure: CHOLECYSTECTOMY;  Surgeon: Kendall Gorman MD;  Location: [facility] OR [address] [place] [address];  Service: General;;    ESOPHAGOGASTRODUODENOSCOPY N/A 12/23/2021    Procedure: EGD (ESOPHAGOGASTRODUODENOSCOPY);  Surgeon: Kendall Gorman MD;   Location: [facility] OR 2ND FLR;  Service: General;  Laterality: N/A;    EVACUATION OF HEMATOMA  2021    Procedure: EVACUATION, HEMATOMA;  Surgeon: Kendall Gorman MD;  Location: [facility] OR 2ND FLR;  Service: General;;    PERCUTANEOUS INSERTION OF GASTROSTOMY TUBE N/A 2021    Procedure: INSERTION, GASTROSTOMY TUBE, PERCUTANEOUS;  Surgeon: Kendall Gorman MD;  Location: [facility] OR 2ND FLR;  Service: General;  Laterality: N/A;    REPLACEMENT OF WOUND VACUUM-ASSISTED CLOSURE DEVICE  2021    Procedure: REPLACEMENT, WOUND VAC;  Surgeon: Kendall Gorman MD;  Location: [facility] OR 2ND FLR;  Service: General;;       GOALS:   Multidisciplinary Problems     Physical Therapy Goals        Problem: Physical Therapy Goal    Goal Priority Disciplines Outcome Goal Variances Interventions   Physical Therapy Goal     PT, PT/OT Ongoing, Progressing     Description: Goals to be met by: 2022    Patient will increase functional independence with mobility by performin. Supine to sit with Moderate Assistance  2. Sit to stand transfer with Moderate Assistance using LRAD  3. Gait  x 10 feet with Moderate Assistance using LRAD  4. Sitting at edge of bed x10 minutes with Stand-by Assistance  5. Stand for 3 minutes with Moderate Assistance using LRAD  6. Lower extremity exercise program x10 reps per handout, with independence                     Time Tracking:     PT Received On: 22  PT Start Time: 1021     PT Stop Time: 1051  PT Total Time (min): 30 min     Billable Minutes: Therapeutic Activity 15 and Therapeutic Exercise 15      Kristy Mcclelland, PT  2022   Pager# [phone]

## 2022-01-12 NOTE — PLAN OF CARE
Recommendations    1. Continue renal diet as tolerated.    - Add Novasource Renal once daily.    - As medically able, liberalize diet.     2. Continue TF of Novasource Renal @ 60 mL/hr x 12 hrs providing pt with 1440 kcal, 65 g protein, and 516 mL free water.    - If hindering po intake, decrease TF.    3. RD following.    Goals: Pt to tolerate >/= 75% EEN/EPN by f/u date  Nutrition Goal Status: goal met

## 2022-01-12 NOTE — SUBJECTIVE & OBJECTIVE
"Interval History: NICOLETTE HAHN daily, UOP 1050L. Wound vac issues overnight, will change today. Tolerating about 25% of meals and tube feeds at goal.     Medications:  Continuous Infusions:   sodium chloride 0.9% Stopped (01/08/22 1053)     Scheduled Meds:   acetaminophen  650 mg Oral Q6H    balsam peru-castor oiL   Topical (Top) BID    dronabinoL  2.5 mg Oral BID    epoetin genie-epbx  50 Units/kg Intravenous Q7 Days    gabapentin  125 mg Per G Tube Q8H    heparin (porcine)  7,500 Units Subcutaneous Q8H    levalbuterol  0.63 mg Nebulization Q4H    meropenem (MERREM) IVPB  1 g Intravenous Q12H    methocarbamoL  500 mg Per G Tube QID    nitroGLYCERIN 2% TD oint  0.5 inch Topical (Top) Q6H    propranoloL  20 mg Per G Tube BID    scopolamine  1 patch Transdermal Q3 Days     PRN Meds:dextrose 50%, heparin (porcine), HYDROmorphone, melatonin, ondansetron, oxyCODONE, oxyCODONE, potassium, sodium phosphates, senna-docusate 8.6-50 mg, sodium chloride 0.9%     Review of patient's allergies indicates:   Allergen Reactions    Tylox [oxycodone-acetaminophen]      "Jittery"     Objective:     Vital Signs (Most Recent):  Temp: 98.2 °F (36.8 °C) (01/12/22 0832)  Pulse: 104 (01/12/22 0846)  Resp: 16 (01/12/22 1102)  BP: 101/60 (01/12/22 0832)  SpO2: (!) 88 % (01/12/22 0846) Vital Signs (24h Range):  Temp:  [98.2 °F (36.8 °C)-99.6 °F (37.6 °C)] 98.2 °F (36.8 °C)  Pulse:  [] 104  Resp:  [16-23] 16  SpO2:  [84 %-100 %] 88 %  BP: ()/(60-70) 101/60     Weight: 121.1 kg (267 lb)  Body mass index is 45.83 kg/m².    Intake/Output - Last 3 Shifts       01/10 0700  01/11 0659 01/11 0700 01/12 0659 01/12 0700  01/13 0659    P.O. 240 1000     NG/GT       IV Piggyback 72.9      Total Intake(mL/kg) 312.9 (2.6) 1000 (8.3)     Urine (mL/kg/hr) 1763 (0.6) 1050 (0.4)     Drains 100 75     Other 100 150     Total Output 1963 1275     Net -1650.1 -275            Stool Occurrence  0 x           Physical Exam  Vitals and " nursing note reviewed.   Constitutional:       General: She is not in acute distress.     Appearance: She is obese. She is not diaphoretic.   HENT:      Head: Normocephalic and atraumatic.      Mouth/Throat:      Mouth: Mucous membranes are moist.      Pharynx: Oropharynx is clear.   Eyes:      Extraocular Movements: Extraocular movements intact.      Conjunctiva/sclera: Conjunctivae normal.   Cardiovascular:      Rate and Rhythm: Regular rhythm. Tachycardia present.   Pulmonary:      Effort: No respiratory distress.   Abdominal:      General: There is no distension.      Palpations: Abdomen is soft.      Tenderness: There is no guarding or rebound.      Comments: Wound vac in place with good seal, no leak noted.   RUQ REAGAN drains x2 with benign fluid, not bloody or bilious  LLQ IR drain with thin SS output   Musculoskeletal:         General: No deformity.   Skin:     General: Skin is warm and dry.   Neurological:      General: No focal deficit present.      Mental Status: She is alert and oriented to person, place, and time.         Significant Labs:  CBC:   Recent Labs   Lab 01/12/22  0505   WBC 11.80   RBC 2.67*   HGB 7.7*   HCT 24.7*   *   MCV 93   MCH 28.8   MCHC 31.2*     CMP:   Recent Labs   Lab 01/12/22  0505   *   CALCIUM 8.5*   ALBUMIN 1.6*   PROT 6.3      K 3.4*   CO2 26   CL 98   BUN 50*   CREATININE 2.4*   ALKPHOS 101   ALT 20   AST 27   BILITOT 0.5       Significant Diagnostics:  I have reviewed all pertinent imaging results/findings within the past 24 hours.

## 2022-01-12 NOTE — PT/OT/SLP PROGRESS
Occupational Therapy   Co-Treatment  Co-treatment with PT for maximal pt participation, safety, and activity tolerance     Name: Chidi Castaneda  MRN: [id number]  Admitting Diagnosis:  <principal problem not specified>  20 Days Post-Op    Recommendations:     Discharge Recommendations: rehabilitation facility  Discharge Equipment Recommendations:   (tbd)  Barriers to discharge:       Assessment:     Chidi Castaneda is a 56 y.o. female with a medical diagnosis of <principal problem not specified>.  She presents with impaired ADL and mobility performance deficits. Pt completed bed mobility and EOB ADLs today showing excellent improvements in overall activity tolerance (tolerating 15 min at bedside). Pt stood with mod A x2 standing 10 seconds with cues for upright posturing. Pt required max A for bed mobility and sat EOB with CGA-mod A with active engagement of core given cues. Pt is a high fall risk and is not safe to return home. Pt currently is motivated and not at her baseline. Pt would benefit from continued OT skilled services 4x/wk to improve daily living skills to optimize QOL.  Pt is recommended to discharge to rehab at this time.   Performance deficits affecting function are weakness,impaired self care skills,impaired endurance,impaired functional mobilty,gait instability,decreased lower extremity function,decreased upper extremity function,impaired balance,decreased coordination,impaired cardiopulmonary response to activity,pain.     Rehab Prognosis:  Good; patient would benefit from acute skilled OT services to address these deficits and reach maximum level of function.       Plan:     Patient to be seen 4 x/week to address the above listed problems via self-care/home management,therapeutic activities,therapeutic exercises,neuromuscular re-education  · Plan of Care Expires: 02/10/22  · Plan of Care Reviewed with: patient    Subjective     Pain/Comfort:  · Pain Rating 1: other (see comments) (abdomen  pain)  · Pain Addressed 1: Reposition,Distraction  · Location - Orientation 2: generalized  · Location 2: abdomen    Objective:     Communicated with: RN prior to session.  Patient found HOB elevated with telemetry,Trialysis,pulse ox (continuous),oxygen,wound vac,billingsley catheter,REAGAN drain upon OT entry to room.    General Precautions: Standard, fall   Orthopedic Precautions:N/A   Braces: N/A  Respiratory Status: Room air     Occupational Performance:     Bed Mobility:    · Patient completed Rolling/Turning to Left with  maximal assistance  · Patient completed Scooting/Bridging with maximal assistance  · Patient completed Supine to Sit with maximal assistance  · Patient completed Sit to Supine with maximal assistance     Functional Mobility/Transfers:  · Patient completed Sit <> Stand Transfer with moderate assistance and of 2 persons  with  hand-held assist   · Functional Mobility: Pt stood 10 seconds statically with mod A x2 requiring cues for posturing.    Activities of Daily Living:  · Grooming: contact guard assistance brushing teeth and washing face   · Upper Body Dressing: moderate assistance donning gown at EOB       [facility] 6 Click ADL: 11    Treatment & Education:  Pt educated on role of occupational therapy, POC, and safety during ADLs and functional mobility. Pt and OT discussed importance of safe, continued mobility to optimize daily living skills. Pt verbalized understanding.   Pt completed the following during session: bed mobility, EOB grooming tasks, sit<Stand t/f, safe setup in bed.   White board updated during session. Pt given instruction to call for medical staff/nurse for assistance.       Patient left HOB elevated with all lines intact, call button in reach and RN notifiedEducation:      GOALS:   Multidisciplinary Problems     Occupational Therapy Goals        Problem: Occupational Therapy Goal    Goal Priority Disciplines Outcome Interventions   Occupational Therapy Goal     OT, PT/OT Ongoing,  Progressing    Description: Goals to be met by: 1/9/2022 (1 week)     Patient will increase functional independence with ADLs by performing:    UE Dressing with Maximum Assistance.  Grooming while seated with Moderate Assistance.  Toileting from bedside commode with Moderate Assistance for hygiene and clothing management.   Rolling to Bilateral with Moderate Assistance.   Supine to sit with Maximum Assistance.  Step transfer with Moderate Assistance  Toilet transfer to bedside commode with Moderate Assistance.                     Time Tracking:     OT Date of Treatment: 01/12/22  OT Start Time: 1020  OT Stop Time: 1051  OT Total Time (min): 31 min    Billable Minutes:Self Care/Home Management 15 min  Therapeutic Activity  16 min    OT/SHYANN: OT          1/12/2022

## 2022-01-12 NOTE — SUBJECTIVE & OBJECTIVE
"Interval History: continued improvement in serum creatinine and persistent urine output    Review of patient's allergies indicates:   Allergen Reactions    Tylox [oxycodone-acetaminophen]      "Jittery"     Current Facility-Administered Medications   Medication Frequency    0.9%  NaCl infusion Continuous    acetaminophen oral solution 650 mg Q6H    balsam peru-castor oiL Oint BID    dextrose 50% injection 25 g PRN    dronabinoL capsule 2.5 mg BID    epoetin genie-epbx injection 6,060 Units Q7 Days    gabapentin 250 mg/5 mL (5 mL) solution 125 mg Q8H    heparin (porcine) injection 1,000 Units PRN    heparin (porcine) injection 7,500 Units Q8H    HYDROmorphone injection 0.5 mg Q3H PRN    levalbuterol nebulizer solution 0.63 mg Q4H    melatonin 1 mg/mL liquid (PEDS) 5 mg Nightly PRN    meropenem-0.9% sodium chloride 1 g/50 mL IVPB Q12H    methocarbamoL tablet 500 mg QID    nitroGLYCERIN 2% TD oint ointment 0.5 inch Q6H    ondansetron injection 8 mg Q8H PRN    oxyCODONE 5 mg/5 mL solution 10 mg Q4H PRN    oxyCODONE 5 mg/5 mL solution 5 mg Q4H PRN    potassium, sodium phosphates 280-160-250 mg packet 2 packet TID PRN    propranoloL tablet 20 mg BID    scopolamine 1.3-1.5 mg (1 mg over 3 days) 1 patch Q3 Days    senna-docusate 8.6-50 mg per tablet 1 tablet Daily PRN    sodium chloride 0.9% bolus 250 mL PRN       Objective:     Vital Signs (Most Recent):  Temp: 98.2 °F (36.8 °C) (01/12/22 0832)  Pulse: 104 (01/12/22 0846)  Resp: 16 (01/12/22 1102)  BP: 101/60 (01/12/22 0832)  SpO2: (!) 88 % (01/12/22 0846)  O2 Device (Oxygen Therapy): room air (01/12/22 0846) Vital Signs (24h Range):  Temp:  [98.2 °F (36.8 °C)-99.6 °F (37.6 °C)] 98.2 °F (36.8 °C)  Pulse:  [] 104  Resp:  [16-23] 16  SpO2:  [84 %-100 %] 88 %  BP: ()/(60-70) 101/60     Weight: 121.1 kg (267 lb) (12/28/21 1003)  Body mass index is 45.83 kg/m².  Body surface area is 2.34 meters squared.    I/O last 3 completed shifts:  In: " 1013.1 [P.O.:1000; IV Piggyback:13.1]  Out: 2523 [Urine:2143; Drains:130; Other:250]    Physical Exam  Constitutional:       Appearance: She is obese. She is ill-appearing.   HENT:      Mouth/Throat:      Mouth: Mucous membranes are moist.   Eyes:      Pupils: Pupils are equal, round, and reactive to light.   Cardiovascular:      Rate and Rhythm: Normal rate and regular rhythm.   Pulmonary:      Effort: No respiratory distress.      Comments: NC  Abdominal:      General: There is no distension.      Comments: Wound vac   Musculoskeletal:         General: No swelling or deformity.      Right lower leg: Edema present.      Left lower leg: Edema present.   Skin:     Coloration: Skin is not jaundiced.   Neurological:      General: No focal deficit present.         Significant Labs:  All labs within the past 24 hours have been reviewed.     Significant Imaging:  Labs: Reviewed

## 2022-01-13 LAB
ALBUMIN SERPL BCP-MCNC: 1.8 G/DL (ref 3.5–5.2)
ALP SERPL-CCNC: 110 U/L (ref 55–135)
ALT SERPL W/O P-5'-P-CCNC: 22 U/L (ref 10–44)
ANION GAP SERPL CALC-SCNC: 13 MMOL/L (ref 8–16)
AST SERPL-CCNC: 29 U/L (ref 10–40)
BASOPHILS # BLD AUTO: 0.08 K/UL (ref 0–0.2)
BASOPHILS NFR BLD: 0.5 % (ref 0–1.9)
BILIRUB SERPL-MCNC: 0.5 MG/DL (ref 0.1–1)
BUN SERPL-MCNC: 49 MG/DL (ref 6–20)
CALCIUM SERPL-MCNC: 9.1 MG/DL (ref 8.7–10.5)
CHLORIDE SERPL-SCNC: 97 MMOL/L (ref 95–110)
CO2 SERPL-SCNC: 26 MMOL/L (ref 23–29)
CREAT SERPL-MCNC: 1.8 MG/DL (ref 0.5–1.4)
DIFFERENTIAL METHOD: ABNORMAL
EOSINOPHIL # BLD AUTO: 0.1 K/UL (ref 0–0.5)
EOSINOPHIL NFR BLD: 0.8 % (ref 0–8)
ERYTHROCYTE [DISTWIDTH] IN BLOOD BY AUTOMATED COUNT: 15.9 % (ref 11.5–14.5)
EST. GFR  (AFRICAN AMERICAN): 36.3 ML/MIN/1.73 M^2
EST. GFR  (NON AFRICAN AMERICAN): 31.5 ML/MIN/1.73 M^2
GLUCOSE SERPL-MCNC: 105 MG/DL (ref 70–110)
HCT VFR BLD AUTO: 27.6 % (ref 37–48.5)
HGB BLD-MCNC: 8.7 G/DL (ref 12–16)
IMM GRANULOCYTES # BLD AUTO: 0.39 K/UL (ref 0–0.04)
IMM GRANULOCYTES NFR BLD AUTO: 2.3 % (ref 0–0.5)
LYMPHOCYTES # BLD AUTO: 1.2 K/UL (ref 1–4.8)
LYMPHOCYTES NFR BLD: 6.8 % (ref 18–48)
MAGNESIUM SERPL-MCNC: 2 MG/DL (ref 1.6–2.6)
MCH RBC QN AUTO: 29.1 PG (ref 27–31)
MCHC RBC AUTO-ENTMCNC: 31.5 G/DL (ref 32–36)
MCV RBC AUTO: 92 FL (ref 82–98)
MONOCYTES # BLD AUTO: 0.9 K/UL (ref 0.3–1)
MONOCYTES NFR BLD: 5.3 % (ref 4–15)
NEUTROPHILS # BLD AUTO: 14.4 K/UL (ref 1.8–7.7)
NEUTROPHILS NFR BLD: 84.3 % (ref 38–73)
NRBC BLD-RTO: 0 /100 WBC
PHOSPHATE SERPL-MCNC: 5.5 MG/DL (ref 2.7–4.5)
PLATELET # BLD AUTO: 659 K/UL (ref 150–450)
PMV BLD AUTO: 10.3 FL (ref 9.2–12.9)
POTASSIUM SERPL-SCNC: 3.5 MMOL/L (ref 3.5–5.1)
PROT SERPL-MCNC: 6.8 G/DL (ref 6–8.4)
RBC # BLD AUTO: 2.99 M/UL (ref 4–5.4)
SODIUM SERPL-SCNC: 136 MMOL/L (ref 136–145)
WBC # BLD AUTO: 17.09 K/UL (ref 3.9–12.7)

## 2022-01-13 PROCEDURE — 20600001 HC STEP DOWN PRIVATE ROOM

## 2022-01-13 PROCEDURE — 25000242 PHARM REV CODE 250 ALT 637 W/ HCPCS: Performed by: STUDENT IN AN ORGANIZED HEALTH CARE EDUCATION/TRAINING PROGRAM

## 2022-01-13 PROCEDURE — 84100 ASSAY OF PHOSPHORUS: CPT | Performed by: SURGERY

## 2022-01-13 PROCEDURE — 27000646 HC AEROBIKA DEVICE

## 2022-01-13 PROCEDURE — 99900035 HC TECH TIME PER 15 MIN (STAT)

## 2022-01-13 PROCEDURE — 27000173 HC ACAPELLA DEVICE DH OR DM

## 2022-01-13 PROCEDURE — 63600175 PHARM REV CODE 636 W HCPCS: Performed by: STUDENT IN AN ORGANIZED HEALTH CARE EDUCATION/TRAINING PROGRAM

## 2022-01-13 PROCEDURE — 94761 N-INVAS EAR/PLS OXIMETRY MLT: CPT

## 2022-01-13 PROCEDURE — 80053 COMPREHEN METABOLIC PANEL: CPT | Performed by: SURGERY

## 2022-01-13 PROCEDURE — 94664 DEMO&/EVAL PT USE INHALER: CPT

## 2022-01-13 PROCEDURE — 25000003 PHARM REV CODE 250: Performed by: STUDENT IN AN ORGANIZED HEALTH CARE EDUCATION/TRAINING PROGRAM

## 2022-01-13 PROCEDURE — 94640 AIRWAY INHALATION TREATMENT: CPT

## 2022-01-13 PROCEDURE — 85025 COMPLETE CBC W/AUTO DIFF WBC: CPT | Performed by: SURGERY

## 2022-01-13 PROCEDURE — 83735 ASSAY OF MAGNESIUM: CPT | Performed by: SURGERY

## 2022-01-13 RX ADMIN — METHOCARBAMOL 500 MG: 500 TABLET ORAL at 09:01

## 2022-01-13 RX ADMIN — NITROGLYCERIN 0.5 INCH: 20 OINTMENT TOPICAL at 05:01

## 2022-01-13 RX ADMIN — HEPARIN SODIUM 7500 UNITS: 5000 INJECTION INTRAVENOUS; SUBCUTANEOUS at 02:01

## 2022-01-13 RX ADMIN — GABAPENTIN 125 MG: 250 SOLUTION ORAL at 05:01

## 2022-01-13 RX ADMIN — Medication: at 09:01

## 2022-01-13 RX ADMIN — LEVALBUTEROL HYDROCHLORIDE 0.63 MG: 0.63 SOLUTION RESPIRATORY (INHALATION) at 03:01

## 2022-01-13 RX ADMIN — LEVALBUTEROL HYDROCHLORIDE 0.63 MG: 0.63 SOLUTION RESPIRATORY (INHALATION) at 04:01

## 2022-01-13 RX ADMIN — ACETAMINOPHEN 650 MG: 160 SOLUTION ORAL at 11:01

## 2022-01-13 RX ADMIN — LEVALBUTEROL HYDROCHLORIDE 0.63 MG: 0.63 SOLUTION RESPIRATORY (INHALATION) at 08:01

## 2022-01-13 RX ADMIN — ACETAMINOPHEN 650 MG: 160 SOLUTION ORAL at 05:01

## 2022-01-13 RX ADMIN — NITROGLYCERIN 0.5 INCH: 20 OINTMENT TOPICAL at 11:01

## 2022-01-13 RX ADMIN — DRONABINOL 2.5 MG: 2.5 CAPSULE ORAL at 08:01

## 2022-01-13 RX ADMIN — ONDANSETRON 8 MG: 2 INJECTION INTRAMUSCULAR; INTRAVENOUS at 01:01

## 2022-01-13 RX ADMIN — METHOCARBAMOL 500 MG: 500 TABLET ORAL at 02:01

## 2022-01-13 RX ADMIN — METHOCARBAMOL 500 MG: 500 TABLET ORAL at 05:01

## 2022-01-13 RX ADMIN — OXYCODONE HYDROCHLORIDE 5 MG: 5 SOLUTION ORAL at 09:01

## 2022-01-13 RX ADMIN — GABAPENTIN 125 MG: 250 SOLUTION ORAL at 09:01

## 2022-01-13 RX ADMIN — HEPARIN SODIUM 7500 UNITS: 5000 INJECTION INTRAVENOUS; SUBCUTANEOUS at 05:01

## 2022-01-13 RX ADMIN — Medication: at 08:01

## 2022-01-13 RX ADMIN — PROPRANOLOL HYDROCHLORIDE 20 MG: 20 TABLET ORAL at 08:01

## 2022-01-13 RX ADMIN — MEROPENEM AND SODIUM CHLORIDE 1 G: 1 INJECTION, SOLUTION INTRAVENOUS at 08:01

## 2022-01-13 RX ADMIN — HEPARIN SODIUM 7500 UNITS: 5000 INJECTION INTRAVENOUS; SUBCUTANEOUS at 09:01

## 2022-01-13 RX ADMIN — NITROGLYCERIN 0.5 INCH: 20 OINTMENT TOPICAL at 12:01

## 2022-01-13 RX ADMIN — PROPRANOLOL HYDROCHLORIDE 20 MG: 20 TABLET ORAL at 09:01

## 2022-01-13 RX ADMIN — MEROPENEM AND SODIUM CHLORIDE 1 G: 1 INJECTION, SOLUTION INTRAVENOUS at 09:01

## 2022-01-13 RX ADMIN — METHOCARBAMOL 500 MG: 500 TABLET ORAL at 08:01

## 2022-01-13 RX ADMIN — GABAPENTIN 125 MG: 250 SOLUTION ORAL at 02:01

## 2022-01-13 RX ADMIN — DRONABINOL 2.5 MG: 2.5 CAPSULE ORAL at 09:01

## 2022-01-13 NOTE — ASSESSMENT & PLAN NOTE
55yo female transfer from OSH after hysterectomy on 12/8 complicated by delayed recognition of small bowel injury requiring resection (in discontinuity) and at some point new bleed from the liver - transferred with open abdomen for higher level of care.  S/p ex-lap, liver packing 12/21  Open cholecystectomy, abdominal closure 12/23  IR drain placed into intra-abdominal abscess 12/31 - Cx growing e. Coli, enterococcus, and an unidentified GNR, f/u susceptibilities    - KUB   - PT/OT reordered  - Aggressive pulmonary hygiene, OOB to chair, PT/OT  - Renal diet, Tube feedings nightly  - Nutrition and speech following, appreciate assistance   - bowel reg  - Appreciate ID assistance   - VAP; BAL 12/28 with klebsiella    - CT 12/31 with pelvic abscess s/p drain placement.   - Pelvic abscess 12/31; bacteroides, klebsiella, e coli, enterococcus   --  fluconazole IV (1/4 - 1/9   -- discontinued Vanc stopped 1/3   -- ontinue Meropenem for 3 week course (1/4-1/24, repeat CT Chest prior to stopping abx)  - Wound vac change biweekly - last performed 01/12, next due 01/15. Will need home wound vac.   - Continue REAGAN drains and IR drain; flush BID with 10 cc NS . Will likely pull one saeed-hepatic drain today.     - Strict I/Os   - SUSAN/ARF present on admission now resolving. Cr stable ~ 3 range, however UOP improving and electrolytes WNL  - Nephro following, appreciate assistance. Last HD 01/08, held yesterday given UOP    - Dispo: rehab, possibly this week pending nephrology recs

## 2022-01-13 NOTE — NURSING
VSS.   at bedside.  Pt had large BM this am and when turning the pt, the pt started vomiting.  Vomited up a large amt of light brown emesis.  Feedings stopped after discussing with her  to minimize risk of aspiration. Pt states she feels very tight and full.   Residual amt was only 30 cc at beginning of shift.  WV remains intact with no leak to ML abdominal incision.  REAGAN drain x 2 to abdomen with serosanguinous outpt noted. Nitro oint to toes on bilateral LE,  Tips of toes remain black in color.  Edema noted to LE.  Purewick in place and urinating well.  No skin breakdown noted to sacrum.  On specialty bed.  Feeding tube patent and flushes well.  Trialysis remains to right neck, plan to be removed today.  After 8mg Zofran IVP pt is no longer nauseated. Call light within reach.  Bed locked and lowered for safety.  Suction in reach of pt.

## 2022-01-13 NOTE — SUBJECTIVE & OBJECTIVE
"Interval History: Afebrile. VSS. Wound vac changed yesterday. Patient's  notes that patient vomited G tube feed liquid after feeling bloating/abdominal tightness following drinking a liter of oral contrast before yesterday's abdominal CT. Patient relays this morning that her abdomen still feels a bit tight. Yesterday's scan appears stable, see significant diagnostics.      Medications:  Continuous Infusions:   sodium chloride 0.9% Stopped (01/08/22 1053)     Scheduled Meds:   acetaminophen  650 mg Oral Q6H    balsam peru-castor oiL   Topical (Top) BID    dronabinoL  2.5 mg Oral BID    epoetin genie-epbx  50 Units/kg Intravenous Q7 Days    gabapentin  125 mg Per G Tube Q8H    heparin (porcine)  7,500 Units Subcutaneous Q8H    levalbuterol  0.63 mg Nebulization Q4H    meropenem (MERREM) IVPB  1 g Intravenous Q12H    methocarbamoL  500 mg Per G Tube QID    nitroGLYCERIN 2% TD oint  0.5 inch Topical (Top) Q6H    propranoloL  20 mg Per G Tube BID    scopolamine  1 patch Transdermal Q3 Days     PRN Meds:dextrose 50%, heparin (porcine), HYDROmorphone, melatonin, ondansetron, oxyCODONE, oxyCODONE, potassium, sodium phosphates, senna-docusate 8.6-50 mg, sodium chloride 0.9%     Review of patient's allergies indicates:   Allergen Reactions    Tylox [oxycodone-acetaminophen]      "Jittery"     Objective:     Vital Signs (Most Recent):  Temp: 98.7 °F (37.1 °C) (01/13/22 0829)  Pulse: 93 (01/13/22 0829)  Resp: 15 (01/13/22 0829)  BP: 120/79 (01/13/22 0406)  SpO2: 100 % (01/13/22 0829) Vital Signs (24h Range):  Temp:  [97.4 °F (36.3 °C)-98.7 °F (37.1 °C)] 98.7 °F (37.1 °C)  Pulse:  [] 93  Resp:  [15-22] 15  SpO2:  [91 %-100 %] 100 %  BP: (117-130)/(70-79) 120/79     Weight: 121.1 kg (267 lb)  Body mass index is 45.83 kg/m².    Intake/Output - Last 3 Shifts       01/11 0700 01/12 0659 01/12 0700 01/13 0659 01/13 0700 01/14 0659    P.O. 1000      NG/GT  420     IV Piggyback  194.6     Total " Intake(mL/kg) 1000 (8.3) 614.6 (5.1)     Urine (mL/kg/hr) 1050 (0.4) 1300 (0.4)     Emesis/NG output  0     Drains 75 34     Other 150 80     Stool  0     Total Output 1275 1414     Net -275 -799.4            Urine Occurrence  1 x     Stool Occurrence 0 x 1 x     Emesis Occurrence  1 x           Physical Exam  Vitals and nursing note reviewed.   Constitutional:       General: She is not in acute distress.     Appearance: She is obese. She is not diaphoretic.   HENT:      Head: Normocephalic and atraumatic.      Mouth/Throat:      Mouth: Mucous membranes are moist.      Pharynx: Oropharynx is clear.   Eyes:      Extraocular Movements: Extraocular movements intact.      Conjunctiva/sclera: Conjunctivae normal.   Cardiovascular:      Rate and Rhythm: Normal rate and regular rhythm.   Pulmonary:      Effort: No respiratory distress.   Abdominal:      General: There is no distension.      Palpations: Abdomen is soft.      Tenderness: There is no guarding or rebound.      Comments: Wound vac in place with good seal, no leak noted.   RUQ REAGAN drains x2 with benign fluid, not bloody or bilious  LLQ IR drain with thin SS output   Musculoskeletal:         General: No deformity.   Skin:     General: Skin is warm and dry.   Neurological:      General: No focal deficit present.      Mental Status: She is alert and oriented to person, place, and time.         Significant Labs:  I have reviewed all pertinent lab results within the past 24 hours.  CBC:   Recent Labs   Lab 01/13/22 0421   WBC 17.09*   RBC 2.99*   HGB 8.7*   HCT 27.6*   *   MCV 92   MCH 29.1   MCHC 31.5*     CMP:   Recent Labs   Lab 01/13/22  0421      CALCIUM 9.1   ALBUMIN 1.8*   PROT 6.8      K 3.5   CO2 26   CL 97   BUN 49*   CREATININE 1.8*   ALKPHOS 110   ALT 22   AST 29   BILITOT 0.5       Significant Diagnostics:  I have reviewed all pertinent imaging results/findings within the past 24 hours.     CT abdominal Pelvis without IV Contrast,  with PO contrast 01/12:   Stable position of supra-cystic catheter with essentially stable size of air and fluid collection within the pelvis.     Stable position of supra-hepatic percutaneous catheter with essentially stable size of perihepatic fluid collection.     Multiple dilated loops of fluid and gas-filled small bowel within the midline extending into the pelvis without definable transition point. Findings could represent partial small bowel obstruction versus ileus.     Subcapsular patchy hepatic hypoattenuation, potentially representing areas of infarct versus intermixed subcapsular fluid.     Bilateral pleural effusions.     Reactive mesenteric lymph nodes and scattered mesenteric edema.     Ventral abdominal wall surgical defect with packing in likely wound VAC.     Abnormal contour to the gastrostomy tubing along the ventral abdominal wall potentially representing kinking.  Clinical correlation is advised.     Additional stable findings as above.

## 2022-01-13 NOTE — PLAN OF CARE
Plan of care reviewed with patient and , verbalizes understanding. AAOx4, VSS. Wound vac dressing changed today by MD at bedside this am.  drain output recorded. Pt still making urine  800mls total output for the shift. Pt with not much of a appetite, eating 25% with each meal, reports no N/V. Patent stood up with PT and got back to bed. Pain managed with PRN pain meds, Bed low, locked and call light in reach

## 2022-01-14 VITALS
DIASTOLIC BLOOD PRESSURE: 83 MMHG | OXYGEN SATURATION: 75 % | TEMPERATURE: 99 F | RESPIRATION RATE: 18 BRPM | SYSTOLIC BLOOD PRESSURE: 135 MMHG | BODY MASS INDEX: 45.58 KG/M2 | HEIGHT: 64 IN | WEIGHT: 267 LBS | HEART RATE: 99 BPM

## 2022-01-14 PROBLEM — R65.21 SEPTIC SHOCK: Status: RESOLVED | Noted: 2021-12-21 | Resolved: 2022-01-14

## 2022-01-14 PROBLEM — A41.9 SEPTIC SHOCK: Status: RESOLVED | Noted: 2021-12-21 | Resolved: 2022-01-14

## 2022-01-14 LAB
ALBUMIN SERPL BCP-MCNC: 1.8 G/DL (ref 3.5–5.2)
ALP SERPL-CCNC: 109 U/L (ref 55–135)
ALT SERPL W/O P-5'-P-CCNC: 19 U/L (ref 10–44)
ANION GAP SERPL CALC-SCNC: 12 MMOL/L (ref 8–16)
AST SERPL-CCNC: 26 U/L (ref 10–40)
BASOPHILS # BLD AUTO: 0.05 K/UL (ref 0–0.2)
BASOPHILS NFR BLD: 0.3 % (ref 0–1.9)
BILIRUB SERPL-MCNC: 0.5 MG/DL (ref 0.1–1)
BUN SERPL-MCNC: 43 MG/DL (ref 6–20)
CALCIUM SERPL-MCNC: 9.2 MG/DL (ref 8.7–10.5)
CHLORIDE SERPL-SCNC: 100 MMOL/L (ref 95–110)
CO2 SERPL-SCNC: 26 MMOL/L (ref 23–29)
CREAT SERPL-MCNC: 1.4 MG/DL (ref 0.5–1.4)
DIFFERENTIAL METHOD: ABNORMAL
EOSINOPHIL # BLD AUTO: 0.2 K/UL (ref 0–0.5)
EOSINOPHIL NFR BLD: 1.2 % (ref 0–8)
ERYTHROCYTE [DISTWIDTH] IN BLOOD BY AUTOMATED COUNT: 16 % (ref 11.5–14.5)
EST. GFR  (AFRICAN AMERICAN): 49.1 ML/MIN/1.73 M^2
EST. GFR  (NON AFRICAN AMERICAN): 42.6 ML/MIN/1.73 M^2
GLUCOSE SERPL-MCNC: 113 MG/DL (ref 70–110)
HCT VFR BLD AUTO: 28.3 % (ref 37–48.5)
HGB BLD-MCNC: 8.7 G/DL (ref 12–16)
IMM GRANULOCYTES # BLD AUTO: 0.32 K/UL (ref 0–0.04)
IMM GRANULOCYTES NFR BLD AUTO: 2 % (ref 0–0.5)
LYMPHOCYTES # BLD AUTO: 1.5 K/UL (ref 1–4.8)
LYMPHOCYTES NFR BLD: 9 % (ref 18–48)
MAGNESIUM SERPL-MCNC: 2 MG/DL (ref 1.6–2.6)
MCH RBC QN AUTO: 28.6 PG (ref 27–31)
MCHC RBC AUTO-ENTMCNC: 30.7 G/DL (ref 32–36)
MCV RBC AUTO: 93 FL (ref 82–98)
MONOCYTES # BLD AUTO: 0.9 K/UL (ref 0.3–1)
MONOCYTES NFR BLD: 5.5 % (ref 4–15)
NEUTROPHILS # BLD AUTO: 13.3 K/UL (ref 1.8–7.7)
NEUTROPHILS NFR BLD: 82 % (ref 38–73)
NRBC BLD-RTO: 0 /100 WBC
PHOSPHATE SERPL-MCNC: 5.1 MG/DL (ref 2.7–4.5)
PLATELET # BLD AUTO: 675 K/UL (ref 150–450)
PMV BLD AUTO: 10.2 FL (ref 9.2–12.9)
POTASSIUM SERPL-SCNC: 3.5 MMOL/L (ref 3.5–5.1)
PROT SERPL-MCNC: 6.8 G/DL (ref 6–8.4)
RBC # BLD AUTO: 3.04 M/UL (ref 4–5.4)
SODIUM SERPL-SCNC: 138 MMOL/L (ref 136–145)
WBC # BLD AUTO: 16.23 K/UL (ref 3.9–12.7)

## 2022-01-14 PROCEDURE — 25000003 PHARM REV CODE 250: Performed by: STUDENT IN AN ORGANIZED HEALTH CARE EDUCATION/TRAINING PROGRAM

## 2022-01-14 PROCEDURE — 27000646 HC AEROBIKA DEVICE

## 2022-01-14 PROCEDURE — 36415 COLL VENOUS BLD VENIPUNCTURE: CPT | Performed by: SURGERY

## 2022-01-14 PROCEDURE — 97535 SELF CARE MNGMENT TRAINING: CPT

## 2022-01-14 PROCEDURE — 83735 ASSAY OF MAGNESIUM: CPT | Performed by: SURGERY

## 2022-01-14 PROCEDURE — 94640 AIRWAY INHALATION TREATMENT: CPT

## 2022-01-14 PROCEDURE — 27000221 HC OXYGEN, UP TO 24 HOURS

## 2022-01-14 PROCEDURE — 63600175 PHARM REV CODE 636 W HCPCS: Performed by: STUDENT IN AN ORGANIZED HEALTH CARE EDUCATION/TRAINING PROGRAM

## 2022-01-14 PROCEDURE — 80053 COMPREHEN METABOLIC PANEL: CPT | Performed by: SURGERY

## 2022-01-14 PROCEDURE — 94761 N-INVAS EAR/PLS OXIMETRY MLT: CPT

## 2022-01-14 PROCEDURE — 25000242 PHARM REV CODE 250 ALT 637 W/ HCPCS: Performed by: STUDENT IN AN ORGANIZED HEALTH CARE EDUCATION/TRAINING PROGRAM

## 2022-01-14 PROCEDURE — 99900035 HC TECH TIME PER 15 MIN (STAT)

## 2022-01-14 PROCEDURE — 94664 DEMO&/EVAL PT USE INHALER: CPT

## 2022-01-14 PROCEDURE — 97110 THERAPEUTIC EXERCISES: CPT | Mod: CQ

## 2022-01-14 PROCEDURE — 85025 COMPLETE CBC W/AUTO DIFF WBC: CPT | Performed by: SURGERY

## 2022-01-14 PROCEDURE — 84100 ASSAY OF PHOSPHORUS: CPT | Performed by: SURGERY

## 2022-01-14 RX ORDER — OXYCODONE HCL 5 MG/5 ML
10 SOLUTION, ORAL ORAL EVERY 6 HOURS PRN
Qty: 280 ML | Refills: 0 | Status: SHIPPED | OUTPATIENT
Start: 2022-01-14

## 2022-01-14 RX ORDER — GABAPENTIN 250 MG/5ML
125 SOLUTION ORAL EVERY 8 HOURS
Qty: 225 ML | Refills: 0 | Status: SHIPPED | OUTPATIENT
Start: 2022-01-14 | End: 2022-02-13

## 2022-01-14 RX ORDER — MEROPENEM AND SODIUM CHLORIDE 1 G/50ML
1 INJECTION, SOLUTION INTRAVENOUS EVERY 12 HOURS
Qty: 1000 ML | Refills: 0 | Status: SHIPPED | OUTPATIENT
Start: 2022-01-14 | End: 2022-01-24

## 2022-01-14 RX ORDER — PROPRANOLOL HYDROCHLORIDE 20 MG/1
20 TABLET ORAL 2 TIMES DAILY
Qty: 60 TABLET | Refills: 0 | Status: SHIPPED | OUTPATIENT
Start: 2022-01-14 | End: 2022-02-13

## 2022-01-14 RX ORDER — DRONABINOL 2.5 MG/1
2.5 CAPSULE ORAL
Qty: 60 CAPSULE | Refills: 0 | Status: SHIPPED | OUTPATIENT
Start: 2022-01-14 | End: 2022-02-13

## 2022-01-14 RX ORDER — MEROPENEM AND SODIUM CHLORIDE 1 G/50ML
1 INJECTION, SOLUTION INTRAVENOUS EVERY 8 HOURS
Qty: 1500 ML | Refills: 0 | OUTPATIENT
Start: 2022-01-14 | End: 2022-01-24

## 2022-01-14 RX ORDER — METHOCARBAMOL 500 MG/1
500 TABLET, FILM COATED ORAL 4 TIMES DAILY
Qty: 40 TABLET | Refills: 0 | Status: SHIPPED | OUTPATIENT
Start: 2022-01-14 | End: 2022-01-24

## 2022-01-14 RX ORDER — MEROPENEM AND SODIUM CHLORIDE 1 G/50ML
1 INJECTION, SOLUTION INTRAVENOUS
Status: DISCONTINUED | OUTPATIENT
Start: 2022-01-14 | End: 2022-01-14 | Stop reason: HOSPADM

## 2022-01-14 RX ADMIN — LEVALBUTEROL HYDROCHLORIDE 0.63 MG: 0.63 SOLUTION RESPIRATORY (INHALATION) at 09:01

## 2022-01-14 RX ADMIN — LEVALBUTEROL HYDROCHLORIDE 0.63 MG: 0.63 SOLUTION RESPIRATORY (INHALATION) at 12:01

## 2022-01-14 RX ADMIN — OXYCODONE HYDROCHLORIDE 5 MG: 5 SOLUTION ORAL at 03:01

## 2022-01-14 RX ADMIN — ONDANSETRON 4 MG: 2 INJECTION INTRAMUSCULAR; INTRAVENOUS at 10:01

## 2022-01-14 RX ADMIN — METHOCARBAMOL 500 MG: 500 TABLET ORAL at 02:01

## 2022-01-14 RX ADMIN — DRONABINOL 2.5 MG: 2.5 CAPSULE ORAL at 10:01

## 2022-01-14 RX ADMIN — MEROPENEM AND SODIUM CHLORIDE 1 G: 1 INJECTION, SOLUTION INTRAVENOUS at 10:01

## 2022-01-14 RX ADMIN — ACETAMINOPHEN 650 MG: 160 SOLUTION ORAL at 02:01

## 2022-01-14 RX ADMIN — GABAPENTIN 125 MG: 250 SOLUTION ORAL at 06:01

## 2022-01-14 RX ADMIN — GABAPENTIN 125 MG: 250 SOLUTION ORAL at 02:01

## 2022-01-14 RX ADMIN — METHOCARBAMOL 500 MG: 500 TABLET ORAL at 10:01

## 2022-01-14 RX ADMIN — ACETAMINOPHEN 650 MG: 160 SOLUTION ORAL at 06:01

## 2022-01-14 RX ADMIN — NITROGLYCERIN 0.5 INCH: 20 OINTMENT TOPICAL at 06:01

## 2022-01-14 RX ADMIN — PROPRANOLOL HYDROCHLORIDE 20 MG: 20 TABLET ORAL at 10:01

## 2022-01-14 RX ADMIN — LEVALBUTEROL HYDROCHLORIDE 0.63 MG: 0.63 SOLUTION RESPIRATORY (INHALATION) at 04:01

## 2022-01-14 RX ADMIN — HEPARIN SODIUM 7500 UNITS: 5000 INJECTION INTRAVENOUS; SUBCUTANEOUS at 02:01

## 2022-01-14 RX ADMIN — HEPARIN SODIUM 7500 UNITS: 5000 INJECTION INTRAVENOUS; SUBCUTANEOUS at 06:01

## 2022-01-14 RX ADMIN — Medication: at 09:01

## 2022-01-14 NOTE — PROGRESS NOTES
- Pt has been D/C per written MD Order.  - RN printed off discharge instructions, reviewed the instructions with the patient and her , Columba, at the bedside.  - RN called report for [facility] (located in [facility]) to DEMETRIUS Chaudhry at [phone].  - Richa requested that patient's IV Access be left for transfer to the facility for IV Merrem infusion.  - D/C RN complied and left 20G Left Hand IV inserted for patient to be discharged from hospital.  - Writer also queried to facility if WOCN was available to place wound vac on patient.  - Richa replied that WOCN will place wound vac on patient in facility, requests RN remove current wound vac and place wet to dry DRSG - which RN did comply with.  Writer also administered Schld PRN Oxycodone via JAYSube at 8455 and called Richa back and informed her of patient's departure.  - RN did ask patient if she would like to take any meds by mouth, to which patient replied no, states she is not eating well.  - RN did query at  if patient did feel well enough to D/C from hospital d/t the recurrent issues with n/v this shift, inability to administer meds via oral and G-tube routes, and weakness.  - Patient and her  are very gracious, acknowledge patient's condition, and state they would ultimately like to discharge today.  - Patient and her  were informed patient will be discharging to room 917 in [facility], completed wet-to-dry DRSG, and assisted EMT team with transfer to [facility].   - RN encouraged patient to place on facemask within hallways, pt states she is claustrophobic, however places gown over her face, and places mask over gown, despite RN re-orientation.  - RN stripped room, removed IV poles, wound vac, and Kangaroo pump and placed in dirty utility room on unit.    -D/C Planning COMPL. No further needs at this time.

## 2022-01-14 NOTE — PT/OT/SLP PROGRESS
"Physical Therapy Treatment    Patient Name:  Chidi Castaneda   MRN:  [id number]  Admitting Diagnosis: Bowel perforation  Recent Surgery: Procedure(s) (LRB):  LAPAROTOMY, EXPLORATORY (N/A)  INSERTION, GASTROSTOMY TUBE, PERCUTANEOUS (N/A)  CHOLECYSTECTOMY  EGD (ESOPHAGOGASTRODUODENOSCOPY) (N/A) 22 Days Post-Op    Recommendations:     Discharge Recommendations:  rehabilitation facility   Discharge Equipment Recommendations:  (TBD)   Barriers to discharge: None    Plan:     During this hospitalization, patient to be seen 4 x/week to address the above listed problems via gait training,therapeutic activities,therapeutic exercises,neuromuscular re-education  · Plan of Care Expires:  01/27/22   Plan of Care Reviewed with: patient,spouse    This Plan of care has been discussed with the patient who was involved in its development and understands and is in agreement with the identified goals and treatment plan    Subjective     Communicated with nurse (Joanna) prior to session.     Patient comments: Pt with c/o nausea "I actually vomited earlier"    Pain/Comfort:  · Pain Rating 1:  (no rating provided)  · Location - Side 1: Bilateral  · Location - Orientation 1: generalized  · Location 1:  (abdomen and LE's)  · Pain Addressed 1: Pre-medicate for activity,Reposition,Distraction,Cessation of Activity,Nurse notified  · Pain Rating Post-Intervention 1:  (no rating provided)    Objective:     2 attempts for tx session 2* with c/o nausea    Patient found with: REAGAN drain,PureWick,peripheral IV,telemetry,wound vac (specialty pressure relief air mattress)    Patient found sup in bed with HOB elevated upon PT entry to room, agreeable to treatment.   present in the room.    RESPIRATORY STATUS:   Room air    General Precautions: Standard, fall   Orthopedic Precautions:N/A   Braces: N/A       BED MOBILITY       NP 2* pt refused with c/o nausea "If I sit up, I will throw up"    THERAPEUTIC ACTIVITIES/EXERCISES  Pt performs ALEJANDRA DIAZ " ther ex's while sup in bed x12 reps with vc's    EDUCATION  Patient provided with daily orientation and goals of this PT session. They were educated to call for assistance and to transfer with hospital staff only.  Also, pt was educated on the effects of prolonged immobility and the importance of performing OOB activity and exercises to promote healing and reduce recovery time      Whiteboard updated with correct mobility information. RN/PCT notified.  Pt requires max A to sit at the EOB.    Patient left HOB elevated, with  all lines intact, call button in reach, nurse notified and  present    AM-PAC 6 CLICK MOBILITY  Turning over in bed (including adjusting bedclothes, sheets and blankets)?: 2  Sitting down on and standing up from a chair with arms (e.g., wheelchair, bedside commode, etc.): 2  Moving from lying on back to sitting on the side of the bed?: 2  Moving to and from a bed to a chair (including a wheelchair)?: 2  Need to walk in hospital room?: 2  Climbing 3-5 steps with a railing?: 2  Basic Mobility Total Score: 12     Assessment:     Chidi Castaneda is a 56 y.o. female admitted with a medical diagnosis of Bowel perforation.  She presents with the following impairments/functional limitations:  weakness,impaired endurance,impaired self care skills,impaired functional mobilty,gait instability,impaired balance,decreased upper extremity function,decreased lower extremity function,pain,decreased ROM,edema. requiring significant assistance and verbal cues for bed mob, scooting to EOB/HOB, static sitting at the EOB, sit < > stand 2* weakness, pain and fatigue.   In light of pt's current functional level and deficits, it is anticipated that pt will need to participate in an intense rehab program consisting of PT and OT in order to achieve full rehab potential to return to previous level of function and roles.  Pt remains motivated to participate in PT session and will cont to benefit from skilled PT  intervention.      Rehab Prognosis:  Fair; patient would benefit from acute skilled PT services to address these deficits and reach maximum level of function.      GOALS:   Multidisciplinary Problems     Physical Therapy Goals        Problem: Physical Therapy Goal    Goal Priority Disciplines Outcome Goal Variances Interventions   Physical Therapy Goal     PT, PT/OT Ongoing, Progressing     Description: Goals to be met by: 2022    Patient will increase functional independence with mobility by performin. Supine to sit with Moderate Assistance  2. Sit to stand transfer with Moderate Assistance using LRAD  3. Gait  x 10 feet with Moderate Assistance using LRAD  4. Sitting at edge of bed x10 minutes with Stand-by Assistance  5. Stand for 3 minutes with Moderate Assistance using LRAD  6. Lower extremity exercise program x10 reps per handout, with independence                     Time Tracking:     PT Received On: 22  PT Start Time: 1057     PT Stop Time: 1111  PT Total Time (min): 14 min     Billable Minutes: Therapeutic Exercise 14    Treatment Type: Treatment  PT/PTA: PTA     PTA Visit Number: 1       KERVIN Chavez.  Pager [phone]    2022    .

## 2022-01-14 NOTE — PLAN OF CARE
01/14/22 1103   Post-Acute Status   Post-Acute Authorization Placement   Post-Acute Placement Status Set-up Complete/Auth obtained   Pt going to [facility] inside of [facility]. SW arranged stretcher transport via [facility]. Requested  time is 3:00 PM. Requested  time does not guarantee arrival time.  RN call report to [phone] or Supervisor phone: [phone].Pt will be in room 917.        Yarely Guzman LMSW  [facility]  [id number]

## 2022-01-14 NOTE — DISCHARGE SUMMARY
[facility]  General Surgery  Discharge Summary      Patient Name: Chidi Castaneda  MRN: [id number]  Admission Date: 12/20/2021  Hospital Length of Stay: 25 days  Discharge Date and Time:  01/14/2022   Attending Physician: Kendall Gorman MD   Discharging Provider: Robb Ceron MD  Primary Care Provider: Primary Doctor No     HPI: Ms. Castaneda is a 57yo F with PMH of sleep apnea, GERD, DVT not on antiboagulation, diverticulitis, vaughn's esophagus, HFrEF (last EF 40%) and a PSH of elective laparoscopic hysterectomy on 12/8.     On 12/10 she was transferred from [facility] to [facility] for acute renal failure, hypotension and tachycardia. A CT scan was done at the OSH that showed 2 small bowel perforations. She went for an expoloratory laparotomy on 12/11 at which time 16 inches of small bowel were removed, abdomen left open with abthera vac in place. Started on SLED on 12/12 for her SUSAN. On 12/15 found to have a subcapsular liver hematoma. On 12/18 she was taken back tot he OR for Wound vac change and washout with removal of liver hematoma.  She continued to have sanguinous to serosanguinous output from her wound vac. She was given TXA on 12/19 without success. 12/20 underwent HD. She has receveived a total of 9 units pRBC, 4 units FFP, and 1 unit of platelets since her admission to the OSH.      She has been transferred from [facility] in [place] for embolization vs OR takeback of her suspected hepatic bleed and higher level of care in the intensive care unit.    Procedure(s) (LRB):  LAPAROTOMY, EXPLORATORY (N/A)  INSERTION, GASTROSTOMY TUBE, PERCUTANEOUS (N/A)  CHOLECYSTECTOMY  EGD (ESOPHAGOGASTRODUODENOSCOPY) (N/A)     Hospital Course: Patient at our hospital initially went to the OR on 12/21/22 for exploration and washout.  Cautery of bleeding liver as well as hemostatic agents.  Brought back to the ICU with an abthera.  Returned two days later on 12/23/22 for repeat washout with improvement in  bleeding.  Again liver oozing was addressed and patients abdomen was closed with multiple drains around the liver and wound vac applied to wound over fascia.  Through the course of her stay her kidneys have made a gradual improvement to the point that CRRT was stopped and patient has been producing urine.  Nephrology has signed off without any indications for long term dialysis at this time.  ID evaluated patient.  Vancomycin discontinued on 1/3 and fluconazole on 1/9.  Meropenem to continue for a 3 week course with estimated stop date of 1/24/22.  Will need a repeat CT chest at that time prior to stopping.  Both liver drains have been discontinued.  Had a pelvic abscess that IR placed a drain in that she will be discharged with.  Wound vac has been working well and patients midline abdomen healing well.  She is tolerating a diet and has remained hemodynamically stable.  Ready for discharge to facility on 1/14/22 in stable condition.    Consults:   Consults (From admission, onward)        Status Ordering Provider     Inpatient consult to Midline team  Once        Provider:  (Not yet assigned)    Completed WHITLEY WHITNEY     Inpatient consult to Infectious Diseases  Once        Provider:  (Not yet assigned)    Completed NEELAM ORTEGA     Inpatient consult to Interventional Radiology  Once        Provider:  (Not yet assigned)    Completed NEELAM ORTEGA     Inpatient consult to Nephrology  Once        Provider:  (Not yet assigned)    Completed NEELAM ORTEGA          Significant Diagnostic Studies: Labs:   BMP:   Recent Labs   Lab 01/13/22  0421 01/14/22  0432    113*    138   K 3.5 3.5   CL 97 100   CO2 26 26   BUN 49* 43*   CREATININE 1.8* 1.4   CALCIUM 9.1 9.2   MG 2.0 2.0   , CMP   Recent Labs   Lab 01/13/22  0421 01/14/22  0432    138   K 3.5 3.5   CL 97 100   CO2 26 26    113*   BUN 49* 43*   CREATININE 1.8* 1.4   CALCIUM 9.1 9.2   PROT 6.8 6.8   ALBUMIN 1.8* 1.8*   BILITOT  0.5 0.5   ALKPHOS 110 109   AST 29 26   ALT 22 19   ANIONGAP 13 12   ESTGFRAFRICA 36.3* 49.1*   EGFRNONAA 31.5* 42.6*    and CBC   Recent Labs   Lab 01/13/22  0421 01/13/22  0421 01/14/22  0432   WBC 17.09*  --  16.23*   HGB 8.7*  --  8.7*   HCT 27.6*   < > 28.3*   *  --  675*    < > = values in this interval not displayed.     Radiology: Narrative & Impression  EXAMINATION:  CT ABDOMEN PELVIS WITHOUT CONTRAST     CLINICAL HISTORY:  Abdominal pain, post-op;     TECHNIQUE:  The patient was surveyed from the lung bases through the pelvis.  30 cc oral contrast was administered.  IV contrast was not administered..  The data was reconstructed for coronal, sagittal, and axial images.     COMPARISON:  CT 01/06/2021, 12/31/2021     FINDINGS:  CHEST:     Lungs/Pleura: Bilateral pleural effusions, similar in size to the prior exam.  Associated compressive atelectasis of the dependent portions of the lung, similar to prior.  Bandlike opacities within the lingula and right middle lobe consistent with subsegmental atelectasis.  No pneumothorax or new focal consolidation.     Heart: The visualized portions of the heart are normal. No pericardial effusion.  Central venous catheter terminates within the SVC.     Thoracic soft tissues: Unremarkable     ABDOMEN:     Liver: Liver appear stable in contour and size from the prior exam.  There is irregular patchy hypoattenuation within the right hepatic lobe, better visualized on the prior contrasted exam and could represent small areas of infarct.  Persistent perihepatic fluid with percutaneous drainage catheter in a supra hepatic position.  Roughly stable size of loculated collection along the inferior aspect of the liver measuring approximately 6.1 cm in greatest diameter.  Small amount of fluid tracks along the right pericolic gutter similar to prior.     Gallbladder/Bile ducts: Gallbladder is surgically absent.  No intrahepatic or extrahepatic biliary ductal  dilatation.     Spleen:Splenic hypoattenuation seen on the prior exam is not well visualized on this examination.     Stomach: Gastrostomy tube is in place.  Possible kinking of the tubing along the left abdominal wall.     Pancreas: Unremarkable.     Adrenals: Unremarkable.     Renal/Ureters: The kidneys are normal in size and location.  Slight residual retention of contrast within the kidneys suggesting medical renal disease.  Left renal upper pole punctate nonobstructing nephrolith unchanged.  No hydronephrosis.  The ureters are normal in course and caliber. Intraluminal bladder air, recommend correlation for recent instrumentation.     Reproductive: Uterus is absent.     Bowel: Postsurgical changes of the small bowel.  Multiple loops of fluid and gas-filled small bowel, largest diameter measuring 4.0 cm in the midline without a definable transition point.  Findings could represent partial small bowel obstruction or ileus.Percutaneous drainage catheter overlies the bladder with an approximate 6.8 x 1.6 cm fluid collection in greatest dimensions with tracking along the left ventral abdominal wall., essentially stable in size from the prior exam.  Scattered foci of air present within this fluid collection and along the left mesentery.  Diffuse mesenteric stranding is again visualized.  Mild reactive wall thickening of the sigmoid colon.     Peritoneum: Scattered mesenteric stranding and fluid collections as described above.     Lymph Nodes: Scattered mesenteric lymph nodes without evidence of pathologic enlargement, likely reactive.     Aorta: The abdominal aorta is normal in course and caliber.  No  atherosclerotic calcifications.     Bones: No acute fractures or osseous destructive lesions.     Soft Tissues: Diffuse anasarca.  Ventral abdominal wall surgical defect is again in place with packing along the defect.  Tubing originating from the defect likely suggestive of wound VAC.  Scattered foci of air within the  ventral abdominal soft tissues potentially representing sequela of injections.     Impression:     Stable position of supra-cystic catheter with essentially stable size of air and fluid collection within the pelvis.  Stable position of supra-hepatic percutaneous catheter with essentially stable size of perihepatic fluid collection.     Multiple dilated loops of fluid and gas-filled small bowel within the midline extending into the pelvis without definable transition point. Findings could represent partial small bowel obstruction versus ileus.     Subcapsular patchy hepatic hypoattenuation, potentially representing areas of infarct versus intermixed subcapsular fluid.     Bilateral pleural effusions.     Reactive mesenteric lymph nodes and scattered mesenteric edema.     Ventral abdominal wall surgical defect with packing in likely wound VAC.     Abnormal contour to the gastrostomy tubing along the ventral abdominal wall potentially representing kinking.  Clinical correlation is advised.     Additional stable findings as above.    Pending Diagnostic Studies:     Procedure Component Value Units Date/Time    Calcium, ionized [[id number]] Collected: 01/08/22 1411    Order Status: Sent Lab Status: In process Updated: 01/08/22 1411    Specimen: Blood     Comprehensive metabolic panel [[id number]] Collected: 01/13/22 0330    Order Status: Sent Lab Status: In process Updated: 01/13/22 0442    Specimen: Blood     Magnesium [[id number]] Collected: 01/07/22 1403    Order Status: Sent Lab Status: In process Updated: 01/07/22 1404    Specimen: Blood     Magnesium [[id number]] Collected: 01/07/22 1403    Order Status: Sent Lab Status: In process Updated: 01/07/22 1404    Specimen: Blood     Magnesium [[id number]] Collected: 12/26/21 0318    Order Status: Sent Lab Status: In process Updated: 12/26/21 0318    Specimen: Blood     Phosphorus [[id number]] Collected: 01/13/22 0330    Order Status: Sent Lab Status: In process Updated:  01/13/22 0442    Specimen: Blood     Potassium [[id number]] Collected: 12/28/21 2158    Order Status: Sent Lab Status: In process Updated: 12/28/21 2158    Specimen: Blood     Renal function panel [[id number]] Collected: 12/26/21 0318    Order Status: Sent Lab Status: In process Updated: 12/26/21 0318    Specimen: Blood         Final Active Diagnoses:    Diagnosis Date Noted POA    PRINCIPAL PROBLEM:  Bowel perforation [K63.1] 12/21/2021 Yes    Ischemia of both lower extremities [I99.8] 01/04/2022 Yes    Increased heart rate [R00.0]  Yes    SUSAN (acute kidney injury) [N17.9] 12/21/2021 Yes    Metabolic acidosis [E87.2] 12/21/2021 Yes    Volume overload [E87.70] 12/21/2021 Yes    Subcapsular hematoma of liver [K76.89] 12/20/2021 Yes      Problems Resolved During this Admission:    Diagnosis Date Noted Date Resolved POA    Encounter for weaning from ventilator [Z99.11]  01/14/2022 Not Applicable    Acute blood loss anemia [D62]  01/14/2022 Yes    Septic shock [A41.9, R65.21] 12/21/2021 01/14/2022 Yes      Discharged Condition: fair    Disposition:     Follow Up:    Patient Instructions:      CT Chest With Contrast   Standing Status: Future Standing Exp. Date: 01/14/23     Order Specific Question Answer Comments   Is the patient allergic to iodine or contrast? No    Is the patient on ANY Metformin drug such as Glucophage/Glucovance?           Should be off drug 48 hours after contrast. Check renal function before restart. No    History of Kidney Disease - including: decreased kidney function, dialysis, kidney transplay, single kidney, kidney cancer, kidney surgery? Decreased Kidney Function    Does the patient have high blood pressure requiring medical treatment? Yes    Diabetes? No    May the Radiologist modify the order per protocol to meet the clinical needs of the patient? Yes      Ambulatory referral/consult to Infectious Disease   Standing Status: Future   Referral Priority: Routine Referral Type:  Consultation   Referral Reason: Specialty Services Required   Requested Specialty: Infectious Diseases     Medications:  Transfer Medications (for Discharge Readmit only):   Current Facility-Administered Medications   Medication Dose Route Frequency Provider Last Rate Last Admin    0.9%  NaCl infusion   Intravenous Continuous Mary Omer MD   Stopped at 01/08/22 1053    acetaminophen oral solution 650 mg  650 mg Oral Q6H Mary Omer MD   650 mg at 01/14/22 0607    balsam peru-castor oiL Oint   Topical (Top) BID Víctor Valderrama MD   Given at 01/14/22 0900    dextrose 50% injection 25 g  25 g Intravenous PRN Víctor Valderrama MD        dronabinoL capsule 2.5 mg  2.5 mg Oral BID Mary Omer MD   2.5 mg at 01/14/22 1024    epoetin genie-epbx injection 6,060 Units  50 Units/kg Intravenous Q7 Days Mary Omer MD   6,060 Units at 01/11/22 1042    gabapentin 250 mg/5 mL (5 mL) solution 125 mg  125 mg Per G Tube Q8H Mary Omer MD   125 mg at 01/14/22 0607    heparin (porcine) injection 1,000 Units  1,000 Units Intra-Catheter PRN Mary Omer MD        heparin (porcine) injection 7,500 Units  7,500 Units Subcutaneous Q8H Mary Omer MD   7,500 Units at 01/14/22 0607    HYDROmorphone injection 0.5 mg  0.5 mg Intravenous Q3H PRN Mary Omer MD   0.5 mg at 01/12/22 1102    levalbuterol nebulizer solution 0.63 mg  0.63 mg Nebulization Q4H Mary Omer MD   0.63 mg at 01/14/22 0931    melatonin 1 mg/mL liquid (PEDS) 5 mg  5 mg Per G Tube Nightly PRN Mary Omer MD   5 mg at 01/08/22 2117    meropenem-0.9% sodium chloride 1 g/50 mL IVPB  1 g Intravenous Q8H Kendall Gorman MD        methocarbamoL tablet 500 mg  500 mg Per G Tube QID Mary Omer MD   500 mg at 01/14/22 1025    nitroGLYCERIN 2% TD oint ointment 0.5 inch  0.5 inch Topical (Top) Q6H Mary Omer MD   0.5 inch at 01/14/22 0607    ondansetron injection 8 mg  8 mg Intravenous  Q8H PRN Mary Omer MD   4 mg at 01/14/22 1019    oxyCODONE 5 mg/5 mL solution 10 mg  10 mg Per G Tube Q4H PRN Mary Omer MD   10 mg at 01/11/22 1404    oxyCODONE 5 mg/5 mL solution 5 mg  5 mg Per G Tube Q4H PRN Mary Omer MD   5 mg at 01/13/22 0937    potassium, sodium phosphates 280-160-250 mg packet 2 packet  2 packet Oral TID PRN Mary Omer MD        propranoloL tablet 20 mg  20 mg Per G Tube BID Mary Omer MD   20 mg at 01/14/22 1025    scopolamine 1.3-1.5 mg (1 mg over 3 days) 1 patch  1 patch Transdermal Q3 Days Mary Omer MD   1 patch at 01/09/22 0920    senna-docusate 8.6-50 mg per tablet 1 tablet  1 tablet Oral Daily PRN Mary Omer MD        sodium chloride 0.9% bolus 250 mL  250 mL Intravenous PRN MD Robb Santos MD  General Surgery  [facility]

## 2022-01-14 NOTE — PT/OT/SLP PROGRESS
Occupational Therapy   Co-Treatment  Co-treatment with PT for maximal pt participation, safety, and activity tolerance     Name: Chidi Castaneda  MRN: [id number]  Admitting Diagnosis:  Bowel perforation  22 Days Post-Op    Recommendations:     Discharge Recommendations: rehabilitation facility  Discharge Equipment Recommendations:   (tbd)  Barriers to discharge:       Assessment:     Chidi Castaneda is a 56 y.o. female with a medical diagnosis of Bowel perforation.  She presents with impiaired ADL and mobility performance deficits. Pt found upright and attempted x2 today with x2 attempt limited 2/2 reported nausea. Pt session limited to  grooming tasks with pt encouraged to complete tasks with setup A. Pt was left upright in bed with spouse present and supportive. RN aware of pt's continued nausea today. At this time, pt is a high fall risk and is not at her baseline. Pt is motivated to return to (I).  Performance deficits affecting function are weakness,impaired self care skills,impaired endurance,gait instability,impaired functional mobilty,impaired balance,decreased lower extremity function,decreased upper extremity function,decreased safety awareness,decreased coordination.     Rehab Prognosis:  Good; patient would benefit from acute skilled OT services to address these deficits and reach maximum level of function.       Plan:     Patient to be seen 4 x/week to address the above listed problems via self-care/home management,therapeutic activities,therapeutic exercises,neuromuscular re-education  · Plan of Care Expires: 02/10/22  · Plan of Care Reviewed with: patient    Subjective     Pain/Comfort:  · Pain Rating 1: other (see comments) (nausea)  · Location - Side 1: Bilateral  · Location - Orientation 1: generalized  · Location 1: abdomen  · Pain Addressed 1: Reposition,Distraction    Objective:     Communicated with: RN prior to session.  Patient found HOB elevated with telemetry,pulse ox (continuous),wound  vac,REAGAN drain upon OT entry to room.    General Precautions: Standard, fall   Orthopedic Precautions:N/A   Braces: N/A  Respiratory Status: Room air     Occupational Performance:     Bed Mobility:    · Moment of pt repositioning self in bed without A     Activities of Daily Living:  · Grooming: setup A to don lotion to UEs (therapist donned to LEs). Pt donned lip balm   · Lower Body Dressing: total assistance doffing socks       AMPAC 6 Click ADL: 11    Treatment & Education:  Pt educated on role of occupational therapy, POC, and safety during ADLs and functional mobility. Pt and OT discussed importance of safe, continued mobility to optimize daily living skills. Pt verbalized understanding.   Pt completed the following during session: grooming tasks, LB dressing, edu on active engagement of UEs and associated exercises for self driven regimine.  White board updated during session. Pt given instruction to call for medical staff/nurse for assistance.       Patient left HOB elevated with all lines intact, call button in reach and RN notifiedEducation:      GOALS:   Multidisciplinary Problems     Occupational Therapy Goals        Problem: Occupational Therapy Goal    Goal Priority Disciplines Outcome Interventions   Occupational Therapy Goal     OT, PT/OT Ongoing, Progressing    Description: Goals to be met by: 1/9/2022 (1 week)     Patient will increase functional independence with ADLs by performing:    UE Dressing with Maximum Assistance.  Grooming while seated with Moderate Assistance.  Toileting from bedside commode with Moderate Assistance for hygiene and clothing management.   Rolling to Bilateral with Moderate Assistance.   Supine to sit with Maximum Assistance.  Step transfer with Moderate Assistance  Toilet transfer to bedside commode with Moderate Assistance.                     Time Tracking:     OT Date of Treatment: 01/14/22  OT Start Time: 1105  OT Stop Time: 1114  OT Total Time (min): 9 min    Billable  Minutes:Self Care/Home Management 9 min    OT/SHYANN: OT          1/14/2022

## 2022-01-14 NOTE — PLAN OF CARE
POC reviewed with pt, verbalized understanding. Pt is A&O x4. VS remained stable throughout the shift. Pt is on renal diet and tolerating well.   -ML incision with wound vac @ 125  -REAGAN drain x 2  -g tube with tube feeds @ 60, tolerating well  -purewick in place, adequate UOP    Pt denies complaints of pain. Pt resting comfortably, bed in low position, call light within reach, WCTM.

## 2022-01-14 NOTE — PLAN OF CARE
Plan of care reviewed with patient and , verbalizes understanding. AAOx4, VSS. Wound vac intact.  drain output recorded.Left IR drain flushed per order with 10cc NS. Pt still making urine  650 mls total output for the shift. Pt with not much of a appetite, eating 25% with each meal, reports no N/V. Pt was not seen by Physical therapy today, called PT and they said they will see pt tomorrow, pt and  notified. Pain managed with PRN pain meds, R IJ trialysis taken out by MD, RN held pressure for 15 mins and applied gauze and tegaderm dressing. Rechecked the dressing after 30 mins and still intact. Bed low, locked and call light in reach

## 2022-01-15 NOTE — PLAN OF CARE
[facility]  Discharge Final Note    Primary Care Provider: Primary Doctor No    Expected Discharge Date: 1/14/2022    Final Discharge Note (most recent)     Final Note - 01/14/22 1814        Final Note    Assessment Type Final Discharge Note     Anticipated Discharge Disposition Rehab Facility        Post-Acute Status    Post-Acute Authorization Placement     Post-Acute Placement Status Set-up Complete/Auth obtained                 Important Message from Medicare

## 2022-01-24 ENCOUNTER — SPECIALTY PHARMACY (OUTPATIENT)
Dept: PHARMACY | Facility: CLINIC | Age: 57
End: 2022-01-24
Payer: OTHER GOVERNMENT

## 2022-01-25 NOTE — TELEPHONE ENCOUNTER
Ana, this is Yani Villatoro with [facility].  We are working on your prescription that your doctor has sent us. We will be working with your insurance to get this approved for you. We will be calling you along the way with updates on your medication.  If you have any questions, you can reach us at [phone].    Welcome call outcome: Patient/caregiver reached - spoke to      Spoke to Zaira banerjee and was able to get corrected processing ID and confirmed with insurance that no PA is required for Retacrit. Co-pay is $38. Messaged MD office to confirm her dose.

## 2022-02-02 ENCOUNTER — NURSE TRIAGE (OUTPATIENT)
Dept: ADMINISTRATIVE | Facility: CLINIC | Age: 57
End: 2022-02-02
Payer: OTHER GOVERNMENT

## 2022-02-03 LAB — FUNGUS SPEC CULT: NORMAL

## 2022-02-03 NOTE — TELEPHONE ENCOUNTER
Patient has a G tube in place. Reports that the G tube pushed into the abdomen. The entire tube except for the clamp remains outside of the belly. Patient discussed this with a physician friend who advised the patient to go to the nearest ED. Patient plans to go to the nearest ED.  Advised the patient to call back with any further questions or if symptoms worsen.      Reason for Disposition   Caller has already spoken with another triager or PCP AND has further questions AND triager able to answer questions.    Protocols used: NO CONTACT OR DUPLICATE CONTACT CALL-A-AH

## 2022-04-12 ENCOUNTER — OFFICE VISIT (OUTPATIENT)
Dept: OBSTETRICS AND GYNECOLOGY | Facility: CLINIC | Age: 57
End: 2022-04-12
Payer: OTHER GOVERNMENT

## 2022-04-12 VITALS
DIASTOLIC BLOOD PRESSURE: 93 MMHG | WEIGHT: 143.69 LBS | SYSTOLIC BLOOD PRESSURE: 162 MMHG | BODY MASS INDEX: 24.53 KG/M2 | HEIGHT: 64 IN

## 2022-04-12 DIAGNOSIS — Z78.0 MENOPAUSE: Primary | ICD-10-CM

## 2022-04-12 PROCEDURE — 99204 OFFICE O/P NEW MOD 45 MIN: CPT | Mod: S$GLB,,, | Performed by: OBSTETRICS & GYNECOLOGY

## 2022-04-12 PROCEDURE — 99204 PR OFFICE/OUTPT VISIT, NEW, LEVL IV, 45-59 MIN: ICD-10-PCS | Mod: S$GLB,,, | Performed by: OBSTETRICS & GYNECOLOGY

## 2022-04-12 RX ORDER — POTASSIUM CHLORIDE 750 MG/1
20 TABLET, EXTENDED RELEASE ORAL 2 TIMES DAILY
COMMUNITY

## 2022-04-12 RX ORDER — MOXIFLOXACIN HYDROCHLORIDE 400 MG/1
400 TABLET ORAL DAILY
COMMUNITY

## 2022-04-12 RX ORDER — GABAPENTIN 600 MG/1
600 TABLET ORAL 3 TIMES DAILY
COMMUNITY

## 2022-04-12 RX ORDER — SUCRALFATE 1 G/1
1 TABLET ORAL 4 TIMES DAILY
COMMUNITY

## 2022-04-12 RX ORDER — PROPRANOLOL HYDROCHLORIDE 20 MG/1
40 TABLET ORAL DAILY
COMMUNITY
Start: 2022-01-28

## 2022-04-12 RX ORDER — POVIDONE-IODINE 10 MG/G
OINTMENT TOPICAL
COMMUNITY

## 2022-04-12 RX ORDER — PANTOPRAZOLE SODIUM 40 MG/1
40 TABLET, DELAYED RELEASE ORAL DAILY
COMMUNITY

## 2022-04-12 RX ORDER — IPRATROPIUM BROMIDE AND ALBUTEROL 20; 100 UG/1; UG/1
1 SPRAY, METERED RESPIRATORY (INHALATION) 3 TIMES DAILY PRN
COMMUNITY
Start: 2022-02-22

## 2022-04-12 RX ORDER — ESOMEPRAZOLE MAGNESIUM 40 MG/1
40 CAPSULE, DELAYED RELEASE ORAL
COMMUNITY

## 2022-04-12 NOTE — PROGRESS NOTES
Subjective:       Patient ID: Chidi Castaneda is a 56 y.o. female.    Chief Complaint: Follow-up (Pt is here today for a follow up visit after surgeries from [facility].)  Pt with PMB and because of fibroids and stenotic os had an unsuccessful EMB and subsequent D&C.[facility]. Had small bowel damage and ultimately ended up in septic shock-[facility] [place] [facility] [place] [facility] Spent 8 weeks in hospital-sent home and has been in rehab-has PT and OT at home  Pt here for post op F/UP from hy  NO sexual activity since prior to surgery  Pt had MSOF and has now been weaned off dialysis   No ERT, h/o DVT prior to surgery-in 2008, no DVT after surgery, no CVA  HPI  Review of Systems   Respiratory: Positive for chest tightness.    Neurological: Positive for weakness.         Objective:      Physical Exam  Vitals and nursing note reviewed. Exam conducted with a chaperone present.   Constitutional:       Appearance: Normal appearance.   HENT:      Head: Normocephalic and atraumatic.   Pulmonary:      Effort: Pulmonary effort is normal.   Abdominal:          Comments: Midline incision-healing well by secondary intention   Genitourinary:     General: Normal vulva.      Exam position: Lithotomy position.      Vagina: Vaginal discharge present.      Uterus: Absent.       Adnexa: Right adnexa normal and left adnexa normal.      Comments: Unable to visualize the cuff-high enterocele obscures  Yellowish D/C  Cuff intact to palpation, no masses  Musculoskeletal:      Cervical back: Normal range of motion and neck supple.        Feet:    Feet:      Comments: Toenails are atrophied and will slough  Neurological:      General: No focal deficit present.      Mental Status: She is alert and oriented to person, place, and time.      Comments: Bilateral grasp 5/5 MS, full dexterity of fingers         Assessment:       Problem List Items Addressed This Visit    None         Plan:      Menopause    Reviewed chart and extensive interview with the pt  PT able to resume nl activity and sexual relations at tolerated  RTC 8 months for annual

## 2023-02-02 NOTE — PROGRESS NOTES
Caller: RANJEET   Relationship to Patient: PHYSICAL THERAPIST WITH [facility]   Phone Number: [phone]   Reason for Call: CALLING STATING THAT THE PATIENTS SECONDARY BANDAGE ON THE LATERAL SIDE OF THE LEG IS SATURATED UNSURE IF THEY SHOULD CHANGE IT    Bedside HD initiated to RIJ catheter.  Tolerated well.

## 2024-05-14 ENCOUNTER — OFFICE VISIT (OUTPATIENT)
Dept: OBSTETRICS AND GYNECOLOGY | Facility: CLINIC | Age: 59
End: 2024-05-14
Payer: OTHER GOVERNMENT

## 2024-05-14 VITALS — BODY MASS INDEX: 33.46 KG/M2 | WEIGHT: 196 LBS | HEIGHT: 64 IN

## 2024-05-14 DIAGNOSIS — Z01.419 ENCOUNTER FOR ANNUAL ROUTINE GYNECOLOGICAL EXAMINATION: Primary | ICD-10-CM

## 2024-05-14 DIAGNOSIS — R21 SKIN RASH: ICD-10-CM

## 2024-05-14 PROCEDURE — 99396 PREV VISIT EST AGE 40-64: CPT | Mod: S$GLB,,, | Performed by: OBSTETRICS & GYNECOLOGY

## 2024-05-14 RX ORDER — MINERAL OIL
180 ENEMA (ML) RECTAL DAILY
COMMUNITY

## 2024-05-14 RX ORDER — DULOXETIN HYDROCHLORIDE 30 MG/1
30 CAPSULE, DELAYED RELEASE ORAL DAILY
COMMUNITY

## 2024-05-14 RX ORDER — LANOLIN ALCOHOL/MO/W.PET/CERES
100 CREAM (GRAM) TOPICAL DAILY
COMMUNITY

## 2024-05-14 RX ORDER — CLOTRIMAZOLE AND BETAMETHASONE DIPROPIONATE 10; .64 MG/G; MG/G
CREAM TOPICAL 2 TIMES DAILY
Qty: 45 G | Refills: 2 | Status: SHIPPED | OUTPATIENT
Start: 2024-05-14

## 2024-05-14 RX ORDER — BUTALBITAL, ACETAMINOPHEN AND CAFFEINE 50; 325; 40 MG/1; MG/1; MG/1
1 TABLET ORAL EVERY 4 HOURS PRN
COMMUNITY

## 2024-05-14 RX ORDER — PREGABALIN 150 MG/1
150 CAPSULE ORAL 2 TIMES DAILY
COMMUNITY

## 2024-05-14 NOTE — PROGRESS NOTES
Annual gyn exam    HPI:  Chidi Castaneda is a 58 y.o. female  presents for a well woman exam.  LMP: No LMP recorded. Patient has had a hysterectomy..  No issues, problems, or complaints.    Past Medical History:   Diagnosis Date    Abnormal Pap smear of cervix     HPV    Coronary artery disease         Deep vein thrombosis     S/P endometrial ablation     Small bowel obstruction      Past Surgical History:   Procedure Laterality Date    APPENDECTOMY       SECTION      x 1    CHOLECYSTECTOMY  2021    Procedure: CHOLECYSTECTOMY;  Surgeon: Kendall Gorman MD;  Location: [facility] OR [address] [place] [address];  Service: General;;    ESOPHAGOGASTRODUODENOSCOPY N/A 2021    Procedure: EGD (ESOPHAGOGASTRODUODENOSCOPY);  Surgeon: Kendall Gorman MD;  Location: [facility] OR [address] [place] [address];  Service: General;  Laterality: N/A;    EVACUATION OF HEMATOMA  2021    Procedure: EVACUATION, HEMATOMA;  Surgeon: Kendall Gorman MD;  Location: [facility] OR [address] [place] [address];  Service: General;;    HYSTERECTOMY      Laparoscopic hysterectomy with BSO for abnormal bleeding and fibroids    PERCUTANEOUS INSERTION OF GASTROSTOMY TUBE N/A 2021    Procedure: INSERTION, GASTROSTOMY TUBE, PERCUTANEOUS;  Surgeon: Kendall Gorman MD;  Location: [facility] OR [address] [place] [address];  Service: General;  Laterality: N/A;    REPLACEMENT OF WOUND VACUUM-ASSISTED CLOSURE DEVICE  2021    Procedure: REPLACEMENT, WOUND VAC;  Surgeon: Kendall Gorman MD;  Location: [facility] OR [address] [place] [address];  Service: General;;    UMBILICAL HERNIA REPAIR       Social History     Socioeconomic History    Marital status:    Tobacco Use    Smoking status: Never    Smokeless tobacco: Never   Substance and Sexual Activity    Alcohol use: Not Currently    Drug use: Never    Sexual activity: Not Currently     Partners: Male     Family History   Problem Relation Name Age of Onset    Hypertension Paternal Grandfather      Cardiomyopathy Paternal Grandfather      Diabetes Paternal Grandfather    "   Hypertension Paternal Grandmother      Cardiomyopathy Paternal Grandmother      Diabetes Paternal Grandmother      Hypertension Maternal Grandmother      Cardiomyopathy Maternal Grandmother      Hypertension Maternal Grandfather      Cardiomyopathy Maternal Grandfather      Diabetes Father       OB History          2    Para   2    Term   2            AB        Living             SAB        IAB        Ectopic        Multiple        Live Births                     Ht 5' 4" (1.626 m)   Wt 88.9 kg (196 lb)   BMI 33.64 kg/m²     ROS:  GENERAL: Denies weight gain or weight loss. Feeling well overall.   SKIN: Denies rash or lesions.   HEAD: Denies head injury or headache.   NODES: Denies enlarged lymph nodes.   CHEST: Denies chest pain or shortness of breath.   CARDIOVASCULAR: Denies palpitations or left sided chest pain.   ABDOMEN: No abdominal pain, constipation, diarrhea, nausea, vomiting or rectal bleeding.   URINARY: No frequency, dysuria, hematuria, or burning on urination.  REPRODUCTIVE: See HPI.   BREASTS: The patient performs breast self-examination and denies pain, lumps, or nipple discharge.   HEMATOLOGIC: No easy bruisability or excessive bleeding.   MUSCULOSKELETAL: Denies joint pain or swelling.   NEUROLOGIC: Denies syncope or weakness.   PSYCHIATRIC: Denies depression, anxiety or mood swings.    PHYSICAL EXAM:    APPEARANCE: Well nourished, well developed, in no acute distress.  AFFECT: WNL, alert and oriented x 3  SKIN: No acne or hirsutism  NECK: Neck symmetric without masses or thyromegaly  NODES: No inguinal, cervical, axillary, or femoral lymph node enlargement  CHEST: Good respiratory effect  ABDOMEN: Soft.  No tenderness or masses.  No hepatosplenomegaly.  No hernias.  BREASTS: Symmetrical, no skin changes or visible lesions.  No palpable masses, nipple discharge bilaterally.  PELVIC:    V/v normal, without discharge or lesions, no prolapse  No pelvic masses appreciated      " ICD-10-CM ICD-9-CM    1. Encounter for annual routine gynecological examination  Z01.419 V72.31       2. Skin rash  R21 782.1 clotrimazole-betamethasone 1-0.05% (LOTRISONE) cream        Negative gyn exam  Mammogram per PCP at [facility]  Lotrisone ointment for rash under the breasts  Colonoscopy up-to-date

## 2024-06-06 NOTE — PROGRESS NOTES
"[facility] - [facility]  General Surgery  Progress Note    Subjective:     History of Present Illness:  No notes on file    Post-Op Info:  Procedure(s) (LRB):  LAPAROTOMY, EXPLORATORY (N/A)  INSERTION, GASTROSTOMY TUBE, PERCUTANEOUS (N/A)  CHOLECYSTECTOMY  EGD (ESOPHAGOGASTRODUODENOSCOPY) (N/A)   21 Days Post-Op     Interval History: Afebrile. VSS. Wound vac changed yesterday. Patient's  notes that patient vomited G tube feed liquid after feeling bloating/abdominal tightness following drinking a liter of oral contrast before yesterday's abdominal CT. Patient relays this morning that her abdomen still feels a bit tight. Yesterday's scan appears stable, see significant diagnostics.      Medications:  Continuous Infusions:   sodium chloride 0.9% Stopped (01/08/22 1053)     Scheduled Meds:   acetaminophen  650 mg Oral Q6H    balsam peru-castor oiL   Topical (Top) BID    dronabinoL  2.5 mg Oral BID    epoetin genie-epbx  50 Units/kg Intravenous Q7 Days    gabapentin  125 mg Per G Tube Q8H    heparin (porcine)  7,500 Units Subcutaneous Q8H    levalbuterol  0.63 mg Nebulization Q4H    meropenem (MERREM) IVPB  1 g Intravenous Q12H    methocarbamoL  500 mg Per G Tube QID    nitroGLYCERIN 2% TD oint  0.5 inch Topical (Top) Q6H    propranoloL  20 mg Per G Tube BID    scopolamine  1 patch Transdermal Q3 Days     PRN Meds:dextrose 50%, heparin (porcine), HYDROmorphone, melatonin, ondansetron, oxyCODONE, oxyCODONE, potassium, sodium phosphates, senna-docusate 8.6-50 mg, sodium chloride 0.9%     Review of patient's allergies indicates:   Allergen Reactions    Tylox [oxycodone-acetaminophen]      "Jittery"     Objective:     Vital Signs (Most Recent):  Temp: 98.7 °F (37.1 °C) (01/13/22 0829)  Pulse: 93 (01/13/22 0829)  Resp: 15 (01/13/22 0829)  BP: 120/79 (01/13/22 0406)  SpO2: 100 % (01/13/22 0829) Vital Signs (24h Range):  Temp:  [97.4 °F (36.3 °C)-98.7 °F (37.1 °C)] 98.7 °F (37.1 °C)  Pulse:  [] 93  Resp:  " [15-22] 15  SpO2:  [91 %-100 %] 100 %  BP: (117-130)/(70-79) 120/79     Weight: 121.1 kg (267 lb)  Body mass index is 45.83 kg/m².    Intake/Output - Last 3 Shifts       01/11 0700  01/12 0659 01/12 0700  01/13 0659 01/13 0700  01/14 0659    P.O. 1000      NG/GT  420     IV Piggyback  194.6     Total Intake(mL/kg) 1000 (8.3) 614.6 (5.1)     Urine (mL/kg/hr) 1050 (0.4) 1300 (0.4)     Emesis/NG output  0     Drains 75 34     Other 150 80     Stool  0     Total Output 1275 1414     Net -275 -799.4            Urine Occurrence  1 x     Stool Occurrence 0 x 1 x     Emesis Occurrence  1 x           Physical Exam  Vitals and nursing note reviewed.   Constitutional:       General: She is not in acute distress.     Appearance: She is obese. She is not diaphoretic.   HENT:      Head: Normocephalic and atraumatic.      Mouth/Throat:      Mouth: Mucous membranes are moist.      Pharynx: Oropharynx is clear.   Eyes:      Extraocular Movements: Extraocular movements intact.      Conjunctiva/sclera: Conjunctivae normal.   Cardiovascular:      Rate and Rhythm: Normal rate and regular rhythm.   Pulmonary:      Effort: No respiratory distress.   Abdominal:      General: There is no distension.      Palpations: Abdomen is soft.      Tenderness: There is no guarding or rebound.      Comments: Wound vac in place with good seal, no leak noted.   RUQ REAGAN drains x2 with benign fluid, not bloody or bilious  LLQ IR drain with thin SS output   Musculoskeletal:         General: No deformity.   Skin:     General: Skin is warm and dry.   Neurological:      General: No focal deficit present.      Mental Status: She is alert and oriented to person, place, and time.         Significant Labs:  I have reviewed all pertinent lab results within the past 24 hours.  CBC:   Recent Labs   Lab 01/13/22 0421   WBC 17.09*   RBC 2.99*   HGB 8.7*   HCT 27.6*   *   MCV 92   MCH 29.1   MCHC 31.5*     CMP:   Recent Labs   Lab 01/13/22 0421       CALCIUM 9.1   ALBUMIN 1.8*   PROT 6.8      K 3.5   CO2 26   CL 97   BUN 49*   CREATININE 1.8*   ALKPHOS 110   ALT 22   AST 29   BILITOT 0.5       Significant Diagnostics:  I have reviewed all pertinent imaging results/findings within the past 24 hours.     CT abdominal Pelvis without IV Contrast, with PO contrast 01/12:   Stable position of supra-cystic catheter with essentially stable size of air and fluid collection within the pelvis.     Stable position of supra-hepatic percutaneous catheter with essentially stable size of perihepatic fluid collection.     Multiple dilated loops of fluid and gas-filled small bowel within the midline extending into the pelvis without definable transition point. Findings could represent partial small bowel obstruction versus ileus.     Subcapsular patchy hepatic hypoattenuation, potentially representing areas of infarct versus intermixed subcapsular fluid.     Bilateral pleural effusions.     Reactive mesenteric lymph nodes and scattered mesenteric edema.     Ventral abdominal wall surgical defect with packing in likely wound VAC.     Abnormal contour to the gastrostomy tubing along the ventral abdominal wall potentially representing kinking.  Clinical correlation is advised.     Additional stable findings as above.     Assessment/Plan:     Subcapsular hematoma of liver  55yo female transfer from OSH after hysterectomy on 12/8 complicated by delayed recognition of small bowel injury requiring resection (in discontinuity) and at some point new bleed from the liver - transferred with open abdomen for higher level of care.  S/p ex-lap, liver packing 12/21  Open cholecystectomy, abdominal closure 12/23  IR drain placed into intra-abdominal abscess 12/31 - Cx growing e. Coli, enterococcus, and an unidentified GNR, f/u susceptibilities    - KUB   - PT/OT reordered  - Aggressive pulmonary hygiene, OOB to chair, PT/OT  - Renal diet, Tube feedings nightly  - Nutrition and speech  following, appreciate assistance   - bowel reg  - Appreciate ID assistance   - VAP; BAL 12/28 with klebsiella    - CT 12/31 with pelvic abscess s/p drain placement.   - Pelvic abscess 12/31; bacteroides, klebsiella, e coli, enterococcus   --  fluconazole IV (1/4 - 1/9   -- discontinued Vanc stopped 1/3   -- ontinue Meropenem for 3 week course (1/4-1/24, repeat CT Chest prior to stopping abx)  - Wound vac change biweekly - last performed 01/12, next due 01/15. Will need home wound vac.   - Continue REAGAN drains and IR drain; flush BID with 10 cc NS . Will likely pull one saeed-hepatic drain today.     - Strict I/Os   - SUSAN/ARF present on admission now resolving. Cr stable ~ 3 range, however UOP improving and electrolytes WNL  - Nephro following, appreciate assistance. Last HD 01/08, held yesterday given UOP    - Dispo: rehab, possibly this week pending nephrology recs          Negro Pearce DPM  General Surgery  [facility]   PROCEDURES:  Exam under anesthesia, anus 06-Jun-2024 11:37:33  Nancy Smith  Ileoanal pouchoscopy 06-Jun-2024 11:37:41  Nancy Smith

## 2024-07-23 NOTE — SUBJECTIVE & OBJECTIVE
Interval History: NAEON.  H/H stable.  Remains on 0.02 vaso.  On PAV vent settings tolerating well. Failed SBT x 2 yesterday.    Medications:  Continuous Infusions:   sodium chloride 0.9% 200 mL/hr at 12/26/21 0700    sodium chloride 0.9% Stopped (12/24/21 0447)    dexmedetomidine (PRECEDEX) infusion 1.4 mcg/kg/hr (12/26/21 0700)    fentanyl 100 mcg/hr (12/25/21 0600)    TPN ADULT CENTRAL LINE CUSTOM 40 mL/hr at 12/26/21 0700    vasopressin 0.02 Units/min (12/26/21 0700)     Scheduled Meds:   balsam peru-castor oiL   Topical (Top) BID    famotidine (PF)  20 mg Intravenous Daily    heparin (porcine)  5,000 Units Subcutaneous Q8H    lipid (SMOFLIPID)  250 mL Intravenous Daily    nitroGLYCERIN 2% TD oint  0.5 inch Topical (Top) Q6H     PRN Meds:sodium chloride, sodium chloride, sodium chloride, sodium chloride, sodium chloride, dextrose 50%, dextrose 50%, fentaNYL, fentanyl, glucagon (human recombinant), insulin aspart U-100, potassium, sodium phosphates, potassium, sodium phosphates, potassium, sodium phosphates, sodium chloride 0.9%, sodium chloride 0.9%     Review of patient's allergies indicates:   Allergen Reactions    Oxycodone     Tylox [oxycodone-acetaminophen]      Objective:     Vital Signs (Most Recent):  Temp: 97.7 °F (36.5 °C) (12/26/21 0730)  Pulse: 88 (12/26/21 0730)  Resp: (!) 31 (12/26/21 0730)  BP: 120/74 (12/26/21 0730)  SpO2: 100 % (12/26/21 0730) Vital Signs (24h Range):  Temp:  [94.82 °F (34.9 °C)-98.6 °F (37 °C)] 97.7 °F (36.5 °C)  Pulse:  [] 88  Resp:  [19-43] 31  SpO2:  [84 %-100 %] 100 %  BP: (120)/(74) 120/74  Arterial Line BP: ()/(47-84) 136/65     Weight: 121.2 kg (267 lb 3.2 oz)  Body mass index is 45.86 kg/m².    Intake/Output - Last 3 Shifts       12/24 0700 12/25 0659 12/25 0700 12/26 0659 12/26 0700 12/27 0659    I.V. (mL/kg) 4699.9 (39.1) 5742.9 (47.4) 311.2 (2.6)    Blood       NG/ 40     IV Piggyback 199.6      TPN 1209.6 1197.5 60.9    Total  LM to call back to review instructions and confirm 2:30 PM arrival time for 7/25 EGD   Intake(mL/kg) 6324.1 (52.7) 6980.4 (57.6) 372.1 (3.1)    Urine (mL/kg/hr) 10 (0) 18 (0)     Drains 495 265     Other 5916 9380 637    Stool 0 0     Blood       Total Output 6421 9663 637    Net -96.9 -2682.6 -264.9           Stool Occurrence 3 x 0 x           Vitals and nursing note reviewed.   Constitutional:       Appearance: She is obese. She is ill-appearing.   HENT:      Head: Normocephalic and atraumatic.      Nose:      Comments: NG tube in place  Neck:      Comments: Right IJ trialysis line  Cardiovascular:      Rate and Rhythm: Normal rate and regular rhythm.      Pulses: Normal pulses.   Pulmonary:      Comments: Intubated  Vent Mode: PAV+  Oxygen Concentration (%):  [40] 40  Resp Rate Total:  [18 br/min-40 br/min] 29 br/min  Vt Set:  [330 mL] 330 mL  PEEP/CPAP:  [5 cmH20] 5 cmH20  Pressure Support:  [5 cmH20-15 cmH20] 15 cmH20  Mean Airway Pressure:  [7.5 sdZ76-34 cmH20] 8.8 cmH20  Abdominal:      Comments: Abdomen is closed with skin open with wound vac in place. x2 ollie drains within abdomen around liver with serosanguinous output.  SubQ ollie drain in LLQ with serosanguinous output.   Musculoskeletal:      Right lower leg: Edema present.      Left lower leg: Edema present.      Comments: L radial larry, L subclavian triple lumen. Palpaable pulses distally on BLE  Skin:     General: Skin is warm and dry.   Neurological:      Comments: Arrousable following commands.     Significant Labs:  I have reviewed all pertinent lab results within the past 24 hours.  CBC:   Recent Labs   Lab 12/26/21 0318   WBC 14.26*   RBC 2.41*   HGB 7.2*   HCT 21.7*      MCV 90   MCH 29.9   MCHC 33.2     CMP:   Recent Labs   Lab 12/26/21 0318   *   CALCIUM 7.6*   ALBUMIN 1.7*   PROT 4.7*      K 4.5   CO2 22*      BUN 24*   CREATININE 1.3   ALKPHOS 119   ALT 67*   AST 74*   BILITOT 3.3*       Significant Diagnostics:  I have reviewed all pertinent imaging results/findings within the past 24 hours.

## (undated) DEVICE — DRAPE INCISE IOBAN 2 23X17IN

## (undated) DEVICE — BOWL STERILE LARGE 32OZ

## (undated) DEVICE — KIT SURGIFLO HEMOSTATIC MATRIX

## (undated) DEVICE — Device

## (undated) DEVICE — DRAIN CHANNEL ROUND 19FR

## (undated) DEVICE — TOWEL OR XRAY WHITE 17X26IN

## (undated) DEVICE — TUBING SUC UNIV W/CONN 12FT

## (undated) DEVICE — HEMOSTAT SURGICEL NU-KNIT 6X9

## (undated) DEVICE — ELECTRODE REM PLYHSV RETURN 9

## (undated) DEVICE — TIP YANKAUERS BULB NO VENT

## (undated) DEVICE — CANISTER INFOV.A.C WOUND 500ML

## (undated) DEVICE — DRAPE ABDOMINAL TIBURON 14X11

## (undated) DEVICE — EVACUATOR WOUND BULB 100CC

## (undated) DEVICE — SUT 1 48IN PDS II VIO MONO

## (undated) DEVICE — DRESSING ABTHERA SENSA TRAC

## (undated) DEVICE — KIT PEG PULL STANDARD 20F

## (undated) DEVICE — BLADE 4 INCH EDGE UN-INS

## (undated) DEVICE — DRESSING GRANUFOAM LARGE VAC

## (undated) DEVICE — BAG URINARY DRN 2000ML

## (undated) DEVICE — URINARY DRAINAGE BAG

## (undated) DEVICE — LUBRICANT SURGILUBE 2 OZ

## (undated) DEVICE — SEE MEDLINE ITEM 156902